# Patient Record
Sex: FEMALE | Race: WHITE | Employment: OTHER | URBAN - METROPOLITAN AREA
[De-identification: names, ages, dates, MRNs, and addresses within clinical notes are randomized per-mention and may not be internally consistent; named-entity substitution may affect disease eponyms.]

---

## 2017-02-25 ENCOUNTER — APPOINTMENT (OUTPATIENT)
Dept: GENERAL RADIOLOGY | Age: 82
DRG: 309 | End: 2017-02-25
Attending: EMERGENCY MEDICINE
Payer: MEDICARE

## 2017-02-25 ENCOUNTER — HOSPITAL ENCOUNTER (INPATIENT)
Age: 82
LOS: 1 days | Discharge: HOME | DRG: 309 | End: 2017-02-26
Attending: EMERGENCY MEDICINE | Admitting: INTERNAL MEDICINE
Payer: MEDICARE

## 2017-02-25 DIAGNOSIS — R06.00 ACUTE DYSPNEA: ICD-10-CM

## 2017-02-25 DIAGNOSIS — I48.91 ATRIAL FIBRILLATION WITH RAPID VENTRICULAR RESPONSE (HCC): Primary | ICD-10-CM

## 2017-02-25 LAB
ALBUMIN SERPL BCP-MCNC: 3.7 G/DL (ref 3.2–4.6)
ALBUMIN/GLOB SERPL: 1 {RATIO} (ref 1.2–3.5)
ALP SERPL-CCNC: 129 U/L (ref 50–136)
ALT SERPL-CCNC: 22 U/L (ref 12–65)
ANION GAP BLD CALC-SCNC: 13 MMOL/L (ref 7–16)
APTT PPP: 39.9 SEC (ref 23.5–31.7)
AST SERPL W P-5'-P-CCNC: 29 U/L (ref 15–37)
ATRIAL RATE: 208 BPM
BASOPHILS # BLD AUTO: 0 K/UL (ref 0–0.2)
BASOPHILS # BLD: 0 % (ref 0–2)
BILIRUB SERPL-MCNC: 1.4 MG/DL (ref 0.2–1.1)
BNP SERPL-MCNC: 395 PG/ML
BUN SERPL-MCNC: 15 MG/DL (ref 8–23)
CALCIUM SERPL-MCNC: 8.9 MG/DL (ref 8.3–10.4)
CALCULATED P AXIS, ECG09: NORMAL DEGREES
CALCULATED R AXIS, ECG10: -65 DEGREES
CALCULATED T AXIS, ECG11: 109 DEGREES
CHLORIDE SERPL-SCNC: 97 MMOL/L (ref 98–107)
CO2 SERPL-SCNC: 28 MMOL/L (ref 21–32)
CREAT SERPL-MCNC: 1.14 MG/DL (ref 0.6–1)
DIAGNOSIS, 93000: NORMAL
DIASTOLIC BP, ECG02: NORMAL MMHG
DIFFERENTIAL METHOD BLD: ABNORMAL
EOSINOPHIL # BLD: 0.1 K/UL (ref 0–0.8)
EOSINOPHIL NFR BLD: 1 % (ref 0.5–7.8)
ERYTHROCYTE [DISTWIDTH] IN BLOOD BY AUTOMATED COUNT: 12.7 % (ref 11.9–14.6)
GLOBULIN SER CALC-MCNC: 3.6 G/DL (ref 2.3–3.5)
GLUCOSE SERPL-MCNC: 106 MG/DL (ref 65–100)
HCT VFR BLD AUTO: 34 % (ref 35.8–46.3)
HGB BLD-MCNC: 11.4 G/DL (ref 11.7–15.4)
IMM GRANULOCYTES # BLD: 0 K/UL (ref 0–0.5)
IMM GRANULOCYTES NFR BLD AUTO: 0.1 % (ref 0–5)
LYMPHOCYTES # BLD AUTO: 16 % (ref 13–44)
LYMPHOCYTES # BLD: 1.3 K/UL (ref 0.5–4.6)
MCH RBC QN AUTO: 30.9 PG (ref 26.1–32.9)
MCHC RBC AUTO-ENTMCNC: 33.5 G/DL (ref 31.4–35)
MCV RBC AUTO: 92.1 FL (ref 79.6–97.8)
MONOCYTES # BLD: 0.8 K/UL (ref 0.1–1.3)
MONOCYTES NFR BLD AUTO: 10 % (ref 4–12)
NEUTS SEG # BLD: 5.7 K/UL (ref 1.7–8.2)
NEUTS SEG NFR BLD AUTO: 73 % (ref 43–78)
P-R INTERVAL, ECG05: NORMAL MS
PLATELET # BLD AUTO: 287 K/UL (ref 150–450)
PMV BLD AUTO: 10.9 FL (ref 10.8–14.1)
POTASSIUM SERPL-SCNC: 3.3 MMOL/L (ref 3.5–5.1)
PROT SERPL-MCNC: 7.3 G/DL (ref 6.3–8.2)
Q-T INTERVAL, ECG07: 382 MS
QRS DURATION, ECG06: 138 MS
QTC CALCULATION (BEZET), ECG08: 539 MS
RBC # BLD AUTO: 3.69 M/UL (ref 4.05–5.25)
SODIUM SERPL-SCNC: 138 MMOL/L (ref 136–145)
SYSTOLIC BP, ECG01: NORMAL MMHG
TROPONIN I SERPL-MCNC: 0.05 NG/ML (ref 0.02–0.05)
TROPONIN I SERPL-MCNC: 0.06 NG/ML (ref 0.02–0.05)
VENTRICULAR RATE, ECG03: 120 BPM
WBC # BLD AUTO: 7.8 K/UL (ref 4.3–11.1)

## 2017-02-25 PROCEDURE — 96365 THER/PROPH/DIAG IV INF INIT: CPT | Performed by: EMERGENCY MEDICINE

## 2017-02-25 PROCEDURE — 74011250636 HC RX REV CODE- 250/636: Performed by: PHYSICIAN ASSISTANT

## 2017-02-25 PROCEDURE — 36415 COLL VENOUS BLD VENIPUNCTURE: CPT | Performed by: INTERNAL MEDICINE

## 2017-02-25 PROCEDURE — 85025 COMPLETE CBC W/AUTO DIFF WBC: CPT | Performed by: EMERGENCY MEDICINE

## 2017-02-25 PROCEDURE — 74011000250 HC RX REV CODE- 250: Performed by: INTERNAL MEDICINE

## 2017-02-25 PROCEDURE — 99285 EMERGENCY DEPT VISIT HI MDM: CPT | Performed by: EMERGENCY MEDICINE

## 2017-02-25 PROCEDURE — 96375 TX/PRO/DX INJ NEW DRUG ADDON: CPT | Performed by: EMERGENCY MEDICINE

## 2017-02-25 PROCEDURE — 74011000258 HC RX REV CODE- 258: Performed by: PHYSICIAN ASSISTANT

## 2017-02-25 PROCEDURE — 85730 THROMBOPLASTIN TIME PARTIAL: CPT | Performed by: INTERNAL MEDICINE

## 2017-02-25 PROCEDURE — 65660000000 HC RM CCU STEPDOWN

## 2017-02-25 PROCEDURE — 80053 COMPREHEN METABOLIC PANEL: CPT | Performed by: EMERGENCY MEDICINE

## 2017-02-25 PROCEDURE — 74011000250 HC RX REV CODE- 250: Performed by: EMERGENCY MEDICINE

## 2017-02-25 PROCEDURE — 74011250636 HC RX REV CODE- 250/636: Performed by: INTERNAL MEDICINE

## 2017-02-25 PROCEDURE — 84484 ASSAY OF TROPONIN QUANT: CPT | Performed by: EMERGENCY MEDICINE

## 2017-02-25 PROCEDURE — C8929 TTE W OR WO FOL WCON,DOPPLER: HCPCS

## 2017-02-25 PROCEDURE — 74011000258 HC RX REV CODE- 258: Performed by: INTERNAL MEDICINE

## 2017-02-25 PROCEDURE — 83880 ASSAY OF NATRIURETIC PEPTIDE: CPT | Performed by: EMERGENCY MEDICINE

## 2017-02-25 PROCEDURE — 93005 ELECTROCARDIOGRAM TRACING: CPT | Performed by: EMERGENCY MEDICINE

## 2017-02-25 PROCEDURE — 77030019605

## 2017-02-25 PROCEDURE — 74011250637 HC RX REV CODE- 250/637: Performed by: PHYSICIAN ASSISTANT

## 2017-02-25 PROCEDURE — 71010 XR CHEST PORT: CPT

## 2017-02-25 PROCEDURE — 74011250637 HC RX REV CODE- 250/637: Performed by: INTERNAL MEDICINE

## 2017-02-25 RX ORDER — ZOLPIDEM TARTRATE 5 MG/1
5 TABLET ORAL
Status: DISCONTINUED | OUTPATIENT
Start: 2017-02-25 | End: 2017-02-26 | Stop reason: HOSPADM

## 2017-02-25 RX ORDER — ASPIRIN 81 MG/1
81 TABLET ORAL DAILY
Status: DISCONTINUED | OUTPATIENT
Start: 2017-02-26 | End: 2017-02-26 | Stop reason: HOSPADM

## 2017-02-25 RX ORDER — ACETAMINOPHEN 325 MG/1
650 TABLET ORAL
Status: DISCONTINUED | OUTPATIENT
Start: 2017-02-25 | End: 2017-02-26 | Stop reason: HOSPADM

## 2017-02-25 RX ORDER — AMLODIPINE BESYLATE 5 MG/1
2.5 TABLET ORAL DAILY
Status: DISCONTINUED | OUTPATIENT
Start: 2017-02-26 | End: 2017-02-26 | Stop reason: HOSPADM

## 2017-02-25 RX ORDER — GLIMEPIRIDE 2 MG/1
1 TABLET ORAL
Status: DISCONTINUED | OUTPATIENT
Start: 2017-02-26 | End: 2017-02-26 | Stop reason: HOSPADM

## 2017-02-25 RX ORDER — PANTOPRAZOLE SODIUM 40 MG/1
40 TABLET, DELAYED RELEASE ORAL
Status: DISCONTINUED | OUTPATIENT
Start: 2017-02-26 | End: 2017-02-26 | Stop reason: HOSPADM

## 2017-02-25 RX ORDER — HEPARIN SODIUM 5000 [USP'U]/100ML
12-25 INJECTION, SOLUTION INTRAVENOUS
Status: DISCONTINUED | OUTPATIENT
Start: 2017-02-25 | End: 2017-02-26 | Stop reason: HOSPADM

## 2017-02-25 RX ORDER — NITROGLYCERIN 0.4 MG/1
0.4 TABLET SUBLINGUAL
Status: DISCONTINUED | OUTPATIENT
Start: 2017-02-25 | End: 2017-02-26 | Stop reason: HOSPADM

## 2017-02-25 RX ORDER — POLYETHYLENE GLYCOL 3350 17 G/17G
17 POWDER, FOR SOLUTION ORAL
Status: DISCONTINUED | OUTPATIENT
Start: 2017-02-25 | End: 2017-02-26 | Stop reason: HOSPADM

## 2017-02-25 RX ORDER — DILTIAZEM HYDROCHLORIDE 5 MG/ML
0.25 INJECTION INTRAVENOUS
Status: COMPLETED | OUTPATIENT
Start: 2017-02-25 | End: 2017-02-25

## 2017-02-25 RX ORDER — METOPROLOL TARTRATE 25 MG/1
25 TABLET, FILM COATED ORAL EVERY 6 HOURS
Status: DISCONTINUED | OUTPATIENT
Start: 2017-02-25 | End: 2017-02-26

## 2017-02-25 RX ORDER — DILTIAZEM HYDROCHLORIDE 5 MG/ML
10 INJECTION INTRAVENOUS ONCE
Status: COMPLETED | OUTPATIENT
Start: 2017-02-25 | End: 2017-02-25

## 2017-02-25 RX ORDER — MORPHINE SULFATE 2 MG/ML
2 INJECTION, SOLUTION INTRAMUSCULAR; INTRAVENOUS
Status: DISCONTINUED | OUTPATIENT
Start: 2017-02-25 | End: 2017-02-26 | Stop reason: HOSPADM

## 2017-02-25 RX ORDER — HYDROCODONE BITARTRATE AND ACETAMINOPHEN 5; 325 MG/1; MG/1
1 TABLET ORAL
Status: DISCONTINUED | OUTPATIENT
Start: 2017-02-25 | End: 2017-02-26 | Stop reason: HOSPADM

## 2017-02-25 RX ORDER — FUROSEMIDE 10 MG/ML
40 INJECTION INTRAMUSCULAR; INTRAVENOUS ONCE
Status: COMPLETED | OUTPATIENT
Start: 2017-02-25 | End: 2017-02-25

## 2017-02-25 RX ORDER — HEPARIN SODIUM 5000 [USP'U]/ML
35 INJECTION, SOLUTION INTRAVENOUS; SUBCUTANEOUS ONCE
Status: COMPLETED | OUTPATIENT
Start: 2017-02-25 | End: 2017-02-25

## 2017-02-25 RX ORDER — LEVOTHYROXINE SODIUM 50 UG/1
25 TABLET ORAL
Status: DISCONTINUED | OUTPATIENT
Start: 2017-02-26 | End: 2017-02-26 | Stop reason: HOSPADM

## 2017-02-25 RX ORDER — METOPROLOL TARTRATE 25 MG/1
25 TABLET, FILM COATED ORAL EVERY 12 HOURS
Status: DISCONTINUED | OUTPATIENT
Start: 2017-02-25 | End: 2017-02-25

## 2017-02-25 RX ORDER — HEPARIN SODIUM 5000 [USP'U]/ML
4000 INJECTION, SOLUTION INTRAVENOUS; SUBCUTANEOUS ONCE
Status: COMPLETED | OUTPATIENT
Start: 2017-02-25 | End: 2017-02-25

## 2017-02-25 RX ORDER — GABAPENTIN 300 MG/1
600 CAPSULE ORAL 3 TIMES DAILY
Status: DISCONTINUED | OUTPATIENT
Start: 2017-02-25 | End: 2017-02-26 | Stop reason: HOSPADM

## 2017-02-25 RX ADMIN — DILTIAZEM HYDROCHLORIDE 11.5 MG: 5 INJECTION INTRAVENOUS at 08:17

## 2017-02-25 RX ADMIN — METOPROLOL TARTRATE 25 MG: 25 TABLET ORAL at 13:27

## 2017-02-25 RX ADMIN — HEPARIN SODIUM 4000 UNITS: 5000 INJECTION INTRAVENOUS; SUBCUTANEOUS at 11:04

## 2017-02-25 RX ADMIN — GABAPENTIN 600 MG: 300 CAPSULE ORAL at 13:27

## 2017-02-25 RX ADMIN — DILTIAZEM HYDROCHLORIDE 10 MG: 5 INJECTION INTRAVENOUS at 15:07

## 2017-02-25 RX ADMIN — AMIODARONE HYDROCHLORIDE 150 MG: 50 INJECTION, SOLUTION INTRAVENOUS at 13:17

## 2017-02-25 RX ADMIN — HEPARIN SODIUM AND DEXTROSE 12 UNITS/KG/HR: 5000; 5 INJECTION INTRAVENOUS at 11:01

## 2017-02-25 RX ADMIN — GABAPENTIN 600 MG: 300 CAPSULE ORAL at 21:05

## 2017-02-25 RX ADMIN — HEPARIN SODIUM 1550 UNITS: 5000 INJECTION, SOLUTION INTRAVENOUS; SUBCUTANEOUS at 19:21

## 2017-02-25 RX ADMIN — SODIUM CHLORIDE 5 MG/HR: 900 INJECTION, SOLUTION INTRAVENOUS at 15:12

## 2017-02-25 RX ADMIN — METOPROLOL TARTRATE 25 MG: 25 TABLET ORAL at 17:12

## 2017-02-25 RX ADMIN — AMIODARONE HYDROCHLORIDE 1 MG/MIN: 50 INJECTION, SOLUTION INTRAVENOUS at 13:36

## 2017-02-25 RX ADMIN — FUROSEMIDE 40 MG: 10 INJECTION, SOLUTION INTRAMUSCULAR; INTRAVENOUS at 13:29

## 2017-02-25 RX ADMIN — PERFLUTREN 1 ML: 6.52 INJECTION, SUSPENSION INTRAVENOUS at 14:00

## 2017-02-25 NOTE — H&P
[facility] CARDIOLOGY History &Physical                 Primary Cardiologist: Dr. Mauri Gomes Physician: Dr. Yamel Gupta:     Patient is a 80 y.o. female who presents with tachypalpitations and dyspnea. She was seen in our office yesterday due to dyspnea. Her BP was markedly elevated. She has history of PAF and was in sinus rhythm in the office. Her Florinef was stopped and she was started on clonidine. Her dyspnea worsened overnight and she felt like she was smothering. She noted tachypalpitations as well. She presented to the ER and was found to be in atrial fibrillation with RVR. BNP was mildly elevated.      Past history includes PAF, orthostatic hypotension, CAD, CKD    Past Medical History:   Diagnosis Date    A fib     Abnormal loss of weight     Acquired hypothyroidism 9/12/2012    Anemia     Anorexia 9/2/2014    Arthritis associated with another disorder     Atrial fibrillation ([address] 9/12/2012    Paroxysmal.  Coumadin contraindicated due to falls      Autonomic neuropathy 1/13/2013    CAD (coronary artery disease)     CAD in native artery 5/5/2016    Cervical radiculopathy 5/23/2013    Chest pain     Chronic pain syndrome 1/13/2013    Back, right shoulder, neck: Peripheral neuropathy, spinal stenosis C7-T1, foraminal narrowing C4-5     CKD (chronic kidney disease), stage III 9/12/2012    Diabetes ([address]     about 30 years    Diabetes mellitus     Diabetes mellitus, type 2 ([address] 9/12/2012    Dysphagia 9/2/2014    Due to esophageal spasm seen on modified barium swallow 2015     Gait disorder 9/12/2012    GERD (gastroesophageal reflux disease)     Heart failure ([address]     HTN     Hypertension 9/12/2012    Orthostatic hypotension due to autonomic neuropathy     Hypothyroid     IBS (irritable bowel syndrome) 9/12/2012    Iron deficiency 9/12/2012    Lumbar disc disease     MCI (mild cognitive impairment) 1/13/2013    Mixed hyperlipidemia 9/12/2012    Orthostatic hypotension 5/5/2016    Pacemaker     SSS (sick sinus syndrome) ([facility] 5/5/2016      Past Surgical History:   Procedure Laterality Date    HX APPENDECTOMY      HX CARPAL TUNNEL RELEASE      HX CERVICAL FUSION      HX COLONOSCOPY  Nov 2005    HX PACEMAKER  6/08      Allergies   Allergen Reactions    Other Medication Unknown (comments)     Tylenol (caused Insomnia)     Social History   Substance Use Topics    Smoking status: Never Smoker    Smokeless tobacco: Never Used    Alcohol use No      FH: No family history on file. Review of Systems   Constitution: Negative for chills, fever, weakness, malaise/fatigue, weight gain and weight loss. HENT: Negative for ear pain, headaches, hearing loss, nosebleeds, sore throat and tinnitus. Eyes: Negative for blurred vision, vision loss in left eye and vision loss in right eye. Cardiovascular: Positive for orthopnea and palpitations. Negative for chest pain, dyspnea on exertion, leg swelling, near-syncope, paroxysmal nocturnal dyspnea and syncope. Respiratory: Positive for shortness of breath. Negative for cough, hemoptysis, sputum production and wheezing. Endocrine: Negative for cold intolerance, heat intolerance and polydipsia. Hematologic/Lymphatic: Does not bruise/bleed easily. Skin: Negative for color change and rash. Musculoskeletal: Negative for back pain, joint pain, joint swelling and myalgias. Gastrointestinal: Negative for abdominal pain, constipation, diarrhea, dysphagia, heartburn, hematemesis, melena, nausea and vomiting. Genitourinary: Negative for dysuria, frequency, hematuria and urgency. Neurological: Negative for difficulty with concentration, dizziness, light-headedness, numbness, paresthesias, seizures and vertigo. Psychiatric/Behavioral: Negative for altered mental status and depression.          Objective:       Visit Vitals    /87 (BP 1 Location: Left arm, BP Patient Position: Sitting)    Pulse (!) 123    Temp 97.6 °F (36.4 °C)    Resp 16    Ht 5' 4\" (1.626 m)    Wt 45.4 kg (100 lb)    SpO2 98%    BMI 17.16 kg/m2       Physical Exam:  General: Well Developed, Well Nourished, No Acute Distress  HEENT: pupils equal and round, no abnormalities noted  Neck: supple, no JVD  Heart: S1S2 with IRR  Lungs: decreased at bilateral bases  Abd: soft, nontender, nondistended, with good bowel sounds  Ext: warm, no edema, calves supple/nontender, pulses 2+ bilaterally  Skin: warm and dry  Psychiatric: Normal mood and affect  Neurologic: Alert and oriented X 3      ECG: atrial fibrillation with RVR, LBBB    Data Review:   Recent Labs      02/25/17   0720   NA  138   K  3.3*   BUN  15   CREA  1.14*   GLU  106*   WBC  7.8   HGB  11.4*   HCT  34.0*   PLT  287   TROIQ  0.06*       CXR: Small amount of layering effusion versus atelectasis suspected now at the left base    Assessment/Plan:   Principal Problem:    Atrial fibrillation with RVR (HCC) (9/12/2012)-admit, will start IV amiodarone, IV Heparin, monitor on telemetry    Active Problems:    Hypertension (9/12/2012)-monitor, was recently on florinef and Norvasc, florinef stopped yesterday, will start BB, will monitor BP, repeat orthostatics      Acquired hypothyroidism (9/12/2012)-continue synthroid      Dyspnea (2/25/2017)-will give one dose of IV lasix and monitor response       Hypokalemia-replace and monitor      Kenneth Weeks PA-C  2/25/2017  10:21 AM

## 2017-02-25 NOTE — PROGRESS NOTES
TRANSFER - IN REPORT:    Verbal report received from SALVATORE Toney on Peggy Busch being received from ED for routine progression of care      Report consisted of patients Situation, Background, Assessment and Recommendations(SBAR). Information from the following report(s) SBAR, Kardex, [facility] and Recent Results was reviewed with the receiving nurse. Opportunity for questions and clarification was provided. Patient had amiodarone bolus and infusion due at 1040 on MAR. Patient arrived to floor not on amiodarone drip. Called pharmacy to find drip. Pharmacy stated medication was sent to ED. Called ED, they said medication was sent to 3rd floor telemetry. Medication not currently on floor. Will look for medication. Assessment completed upon patients arrival to unit and care assumed. Dual RN skin assessment complete. Patient's skin found to be fragile, dry , warm and intact. Sacrum and heels visualized with no noted skin breakdown. Allevyn applied to sacrum to prevent skin breakdown. No other noted skin issues. Call light in reach, gripper socks on, family at bedside.

## 2017-02-25 NOTE — PROGRESS NOTES
Problem: Falls - Risk of  Goal: *Absence of falls  Outcome: Progressing Towards Goal  Patient agrees to use call bell and ask for assistance to get out of bed. Gripper sock on, call light in place, bed alarm on, and family at bedside.

## 2017-02-25 NOTE — ED NOTES
Patient is resting on the stretcher. Patient is on B cardiac monitor, continuous pulse ox, and cycling vital signs. Patient denies needs at this time. Visitor at bedside. Call light within reach. Side rails x 2 for safety.  Will continue to monitor

## 2017-02-25 NOTE — ED NOTES
TRANSFER - OUT REPORT:    Verbal report given to SALVATORE Josue on Riana Kowalski  being transferred to [facility] for routine progression of care       Report consisted of patients Situation, Background, Assessment and   Recommendations(SBAR). Information from the following report(s) SBAR, Kardex, ED Summary, MAR, Accordion and Recent Results was reviewed with the receiving nurse. Lines:   Peripheral IV 02/25/17 Left Antecubital (Active)   Site Assessment Clean, dry, & intact 2/25/2017 11:34 AM   Phlebitis Assessment 0 2/25/2017 11:34 AM   Infiltration Assessment 0 2/25/2017 11:34 AM   Dressing Status Clean, dry, & intact 2/25/2017 11:34 AM        Opportunity for questions and clarification was provided.       Patient transported with:   Monitor  O2 @ 2 liters  Registered Nurse

## 2017-02-25 NOTE — ED PROVIDER NOTES
Patient is a 80 y.o. female presenting with shortness of breath. The history is provided by the patient and a relative. Shortness of Breath   This is a new problem. The average episode lasts 2 days. The problem occurs continuously. The current episode started yesterday. The problem has been gradually worsening. Associated symptoms include orthopnea and chest pain (patient did take one nitroglycerin last night about 11 PM.  Unclear whether she took it for chest pain or just because she was more short of breath.). Pertinent negatives include no fever, no headaches, no coryza, no rhinorrhea, no sore throat, no swollen glands, no ear pain, no neck pain, no cough, no sputum production, no hemoptysis, no wheezing, no PND, no syncope, no vomiting, no abdominal pain, no rash, no leg pain, no leg swelling and no claudication. It is unknown what precipitated the problem. She has tried nothing for the symptoms. The treatment provided no relief. She has had prior hospitalizations. She has had prior ED visits. She has had no prior ICU admissions. Associated medical issues include CAD and heart failure. Associated medical issues do not include asthma, COPD, chronic lung disease, past MI, DVT or recent surgery.         Past Medical History:   Diagnosis Date    A fib     Abnormal loss of weight     Acquired hypothyroidism 9/12/2012    Anemia     Anorexia 9/2/2014    Arthritis associated with another disorder     Atrial fibrillation ([address] 9/12/2012    Paroxysmal.  Coumadin contraindicated due to falls      Autonomic neuropathy 1/13/2013    CAD (coronary artery disease)     CAD in native artery 5/5/2016    Cervical radiculopathy 5/23/2013    Chest pain     Chronic pain syndrome 1/13/2013    Back, right shoulder, neck: Peripheral neuropathy, spinal stenosis C7-T1, foraminal narrowing C4-5     CKD (chronic kidney disease), stage III 9/12/2012    Diabetes ([address]     about 30 years    Diabetes mellitus     Diabetes mellitus, type 2 ([facility] 9/12/2012    Dysphagia 9/2/2014    Due to esophageal spasm seen on modified barium swallow 2015     Gait disorder 9/12/2012    GERD (gastroesophageal reflux disease)     Heart failure ([facility]     HTN     Hypertension 9/12/2012    Orthostatic hypotension due to autonomic neuropathy     Hypothyroid     IBS (irritable bowel syndrome) 9/12/2012    Iron deficiency 9/12/2012    Lumbar disc disease     MCI (mild cognitive impairment) 1/13/2013    Mixed hyperlipidemia 9/12/2012    Orthostatic hypotension 5/5/2016    Pacemaker     SSS [facility]sick [facility] syndrome) ([facility] 5/5/2016       Past Surgical History:   Procedure Laterality Date    HX APPENDECTOMY      HX CARPAL TUNNEL RELEASE      HX CERVICAL FUSION      HX COLONOSCOPY  Nov 2005    HX PACEMAKER  6/08         No family history on file. Social History     Social History    Marital status:      Spouse name: N/A    Number of children: N/A    Years of education: N/A     Occupational History    Not on file. Social History Main Topics    Smoking status: Never Smoker    Smokeless tobacco: Never Used    Alcohol use No    Drug use: No    Sexual activity: Not on file     Other Topics Concern    Not on file     Social History Narrative    Lives with  who has mild vascular dementia, patient is primary caretaker but daughters come over to house 4-5 times a week, Meghan Corona has HCPOA         ALLERGIES: Other medication    Review of Systems   Constitutional: Negative for fever. HENT: Negative for ear pain, rhinorrhea and sore throat. Respiratory: Positive for shortness of breath. Negative for cough, hemoptysis, sputum production and wheezing. Cardiovascular: Positive for chest pain (patient did take one nitroglycerin last night about 11 PM.  Unclear whether she took it for chest pain or just because she was more short of breath.) and orthopnea. Negative for claudication, leg swelling, syncope and PND. Gastrointestinal: Negative for abdominal pain and vomiting. Musculoskeletal: Negative for neck pain. Skin: Negative for rash. Neurological: Negative for headaches. All other systems reviewed and are negative. Vitals:    02/25/17 0718 02/25/17 0748   BP: 180/87    Pulse: (!) 123    Resp: 16    Temp: 97.6 °F (36.4 °C)    SpO2: 94% 96%   Weight: 45.4 kg (100 lb)    Height: 5' 4\" (1.626 m)             Physical Exam   Constitutional: She is oriented to person, place, and time. She appears well-developed and well-nourished. She has a sickly appearance. She appears distressed. HENT:   Head: Normocephalic and atraumatic. Right Ear: Tympanic membrane and external ear normal.   Left Ear: Tympanic membrane and external ear normal.   Mouth/Throat: Oropharynx is clear and moist.   Eyes: Conjunctivae and EOM are normal. Pupils are equal, round, and reactive to light. Neck: Normal range of motion. Neck supple. No tracheal deviation present. Cardiovascular: Normal heart sounds and intact distal pulses. An irregularly irregular rhythm present. Tachycardia present. Exam reveals no gallop and no friction rub. No murmur heard. Pulmonary/Chest: Effort normal and breath sounds normal. No respiratory distress. She has no wheezes. Abdominal: Soft. Bowel sounds are normal. She exhibits no distension and no mass. There is no hepatosplenomegaly. There is no tenderness. There is no rebound and no guarding. Musculoskeletal: Normal range of motion. She exhibits no edema. Lymphadenopathy:     She has no cervical adenopathy. Neurological: She is alert and oriented to person, place, and time. She displays normal reflexes. No cranial nerve deficit. Skin: Skin is warm and dry. No rash noted. She is not diaphoretic. No erythema. Psychiatric: She has a normal mood and affect. Nursing note and vitals reviewed.        MDM  Number of Diagnoses or Management Options  Acute dyspnea: new and requires workup  Atrial fibrillation with rapid ventricular response ([facility]): established and worsening     Amount and/or Complexity of Data Reviewed  Clinical lab tests: ordered and reviewed  Tests in the radiology section of CPT®: ordered and reviewed  Decide to obtain previous medical records or to obtain history from someone other than the patient: yes  Review and summarize past medical records: yes  Discuss the patient with other providers: yes  Independent visualization of images, tracings, or specimens: yes    Risk of Complications, Morbidity, and/or Mortality  Presenting problems: high  Diagnostic procedures: moderate  Management options: high    Patient Progress  Patient progress: improved    ED Course       Procedures

## 2017-02-25 NOTE — ED TRIAGE NOTES
Per patient she shaking and cant sleep and having shortness of breath, patient was evaluated by cardiologist yesterday and took 1 nitro. Patient is A+Ox4 but debatable if patient is able to make proper decisions.  \" I want to see my cardiologist, if I can't I will just go to [facility]\"

## 2017-02-26 VITALS
SYSTOLIC BLOOD PRESSURE: 174 MMHG | TEMPERATURE: 97.8 F | OXYGEN SATURATION: 97 % | BODY MASS INDEX: 15.91 KG/M2 | HEIGHT: 64 IN | WEIGHT: 93.2 LBS | DIASTOLIC BLOOD PRESSURE: 74 MMHG | RESPIRATION RATE: 18 BRPM | HEART RATE: 64 BPM

## 2017-02-26 LAB
ANION GAP BLD CALC-SCNC: 8 MMOL/L (ref 7–16)
APTT PPP: 44.8 SEC (ref 23.5–31.7)
BUN SERPL-MCNC: 17 MG/DL (ref 8–23)
CALCIUM SERPL-MCNC: 8 MG/DL (ref 8.3–10.4)
CHLORIDE SERPL-SCNC: 99 MMOL/L (ref 98–107)
CO2 SERPL-SCNC: 33 MMOL/L (ref 21–32)
CREAT SERPL-MCNC: 1.13 MG/DL (ref 0.6–1)
ERYTHROCYTE [DISTWIDTH] IN BLOOD BY AUTOMATED COUNT: 12.6 % (ref 11.9–14.6)
GLUCOSE SERPL-MCNC: 102 MG/DL (ref 65–100)
HCT VFR BLD AUTO: 30.4 % (ref 35.8–46.3)
HGB BLD-MCNC: 10.4 G/DL (ref 11.7–15.4)
MCH RBC QN AUTO: 31.3 PG (ref 26.1–32.9)
MCHC RBC AUTO-ENTMCNC: 34.2 G/DL (ref 31.4–35)
MCV RBC AUTO: 91.6 FL (ref 79.6–97.8)
PLATELET # BLD AUTO: 281 K/UL (ref 150–450)
PMV BLD AUTO: 10.4 FL (ref 10.8–14.1)
POTASSIUM SERPL-SCNC: 3 MMOL/L (ref 3.5–5.1)
RBC # BLD AUTO: 3.32 M/UL (ref 4.05–5.25)
SODIUM SERPL-SCNC: 140 MMOL/L (ref 136–145)
TSH SERPL DL<=0.005 MIU/L-ACNC: 1.58 UIU/ML (ref 0.36–3.74)
WBC # BLD AUTO: 5.3 K/UL (ref 4.3–11.1)

## 2017-02-26 PROCEDURE — 74011250637 HC RX REV CODE- 250/637: Performed by: INTERNAL MEDICINE

## 2017-02-26 PROCEDURE — 74011250636 HC RX REV CODE- 250/636: Performed by: INTERNAL MEDICINE

## 2017-02-26 PROCEDURE — 84443 ASSAY THYROID STIM HORMONE: CPT | Performed by: PHYSICIAN ASSISTANT

## 2017-02-26 PROCEDURE — 85730 THROMBOPLASTIN TIME PARTIAL: CPT | Performed by: INTERNAL MEDICINE

## 2017-02-26 PROCEDURE — 74011250637 HC RX REV CODE- 250/637: Performed by: PHYSICIAN ASSISTANT

## 2017-02-26 PROCEDURE — 36415 COLL VENOUS BLD VENIPUNCTURE: CPT | Performed by: PHYSICIAN ASSISTANT

## 2017-02-26 PROCEDURE — 80048 BASIC METABOLIC PNL TOTAL CA: CPT | Performed by: PHYSICIAN ASSISTANT

## 2017-02-26 PROCEDURE — 74011250636 HC RX REV CODE- 250/636: Performed by: PHYSICIAN ASSISTANT

## 2017-02-26 PROCEDURE — 85027 COMPLETE CBC AUTOMATED: CPT | Performed by: PHYSICIAN ASSISTANT

## 2017-02-26 RX ORDER — FUROSEMIDE 20 MG/1
20 TABLET ORAL DAILY
Qty: 30 TAB | Refills: 3 | Status: SHIPPED | OUTPATIENT
Start: 2017-02-26 | End: 2017-03-21

## 2017-02-26 RX ORDER — POTASSIUM CHLORIDE 750 MG/1
10 TABLET, EXTENDED RELEASE ORAL DAILY
Qty: 30 TAB | Refills: 3 | Status: SHIPPED | OUTPATIENT
Start: 2017-02-26 | End: 2017-07-13 | Stop reason: SDUPTHER

## 2017-02-26 RX ORDER — POTASSIUM CHLORIDE 20 MEQ/1
40 TABLET, EXTENDED RELEASE ORAL
Status: COMPLETED | OUTPATIENT
Start: 2017-02-26 | End: 2017-02-26

## 2017-02-26 RX ORDER — POTASSIUM CHLORIDE 14.9 MG/ML
20 INJECTION INTRAVENOUS ONCE
Status: COMPLETED | OUTPATIENT
Start: 2017-02-26 | End: 2017-02-26

## 2017-02-26 RX ORDER — CARVEDILOL 12.5 MG/1
12.5 TABLET ORAL 2 TIMES DAILY WITH MEALS
Qty: 60 TAB | Refills: 6 | Status: SHIPPED | OUTPATIENT
Start: 2017-02-26 | End: 2017-10-13 | Stop reason: SDUPTHER

## 2017-02-26 RX ORDER — HEPARIN SODIUM 5000 [USP'U]/ML
35 INJECTION, SOLUTION INTRAVENOUS; SUBCUTANEOUS ONCE
Status: COMPLETED | OUTPATIENT
Start: 2017-02-26 | End: 2017-02-26

## 2017-02-26 RX ORDER — LISINOPRIL 10 MG/1
10 TABLET ORAL DAILY
Qty: 30 TAB | Refills: 6 | Status: SHIPPED | OUTPATIENT
Start: 2017-02-26 | End: 2017-03-11

## 2017-02-26 RX ORDER — CARVEDILOL 12.5 MG/1
12.5 TABLET ORAL 2 TIMES DAILY WITH MEALS
Status: DISCONTINUED | OUTPATIENT
Start: 2017-02-26 | End: 2017-02-26 | Stop reason: HOSPADM

## 2017-02-26 RX ADMIN — POTASSIUM CHLORIDE 40 MEQ: 20 TABLET, EXTENDED RELEASE ORAL at 09:15

## 2017-02-26 RX ADMIN — GLIMEPIRIDE 1 MG: 2 TABLET ORAL at 06:27

## 2017-02-26 RX ADMIN — METOPROLOL TARTRATE 25 MG: 25 TABLET ORAL at 00:09

## 2017-02-26 RX ADMIN — AMLODIPINE BESYLATE 2.5 MG: 5 TABLET ORAL at 09:15

## 2017-02-26 RX ADMIN — PANTOPRAZOLE SODIUM 40 MG: 40 TABLET, DELAYED RELEASE ORAL at 06:28

## 2017-02-26 RX ADMIN — ASPIRIN 81 MG: 81 TABLET, COATED ORAL at 09:16

## 2017-02-26 RX ADMIN — METOPROLOL TARTRATE 25 MG: 25 TABLET ORAL at 06:23

## 2017-02-26 RX ADMIN — LEVOTHYROXINE SODIUM 25 MCG: 50 TABLET ORAL at 06:27

## 2017-02-26 RX ADMIN — GABAPENTIN 600 MG: 300 CAPSULE ORAL at 06:23

## 2017-02-26 RX ADMIN — HEPARIN SODIUM 1550 UNITS: 5000 INJECTION, SOLUTION INTRAVENOUS; SUBCUTANEOUS at 03:32

## 2017-02-26 RX ADMIN — CARVEDILOL 12.5 MG: 12.5 TABLET, FILM COATED ORAL at 09:16

## 2017-02-26 RX ADMIN — POTASSIUM CHLORIDE 20 MEQ: 14.9 INJECTION, SOLUTION INTRAVENOUS at 09:22

## 2017-02-26 NOTE — DISCHARGE SUMMARY
[facility] Cardiology Discharge Summary     Patient ID:  Yousuf Alonzo  [id number]  19 y.o.  8/10/1927    Admit date: 2/25/2017    Discharge date:  2/26/2017    Admitting Physician: Jeannette Steward MD     Discharge Physician: ALBERTO Ball/Dr. Loretta Gonsales     Admission Diagnoses: Atrial fibrillation with rapid ventricular response [facility])    Discharge Diagnoses:   Patient Active Problem List    Diagnosis Date Noted    Dyspnea 02/25/2017    Atrial fibrillation with rapid ventricular response ([address] 02/25/2017    SSS (sick sinus syndrome) ([facility] 05/05/2016    Orthostatic hypotension 05/05/2016    CAD in native artery 05/05/2016    Pacemaker 11/10/2015    Anorexia 09/02/2014    Dysphagia 09/02/2014    Cervical radiculopathy 05/23/2013    Autonomic neuropathy 01/13/2013    MCI (mild cognitive impairment) 01/13/2013    Chronic pain syndrome 01/13/2013    Diabetes mellitus, type 2 ([address] 09/12/2012    Hypertension 09/12/2012    Acquired hypothyroidism 09/12/2012    Mixed hyperlipidemia 09/12/2012    CKD (chronic kidney disease), stage III 09/12/2012    GERD (gastroesophageal reflux disease) 09/12/2012    IBS (irritable bowel syndrome) 09/12/2012    Gait disorder 09/12/2012    Atrial fibrillation with RVR ([facility] 09/12/2012       Cardiology Procedures this admission:  EchoCardiogram  Consults: None    Hospital Course: Patient presented to the emergency department of [facility] with complaints of tachypalpitations and shortness of breath. She was seen in our office on 2/24 for complaints of dyspnea. Her BP was markedly elevated. She has history of PAF and was in sinus rhythm in the office. Her Florinef was stopped and she was started on clonidine. Her dyspnea worsened overnight and she felt like she was smothering. She noted tachypalpitations as well. She presented to the ER and was found to be in atrial fibrillation with RVR. BNP was mildly elevated. She was admitted.  She was given IV amiodarone bolus and started on drip. She was started on IV Heparin as well. She was given single dose of IV lasix with good diuresis. She remained in atrial fibrillation with RVR. She was changed to IV Cardizem. She converted to sinus rhythm. She felt much better with less dyspnea. TSH was within normal limits. She had been on oral amiodarone 200mg daily PTA. An echocardiogram was performed with report as follows:  -  Left ventricle: Systolic function was markedly reduced. Ejection fraction was estimated in the range of 30 % to 35 %. This study was inadequate for the evaluation of regional wall motion. Possible mild anterior hypokinesis. There was moderate concentric hypertrophy. -  Left atrium: The atrium was mildly dilated. -  Right atrium: The atrium was mildly dilated. -  Inferior vena cava, hepatic veins: The inferior vena cava was mildly dilated. -  Mitral valve: There was mild annular calcification. There was mild to moderate regurgitation. -  Tricuspid valve: There was mild regurgitation. -  Pericardium: A trivial pericardial effusion was identified. She has known history of CAD but denies any recent chest pain. The morning of 2/26/17, the patient was feeling much better and remained in sinus rhythm. The patient was seen and examined by Dr. Jae Mojica and was determined stable and ready for discharge home. The patient was instructed on the importance of medication compliance and outpatient follow up. For systolic CHF, she is discharged on BB, ACE-I, oral lasix and potassium. She was determined not to be an anticoagulation candidate in the past by Dr. Conor Russ. The patient will follow up with [facility] Dr. Conor Russ in 1-2 weeks. DISPOSITION: The patient is being discharged home in stable condition on a low saturated fat, low cholesterol and low salt diet. The patient is instructed to advance activities as tolerated to the limit of fatigue or shortness of breath.  The patient is instructed to call the office or return to the ER for immediate evaluation for any severe shortness of breath, weight gain, LE edema, orthopnea, chest pain, prolonged palpitations, near syncope or syncope.     Discharge Exam:   Visit Vitals    /52 (BP 1 Location: Right arm, BP Patient Position: At rest)    Pulse 62    Temp 97.9 °F (36.6 °C)    Resp 16    Ht 5' 4\" (1.626 m)    Wt 42.3 kg (93 lb 3.2 oz)    SpO2 91%    BMI 16 kg/m2       Physical Exam:  General: Well Developed, Well Nourished, No Acute Distress, Alert & Oriented  Neck: supple, no JVD  Heart: S1S2 with RRR  Lungs: Clear throughout auscultation bilaterally  Abd: soft, nontender, nondistended, with good bowel sounds  Ext: warm, no edema, calves supple/nontender, pulses 2+ bilaterally  Skin: warm and dry      Recent Results (from the past 24 hour(s))   TROPONIN I    Collection Time: 02/25/17 10:10 AM   Result Value Ref Range    Troponin-I, Qt. 0.05 0.02 - 0.05 NG/ML   PTT    Collection Time: 02/25/17  4:10 PM   Result Value Ref Range    aPTT 39.9 (H) 23.5 - 75.2 SEC   METABOLIC PANEL, BASIC    Collection Time: 02/26/17  2:00 AM   Result Value Ref Range    Sodium 140 136 - 145 mmol/L    Potassium 3.0 (L) 3.5 - 5.1 mmol/L    Chloride 99 98 - 107 mmol/L    CO2 33 (H) 21 - 32 mmol/L    Anion gap 8 7 - 16 mmol/L    Glucose 102 (H) 65 - 100 mg/dL    BUN 17 8 - 23 MG/DL    Creatinine 1.13 (H) 0.6 - 1.0 MG/DL    GFR est AA 58 (L) >60 ml/min/1.73m2    GFR est non-AA 48 (L) >60 ml/min/1.73m2    Calcium 8.0 (L) 8.3 - 10.4 MG/DL   TSH 3RD GENERATION    Collection Time: 02/26/17  2:00 AM   Result Value Ref Range    TSH 1.580 0.358 - 3.740 uIU/mL   CBC W/O DIFF    Collection Time: 02/26/17  2:00 AM   Result Value Ref Range    WBC 5.3 4.3 - 11.1 K/uL    RBC 3.32 (L) 4.05 - 5.25 M/uL    HGB 10.4 (L) 11.7 - 15.4 g/dL    HCT 30.4 (L) 35.8 - 46.3 %    MCV 91.6 79.6 - 97.8 FL    MCH 31.3 26.1 - 32.9 PG    MCHC 34.2 31.4 - 35.0 g/dL    RDW 12.6 11.9 - 14.6 %    PLATELET 876 663 - 054 K/uL    MPV 10.4 (L) 10.8 - 14.1 FL   PTT    Collection Time: 02/26/17  2:00 AM   Result Value Ref Range    aPTT 44.8 (H) 23.5 - 31.7 SEC         Patient Instructions:   Current Discharge Medication List      START taking these medications    Details   carvedilol (COREG) 12.5 mg tablet Take 1 Tab by mouth two (2) times daily (with meals). Qty: 60 Tab, Refills: 6      furosemide (LASIX) 20 mg tablet Take 1 Tab by mouth daily. Qty: 30 Tab, Refills: 3      potassium chloride (K-DUR, KLOR-CON) 10 mEq tablet Take 1 Tab by mouth daily. Qty: 30 Tab, Refills: 3         CONTINUE these medications which have CHANGED    Details   lisinopril (PRINIVIL, ZESTRIL) 10 mg tablet Take 1 Tab by mouth daily. Qty: 30 Tab, Refills: 6         CONTINUE these medications which have NOT CHANGED    Details   HYDROcodone-acetaminophen (NORCO) 7.5-325 mg per tablet Take 1 Tab by mouth three (3) times daily. Max Daily Amount: 3 Tabs. Indications: Pain  Qty: 90 Tab, Refills: 0    Associated Diagnoses: Flank pain      SENNA TK 1 T PO QPM  Refills: 1      glimepiride (AMARYL) 1 mg tablet Take 1 mg by mouth.      zolpidem (AMBIEN) 5 mg tablet Take 1 Tab by mouth nightly as needed for Sleep. Max Daily Amount: 5 mg. Qty: 30 Tab, Refills: 1      amiodarone (CORDARONE) 200 mg tablet Take 1 Tab by mouth daily. Indications: PREVENTION OF RECURRENT ATRIAL FIBRILLATION  Qty: 90 Tab, Refills: 3      aspirin delayed-release 81 mg tablet Take  by mouth daily. omeprazole (PRILOSEC) 20 mg capsule Take 1 Cap by mouth two (2) times a day. Qty: 180 Cap, Refills: 3    Associated Diagnoses: Rib pain on left side      gabapentin (NEURONTIN) 600 mg tablet Take 1 Tab by mouth three (3) times daily. Qty: 360 Tab, Refills: 3      polyethylene glycol (MIRALAX) 17 gram packet Take 1 Packet by mouth daily. Qty: 30 Packet, Refills: 5    Associated Diagnoses: Rib pain on left side      senna-docusate (PERICOLACE) 8.6-50 mg per tablet Take 1 Tab by mouth nightly.   Qty: 90 Tab, Refills: 3    Associated Diagnoses: Rib pain on left side      levothyroxine (SYNTHROID) 25 mcg tablet Take 1 Tab by mouth Daily (before breakfast). Qty: 90 Tab, Refills: 3      glucose blood VI test strips (ONE TOUCH ULTRA TEST) strip Test twice a day  Qty: 4 Package, Refills: 3      nitroglycerin (NITROSTAT) 0.4 mg SL tablet 1 Tab by SubLINGual route every five (5) minutes as needed. Indications: ANGINA  Qty: 1 Bottle, Refills: 3      cholecalciferol (VITAMIN D-3) 400 unit Tab tablet Take 1,000 Units by mouth daily. Associated Diagnoses: Rib pain on left side      calcium carbonate (OS-) 500 mg (1,250 mg) tablet take 1 Tab by mouth two (2) times a day.           STOP taking these medications       cloNIDine HCl (CATAPRES) 0.1 mg tablet Comments:   Reason for Stopping:         amLODIPine (NORVASC) 5 mg tablet Comments:   Reason for Stopping:         fludrocortisone (FLORINEF) 0.1 mg tablet Comments:   Reason for Stopping:                 Signed:  Rosas Triana PA-C  2/26/2017  8:29 AM

## 2017-02-26 NOTE — PROGRESS NOTES
MR notified this writer that patient has converted into NSR. MD Thomas made aware orders to turn off cardizem gtt in 1 hour.

## 2017-02-26 NOTE — PROGRESS NOTES
Bedside shift change report given to  Prabhjot persaud RN. Report included the following information SBAR, Kardex, MAR and Recent Results. Heparin gtt rate verified at bedside.

## 2017-02-26 NOTE — DISCHARGE INSTRUCTIONS
DISCHARGE SUMMARY from Nurse    The following personal items are in your possession at time of discharge:    Dental Appliances: None  Visual Aid: Glasses     Home Medications: None  Jewelry: None  Clothing: Pajamas, Footwear, Robe, At bedside  Other Valuables: None             PATIENT INSTRUCTIONS:    After general anesthesia or intravenous sedation, for 24 hours or while taking prescription Narcotics:  · Limit your activities  · Do not drive and operate hazardous machinery  · Do not make important personal or business decisions  · Do  not drink alcoholic beverages  · If you have not urinated within 8 hours after discharge, please contact your surgeon on call. Report the following to your surgeon:  · Excessive pain, swelling, redness or odor of or around the surgical area  · Temperature over 100.5  · Nausea and vomiting lasting longer than 4 hours or if unable to take medications  · Any signs of decreased circulation or nerve impairment to extremity: change in color, persistent  numbness, tingling, coldness or increase pain  · Any questions        What to do at Home:  Recommended activity: Activity as tolerated    If you experience any of the following symptoms chest pain, shortness of breath, dizziness, palpitations, weight gain of 3 pounds overnight or 5 pounds in a week please follow up with [facility]. *  Please give a list of your current medications to your Primary Care Provider. *  Please update this list whenever your medications are discontinued, doses are      changed, or new medications (including over-the-counter products) are added. *  Please carry medication information at all times in case of emergency situations. These are general instructions for a healthy lifestyle:    No smoking/ No tobacco products/ Avoid exposure to second hand smoke    Surgeon General's Warning:  Quitting smoking now greatly reduces serious risk to your health.     Obesity, smoking, and sedentary lifestyle greatly increases your risk for illness    A healthy diet, regular physical exercise & weight monitoring are important for maintaining a healthy lifestyle    You may be retaining fluid if you have a history of heart failure or if you experience any of the following symptoms:  Weight gain of 3 pounds or more overnight or 5 pounds in a week, increased swelling in our hands or feet or shortness of breath while lying flat in bed. Please call your doctor as soon as you notice any of these symptoms; do not wait until your next office visit. Recognize signs and symptoms of STROKE:    F-face looks uneven    A-arms unable to move or move unevenly    S-speech slurred or non-existent    T-time-call 911 as soon as signs and symptoms begin-DO NOT go       Back to bed or wait to see if you get better-TIME IS BRAIN. Warning Signs of HEART ATTACK     Call 911 if you have these symptoms:   Chest discomfort. Most heart attacks involve discomfort in the center of the chest that lasts more than a few minutes, or that goes away and comes back. It can feel like uncomfortable pressure, squeezing, fullness, or pain.  Discomfort in other areas of the upper body. Symptoms can include pain or discomfort in one or both arms, the back, neck, jaw, or stomach.  Shortness of breath with or without chest discomfort.  Other signs may include breaking out in a cold sweat, nausea, or lightheadedness. Don't wait more than five minutes to call 911 - MINUTES MATTER! Fast action can save your life. Calling 911 is almost always the fastest way to get lifesaving treatment. Emergency Medical Services staff can begin treatment when they arrive -- up to an hour sooner than if someone gets to the hospital by car. The discharge information has been reviewed with the patient. The patient verbalized understanding.     Discharge medications reviewed with the patient and appropriate educational materials and side effects teaching were provided. Atrial Fibrillation: Care Instructions  Your Care Instructions    Atrial fibrillation is an irregular and often fast heartbeat. Treating this condition is important for several reasons. It can cause blood clots, which can travel from your heart to your brain and cause a stroke. If you have a fast heartbeat, you may feel lightheaded, dizzy, and weak. An irregular heartbeat can also increase your risk for heart failure. Atrial fibrillation is often the result of another heart condition, such as high blood pressure or coronary artery disease. Making changes to improve your heart condition will help you stay healthy and active. Follow-up care is a key part of your treatment and safety. Be sure to make and go to all appointments, and call your doctor if you are having problems. It's also a good idea to know your test results and keep a list of the medicines you take. How can you care for yourself at home? Medicines  · Take your medicines exactly as prescribed. Call your doctor if you think you are having a problem with your medicine. You will get more details on the specific medicines your doctor prescribes. · If your doctor has given you a blood thinner to prevent a stroke, be sure you get instructions about how to take your medicine safely. Blood thinners can cause serious bleeding problems. · Do not take any vitamins, over-the-counter drugs, or herbal products without talking to your doctor first.  Lifestyle changes  · Do not smoke. Smoking can increase your chance of a stroke and heart attack. If you need help quitting, talk to your doctor about stop-smoking programs and medicines. These can increase your chances of quitting for good. · Eat a heart-healthy diet. · Stay at a healthy weight. Lose weight if you need to. · Limit alcohol to 2 drinks a day for men and 1 drink a day for women. Too much alcohol can cause health problems. · Avoid colds and flu. Get a pneumococcal vaccine shot.  If you have had one before, ask your doctor whether you need another dose. Get a flu shot every year. If you must be around people with colds or flu, wash your hands often. Activity  · If your doctor recommends it, get more exercise. Walking is a good choice. Bit by bit, increase the amount you walk every day. Try for at least 30 minutes on most days of the week. You also may want to swim, bike, or do other activities. Your doctor may suggest that you join a cardiac rehabilitation program so that you can have help increasing your physical activity safely. · Start light exercise if your doctor says it is okay. Even a small amount will help you get stronger, have more energy, and manage stress. Walking is an easy way to get exercise. Start out by walking a little more than you did in the hospital. Gradually increase the amount you walk. · When you exercise, watch for signs that your heart is working too hard. You are pushing too hard if you cannot talk while you are exercising. If you become short of breath or dizzy or have chest pain, sit down and rest immediately. · Check your pulse regularly. Place two fingers on the artery at the palm side of your wrist, in line with your thumb. If your heartbeat seems uneven or fast, talk to your doctor. When should you call for help? Call 911 anytime you think you may need emergency care. For example, call if:  · You have symptoms of a heart attack. These may include:  ¨ Chest pain or pressure, or a strange feeling in the chest.  ¨ Sweating. ¨ Shortness of breath. ¨ Nausea or vomiting. ¨ Pain, pressure, or a strange feeling in the back, neck, jaw, or upper belly or in one or both shoulders or arms. ¨ Lightheadedness or sudden weakness. ¨ A fast or irregular heartbeat. After you call 911, the  may tell you to chew 1 adult-strength or 2 to 4 low-dose aspirin. Wait for an ambulance. Do not try to drive yourself. · You have symptoms of a stroke.  These may include:  ¨ Sudden numbness, tingling, weakness, or loss of movement in your face, arm, or leg, especially on only one side of your body. ¨ Sudden vision changes. ¨ Sudden trouble speaking. ¨ Sudden confusion or trouble understanding simple statements. ¨ Sudden problems with walking or balance. ¨ A sudden, severe headache that is different from past headaches. · You passed out (lost consciousness). Call your doctor now or seek immediate medical care if:  · You have new or increased shortness of breath. · You feel dizzy or lightheaded, or you feel like you may faint. · Your heart rate becomes irregular. · You can feel your heart flutter in your chest or skip heartbeats. Tell your doctor if these symptoms are new or worse. Watch closely for changes in your health, and be sure to contact your doctor if you have any problems. Where can you learn more? Go to http://davidDick or Broriu.info/. Enter U020 in the search box to learn more about \"Atrial Fibrillation: Care Instructions. \"  Current as of: January 27, 2016  Content Version: 11.1  © 1427-0493 [employer]. Care instructions adapted under license by [employer] (which disclaims liability or warranty for this information). If you have questions about a medical condition or this instruction, always ask your healthcare professional. [address] any warranty or liability for your use of this information. Avoiding Triggers With Heart Failure: Care Instructions  Your Care Instructions  Triggers are anything that make your heart failure flare up. A flare-up is also called \"sudden heart failure\" or \"acute heart failure. \" When you have a flare-up, fluid builds up in your lungs, and you have problems breathing. You might need to go to the hospital. By watching for changes in your condition and avoiding triggers, you can prevent heart failure flare-ups.   Follow-up care is a key part of your treatment and safety. Be sure to make and go to all appointments, and call your doctor if you are having problems. It's also a good idea to know your test results and keep a list of the medicines you take. How can you care for yourself at home? Watch for changes in your weight and condition  · Weigh yourself without clothing at the same time each day. Record your weight. Call your doctor if you gain 3 pounds or more in 2 to 3 days. A sudden weight gain may mean that your heart failure is getting worse. · Keep a daily record of your symptoms. Write down any changes in how you feel, such as new shortness of breath, cough, or problems eating. Also record if your ankles are more swollen than usual and if you have to urinate in the night more often. Note anything that you ate or did that could have triggered these changes. Limit sodium  Sodium causes your body to hold on to water, making it harder for your heart to pump. People get most of their sodium from processed foods. Fast food and restaurant meals also tend to be very high in sodium. · Your doctor may suggest that you limit sodium to 2,000 milligrams (mg) a day or less. That is less than 1 teaspoon of salt a day, including all the salt you eat in cooking or in packaged foods. · Read food labels on cans and food packages. They tell you how much sodium you get in one serving. Check the serving size. If you eat more than one serving, you are getting more sodium. · Be aware that sodium can come in forms other than salt, including monosodium glutamate (MSG), sodium citrate, and sodium bicarbonate (baking soda). MSG is often added to Asian food. You can sometimes ask for food without MSG or salt. · Slowly reducing salt will help you adjust to the taste. Take the salt shaker off the table. · Flavor your food with garlic, lemon juice, onion, vinegar, herbs, and spices instead of salt.  Do not use soy sauce, steak sauce, onion salt, garlic salt, mustard, or ketchup on your food, unless it is labeled \"low-sodium\" or \"low-salt. \"  · Make your own salad dressings, sauces, and ketchup without adding salt. · Use fresh or frozen ingredients, instead of canned ones, whenever you can. Choose low-sodium canned goods. · Eat less processed food and food from restaurants, including fast food. Exercise as directed  Moderate, regular exercise is very good for your heart. It improves your blood flow and helps control your weight. But too much exercise can stress your heart and cause a heart failure flare-up. · Check with your doctor before you start an exercise program.  · Walking is an easy way to get exercise. Start out slowly. Gradually increase the length and pace of your walk. Swimming, riding a bike, and using a treadmill are also good forms of exercise. · When you exercise, watch for signs that your heart is working too hard. You are pushing yourself too hard if you cannot talk while you are exercising. If you become short of breath or dizzy or have chest pain, stop, sit down, and rest.  · Do not exercise when you do not feel well. Take medicines correctly  · Take your medicines exactly as prescribed. Call your doctor if you think you are having a problem with your medicine. · Make a list of all the medicines you take. Include those prescribed to you by other doctors and any over-the-counter medicines, vitamins, or supplements you take. Take this list with you when you go to any doctor. · Take your medicines at the same time every day. It may help you to post a list of all the medicines you take every day and what time of day you take them. · Make taking your medicine as simple as you can. Plan times to take your medicines when you are doing other things, such as eating a meal or getting ready for bed. This will make it easier to remember to take your medicines. · Get organized. Use helpful tools, such as daily or weekly pill containers. When should you call for help?   Call 911 if you have symptoms of sudden heart failure such as:  · You have severe trouble breathing. · You cough up pink, foamy mucus. · You have a new irregular or rapid heartbeat. Call your doctor now or seek immediate medical care if:  · You have new or increased shortness of breath. · You are dizzy or lightheaded, or you feel like you may faint. · You have sudden weight gain, such as 3 pounds or more in 2 to 3 days. · You have increased swelling in your legs, ankles, or feet. · You are suddenly so tired or weak that you cannot do your usual activities. Watch closely for changes in your health, and be sure to contact your doctor if you develop new symptoms. Where can you learn more? Go to http://david-rui.info/. Enter L340 in the search box to learn more about \"Avoiding Triggers With Heart Failure: Care Instructions. \"  Current as of: April 27, 2016  Content Version: 11.1  © 4886-7580 [employer]. Care instructions adapted under license by [employer] (which disclaims liability or warranty for this information). If you have questions about a medical condition or this instruction, always ask your healthcare professional. [address] any warranty or liability for your use of this information.

## 2017-02-26 NOTE — PROGRESS NOTES
Bedside and Verbal shift change report given to Varun Ge RN (oncoming nurse) by self Kvng Ping nurse). Report included the following information SBAR, Kardex, MAR and Recent Results.

## 2017-02-26 NOTE — PROGRESS NOTES
IV heparin rate has been adjusted based on the most recent PTT results.     Lab Results   Component Value Date/Time    aPTT 44.8 02/26/2017 02:00 AM     Gtt increased and bolus ordered per protocol

## 2017-02-26 NOTE — PROGRESS NOTES
Bedside and Verbal shift change report given to self (oncoming nurse) by Ivon Garcia RN (offgoing nurse). Report included the following information SBAR, Kardex, MAR and Recent Results.

## 2017-02-26 NOTE — PROGRESS NOTES
Discharge instructions given and review with patient and daughter. All questions answered. IV and heart monitor removed. Ready for discharge.

## 2017-02-27 ENCOUNTER — PATIENT OUTREACH (OUTPATIENT)
Dept: CASE MANAGEMENT | Age: 82
End: 2017-02-27

## 2017-02-28 NOTE — PROGRESS NOTES
Transition of Care Discharge Follow-Up Questionnaire       Date/Time of Call:   February 27, 2017 2:52PM   What was the patient hospitalized for? Patient hospitalized for Atrial Fibrillation with RVR. Does the patient understand his/her diagnosis and/or treatment and what happened during the hospitalization? Patient states understanding of diagnosis and treatment during hospitalization. Did the patient receive discharge instructions? Patient states discharge instructions explained and received before discharge to home. Review any discharge instructions (see notes in [facility]). Ask patient if they understand these. Do they have any questions? Discharge instructions reviewed with  patient per connect [facility], opportunity for questions and clarification provided. Patient states no questions at this time. Were home services ordered (nursing, PT, OT, ST, etc.)? No home health services ordered. If so, has the first visit occurred? If not, why? (Assist with coordination of services if necessary. ) NA         Was any DME ordered? No durable medical equipment ordered. If so, has it been received? If not, why?  (Assist with coordination of arranging DME orders if necessary. ) NA         Complete a review of all medications (new, continued and discontinued meds per the D/C instructions and medication tab in [facility]). Three new prescriptions added Coreg 12.5 mg tabs po, Lasix 20 mh tabs po and K-Dur 10 meEq tabs po added before patient discharged home. Clonidine 0.1 mg tab po, Norvasc 5 mg tabs po and Florinef 0.1 mg po tabs discontinued. Patient states that she is no longer taking discontinued medications. Were all new prescriptions filled? If not, why?  (Assist with obtainment of medications if necessary.) Patient states new prescriptions filled and currently being taken per doctors order.          Does the patient understand the purpose and dosing instructions for all medications? (If patient has questions, provide explanation and education.) Patient states understanding of medication purposes and dosing instructions. Does the patient have any problems in performing ADLs? (If patient is unable to perform ADLs - what is the limiting factor(s)? Do they have a support system that can assist? If no support system is present, discuss possible assistance that they may be able to obtain.) Patient states she is independent with ADL's and ambulation. Patient states that her daughter is with her and will assist her as needed. Does the patient have all follow-up appointments scheduled? Has transportation been arranged? [facility] follow-up should be within 7 days of discharge; all others should have PCP follow-up within 7 days of discharge; follow-ups with other specialists as appropriate or ordered.) Patient states follow up appointment scheduled with [facility] [place] @ 2:45PM with Dr. Zeeshan Alonso. [address] [facility], March 5, 2017 @ 2:00PM with Dr. Sravani Mancuso. Patient states that she has transportation to appointments   Any other questions or concerns expressed by the patient? Patient expresses no questions or concerns. Schedule next appointment with ZAKIA CHARLES Coordinator or refer to RN Case Manager/  as appropriate. No further needs identified, patient instructed to call Care Coordinator if further questions or concerns arise.    ABRAN Call Completed By: Malachi Serrato LPN  Care Coordinator   [facility]

## 2017-03-08 ENCOUNTER — APPOINTMENT (OUTPATIENT)
Dept: CT IMAGING | Age: 82
DRG: 309 | End: 2017-03-08
Attending: EMERGENCY MEDICINE
Payer: MEDICARE

## 2017-03-08 ENCOUNTER — HOSPITAL ENCOUNTER (INPATIENT)
Age: 82
LOS: 1 days | Discharge: HOME | DRG: 309 | End: 2017-03-11
Attending: EMERGENCY MEDICINE | Admitting: INTERNAL MEDICINE
Payer: MEDICARE

## 2017-03-08 ENCOUNTER — APPOINTMENT (OUTPATIENT)
Dept: GENERAL RADIOLOGY | Age: 82
DRG: 309 | End: 2017-03-08
Attending: EMERGENCY MEDICINE
Payer: MEDICARE

## 2017-03-08 DIAGNOSIS — R40.0 SOMNOLENCE: ICD-10-CM

## 2017-03-08 DIAGNOSIS — R07.81 PLEURITIC CHEST PAIN: Primary | ICD-10-CM

## 2017-03-08 LAB
ALBUMIN SERPL BCP-MCNC: 4 G/DL (ref 3.2–4.6)
ALBUMIN/GLOB SERPL: 1 {RATIO} (ref 1.2–3.5)
ALP SERPL-CCNC: 116 U/L (ref 50–136)
ALT SERPL-CCNC: 19 U/L (ref 12–65)
AMMONIA PLAS-SCNC: <10 UMOL/L (ref 11–32)
ANION GAP BLD CALC-SCNC: 9 MMOL/L (ref 7–16)
ARTERIAL PATENCY WRIST A: POSITIVE
AST SERPL W P-5'-P-CCNC: 18 U/L (ref 15–37)
BASE EXCESS BLDA CALC-SCNC: 2.1 MMOL/L (ref 0–3)
BASOPHILS # BLD AUTO: 0 K/UL (ref 0–0.2)
BASOPHILS # BLD: 0 % (ref 0–2)
BDY SITE: ABNORMAL
BILIRUB SERPL-MCNC: 0.8 MG/DL (ref 0.2–1.1)
BNP SERPL-MCNC: 345 PG/ML
BUN SERPL-MCNC: 22 MG/DL (ref 8–23)
CALCIUM SERPL-MCNC: 9.1 MG/DL (ref 8.3–10.4)
CHLORIDE SERPL-SCNC: 95 MMOL/L (ref 98–107)
CO2 SERPL-SCNC: 31 MMOL/L (ref 21–32)
COHGB MFR BLD: 0.4 % (ref 0.5–1.5)
CREAT SERPL-MCNC: 1.41 MG/DL (ref 0.6–1)
DIFFERENTIAL METHOD BLD: ABNORMAL
DO-HGB BLD-MCNC: 5 % (ref 0–5)
EOSINOPHIL # BLD: 0 K/UL (ref 0–0.8)
EOSINOPHIL NFR BLD: 0 % (ref 0.5–7.8)
ERYTHROCYTE [DISTWIDTH] IN BLOOD BY AUTOMATED COUNT: 12.7 % (ref 11.9–14.6)
GAS FLOW.O2 O2 DELIVERY SYS: 2 L/MIN
GLOBULIN SER CALC-MCNC: 3.9 G/DL (ref 2.3–3.5)
GLUCOSE SERPL-MCNC: 132 MG/DL (ref 65–100)
HCO3 BLDA-SCNC: 26 MMOL/L (ref 22–26)
HCT VFR BLD AUTO: 35.2 % (ref 35.8–46.3)
HGB BLD-MCNC: 12 G/DL (ref 11.7–15.4)
HGB BLDMV-MCNC: 10.9 GM/DL (ref 11.7–15)
IMM GRANULOCYTES # BLD: 0 K/UL (ref 0–0.5)
IMM GRANULOCYTES NFR BLD AUTO: 0.2 % (ref 0–5)
LACTATE BLD-SCNC: 0.9 MMOL/L (ref 0.5–1.9)
LYMPHOCYTES # BLD AUTO: 13 % (ref 13–44)
LYMPHOCYTES # BLD: 1.2 K/UL (ref 0.5–4.6)
MCH RBC QN AUTO: 31.4 PG (ref 26.1–32.9)
MCHC RBC AUTO-ENTMCNC: 34.1 G/DL (ref 31.4–35)
MCV RBC AUTO: 92.1 FL (ref 79.6–97.8)
METHGB MFR BLD: 0.3 % (ref 0–1.5)
MONOCYTES # BLD: 1.4 K/UL (ref 0.1–1.3)
MONOCYTES NFR BLD AUTO: 15 % (ref 4–12)
NEUTS SEG # BLD: 6.6 K/UL (ref 1.7–8.2)
NEUTS SEG NFR BLD AUTO: 72 % (ref 43–78)
OXYHGB MFR BLDA: 94.1 % (ref 94–97)
PCO2 BLDA: 40 MMHG (ref 35–45)
PH BLDA: 7.44 [PH] (ref 7.35–7.45)
PLATELET # BLD AUTO: 341 K/UL (ref 150–450)
PMV BLD AUTO: 10.4 FL (ref 10.8–14.1)
PO2 BLDA: 76 MMHG (ref 75–100)
POTASSIUM SERPL-SCNC: 4.1 MMOL/L (ref 3.5–5.1)
PROT SERPL-MCNC: 7.9 G/DL (ref 6.3–8.2)
RBC # BLD AUTO: 3.82 M/UL (ref 4.05–5.25)
SAO2 % BLD: 95 % (ref 92–98.5)
SERVICE CMNT-IMP: ABNORMAL
SODIUM SERPL-SCNC: 135 MMOL/L (ref 136–145)
VENTILATION MODE VENT: ABNORMAL
WBC # BLD AUTO: 9.2 K/UL (ref 4.3–11.1)

## 2017-03-08 PROCEDURE — 83880 ASSAY OF NATRIURETIC PEPTIDE: CPT | Performed by: EMERGENCY MEDICINE

## 2017-03-08 PROCEDURE — 83605 ASSAY OF LACTIC ACID: CPT

## 2017-03-08 PROCEDURE — 74011000258 HC RX REV CODE- 258: Performed by: EMERGENCY MEDICINE

## 2017-03-08 PROCEDURE — 74011000250 HC RX REV CODE- 250: Performed by: EMERGENCY MEDICINE

## 2017-03-08 PROCEDURE — 71260 CT THORAX DX C+: CPT

## 2017-03-08 PROCEDURE — 82803 BLOOD GASES ANY COMBINATION: CPT

## 2017-03-08 PROCEDURE — 81003 URINALYSIS AUTO W/O SCOPE: CPT | Performed by: EMERGENCY MEDICINE

## 2017-03-08 PROCEDURE — 87040 BLOOD CULTURE FOR BACTERIA: CPT | Performed by: EMERGENCY MEDICINE

## 2017-03-08 PROCEDURE — 71010 XR CHEST PORT: CPT

## 2017-03-08 PROCEDURE — 93005 ELECTROCARDIOGRAM TRACING: CPT | Performed by: EMERGENCY MEDICINE

## 2017-03-08 PROCEDURE — 96375 TX/PRO/DX INJ NEW DRUG ADDON: CPT | Performed by: EMERGENCY MEDICINE

## 2017-03-08 PROCEDURE — 70450 CT HEAD/BRAIN W/O DYE: CPT

## 2017-03-08 PROCEDURE — 74011250636 HC RX REV CODE- 250/636: Performed by: EMERGENCY MEDICINE

## 2017-03-08 PROCEDURE — 96361 HYDRATE IV INFUSION ADD-ON: CPT | Performed by: EMERGENCY MEDICINE

## 2017-03-08 PROCEDURE — 85025 COMPLETE CBC W/AUTO DIFF WBC: CPT | Performed by: EMERGENCY MEDICINE

## 2017-03-08 PROCEDURE — 74011636320 HC RX REV CODE- 636/320: Performed by: EMERGENCY MEDICINE

## 2017-03-08 PROCEDURE — 82140 ASSAY OF AMMONIA: CPT | Performed by: EMERGENCY MEDICINE

## 2017-03-08 PROCEDURE — 77030005510 HC CATH URETH FOL M BARD -A

## 2017-03-08 PROCEDURE — 80053 COMPREHEN METABOLIC PANEL: CPT | Performed by: EMERGENCY MEDICINE

## 2017-03-08 PROCEDURE — 99218 HC RM OBSERVATION: CPT

## 2017-03-08 PROCEDURE — 36600 WITHDRAWAL OF ARTERIAL BLOOD: CPT

## 2017-03-08 PROCEDURE — 99285 EMERGENCY DEPT VISIT HI MDM: CPT | Performed by: EMERGENCY MEDICINE

## 2017-03-08 PROCEDURE — 96374 THER/PROPH/DIAG INJ IV PUSH: CPT | Performed by: EMERGENCY MEDICINE

## 2017-03-08 PROCEDURE — 51702 INSERT TEMP BLADDER CATH: CPT | Performed by: EMERGENCY MEDICINE

## 2017-03-08 RX ORDER — FUROSEMIDE 10 MG/ML
40 INJECTION INTRAMUSCULAR; INTRAVENOUS
Status: COMPLETED | OUTPATIENT
Start: 2017-03-08 | End: 2017-03-08

## 2017-03-08 RX ORDER — ENALAPRILAT 1.25 MG/ML
1.25 INJECTION INTRAVENOUS
Status: COMPLETED | OUTPATIENT
Start: 2017-03-08 | End: 2017-03-08

## 2017-03-08 RX ORDER — HYDRALAZINE HYDROCHLORIDE 20 MG/ML
20 INJECTION INTRAMUSCULAR; INTRAVENOUS
Status: COMPLETED | OUTPATIENT
Start: 2017-03-08 | End: 2017-03-08

## 2017-03-08 RX ORDER — SODIUM CHLORIDE 0.9 % (FLUSH) 0.9 %
10 SYRINGE (ML) INJECTION
Status: COMPLETED | OUTPATIENT
Start: 2017-03-08 | End: 2017-03-08

## 2017-03-08 RX ADMIN — HYDRALAZINE HYDROCHLORIDE 20 MG: 20 INJECTION INTRAMUSCULAR; INTRAVENOUS at 19:52

## 2017-03-08 RX ADMIN — IOVERSOL 100 ML: 741 INJECTION INTRA-ARTERIAL; INTRAVENOUS at 21:41

## 2017-03-08 RX ADMIN — ENALAPRILAT 1.25 MG: 1.25 INJECTION, SOLUTION INTRAVENOUS at 18:04

## 2017-03-08 RX ADMIN — SODIUM CHLORIDE 100 ML: 900 INJECTION, SOLUTION INTRAVENOUS at 21:41

## 2017-03-08 RX ADMIN — SODIUM CHLORIDE 1000 ML: 900 INJECTION, SOLUTION INTRAVENOUS at 19:03

## 2017-03-08 RX ADMIN — FUROSEMIDE 40 MG: 10 INJECTION, SOLUTION INTRAMUSCULAR; INTRAVENOUS at 22:19

## 2017-03-08 RX ADMIN — Medication 10 ML: at 21:41

## 2017-03-08 NOTE — ED TRIAGE NOTES
Pt. With increasing lethargy/ams/urinary odor for several days per family. Sent here from urgent care via EMS. Pt. Room air on arrival of 87%. Family denies any history of resp problems. Pt. Complains of cough/pain while breathing.

## 2017-03-08 NOTE — ED PROVIDER NOTES
HPI Comments: Pt presents with pleuritic chest pain since last night, when she awoke with pleurisy. She has had dyspnea on exertion today, just walking to the bathroom, for example. Family members and care-taker indicate pt has been more somnolent than usual today. Unsteady on her feet, she even fell once today, but it was after all this had started    Daughter reports that patient has taken her usual medications throughout the day, and states her usual blood pressure is around 140's/70's  correg was recently started, and both norvasc, and clonidine were recently stopped    Patient is a 80 y.o. female presenting with altered mental status. The history is provided by a relative and the patient. The history is limited by the condition of the patient. Altered mental status    This is a new problem. The current episode started more than 2 days ago. The problem has been gradually worsening. Associated symptoms include confusion, somnolence and weakness.         Past Medical History:   Diagnosis Date    A fib     Abnormal loss of weight     Acquired hypothyroidism 9/12/2012    Anemia     Anorexia 9/2/2014    Arthritis associated with another disorder     Atrial fibrillation ([address] 9/12/2012    Paroxysmal.  Coumadin contraindicated due to falls      Autonomic neuropathy 1/13/2013    CAD (coronary artery disease)     CAD in native artery 5/5/2016    Cervical radiculopathy 5/23/2013    Chest pain     Chronic pain syndrome 1/13/2013    Back, right shoulder, neck: Peripheral neuropathy, spinal stenosis C7-T1, foraminal narrowing C4-5     CKD (chronic kidney disease), stage III 9/12/2012    Diabetes ([address]     about 30 years    Diabetes mellitus     Diabetes mellitus, type 2 ([address] 9/12/2012    Dysphagia 9/2/2014    Due to esophageal spasm seen on modified barium swallow 2015     Gait disorder 9/12/2012    GERD (gastroesophageal reflux disease)     Heart failure ([address]     HTN     Hypertension 9/12/2012    Orthostatic hypotension due to autonomic neuropathy     Hypothyroid     IBS (irritable bowel syndrome) 9/12/2012    Iron deficiency 9/12/2012    Lumbar disc disease     MCI (mild cognitive impairment) 1/13/2013    Mixed hyperlipidemia 9/12/2012    Orthostatic hypotension 5/5/2016    Pacemaker     SSS (sick sinus syndrome) ([facility] 5/5/2016       Past Surgical History:   Procedure Laterality Date    HX APPENDECTOMY      HX CARPAL TUNNEL RELEASE      HX CERVICAL FUSION      HX COLONOSCOPY  Nov 2005    HX PACEMAKER  6/08         No family history on file. Social History     Social History    Marital status:      Spouse name: N/A    Number of children: N/A    Years of education: N/A     Occupational History    Not on file. Social History Main Topics    Smoking status: Never Smoker    Smokeless tobacco: Never Used    Alcohol use No    Drug use: No    Sexual activity: Not on file     Other Topics Concern    Not on file     Social History Narrative    Lives with  who has mild vascular dementia, patient is primary caretaker but daughters come over to house 4-5 times a week, Meghan Corona has HCPOA         ALLERGIES: Other medication    Review of Systems   Constitutional: Negative for chills and fever. HENT: Negative for congestion and voice change. Eyes: Negative for discharge and redness. Respiratory: Positive for shortness of breath. Negative for cough. Cardiovascular: Positive for chest pain. Negative for leg swelling. Gastrointestinal: Negative for diarrhea, nausea and vomiting. Musculoskeletal: Positive for gait problem. Neurological: Positive for weakness. Psychiatric/Behavioral: Positive for confusion and decreased concentration.        Vitals:    03/08/17 1655 03/08/17 1720 03/08/17 1739   BP: (!) 212/86 191/77    Pulse: 64 64    Resp: 14 17    Temp: 99.3 °F (37.4 °C)     SpO2: (!) 87% 98% 96%   Weight: 42.2 kg (93 lb)     Height: 5' 4\" (1.626 m) Physical Exam   Constitutional: She appears well-developed and well-nourished. No distress. HENT:   Head: Normocephalic and atraumatic. Eyes: Conjunctivae and EOM are normal. Pupils are equal, round, and reactive to light. Right eye exhibits no discharge. Left eye exhibits no discharge. No scleral icterus. Neck: Normal range of motion. Neck supple. Cardiovascular: Normal rate, regular rhythm and normal heart sounds. Exam reveals no gallop. No murmur heard. Pulmonary/Chest: Effort normal and breath sounds normal. No respiratory distress. She has no wheezes. She has no rales. Abdominal: Soft. There is no tenderness. There is no guarding. Musculoskeletal: Normal range of motion. She exhibits no edema. Neurological: She is alert. She has normal strength. She exhibits normal muscle tone. cni 2-12 grossly  Pt awakens to voice and answers questions,  maeew   Skin: Skin is warm and dry. No rash noted. She is not diaphoretic. Psychiatric: She has a normal mood and affect. Her behavior is normal.   Nursing note and vitals reviewed. MDM  Number of Diagnoses or Management Options  Pleuritic chest pain:   Somnolence:   Diagnosis management comments: Medical decision making note:  1 pleuritic chest pain with dyspnea on exertion,  Ct shows no PE, but only small bilateral pleural effusions  bnp slightly elevated, but less so than at last evaluation    2 altered mental status - co2 ok on abg, head ct performed AFTER chest ct, but looks reasonable enough, will check an ammonia level as well    3 CKD slightly worse than before, necessitating hydration to protect kidneys from iv contrast  Admit to hospitalist for altered mental status / accelerated HTN    This concludes the \"medical decision making note\" part of this emergency department visit note.       ED Course       Procedures

## 2017-03-08 NOTE — IP AVS SNAPSHOT
[address] 
[phone] Patient: Siri Srinivasan MRN: [id number] :8/10/1927 You are allergic to the following Allergen Reactions Other Medication Unknown (comments) Tylenol (caused Insomnia) Immunizations Administered for This Admission Name Date  
 TB Skin Test (PPD) Intradermal 3/9/2017 Recent Documentation Height Weight Breastfeeding? BMI OB Status Smoking Status 1.626 m 42.2 kg No 15.96 kg/m2 Postmenopausal Never Smoker Unresulted Labs Order Current Status CULTURE, BLOOD Preliminary result CULTURE, BLOOD Preliminary result PLEASE READ & DOCUMENT PPD TEST IN 24 HRS Preliminary result Emergency Contacts Name Discharge Info Relation Home Work Mobile Sun Mcdowell  Child [2] [phone] Raciel Green  Daughter [21] [phone] About your hospitalization You were admitted on:  2017 You last received care in the:  [facility] 6 MED SURG You were discharged on:  2017 Unit phone number:  [phone] Why you were hospitalized Your primary diagnosis was: Altered Mental State Your diagnoses also included:  Chest Pain, Pleuritic, Diabetes Mellitus, Type 2 (Hcc), Hypertension, Acquired Hypothyroidism, Atrial Fibrillation (Hcc), Altered Mental Status Providers Seen During Your Hospitalizations Provider Role Specialty Primary office phone Attila Flores MD Attending Provider Emergency Medicine [phone] Josh Jaimes DO Attending Provider Internal Medicine [phone] Jorge Navas MD Attending Provider Internal Medicine [phone] Your Primary Care Physician (PCP) Primary Care Physician Office Phone Office Fax Larry Galdamez [phone] Follow-up Information Follow up With Details Comments Contact Info Eddie Gilmore MD   [address] [facility] [address] 
[phone] Your Appointments Monday March 13, 2017  2:45 PM EDT TRANSITIONAL CARE MANAGEMENT with Elvira Bartlett MD  
[facility] Cardiology ([address]) [address] 
[address] Michelle Wells 81  
[phone] Wednesday March 29, 2017  9:10 AM EDT  
LAB with [facility] PM LAB [address] [place] [id number] [phone] Wednesday April 05, 2017  2:00 PM EDT Office Visit with Sonia Alvarez MD  
[address] [place] [phone] Current Discharge Medication List  
  
START taking these medications Dose & Instructions Dispensing Information Comments Morning Noon Evening Bedtime  
 dilTIAZem  mg ER capsule Commonly known as:  CARDIZEM CD Your next dose is: Today, Tomorrow Other:  _________ Dose:  120 mg Take 1 Cap by mouth daily. Quantity:  30 Cap Refills:  1  
     
   
   
   
  
 gabapentin 100 mg capsule Commonly known as:  NEURONTIN Replaces:  gabapentin 600 mg tablet Your next dose is: Today, Tomorrow Other:  _________ Dose:  100 mg Take 1 Cap by mouth three (3) times daily. Quantity:  90 Cap Refills:  0 CONTINUE these medications which have CHANGED Dose & Instructions Dispensing Information Comments Morning Noon Evening Bedtime HYDROcodone-acetaminophen 7.5-325 mg per tablet Commonly known as:  Lina Panda What changed:   
- when to take this 
- reasons to take this Your next dose is: Today, Tomorrow Other:  _________ Dose:  1 Tab Take 1 Tab by mouth every eight (8) hours as needed. Max Daily Amount: 3 Tabs. Indications: Pain Quantity:  20 Tab Refills:  0 CONTINUE these medications which have NOT CHANGED Dose & Instructions Dispensing Information Comments Morning Noon Evening Bedtime  
 amiodarone 200 mg tablet Commonly known as:  CORDARONE Your next dose is: Today, Tomorrow Other:  _________ Dose:  200 mg Take 1 Tab by mouth daily. Indications: PREVENTION OF RECURRENT ATRIAL FIBRILLATION Quantity:  90 Tab Refills:  3  
     
   
   
   
  
 aspirin delayed-release 81 mg tablet Your next dose is: Today, Tomorrow Other:  _________ Take  by mouth daily. Refills:  0  
     
   
   
   
  
 calcium carbonate 500 mg calcium (1,250 mg) tablet Commonly known as:  OS-CAN Your next dose is: Today, Tomorrow Other:  _________ Dose:  1 Tab  
take 1 Tab by mouth two (2) times a day. Refills:  0  
     
   
   
   
  
 carvedilol 12.5 mg tablet Commonly known as:  Jayesh Been Your next dose is: Today, Tomorrow Other:  _________ Dose:  12.5 mg Take 1 Tab by mouth two (2) times daily (with meals). Quantity:  60 Tab Refills:  6  
     
   
   
   
  
 furosemide 20 mg tablet Commonly known as:  LASIX Your next dose is: Today, Tomorrow Other:  _________ Dose:  20 mg Take 1 Tab by mouth daily. Quantity:  30 Tab Refills:  3  
     
   
   
   
  
 glucose blood VI test strips strip Commonly known as:  ONETOUCH ULTRA TEST Your next dose is: Today, Tomorrow Other:  _________ Test twice a day Quantity:  4 Package Refills:  3  
     
   
   
   
  
 levothyroxine 25 mcg tablet Commonly known as:  SYNTHROID Your next dose is: Today, Tomorrow Other:  _________ Dose:  25 mcg Take 1 Tab by mouth Daily (before breakfast). Quantity:  90 Tab Refills:  3  
     
   
   
   
  
 nitroglycerin 0.4 mg SL tablet Commonly known as:  NITROSTAT Your next dose is: Today, Tomorrow Other:  _________ Dose:  0.4 mg  
1 Tab by SubLINGual route every five (5) minutes as needed. Indications: ANGINA Quantity:  1 Bottle Refills:  3  
     
   
   
   
  
 omeprazole 20 mg capsule Commonly known as:  PRILOSEC Your next dose is: Today, Tomorrow Other:  _________ Dose:  20 mg Take 1 Cap by mouth two (2) times a day. Quantity:  180 Cap Refills:  3  
     
   
   
   
  
 polyethylene glycol 17 gram packet Commonly known as:  Waymond Oren Your next dose is: Today, Tomorrow Other:  _________ Dose:  17 g Take 1 Packet by mouth daily. Quantity:  30 Packet Refills:  5  
     
   
   
   
  
 potassium chloride 10 mEq tablet Commonly known as:  K-DUR KLOR-CON Your next dose is: Today, Tomorrow Other:  _________ Dose:  10 mEq Take 1 Tab by mouth daily. Quantity:  30 Tab Refills:  3 SENNA Your next dose is: Today, Tomorrow Other:  _________ TK 1 T PO QPM  
 Refills:  1  
     
   
   
   
  
 senna-docusate 8.6-50 mg per tablet Commonly known as:  Romero Parks Your next dose is: Today, Tomorrow Other:  _________ Dose:  1 Tab Take 1 Tab by mouth nightly. Quantity:  90 Tab Refills:  3 VITAMIN D3 400 unit Tab tablet Generic drug:  cholecalciferol Your next dose is: Today, Tomorrow Other:  _________ Dose:  1000 Units Take 1,000 Units by mouth daily. Refills:  0  
     
   
   
   
  
 zolpidem 5 mg tablet Commonly known as:  AMBIEN Your next dose is: Today, Tomorrow Other:  _________ Dose:  5 mg Take 1 Tab by mouth nightly as needed for Sleep. Max Daily Amount: 5 mg. Quantity:  30 Tab Refills:  1 STOP taking these medications   
 gabapentin 600 mg tablet Commonly known as:  NEURONTIN Replaced by:  gabapentin 100 mg capsule  
   
  
 glimepiride 1 mg tablet Commonly known as:  AMARYL  
   
  
 lisinopril 10 mg tablet Commonly known as:  Ania Zeng Where to Get Your Medications Information on where to get these meds will be given to you by the nurse or doctor. ! Ask your nurse or doctor about these medications  
  dilTIAZem  mg ER capsule  
 gabapentin 100 mg capsule HYDROcodone-acetaminophen 7.5-325 mg per tablet Discharge Instructions DISCHARGE SUMMARY from Nurse The following personal items are in your possession at time of discharge: 
 
Dental Appliances: None Home Medications: None Jewelry: None Clothing: At bedside Other Valuables: None PATIENT INSTRUCTIONS: 
 
After general anesthesia or intravenous sedation, for 24 hours or while taking prescription Narcotics: · Limit your activities · Do not drive and operate hazardous machinery · Do not make important personal or business decisions · Do  not drink alcoholic beverages · If you have not urinated within 8 hours after discharge, please contact your surgeon on call. Report the following to your surgeon: 
· Excessive pain, swelling, redness or odor of or around the surgical area · Temperature over 100.5 · Nausea and vomiting lasting longer than 4 hours or if unable to take medications · Any signs of decreased circulation or nerve impairment to extremity: change in color, persistent  numbness, tingling, coldness or increase pain · Any questions What to do at Home: 
Recommended activity: Activity as tolerated, no heavy lifting. If you experience any of the following symptoms change in mental status, increased weakness, or increased dizziness please follow up with your primary care provider or the emergency department.  
 
 
*  Please give a list of your current medications to your Primary Care Provider. *  Please update this list whenever your medications are discontinued, doses are 
    changed, or new medications (including over-the-counter products) are added. *  Please carry medication information at all times in case of emergency situations. These are general instructions for a healthy lifestyle: No smoking/ No tobacco products/ Avoid exposure to second hand smoke Surgeon General's Warning:  Quitting smoking now greatly reduces serious risk to your health. Obesity, smoking, and sedentary lifestyle greatly increases your risk for illness A healthy diet, regular physical exercise & weight monitoring are important for maintaining a healthy lifestyle You may be retaining fluid if you have a history of heart failure or if you experience any of the following symptoms:  Weight gain of 3 pounds or more overnight or 5 pounds in a week, increased swelling in our hands or feet or shortness of breath while lying flat in bed. Please call your doctor as soon as you notice any of these symptoms; do not wait until your next office visit. Recognize signs and symptoms of STROKE: 
 
F-face looks uneven A-arms unable to move or move unevenly S-speech slurred or non-existent T-time-call 911 as soon as signs and symptoms begin-DO NOT go Back to bed or wait to see if you get better-TIME IS BRAIN. Warning Signs of HEART ATTACK Call 911 if you have these symptoms: 
? Chest discomfort. Most heart attacks involve discomfort in the center of the chest that lasts more than a few minutes, or that goes away and comes back. It can feel like uncomfortable pressure, squeezing, fullness, or pain. ? Discomfort in other areas of the upper body. Symptoms can include pain or discomfort in one or both arms, the back, neck, jaw, or stomach. ? Shortness of breath with or without chest discomfort. ? Other signs may include breaking out in a cold sweat, nausea, or lightheadedness. Don't wait more than five minutes to call [address]! Fast action can save your life. Calling 911 is almost always the fastest way to get lifesaving treatment. Emergency Medical Services staff can begin treatment when they arrive  up to an hour sooner than if someone gets to the hospital by car. The discharge information has been reviewed with the patient and caregiver. The patient and caregiver verbalized understanding. Discharge medications reviewed with the patient and caregiver and appropriate educational materials and side effects teaching were provided. Discharge Orders None ACO Transitions of Care Introducing [employer] [address] Diann Reyes offers a voluntary care coordination program to provide high quality service and care to [facility] fee-for-service beneficiaries. Luis Daniel Carson was designed to help you enhance your health and well-being through the following services: ? Transitions of Care  support for individuals who are transitioning from one care setting to another (example: Hospital to home). ? Chronic and Complex Care Coordination  support for individuals and caregivers of those with serious or chronic illnesses or with more than one chronic (ongoing) condition and those who take a number of different medications. If you meet specific medical criteria, a [facility] [address] may call you directly to coordinate your care with your primary care physician and your other care providers. For questions about the [facility] programs, please, contact your physicians office. For general questions or additional information about Accountable Care Organizations: 
Please visit www.medicare.gov/acos. html or call 1-800-MEDICARE (0-372.474.9239) TTY users should call 8-506.207.5788. Ivone Announcement  We are excited to announce that we are making your provider's discharge notes available to you in [employer]. You will see these notes when they are completed and signed by the physician that discharged you from your recent hospital stay. If you have any questions or concerns about any information you see in [employer], please call the Health Information Department where you were seen or reach out to your Primary Care Provider for more information about your plan of care. Introducing [facility]! [facility] introduces [employer] patient portal. Now you can access parts of your medical record, email your doctor's office, and request medication refills online. 1. In your internet browser, go to [email or web address] 2. Click on the First Time User? Click Here link in the Sign In box. You will see the New Member Sign Up page. 3. Enter your [employer] Access Code exactly as it appears below. You will not need to use this code after youve completed the sign-up process. If you do not sign up before the expiration date, you must request a new code. · [employer] Access Code: [facility] Expires: 5/25/2017  9:40 AM 
 
4. Enter the last four digits of your Social Security Number (xxxx) and Date of Birth (mm/dd/yyyy) as indicated and click Submit. You will be taken to the next sign-up page. 5. Create a [employer] ID. This will be your [employer] login ID and cannot be changed, so think of one that is secure and easy to remember. 6. Create a [employer] password. You can change your password at any time. 7. Enter your Password Reset Question and Answer. This can be used at a later time if you forget your password. 8. Enter your e-mail address. You will receive e-mail notification when new information is available in [address] 9. Click Sign Up. You can now view and download portions of your medical record. 10. Click the Download Summary menu link to download a portable copy of your medical information. If you have questions, please visit the Frequently Asked Questions section of the MyChart website. Remember, MyChart is NOT to be used for urgent needs. For medical emergencies, dial 911. Now available from your iPhone and Android! General Information Please provide this summary of care documentation to your next provider. Patient Signature:  ____________________________________________________________ Date:  ____________________________________________________________  
  
[facility] Provider Signature:  ____________________________________________________________ Date:  ____________________________________________________________

## 2017-03-09 PROBLEM — R07.81 CHEST PAIN, PLEURITIC: Status: ACTIVE | Noted: 2017-03-09

## 2017-03-09 PROBLEM — I48.91 ATRIAL FIBRILLATION (HCC): Status: ACTIVE | Noted: 2017-03-09

## 2017-03-09 PROBLEM — R41.82 ALTERED MENTAL STATE: Status: ACTIVE | Noted: 2017-03-09

## 2017-03-09 LAB
ALBUMIN SERPL BCP-MCNC: 2.9 G/DL (ref 3.2–4.6)
ALBUMIN/GLOB SERPL: 0.9 {RATIO} (ref 1.2–3.5)
ALP SERPL-CCNC: 92 U/L (ref 50–136)
ALT SERPL-CCNC: 13 U/L (ref 12–65)
ANION GAP BLD CALC-SCNC: 14 MMOL/L (ref 7–16)
APPEARANCE UR: CLEAR
AST SERPL W P-5'-P-CCNC: 16 U/L (ref 15–37)
ATRIAL RATE: 63 BPM
ATRIAL RATE: 85 BPM
BACTERIA URNS QL MICRO: 0 /HPF
BILIRUB DIRECT SERPL-MCNC: 0.2 MG/DL
BILIRUB SERPL-MCNC: 0.7 MG/DL (ref 0.2–1.1)
BILIRUB UR QL: NEGATIVE
BUN SERPL-MCNC: 21 MG/DL (ref 8–23)
CALCIUM SERPL-MCNC: 7.8 MG/DL (ref 8.3–10.4)
CALCULATED P AXIS, ECG09: 110 DEGREES
CALCULATED P AXIS, ECG09: NORMAL DEGREES
CALCULATED R AXIS, ECG10: -50 DEGREES
CALCULATED R AXIS, ECG10: -71 DEGREES
CALCULATED T AXIS, ECG11: 81 DEGREES
CALCULATED T AXIS, ECG11: 95 DEGREES
CASTS URNS QL MICRO: ABNORMAL /LPF
CHLORIDE SERPL-SCNC: 97 MMOL/L (ref 98–107)
CO2 SERPL-SCNC: 27 MMOL/L (ref 21–32)
COLOR UR: YELLOW
CREAT SERPL-MCNC: 1.54 MG/DL (ref 0.6–1)
DIAGNOSIS, 93000: NORMAL
DIAGNOSIS, 93000: NORMAL
DIASTOLIC BP, ECG02: NORMAL MMHG
DIASTOLIC BP, ECG02: NORMAL MMHG
EPI CELLS #/AREA URNS HPF: 0 /HPF
ERYTHROCYTE [DISTWIDTH] IN BLOOD BY AUTOMATED COUNT: 13 % (ref 11.9–14.6)
FLUAV AG NPH QL IA: NEGATIVE
FLUBV AG NPH QL IA: NEGATIVE
GLOBULIN SER CALC-MCNC: 3.3 G/DL (ref 2.3–3.5)
GLUCOSE BLD STRIP.AUTO-MCNC: 129 MG/DL (ref 65–100)
GLUCOSE BLD STRIP.AUTO-MCNC: 176 MG/DL (ref 65–100)
GLUCOSE BLD STRIP.AUTO-MCNC: 185 MG/DL (ref 65–100)
GLUCOSE BLD STRIP.AUTO-MCNC: 188 MG/DL (ref 65–100)
GLUCOSE SERPL-MCNC: 247 MG/DL (ref 65–100)
GLUCOSE UR STRIP.AUTO-MCNC: NEGATIVE MG/DL
HCT VFR BLD AUTO: 31.9 % (ref 35.8–46.3)
HGB BLD-MCNC: 10.6 G/DL (ref 11.7–15.4)
HGB UR QL STRIP: ABNORMAL
KETONES UR QL STRIP.AUTO: NEGATIVE MG/DL
LEUKOCYTE ESTERASE UR QL STRIP.AUTO: ABNORMAL
MCH RBC QN AUTO: 30.5 PG (ref 26.1–32.9)
MCHC RBC AUTO-ENTMCNC: 33.2 G/DL (ref 31.4–35)
MCV RBC AUTO: 91.9 FL (ref 79.6–97.8)
NITRITE UR QL STRIP.AUTO: NEGATIVE
P-R INTERVAL, ECG05: 216 MS
P-R INTERVAL, ECG05: NORMAL MS
PH UR STRIP: 7 [PH] (ref 5–9)
PLATELET # BLD AUTO: 324 K/UL (ref 150–450)
PMV BLD AUTO: 10.8 FL (ref 10.8–14.1)
POTASSIUM SERPL-SCNC: 3.5 MMOL/L (ref 3.5–5.1)
PROT SERPL-MCNC: 6.2 G/DL (ref 6.3–8.2)
PROT UR STRIP-MCNC: NEGATIVE MG/DL
Q-T INTERVAL, ECG07: 466 MS
Q-T INTERVAL, ECG07: 496 MS
QRS DURATION, ECG06: 140 MS
QRS DURATION, ECG06: 144 MS
QTC CALCULATION (BEZET), ECG08: 507 MS
QTC CALCULATION (BEZET), ECG08: 591 MS
RBC # BLD AUTO: 3.47 M/UL (ref 4.05–5.25)
RBC #/AREA URNS HPF: ABNORMAL /HPF
SODIUM SERPL-SCNC: 138 MMOL/L (ref 136–145)
SP GR UR REFRACTOMETRY: 1.02 (ref 1–1.02)
SYSTOLIC BP, ECG01: NORMAL MMHG
SYSTOLIC BP, ECG01: NORMAL MMHG
TROPONIN I SERPL-MCNC: <0.02 NG/ML (ref 0.02–0.05)
UROBILINOGEN UR QL STRIP.AUTO: 0.2 EU/DL (ref 0.2–1)
VENTRICULAR RATE, ECG03: 63 BPM
VENTRICULAR RATE, ECG03: 97 BPM
WBC # BLD AUTO: 7.6 K/UL (ref 4.3–11.1)
WBC URNS QL MICRO: ABNORMAL /HPF

## 2017-03-09 PROCEDURE — 74011636637 HC RX REV CODE- 636/637: Performed by: INTERNAL MEDICINE

## 2017-03-09 PROCEDURE — 99218 HC RM OBSERVATION: CPT

## 2017-03-09 PROCEDURE — 93005 ELECTROCARDIOGRAM TRACING: CPT | Performed by: INTERNAL MEDICINE

## 2017-03-09 PROCEDURE — 74011250637 HC RX REV CODE- 250/637: Performed by: INTERNAL MEDICINE

## 2017-03-09 PROCEDURE — 74011250636 HC RX REV CODE- 250/636: Performed by: INTERNAL MEDICINE

## 2017-03-09 PROCEDURE — 87804 INFLUENZA ASSAY W/OPTIC: CPT | Performed by: INTERNAL MEDICINE

## 2017-03-09 PROCEDURE — 86580 TB INTRADERMAL TEST: CPT | Performed by: INTERNAL MEDICINE

## 2017-03-09 PROCEDURE — 85027 COMPLETE CBC AUTOMATED: CPT | Performed by: INTERNAL MEDICINE

## 2017-03-09 PROCEDURE — 80076 HEPATIC FUNCTION PANEL: CPT | Performed by: INTERNAL MEDICINE

## 2017-03-09 PROCEDURE — 81001 URINALYSIS AUTO W/SCOPE: CPT | Performed by: INTERNAL MEDICINE

## 2017-03-09 PROCEDURE — P9047 ALBUMIN (HUMAN), 25%, 50ML: HCPCS | Performed by: INTERNAL MEDICINE

## 2017-03-09 PROCEDURE — A9270 NON-COVERED ITEM OR SERVICE: HCPCS | Performed by: INTERNAL MEDICINE

## 2017-03-09 PROCEDURE — 82962 GLUCOSE BLOOD TEST: CPT

## 2017-03-09 PROCEDURE — 36415 COLL VENOUS BLD VENIPUNCTURE: CPT | Performed by: INTERNAL MEDICINE

## 2017-03-09 PROCEDURE — 80048 BASIC METABOLIC PNL TOTAL CA: CPT | Performed by: INTERNAL MEDICINE

## 2017-03-09 PROCEDURE — 84484 ASSAY OF TROPONIN QUANT: CPT | Performed by: INTERNAL MEDICINE

## 2017-03-09 PROCEDURE — 74011000302 HC RX REV CODE- 302: Performed by: INTERNAL MEDICINE

## 2017-03-09 RX ORDER — PANTOPRAZOLE SODIUM 40 MG/1
40 TABLET, DELAYED RELEASE ORAL
Status: DISCONTINUED | OUTPATIENT
Start: 2017-03-09 | End: 2017-03-11 | Stop reason: HOSPADM

## 2017-03-09 RX ORDER — NALOXONE HYDROCHLORIDE 0.4 MG/ML
0.4 INJECTION, SOLUTION INTRAMUSCULAR; INTRAVENOUS; SUBCUTANEOUS AS NEEDED
Status: DISCONTINUED | OUTPATIENT
Start: 2017-03-09 | End: 2017-03-11 | Stop reason: HOSPADM

## 2017-03-09 RX ORDER — ASPIRIN 81 MG/1
81 TABLET ORAL DAILY
Status: DISCONTINUED | OUTPATIENT
Start: 2017-03-09 | End: 2017-03-11 | Stop reason: HOSPADM

## 2017-03-09 RX ORDER — SODIUM CHLORIDE 0.9 % (FLUSH) 0.9 %
5-10 SYRINGE (ML) INJECTION AS NEEDED
Status: DISCONTINUED | OUTPATIENT
Start: 2017-03-09 | End: 2017-03-11 | Stop reason: HOSPADM

## 2017-03-09 RX ORDER — ONDANSETRON 2 MG/ML
4 INJECTION INTRAMUSCULAR; INTRAVENOUS
Status: DISCONTINUED | OUTPATIENT
Start: 2017-03-09 | End: 2017-03-11 | Stop reason: HOSPADM

## 2017-03-09 RX ORDER — ACETAMINOPHEN 325 MG/1
650 TABLET ORAL
Status: DISCONTINUED | OUTPATIENT
Start: 2017-03-09 | End: 2017-03-11 | Stop reason: HOSPADM

## 2017-03-09 RX ORDER — LISINOPRIL 5 MG/1
10 TABLET ORAL DAILY
Status: DISCONTINUED | OUTPATIENT
Start: 2017-03-09 | End: 2017-03-09

## 2017-03-09 RX ORDER — LISINOPRIL 5 MG/1
2.5 TABLET ORAL DAILY
Status: DISCONTINUED | OUTPATIENT
Start: 2017-03-10 | End: 2017-03-10

## 2017-03-09 RX ORDER — ENOXAPARIN SODIUM 100 MG/ML
30 INJECTION SUBCUTANEOUS EVERY 24 HOURS
Status: DISCONTINUED | OUTPATIENT
Start: 2017-03-09 | End: 2017-03-11 | Stop reason: HOSPADM

## 2017-03-09 RX ORDER — INSULIN LISPRO 100 [IU]/ML
INJECTION, SOLUTION INTRAVENOUS; SUBCUTANEOUS
Status: DISCONTINUED | OUTPATIENT
Start: 2017-03-09 | End: 2017-03-11 | Stop reason: HOSPADM

## 2017-03-09 RX ORDER — ALBUMIN HUMAN 250 G/1000ML
25 SOLUTION INTRAVENOUS
Status: COMPLETED | OUTPATIENT
Start: 2017-03-09 | End: 2017-03-09

## 2017-03-09 RX ORDER — CARVEDILOL 12.5 MG/1
12.5 TABLET ORAL 2 TIMES DAILY WITH MEALS
Status: DISCONTINUED | OUTPATIENT
Start: 2017-03-09 | End: 2017-03-09

## 2017-03-09 RX ORDER — GLIMEPIRIDE 2 MG/1
1 TABLET ORAL
Status: DISCONTINUED | OUTPATIENT
Start: 2017-03-09 | End: 2017-03-09

## 2017-03-09 RX ORDER — HYDROCODONE BITARTRATE AND ACETAMINOPHEN 7.5; 325 MG/1; MG/1
1 TABLET ORAL
Status: DISCONTINUED | OUTPATIENT
Start: 2017-03-09 | End: 2017-03-11 | Stop reason: HOSPADM

## 2017-03-09 RX ORDER — AMOXICILLIN 250 MG
1 CAPSULE ORAL
Status: DISCONTINUED | OUTPATIENT
Start: 2017-03-09 | End: 2017-03-11 | Stop reason: HOSPADM

## 2017-03-09 RX ORDER — FUROSEMIDE 20 MG/1
20 TABLET ORAL DAILY
Status: DISCONTINUED | OUTPATIENT
Start: 2017-03-09 | End: 2017-03-10

## 2017-03-09 RX ORDER — ZOLPIDEM TARTRATE 5 MG/1
5 TABLET ORAL
Status: DISCONTINUED | OUTPATIENT
Start: 2017-03-09 | End: 2017-03-11 | Stop reason: HOSPADM

## 2017-03-09 RX ORDER — MORPHINE SULFATE 10 MG/ML
5 INJECTION, SOLUTION INTRAMUSCULAR; INTRAVENOUS
Status: DISCONTINUED | OUTPATIENT
Start: 2017-03-09 | End: 2017-03-11 | Stop reason: HOSPADM

## 2017-03-09 RX ORDER — FUROSEMIDE 10 MG/ML
40 INJECTION INTRAMUSCULAR; INTRAVENOUS ONCE
Status: COMPLETED | OUTPATIENT
Start: 2017-03-09 | End: 2017-03-09

## 2017-03-09 RX ORDER — AMIODARONE HYDROCHLORIDE 200 MG/1
200 TABLET ORAL DAILY
Status: DISCONTINUED | OUTPATIENT
Start: 2017-03-09 | End: 2017-03-09

## 2017-03-09 RX ORDER — AMIODARONE HYDROCHLORIDE 200 MG/1
400 TABLET ORAL EVERY 12 HOURS
Status: DISCONTINUED | OUTPATIENT
Start: 2017-03-09 | End: 2017-03-10

## 2017-03-09 RX ORDER — CARVEDILOL 25 MG/1
25 TABLET ORAL 2 TIMES DAILY WITH MEALS
Status: DISCONTINUED | OUTPATIENT
Start: 2017-03-09 | End: 2017-03-10

## 2017-03-09 RX ORDER — DIPHENHYDRAMINE HCL 25 MG
25 CAPSULE ORAL
Status: DISCONTINUED | OUTPATIENT
Start: 2017-03-09 | End: 2017-03-11 | Stop reason: HOSPADM

## 2017-03-09 RX ORDER — POLYETHYLENE GLYCOL 3350 17 G/17G
17 POWDER, FOR SOLUTION ORAL DAILY
Status: DISCONTINUED | OUTPATIENT
Start: 2017-03-09 | End: 2017-03-11 | Stop reason: HOSPADM

## 2017-03-09 RX ORDER — GABAPENTIN 300 MG/1
600 CAPSULE ORAL 3 TIMES DAILY
Status: DISCONTINUED | OUTPATIENT
Start: 2017-03-09 | End: 2017-03-10

## 2017-03-09 RX ORDER — LEVOTHYROXINE SODIUM 50 UG/1
25 TABLET ORAL
Status: DISCONTINUED | OUTPATIENT
Start: 2017-03-09 | End: 2017-03-11 | Stop reason: HOSPADM

## 2017-03-09 RX ORDER — SODIUM CHLORIDE 0.9 % (FLUSH) 0.9 %
5-10 SYRINGE (ML) INJECTION EVERY 8 HOURS
Status: DISCONTINUED | OUTPATIENT
Start: 2017-03-09 | End: 2017-03-11 | Stop reason: HOSPADM

## 2017-03-09 RX ADMIN — CARVEDILOL 25 MG: 25 TABLET, FILM COATED ORAL at 17:51

## 2017-03-09 RX ADMIN — GABAPENTIN 600 MG: 300 CAPSULE ORAL at 05:59

## 2017-03-09 RX ADMIN — ASPIRIN 81 MG: 81 TABLET, COATED ORAL at 09:20

## 2017-03-09 RX ADMIN — HYDROCODONE BITARTRATE AND ACETAMINOPHEN 1 TABLET: 7.5; 325 TABLET ORAL at 06:09

## 2017-03-09 RX ADMIN — Medication 1 TABLET: at 22:16

## 2017-03-09 RX ADMIN — Medication 10 ML: at 22:00

## 2017-03-09 RX ADMIN — Medication 10 ML: at 02:34

## 2017-03-09 RX ADMIN — FUROSEMIDE 40 MG: 10 INJECTION, SOLUTION INTRAMUSCULAR; INTRAVENOUS at 02:34

## 2017-03-09 RX ADMIN — AMIODARONE HYDROCHLORIDE 200 MG: 200 TABLET ORAL at 09:21

## 2017-03-09 RX ADMIN — INSULIN LISPRO 2 UNITS: 100 INJECTION, SOLUTION INTRAVENOUS; SUBCUTANEOUS at 11:30

## 2017-03-09 RX ADMIN — Medication 10 ML: at 14:00

## 2017-03-09 RX ADMIN — ALBUMIN (HUMAN) 25 G: 0.25 INJECTION, SOLUTION INTRAVENOUS at 12:38

## 2017-03-09 RX ADMIN — POLYETHYLENE GLYCOL 3350 17 G: 17 POWDER, FOR SOLUTION ORAL at 09:20

## 2017-03-09 RX ADMIN — LEVOTHYROXINE SODIUM 25 MCG: 50 TABLET ORAL at 06:00

## 2017-03-09 RX ADMIN — GLIMEPIRIDE 1 MG: 2 TABLET ORAL at 09:21

## 2017-03-09 RX ADMIN — PANTOPRAZOLE SODIUM 40 MG: 40 TABLET, DELAYED RELEASE ORAL at 05:59

## 2017-03-09 RX ADMIN — TUBERCULIN PURIFIED PROTEIN DERIVATIVE 5 UNITS: 5 INJECTION, SOLUTION INTRADERMAL at 17:51

## 2017-03-09 RX ADMIN — AMIODARONE HYDROCHLORIDE 400 MG: 200 TABLET ORAL at 22:16

## 2017-03-09 RX ADMIN — GABAPENTIN 600 MG: 300 CAPSULE ORAL at 22:16

## 2017-03-09 RX ADMIN — GABAPENTIN 600 MG: 300 CAPSULE ORAL at 17:51

## 2017-03-09 RX ADMIN — Medication 1 TABLET: at 06:00

## 2017-03-09 RX ADMIN — ENOXAPARIN SODIUM 30 MG: 30 INJECTION SUBCUTANEOUS at 09:20

## 2017-03-09 NOTE — PROGRESS NOTES
TRANSFER - IN REPORT:    Verbal report received from [facility] HERNAN TRAN karen Alonzo  being received from ED(unit) for routine progression of care      Report consisted of patients Situation, Background, Assessment and   Recommendations(SBAR). Information from the following report(s) SBAR, Kardex and MAR was reviewed with the receiving nurse. Opportunity for questions and clarification was provided. Assessment completed upon patients arrival to unit and care assumed.

## 2017-03-09 NOTE — MED STUDENT NOTES
*ATTENTION:  This note has been created by a medical student for educational purposes only. Please do not refer to the content of this note for clinical decision-making, billing, or other purposes. Please see attending physicians note to obtain clinical information on this patient. *        Progress Note  Patient: Abi Durham MRN: [id number] SSN: [id number]    YOB: 1927 Age: 80 y.o. Sex: female      Subjective:  Ms. Heather Reyna is an 81yo  woman admitted 3/8/17 for pleuritic chest pain and altered mental status of one day duration. She reports a severe episode of afib one week prior that resulted in dyspnea. Dyspnea on exertion has increased in the last week with climbing 2 stairs. She also endorses a fall two days ago while in her kitchen talking to her caretaker. She denies hitting her head. She is a patient of [facility] cardiology. She recently had clonidine and norvasc stopped by [facility] cardiology, Dr. Kory Guerrero, with an addition of carvedilol. She is not on anticoagulation for her afib d/t her history of frequent falls. [address] [place] [place] - pacemaker 2008, cervical fusion, appendectomy, carpal tunnel release, colonoscopy 2005  PMH - afib, hypothyroidism, anemia, CAD, cervical radiculopathy, chronic pain sydnrome, CKD stage III, DM2, CHF, gait disturbance, IBS, Anup  Meds - reconciled with patient, see H&P. Allergies - tylenol (insomnia)  FH - no pertinent  SH - nonsmoker, nondrinker, , lives independently with . ROS:    Gen:  Denies fevers, chills, weight loss. HEENT:  Denies vision changes, congestion  Respiratory:  Endorses intermittent cough. CV:  Endorses diffuse chest pain on deep inspiration. GI:  Denies nausea, vomiting, diarrhea, hematochezia and melena. :  Denies hematuria and dysuria. Endorses urgency with increased lasix  MSK:  Denies muscle and joint pain. Neurological:  Endorses mild occasional occipital headache. Denies numbness and tingling. Hematologic:  Bruise/bleed    Endocrine:  Denies polyuria, polydipsia, heat/cold intolerance. Physical Exam:    Patient Vitals for the past 24 hrs:   BP Temp Pulse Resp SpO2 Height Weight   03/09/17 1531 103/50 97.5 °F (36.4 °C) 91 18 96 % - -   03/09/17 1141 91/51 98.1 °F (36.7 °C) 93 18 92 % - -   03/09/17 0800 95/48 - (!) 110 - - - -   03/09/17 0714 100/45 97.4 °F (36.3 °C) (!) 105 18 95 % - -   03/09/17 0304 147/64 98.1 °F (36.7 °C) 80 16 93 % - -   03/09/17 0036 151/53 98.1 °F (36.7 °C) 74 18 97 % - -   03/08/17 2340 167/72 98.5 °F (36.9 °C) 68 18 92 % - -   03/08/17 2333 171/74 - 69 18 94 % - -   03/08/17 2219 183/71 - 68 - - - -   03/08/17 2202 - - - - 91 % - -   03/08/17 2201 156/68 - - - - - -   03/08/17 2120 142/65 - 71 14 95 % - -   03/08/17 2100 177/72 - 69 (!) 32 95 % - -   03/08/17 2040 151/66 - 76 - 94 % - -   03/08/17 2020 140/63 - 71 - 95 % - -   03/08/17 2000 148/76 - 68 20 93 % - -   03/08/17 1952 (!) 196/117 - 83 - - - -   03/08/17 1920 194/82 - 63 14 97 % - -   03/08/17 1900 170/76 - 61 14 97 % - -   03/08/17 1820 181/86 - 62 17 99 % - -   03/08/17 1804 197/79 - 60 - - - -   03/08/17 1802 197/79 - 62 26 99 % - -   03/08/17 1739 - - - - 96 % - -   03/08/17 1720 191/77 - 64 17 98 % - -   03/08/17 1655 (!) 212/86 99.3 °F (37.4 °C) 64 14 (!) 87 % 5' 4\" (1.626 m) 42.2 kg (93 lb)     General:  Thin, elderly woman in NAD. AAOx3. CV:  Tachycardic, irregular irregular rhythm. No m/r/g.  2+ dorsalis pedis b/l. No peripheral LE edema. Lungs:  Mild R basilar crackles. No wheeze or rhonchi. Abdomen:  Soft, nontender, nondistended. +BS x 4. No hepatosplenomegaly. :  No suprapubic tenderness. No CVA tenderenss.     Recent Labs      03/09/17   0953  03/08/17   1710   WBC  7.6  9.2   HGB  10.6*  12.0   HCT  31.9*  35.2*   PLT  324  341     Recent Labs      03/09/17   0953  03/08/17   1710   NA  138  135*   K  3.5  4.1   CL  97*  95*   CO2  27  31   BUN  21  22   CREA  1.54*  1.41*   SGOT 16  18    Troponin <0.02 Ammonia <10    CT chest w contrast 3/8/17 - No evidence of PE. Consider pulmonary edema. CT head 3/8/17 - no acute infarction/ischemia    I/Os:  0/1900mL    Assessment:   1) Atrial fibrillation - per history, dyspnea is exacerbated with episodes of afib. 2) HTN - controlled, episodes of hypotension. 3) CKD stage III - stable  4) DM2 - controlled  5) CHF - EF 30-35%, no exacerbation   6) GERD - cont protonix  7) Pulmonary fibrosis - doubt, consider possible early presentation d/t prolonged amiodarone use    Plan:    1) D/C carvedilol and lisinopril. IV contrast administered on admission. Avoid NSAIDs for now w/ possible RUSS in near future. Will monitor BUN/Cr/UOP. D/c lasix. Albumin to aid hypotension. 2) Consider orthostatic checks d/t recent falls and change to HTN medication. 3) UA - r/o UTI. Asymptomatic currently. Rapid flu to r/o atypical flu presentation. 4) Consult cardiology. Per Dr. Judy Jenkins, with recent echo demonstrating LVEF of 30-35% and more frequent afib load, she may be require a biventricular pacer. In her condition and at her age, surgical risk should be considered. 5) Will continue to monitor. Based on cardiology consult, may discharge tomorrow.     Signed By: Kenzie Shannon    March 9, 2017

## 2017-03-09 NOTE — PROGRESS NOTES
Progress Note    Patient: Arnoldo Holstein MRN: [id number]  SSN: [id number]    YOB: 1927  Age: 80 y.o. Sex: female      Admit Date: 3/8/2017    LOS: 0 days     Subjective:     81 yo Female patient admitted with chest discomfort and SOB. CHest CT scan negative for PE. FOund with Afib this am. LVEF is 30%. Will consult cardiology for Afib management and to consider her for a BiV PMM upgrade. Objective:     Vitals:    03/08/17 2340 03/09/17 0036 03/09/17 0304 03/09/17 0714   BP: 167/72 151/53 147/64 100/45   Pulse: 68 74 80 (!) 105   Resp: 18 18 16 18   Temp: 98.5 °F (36.9 °C) 98.1 °F (36.7 °C) 98.1 °F (36.7 °C) 97.4 °F (36.3 °C)   SpO2: 92% 97% 93% 95%   Weight:       Height:            Intake and Output:  Current Shift:    Last three shifts: 03/07 1901 - 03/09 0700  In: -   Out: 1900 [Urine:1900]    Physical Exam:   GENERAL: alert, cooperative, no distress, appears stated age  EYE: conjunctivae/corneas clear. PERRL, EOM's intact. Fundi benign  LYMPHATIC: Cervical, supraclavicular, and axillary nodes normal.   THROAT & NECK: normal and no erythema or exudates noted. LUNG: clear to auscultation bilaterally  HEART: regular rate and rhythm, S1, S2 normal, no murmur, click, rub or gallop  ABDOMEN: soft, non-tender. Bowel sounds normal. No masses,  no organomegaly  EXTREMITIES:  extremities normal, atraumatic, no cyanosis or edema  SKIN: Normal.  NEUROLOGIC: AOx3. Gait normal. Reflexes and motor strength normal and symmetric. Cranial nerves 2-12 and sensation grossly intact. PSYCHIATRIC: non focal    Lab/Data Review:  Lab results reviewed. For significant abnormal values and values requiring intervention, see assessment and plan.          Assessment:     Principal Problem:    Altered mental state (3/9/2017)    Active Problems:    Diabetes mellitus, type 2 ([address] (9/12/2012)      Hypertension (9/12/2012)      Overview: Orthostatic hypotension due to autonomic neuropathy      Acquired hypothyroidism (9/12/2012)      Chest pain, pleuritic (3/9/2017)        Plan:     # BP is low. Will hold BP meds. Decrease dose of lisinopril.  IV albumin ordered   #On Afib this am. Will consult cardiology as previously discussed   #Will remove [place] cath       Signed By: Evaristo Casey MD     March 9, 2017

## 2017-03-09 NOTE — PROGRESS NOTES
Primary Nurse Romulo Pacheco and Jc Amado RN performed a dual skin assessment on this patient No impairment noted  Azam score is 18.

## 2017-03-09 NOTE — PROGRESS NOTES
Care Management Interventions  PCP Verified by CM: Yes  Transition of Care Consult (CM Consult): Discharge Planning  Physical Therapy Consult: Yes  Current Support Network: Lives with Spouse  Confirm Follow Up Transport: Family  Plan discussed with Pt/Family/Caregiver: Yes  Freedom of Choice Offered: Yes  Discharge Location  Discharge Placement: Home     Met with patient and her daughter at the bedside. Mrs. Heather Reyna lives with her debilitated spouse who is not very mobile and is usually found in a hospital bed. There are sitters during the day so that the couple in never alone. The four children take turns spending the nights with their parents. One level home. Patient usually is independent with mobility and only uses a walker occasionally. She tried to help the sitter with her husbands care but her children make sure she is not the primary caretaker. Patient asking for [place] cath to be dc'ed. MD aware. Daughter signed OBS paperwork and a copy has been placed on the chart. Daughter very aware of OBS vs Inpatient status from prior hospitalizations for her father. Goal is home with spouse. Patient does better in her own home and spouse does better when she is there. Devoted caring children. Will order PT and will add in [facility] if indicated. PPD just in case and will wait to see what cards decides. Evelyn Rossi

## 2017-03-09 NOTE — H&P
HOSPITALIST H&P    NAME:  Shonna Koehler   Age:  80 y.o.  :   8/10/1927   MRN:   [id number]  PCP: Raffy Dugan MD  Chief complaint: chest pain, lethargy    Subjective:     Patient is a 80 y.o. female who presents with chest pain and lethargy. Pt reports chest pain with deep inspiration. She cannot pinpoint one location and is unable to describe to me the character. She reports some dyspnea with exertion and has been more somnolent today per her daughter. She was recently in the hospital for afib with RVR. She denies fever, chills, N/V/D, abdominal pain, headache, or focal weakness.     Past Medical History:   Diagnosis Date    A fib     Abnormal loss of weight     Acquired hypothyroidism 2012    Anemia     Anorexia 2014    Arthritis associated with another disorder     Atrial fibrillation ([address] 2012    Paroxysmal.  Coumadin contraindicated due to falls      Autonomic neuropathy 2013    CAD (coronary artery disease)     CAD in native artery 2016    Cervical radiculopathy 2013    Chest pain     Chronic pain syndrome 2013    Back, right shoulder, neck: Peripheral neuropathy, spinal stenosis C7-T1, foraminal narrowing C4-5     CKD (chronic kidney disease), stage III 2012    Diabetes ([address]     about 30 years    Diabetes mellitus     Diabetes mellitus, type 2 ([address] 2012    Dysphagia 2014    Due to esophageal spasm seen on modified barium swallow 2015     Gait disorder 2012    GERD (gastroesophageal reflux disease)     Heart failure ([address]     HTN     Hypertension 2012    Orthostatic hypotension due to autonomic neuropathy     Hypothyroid     IBS (irritable bowel syndrome) 2012    Iron deficiency 2012    Lumbar disc disease     MCI (mild cognitive impairment) 2013    Mixed hyperlipidemia 2012    Orthostatic hypotension 2016    Pacemaker     SSS (sick sinus syndrome) ([address] 2016       Past Surgical History:   Procedure Laterality Date    HX APPENDECTOMY      HX CARPAL TUNNEL RELEASE      HX CERVICAL FUSION      HX COLONOSCOPY  Nov 2005    HX PACEMAKER  6/08       No current facility-administered medications on file prior to encounter. Current Outpatient Prescriptions on File Prior to Encounter   Medication Sig Dispense Refill    carvedilol (COREG) 12.5 mg tablet Take 1 Tab by mouth two (2) times daily (with meals). 60 Tab 6    lisinopril (PRINIVIL, ZESTRIL) 10 mg tablet Take 1 Tab by mouth daily. 30 Tab 6    furosemide (LASIX) 20 mg tablet Take 1 Tab by mouth daily. 30 Tab 3    potassium chloride (K-DUR, KLOR-CON) 10 mEq tablet Take 1 Tab by mouth daily. 30 Tab 3    HYDROcodone-acetaminophen (NORCO) 7.5-325 mg per tablet Take 1 Tab by mouth three (3) times daily. Max Daily Amount: 3 Tabs. Indications: Pain 90 Tab 0    SENNA TK 1 T PO QPM  1    glimepiride (AMARYL) 1 mg tablet Take 1 mg by mouth.  zolpidem (AMBIEN) 5 mg tablet Take 1 Tab by mouth nightly as needed for Sleep. Max Daily Amount: 5 mg. 30 Tab 1    amiodarone (CORDARONE) 200 mg tablet Take 1 Tab by mouth daily. Indications: PREVENTION OF RECURRENT ATRIAL FIBRILLATION 90 Tab 3    aspirin delayed-release 81 mg tablet Take  by mouth daily.  omeprazole (PRILOSEC) 20 mg capsule Take 1 Cap by mouth two (2) times a day. 180 Cap 3    gabapentin (NEURONTIN) 600 mg tablet Take 1 Tab by mouth three (3) times daily. 360 Tab 3    polyethylene glycol (MIRALAX) 17 gram packet Take 1 Packet by mouth daily. 30 Packet 5    senna-docusate (PERICOLACE) 8.6-50 mg per tablet Take 1 Tab by mouth nightly. 90 Tab 3    levothyroxine (SYNTHROID) 25 mcg tablet Take 1 Tab by mouth Daily (before breakfast). 90 Tab 3    glucose blood VI test strips (ONE TOUCH ULTRA TEST) strip Test twice a day 4 Package 3    nitroglycerin (NITROSTAT) 0.4 mg SL tablet 1 Tab by SubLINGual route every five (5) minutes as needed.  Indications: ANGINA 1 Bottle 3    cholecalciferol (VITAMIN D-3) 400 unit Tab tablet Take 1,000 Units by mouth daily.  calcium carbonate (OS-) 500 mg (1,250 mg) tablet take 1 Tab by mouth two (2) times a day. Allergies   Allergen Reactions    Other Medication Unknown (comments)     Tylenol (caused Insomnia)       Social History   Substance Use Topics    Smoking status: Never Smoker    Smokeless tobacco: Never Used    Alcohol use No       History reviewed. No pertinent family history. I have personally reviewed and reconciled patients history. Review of Systems  A comprehensive 12 point review of systems is negative other than what is listed above. Objective:     Patient Vitals for the past 24 hrs:   BP Temp Pulse Resp SpO2 Height Weight   03/09/17 0036 151/53 98.1 °F (36.7 °C) 74 18 97 % - -   03/08/17 2340 167/72 98.5 °F (36.9 °C) 68 18 92 % - -   03/08/17 2333 171/74 - 69 18 94 % - -   03/08/17 2219 183/71 - 68 - - - -   03/08/17 2202 - - - - 91 % - -   03/08/17 2201 156/68 - - - - - -   03/08/17 2120 142/65 - 71 14 95 % - -   03/08/17 2100 177/72 - 69 (!) 32 95 % - -   03/08/17 2040 151/66 - 76 - 94 % - -   03/08/17 2020 140/63 - 71 - 95 % - -   03/08/17 2000 148/76 - 68 20 93 % - -   03/08/17 1952 (!) 196/117 - 83 - - - -   03/08/17 1920 194/82 - 63 14 97 % - -   03/08/17 1900 170/76 - 61 14 97 % - -   03/08/17 1820 181/86 - 62 17 99 % - -   03/08/17 1804 197/79 - 60 - - - -   03/08/17 1802 197/79 - 62 26 99 % - -   03/08/17 1739 - - - - 96 % - -   03/08/17 1720 191/77 - 64 17 98 % - -   03/08/17 1655 (!) 212/86 99.3 °F (37.4 °C) 64 14 (!) 87 % 5' 4\" (1.626 m) 42.2 kg (93 lb)       Exam:  General: awake, alert, no apparent distress  Eyes: anicteric  Neck: Supple, trachea midline  Lungs: Clear to auscultation bilaterally. No rales, wheezes, or rhonchi. Heart: Regular rate and rhythm. No appreciable murmur. Abdomen: Soft, nontender, nondistended.   Bowel sounds normal.  No rebound tenderness, guarding, or rigidity. Extremities:  No LE edema. Skin: Warm/dry. No rashes or lesions. Neurologic: CN II-XII grossly intact bilaterally. No focal deficits. Psych: Alert. Slightly delayed. Data Review (Labs):   Recent Results (from the past 24 hour(s))   EKG, 12 LEAD, INITIAL    Collection Time: 03/08/17  5:02 PM   Result Value Ref Range    Systolic BP  mmHg    Diastolic BP  mmHg    Ventricular Rate 63 BPM    Atrial Rate 63 BPM    P-R Interval 216 ms    QRS Duration 144 ms    Q-T Interval 496 ms    QTC Calculation (Bezet) 507 ms    Calculated P Axis 110 degrees    Calculated R Axis -50 degrees    Calculated T Axis 81 degrees    Diagnosis       !! AGE AND GENDER SPECIFIC ECG ANALYSIS !! Sinus rhythm with 1st degree A-V block with Premature atrial complexes  Left axis deviation  Left bundle branch block  Abnormal ECG  When compared with ECG of 08-MAR-2017 16:59,  Previous ECG has undetermined rhythm, needs review     CBC WITH AUTOMATED DIFF    Collection Time: 03/08/17  5:10 PM   Result Value Ref Range    WBC 9.2 4.3 - 11.1 K/uL    RBC 3.82 (L) 4.05 - 5.25 M/uL    HGB 12.0 11.7 - 15.4 g/dL    HCT 35.2 (L) 35.8 - 46.3 %    MCV 92.1 79.6 - 97.8 FL    MCH 31.4 26.1 - 32.9 PG    MCHC 34.1 31.4 - 35.0 g/dL    RDW 12.7 11.9 - 14.6 %    PLATELET 296 834 - 205 K/uL    MPV 10.4 (L) 10.8 - 14.1 FL    DF AUTOMATED      NEUTROPHILS 72 43 - 78 %    LYMPHOCYTES 13 13 - 44 %    MONOCYTES 15 (H) 4.0 - 12.0 %    EOSINOPHILS 0 (L) 0.5 - 7.8 %    BASOPHILS 0 0.0 - 2.0 %    IMMATURE GRANULOCYTES 0.2 0.0 - 5.0 %    ABS. NEUTROPHILS 6.6 1.7 - 8.2 K/UL    ABS. LYMPHOCYTES 1.2 0.5 - 4.6 K/UL    ABS. MONOCYTES 1.4 (H) 0.1 - 1.3 K/UL    ABS. EOSINOPHILS 0.0 0.0 - 0.8 K/UL    ABS. BASOPHILS 0.0 0.0 - 0.2 K/UL    ABS. IMM.  GRANS. 0.0 0.0 - 0.5 K/UL   METABOLIC PANEL, COMPREHENSIVE    Collection Time: 03/08/17  5:10 PM   Result Value Ref Range    Sodium 135 (L) 136 - 145 mmol/L    Potassium 4.1 3.5 - 5.1 mmol/L    Chloride 95 (L) 98 - 107 mmol/L CO2 31 21 - 32 mmol/L    Anion gap 9 7 - 16 mmol/L    Glucose 132 (H) 65 - 100 mg/dL    BUN 22 8 - 23 MG/DL    Creatinine 1.41 (H) 0.6 - 1.0 MG/DL    GFR est AA 45 (L) >60 ml/min/1.73m2    GFR est non-AA 37 (L) >60 ml/min/1.73m2    Calcium 9.1 8.3 - 10.4 MG/DL    Bilirubin, total 0.8 0.2 - 1.1 MG/DL    ALT (SGPT) 19 12 - 65 U/L    AST (SGOT) 18 15 - 37 U/L    Alk. phosphatase 116 50 - 136 U/L    Protein, total 7.9 6.3 - 8.2 g/dL    Albumin 4.0 3.2 - 4.6 g/dL    Globulin 3.9 (H) 2.3 - 3.5 g/dL    A-G Ratio 1.0 (L) 1.2 - 3.5     BNP    Collection Time: 03/08/17  5:10 PM   Result Value Ref Range     pg/mL   POC LACTIC ACID    Collection Time: 03/08/17  5:15 PM   Result Value Ref Range    Lactic Acid (POC) 0.9 0.5 - 1.9 mmol/L   BLOOD GAS, ARTERIAL    Collection Time: 03/08/17  6:04 PM   Result Value Ref Range    pH 7.44 7.35 - 7.45      PCO2 40 35.0 - 45.0 mmHg    PO2 76 75.0 - 100.0 mmHg    BICARBONATE 26 22.0 - 26.0 mmol/L    BASE EXCESS 2.1 0 - 3 mmol/L    TOTAL HEMOGLOBIN 10.9 (L) 11.7 - 15.0 GM/DL    O2 SAT 95 92.0 - 98.5 %    ARTERIAL O2 HGB 94.1 94.0 - 97.0 %    CARBOXYHEMOGLOBIN 0.4 (L) 0.5 - 1.5 %    METHEMOGLOBIN 0.3 0.0 - 1.5 %    DEOXYHEMOGLOBIN 5 0.0 - 5.0 %    SITE LR     ALLENS TEST POSITIVE      MODE NC     O2 FLOW 2.00 L/min    Respiratory commentDorcus Farhana MD at 3 8 2017 6 08 06 PM. Read back.     AMMONIA    Collection Time: 03/08/17 11:21 PM   Result Value Ref Range    Ammonia <10 (L) 11 - 32 UMOL/L       Assessment:   Principal Problem:    Altered mental state (3/9/2017)    Active Problems:    Diabetes mellitus, type 2 ([facility] (9/12/2012)      Hypertension (9/12/2012)      Overview: Orthostatic hypotension due to autonomic neuropathy      Acquired hypothyroidism (9/12/2012)      Chest pain, pleuritic (3/9/2017)        Plan:     Observation on medical floor  Check troponin  Will give dose of IV lasix  CT chest without PE, aortic dissection, or pneumonia    DVT prophylaxis: Lovenox  Code Status: DNR    Plan of care discussed with: patient/daughter  Time spent on patient care: 30 minutes  Anticipated date of discharge: 1-2 days    Chapin Lerma, DO

## 2017-03-09 NOTE — CONSULTS
[facility] Cardiology Consult    Attending Cardiologist:Dr. Kieran Giles    Primary Cardiologist:Dr. Katelyn Verma     Primary Care Physician:Dr. Sonia Fields                     Referring--Dr. Nini Joel, consideration for CRT    Subjective:     Toma Jenkins is a 80 y. o.female with known history of PAF, SSS with Mdt dual chamber PM. She was here last month on our service with afib with RVR, she was chronically on amiodarone 200 mg daily but felt not to be anticoagulation candidate for several years now. She had spontaneous conversion to SR prior to discharge. Her echo last month noted EF 30-35 %. When she is in SR it appears she is 100 % AV paced with underlying LBBB. Previous EF normal in 2005. She represented with chest discomfort worsening with deep inspiration. CT chest negative for pathology. BNP was minimally elevated >300. She appears to have significant afib burden since Feb 5th at 5 %, device is close to CRYS at 1-8 months. Her breathing has improved overnight with single dose of IV lasix. Daughter at bedside relates pt has had mild confusion in last week which has now improved. Chest xray noted     MILD LEFT BASILAR ATELECTASIS/INFILTRATE WITH NO SIGNIFICANT CHANGE  FROM FEBRUARY 25.   Past Medical History:   Diagnosis Date    A fib     Abnormal loss of weight     Acquired hypothyroidism 9/12/2012    Anemia     Anorexia 9/2/2014    Arthritis associated with another disorder     Atrial fibrillation ([facility] 9/12/2012    Paroxysmal.  Coumadin contraindicated due to falls      Autonomic neuropathy 1/13/2013    CAD (coronary artery disease)     CAD in native artery 5/5/2016    Cervical radiculopathy 5/23/2013    Chest pain     Chronic pain syndrome 1/13/2013    Back, right shoulder, neck: Peripheral neuropathy, spinal stenosis C7-T1, foraminal narrowing C4-5     CKD (chronic kidney disease), stage III 9/12/2012    Diabetes ([address]     about 30 years    Diabetes mellitus     Diabetes mellitus, type 2 ([facility] 9/12/2012    Dysphagia 9/2/2014    Due to esophageal spasm seen on modified barium swallow 2015     Gait disorder 9/12/2012    GERD (gastroesophageal reflux disease)     Heart failure (HCC)     HTN     Hypertension 9/12/2012    Orthostatic hypotension due to autonomic neuropathy     Hypothyroid     IBS (irritable bowel syndrome) 9/12/2012    Iron deficiency 9/12/2012    Lumbar disc disease     MCI (mild cognitive impairment) 1/13/2013    Mixed hyperlipidemia 9/12/2012    Orthostatic hypotension 5/5/2016    Pacemaker     SSS [facility]sick [facility] syndrome) [facility] 5/5/2016      Past Surgical History:   Procedure Laterality Date    HX APPENDECTOMY      HX CARPAL TUNNEL RELEASE      HX CERVICAL FUSION      HX COLONOSCOPY  Nov 2005    HX PACEMAKER  6/08      Current Facility-Administered Medications   Medication Dose Route Frequency    amiodarone (CORDARONE) tablet 200 mg  200 mg Oral DAILY    aspirin delayed-release tablet 81 mg  81 mg Oral DAILY    carvedilol (COREG) tablet 12.5 mg  12.5 mg Oral BID WITH MEALS    furosemide (LASIX) tablet 20 mg  20 mg Oral DAILY    gabapentin (NEURONTIN) capsule 600 mg  600 mg Oral TID    HYDROcodone-acetaminophen (NORCO) 7.5-325 mg per tablet 1 Tab  1 Tab Oral Q6H PRN    levothyroxine (SYNTHROID) tablet 25 mcg  25 mcg Oral ACB    pantoprazole (PROTONIX) tablet 40 mg  40 mg Oral ACB    polyethylene glycol (MIRALAX) packet 17 g  17 g Oral DAILY    senna-docusate (PERICOLACE) 8.6-50 mg per tablet 1 Tab  1 Tab Oral QHS    zolpidem (AMBIEN) tablet 5 mg  5 mg Oral QHS PRN    sodium chloride (NS) flush 5-10 mL  5-10 mL IntraVENous Q8H    sodium chloride (NS) flush 5-10 mL  5-10 mL IntraVENous PRN    insulin lispro (HUMALOG) injection   SubCUTAneous AC&HS    acetaminophen (TYLENOL) tablet 650 mg  650 mg Oral Q6H PRN    morphine injection 5 mg  5 mg IntraVENous Q4H PRN    naloxone (NARCAN) injection 0.4 mg  0.4 mg IntraVENous PRN    diphenhydrAMINE (BENADRYL) capsule 25 mg  25 mg Oral Q4H PRN    ondansetron (ZOFRAN) injection 4 mg  4 mg IntraVENous Q6H PRN    enoxaparin (LOVENOX) injection 30 mg  30 mg SubCUTAneous Q24H    [START ON 3/10/2017] lisinopril (PRINIVIL, ZESTRIL) tablet 2.5 mg  2.5 mg Oral DAILY    albumin human 25% (BUMINATE) solution 25 g  25 g IntraVENous NOW     Allergies   Allergen Reactions    Other Medication Unknown (comments)     Tylenol (caused Insomnia)      Social History   Substance Use Topics    Smoking status: Never Smoker    Smokeless tobacco: Never Used    Alcohol use No      History reviewed. No pertinent family history. Review of Systems  Gen: Denies fever, chills, ++malaise or fatigue. Appetite good. HEENT: Denies frequent headaches, dizzyness, visual disturbances, Neck pain or swallowing difficulty  Lungs: as above, smothering feeling  Cardiovascular: as above  GI: Denies hememesis, dark tarry stools, No prior Hx of GI bleed, Denies constipation  : Denies dysuria, no complaints of frequency, nocturia  Heme: No prior bleeding disorders, no prior Cancer  Neuro: Denies prior CVA, TIA. Endocrine: no diabetes, thyroid disorders  Psychiatric: Denies anxiety, or other psychiatric illnesses. Objective:     Visit Vitals    BP 91/51    Pulse 93    Temp 98.1 °F (36.7 °C)    Resp 18    Ht 5' 4\" (1.626 m)    Wt 42.2 kg (93 lb)    SpO2 92%    Breastfeeding No    BMI 15.96 kg/m2     General:Alert, cooperative, no distress, appears stated age  Head: Normocephalic, without obvious abnormality, atraumatic. Eyes: Conjunctivae/corneas clear. PERRL, EOMs intact  Nose:Nares normal. Septum midline. Mucosa normal. No drainage or sinus tenderness. Throat: Lips, mucosa, and tongue normal. Teeth and gums normal.   Neck: Supple, symmetrical, trachea midline,  no carotid bruit and no JVD. Lungs:Clear to auscultation bilaterally. Chest wall: No tenderness or deformity.    Heart: Regular rate and rhythm, S1, S2 normal, no murmur, click, rub or gallop. Abdomen:Soft, non-tender. Bowel sounds normal. No masses, No organomegaly. Extremities: Extremities normal, atraumatic, no cyanosis or edema. Pulses: 2+ and symmetric all extremities.     Skin: Skin color, texture, turgor normal. No rashes or lesions  Lymph nodes: Cervical, supraclavicular, and axillary nodes normal  Neurologic:No focal deficits identified                 ECG: atrial fibrillation    Data Review:     Recent Results (from the past 24 hour(s))   CULTURE, BLOOD    Collection Time: 03/08/17  5:00 PM   Result Value Ref Range    Special Requests: NO SPECIAL REQUESTS      Culture result: NO GROWTH AFTER 12 HOURS     EKG, 12 LEAD, INITIAL    Collection Time: 03/08/17  5:02 PM   Result Value Ref Range    Systolic BP  mmHg    Diastolic BP  mmHg    Ventricular Rate 63 BPM    Atrial Rate 63 BPM    P-R Interval 216 ms    QRS Duration 144 ms    Q-T Interval 496 ms    QTC Calculation (Bezet) 507 ms    Calculated P Axis 110 degrees    Calculated R Axis -50 degrees    Calculated T Axis 81 degrees    Diagnosis       !! AGE AND GENDER SPECIFIC ECG ANALYSIS !!  AV sequential or dual chamber electronic pacemaker  Left axis deviation  Left bundle branch block  Abnormal ECG  When compared with ECG of 08-MAR-2017 16:59,  Previous ECG has undetermined rhythm, needs review  Confirmed by Darrell Braun MD (), STORM ROGER ([id number]) on 3/9/2017 11:00:02 AM     CBC WITH AUTOMATED DIFF    Collection Time: 03/08/17  5:10 PM   Result Value Ref Range    WBC 9.2 4.3 - 11.1 K/uL    RBC 3.82 (L) 4.05 - 5.25 M/uL    HGB 12.0 11.7 - 15.4 g/dL    HCT 35.2 (L) 35.8 - 46.3 %    MCV 92.1 79.6 - 97.8 FL    MCH 31.4 26.1 - 32.9 PG    MCHC 34.1 31.4 - 35.0 g/dL    RDW 12.7 11.9 - 14.6 %    PLATELET 982 617 - 160 K/uL    MPV 10.4 (L) 10.8 - 14.1 FL    DF AUTOMATED      NEUTROPHILS 72 43 - 78 %    LYMPHOCYTES 13 13 - 44 %    MONOCYTES 15 (H) 4.0 - 12.0 %    EOSINOPHILS 0 (L) 0.5 - 7.8 %    BASOPHILS 0 0.0 - 2.0 %    IMMATURE GRANULOCYTES 0.2 0.0 - 5.0 %    ABS. NEUTROPHILS 6.6 1.7 - 8.2 K/UL    ABS. LYMPHOCYTES 1.2 0.5 - 4.6 K/UL    ABS. MONOCYTES 1.4 (H) 0.1 - 1.3 K/UL    ABS. EOSINOPHILS 0.0 0.0 - 0.8 K/UL    ABS. BASOPHILS 0.0 0.0 - 0.2 K/UL    ABS. IMM. GRANS. 0.0 0.0 - 0.5 K/UL   METABOLIC PANEL, COMPREHENSIVE    Collection Time: 03/08/17  5:10 PM   Result Value Ref Range    Sodium 135 (L) 136 - 145 mmol/L    Potassium 4.1 3.5 - 5.1 mmol/L    Chloride 95 (L) 98 - 107 mmol/L    CO2 31 21 - 32 mmol/L    Anion gap 9 7 - 16 mmol/L    Glucose 132 (H) 65 - 100 mg/dL    BUN 22 8 - 23 MG/DL    Creatinine 1.41 (H) 0.6 - 1.0 MG/DL    GFR est AA 45 (L) >60 ml/min/1.73m2    GFR est non-AA 37 (L) >60 ml/min/1.73m2    Calcium 9.1 8.3 - 10.4 MG/DL    Bilirubin, total 0.8 0.2 - 1.1 MG/DL    ALT (SGPT) 19 12 - 65 U/L    AST (SGOT) 18 15 - 37 U/L    Alk.  phosphatase 116 50 - 136 U/L    Protein, total 7.9 6.3 - 8.2 g/dL    Albumin 4.0 3.2 - 4.6 g/dL    Globulin 3.9 (H) 2.3 - 3.5 g/dL    A-G Ratio 1.0 (L) 1.2 - 3.5     CULTURE, BLOOD    Collection Time: 03/08/17  5:10 PM   Result Value Ref Range    Special Requests: NO SPECIAL REQUESTS      Culture result: NO GROWTH AFTER 12 HOURS     BNP    Collection Time: 03/08/17  5:10 PM   Result Value Ref Range     pg/mL   POC LACTIC ACID    Collection Time: 03/08/17  5:15 PM   Result Value Ref Range    Lactic Acid (POC) 0.9 0.5 - 1.9 mmol/L   BLOOD GAS, ARTERIAL    Collection Time: 03/08/17  6:04 PM   Result Value Ref Range    pH 7.44 7.35 - 7.45      PCO2 40 35.0 - 45.0 mmHg    PO2 76 75.0 - 100.0 mmHg    BICARBONATE 26 22.0 - 26.0 mmol/L    BASE EXCESS 2.1 0 - 3 mmol/L    TOTAL HEMOGLOBIN 10.9 (L) 11.7 - 15.0 GM/DL    O2 SAT 95 92.0 - 98.5 %    ARTERIAL O2 HGB 94.1 94.0 - 97.0 %    CARBOXYHEMOGLOBIN 0.4 (L) 0.5 - 1.5 %    METHEMOGLOBIN 0.3 0.0 - 1.5 %    DEOXYHEMOGLOBIN 5 0.0 - 5.0 %    SITE LR     ALLENS TEST POSITIVE      MODE NC     O2 FLOW 2.00 L/min    Respiratory comment: Briana Parekh MD at 3 8 2017 6 08 06 PM. Read back. AMMONIA    Collection Time: 03/08/17 11:21 PM   Result Value Ref Range    Ammonia <10 (L) 11 - 32 UMOL/L   TROPONIN I    Collection Time: 03/09/17  1:05 AM   Result Value Ref Range    Troponin-I, Qt. <0.02 (L) 0.02 - 0.05 NG/ML   GLUCOSE, POC    Collection Time: 03/09/17  7:18 AM   Result Value Ref Range    Glucose (POC) 129 (H) 65 - 100 mg/dL   CBC W/O DIFF    Collection Time: 03/09/17  9:53 AM   Result Value Ref Range    WBC 7.6 4.3 - 11.1 K/uL    RBC 3.47 (L) 4.05 - 5.25 M/uL    HGB 10.6 (L) 11.7 - 15.4 g/dL    HCT 31.9 (L) 35.8 - 46.3 %    MCV 91.9 79.6 - 97.8 FL    MCH 30.5 26.1 - 32.9 PG    MCHC 33.2 31.4 - 35.0 g/dL    RDW 13.0 11.9 - 14.6 %    PLATELET 267 466 - 982 K/uL    MPV 10.8 10.8 - 90.7 FL   METABOLIC PANEL, BASIC    Collection Time: 03/09/17  9:53 AM   Result Value Ref Range    Sodium 138 136 - 145 mmol/L    Potassium 3.5 3.5 - 5.1 mmol/L    Chloride 97 (L) 98 - 107 mmol/L    CO2 27 21 - 32 mmol/L    Anion gap 14 7 - 16 mmol/L    Glucose 247 (H) 65 - 100 mg/dL    BUN 21 8 - 23 MG/DL    Creatinine 1.54 (H) 0.6 - 1.0 MG/DL    GFR est AA 41 (L) >60 ml/min/1.73m2    GFR est non-AA 34 (L) >60 ml/min/1.73m2    Calcium 7.8 (L) 8.3 - 10.4 MG/DL   HEPATIC FUNCTION PANEL    Collection Time: 03/09/17  9:53 AM   Result Value Ref Range    Protein, total 6.2 (L) 6.3 - 8.2 g/dL    Albumin 2.9 (L) 3.2 - 4.6 g/dL    Globulin 3.3 2.3 - 3.5 g/dL    A-G Ratio 0.9 (L) 1.2 - 3.5      Bilirubin, total 0.7 0.2 - 1.1 MG/DL    Bilirubin, direct 0.2 <0.4 MG/DL    Alk.  phosphatase 92 50 - 136 U/L    AST (SGOT) 16 15 - 37 U/L    ALT (SGPT) 13 12 - 65 U/L   EKG, 12 LEAD, SUBSEQUENT    Collection Time: 03/09/17 10:20 AM   Result Value Ref Range    Systolic BP  mmHg    Diastolic BP  mmHg    Ventricular Rate 97 BPM    Atrial Rate 85 BPM    P-R Interval  ms    QRS Duration 140 ms    Q-T Interval 466 ms    QTC Calculation (Bezet) 591 ms    Calculated P Axis  degrees    Calculated R Axis -71 degrees Calculated T Axis 95 degrees    Diagnosis       Atrial fibrillation  Left axis deviation  Left bundle branch block  Abnormal ECG  When compared with ECG of 09-MAR-2017 10:20,  No significant change was found  Confirmed by Brandee Daniel MD (), STORM ROGER ([id number]) on 3/9/2017 11:02:24 AM     GLUCOSE, POC    Collection Time: 03/09/17 11:40 AM   Result Value Ref Range    Glucose (POC) 185 (H) 65 - 100 mg/dL   URINALYSIS W/ RFLX MICROSCOPIC    Collection Time: 03/09/17 11:48 AM   Result Value Ref Range    Color YELLOW      Appearance CLEAR      Specific gravity 1.020 1.001 - 1.023      pH (UA) 7.0 5.0 - 9.0      Protein NEGATIVE  NEG mg/dL    Glucose NEGATIVE  mg/dL    Ketone NEGATIVE  NEG mg/dL    Bilirubin NEGATIVE  NEG      Blood MODERATE (A) NEG      Urobilinogen 0.2 0.2 - 1.0 EU/dL    Nitrites NEGATIVE  NEG      Leukocyte Esterase MODERATE (A) NEG      WBC 10-20 0 /hpf    RBC 5-10 0 /hpf    Epithelial cells 0 0 /hpf    Bacteria 0 0 /hpf    Casts 0-3 0 /lpf         Assessment / Plan     Principal Problem:    Altered mental state (3/9/2017)--per IM     Active Problems:    Diabetes mellitus, type 2 ([address] (9/12/2012)      Hypertension (9/12/2012)--recently stopped florinef       Overview: Orthostatic hypotension due to autonomic neuropathy      Acquired hypothyroidism (9/12/2012)--normal TSH       Chest pain, pleuritic (3/9/2017)--noted CXR findings, consideration for pneumonia? Atrial fibrillation ([facility] (3/9/2017)--patient with increased afib burden, not on oral anticoagulant due to fall risk, will increase amiodarone dose to 400mg BID x 3 days and then change to 200mg BID to suppress afib.  If cannot tolerate or does not control AF would consider AV node ablation and BiV PM Federico Smith MD

## 2017-03-10 PROBLEM — R41.82 ALTERED MENTAL STATUS: Status: ACTIVE | Noted: 2017-03-10

## 2017-03-10 LAB
ANION GAP BLD CALC-SCNC: 9 MMOL/L (ref 7–16)
BASOPHILS # BLD AUTO: 0 K/UL (ref 0–0.2)
BASOPHILS # BLD: 0 % (ref 0–2)
BUN SERPL-MCNC: 23 MG/DL (ref 8–23)
CALCIUM SERPL-MCNC: 8.4 MG/DL (ref 8.3–10.4)
CHLORIDE SERPL-SCNC: 98 MMOL/L (ref 98–107)
CO2 SERPL-SCNC: 31 MMOL/L (ref 21–32)
CREAT SERPL-MCNC: 1.41 MG/DL (ref 0.6–1)
DIFFERENTIAL METHOD BLD: ABNORMAL
EOSINOPHIL # BLD: 0.1 K/UL (ref 0–0.8)
EOSINOPHIL NFR BLD: 2 % (ref 0.5–7.8)
ERYTHROCYTE [DISTWIDTH] IN BLOOD BY AUTOMATED COUNT: 12.8 % (ref 11.9–14.6)
GLUCOSE BLD STRIP.AUTO-MCNC: 116 MG/DL (ref 65–100)
GLUCOSE BLD STRIP.AUTO-MCNC: 116 MG/DL (ref 65–100)
GLUCOSE BLD STRIP.AUTO-MCNC: 131 MG/DL (ref 65–100)
GLUCOSE BLD STRIP.AUTO-MCNC: 153 MG/DL (ref 65–100)
GLUCOSE SERPL-MCNC: 64 MG/DL (ref 65–100)
HCT VFR BLD AUTO: 29.7 % (ref 35.8–46.3)
HGB BLD-MCNC: 9.9 G/DL (ref 11.7–15.4)
IMM GRANULOCYTES # BLD: 0 K/UL (ref 0–0.5)
IMM GRANULOCYTES NFR BLD AUTO: 0.4 % (ref 0–5)
LYMPHOCYTES # BLD AUTO: 23 % (ref 13–44)
LYMPHOCYTES # BLD: 1.3 K/UL (ref 0.5–4.6)
MCH RBC QN AUTO: 30.5 PG (ref 26.1–32.9)
MCHC RBC AUTO-ENTMCNC: 33.3 G/DL (ref 31.4–35)
MCV RBC AUTO: 91.4 FL (ref 79.6–97.8)
MM INDURATION POC: 0 MM (ref 0–5)
MONOCYTES # BLD: 0.7 K/UL (ref 0.1–1.3)
MONOCYTES NFR BLD AUTO: 13 % (ref 4–12)
NEUTS SEG # BLD: 3.3 K/UL (ref 1.7–8.2)
NEUTS SEG NFR BLD AUTO: 62 % (ref 43–78)
PLATELET # BLD AUTO: 303 K/UL (ref 150–450)
PMV BLD AUTO: 10.6 FL (ref 10.8–14.1)
POTASSIUM SERPL-SCNC: 3.4 MMOL/L (ref 3.5–5.1)
PPD POC: NORMAL NEGATIVE
RBC # BLD AUTO: 3.25 M/UL (ref 4.05–5.25)
SODIUM SERPL-SCNC: 138 MMOL/L (ref 136–145)
WBC # BLD AUTO: 5.5 K/UL (ref 4.3–11.1)

## 2017-03-10 PROCEDURE — 74011250637 HC RX REV CODE- 250/637: Performed by: INTERNAL MEDICINE

## 2017-03-10 PROCEDURE — 74011250636 HC RX REV CODE- 250/636: Performed by: INTERNAL MEDICINE

## 2017-03-10 PROCEDURE — 99218 HC RM OBSERVATION: CPT

## 2017-03-10 PROCEDURE — 36415 COLL VENOUS BLD VENIPUNCTURE: CPT | Performed by: INTERNAL MEDICINE

## 2017-03-10 PROCEDURE — 82962 GLUCOSE BLOOD TEST: CPT

## 2017-03-10 PROCEDURE — 80048 BASIC METABOLIC PNL TOTAL CA: CPT | Performed by: INTERNAL MEDICINE

## 2017-03-10 PROCEDURE — 97162 PT EVAL MOD COMPLEX 30 MIN: CPT

## 2017-03-10 PROCEDURE — 65270000029 HC RM PRIVATE

## 2017-03-10 PROCEDURE — 85025 COMPLETE CBC W/AUTO DIFF WBC: CPT | Performed by: INTERNAL MEDICINE

## 2017-03-10 RX ORDER — CARVEDILOL 12.5 MG/1
12.5 TABLET ORAL 2 TIMES DAILY WITH MEALS
Status: DISCONTINUED | OUTPATIENT
Start: 2017-03-10 | End: 2017-03-11 | Stop reason: HOSPADM

## 2017-03-10 RX ORDER — POTASSIUM CHLORIDE 750 MG/1
20 TABLET, EXTENDED RELEASE ORAL DAILY
Status: DISCONTINUED | OUTPATIENT
Start: 2017-03-11 | End: 2017-03-11 | Stop reason: HOSPADM

## 2017-03-10 RX ORDER — GABAPENTIN 100 MG/1
100 CAPSULE ORAL 3 TIMES DAILY
Status: DISCONTINUED | OUTPATIENT
Start: 2017-03-10 | End: 2017-03-11 | Stop reason: HOSPADM

## 2017-03-10 RX ORDER — DIGOXIN 125 MCG
0.12 TABLET ORAL DAILY
Status: DISCONTINUED | OUTPATIENT
Start: 2017-03-11 | End: 2017-03-10

## 2017-03-10 RX ORDER — AMIODARONE HYDROCHLORIDE 200 MG/1
200 TABLET ORAL DAILY
Status: DISCONTINUED | OUTPATIENT
Start: 2017-03-11 | End: 2017-03-11 | Stop reason: HOSPADM

## 2017-03-10 RX ORDER — DILTIAZEM HYDROCHLORIDE 30 MG/1
30 TABLET, FILM COATED ORAL
Status: DISCONTINUED | OUTPATIENT
Start: 2017-03-10 | End: 2017-03-11 | Stop reason: HOSPADM

## 2017-03-10 RX ADMIN — CARVEDILOL 25 MG: 25 TABLET, FILM COATED ORAL at 08:36

## 2017-03-10 RX ADMIN — GABAPENTIN 600 MG: 300 CAPSULE ORAL at 08:38

## 2017-03-10 RX ADMIN — Medication 10 ML: at 13:49

## 2017-03-10 RX ADMIN — ASPIRIN 81 MG: 81 TABLET, COATED ORAL at 08:33

## 2017-03-10 RX ADMIN — CARVEDILOL 12.5 MG: 12.5 TABLET, FILM COATED ORAL at 17:18

## 2017-03-10 RX ADMIN — DILTIAZEM HYDROCHLORIDE 30 MG: 30 TABLET, FILM COATED ORAL at 17:18

## 2017-03-10 RX ADMIN — LISINOPRIL 2.5 MG: 5 TABLET ORAL at 08:36

## 2017-03-10 RX ADMIN — GABAPENTIN 100 MG: 100 CAPSULE ORAL at 17:18

## 2017-03-10 RX ADMIN — Medication 1 TABLET: at 21:50

## 2017-03-10 RX ADMIN — AMIODARONE HYDROCHLORIDE 200 MG: 200 TABLET ORAL at 08:33

## 2017-03-10 RX ADMIN — GABAPENTIN 100 MG: 100 CAPSULE ORAL at 21:50

## 2017-03-10 RX ADMIN — Medication 10 ML: at 22:00

## 2017-03-10 RX ADMIN — PANTOPRAZOLE SODIUM 40 MG: 40 TABLET, DELAYED RELEASE ORAL at 08:38

## 2017-03-10 RX ADMIN — POLYETHYLENE GLYCOL 3350 17 G: 17 POWDER, FOR SOLUTION ORAL at 08:43

## 2017-03-10 RX ADMIN — HYDROCODONE BITARTRATE AND ACETAMINOPHEN 1 TABLET: 7.5; 325 TABLET ORAL at 21:50

## 2017-03-10 RX ADMIN — ENOXAPARIN SODIUM 30 MG: 30 INJECTION SUBCUTANEOUS at 08:46

## 2017-03-10 RX ADMIN — FUROSEMIDE 20 MG: 20 TABLET ORAL at 08:37

## 2017-03-10 NOTE — PROGRESS NOTES
Problem: Mobility Impaired (Adult and Pediatric)  Goal: *Acute Goals and Plan of Care (Insert Text)  LTG:  (1.)Ms. Jojo Gaitan will move from supine to sit and sit to supine , scoot up and down and roll side to side in bed with INDEPENDENCE within 7 day(s). (2.)Ms. Jojo Gaitan will transfer from bed to chair and chair to bed with MODIFIED INDEPENDENCE using the least restrictive device within 7 day(s). (3.)Ms. Jojo Gaitan will ambulate with MODIFIED INDEPENDENCE for 250 feet with the least restrictive device within 7 day(s). (4.)Ms. Jojo Gaitan will perform standing static and dynamic balance activities x 20 minutes with SUPERVISION to improve safety within 7 day(s). ________________________________________________________________________________________________      PHYSICAL THERAPY: INITIAL ASSESSMENT, PM 3/10/2017  INPATIENT: Hospital Day: 3  [place] / Product Type: Medicare /      NAME/AGE/GENDER: Vitor Huston is a 80 y.o. female        PRIMARY DIAGNOSIS: altered mental state  Altered mental status Altered mental state Altered mental state        ICD-10: Treatment Diagnosis:       · Generalized Muscle Weakness (M62.81)  · Other abnormalities of gait and mobility (R26.89)  · Repeated Falls (R29.6)  · History of falling (Z91.81)   Precaution/Allergies: Other medication       ASSESSMENT:      Ms. Jojo Gaitan is a 80 y.o. female admitted to the hospital for the above with PMH of a-fib, AMS, and chest pain. Pt presents to PT with generalized weakness in B LEs, especially R hip flexors (4-/5). Pt observed to have WFL AROM in B LEs and reports no N/T in either LE. Pt was able to come to sitting on EOB with supervision and has intact static sitting balance but fair dynamic balance. Pt stood with CGA and ambulated in ohara with SBA-supervision. Pt has fair dynamic standing balance with decreased gait speed and shortened step lengths.   Ms. Jojo Gaitan also has tendency to deviate to R and required cuing to avoid objects on R. Upon further testing pt noted to have decreased R peripheral vision and some upward torsional nystagmus with left/right upper quadrant testing. Pt transferred to bedside chair with SBA and was left with needs within reach. Pt could benefit from skilled PT to increase safety and independence at home. This section established at most recent assessment   PROBLEM LIST (Impairments causing functional limitations):  1. Decreased Strength  2. Decreased Transfer Abilities  3. Decreased Ambulation Ability/Technique  4. Decreased Balance    INTERVENTIONS PLANNED: (Benefits and precautions of physical therapy have been discussed with the patient.)  1. Balance Exercise  2. Bed Mobility  3. Family Education  4. Gait Training  5. Neuromuscular Re-education/Strengthening  6. Therapeutic Activites  7. Therapeutic Exercise/Strengthening  8. Transfer Training  9. Group Therapy      TREATMENT PLAN: Frequency/Duration: 3 times a week for duration of hospital stay  Rehabilitation Potential For Stated Goals: GOOD      RECOMMENDED REHABILITATION/EQUIPMENT: (at time of discharge pending progress): Continue Skilled Therapy and Discussed with Case Management. HISTORY:   History of Present Injury/Illness (Reason for Referral):  Per H&P:   Subjective:      Patient is a 80 y.o. female who presents with chest pain and lethargy. Pt reports chest pain with deep inspiration. She cannot pinpoint one location and is unable to describe to me the character. She reports some dyspnea with exertion and has been more somnolent today per her daughter. She was recently in the hospital for afib with RVR. She denies fever, chills, N/V/D, abdominal pain, headache, or focal weakness. Past Medical History/Comorbidities:   Ms. Heather Reyna  has a past medical history of A fib; Abnormal loss of weight; Acquired hypothyroidism (9/12/2012); Anemia; Anorexia (9/2/2014);  Arthritis associated with another disorder; Atrial fibrillation ([address] (9/12/2012); Autonomic neuropathy (1/13/2013); CAD (coronary artery disease); CAD in native artery (5/5/2016); Cervical radiculopathy (5/23/2013); Chest pain; Chronic pain syndrome (1/13/2013); CKD (chronic kidney disease), stage III (9/12/2012); Diabetes ([address] Diabetes mellitus; Diabetes mellitus, type 2 ([address] (9/12/2012); Dysphagia (9/2/2014); Gait disorder (9/12/2012); GERD (gastroesophageal reflux disease); [facility] failure ([facility] HTN; Hypertension (9/12/2012); Hypothyroid; IBS (irritable bowel syndrome) (9/12/2012); Iron deficiency (9/12/2012); Lumbar disc disease; MCI (mild cognitive impairment) (1/13/2013); Mixed hyperlipidemia (9/12/2012); Orthostatic hypotension (5/5/2016); Pacemaker; and SSS (sick sinus syndrome) ([facility] (5/5/2016). Ms. Jerod Jack  has a past surgical history that includes pacemaker (6/08); cervical fusion; appendectomy; carpal tunnel release; and colonoscopy (Nov 2005). Social History/Living Environment:   Home Environment: Private residence  # Steps to Enter: 1  One/Two Story Residence: One story  Living Alone: No  Support Systems: Child(malia), Home care staff  Patient Expects to be Discharged to[de-identified] Private residence  Current DME Used/Available at Home: Girtha Eloise, straight, Walker, rolling, Grab bars, Shower chair  Prior Level of Function/Work/Activity:  Pt reports she lives in a one story house with her  and no steps to enter. She reports being independent with ADLs and occasionally using cane for ambulation. Pt reports multiple falls within the past year. Per daughter pt and spouse have sitters 8-5 most days with children rotating stays at night.       Number of Personal Factors/Comorbidities that affect the Plan of Care:  · A-fib  · CAD  · DM II  · HF  · Orthostatic hypotension 3+: HIGH COMPLEXITY   EXAMINATION:   Most Recent Physical Functioning:   Gross Assessment:  AROM: Within functional limits  Strength: Generally decreased, functional (B LEs 4 to 4-/5) Posture:     Balance:  Sitting: Impaired  Sitting - Static: Good (unsupported)  Sitting - Dynamic: Fair (occasional)  Standing: Impaired  Standing - Static: Good  Standing - Dynamic : Fair Bed Mobility:  Supine to Sit: Supervision  Scooting: Supervision  Wheelchair Mobility:     Transfers:  Sit to Stand: Contact guard assistance  Stand to Sit: Stand-by asssistance  Bed to Chair: Stand-by asssistance  Gait:     Speed/Chandni: Slow  Step Length: Right shortened;Left shortened  Gait Abnormalities: Decreased step clearance; Path deviations  Distance (ft): 75 Feet (ft)  Assistive Device:  (none)  Ambulation - Level of Assistance: Stand-by asssistance;Contact guard assistance       Body Structures Involved:  1. Heart  2. Lungs  3. Muscles Body Functions Affected:  1. Cardio  2. Respiratory  3. Neuromusculoskeletal  4. Movement Related Activities and Participation Affected:  1. General Tasks and Demands  2. Mobility  3. Community, Social and [place]   Number of elements that affect the Plan of Care: 4+: HIGH COMPLEXITY   CLINICAL PRESENTATION:   Presentation: Evolving clinical presentation with changing clinical characteristics: MODERATE COMPLEXITY   CLINICAL DECISION MAKIN [facility] Inpatient Short Form  How much difficulty does the patient currently have. .. Unable A Lot A Little None   1. Turning over in bed (including adjusting bedclothes, sheets and blankets)? [ ] 1   [ ] 2   [ ] 3   [X] 4   2. Sitting down on and standing up from a chair with arms ( e.g., wheelchair, bedside commode, etc.)   [ ] 1   [ ] 2   [X] 3   [ ] 4   3. Moving from lying on back to sitting on the side of the bed? [ ] 1   [ ] 2   [ ] 3   [X] 4   How much help from another person does the patient currently need. .. Total A Lot A Little None   4. Moving to and from a bed to a chair (including a wheelchair)? [ ] 1   [ ] 2   [X] 3   [ ] 4   5. Need to walk in hospital room?    [ ] 1   [ ] 2 [X] 3   [ ] 4   6. Climbing 3-5 steps with a railing? [ ] 1   [ ] 2   [X] 3   [ ] 4   © 2007, Trustees of [address] [place] [address] [address] [id number], under license to [employer]. All rights reserved    Score:  Initial: 20 Most Recent: X (Date: -- )     Interpretation of Tool:  Represents activities that are increasingly more difficult (i.e. Bed mobility, Transfers, Gait). Score 24 23 22-20 19-15 14-10 9-7 6       Modifier CH CI CJ CK CL CM CN         · Mobility - Walking and Moving Around:               - CURRENT STATUS:    CJ - 20%-39% impaired, limited or restricted               - GOAL STATUS:           CI - 1%-19% impaired, limited or restricted               - D/C STATUS:                       ---------------To be determined---------------  [place] / [place] / Product Type: Medicare /       Medical Necessity:     · Patient demonstrates good rehab potential due to higher previous functional level. Reason for Services/Other Comments:  · Patient continues to require skilled intervention due to decreased balance and activity tolerance. Use of outcome tool(s) and clinical judgement create a POC that gives a: Questionable prediction of patient's progress: MODERATE COMPLEXITY                 TREATMENT:   (In addition to Assessment/Re-Assessment sessions the following treatments were rendered)   Pre-treatment Symptoms/Complaints:  None  Pain: Initial:   Pain Intensity 1: 0  Post Session:  0      Assessment/Reassessment only, no treatment provided today     Braces/Orthotics/Lines/Etc:   · O2 Device: Room air  Treatment/Session Assessment:    · Response to Treatment:  Pt tolerated evaluation fairly as she becomes fatigued with minimal exertion.   · Interdisciplinary Collaboration:  · Physical Therapist  · Registered Nurse  ·   · After treatment position/precautions:  · Up in chair  · Bed/Chair-wheels locked  · Call light within reach  · RN notified  · Family at bedside  · Compliance with Program/Exercises: Will assess as treatment progresses. · Recommendations/Intent for next treatment session: \"Next visit will focus on advancements to more challenging activities and reduction in assistance provided\".   Total Treatment Duration:  PT Patient Time In/Time Out  Time In: 8190  Time Out: José Miugel Torrez PT, DPT

## 2017-03-10 NOTE — INTERDISCIPLINARY ROUNDS
Interdisciplinary team rounds were held 3/10/2017 with the following team members:Care Management, Nursing, Pastoral Care, Pharmacy and Physician. Plan of care discussed. See clinical pathway and/or care plan for interventions and desired outcomes. Anticipating discharge to home tomorrow.

## 2017-03-10 NOTE — PROGRESS NOTES
CNA reports pt fell. She was trying to get in the chair and the foot rest was up. The pt thought the foot rest was the chair seat and sat down. Pt did hit her R elbow, slight bruising noted. Pt did NOT hit her head. Dr. Amelia Mancuso at nursing station, states pt should be moved to another room, he will talk to Saima Marion. Pt daughter, Shauna Reynaga, notified of fall.

## 2017-03-10 NOTE — PROGRESS NOTES
[facility] CARDIOLOGY PROGRESS NOTE           3/10/2017 4:23 PM    Admit Date: 3/8/2017      Subjective:   No cp or inc sob    ROS:  Cardiovascular:  As noted above    Objective:      Vitals:    03/10/17 0804 03/10/17 1125 03/10/17 1429 03/10/17 1450   BP: 118/60 112/57 108/71    Pulse: 100 90 91 (!) 102   Resp: 16 18 18    Temp: 97.7 °F (36.5 °C) 97.5 °F (36.4 °C) 97.7 °F (36.5 °C)    SpO2: 95% 97% 94% 95%   Weight:       Height:           Physical Exam:  General-No Acute Distress  Neck- supple, no JVD  CV- irregular rate and rhythm no MRG  Lung- clear bilaterally  Abd- soft, nontender, nondistended  Ext- no edema bilaterally. Skin- warm and dry    Data Review:   Recent Labs      03/10/17   0529  03/09/17   0953  03/09/17   0105   NA  138  138   --    K  3.4*  3.5   --    BUN  23  21   --    CREA  1.41*  1.54*   --    GLU  64*  247*   --    WBC  5.5  7.6   --    HGB  9.9*  10.6*   --    HCT  29.7*  31.9*   --    PLT  303  324   --    TROIQ   --    --   <0.02*       Assessment/Plan:     Principal Problem:    Altered mental state (3/9/2017)    Active Problems:    Diabetes mellitus, type 2 ([address] (9/12/2012)      Hypertension (9/12/2012)      Overview: Orthostatic hypotension due to autonomic neuropathy      Acquired hypothyroidism (9/12/2012)      Chest pain, pleuritic (3/9/2017)      Atrial fibrillation ([address] (3/9/2017)      Altered mental status (3/10/2017)    ///  She is currently compensated. Since pt is on chronic amiodarone, a transient inc in the dose should not help. Will dec carvedilol to home dose of 12.5 and add low dose diltiazem for a fib rate control.       Michael Molina MD  3/10/2017 4:23 PM

## 2017-03-10 NOTE — PROGRESS NOTES
Progress Note    Patient: Miriam Yusuf MRN: [id number]  SSN: [id number]    YOB: 1927  Age: 80 y.o. Sex: female      Admit Date: 3/8/2017    LOS: 0 days     Subjective:     81 yo Female patient admitted with chest discomfort and SOB. CHest CT scan negative for PE. FOund with uncontrolled  Afib . LVEF is 30%. Cardiology consulted. Recommendations appreciated. Objective:     Vitals:    03/10/17 0804 03/10/17 1125 03/10/17 1429 03/10/17 1450   BP: 118/60 112/57 108/71    Pulse: 100 90 91 (!) 102   Resp: 16 18 18    Temp: 97.7 °F (36.5 °C) 97.5 °F (36.4 °C) 97.7 °F (36.5 °C)    SpO2: 95% 97% 94% 95%   Weight:       Height:            Intake and Output:  Current Shift: 03/10 0701 - 03/10 1900  In: 440 [P.O.:440]  Out: -   Last three shifts: 03/08 1901 - 03/10 0700  In: 420 [P.O.:420]  Out: 1900 [Urine:1900]    Physical Exam:   GENERAL: alert, cooperative, no distress, appears stated age  EYE: conjunctivae/corneas clear. PERRL, EOM's intact. Fundi benign  LYMPHATIC: Cervical, supraclavicular, and axillary nodes normal.   THROAT & NECK: normal and no erythema or exudates noted. LUNG: clear to auscultation bilaterally  HEART: regular rate and rhythm, S1, S2 normal, no murmur, click, rub or gallop  ABDOMEN: soft, non-tender. Bowel sounds normal. No masses,  no organomegaly  EXTREMITIES:  extremities normal, atraumatic, no cyanosis or edema  SKIN: Normal.  NEUROLOGIC: AOx3. Gait normal. Reflexes and motor strength normal and symmetric. Cranial nerves 2-12 and sensation grossly intact. PSYCHIATRIC: non focal    Lab/Data Review:  Lab results reviewed. For significant abnormal values and values requiring intervention, see assessment and plan.          Assessment:     Principal Problem:    Altered mental state (3/9/2017)    Active Problems:    Diabetes mellitus, type 2 ([address] (9/12/2012)      Hypertension (9/12/2012)      Overview: Orthostatic hypotension due to autonomic neuropathy      Acquired hypothyroidism (9/12/2012)      Chest pain, pleuritic (3/9/2017)      Atrial fibrillation ([address] (3/9/2017)      Altered mental status (3/10/2017)        Plan:     # BP is better today. On BB. Started on low dose cardizem by cardiology today. #Patient had a mechanical fall today. No evidence of head trauma or important trauma. Will try to move her to a bed closer to the nurse station. Patient was switched to inpatient status today.  PMH, social history, Family history and ROS reviewed and remains the same as per previous H&P done on 3/8/17    Signed By: Toby Cheema MD     March 10, 2017

## 2017-03-11 ENCOUNTER — APPOINTMENT (OUTPATIENT)
Dept: CT IMAGING | Age: 82
DRG: 309 | End: 2017-03-11
Attending: INTERNAL MEDICINE
Payer: MEDICARE

## 2017-03-11 VITALS
SYSTOLIC BLOOD PRESSURE: 121 MMHG | HEART RATE: 80 BPM | BODY MASS INDEX: 15.88 KG/M2 | RESPIRATION RATE: 16 BRPM | WEIGHT: 93 LBS | DIASTOLIC BLOOD PRESSURE: 61 MMHG | HEIGHT: 64 IN | OXYGEN SATURATION: 96 % | TEMPERATURE: 97.6 F

## 2017-03-11 LAB
ANION GAP BLD CALC-SCNC: 9 MMOL/L (ref 7–16)
BUN SERPL-MCNC: 18 MG/DL (ref 8–23)
CALCIUM SERPL-MCNC: 8.3 MG/DL (ref 8.3–10.4)
CHLORIDE SERPL-SCNC: 94 MMOL/L (ref 98–107)
CO2 SERPL-SCNC: 29 MMOL/L (ref 21–32)
CREAT SERPL-MCNC: 1.46 MG/DL (ref 0.6–1)
ERYTHROCYTE [DISTWIDTH] IN BLOOD BY AUTOMATED COUNT: 12.5 % (ref 11.9–14.6)
GLUCOSE BLD STRIP.AUTO-MCNC: 163 MG/DL (ref 65–100)
GLUCOSE BLD STRIP.AUTO-MCNC: 86 MG/DL (ref 65–100)
GLUCOSE SERPL-MCNC: 152 MG/DL (ref 65–100)
HCT VFR BLD AUTO: 30.9 % (ref 35.8–46.3)
HGB BLD-MCNC: 10.2 G/DL (ref 11.7–15.4)
MCH RBC QN AUTO: 30.4 PG (ref 26.1–32.9)
MCHC RBC AUTO-ENTMCNC: 33 G/DL (ref 31.4–35)
MCV RBC AUTO: 92.2 FL (ref 79.6–97.8)
PLATELET # BLD AUTO: 333 K/UL (ref 150–450)
PMV BLD AUTO: 10.4 FL (ref 10.8–14.1)
POTASSIUM SERPL-SCNC: 3.8 MMOL/L (ref 3.5–5.1)
RBC # BLD AUTO: 3.35 M/UL (ref 4.05–5.25)
SODIUM SERPL-SCNC: 132 MMOL/L (ref 136–145)
WBC # BLD AUTO: 3.8 K/UL (ref 4.3–11.1)

## 2017-03-11 PROCEDURE — 36415 COLL VENOUS BLD VENIPUNCTURE: CPT | Performed by: INTERNAL MEDICINE

## 2017-03-11 PROCEDURE — 70450 CT HEAD/BRAIN W/O DYE: CPT

## 2017-03-11 PROCEDURE — 74011250636 HC RX REV CODE- 250/636: Performed by: INTERNAL MEDICINE

## 2017-03-11 PROCEDURE — G8988 SELF CARE GOAL STATUS: HCPCS

## 2017-03-11 PROCEDURE — 74011250637 HC RX REV CODE- 250/637: Performed by: INTERNAL MEDICINE

## 2017-03-11 PROCEDURE — 80048 BASIC METABOLIC PNL TOTAL CA: CPT | Performed by: INTERNAL MEDICINE

## 2017-03-11 PROCEDURE — 97165 OT EVAL LOW COMPLEX 30 MIN: CPT

## 2017-03-11 PROCEDURE — G8987 SELF CARE CURRENT STATUS: HCPCS

## 2017-03-11 PROCEDURE — G8989 SELF CARE D/C STATUS: HCPCS

## 2017-03-11 PROCEDURE — 82962 GLUCOSE BLOOD TEST: CPT

## 2017-03-11 PROCEDURE — 85027 COMPLETE CBC AUTOMATED: CPT | Performed by: INTERNAL MEDICINE

## 2017-03-11 RX ORDER — DILTIAZEM HYDROCHLORIDE 120 MG/1
120 CAPSULE, COATED, EXTENDED RELEASE ORAL DAILY
Qty: 30 CAP | Refills: 1 | Status: SHIPPED | OUTPATIENT
Start: 2017-03-11 | End: 2017-03-15

## 2017-03-11 RX ORDER — HYDROCODONE BITARTRATE AND ACETAMINOPHEN 7.5; 325 MG/1; MG/1
1 TABLET ORAL
Qty: 20 TAB | Refills: 0 | Status: SHIPPED | OUTPATIENT
Start: 2017-03-11 | End: 2017-03-21

## 2017-03-11 RX ORDER — GABAPENTIN 100 MG/1
100 CAPSULE ORAL 3 TIMES DAILY
Qty: 90 CAP | Refills: 0 | Status: SHIPPED | OUTPATIENT
Start: 2017-03-11 | End: 2017-04-05 | Stop reason: DRUGHIGH

## 2017-03-11 RX ADMIN — GABAPENTIN 100 MG: 100 CAPSULE ORAL at 08:26

## 2017-03-11 RX ADMIN — ENOXAPARIN SODIUM 30 MG: 30 INJECTION SUBCUTANEOUS at 08:26

## 2017-03-11 RX ADMIN — DILTIAZEM HYDROCHLORIDE 30 MG: 30 TABLET, FILM COATED ORAL at 11:24

## 2017-03-11 RX ADMIN — LEVOTHYROXINE SODIUM 25 MCG: 50 TABLET ORAL at 06:31

## 2017-03-11 RX ADMIN — HYDROCODONE BITARTRATE AND ACETAMINOPHEN 1 TABLET: 7.5; 325 TABLET ORAL at 03:33

## 2017-03-11 RX ADMIN — ASPIRIN 81 MG: 81 TABLET, COATED ORAL at 08:26

## 2017-03-11 RX ADMIN — Medication 10 ML: at 13:58

## 2017-03-11 RX ADMIN — DILTIAZEM HYDROCHLORIDE 30 MG: 30 TABLET, FILM COATED ORAL at 08:26

## 2017-03-11 RX ADMIN — POTASSIUM CHLORIDE 20 MEQ: 10 TABLET, EXTENDED RELEASE ORAL at 08:26

## 2017-03-11 RX ADMIN — CARVEDILOL 12.5 MG: 12.5 TABLET, FILM COATED ORAL at 08:26

## 2017-03-11 RX ADMIN — AMIODARONE HYDROCHLORIDE 200 MG: 200 TABLET ORAL at 08:26

## 2017-03-11 RX ADMIN — PANTOPRAZOLE SODIUM 40 MG: 40 TABLET, DELAYED RELEASE ORAL at 06:32

## 2017-03-11 NOTE — PROGRESS NOTES
Problem: Self Care Deficits Care Plan (Adult)  Goal: *Acute Goals and Plan of Care (Insert Text)      OCCUPATIONAL THERAPY: Initial Assessment and Discharge 3/11/2017  INPATIENT: Hospital Day: 4  [place] / [place] / Product Type: Medicare /      NAME/AGE/GENDER: Ritesh Douglass is a 80 y.o. female        PRIMARY DIAGNOSIS:  altered mental state  Altered mental status Altered mental state Altered mental state        ICD-10: Treatment Diagnosis:        · Generalized Muscle Weakness (M62.81)   Precautions/Allergies: Other medication       ASSESSMENT:      Ms. Cary Hamm presents supine in bed with daughter at side, agreeable to OT evaluation. Pt is oriented x 4 and presents with overall weakness. She demonstrates lower body dressing sitting edge of bed with setup and verbal cueing for positioning to decrease c/o of slight dizziness. Pt transferred to chair for lunch with CGA to stand and standby assistance for the short distance between bed and chair. Pt states she lives with her  (he has a hx of dementia) and receives assistance from paid caregivers from 8-5 everyday with family taking turns coming by at night. She is close to baseline for self care. PT is addressing her activity tolerance and weakness deficits. Further OT is not indicated at this time. Recommend PT HH if needed (defer to PT)      This section established at most recent assessment                RECOMMENDED REHABILITATION/EQUIPMENT: (at time of discharge pending progress): Home Health: Physical Therapy. OCCUPATIONAL PROFILE AND HISTORY:   History of Present Injury/Illness (Reason for Referral):  See H&P  Past Medical History/Comorbidities:   Ms. Cary Hamm  has a past medical history of A fib; Abnormal loss of weight; Acquired hypothyroidism (9/12/2012); Anemia; Anorexia (9/2/2014); Arthritis associated with another disorder; Atrial fibrillation ([facility] [address] (9/12/2012);  Autonomic neuropathy (1/13/2013); CAD (coronary artery disease); CAD in native artery (5/5/2016); Cervical radiculopathy (5/23/2013); Chest pain; Chronic pain syndrome (1/13/2013); CKD (chronic kidney disease), stage III (9/12/2012); Diabetes ([facility] Diabetes mellitus; Diabetes mellitus, type 2 ([facility] (9/12/2012); Dysphagia (9/2/2014); Gait disorder (9/12/2012); GERD (gastroesophageal reflux disease); Heart failure ([facility] HTN; Hypertension (9/12/2012); Hypothyroid; IBS (irritable bowel syndrome) (9/12/2012); Iron deficiency (9/12/2012); Lumbar disc disease; MCI (mild cognitive impairment) (1/13/2013); Mixed hyperlipidemia (9/12/2012); Orthostatic hypotension (5/5/2016); Pacemaker; and SSS (sick sinus syndrome) ([facility] (5/5/2016). Ms. Fernando Lala  has a past surgical history that includes pacemaker (6/08); cervical fusion; appendectomy; carpal tunnel release; and colonoscopy (Nov 2005).   Social History/Living Environment:   Home Environment: Private residence  # Steps to Enter: 1  One/Two Story Residence: One story  Living Alone: No  Support Systems: Child(malia), Home care staff  Patient Expects to be Discharged to[de-identified] Private residence  Current DME Used/Available at Home: [employer], straight, Walker, rolling, Grab bars, Shower chair  Tub or Shower Type: Shower  Prior Level of Function/Work/Activity:  Mostly independent      Number of Personal Factors/Comorbidities that affect the Plan of Care: Expanded review of therapy/medical records (1-2):  MODERATE COMPLEXITY   ASSESSMENT OF OCCUPATIONAL PERFORMANCE[de-identified]   Activities of Daily Living:           Basic ADLs (From Assessment) Complex ADLs (From Assessment)   Basic ADL  Feeding: Modified independent  Oral Facial Hygiene/Grooming: Setup  Bathing: Stand-by assistance  Upper Body Dressing: Modified independent  Lower Body Dressing: Setup  Toileting: Stand by assistance     Grooming/Bathing/Dressing Activities of Daily Living     Cognitive Retraining  Safety/Judgement: Fall prevention                 Functional Transfers  Toilet Transfer : Stand-by asssistance  Shower Transfer: Contact guard assistance   Lower Body Dressing Assistance  Dressing Assistance: Supervision/set up  Socks: Supervision/set-up Bed/Mat Mobility  Rolling: Supervision  Supine to Sit: Supervision  Sit to Stand: Contact guard assistance  Bed to Chair: Stand-by asssistance  Scooting: Supervision          Most Recent Physical Functioning:   Gross Assessment:  AROM: Within functional limits  Strength: Generally decreased, functional               Posture:     Balance:  Sitting: Impaired  Sitting - Static: Good (unsupported)  Sitting - Dynamic: Fair (occasional) (Fair+)  Standing: Impaired  Standing - Static: Good  Standing - Dynamic : Fair Bed Mobility:  Rolling: Supervision  Supine to Sit: Supervision  Scooting: Supervision  Wheelchair Mobility:     Transfers:  Sit to Stand: Contact guard assistance  Stand to Sit: Stand-by asssistance  Bed to Chair: Stand-by asssistance                    Patient Vitals for the past 6 hrs:       BP BP Patient Position SpO2 Pulse   17 0743 125/57 Head of bed elevated (Comment degrees) 93 % 90   17 1205 121/61 Sitting 96 % 80        Mental Status  Neurologic State: Alert  Orientation Level: Oriented X4  Cognition: Follows commands  Perception: Appears intact  Perseveration: No perseveration noted  Safety/Judgement: Fall prevention                               Physical Skills Involved:  1. Balance  2. Mobility  3. Strength  4. Endurance Cognitive Skills Affected (resulting in the inability to perform in a timely and safe manner):  1. Problem Solving Psychosocial Skills Affected:  1. Active Use of Coping Strategies  2. Environmental Adaptations   Number of elements that affect the Plan of Care: 3-5:  MODERATE COMPLEXITY   CLINICAL DECISION MAKIN [facility] [address] [id number] AM-PAC 6 Clicks   Basic Mobility Inpatient Short Form  How much help from another person does the patient currently need. ..  Total A Lot A Little None   1. Putting on and taking off regular lower body clothing?   [ ] 1   [ ] 2   [ ] 3   [X] 4   2. Bathing (including washing, rinsing, drying)? [ ] 1   [ ] 2   [X] 3   [ ] 4   3. Toileting, which includes using toilet, bedpan or urinal?   [ ] 1   [ ] 2   [ ] 3   [X] 4   4. Putting on and taking off regular upper body clothing?   [ ] 1   [ ] 2   [ ] 3   [X] 4   5. Taking care of personal grooming such as brushing teeth? [ ] 1   [ ] 2   [ ] 3   [X] 4   6. Eating meals? [ ] 1   [ ] 2   [ ] 3   [X] 4   © 2007, Trustees of [address] [place] [address] [address] [address], under license to [employer]. All rights reserved    Score:  Initial: 23 Most Recent: X (Date: -- )     Interpretation of Tool:  Represents activities that are increasingly more difficult (i.e. Bed mobility, Transfers, Gait).        Score 24 23 22-20 19-15 14-10 9-7 6       Modifier CH CI CJ CK CL CM CN         · Self Care:               - CURRENT STATUS:    CI - 1%-19% impaired, limited or restricted               - GOAL STATUS:           CI - 1%-19% impaired, limited or restricted               - D/C STATUS:                       CI - 1%-19% impaired, limited or restricted  [place] / [place] / Product Type: Medicare /           Use of outcome tool(s) and clinical judgement create a POC that gives a: LOW COMPLEXITY             TREATMENT:   (In addition to Assessment/Re-Assessment sessions the following treatments were rendered)      Pre-treatment Symptoms/Complaints:    Pain: Initial:   Pain Intensity 1: 0  Post Session:  0      Assessment/Reassessment only, no treatment provided today  Discharge     Braces/Orthotics/Lines/Etc:   · O2 Device: Room air  Treatment/Session Assessment:    · Response to Treatment:  D/C  · Interdisciplinary Collaboration:  · Occupational Therapist  · Registered Nurse  · After treatment position/precautions:  · Up in chair  · Bed/Chair-wheels locked  · Call light within reach  · RN notified  · Family at bedside     Total Treatment Duration:  OT Patient Time In/Time Out  Time In: 1130  Time Out: Surinder Harrington, OTR/L

## 2017-03-11 NOTE — DISCHARGE INSTRUCTIONS
DISCHARGE SUMMARY from Nurse    The following personal items are in your possession at time of discharge:    Dental Appliances: None        Home Medications: None  Jewelry: None  Clothing: At bedside  Other Valuables: None             PATIENT INSTRUCTIONS:    After general anesthesia or intravenous sedation, for 24 hours or while taking prescription Narcotics:  · Limit your activities  · Do not drive and operate hazardous machinery  · Do not make important personal or business decisions  · Do  not drink alcoholic beverages  · If you have not urinated within 8 hours after discharge, please contact your surgeon on call. Report the following to your surgeon:  · Excessive pain, swelling, redness or odor of or around the surgical area  · Temperature over 100.5  · Nausea and vomiting lasting longer than 4 hours or if unable to take medications  · Any signs of decreased circulation or nerve impairment to extremity: change in color, persistent  numbness, tingling, coldness or increase pain  · Any questions        What to do at Home:  Recommended activity: Activity as tolerated, no heavy lifting. If you experience any of the following symptoms change in mental status, increased weakness, or increased dizziness please follow up with your primary care provider or the emergency department. *  Please give a list of your current medications to your Primary Care Provider. *  Please update this list whenever your medications are discontinued, doses are      changed, or new medications (including over-the-counter products) are added. *  Please carry medication information at all times in case of emergency situations. These are general instructions for a healthy lifestyle:    No smoking/ No tobacco products/ Avoid exposure to second hand smoke    Surgeon General's Warning:  Quitting smoking now greatly reduces serious risk to your health.     Obesity, smoking, and sedentary lifestyle greatly increases your risk for illness    A healthy diet, regular physical exercise & weight monitoring are important for maintaining a healthy lifestyle    You may be retaining fluid if you have a history of heart failure or if you experience any of the following symptoms:  Weight gain of 3 pounds or more overnight or 5 pounds in a week, increased swelling in our hands or feet or shortness of breath while lying flat in bed. Please call your doctor as soon as you notice any of these symptoms; do not wait until your next office visit. Recognize signs and symptoms of STROKE:    F-face looks uneven    A-arms unable to move or move unevenly    S-speech slurred or non-existent    T-time-call 911 as soon as signs and symptoms begin-DO NOT go       Back to bed or wait to see if you get better-TIME IS BRAIN. Warning Signs of HEART ATTACK     Call 911 if you have these symptoms:   Chest discomfort. Most heart attacks involve discomfort in the center of the chest that lasts more than a few minutes, or that goes away and comes back. It can feel like uncomfortable pressure, squeezing, fullness, or pain.  Discomfort in other areas of the upper body. Symptoms can include pain or discomfort in one or both arms, the back, neck, jaw, or stomach.  Shortness of breath with or without chest discomfort.  Other signs may include breaking out in a cold sweat, nausea, or lightheadedness. Don't wait more than five minutes to call 911 - MINUTES MATTER! Fast action can save your life. Calling 911 is almost always the fastest way to get lifesaving treatment. Emergency Medical Services staff can begin treatment when they arrive -- up to an hour sooner than if someone gets to the hospital by car. The discharge information has been reviewed with the patient and caregiver. The patient and caregiver verbalized understanding.     Discharge medications reviewed with the patient and caregiver and appropriate educational materials and side effects teaching were provided.

## 2017-03-11 NOTE — DISCHARGE SUMMARY
Discharge Summary     Patient: Sisi López MRN: [id number]  SSN: [id number]    YOB: 1927  Age: 80 y.o.   Sex: female       Admit Date: 3/8/2017    Discharge Date: 3/11/2017      Admission Diagnoses: altered mental state  Altered mental status    Discharge Diagnoses:   Problem List as of 3/11/2017  Date Reviewed: 2/24/2017          Codes Class Noted - Resolved    Altered mental status ICD-10-CM: R41.82  ICD-9-CM: 780.97  3/10/2017 - Present        * (Principal)Altered mental state ICD-10-CM: R41.82  ICD-9-CM: 780.97  3/9/2017 - Present        Chest pain, pleuritic ICD-10-CM: R07.81  ICD-9-CM: 786.52  3/9/2017 - Present        Atrial fibrillation ([facility] ICD-10-CM: I48.91  ICD-9-CM: 427.31  3/9/2017 - Present        Dyspnea ICD-10-CM: R06.00  ICD-9-CM: 786.09  2/25/2017 - Present        Atrial fibrillation with rapid ventricular response ([facility] ICD-10-CM: I48.91  ICD-9-CM: 427.31  2/25/2017 - Present        SSS (sick sinus syndrome) ([facility] ICD-10-CM: I49.5  ICD-9-CM: 427.81  5/5/2016 - Present        Orthostatic hypotension ICD-10-CM: I95.1  ICD-9-CM: 458.0  5/5/2016 - Present        CAD in native artery ICD-10-CM: I25.10  ICD-9-CM: 414.01  5/5/2016 - Present        Pacemaker ICD-10-CM: Z95.0  ICD-9-CM: V45.01  11/10/2015 - Present        Anorexia ICD-10-CM: R63.0  ICD-9-CM: 783.0  9/2/2014 - Present        Dysphagia ICD-10-CM: R13.10  ICD-9-CM: 787.20  9/2/2014 - Present    Overview Signed 4/5/2016  5:24 PM by Kristel Jenkins MD     Due to esophageal spasm seen on modified barium swallow 2015             Cervical radiculopathy ICD-10-CM: M54.12  ICD-9-CM: 723.4  5/23/2013 - Present    Overview Addendum 8/26/2013  4:59 PM by Kristel Jenkins MD     Chronic right shoulder pain S/P eval Dr Rey Anthony POC August 2013, previous cervical spinal fusion             Autonomic neuropathy ICD-10-CM: G90.9  ICD-9-CM: 337.9  1/13/2013 - Present    Overview Signed 1/13/2013  8:18 PM by Kristel Jenkins MD     Severe orthostatic hypotension             MCI (mild cognitive impairment) ICD-10-CM: G31.84  ICD-9-CM: 331.83  1/13/2013 - Present    Overview Addendum 12/16/2013  4:43 PM by Fairy Olszewski, MD     MMSE 27/30 Nov 2009, 27/30 Dec 2013, remembers 3/3 objects 5 min later and draws normal clock             Chronic pain syndrome ICD-10-CM: G89.4  ICD-9-CM: 338.4  1/13/2013 - Present    Overview Addendum 11/10/2015  5:15 PM by Fairy Olszewski, MD     Back, right shoulder, neck: Peripheral neuropathy, spinal stenosis C7-T1, foraminal narrowing C4-5             Diabetes mellitus, type 2 (HCC) ICD-10-CM: E11.9  ICD-9-CM: 250.00  9/12/2012 - Present        Hypertension (Chronic) ICD-10-CM: I10  ICD-9-CM: 401.9  9/12/2012 - Present    Overview Signed 9/12/2012  3:43 PM by Giovanny Alarcon     Orthostatic hypotension due to autonomic neuropathy             Acquired hypothyroidism (Chronic) ICD-10-CM: E03.9  ICD-9-CM: 244.9  9/12/2012 - Present        Mixed hyperlipidemia (Chronic) ICD-10-CM: E78.2  ICD-9-CM: 272.2  9/12/2012 - Present    Overview Addendum 9/12/2012  3:51 PM by Giovanny Alarcon     With high HDL  Intolerant of pravastatin,lovastatin and lescol             CKD (chronic kidney disease), stage III (Chronic) ICD-10-CM: N18.3  ICD-9-CM: 585.3  9/12/2012 - Present        GERD (gastroesophageal reflux disease) (Chronic) ICD-10-CM: K21.9  ICD-9-CM: 530.81  9/12/2012 - Present        IBS (irritable bowel syndrome) (Chronic) ICD-10-CM: K58.9  ICD-9-CM: 564.1  9/12/2012 - Present    Overview Signed 9/12/2012  3:52 PM by Giovanny Alarcon     Chronic enema abuse  diverticulosis             Gait disorder (Chronic) ICD-10-CM: R26.9  ICD-9-CM: 781.2  9/12/2012 - Present    Overview Signed 9/12/2012  3:53 PM by Giovanny Alarcon     Diabetic peripheral neuropathy              Atrial fibrillation with RVR ([facility] ICD-10-CM: I48.91  ICD-9-CM: 427.31  9/12/2012 - Present    Overview Addendum 10/14/2012  9:34 AM by Fairy Olszewski, MD     Paroxysmal. 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 o.female with known history of PAF, SSS with Mdt dual chamber PM. She was admitted last month to the cardiology  service with afib with RVR, she is chronically on amiodarone 200 mg daily , she has been considered a poor candidate for anticoagulation. She has paroxysmal Afib, during last admission she converted spontaneously to NSR. Her echo last month showed an  EF 30-35 %. She was admitted  with chest discomfort worsening with deep inspiration. CT chest negative for PE or any other lung pathology. BNP was minimally elevated >300 and she received one dose of IV lasix at admission. She was found to have Afib during this admission. She appears to have important  afib burden. She was seen by cardiology and she was started on cardizem ( already on Amiodarone and coreg). Stopped lisinopril due to borderline BP. Gabapentin dose decreased to 100 mg tid. She will follow up with cardiology on 3/13/17. It has been considered she might need re-synchronization  (  ms)  therapy and upgrade her device. She fell down during this admission and underwent minor head trauma. Head CT scan was negative for any lesion. Her BS remained stable during this admission. She has been advised to stop her amaryl and monitor her BS at home. It was considered majority of her symptoms at presentation ( AMS, SOB) were due to uncontrolled paroxysmal Afib and CHF. GENERAL: alert, cooperative, no distress, appears stated age  EYE: conjunctivae/corneas clear. PERRL, EOM's intact.  Fundi benign  LYMPHATIC: Cervical, supraclavicular, and axillary nodes normal.   THROAT & NECK: normal and no erythema or exudates noted. LUNG: clear to auscultation bilaterally  HEART: regular rate and rhythm, S1, S2 normal, no murmur, click, rub or gallop  ABDOMEN: soft, non-tender. Bowel sounds normal. No masses, no organomegaly  EXTREMITIES: extremities normal, atraumatic, no cyanosis or edema  SKIN: Normal.  NEUROLOGIC: AOx3. Gait normal. Reflexes and motor strength normal and symmetric. Cranial nerves 2-12 and sensation grossly intact. PSYCHIATRIC: non focal        Consults: Cardiology    Significant Diagnostic Studies: see hospital course     Disposition: home    Discharge Medications:   Current Discharge Medication List      START taking these medications    Details   gabapentin (NEURONTIN) 100 mg capsule Take 1 Cap by mouth three (3) times daily. Qty: 90 Cap, Refills: 0      dilTIAZem CD (CARDIZEM CD) 120 mg ER capsule Take 1 Cap by mouth daily. Qty: 30 Cap, Refills: 1         CONTINUE these medications which have CHANGED    Details   HYDROcodone-acetaminophen (NORCO) 7.5-325 mg per tablet Take 1 Tab by mouth every eight (8) hours as needed. Max Daily Amount: 3 Tabs. Indications: Pain  Qty: 20 Tab, Refills: 0         CONTINUE these medications which have NOT CHANGED    Details   carvedilol (COREG) 12.5 mg tablet Take 1 Tab by mouth two (2) times daily (with meals). Qty: 60 Tab, Refills: 6      furosemide (LASIX) 20 mg tablet Take 1 Tab by mouth daily. Qty: 30 Tab, Refills: 3      potassium chloride (K-DUR, KLOR-CON) 10 mEq tablet Take 1 Tab by mouth daily. Qty: 30 Tab, Refills: 3      SENNA TK 1 T PO QPM  Refills: 1      zolpidem (AMBIEN) 5 mg tablet Take 1 Tab by mouth nightly as needed for Sleep. Max Daily Amount: 5 mg. Qty: 30 Tab, Refills: 1      amiodarone (CORDARONE) 200 mg tablet Take 1 Tab by mouth daily.  Indications: PREVENTION OF RECURRENT ATRIAL FIBRILLATION  Qty: 90 Tab, Refills: 3      aspirin delayed-release 81 mg tablet Take  by mouth daily. omeprazole (PRILOSEC) 20 mg capsule Take 1 Cap by mouth two (2) times a day. Qty: 180 Cap, Refills: 3    Associated Diagnoses: Rib pain on left side      polyethylene glycol (MIRALAX) 17 gram packet Take 1 Packet by mouth daily. Qty: 30 Packet, Refills: 5    Associated Diagnoses: Rib pain on left side      senna-docusate (PERICOLACE) 8.6-50 mg per tablet Take 1 Tab by mouth nightly. Qty: 90 Tab, Refills: 3    Associated Diagnoses: Rib pain on left side      levothyroxine (SYNTHROID) 25 mcg tablet Take 1 Tab by mouth Daily (before breakfast). Qty: 90 Tab, Refills: 3      glucose blood VI test strips (ONE TOUCH ULTRA TEST) strip Test twice a day  Qty: 4 Package, Refills: 3      nitroglycerin (NITROSTAT) 0.4 mg SL tablet 1 Tab by SubLINGual route every five (5) minutes as needed. Indications: ANGINA  Qty: 1 Bottle, Refills: 3      cholecalciferol (VITAMIN D-3) 400 unit Tab tablet Take 1,000 Units by mouth daily. Associated Diagnoses: Rib pain on left side      calcium carbonate (OS-) 500 mg (1,250 mg) tablet take 1 Tab by mouth two (2) times a day. STOP taking these medications       lisinopril (PRINIVIL, ZESTRIL) 10 mg tablet Comments:   Reason for Stopping:         glimepiride (AMARYL) 1 mg tablet Comments:   Reason for Stopping:         gabapentin (NEURONTIN) 600 mg tablet Comments:   Reason for Stopping:               Activity: Activity as tolerated  Diet: Cardiac Diet  Wound Care: None needed    Follow-up Appointments   Procedures    FOLLOW UP VISIT Appointment in: 3 - 5 Days Cardiology. [facility] has appointment on Monday 3/13/17     Cardiology.  [facility] has appointment on Monday 3/13/17     Standing Status:   Standing     Number of Occurrences:   1     Order Specific Question:   Appointment in     Answer:   3 - 5 Days       Signed By: Santosh Olguin MD     March 11, 2017

## 2017-03-11 NOTE — PROGRESS NOTES
3/11/2017 11:50 AM    Admit Date: 3/8/2017        Subjective:     Shonna Koehler denies chest pain, dyspnea, palpitations. She fell yesterday and hit her head CT today neg        Objective:      Visit Vitals    /57 (BP 1 Location: Left arm, BP Patient Position: Head of bed elevated (Comment degrees))    Pulse 90    Temp 97.5 °F (36.4 °C)    Resp 16    Ht 5' 4\" (1.626 m)    Wt 42.2 kg (93 lb)    SpO2 93%    Breastfeeding No    BMI 15.96 kg/m2       Physical Exam:  Heart: irregularly irregular rhythm  Lungs: clear to auscultation bilaterally  Abdomen: soft, non-tender.  Bowel sounds normal. No masses,  no organomegaly    Data Review:   Labs:    Recent Results (from the past 24 hour(s))   GLUCOSE, POC    Collection Time: 03/10/17  3:53 PM   Result Value Ref Range    Glucose (POC) 153 (H) 65 - 100 mg/dL   PLEASE READ & DOCUMENT PPD TEST IN 24 HRS    Collection Time: 03/10/17  7:12 PM   Result Value Ref Range    PPD  Negative    mm Induration 0 mm   GLUCOSE, POC    Collection Time: 03/10/17  8:37 PM   Result Value Ref Range    Glucose (POC) 116 (H) 65 - 100 mg/dL   GLUCOSE, POC    Collection Time: 03/11/17  7:42 AM   Result Value Ref Range    Glucose (POC) 86 65 - 100 mg/dL           Radiology:CT head neg        Assessment:     Patient Active Problem List    Diagnosis Date Noted    Altered mental status better 03/10/2017    Altered mental state 03/09/2017    Chest pain, pleuritic 03/09/2017    Atrial fibrillation ([address] rate control Dr Eder Schulz added cardiazem 03/09/2017    Dyspnea 02/25/2017    Atrial fibrillation with rapid ventricular response ([address] 02/25/2017    SSS (sick sinus syndrome) ([address] 05/05/2016    Orthostatic hypotension 05/05/2016    CAD in native artery 05/05/2016    Pacemaker  Stable close to CRYS 11/10/2015    Anorexia 09/02/2014    Dysphagia 09/02/2014    Cervical radiculopathy 05/23/2013    Autonomic neuropathy 01/13/2013    MCI (mild cognitive impairment) 01/13/2013    Chronic pain syndrome 01/13/2013    Diabetes mellitus, type 2 ([facility] 09/12/2012    Hypertension 09/12/2012    Acquired hypothyroidism 09/12/2012    Mixed hyperlipidemia 09/12/2012    CKD (chronic kidney disease), stage III 09/12/2012    GERD (gastroesophageal reflux disease) 09/12/2012    IBS (irritable bowel syndrome) 09/12/2012    Gait disorder 09/12/2012    Atrial fibrillation with RVR ([facility] 09/12/2012       Plan:   Madina Vigil to send home

## 2017-03-11 NOTE — PROGRESS NOTES
Discharge information given and reviewed with patient and daughter. Prescriptions handed to daughter as well. Medications have changed so that information was written down by Dr. Cassy Saenz which was gone over with and given to patient's daughter. All questions answered. IV removed.

## 2017-03-11 NOTE — PROGRESS NOTES
Pt c/o right occipital head pain. Pt states she fell near the chair earlier in the day and hit her head on the bed rail. Pt tells night shift CNA she only stated elbow pain earlier because she \"Didn't want us to worry\" Pt states she regularly takes Norco at home for leg pain. Pt alert and oriented. Pt given Norco 7.5 r/t head pain. Dr Jeniffer Damon notified, no new orders at this time.

## 2017-03-12 ENCOUNTER — PATIENT OUTREACH (OUTPATIENT)
Dept: CASE MANAGEMENT | Age: 82
End: 2017-03-12

## 2017-03-13 LAB
BACTERIA SPEC CULT: NORMAL
BACTERIA SPEC CULT: NORMAL
SERVICE CMNT-IMP: NORMAL
SERVICE CMNT-IMP: NORMAL

## 2017-03-13 NOTE — PROGRESS NOTES
Transition of Care Discharge Follow-up Questionnaire   Date/Time of Call:   3/12/17 1:42p     What was the patient hospitalized for? Does the patient understand his/her diagnosis and/or treatment and what happened during the hospitalization? Patient verbalized understanding of treatment and diagnosis. Did the patient receive discharge instructions? Yes   Review any discharge instructions (see notes in [facility]). Ask patient if they understand these. Do they have any questions? Patient verbalized understanding of instructions. Were home services ordered (nursing, PT, OT, ST, etc.)? No   If so, has the first visit occurred? If not, why? (Assist with coordination of services if necessary.) n/a   Was any DME ordered? No   If so, has it been received? If not, why?  (Assist with coordination of arranging DME orders if necessary.) n/a   Complete a review of all medications (new, continued and discontinued meds per the D/C instructions and medication tab in [facility]). Patient is compliant with medications. Were all new prescriptions filled? If not, why?  (Assist with obtainment of medications if necessary.) Yes. Daughter picked up medications. Does the patient understand the purpose and dosing instructions for all medications? (If patient has questions, provide explanation and education.) Yes   Does the patient have any problems in performing ADLs? (If patient is unable to perform ADLs - what is the limiting factor(s)? Do they have a support system that can assist? If no support system is present, discuss possible assistance that they may be able to obtain.) Patient uses a sit down walker. Patient has hired help who assists with ADLs. Patient is able to cook and clean as necessary. Does the patient have all follow-up appointments scheduled? Has transportation been arranged?   [facility] follow-up should be within 7 days of discharge; all others should have PCP follow-up within 7 days of discharge; follow-ups with other specialists as appropriate or ordered.) Patient will schedule f/u appointment. Any other questions or concerns expressed by the patient? No other questions or concerns were voiced by patient to care coordinator. Patient was appreciative for call.     ABRAN Call Completed By: NIK Brady ACO  Care Coordinator

## 2017-03-14 ENCOUNTER — ANESTHESIA EVENT (OUTPATIENT)
Dept: SURGERY | Age: 82
End: 2017-03-14
Payer: MEDICARE

## 2017-03-14 RX ORDER — NALOXONE HYDROCHLORIDE 0.4 MG/ML
0.1 INJECTION, SOLUTION INTRAMUSCULAR; INTRAVENOUS; SUBCUTANEOUS
Status: CANCELLED | OUTPATIENT
Start: 2017-03-14

## 2017-03-14 RX ORDER — FLUMAZENIL 0.1 MG/ML
0.2 INJECTION INTRAVENOUS
Status: CANCELLED | OUTPATIENT
Start: 2017-03-14

## 2017-03-14 RX ORDER — NALBUPHINE HYDROCHLORIDE 20 MG/ML
5 INJECTION, SOLUTION INTRAMUSCULAR; INTRAVENOUS; SUBCUTANEOUS
Status: CANCELLED | OUTPATIENT
Start: 2017-03-14

## 2017-03-14 RX ORDER — SODIUM CHLORIDE 0.9 % (FLUSH) 0.9 %
5-10 SYRINGE (ML) INJECTION AS NEEDED
Status: CANCELLED | OUTPATIENT
Start: 2017-03-14

## 2017-03-14 RX ORDER — ONDANSETRON 2 MG/ML
4 INJECTION INTRAMUSCULAR; INTRAVENOUS
Status: CANCELLED | OUTPATIENT
Start: 2017-03-14

## 2017-03-14 RX ORDER — OXYCODONE HYDROCHLORIDE 5 MG/1
5 TABLET ORAL
Status: CANCELLED | OUTPATIENT
Start: 2017-03-14

## 2017-03-14 RX ORDER — HYDROMORPHONE HYDROCHLORIDE 2 MG/ML
0.5 INJECTION, SOLUTION INTRAMUSCULAR; INTRAVENOUS; SUBCUTANEOUS
Status: CANCELLED | OUTPATIENT
Start: 2017-03-14

## 2017-03-15 ENCOUNTER — SURGERY (OUTPATIENT)
Age: 82
End: 2017-03-15

## 2017-03-15 ENCOUNTER — HOSPITAL ENCOUNTER (OUTPATIENT)
Age: 82
Setting detail: OUTPATIENT SURGERY
Discharge: HOME | End: 2017-03-15
Attending: INTERNAL MEDICINE | Admitting: INTERNAL MEDICINE
Payer: MEDICARE

## 2017-03-15 ENCOUNTER — ANESTHESIA (OUTPATIENT)
Dept: SURGERY | Age: 82
End: 2017-03-15
Payer: MEDICARE

## 2017-03-15 VITALS
RESPIRATION RATE: 18 BRPM | BODY MASS INDEX: 16.22 KG/M2 | WEIGHT: 95 LBS | HEART RATE: 75 BPM | HEIGHT: 64 IN | TEMPERATURE: 98.3 F | SYSTOLIC BLOOD PRESSURE: 140 MMHG | OXYGEN SATURATION: 92 % | DIASTOLIC BLOOD PRESSURE: 78 MMHG

## 2017-03-15 DIAGNOSIS — I48.91 ATRIAL FIBRILLATION WITH RVR (HCC): Primary | ICD-10-CM

## 2017-03-15 LAB
ANION GAP BLD CALC-SCNC: 10 MMOL/L (ref 7–16)
ATRIAL RATE: 108 BPM
BUN SERPL-MCNC: 15 MG/DL (ref 8–23)
CALCIUM SERPL-MCNC: 8.8 MG/DL (ref 8.3–10.4)
CALCULATED P AXIS, ECG09: NORMAL DEGREES
CALCULATED R AXIS, ECG10: -64 DEGREES
CALCULATED T AXIS, ECG11: 113 DEGREES
CHLORIDE SERPL-SCNC: 99 MMOL/L (ref 98–107)
CO2 SERPL-SCNC: 28 MMOL/L (ref 21–32)
CREAT SERPL-MCNC: 1.54 MG/DL (ref 0.6–1)
DIAGNOSIS, 93000: NORMAL
DIASTOLIC BP, ECG02: NORMAL MMHG
ERYTHROCYTE [DISTWIDTH] IN BLOOD BY AUTOMATED COUNT: 12.7 % (ref 11.9–14.6)
GLUCOSE BLD STRIP.AUTO-MCNC: 98 MG/DL (ref 65–100)
GLUCOSE SERPL-MCNC: 107 MG/DL (ref 65–100)
HCT VFR BLD AUTO: 34.3 % (ref 35.8–46.3)
HGB BLD-MCNC: 11.5 G/DL (ref 11.7–15.4)
INR PPP: 1 (ref 0.9–1.2)
MAGNESIUM SERPL-MCNC: 2.2 MG/DL (ref 1.8–2.4)
MCH RBC QN AUTO: 30.7 PG (ref 26.1–32.9)
MCHC RBC AUTO-ENTMCNC: 33.5 G/DL (ref 31.4–35)
MCV RBC AUTO: 91.5 FL (ref 79.6–97.8)
P-R INTERVAL, ECG05: NORMAL MS
PLATELET # BLD AUTO: 394 K/UL (ref 150–450)
PMV BLD AUTO: 10.2 FL (ref 10.8–14.1)
POTASSIUM SERPL-SCNC: 4.6 MMOL/L (ref 3.5–5.1)
PROTHROMBIN TIME: 10.9 SEC (ref 9.6–12)
Q-T INTERVAL, ECG07: 358 MS
QRS DURATION, ECG06: 152 MS
QTC CALCULATION (BEZET), ECG08: 468 MS
RBC # BLD AUTO: 3.75 M/UL (ref 4.05–5.25)
SODIUM SERPL-SCNC: 137 MMOL/L (ref 136–145)
SYSTOLIC BP, ECG01: NORMAL MMHG
VENTRICULAR RATE, ECG03: 103 BPM
WBC # BLD AUTO: 7.3 K/UL (ref 4.3–11.1)

## 2017-03-15 PROCEDURE — C1894 INTRO/SHEATH, NON-LASER: HCPCS

## 2017-03-15 PROCEDURE — 83735 ASSAY OF MAGNESIUM: CPT | Performed by: INTERNAL MEDICINE

## 2017-03-15 PROCEDURE — 80048 BASIC METABOLIC PNL TOTAL CA: CPT | Performed by: INTERNAL MEDICINE

## 2017-03-15 PROCEDURE — 74011250636 HC RX REV CODE- 250/636

## 2017-03-15 PROCEDURE — 74011000250 HC RX REV CODE- 250: Performed by: ANESTHESIOLOGY

## 2017-03-15 PROCEDURE — 74011250636 HC RX REV CODE- 250/636: Performed by: ANESTHESIOLOGY

## 2017-03-15 PROCEDURE — 74011250637 HC RX REV CODE- 250/637: Performed by: ANESTHESIOLOGY

## 2017-03-15 PROCEDURE — 76060000032 HC ANESTHESIA 0.5 TO 1 HR: Performed by: INTERNAL MEDICINE

## 2017-03-15 PROCEDURE — 93005 ELECTROCARDIOGRAM TRACING: CPT | Performed by: INTERNAL MEDICINE

## 2017-03-15 PROCEDURE — 93280 PM DEVICE PROGR EVAL DUAL: CPT

## 2017-03-15 PROCEDURE — 93650 ICAR CATH ABLTJ AV NODE FUNC: CPT

## 2017-03-15 PROCEDURE — 82962 GLUCOSE BLOOD TEST: CPT

## 2017-03-15 PROCEDURE — 85027 COMPLETE CBC AUTOMATED: CPT | Performed by: INTERNAL MEDICINE

## 2017-03-15 PROCEDURE — C1733 CATH, EP, OTHR THAN COOL-TIP: HCPCS

## 2017-03-15 PROCEDURE — 85610 PROTHROMBIN TIME: CPT | Performed by: INTERNAL MEDICINE

## 2017-03-15 RX ORDER — SODIUM CHLORIDE, SODIUM LACTATE, POTASSIUM CHLORIDE, CALCIUM CHLORIDE 600; 310; 30; 20 MG/100ML; MG/100ML; MG/100ML; MG/100ML
100 INJECTION, SOLUTION INTRAVENOUS CONTINUOUS
Status: DISCONTINUED | OUTPATIENT
Start: 2017-03-15 | End: 2017-03-15 | Stop reason: HOSPADM

## 2017-03-15 RX ORDER — PROPOFOL 10 MG/ML
INJECTION, EMULSION INTRAVENOUS
Status: DISCONTINUED | OUTPATIENT
Start: 2017-03-15 | End: 2017-03-15 | Stop reason: HOSPADM

## 2017-03-15 RX ORDER — SODIUM CHLORIDE 0.9 % (FLUSH) 0.9 %
5-10 SYRINGE (ML) INJECTION AS NEEDED
Status: DISCONTINUED | OUTPATIENT
Start: 2017-03-15 | End: 2017-03-15 | Stop reason: HOSPADM

## 2017-03-15 RX ORDER — SODIUM CHLORIDE 0.9 % (FLUSH) 0.9 %
5-10 SYRINGE (ML) INJECTION EVERY 8 HOURS
Status: DISCONTINUED | OUTPATIENT
Start: 2017-03-15 | End: 2017-03-15 | Stop reason: HOSPADM

## 2017-03-15 RX ORDER — ASPIRIN 81 MG/1
81 TABLET ORAL ONCE
Status: COMPLETED | OUTPATIENT
Start: 2017-03-15 | End: 2017-03-15

## 2017-03-15 RX ORDER — GLIMEPIRIDE 1 MG/1
0.5 TABLET ORAL
Status: ON HOLD | COMMUNITY
End: 2017-08-12

## 2017-03-15 RX ORDER — LIDOCAINE HYDROCHLORIDE 10 MG/ML
0.1 INJECTION INFILTRATION; PERINEURAL AS NEEDED
Status: DISCONTINUED | OUTPATIENT
Start: 2017-03-15 | End: 2017-03-15 | Stop reason: HOSPADM

## 2017-03-15 RX ORDER — FAMOTIDINE 10 MG/ML
20 INJECTION INTRAVENOUS ONCE
Status: COMPLETED | OUTPATIENT
Start: 2017-03-15 | End: 2017-03-15

## 2017-03-15 RX ORDER — PROPOFOL 10 MG/ML
INJECTION, EMULSION INTRAVENOUS AS NEEDED
Status: DISCONTINUED | OUTPATIENT
Start: 2017-03-15 | End: 2017-03-15 | Stop reason: HOSPADM

## 2017-03-15 RX ADMIN — PROPOFOL 50 MG: 10 INJECTION, EMULSION INTRAVENOUS at 14:45

## 2017-03-15 RX ADMIN — PROPOFOL 70 MCG/KG/MIN: 10 INJECTION, EMULSION INTRAVENOUS at 14:45

## 2017-03-15 RX ADMIN — ASPIRIN 81 MG: 81 TABLET, COATED ORAL at 13:55

## 2017-03-15 RX ADMIN — SODIUM CHLORIDE, SODIUM LACTATE, POTASSIUM CHLORIDE, AND CALCIUM CHLORIDE: 600; 310; 30; 20 INJECTION, SOLUTION INTRAVENOUS at 14:40

## 2017-03-15 RX ADMIN — FAMOTIDINE 20 MG: 10 INJECTION, SOLUTION INTRAVENOUS at 13:59

## 2017-03-15 NOTE — PROGRESS NOTES
Discharge instructions reviewed with patient and son. Verbalize understanding. Written instructions given.

## 2017-03-15 NOTE — IP AVS SNAPSHOT
Marcello Chang 
 
 
 [address] 
[phone] Patient: Abi Durham MRN: [id number] :8/10/1927 You are allergic to the following Allergen Reactions Other Medication Unknown (comments) Tylenol (caused Insomnia) Recent Documentation Height Weight BMI OB Status Smoking Status 1.626 m 43.1 kg 16.31 kg/m2 Postmenopausal Never Smoker Emergency Contacts Name Discharge Info Relation Home Work Mobile Demarcus Arvizu  Child [2] [phone] Eligio Emanuel  Daughter [21] [phone] About your hospitalization You were admitted on:  March 15, 2017 You last received care in the:  [facility] CARDIAC CATH LAB You were discharged on:  March 15, 2017 Unit phone number:  [phone] Why you were hospitalized Your primary diagnosis was:  Not on File Providers Seen During Your Hospitalizations Provider Role Specialty Primary office phone Ronak Henry MD Attending Provider Cardiology [phone] Your Primary Care Physician (PCP) Primary Care Physician Office Phone Office Fax Dl Simmons [phone] Follow-up Information Follow up With Details Comments Contact Info Aruna Peres MD On 3/30/2017 at 4:15 p.m. [address] [facility] [address] 
[phone] Your Appointments 2017  9:10 AM EDT  
LAB with [facility] PM LAB [address] [place] [address]) [address] [place] [id number] [phone]   4:15 PM EDT HOSPITAL FOLLOW-UP with Aruna Peres, [address] [facility] Cardiology ([address] [place] [address]) [address] 
[address]  
[phone] 2017  2:00 PM EDT Office Visit with Sonia Donovan MD  
[address] [place] [address]) [address] [place] [address] [address] [facility] [id number] [phone] Current Discharge Medication List  
  
CONTINUE these medications which have NOT CHANGED Dose & Instructions Dispensing Information Comments Morning Noon Evening Bedtime  
 amiodarone 200 mg tablet Commonly known as:  CORDARONE Your last dose was: Your next dose is:    
   
   
 Dose:  200 mg Take 1 Tab by mouth daily. Indications: PREVENTION OF RECURRENT ATRIAL FIBRILLATION Quantity:  90 Tab Refills:  3  
     
   
   
   
  
 aspirin delayed-release 81 mg tablet Your last dose was: Your next dose is: Take  by mouth daily. Refills:  0  
     
   
   
   
  
 calcium carbonate 500 mg calcium (1,250 mg) tablet Commonly known as:  OS-CAN Your last dose was: Your next dose is:    
   
   
 Dose:  1 Tab  
take 1 Tab by mouth two (2) times a day. Refills:  0  
     
   
   
   
  
 carvedilol 12.5 mg tablet Commonly known as:  May Annabelleh Your last dose was: Your next dose is:    
   
   
 Dose:  12.5 mg Take 1 Tab by mouth two (2) times daily (with meals). Quantity:  60 Tab Refills:  6  
     
   
   
   
  
 furosemide 20 mg tablet Commonly known as:  LASIX Your last dose was: Your next dose is:    
   
   
 Dose:  20 mg Take 1 Tab by mouth daily. Quantity:  30 Tab Refills:  3  
     
   
   
   
  
 gabapentin 100 mg capsule Commonly known as:  NEURONTIN Your last dose was: Your next dose is:    
   
   
 Dose:  100 mg Take 1 Cap by mouth three (3) times daily. Quantity:  90 Cap Refills:  0  
     
   
   
   
  
 glimepiride 1 mg tablet Commonly known as:  AMARYL Your last dose was: Your next dose is:    
   
   
 Dose:  0.5 mg Take 0.5 mg by mouth Daily (before breakfast). Refills:  0 HYDROcodone-acetaminophen 7.5-325 mg per tablet Commonly known as:  Dinorastarr Jacob  
 Your last dose was: Your next dose is:    
   
   
 Dose:  1 Tab Take 1 Tab by mouth every eight (8) hours as needed. Max Daily Amount: 3 Tabs. Indications: Pain Quantity:  20 Tab Refills:  0  
     
   
   
   
  
 levothyroxine 25 mcg tablet Commonly known as:  SYNTHROID Your last dose was: Your next dose is:    
   
   
 Dose:  25 mcg Take 1 Tab by mouth Daily (before breakfast). Quantity:  90 Tab Refills:  3  
     
   
   
   
  
 nitroglycerin 0.4 mg SL tablet Commonly known as:  NITROSTAT Your last dose was: Your next dose is:    
   
   
 Dose:  0.4 mg  
1 Tab by SubLINGual route every five (5) minutes as needed. Indications: ANGINA Quantity:  1 Bottle Refills:  3  
     
   
   
   
  
 omeprazole 20 mg capsule Commonly known as:  PRILOSEC Your last dose was: Your next dose is:    
   
   
 Dose:  20 mg Take 1 Cap by mouth two (2) times a day. Quantity:  180 Cap Refills:  3  
     
   
   
   
  
 polyethylene glycol 17 gram packet Commonly known as:  Duanestarr Wooten Your last dose was: Your next dose is:    
   
   
 Dose:  17 g Take 1 Packet by mouth daily. Quantity:  30 Packet Refills:  5  
     
   
   
   
  
 potassium chloride 10 mEq tablet Commonly known as:  K-DUR, KLOR-CON Your last dose was: Your next dose is:    
   
   
 Dose:  10 mEq Take 1 Tab by mouth daily. Quantity:  30 Tab Refills:  3  
     
   
   
   
  
 senna-docusate 8.6-50 mg per tablet Commonly known as:  Roselinda Sorin Your last dose was: Your next dose is:    
   
   
 Dose:  1 Tab Take 1 Tab by mouth nightly. Quantity:  90 Tab Refills:  3 VITAMIN D3 400 unit Tab tablet Generic drug:  cholecalciferol Your last dose was: Your next dose is:    
   
   
 Dose:  1000 Units Take 1,000 Units by mouth daily. Refills:  0 zolpidem 5 mg tablet Commonly known as:  AMBIEN Your last dose was: Your next dose is:    
   
   
 Dose:  5 mg Take 1 Tab by mouth nightly as needed for Sleep. Max Daily Amount: 5 mg. Quantity:  30 Tab Refills:  1 STOP taking these medications   
 dilTIAZem  mg ER capsule Commonly known as:  CARDIZEM CD Discharge Instructions ABLATION DISCHARGE INSTRUCTIONS 1. Check puncture site frequently for swelling or bleeding. If there is any bleeding, lie down and apply pressure over the area with a clean towel or washcloth. Notify your doctor for any redness, swelling, drainage, or oozing from the puncture site. Notify your doctor for any fever or chills. 2. If the extremity becomes cold, numb, or painful call Dr. Matt Wyatt at [phone]. 
3. Activity should be limited for the next 48 hours. Climb stairs as little as possible and avoid any stooping, bending, or strenuous activity for 48 hours. No heavy lifting (anything over 10 pounds) for 3 days. 4. You may resume your usual diet. Drink more fluids than usual. 
5. Have a responsible person drive you home and stay with you for at least 24 hours after your ablation. 6. You may remove bandage from your Right groin in 24 hours. You may shower in 24 hours. No tub baths, hot tubs, or swimming for 1 week. Do not place any lotions, creams, powders, or ointments over puncture site for 1 week. You may place a clean band-aid over the puncture site each day for 5 days. Change daily. 7. Do not drive for 24 hours. I have read the above instructions and have had the opportunity to ask questions. Patient: ________________________   Date: 3/15/2017 Witness: _______________________   Date: 3/15/2017 Discharge Orders None ACO Transitions of Care Rima Mukherjee [facility] offers a voluntary care coordination program to provide high quality service and care to [facility] fee-for-service beneficiaries. Praneeth Rush was designed to help you enhance your health and well-being through the following services: ? Transitions of Care  support for individuals who are transitioning from one care setting to another (example: Hospital to home). ? Chronic and Complex Care Coordination  support for individuals and caregivers of those with serious or chronic illnesses or with more than one chronic (ongoing) condition and those who take a number of different medications. If you meet specific medical criteria, a [facility] [address] may call you directly to coordinate your care with your primary care physician and your other care providers. For questions about the [facility] programs, please, contact your physicians office. For general questions or additional information about Accountable Care Organizations: 
Please visit www.medicare.gov/acos. html or call 1-800-MEDICARE (2-567.807.8792) TTY users should call 4-465.777.7537. Introducing [facility]! Bianca Dugan introduces [employer] patient portal. Now you can access parts of your medical record, email your doctor's office, and request medication refills online. 1. In your internet browser, go to [email or web address] 2. Click on the First Time User? Click Here link in the Sign In box. You will see the New Member Sign Up page. 3. Enter your [employer] Access Code exactly as it appears below. You will not need to use this code after youve completed the sign-up process. If you do not sign up before the expiration date, you must request a new code. · [employer] Access Code: [facility] Expires: 5/25/2017 10:40 AM 
 
4. Enter the last four digits of your Social Security Number (xxxx) and Date of Birth (mm/dd/yyyy) as indicated and click Submit. You will be taken to the next sign-up page. 5. Create a digitalbox ID. This will be your digitalbox login ID and cannot be changed, so think of one that is secure and easy to remember. 6. Create a digitalbox password. You can change your password at any time. 7. Enter your Password Reset Question and Answer. This can be used at a later time if you forget your password. 8. Enter your e-mail address. You will receive e-mail notification when new information is available in [address] 9. Click Sign Up. You can now view and download portions of your medical record. 10. Click the Download Summary menu link to download a portable copy of your medical information. If you have questions, please visit the Frequently Asked Questions section of the digitalbox website. Remember, digitalbox is NOT to be used for urgent needs. For medical emergencies, dial 911. Now available from your iPhone and Android! General Information Please provide this summary of care documentation to your next provider. Patient Signature:  ____________________________________________________________ Date:  ____________________________________________________________  
  
Becka Biswas Provider Signature:  ____________________________________________________________ Date:  ____________________________________________________________

## 2017-03-15 NOTE — DISCHARGE INSTRUCTIONS
ABLATION DISCHARGE INSTRUCTIONS    1. Check puncture site frequently for swelling or bleeding. If there is any bleeding, lie down and apply pressure over the area with a clean towel or washcloth. Notify your doctor for any redness, swelling, drainage, or oozing from the puncture site. Notify your doctor for any fever or chills. 2. If the extremity becomes cold, numb, or painful call Dr. Trisha Ferrer at [phone].  3. Activity should be limited for the next 48 hours. Climb stairs as little as possible and avoid any stooping, bending, or strenuous activity for 48 hours. No heavy lifting (anything over 10 pounds) for 3 days. 4. You may resume your usual diet. Drink more fluids than usual.  5. Have a responsible person drive you home and stay with you for at least 24 hours after your ablation. 6. You may remove bandage from your Right groin in 24 hours. You may shower in 24 hours. No tub baths, hot tubs, or swimming for 1 week. Do not place any lotions, creams, powders, or ointments over puncture site for 1 week. You may place a clean band-aid over the puncture site each day for 5 days. Change daily. 7. Do not drive for 24 hours. I have read the above instructions and have had the opportunity to ask questions.       Patient: ________________________   Date: 3/15/2017    Witness: _______________________   Date: 3/15/2017

## 2017-03-15 NOTE — PROCEDURES
PRE-ELECTROPHYSIOLOGY STUDY DIAGNOSES  1. Atrial Fibrillation  2. Tachy/Jeremias syndrome  3. Previous device implant     PROCEDURE PERFORMED  1. AV Node ablation. 2. Patito-Procedure Device reprogramming     Anesthesia: MAC     Estimated Blood Loss: Less than 10 mL     Specimens: * No specimens in log *      PROCEDURE IN DETAIL: The patient was brought into the EP lab in a fasting  state. The right groin was prepped and draped in the usual  sterile fashion. Access was obtained in the right femoral vein via the  Seldinger technique over which an 8 -Swedish sheath was placed. Through the 8-Swedish sheath, a large curved 8 mm Blazer II ablation catheter was  placed. The AV node was mapped and ablated with 61 W of energy, 60 degree heat for  60 seconds. The patient was monitored for 10 minutes. The device was reprogrammed.        The sheaths were then pulled. Hemostasis was achieved.  The patient recovered from their sedation, transferred from the lab in a stable condition.     IMPRESSION: Successful catheter mapping ablation of AV node to form complete AV Block

## 2017-03-15 NOTE — PROGRESS NOTES
Discharged to home accompanied by son. Left unit via wheelchair. Right groin without bleeding or hematoma.

## 2017-03-15 NOTE — IP AVS SNAPSHOT
Jennifer Sullivan 
 
 
 [address] [facility] [address] [facility] 
[phone] Patient: Salty Sandoval MRN: [id number] :8/10/1927 Discharge Summary 3/15/2017 Salty Sandoval MRN[de-identified]  [id number] Admission Information Provider Pager Service Admission Date Expected D/C Date Noe Friday, MD  CARDIAC CATH LAB 3/15/2017 3/15/2017 Actual LOS Patient Class 0 days OUTPATIENT SURGERY Follow-up Information Follow up With Details Comments Contact Info Hayden Mullen MD On 3/30/2017 at 4:15 p.m. [address] [place] [address] 
[phone] Current Discharge Medication List  
  
CONTINUE these medications which have NOT CHANGED Dose & Instructions Dispensing Information Comments Morning Noon Evening Bedtime  
 amiodarone 200 mg tablet Commonly known as:  CORDARONE Your last dose was: Your next dose is:    
   
   
 Dose:  200 mg Take 1 Tab by mouth daily. Indications: PREVENTION OF RECURRENT ATRIAL FIBRILLATION Quantity:  90 Tab Refills:  3  
     
   
   
   
  
 aspirin delayed-release 81 mg tablet Your last dose was: Your next dose is: Take  by mouth daily. Refills:  0  
     
   
   
   
  
 calcium carbonate 500 mg calcium (1,250 mg) tablet Commonly known as:  OS-CAN Your last dose was: Your next dose is:    
   
   
 Dose:  1 Tab  
take 1 Tab by mouth two (2) times a day. Refills:  0  
     
   
   
   
  
 carvedilol 12.5 mg tablet Commonly known as:  Jestine Pellet Your last dose was: Your next dose is:    
   
   
 Dose:  12.5 mg Take 1 Tab by mouth two (2) times daily (with meals). Quantity:  60 Tab Refills:  6  
     
   
   
   
  
 furosemide 20 mg tablet Commonly known as:  LASIX Your last dose was: Your next dose is:    
   
   
 Dose:  20 mg Take 1 Tab by mouth daily. Quantity:  30 Tab Refills:  3  
     
   
   
   
  
 gabapentin 100 mg capsule Commonly known as:  NEURONTIN Your last dose was: Your next dose is:    
   
   
 Dose:  100 mg Take 1 Cap by mouth three (3) times daily. Quantity:  90 Cap Refills:  0  
     
   
   
   
  
 glimepiride 1 mg tablet Commonly known as:  AMARYL Your last dose was: Your next dose is:    
   
   
 Dose:  0.5 mg Take 0.5 mg by mouth Daily (before breakfast). Refills:  0 HYDROcodone-acetaminophen 7.5-325 mg per tablet Commonly known as:  Benetta Pock Your last dose was: Your next dose is:    
   
   
 Dose:  1 Tab Take 1 Tab by mouth every eight (8) hours as needed. Max Daily Amount: 3 Tabs. Indications: Pain Quantity:  20 Tab Refills:  0  
     
   
   
   
  
 levothyroxine 25 mcg tablet Commonly known as:  SYNTHROID Your last dose was: Your next dose is:    
   
   
 Dose:  25 mcg Take 1 Tab by mouth Daily (before breakfast). Quantity:  90 Tab Refills:  3  
     
   
   
   
  
 nitroglycerin 0.4 mg SL tablet Commonly known as:  NITROSTAT Your last dose was: Your next dose is:    
   
   
 Dose:  0.4 mg  
1 Tab by SubLINGual route every five (5) minutes as needed. Indications: ANGINA Quantity:  1 Bottle Refills:  3  
     
   
   
   
  
 omeprazole 20 mg capsule Commonly known as:  PRILOSEC Your last dose was: Your next dose is:    
   
   
 Dose:  20 mg Take 1 Cap by mouth two (2) times a day. Quantity:  180 Cap Refills:  3  
     
   
   
   
  
 polyethylene glycol 17 gram packet Commonly known as:  Marnie Clause Your last dose was: Your next dose is:    
   
   
 Dose:  17 g Take 1 Packet by mouth daily. Quantity:  30 Packet Refills:  5  
     
   
   
   
  
 potassium chloride 10 mEq tablet Commonly known as:  K-DUR, KLOR-CON Your last dose was: Your next dose is:    
   
   
 Dose:  10 mEq Take 1 Tab by mouth daily. Quantity:  30 Tab Refills:  3  
     
   
   
   
  
 senna-docusate 8.6-50 mg per tablet Commonly known as:  Carolynn Thomson Your last dose was: Your next dose is:    
   
   
 Dose:  1 Tab Take 1 Tab by mouth nightly. Quantity:  90 Tab Refills:  3 VITAMIN D3 400 unit Tab tablet Generic drug:  cholecalciferol Your last dose was: Your next dose is:    
   
   
 Dose:  1000 Units Take 1,000 Units by mouth daily. Refills:  0  
     
   
   
   
  
 zolpidem 5 mg tablet Commonly known as:  AMBIEN Your last dose was: Your next dose is:    
   
   
 Dose:  5 mg Take 1 Tab by mouth nightly as needed for Sleep. Max Daily Amount: 5 mg. Quantity:  30 Tab Refills:  1 STOP taking these medications   
 dilTIAZem  mg ER capsule Commonly known as:  CARDIZEM CD General Information Please provide this summary of care documentation to your next provider. Allergies Unspecified:  Other Medication Current Immunizations  Reviewed on 3/9/2017 Name Date Influenza High Dose Vaccine PF 9/20/2016, 10/8/2015 Influenza Vaccine 9/2/2014, 10/10/2013 Influenza Vaccine Split 10/15/2012 Pneumococcal Conjugate (PCV-13) 11/10/2015 Pneumococcal Polysaccharide (PPSV-23) 4/16/2013 TB Skin Test (PPD) Intradermal 3/9/2017 TDAP Vaccine 10/15/2012 Discharge Instructions Discharge Instructions ABLATION DISCHARGE INSTRUCTIONS 1. Check puncture site frequently for swelling or bleeding. If there is any bleeding, lie down and apply pressure over the area with a clean towel or washcloth. Notify your doctor for any redness, swelling, drainage, or oozing from the puncture site. Notify your doctor for any fever or chills. 2. If the extremity becomes cold, numb, or painful call Dr. Lonny Hernandez at [phone]. 
3. Activity should be limited for the next 48 hours. Climb stairs as little as possible and avoid any stooping, bending, or strenuous activity for 48 hours. No heavy lifting (anything over 10 pounds) for 3 days. 4. You may resume your usual diet. Drink more fluids than usual. 
5. Have a responsible person drive you home and stay with you for at least 24 hours after your ablation. 6. You may remove bandage from your Right groin in 24 hours. You may shower in 24 hours. No tub baths, hot tubs, or swimming for 1 week. Do not place any lotions, creams, powders, or ointments over puncture site for 1 week. You may place a clean band-aid over the puncture site each day for 5 days. Change daily. 7. Do not drive for 24 hours. I have read the above instructions and have had the opportunity to ask questions. Patient: ________________________   Date: 3/15/2017 Witness: _______________________   Date: 3/15/2017 Discharge Orders None  
  
` Patient Signature:  ____________________________________________________________ Date:  ____________________________________________________________  
  
 Malinda James Provider Signature:  ____________________________________________________________ Date:  ____________________________________________________________

## 2017-03-15 NOTE — IP AVS SNAPSHOT
Current Discharge Medication List  
  
CONTINUE these medications which have NOT CHANGED Dose & Instructions Dispensing Information Comments Morning Noon Evening Bedtime  
 amiodarone 200 mg tablet Commonly known as:  CORDARONE Your last dose was: Your next dose is:    
   
   
 Dose:  200 mg Take 1 Tab by mouth daily. Indications: PREVENTION OF RECURRENT ATRIAL FIBRILLATION Quantity:  90 Tab Refills:  3  
     
   
   
   
  
 aspirin delayed-release 81 mg tablet Your last dose was: Your next dose is: Take  by mouth daily. Refills:  0  
     
   
   
   
  
 calcium carbonate 500 mg calcium (1,250 mg) tablet Commonly known as:  OS-CAN Your last dose was: Your next dose is:    
   
   
 Dose:  1 Tab  
take 1 Tab by mouth two (2) times a day. Refills:  0  
     
   
   
   
  
 carvedilol 12.5 mg tablet Commonly known as:  Willard Gut Your last dose was: Your next dose is:    
   
   
 Dose:  12.5 mg Take 1 Tab by mouth two (2) times daily (with meals). Quantity:  60 Tab Refills:  6  
     
   
   
   
  
 furosemide 20 mg tablet Commonly known as:  LASIX Your last dose was: Your next dose is:    
   
   
 Dose:  20 mg Take 1 Tab by mouth daily. Quantity:  30 Tab Refills:  3  
     
   
   
   
  
 gabapentin 100 mg capsule Commonly known as:  NEURONTIN Your last dose was: Your next dose is:    
   
   
 Dose:  100 mg Take 1 Cap by mouth three (3) times daily. Quantity:  90 Cap Refills:  0  
     
   
   
   
  
 glimepiride 1 mg tablet Commonly known as:  AMARYL Your last dose was: Your next dose is:    
   
   
 Dose:  0.5 mg Take 0.5 mg by mouth Daily (before breakfast). Refills:  0 HYDROcodone-acetaminophen 7.5-325 mg per tablet Commonly known as:  Emy Gates Your last dose was: Your next dose is: Dose:  1 Tab Take 1 Tab by mouth every eight (8) hours as needed. Max Daily Amount: 3 Tabs. Indications: Pain Quantity:  20 Tab Refills:  0  
     
   
   
   
  
 levothyroxine 25 mcg tablet Commonly known as:  SYNTHROID Your last dose was: Your next dose is:    
   
   
 Dose:  25 mcg Take 1 Tab by mouth Daily (before breakfast). Quantity:  90 Tab Refills:  3  
     
   
   
   
  
 nitroglycerin 0.4 mg SL tablet Commonly known as:  NITROSTAT Your last dose was: Your next dose is:    
   
   
 Dose:  0.4 mg  
1 Tab by SubLINGual route every five (5) minutes as needed. Indications: ANGINA Quantity:  1 Bottle Refills:  3  
     
   
   
   
  
 omeprazole 20 mg capsule Commonly known as:  PRILOSEC Your last dose was: Your next dose is:    
   
   
 Dose:  20 mg Take 1 Cap by mouth two (2) times a day. Quantity:  180 Cap Refills:  3  
     
   
   
   
  
 polyethylene glycol 17 gram packet Commonly known as:  Kreg Patron Your last dose was: Your next dose is:    
   
   
 Dose:  17 g Take 1 Packet by mouth daily. Quantity:  30 Packet Refills:  5  
     
   
   
   
  
 potassium chloride 10 mEq tablet Commonly known as:  K-DUR, KLOR-CON Your last dose was: Your next dose is:    
   
   
 Dose:  10 mEq Take 1 Tab by mouth daily. Quantity:  30 Tab Refills:  3  
     
   
   
   
  
 senna-docusate 8.6-50 mg per tablet Commonly known as:  Laura Foster Your last dose was: Your next dose is:    
   
   
 Dose:  1 Tab Take 1 Tab by mouth nightly. Quantity:  90 Tab Refills:  3 VITAMIN D3 400 unit Tab tablet Generic drug:  cholecalciferol Your last dose was: Your next dose is:    
   
   
 Dose:  1000 Units Take 1,000 Units by mouth daily. Refills:  0  
     
   
   
   
  
 zolpidem 5 mg tablet Commonly known as:  AMBIEN  
   
 Your last dose was: Your next dose is:    
   
   
 Dose:  5 mg Take 1 Tab by mouth nightly as needed for Sleep. Max Daily Amount: 5 mg. Quantity:  30 Tab Refills:  1 STOP taking these medications   
 dilTIAZem  mg ER capsule Commonly known as:  CARDIZEM CD

## 2017-03-15 NOTE — PROGRESS NOTES
Report received from [address] [place] [address]. Procedural findings communicated. Intra procedural  medication administration reviewed. Progression of care discussed.      Patient received into [address] [facility] 2 post sheath removal.     Access site without bleeding or swelling yes    Dressing dry and intact yes    Patient instructed to limit movement to right lower extremity    Routine post procedural vital signs and site assessment initiated yes

## 2017-03-15 NOTE — ANESTHESIA PREPROCEDURE EVALUATION
Anesthetic History   No history of anesthetic complications            Review of Systems / Medical History  Patient summary reviewed and pertinent labs reviewed    Pulmonary          Shortness of breath (associated with AFib)         Neuro/Psych             Comments: \"mild cognitive impairment\" Cardiovascular    Hypertension      CHF  Dysrhythmias (SSS) : atrial fibrillation  Pacemaker and CAD    Exercise tolerance: <4 METS  Comments: Orthostatic hypotension   GI/Hepatic/Renal     GERD    Renal disease: CRI      Comments: IBS    Dysphagia Endo/Other    Diabetes: poorly controlled, type 2  Hypothyroidism       Other Findings   Comments: Autonomic neuropathy    Anorexia    Chronic Pain    Cervical Radiculopathy           Physical Exam    Airway  Mallampati: II  TM Distance: 4 - 6 cm  Neck ROM: normal range of motion   Mouth opening: Normal     Cardiovascular    Rhythm: irregular  Rate: normal         Dental  No notable dental hx       Pulmonary  Breath sounds clear to auscultation               Abdominal  GI exam deferred       Other Findings            Anesthetic Plan    ASA: 3  Anesthesia type: total IV anesthesia          Induction: Intravenous  Anesthetic plan and risks discussed with: Patient

## 2017-03-15 NOTE — PROGRESS NOTES
Patient received to [address] [place] [address] room # 4  Ambulatory from [facility]. Patient scheduled for AVN Ablation today with Dr Evelyn Ramirez. Procedure reviewed & questions answered, voiced good understanding consent obtained & placed on chart. All medications and medical history reviewed. Will prep patient per orders. Patient & family updated on plan of care.

## 2017-03-16 NOTE — ANESTHESIA POSTPROCEDURE EVALUATION
Post-Anesthesia Evaluation and Assessment    Patient: Peggy Busch MRN: [id number]  SSN: [id number]    YOB: 1927  Age: 80 y.o. Sex: female       Cardiovascular Function/Vital Signs  Visit Vitals    /78 (BP Patient Position: Post activity; Sitting)    Pulse 75    Temp 36.8 °C (98.3 °F)    Resp 18    Ht 5' 4\" (1.626 m)    Wt 43.1 kg (95 lb)    SpO2 92%    BMI 16.31 kg/m2       Patient is status post total IV anesthesia anesthesia for Procedure(s):  AV NODE  ABLATION. Nausea/Vomiting: None    Postoperative hydration reviewed and adequate. Pain:  Pain Scale 1: Numeric (0 - 10) (03/15/17 1729)  Pain Intensity 1: 0 (03/15/17 1916)   Managed    Neurological Status: At baseline    Mental Status and Level of Consciousness: Arousable    Pulmonary Status:   O2 Device: Oxygen mask (03/15/17 1522)   Adequate oxygenation and airway patent    Complications related to anesthesia: None    Post-anesthesia assessment completed.  No concerns    Signed By: Chasity Garcia MD     March 15, 2017

## 2017-03-17 NOTE — PROGRESS NOTES
Call from [facility]. Patient dc'ed yesterday  And she will have Interim. They called to schedule with her and per patient request, she does not want anyone to come to the home until Monday. [facility] wanted to update medical team with that information. Delay in service PER PATIENT REQUEST. Paramjit Garcia

## 2017-03-18 ENCOUNTER — APPOINTMENT (OUTPATIENT)
Dept: GENERAL RADIOLOGY | Age: 82
DRG: 242 | End: 2017-03-18
Attending: EMERGENCY MEDICINE
Payer: MEDICARE

## 2017-03-18 ENCOUNTER — HOSPITAL ENCOUNTER (INPATIENT)
Age: 82
LOS: 1 days | Discharge: HOME HEALTH | DRG: 242 | End: 2017-03-21
Attending: EMERGENCY MEDICINE | Admitting: INTERNAL MEDICINE
Payer: MEDICARE

## 2017-03-18 DIAGNOSIS — I10 HYPERTENSION, UNCONTROLLED: ICD-10-CM

## 2017-03-18 DIAGNOSIS — E11.29 TYPE 2 DIABETES MELLITUS WITH MICROALBUMINURIA, WITHOUT LONG-TERM CURRENT USE OF INSULIN (HCC): ICD-10-CM

## 2017-03-18 DIAGNOSIS — R80.9 TYPE 2 DIABETES MELLITUS WITH MICROALBUMINURIA, WITHOUT LONG-TERM CURRENT USE OF INSULIN (HCC): ICD-10-CM

## 2017-03-18 DIAGNOSIS — I50.9 CONGESTIVE HEART FAILURE, UNSPECIFIED CONGESTIVE HEART FAILURE CHRONICITY, UNSPECIFIED CONGESTIVE HEART FAILURE TYPE: ICD-10-CM

## 2017-03-18 DIAGNOSIS — R09.02 HYPOXIA: ICD-10-CM

## 2017-03-18 DIAGNOSIS — I50.21 ACUTE SYSTOLIC CONGESTIVE HEART FAILURE (HCC): Primary | ICD-10-CM

## 2017-03-18 LAB
ALBUMIN SERPL BCP-MCNC: 3.3 G/DL (ref 3.2–4.6)
ALBUMIN/GLOB SERPL: 0.9 {RATIO} (ref 1.2–3.5)
ALP SERPL-CCNC: 170 U/L (ref 50–136)
ALT SERPL-CCNC: 33 U/L (ref 12–65)
ANION GAP BLD CALC-SCNC: 12 MMOL/L (ref 7–16)
AST SERPL W P-5'-P-CCNC: 43 U/L (ref 15–37)
BASOPHILS # BLD AUTO: 0 K/UL (ref 0–0.2)
BASOPHILS # BLD: 0 % (ref 0–2)
BILIRUB SERPL-MCNC: 0.9 MG/DL (ref 0.2–1.1)
BNP SERPL-MCNC: 1141 PG/ML
BUN SERPL-MCNC: 22 MG/DL (ref 8–23)
CALCIUM SERPL-MCNC: 8.4 MG/DL (ref 8.3–10.4)
CHLORIDE SERPL-SCNC: 96 MMOL/L (ref 98–107)
CO2 SERPL-SCNC: 25 MMOL/L (ref 21–32)
CREAT SERPL-MCNC: 1.65 MG/DL (ref 0.6–1)
DIFFERENTIAL METHOD BLD: ABNORMAL
EOSINOPHIL # BLD: 0 K/UL (ref 0–0.8)
EOSINOPHIL NFR BLD: 0 % (ref 0.5–7.8)
ERYTHROCYTE [DISTWIDTH] IN BLOOD BY AUTOMATED COUNT: 12.8 % (ref 11.9–14.6)
FLUAV AG NPH QL IA: NEGATIVE
FLUBV AG NPH QL IA: NEGATIVE
GLOBULIN SER CALC-MCNC: 3.5 G/DL (ref 2.3–3.5)
GLUCOSE SERPL-MCNC: 182 MG/DL (ref 65–100)
HCT VFR BLD AUTO: 30.4 % (ref 35.8–46.3)
HGB BLD-MCNC: 10.2 G/DL (ref 11.7–15.4)
IMM GRANULOCYTES # BLD: 0 K/UL (ref 0–0.5)
IMM GRANULOCYTES NFR BLD AUTO: 0.3 % (ref 0–5)
LACTATE BLD-SCNC: 1.5 MMOL/L (ref 0.5–1.9)
LYMPHOCYTES # BLD AUTO: 5 % (ref 13–44)
LYMPHOCYTES # BLD: 0.5 K/UL (ref 0.5–4.6)
MCH RBC QN AUTO: 30.8 PG (ref 26.1–32.9)
MCHC RBC AUTO-ENTMCNC: 33.6 G/DL (ref 31.4–35)
MCV RBC AUTO: 91.8 FL (ref 79.6–97.8)
MONOCYTES # BLD: 1 K/UL (ref 0.1–1.3)
MONOCYTES NFR BLD AUTO: 10 % (ref 4–12)
NEUTS SEG # BLD: 8.1 K/UL (ref 1.7–8.2)
NEUTS SEG NFR BLD AUTO: 85 % (ref 43–78)
PLATELET # BLD AUTO: 300 K/UL (ref 150–450)
PMV BLD AUTO: 10.1 FL (ref 10.8–14.1)
POTASSIUM SERPL-SCNC: 4.4 MMOL/L (ref 3.5–5.1)
PROCALCITONIN SERPL-MCNC: 0.1 NG/ML
PROT SERPL-MCNC: 6.8 G/DL (ref 6.3–8.2)
RBC # BLD AUTO: 3.31 M/UL (ref 4.05–5.25)
SODIUM SERPL-SCNC: 133 MMOL/L (ref 136–145)
TROPONIN I BLD-MCNC: 0.11 NG/ML (ref 0–0.08)
TROPONIN I SERPL-MCNC: 0.12 NG/ML (ref 0.02–0.05)
WBC # BLD AUTO: 9.6 K/UL (ref 4.3–11.1)

## 2017-03-18 PROCEDURE — 74011250637 HC RX REV CODE- 250/637: Performed by: EMERGENCY MEDICINE

## 2017-03-18 PROCEDURE — 71010 XR CHEST SNGL V: CPT

## 2017-03-18 PROCEDURE — 87040 BLOOD CULTURE FOR BACTERIA: CPT | Performed by: EMERGENCY MEDICINE

## 2017-03-18 PROCEDURE — 80053 COMPREHEN METABOLIC PANEL: CPT | Performed by: EMERGENCY MEDICINE

## 2017-03-18 PROCEDURE — 96375 TX/PRO/DX INJ NEW DRUG ADDON: CPT | Performed by: EMERGENCY MEDICINE

## 2017-03-18 PROCEDURE — 74011250636 HC RX REV CODE- 250/636: Performed by: EMERGENCY MEDICINE

## 2017-03-18 PROCEDURE — 84484 ASSAY OF TROPONIN QUANT: CPT | Performed by: EMERGENCY MEDICINE

## 2017-03-18 PROCEDURE — 83605 ASSAY OF LACTIC ACID: CPT

## 2017-03-18 PROCEDURE — 99218 HC RM OBSERVATION: CPT

## 2017-03-18 PROCEDURE — 74011250636 HC RX REV CODE- 250/636: Performed by: INTERNAL MEDICINE

## 2017-03-18 PROCEDURE — 99285 EMERGENCY DEPT VISIT HI MDM: CPT | Performed by: EMERGENCY MEDICINE

## 2017-03-18 PROCEDURE — 84145 PROCALCITONIN (PCT): CPT | Performed by: EMERGENCY MEDICINE

## 2017-03-18 PROCEDURE — 74011000250 HC RX REV CODE- 250: Performed by: EMERGENCY MEDICINE

## 2017-03-18 PROCEDURE — 85025 COMPLETE CBC W/AUTO DIFF WBC: CPT | Performed by: EMERGENCY MEDICINE

## 2017-03-18 PROCEDURE — 87804 INFLUENZA ASSAY W/OPTIC: CPT | Performed by: EMERGENCY MEDICINE

## 2017-03-18 PROCEDURE — 83880 ASSAY OF NATRIURETIC PEPTIDE: CPT | Performed by: EMERGENCY MEDICINE

## 2017-03-18 PROCEDURE — 96374 THER/PROPH/DIAG INJ IV PUSH: CPT | Performed by: EMERGENCY MEDICINE

## 2017-03-18 PROCEDURE — 93005 ELECTROCARDIOGRAM TRACING: CPT | Performed by: EMERGENCY MEDICINE

## 2017-03-18 RX ORDER — FENTANYL CITRATE 50 UG/ML
25 INJECTION, SOLUTION INTRAMUSCULAR; INTRAVENOUS ONCE
Status: COMPLETED | OUTPATIENT
Start: 2017-03-18 | End: 2017-03-18

## 2017-03-18 RX ORDER — ASPIRIN 81 MG/1
81 TABLET ORAL DAILY
Status: DISCONTINUED | OUTPATIENT
Start: 2017-03-19 | End: 2017-03-21 | Stop reason: HOSPADM

## 2017-03-18 RX ORDER — LABETALOL HYDROCHLORIDE 5 MG/ML
20 INJECTION, SOLUTION INTRAVENOUS ONCE
Status: DISCONTINUED | OUTPATIENT
Start: 2017-03-18 | End: 2017-03-18

## 2017-03-18 RX ORDER — HEPARIN SODIUM 5000 [USP'U]/ML
5000 INJECTION, SOLUTION INTRAVENOUS; SUBCUTANEOUS EVERY 8 HOURS
Status: DISCONTINUED | OUTPATIENT
Start: 2017-03-18 | End: 2017-03-21 | Stop reason: HOSPADM

## 2017-03-18 RX ORDER — GUAIFENESIN 100 MG/5ML
324 LIQUID (ML) ORAL
Status: COMPLETED | OUTPATIENT
Start: 2017-03-18 | End: 2017-03-18

## 2017-03-18 RX ORDER — LISINOPRIL 5 MG/1
10 TABLET ORAL DAILY
Status: DISCONTINUED | OUTPATIENT
Start: 2017-03-19 | End: 2017-03-21

## 2017-03-18 RX ORDER — INSULIN LISPRO 100 [IU]/ML
INJECTION, SOLUTION INTRAVENOUS; SUBCUTANEOUS
Status: DISCONTINUED | OUTPATIENT
Start: 2017-03-19 | End: 2017-03-21

## 2017-03-18 RX ORDER — FUROSEMIDE 10 MG/ML
40 INJECTION INTRAMUSCULAR; INTRAVENOUS
Status: COMPLETED | OUTPATIENT
Start: 2017-03-18 | End: 2017-03-18

## 2017-03-18 RX ORDER — HYDROCODONE BITARTRATE AND ACETAMINOPHEN 7.5; 325 MG/1; MG/1
1 TABLET ORAL
Status: DISCONTINUED | OUTPATIENT
Start: 2017-03-18 | End: 2017-03-21 | Stop reason: HOSPADM

## 2017-03-18 RX ORDER — PANTOPRAZOLE SODIUM 40 MG/1
40 TABLET, DELAYED RELEASE ORAL
Status: DISCONTINUED | OUTPATIENT
Start: 2017-03-19 | End: 2017-03-21 | Stop reason: HOSPADM

## 2017-03-18 RX ORDER — LEVOTHYROXINE SODIUM 50 UG/1
25 TABLET ORAL
Status: DISCONTINUED | OUTPATIENT
Start: 2017-03-19 | End: 2017-03-21 | Stop reason: HOSPADM

## 2017-03-18 RX ORDER — POLYETHYLENE GLYCOL 3350 17 G/17G
17 POWDER, FOR SOLUTION ORAL DAILY
Status: DISCONTINUED | OUTPATIENT
Start: 2017-03-19 | End: 2017-03-21 | Stop reason: HOSPADM

## 2017-03-18 RX ORDER — GLIMEPIRIDE 1 MG/1
0.5 TABLET ORAL
Status: DISCONTINUED | OUTPATIENT
Start: 2017-03-19 | End: 2017-03-19

## 2017-03-18 RX ORDER — AMIODARONE HYDROCHLORIDE 200 MG/1
200 TABLET ORAL DAILY
Status: DISCONTINUED | OUTPATIENT
Start: 2017-03-19 | End: 2017-03-21 | Stop reason: HOSPADM

## 2017-03-18 RX ORDER — CARVEDILOL 12.5 MG/1
12.5 TABLET ORAL 2 TIMES DAILY WITH MEALS
Status: DISCONTINUED | OUTPATIENT
Start: 2017-03-19 | End: 2017-03-21 | Stop reason: HOSPADM

## 2017-03-18 RX ORDER — METOPROLOL TARTRATE 5 MG/5ML
5 INJECTION INTRAVENOUS ONCE
Status: COMPLETED | OUTPATIENT
Start: 2017-03-18 | End: 2017-03-18

## 2017-03-18 RX ORDER — ZOLPIDEM TARTRATE 5 MG/1
5 TABLET ORAL
Status: DISCONTINUED | OUTPATIENT
Start: 2017-03-18 | End: 2017-03-21 | Stop reason: HOSPADM

## 2017-03-18 RX ORDER — FUROSEMIDE 10 MG/ML
40 INJECTION INTRAMUSCULAR; INTRAVENOUS EVERY 12 HOURS
Status: DISCONTINUED | OUTPATIENT
Start: 2017-03-18 | End: 2017-03-21

## 2017-03-18 RX ADMIN — ASPIRIN 324 MG: 81 TABLET, CHEWABLE ORAL at 19:45

## 2017-03-18 RX ADMIN — FUROSEMIDE 40 MG: 10 INJECTION, SOLUTION INTRAMUSCULAR; INTRAVENOUS at 19:46

## 2017-03-18 RX ADMIN — METOPROLOL TARTRATE 5 MG: 5 INJECTION INTRAVENOUS at 20:56

## 2017-03-18 RX ADMIN — HEPARIN SODIUM 5000 UNITS: 5000 INJECTION, SOLUTION INTRAVENOUS; SUBCUTANEOUS at 23:08

## 2017-03-18 RX ADMIN — FENTANYL CITRATE 25 MCG: 50 INJECTION, SOLUTION INTRAMUSCULAR; INTRAVENOUS at 21:20

## 2017-03-18 RX ADMIN — FUROSEMIDE 40 MG: 10 INJECTION, SOLUTION INTRAMUSCULAR; INTRAVENOUS at 23:08

## 2017-03-18 RX ADMIN — NITROGLYCERIN 1 INCH: 20 OINTMENT TOPICAL at 19:46

## 2017-03-18 NOTE — IP AVS SNAPSHOT
Rigoberto Eldridge 
 
 
 [address] [id number] 
[phone] Patient: Kaveh Malik MRN: [id number] :8/10/1927 You are allergic to the following Allergen Reactions Tylenol (Acetaminophen) Unknown (comments)  
 insomnia Recent Documentation Height Weight Breastfeeding? BMI OB Status Smoking Status 1.626 m 40.3 kg No 15.26 kg/m2 Postmenopausal Never Smoker Unresulted Labs Order Current Status CULTURE, BLOOD Preliminary result CULTURE, BLOOD Preliminary result Emergency Contacts Name Discharge Info Relation Home Work Mobile Javier Aguilar  Daughter [21] [phone] Maria D Ta  Child [2] [phone] About your hospitalization You were admitted on:  2017 You last received care in the:  [facility] 3 CLINICAL OBSERVATION You were discharged on:  2017 Unit phone number:  [phone] Why you were hospitalized Your primary diagnosis was:  Not on File Your diagnoses also included:  Chf (Congestive Heart Failure) (Hcc) Providers Seen During Your Hospitalizations Provider Role Specialty Primary office phone Alcon Allan MD Attending Provider Emergency Medicine [phone] Adolm Cooks, MD Attending Provider Cardiology [phone] Your Primary Care Physician (PCP) Primary Care Physician Office Phone Office Fax Omer Shrestha [phone] Follow-up Information Follow up With Details Comments Contact Info [address], physical therapy [address] [facility] [address] [place] [address] 
[phone] [facility] Cardiology On 2017 Pacemaker check on 17 @ [address] 
[id number] [place] [address] 
[phone] Adele Wolf MD   [address] [place] [address] 
[phone] Your Appointments Wednesday March 29, 2017  9:10 AM EDT  
LAB with [facility] PM LAB [address] [facility]) [address] [address] [phone] Thursday March 30, 2017  4:15 PM EDT HOSPITAL FOLLOW-UP with Vladimir Goss, [facility] Cardiology ([address] [place] [address]) [address]  
[address] 81  
[phone] Wednesday April 05, 2017  2:00 PM EDT Office Visit with Sonia Berry MD  
[address] [facility]) [address] [address] [phone] Thursday April 06, 2017  9:30 AM EDT  
OFFICE DEVICE CHECKS with [facility] Cardiology ([address] [place] [address]) [address]  
[phone] Current Discharge Medication List  
  
CONTINUE these medications which have CHANGED Dose & Instructions Dispensing Information Comments Morning Noon Evening Bedtime  
 furosemide 40 mg tablet Commonly known as:  LASIX What changed:   
- medication strength 
- how much to take - when to take this Your last dose was: Your next dose is:    
   
   
 Dose:  40 mg Take 1 Tab by mouth two (2) times a day. Quantity:  60 Tab Refills:  11  
     
   
   
   
  
 gabapentin 100 mg capsule Commonly known as:  NEURONTIN What changed:  when to take this Your last dose was: Your next dose is:    
   
   
 Dose:  100 mg Take 1 Cap by mouth three (3) times daily. Quantity:  90 Cap Refills:  0 HYDROcodone-acetaminophen 7.5-325 mg per tablet Commonly known as:  Julaine Kava What changed:  when to take this Your last dose was: Your next dose is:    
   
   
 Dose:  1 Tab Take 1 Tab by mouth every six (6) hours as needed. Max Daily Amount: 4 Tabs. Indications: Pain Quantity:  8 Tab Refills:  0 CONTINUE these medications which have NOT CHANGED Dose & Instructions Dispensing Information Comments Morning Noon Evening Bedtime  
 amiodarone 200 mg tablet Commonly known as:  CORDARONE Your last dose was: Your next dose is:    
   
   
 Dose:  200 mg Take 1 Tab by mouth daily. Indications: PREVENTION OF RECURRENT ATRIAL FIBRILLATION Quantity:  90 Tab Refills:  3  
     
   
   
   
  
 aspirin delayed-release 81 mg tablet Your last dose was: Your next dose is: Take  by mouth daily. Refills:  0  
     
   
   
   
  
 calcium carbonate 500 mg calcium (1,250 mg) tablet Commonly known as:  OS-CAN Your last dose was: Your next dose is:    
   
   
 Dose:  1 Tab  
take 1 Tab by mouth two (2) times a day. Refills:  0  
     
   
   
   
  
 carvedilol 12.5 mg tablet Commonly known as:  Layman Winn Your last dose was: Your next dose is:    
   
   
 Dose:  12.5 mg Take 1 Tab by mouth two (2) times daily (with meals). Quantity:  60 Tab Refills:  6  
     
   
   
   
  
 glimepiride 1 mg tablet Commonly known as:  AMARYL Your last dose was: Your next dose is:    
   
   
 Dose:  0.5 mg Take 0.5 mg by mouth Daily (before breakfast). Refills:  0  
     
   
   
   
  
 levothyroxine 25 mcg tablet Commonly known as:  SYNTHROID Your last dose was: Your next dose is:    
   
   
 Dose:  25 mcg Take 1 Tab by mouth Daily (before breakfast). Quantity:  90 Tab Refills:  3  
     
   
   
   
  
 nitroglycerin 0.4 mg SL tablet Commonly known as:  NITROSTAT Your last dose was: Your next dose is:    
   
   
 Dose:  0.4 mg  
1 Tab by SubLINGual route every five (5) minutes as needed. Indications: ANGINA Quantity:  1 Bottle Refills:  3  
     
   
   
   
  
 omeprazole 20 mg capsule Commonly known as:  PRILOSEC Your last dose was: Your next dose is: Dose:  20 mg Take 1 Cap by mouth two (2) times a day. Quantity:  180 Cap Refills:  3  
     
   
   
   
  
 polyethylene glycol 17 gram packet Commonly known as:  ne Verna Your last dose was: Your next dose is:    
   
   
 Dose:  17 g Take 1 Packet by mouth daily. Quantity:  30 Packet Refills:  5  
     
   
   
   
  
 potassium chloride 10 mEq tablet Commonly known as:  K-DUR, KLOR-CON Your last dose was: Your next dose is:    
   
   
 Dose:  10 mEq Take 1 Tab by mouth daily. Quantity:  30 Tab Refills:  3  
     
   
   
   
  
 senna-docusate 8.6-50 mg per tablet Commonly known as:  Gauri Alma Your last dose was: Your next dose is:    
   
   
 Dose:  1 Tab Take 1 Tab by mouth nightly. Quantity:  90 Tab Refills:  3 VITAMIN D3 400 unit Tab tablet Generic drug:  cholecalciferol Your last dose was: Your next dose is:    
   
   
 Dose:  1000 Units Take 1,000 Units by mouth daily. Refills:  0  
     
   
   
   
  
 zolpidem 5 mg tablet Commonly known as:  AMBIEN Your last dose was: Your next dose is:    
   
   
 Dose:  5 mg Take 1 Tab by mouth nightly as needed for Sleep. Max Daily Amount: 5 mg. Quantity:  30 Tab Refills:  1 Where to Get Your Medications Information on where to get these meds will be given to you by the nurse or doctor. ! Ask your nurse or doctor about these medications  
  furosemide 40 mg tablet HYDROcodone-acetaminophen 7.5-325 mg per tablet Discharge Instructions Heart Failure: Care Instructions Your Care Instructions Heart failure occurs when your heart does not pump as much blood as the body needs. Failure does not mean that the heart has stopped pumping but rather that it is not pumping as well as it should.  Over time, this causes fluid buildup in your lungs and other parts of your body. Fluid buildup can cause shortness of breath, fatigue, swollen ankles, and other problems. By taking medicines regularly, reducing sodium (salt) in your diet, checking your weight every day, and making lifestyle changes, you can feel better and live longer. Follow-up care is a key part of your treatment and safety. Be sure to make and go to all appointments, and call your doctor if you are having problems. It's also a good idea to know your test results and keep a list of the medicines you take. How can you care for yourself at home? Medicines · Be safe with medicines. Take your medicines exactly as prescribed. Call your doctor if you think you are having a problem with your medicine. · Do not take any vitamins, over-the-counter medicine, or herbal products without talking to your doctor first. Zetta Hipps not take ibuprofen (Advil or Motrin) and naproxen (Aleve) without talking to your doctor first. They could make your heart failure worse. · You may be taking some of the following medicine. ¨ Beta-blockers can slow heart rate, decrease blood pressure, and improve your condition. Taking a beta-blocker may lower your chance of needing to be hospitalized. ¨ Angiotensin-converting enzyme inhibitors (ACEIs) reduce the heart's workload, lower blood pressure, and reduce swelling. Taking an ACEI may lower your chance of needing to be hospitalized again. ¨ Angiotensin II receptor blockers (ARBs) work like ACEIs. Your doctor may prescribe them instead of ACEIs. ¨ Diuretics, also called water pills, reduce swelling. ¨ Potassium supplements replace this important mineral, which is sometimes lost with diuretics. ¨ Aspirin and other blood thinners prevent blood clots, which can cause a stroke or heart attack. You will get more details on the specific medicines your doctor prescribes. Diet · Your doctor may suggest that you limit sodium to 2,000 milligrams (mg) a day or less. That is less than 1 teaspoon of salt a day, including all the salt you eat in cooking or in packaged foods. People get most of their sodium from processed foods. Fast food and restaurant meals also tend to be very high in sodium. · Ask your doctor how much liquid you can drink each day. You may have to limit liquids. Weight · Weigh yourself without clothing at the same time each day. Record your weight. Call your doctor if you gain more than 3 pounds in 2 to 3 days. A sudden weight gain may mean that your heart failure is getting worse. Activity level · Start light exercise (if your doctor says it is okay). Even if you can only do a small amount, exercise will help you get stronger, have more energy, and manage your weight and your stress. Walking is an easy way to get exercise. Start out by walking a little more than you did before. Bit by bit, increase the amount you walk. · When you exercise, watch for signs that your heart is working too hard. You are pushing yourself too hard if you cannot talk while you are exercising. If you become short of breath or dizzy or have chest pain, stop, sit down, and rest. 
· If you feel \"wiped out\" the day after you exercise, walk slower or for a shorter distance until you can work up to a better pace. · Get enough rest at night. Sleeping with 1 or 2 pillows under your upper body and head may help you breathe easier. Lifestyle changes · Do not smoke. Smoking can make a heart condition worse. If you need help quitting, talk to your doctor about stop-smoking programs and medicines. These can increase your chances of quitting for good. Quitting smoking may be the most important step you can take to protect your heart. · Limit alcohol to 2 drinks a day for men and 1 drink a day for women. Too much alcohol can cause health problems. · Avoid getting sick from colds and the flu.  Get a pneumococcal vaccine shot. If you have had one before, ask your doctor whether you need another dose. Get a flu shot each year. If you must be around people with colds or the flu, wash your hands often. When should you call for help? Call 911 if you have symptoms of sudden heart failure such as: 
· You have severe trouble breathing. · You cough up pink, foamy mucus. · You have a new irregular or rapid heartbeat. Call your doctor now or seek immediate medical care if: 
· You have new or increased shortness of breath. · You are dizzy or lightheaded, or you feel like you may faint. · You have sudden weight gain, such as 3 pounds or more in 2 to 3 days. · You have increased swelling in your legs, ankles, or feet. · You are suddenly so tired or weak that you cannot do your usual activities. Watch closely for changes in your health, and be sure to contact your doctor if: 
· You develop new symptoms. Where can you learn more? Go to http://david-rui.info/. Enter Z757 in the search box to learn more about \"Heart Failure: Care Instructions. \" Current as of: January 27, 2016 Content Version: 11.1 © 1038-6646 [employer]. Care instructions adapted under license by [employer] (which disclaims liability or warranty for this information). If you have questions about a medical condition or this instruction, always ask your healthcare professional. [place] [address] any warranty or liability for your use of this information. Discharge Orders None ACO Transitions of Care Introducing [employer] [address] Diann Reyes offers a voluntary care coordination program to provide high quality service and care to [facility] fee-for-service beneficiaries. Thi Rose was designed to help you enhance your health and well-being through the following services: ? Transitions of Care  support for individuals who are transitioning from one care setting to another (example: Hospital to home). ? Chronic and Complex Care Coordination  support for individuals and caregivers of those with serious or chronic illnesses or with more than one chronic (ongoing) condition and those who take a number of different medications. If you meet specific medical criteria, a Critical access hospital[address] may call you directly to coordinate your care with your primary care physician and your other care providers. For questions about the [facility] programs, please, contact your physicians office. For general questions or additional information about Accountable Care Organizations: 
Please visit www.medicare.gov/acos. html or call 1-800-MEDICARE (5-554.195.8459) TTY users should call 9-972.657.8621. [employer] Announcement We are excited to announce that we are making your provider's discharge notes available to you in Clinverse. You will see these notes when they are completed and signed by the physician that discharged you from your recent hospital stay. If you have any questions or concerns about any information you see in Clinverse, please call the Health Information Department where you were seen or reach out to your Primary Care Provider for more information about your plan of care. Introducing [facility]! Rosita Marsh introduces Clinverse patient portal. Now you can access parts of your medical record, email your doctor's office, and request medication refills online. 1. In your internet browser, go to [email or web address] 2. Click on the First Time User? Click Here link in the Sign In box. You will see the New Member Sign Up page. 3. Enter your Clinverse Access Code exactly as it appears below. You will not need to use this code after youve completed the sign-up process.  If you do not sign up before the expiration date, you must request a new code. · ShapeUp Access Code: [facility] Expires: 5/25/2017 10:40 AM 
 
4. Enter the last four digits of your Social Security Number (xxxx) and Date of Birth (mm/dd/yyyy) as indicated and click Submit. You will be taken to the next sign-up page. 5. Create a ShapeUp ID. This will be your ShapeUp login ID and cannot be changed, so think of one that is secure and easy to remember. 6. Create a ShapeUp password. You can change your password at any time. 7. Enter your Password Reset Question and Answer. This can be used at a later time if you forget your password. 8. Enter your e-mail address. You will receive e-mail notification when new information is available in [address] 9. Click Sign Up. You can now view and download portions of your medical record. 10. Click the Download Summary menu link to download a portable copy of your medical information. If you have questions, please visit the Frequently Asked Questions section of the ShapeUp website. Remember, [employer]Up is NOT to be used for urgent needs. For medical emergencies, dial 911. Now available from your iPhone and Android! General Information Please provide this summary of care documentation to your next provider. Patient Signature:  ____________________________________________________________ Date:  ____________________________________________________________  
  
Luisroque Queen Provider Signature:  ____________________________________________________________ Date:  ____________________________________________________________

## 2017-03-18 NOTE — IP AVS SNAPSHOT
Current Discharge Medication List  
  
CONTINUE these medications which have CHANGED Dose & Instructions Dispensing Information Comments Morning Noon Evening Bedtime  
 furosemide 40 mg tablet Commonly known as:  LASIX What changed:   
- medication strength 
- how much to take - when to take this Your last dose was: Your next dose is:    
   
   
 Dose:  40 mg Take 1 Tab by mouth two (2) times a day. Quantity:  60 Tab Refills:  11  
     
   
   
   
  
 gabapentin 100 mg capsule Commonly known as:  NEURONTIN What changed:  when to take this Your last dose was: Your next dose is:    
   
   
 Dose:  100 mg Take 1 Cap by mouth three (3) times daily. Quantity:  90 Cap Refills:  0 HYDROcodone-acetaminophen 7.5-325 mg per tablet Commonly known as:  Adi Ill What changed:  when to take this Your last dose was: Your next dose is:    
   
   
 Dose:  1 Tab Take 1 Tab by mouth every six (6) hours as needed. Max Daily Amount: 4 Tabs. Indications: Pain Quantity:  8 Tab Refills:  0 CONTINUE these medications which have NOT CHANGED Dose & Instructions Dispensing Information Comments Morning Noon Evening Bedtime  
 amiodarone 200 mg tablet Commonly known as:  CORDARONE Your last dose was: Your next dose is:    
   
   
 Dose:  200 mg Take 1 Tab by mouth daily. Indications: PREVENTION OF RECURRENT ATRIAL FIBRILLATION Quantity:  90 Tab Refills:  3  
     
   
   
   
  
 aspirin delayed-release 81 mg tablet Your last dose was: Your next dose is: Take  by mouth daily. Refills:  0  
     
   
   
   
  
 calcium carbonate 500 mg calcium (1,250 mg) tablet Commonly known as:  OS-CAN Your last dose was: Your next dose is:    
   
   
 Dose:  1 Tab  
take 1 Tab by mouth two (2) times a day. Refills:  0 carvedilol 12.5 mg tablet Commonly known as:  Theo Salazar Your last dose was: Your next dose is:    
   
   
 Dose:  12.5 mg Take 1 Tab by mouth two (2) times daily (with meals). Quantity:  60 Tab Refills:  6  
     
   
   
   
  
 glimepiride 1 mg tablet Commonly known as:  AMARYL Your last dose was: Your next dose is:    
   
   
 Dose:  0.5 mg Take 0.5 mg by mouth Daily (before breakfast). Refills:  0  
     
   
   
   
  
 levothyroxine 25 mcg tablet Commonly known as:  SYNTHROID Your last dose was: Your next dose is:    
   
   
 Dose:  25 mcg Take 1 Tab by mouth Daily (before breakfast). Quantity:  90 Tab Refills:  3  
     
   
   
   
  
 nitroglycerin 0.4 mg SL tablet Commonly known as:  NITROSTAT Your last dose was: Your next dose is:    
   
   
 Dose:  0.4 mg  
1 Tab by SubLINGual route every five (5) minutes as needed. Indications: ANGINA Quantity:  1 Bottle Refills:  3  
     
   
   
   
  
 omeprazole 20 mg capsule Commonly known as:  PRILOSEC Your last dose was: Your next dose is:    
   
   
 Dose:  20 mg Take 1 Cap by mouth two (2) times a day. Quantity:  180 Cap Refills:  3  
     
   
   
   
  
 polyethylene glycol 17 gram packet Commonly known as:  Janee Fail Your last dose was: Your next dose is:    
   
   
 Dose:  17 g Take 1 Packet by mouth daily. Quantity:  30 Packet Refills:  5  
     
   
   
   
  
 potassium chloride 10 mEq tablet Commonly known as:  K-DUR, KLOR-CON Your last dose was: Your next dose is:    
   
   
 Dose:  10 mEq Take 1 Tab by mouth daily. Quantity:  30 Tab Refills:  3  
     
   
   
   
  
 senna-docusate 8.6-50 mg per tablet Commonly known as:  Sam Garcia Your last dose was: Your next dose is:    
   
   
 Dose:  1 Tab Take 1 Tab by mouth nightly. Quantity:  90 Tab Refills:  3 VITAMIN D3 400 unit Tab tablet Generic drug:  cholecalciferol Your last dose was: Your next dose is:    
   
   
 Dose:  1000 Units Take 1,000 Units by mouth daily. Refills:  0  
     
   
   
   
  
 zolpidem 5 mg tablet Commonly known as:  AMBIEN Your last dose was: Your next dose is:    
   
   
 Dose:  5 mg Take 1 Tab by mouth nightly as needed for Sleep. Max Daily Amount: 5 mg. Quantity:  30 Tab Refills:  1 Where to Get Your Medications Information on where to get these meds will be given to you by the nurse or doctor. ! Ask your nurse or doctor about these medications  
  furosemide 40 mg tablet HYDROcodone-acetaminophen 7.5-325 mg per tablet

## 2017-03-18 NOTE — ED TRIAGE NOTES
Pt. Complains of shortness of breath starting last night. Pt. States she had an ablation done on Wednesday.

## 2017-03-19 LAB
ANION GAP BLD CALC-SCNC: 11 MMOL/L (ref 7–16)
ATRIAL RATE: 70 BPM
BUN SERPL-MCNC: 23 MG/DL (ref 8–23)
CALCIUM SERPL-MCNC: 8.4 MG/DL (ref 8.3–10.4)
CALCULATED P AXIS, ECG09: NORMAL DEGREES
CALCULATED R AXIS, ECG10: -79 DEGREES
CALCULATED T AXIS, ECG11: 88 DEGREES
CHLORIDE SERPL-SCNC: 98 MMOL/L (ref 98–107)
CO2 SERPL-SCNC: 30 MMOL/L (ref 21–32)
CREAT SERPL-MCNC: 1.57 MG/DL (ref 0.6–1)
DIAGNOSIS, 93000: NORMAL
DIASTOLIC BP, ECG02: NORMAL MMHG
EST. AVERAGE GLUCOSE BLD GHB EST-MCNC: 154 MG/DL
GLUCOSE BLD STRIP.AUTO-MCNC: 102 MG/DL (ref 65–100)
GLUCOSE BLD STRIP.AUTO-MCNC: 106 MG/DL (ref 65–100)
GLUCOSE BLD STRIP.AUTO-MCNC: 168 MG/DL (ref 65–100)
GLUCOSE BLD STRIP.AUTO-MCNC: 192 MG/DL (ref 65–100)
GLUCOSE SERPL-MCNC: 129 MG/DL (ref 65–100)
HBA1C MFR BLD: 7 % (ref 4.8–6)
P-R INTERVAL, ECG05: NORMAL MS
POTASSIUM SERPL-SCNC: 3.7 MMOL/L (ref 3.5–5.1)
Q-T INTERVAL, ECG07: 508 MS
QRS DURATION, ECG06: 180 MS
QTC CALCULATION (BEZET), ECG08: 571 MS
SODIUM SERPL-SCNC: 139 MMOL/L (ref 136–145)
SYSTOLIC BP, ECG01: NORMAL MMHG
VENTRICULAR RATE, ECG03: 76 BPM

## 2017-03-19 PROCEDURE — 74011250637 HC RX REV CODE- 250/637: Performed by: INTERNAL MEDICINE

## 2017-03-19 PROCEDURE — 80048 BASIC METABOLIC PNL TOTAL CA: CPT | Performed by: INTERNAL MEDICINE

## 2017-03-19 PROCEDURE — 74011250636 HC RX REV CODE- 250/636: Performed by: INTERNAL MEDICINE

## 2017-03-19 PROCEDURE — 99218 HC RM OBSERVATION: CPT

## 2017-03-19 PROCEDURE — 82962 GLUCOSE BLOOD TEST: CPT

## 2017-03-19 PROCEDURE — 36415 COLL VENOUS BLD VENIPUNCTURE: CPT | Performed by: INTERNAL MEDICINE

## 2017-03-19 PROCEDURE — 83036 HEMOGLOBIN GLYCOSYLATED A1C: CPT | Performed by: INTERNAL MEDICINE

## 2017-03-19 RX ORDER — GABAPENTIN 100 MG/1
100 CAPSULE ORAL 3 TIMES DAILY
Status: DISCONTINUED | OUTPATIENT
Start: 2017-03-20 | End: 2017-03-20

## 2017-03-19 RX ORDER — GABAPENTIN 100 MG/1
100 CAPSULE ORAL 3 TIMES DAILY
Status: DISCONTINUED | OUTPATIENT
Start: 2017-03-20 | End: 2017-03-19

## 2017-03-19 RX ADMIN — HEPARIN SODIUM 5000 UNITS: 5000 INJECTION, SOLUTION INTRAVENOUS; SUBCUTANEOUS at 06:00

## 2017-03-19 RX ADMIN — INSULIN LISPRO 3 UNITS: 100 INJECTION, SOLUTION INTRAVENOUS; SUBCUTANEOUS at 12:14

## 2017-03-19 RX ADMIN — LISINOPRIL 10 MG: 5 TABLET ORAL at 09:40

## 2017-03-19 RX ADMIN — INSULIN LISPRO 3 UNITS: 100 INJECTION, SOLUTION INTRAVENOUS; SUBCUTANEOUS at 17:19

## 2017-03-19 RX ADMIN — HEPARIN SODIUM 5000 UNITS: 5000 INJECTION, SOLUTION INTRAVENOUS; SUBCUTANEOUS at 15:01

## 2017-03-19 RX ADMIN — GABAPENTIN 100 MG: 100 CAPSULE ORAL at 23:27

## 2017-03-19 RX ADMIN — PANTOPRAZOLE SODIUM 40 MG: 40 TABLET, DELAYED RELEASE ORAL at 06:01

## 2017-03-19 RX ADMIN — FUROSEMIDE 40 MG: 10 INJECTION, SOLUTION INTRAMUSCULAR; INTRAVENOUS at 09:34

## 2017-03-19 RX ADMIN — CARVEDILOL 12.5 MG: 12.5 TABLET, FILM COATED ORAL at 17:16

## 2017-03-19 RX ADMIN — FUROSEMIDE 40 MG: 10 INJECTION, SOLUTION INTRAMUSCULAR; INTRAVENOUS at 17:16

## 2017-03-19 RX ADMIN — HYDROCODONE BITARTRATE AND ACETAMINOPHEN 1 TABLET: 7.5; 325 TABLET ORAL at 23:29

## 2017-03-19 RX ADMIN — AMIODARONE HYDROCHLORIDE 200 MG: 200 TABLET ORAL at 09:35

## 2017-03-19 RX ADMIN — LEVOTHYROXINE SODIUM 25 MCG: 50 TABLET ORAL at 06:01

## 2017-03-19 RX ADMIN — HEPARIN SODIUM 5000 UNITS: 5000 INJECTION, SOLUTION INTRAVENOUS; SUBCUTANEOUS at 21:32

## 2017-03-19 RX ADMIN — ASPIRIN 81 MG: 81 TABLET, COATED ORAL at 09:35

## 2017-03-19 RX ADMIN — CARVEDILOL 12.5 MG: 12.5 TABLET, FILM COATED ORAL at 09:35

## 2017-03-19 NOTE — PROGRESS NOTES
TRANSFER - IN REPORT:    [facility] being received from ED for routine progression of care      Report consisted of patients Situation, Background, Assessment and Recommendations(SBAR). Information from the following report(s) SBAR, Kardex, ED Summary, Intake/Output, MAR, Recent Results and Cardiac Rhythm paced was reviewed with the receiving nurse. Opportunity for questions and clarification was provided. Assessment completed upon patients arrival to unit and care assumed. Dual skin assessment complete. Sacrum and bilateral heels visualized. Patient has no skin abnormalities other than a couple of scars on shins. Yellow falls socks applied, bed alarm activated, falls magnet on outside of door. Patient has had falls at home. Instructed patient to call for any assistance. Patient's daughter at bedside. Call light within reach.

## 2017-03-19 NOTE — PROGRESS NOTES
Patient was very alert on admission now she is extremely drowsy but arouses when her name is called and goes right back to sleep. Patient O2 sats 100% on 3 L O2. P[place]y bed alarm keeps alarming due to patient's light weight. Will switch the bed to a bed with a built in alarm. Will closely monitor. Family at bedside.

## 2017-03-19 NOTE — PROGRESS NOTES
Bedside and Verbal shift change report received from [address]. Report included the following information SBAR, Kardex, Intake/Output, MAR, Recent Results and Cardiac Rhythm paced.

## 2017-03-19 NOTE — ED PROVIDER NOTES
HPI Comments: Presents complaint of shortness of breath for the past few hours. Patient reports right-sided chest pain intermittently and bilateral lower extremity pain but no swelling. Patient is not on home oxygen. She denies any cough. She denies any fever. She has a history of heart failure and forgot to take her Lasix this morning but her daughter gave it to her this afternoon. She recently had a ablation for atrial fibrillation. Patient is a 80 y.o. female presenting with shortness of breath. The history is provided by the patient and a relative. Shortness of Breath   This is a new problem. The problem occurs continuously. The current episode started 6 to 12 hours ago. The problem has been rapidly worsening. Associated symptoms include chest pain (R sided) and leg pain (B since discharge). Pertinent negatives include no fever, no cough, no sputum production, no wheezing and no leg swelling. She has had prior hospitalizations. She has had prior ED visits. She has had prior ICU admissions. Associated medical issues include CAD, heart failure and past MI.         Past Medical History:   Diagnosis Date    A fib     Abnormal loss of weight     Acquired hypothyroidism 9/12/2012    Anemia     Anorexia 9/2/2014    Arthritis associated with another disorder     Atrial fibrillation ([address] 9/12/2012    Paroxysmal.  Coumadin contraindicated due to falls      Autonomic neuropathy 1/13/2013    CAD (coronary artery disease)     CAD in native artery 5/5/2016    Cervical radiculopathy 5/23/2013    Chest pain     Chronic pain syndrome 1/13/2013    Back, right shoulder, neck: Peripheral neuropathy, spinal stenosis C7-T1, foraminal narrowing C4-5     CKD (chronic kidney disease), stage III 9/12/2012    Diabetes ([address]     about 30 years    Diabetes mellitus     Diabetes mellitus, type 2 ([address] 9/12/2012    Dysphagia 9/2/2014    Due to esophageal spasm seen on modified barium swallow 2015     Gait disorder 9/12/2012    GERD (gastroesophageal reflux disease)     Heart failure ([address]     HTN     Hypertension 9/12/2012    Orthostatic hypotension due to autonomic neuropathy     Hypothyroid     IBS (irritable bowel syndrome) 9/12/2012    Iron deficiency 9/12/2012    Lumbar disc disease     MCI (mild cognitive impairment) 1/13/2013    Mixed hyperlipidemia 9/12/2012    Orthostatic hypotension 5/5/2016    Pacemaker     SSS (sick sinus syndrome) ([address] 5/5/2016       Past Surgical History:   Procedure Laterality Date    HX APPENDECTOMY      HX CARPAL TUNNEL RELEASE      HX CERVICAL FUSION      HX COLONOSCOPY  Nov 2005    HX PACEMAKER  6/08         No family history on file. Social History     Social History    Marital status:      Spouse name: N/A    Number of children: N/A    Years of education: N/A     Occupational History    Not on file. Social History Main Topics    Smoking status: Never Smoker    Smokeless tobacco: Never Used    Alcohol use No    Drug use: No    Sexual activity: Not on file     Other Topics Concern    Not on file     Social History Narrative    Lives with  who has mild vascular dementia, patient is primary caretaker but daughters come over to house 4-5 times a week, Kell Martinez has HCPOA         ALLERGIES: Tylenol [acetaminophen]    Review of Systems   Constitutional: Negative for chills and fever. Respiratory: Positive for shortness of breath. Negative for cough, sputum production and wheezing. Cardiovascular: Positive for chest pain (R sided). Negative for leg swelling. All other systems reviewed and are negative. Vitals:    03/18/17 2056 03/18/17 2100 03/18/17 2110 03/18/17 2120   BP: (!) 169/98 195/85 155/72 179/79   Pulse: 75  75 75   Resp:   17 23   Temp:    98 °F (36.7 °C)   SpO2:   95% 96%   Weight:       Height:                Physical Exam   Constitutional: She appears well-developed. She appears cachectic. She appears distressed. HENT:   Head: Normocephalic and atraumatic. Neck: Normal range of motion. Neck supple. Cardiovascular: Normal rate and regular rhythm. Pulmonary/Chest: She is in respiratory distress. She has rales. Abdominal: Soft. She exhibits no distension. There is no tenderness. There is no rebound and no guarding. Musculoskeletal: Normal range of motion. She exhibits no edema. No calf tenderness     Neurological: She is alert. No cranial nerve deficit. Skin: Skin is warm and dry. No rash noted. She is not diaphoretic. No erythema. Psychiatric: She has a normal mood and affect. Her behavior is normal.   Nursing note and vitals reviewed. MDM  Number of Diagnoses or Management Options  Acute systolic congestive heart failure ([address]): Hypertension, uncontrolled:   Hypoxia:   Diagnosis management comments: Patient with rhonchi and moderate respiratory distress requiring O2. She was given nitro paste and Lasix. I discussed her case with Dr. José Zaldivar and he recommended we give her labetalol which we are out of position was given a dose of Lopressor. She had 300 urine output. I did a bedside ultrasound of her heart and saw no pericardial effusion. She does not have pneumonia and no signs of sepsis. Amount and/or Complexity of Data Reviewed  Clinical lab tests: ordered and reviewed  Decide to obtain previous medical records or to obtain history from someone other than the patient: yes (Dtr)  Review and summarize past medical records: yes (AV node ablation)  Discuss the patient with other providers: yes  Independent visualization of images, tracings, or specimens: yes (Paced  )    Risk of Complications, Morbidity, and/or Mortality  Presenting problems: high  Diagnostic procedures: moderate  Management options: high    Patient Progress  Patient progress: stable    ED Course       Bedside US  Date/Time: 3/18/2017 9:56 PM  Consent: Verbal consent obtained.   Consent given by: patient  Procedure Type: Cardiac  Indication: recent AV node ablation.   Images Obtained: Subxyphoid  Findings: No pericardial fluid  Confirmation Study: Not Indicated

## 2017-03-19 NOTE — ED NOTES
TRANSFER - OUT REPORT:    Verbal report given to SALVATORE Still (name) on Budd Dye  being transferred to room 336(unit) for routine progression of care       Report consisted of patients Situation, Background, Assessment and   Recommendations(SBAR). Information from the following report(s) SBAR, ED Summary, Intake/Output and MAR was reviewed with the receiving nurse. Lines:   Peripheral IV 03/18/17 Left Forearm (Active)   Site Assessment Clean, dry, & intact 3/18/2017  8:57 PM   Phlebitis Assessment 0 3/18/2017  8:57 PM   Infiltration Assessment 0 3/18/2017  8:57 PM   Dressing Status Clean, dry, & intact 3/18/2017  8:57 PM        Opportunity for questions and clarification was provided.       Patient transported with:   Monitor  O2 @ 3 liters  Registered Nurse

## 2017-03-19 NOTE — PROGRESS NOTES
[facility] CARDIOLOGY PROGRESS NOTE           3/19/2017 7:19 AM    Admit Date: 3/18/2017      Subjective:   Breathing better overnight patient laying flat sleeping comfortably  ROS:  Cardiovascular:  As noted above    Objective:      Vitals:    03/18/17 2205 03/18/17 2336 03/19/17 0051 03/19/17 0453   BP: 157/69  145/76 113/64   Pulse: 99  74 77   Resp: 20 18 18   Temp: 96.7 °F (35.9 °C)  97.1 °F (36.2 °C) 97.7 °F (36.5 °C)   SpO2: 98% 100% 100% 100%   Weight: 43.9 kg (96 lb 11.2 oz)   44.1 kg (97 lb 3.2 oz)   Height: 5' 4\" (1.626 m)        Current Facility-Administered Medications   Medication Dose Route Frequency    amiodarone (CORDARONE) tablet 200 mg  200 mg Oral DAILY    aspirin delayed-release tablet 81 mg  81 mg Oral DAILY    carvedilol (COREG) tablet 12.5 mg  12.5 mg Oral BID WITH MEALS    glimepiride (AMARYL) tablet 0.5 mg  0.5 mg Oral ACB    HYDROcodone-acetaminophen (NORCO) 7.5-325 mg per tablet 1 Tab  1 Tab Oral Q6H PRN    levothyroxine (SYNTHROID) tablet 25 mcg  25 mcg Oral ACB    pantoprazole (PROTONIX) tablet 40 mg  40 mg Oral ACB    polyethylene glycol (MIRALAX) packet 17 g  17 g Oral DAILY    zolpidem (AMBIEN) tablet 5 mg  5 mg Oral QHS PRN    heparin (porcine) injection 5,000 Units  5,000 Units SubCUTAneous Q8H    lisinopril (PRINIVIL, ZESTRIL) tablet 10 mg  10 mg Oral DAILY    furosemide (LASIX) injection 40 mg  40 mg IntraVENous Q12H    insulin lispro (HUMALOG) injection   SubCUTAneous AC&HS         Physical Exam:  General-No Acute Distress  Neck- supple, no JVD  CV- regular rate and rhythm no MRG  Lung- clear bilaterally  Abd- soft, nontender, nondistended  Ext- no edema bilaterally.   Skin- warm and dry    Data Review:   Recent Labs      03/19/17   0420  03/18/17   1856   NA  139  133*   K  3.7  4.4   BUN  23  22   CREA  1.57*  1.65*   GLU  129*  182*   WBC   --   9.6   HGB   --   10.2*   HCT   --   30.4*   PLT   --   300   TROIQ   --   0.12*     Lab Results Component Value Date/Time    Glucose 129 03/19/2017 04:20 AM    Glucose (POC) 106 03/19/2017 06:30 AM         Assessment/Plan:     Active Problems:    CHF (congestive heart failure) ([facility] (3/18/2017) symptoms improving creatinine improving with IV diuresis continue current therapies potassium stable.   hypertensive urgency uncontrolled still elevated blood pressures goal reduction 20% to 24 hours patient has appropriate blood pressure at this time avoiding aggressive blood pressure reduction. Atrial fibrillation status post AV node ablation not candidate for anticoagulation due to falls    Diabetes continue sliding scale insulin.     Chito Burch MD  3/19/2017 7:19 AM

## 2017-03-19 NOTE — H&P
[address]   HISTORY AND PHYSICAL       Name:  Radha Garcia   MR#:  [id number]   :  08/10/1927   Account #:  [de-identified]   Date of Adm:  2017       ADMITTING DIAGNOSES: Congestive heart failure, decompensated. HISTORY OF PRESENT ILLNESS: This is an 42-year-old female with   history of New York Heart Association class III-IV   cardiomyopathy with a history of atrial fibrillation. She   underwent recent AV node ablation due to recurrent atrial   fibrillation with rapid ventricular response. She has known   cardiomyopathy, EF 30% to 35%. The patient presented to [facility] Emergency Department with episodes of shortness of   breath. The patient noticed the shortness of breath beginning   approximately 2017. Denied weight gain, abdominal   swelling. The patient currently lives with her  who is   bedridden as well. They are checked on a regular basis by family   members. During the hospital admission, she was accompanied by her   adult daughter who was with her at that time. REVIEW OF SYSTEMS   CONSTITUTIONAL: No fever, chills, weight loss. SKIN: No rashes or itching. HEENT: No headaches, hearing loss, tinnitus. EYES: No blurred vision, photophobia, eye redness. CARDIOVASCULAR: No chest pain, palpitations, orthopnea. Positive   for minimal leg swelling. RESPIRATORY: Shortness of breath. Denies cough or sputum   production. GASTROINTESTINAL: No nausea, vomiting, abdominal pain. GENITOURINARY: No dysuria or urinary frequency. MUSCULOSKELETAL: No myalgias or neck pain. NEUROLOGIC: No dizziness, tingling, tremors or sensory change. PSYCHIATRIC: No depression, suicidal ideation, substance abuse,       PAST MEDICAL HISTORY   1. Coronary artery disease. 2. Arthritis. 3. Atrial fibrillation. 4. Chest discomfort. 5. Chronic pain. 6. Diabetes. 7. Hypothyroidism. 8. Chronic kidney disease. PAST SURGICAL HISTORY    1. Appendectomy.    2. Carpal tunnel release. 3. Cervical fusion. 4. Colonoscopy. 5. Pacemaker placement. FAMILY HISTORY: Noncontributory. Denies history of sudden   cardiac death. SOCIAL HISTORY: The patient is a nonsmoker. PHYSICAL EXAMINATION   HEAD, EYES, EARS, NOSE, AND THROAT: Head normocephalic,   atraumatic. Eyes: Extraocular muscles intact. Nares patent, no   discharge clear, no exudate. CARDIAC: Regular rate and rhythm. CHEST: Coarse expiratory wheezing throughout. ABDOMEN: Soft, nontender, nondistended. Bowel sounds present. EXTREMITIES: Warm. 5/5 strength in distal bilateral extremities,   2+ pulses distally. NEUROLOGIC: Awake, alert to person, place, time, and event. Diminished hearing. ASSESSMENT    1. New York Heart Association Class III-IV cardiomyopathy. Last   ejection fraction 30% to 35%. BNP elevated. Findings of volume   overload on exam. The patient will be initiated on IV diuretic. She will be continued on heart failure medication. Of note, the   patient was not on ACE inhibitor or ARB and I could not find   documentation as to the reason for this. The patient was placed   on lisinopril therapy. 2. Hypertensive urgency. Elevated blood in the emergency   department, treated with intravenous metoprolol. Blood pressure   improved. Will continue heart failure therapy. Most likely   etiology is respiratory distress from pulmonary edema   contributing to increased sympathetic output and elevated blood   pressures. They have improved with symptom improvement of   cardiomyopathy. 3. Diabetes. Sliding scale insulin initiated. The patient not on   home medications. 4. Atrial fibrillation, profoundly elevated CHADS-VASc score. Rate   control with CRT and AV node ablation. The patient is not a   candidate for anticoagulation, which is documented on multiple   notes due to history of fall.          MD Shandra Henderson   D:  03/18/2017   22:37   T:  03/18/2017   23:03   Job #: [id number]

## 2017-03-19 NOTE — PROGRESS NOTES
Dr Garsia Duemolly on telemetry unit and requested me to place an admit order to the observation unit for this patient. VO read back and placed in connect care. ER Charge notified that bed order was placed.

## 2017-03-19 NOTE — DISCHARGE INSTRUCTIONS
Heart Failure: Care Instructions  Your Care Instructions    Heart failure occurs when your heart does not pump as much blood as the body needs. Failure does not mean that the heart has stopped pumping but rather that it is not pumping as well as it should. Over time, this causes fluid buildup in your lungs and other parts of your body. Fluid buildup can cause shortness of breath, fatigue, swollen ankles, and other problems. By taking medicines regularly, reducing sodium (salt) in your diet, checking your weight every day, and making lifestyle changes, you can feel better and live longer. Follow-up care is a key part of your treatment and safety. Be sure to make and go to all appointments, and call your doctor if you are having problems. It's also a good idea to know your test results and keep a list of the medicines you take. How can you care for yourself at home? Medicines  · Be safe with medicines. Take your medicines exactly as prescribed. Call your doctor if you think you are having a problem with your medicine. · Do not take any vitamins, over-the-counter medicine, or herbal products without talking to your doctor first. Jean Pierre Palmero not take ibuprofen (Advil or Motrin) and naproxen (Aleve) without talking to your doctor first. They could make your heart failure worse. · You may be taking some of the following medicine. ¨ Beta-blockers can slow heart rate, decrease blood pressure, and improve your condition. Taking a beta-blocker may lower your chance of needing to be hospitalized. ¨ Angiotensin-converting enzyme inhibitors (ACEIs) reduce the heart's workload, lower blood pressure, and reduce swelling. Taking an ACEI may lower your chance of needing to be hospitalized again. ¨ Angiotensin II receptor blockers (ARBs) work like ACEIs. Your doctor may prescribe them instead of ACEIs. ¨ Diuretics, also called water pills, reduce swelling.   ¨ Potassium supplements replace this important mineral, which is sometimes lost with diuretics. ¨ Aspirin and other blood thinners prevent blood clots, which can cause a stroke or heart attack. You will get more details on the specific medicines your doctor prescribes. Diet  · Your doctor may suggest that you limit sodium to 2,000 milligrams (mg) a day or less. That is less than 1 teaspoon of salt a day, including all the salt you eat in cooking or in packaged foods. People get most of their sodium from processed foods. Fast food and restaurant meals also tend to be very high in sodium. · Ask your doctor how much liquid you can drink each day. You may have to limit liquids. Weight  · Weigh yourself without clothing at the same time each day. Record your weight. Call your doctor if you gain more than 3 pounds in 2 to 3 days. A sudden weight gain may mean that your heart failure is getting worse. Activity level  · Start light exercise (if your doctor says it is okay). Even if you can only do a small amount, exercise will help you get stronger, have more energy, and manage your weight and your stress. Walking is an easy way to get exercise. Start out by walking a little more than you did before. Bit by bit, increase the amount you walk. · When you exercise, watch for signs that your heart is working too hard. You are pushing yourself too hard if you cannot talk while you are exercising. If you become short of breath or dizzy or have chest pain, stop, sit down, and rest.  · If you feel \"wiped out\" the day after you exercise, walk slower or for a shorter distance until you can work up to a better pace. · Get enough rest at night. Sleeping with 1 or 2 pillows under your upper body and head may help you breathe easier. Lifestyle changes  · Do not smoke. Smoking can make a heart condition worse. If you need help quitting, talk to your doctor about stop-smoking programs and medicines. These can increase your chances of quitting for good.  Quitting smoking may be the most important step you can take to protect your heart. · Limit alcohol to 2 drinks a day for men and 1 drink a day for women. Too much alcohol can cause health problems. · Avoid getting sick from colds and the flu. Get a pneumococcal vaccine shot. If you have had one before, ask your doctor whether you need another dose. Get a flu shot each year. If you must be around people with colds or the flu, wash your hands often. When should you call for help? Call 911 if you have symptoms of sudden heart failure such as:  · You have severe trouble breathing. · You cough up pink, foamy mucus. · You have a new irregular or rapid heartbeat. Call your doctor now or seek immediate medical care if:  · You have new or increased shortness of breath. · You are dizzy or lightheaded, or you feel like you may faint. · You have sudden weight gain, such as 3 pounds or more in 2 to 3 days. · You have increased swelling in your legs, ankles, or feet. · You are suddenly so tired or weak that you cannot do your usual activities. Watch closely for changes in your health, and be sure to contact your doctor if:  · You develop new symptoms. Where can you learn more? Go to http://david-rui.info/. Enter S593 in the search box to learn more about \"Heart Failure: Care Instructions. \"  Current as of: January 27, 2016  Content Version: 11.1  © 4565-9406 [employer]. Care instructions adapted under license by [employer] (which disclaims liability or warranty for this information). If you have questions about a medical condition or this instruction, always ask your healthcare professional. [address] any warranty or liability for your use of this information.

## 2017-03-20 ENCOUNTER — APPOINTMENT (OUTPATIENT)
Dept: GENERAL RADIOLOGY | Age: 82
DRG: 242 | End: 2017-03-20
Attending: INTERNAL MEDICINE
Payer: MEDICARE

## 2017-03-20 LAB
ANION GAP BLD CALC-SCNC: 10 MMOL/L (ref 7–16)
BUN SERPL-MCNC: 27 MG/DL (ref 8–23)
CALCIUM SERPL-MCNC: 8 MG/DL (ref 8.3–10.4)
CHLORIDE SERPL-SCNC: 95 MMOL/L (ref 98–107)
CO2 SERPL-SCNC: 32 MMOL/L (ref 21–32)
CREAT SERPL-MCNC: 1.7 MG/DL (ref 0.6–1)
GLUCOSE BLD STRIP.AUTO-MCNC: 101 MG/DL (ref 65–100)
GLUCOSE BLD STRIP.AUTO-MCNC: 153 MG/DL (ref 65–100)
GLUCOSE BLD STRIP.AUTO-MCNC: 167 MG/DL (ref 65–100)
GLUCOSE BLD STRIP.AUTO-MCNC: 182 MG/DL (ref 65–100)
GLUCOSE SERPL-MCNC: 85 MG/DL (ref 65–100)
POTASSIUM SERPL-SCNC: 3.4 MMOL/L (ref 3.5–5.1)
SODIUM SERPL-SCNC: 137 MMOL/L (ref 136–145)

## 2017-03-20 PROCEDURE — 80048 BASIC METABOLIC PNL TOTAL CA: CPT | Performed by: INTERNAL MEDICINE

## 2017-03-20 PROCEDURE — 74011250636 HC RX REV CODE- 250/636: Performed by: INTERNAL MEDICINE

## 2017-03-20 PROCEDURE — 0JH607Z INSERTION OF CARDIAC RESYNCHRONIZATION PACEMAKER PULSE GENERATOR INTO CHEST SUBCUTANEOUS TISSUE AND FASCIA, OPEN APPROACH: ICD-10-PCS | Performed by: INTERNAL MEDICINE

## 2017-03-20 PROCEDURE — C1900 LEAD, CORONARY VENOUS: HCPCS

## 2017-03-20 PROCEDURE — 77030004559 HC CATH ANGI DX SUPT CARD -B

## 2017-03-20 PROCEDURE — C1769 GUIDE WIRE: HCPCS

## 2017-03-20 PROCEDURE — 99152 MOD SED SAME PHYS/QHP 5/>YRS: CPT

## 2017-03-20 PROCEDURE — 33225 L VENTRIC PACING LEAD ADD-ON: CPT

## 2017-03-20 PROCEDURE — 99218 HC RM OBSERVATION: CPT

## 2017-03-20 PROCEDURE — 77030002996 HC SUT SLK J&J -A

## 2017-03-20 PROCEDURE — 77030004534 HC CATH ANGI DX INFN CARD -A

## 2017-03-20 PROCEDURE — 74011000250 HC RX REV CODE- 250: Performed by: INTERNAL MEDICINE

## 2017-03-20 PROCEDURE — 74011250637 HC RX REV CODE- 250/637: Performed by: INTERNAL MEDICINE

## 2017-03-20 PROCEDURE — 77030031139 HC SUT VCRL2 J&J -A

## 2017-03-20 PROCEDURE — 71010 XR CHEST PORT: CPT

## 2017-03-20 PROCEDURE — 02HL3JZ INSERTION OF PACEMAKER LEAD INTO LEFT VENTRICLE, PERCUTANEOUS APPROACH: ICD-10-PCS | Performed by: INTERNAL MEDICINE

## 2017-03-20 PROCEDURE — 36415 COLL VENOUS BLD VENIPUNCTURE: CPT | Performed by: INTERNAL MEDICINE

## 2017-03-20 PROCEDURE — C1894 INTRO/SHEATH, NON-LASER: HCPCS

## 2017-03-20 PROCEDURE — 99153 MOD SED SAME PHYS/QHP EA: CPT

## 2017-03-20 PROCEDURE — C1892 INTRO/SHEATH,FIXED,PEEL-AWAY: HCPCS

## 2017-03-20 PROCEDURE — 33208 INSRT HEART PM ATRIAL & VENT: CPT

## 2017-03-20 PROCEDURE — 74011000258 HC RX REV CODE- 258: Performed by: INTERNAL MEDICINE

## 2017-03-20 PROCEDURE — 74011250636 HC RX REV CODE- 250/636

## 2017-03-20 PROCEDURE — 65660000000 HC RM CCU STEPDOWN

## 2017-03-20 PROCEDURE — 82962 GLUCOSE BLOOD TEST: CPT

## 2017-03-20 PROCEDURE — 77030010507 HC ADH SKN DERMBND J&J -B

## 2017-03-20 PROCEDURE — 74011636320 HC RX REV CODE- 636/320: Performed by: INTERNAL MEDICINE

## 2017-03-20 PROCEDURE — C2621 PMKR, OTHER THAN SING/DUAL: HCPCS

## 2017-03-20 RX ORDER — CEFAZOLIN SODIUM IN 0.9 % NACL 2 G/50 ML
2 INTRAVENOUS SOLUTION, PIGGYBACK (ML) INTRAVENOUS ONCE
Status: COMPLETED | OUTPATIENT
Start: 2017-03-20 | End: 2017-03-20

## 2017-03-20 RX ORDER — FENTANYL CITRATE 50 UG/ML
25-75 INJECTION, SOLUTION INTRAMUSCULAR; INTRAVENOUS
Status: DISCONTINUED | OUTPATIENT
Start: 2017-03-20 | End: 2017-03-21 | Stop reason: HOSPADM

## 2017-03-20 RX ORDER — GABAPENTIN 100 MG/1
100 CAPSULE ORAL
Status: DISCONTINUED | OUTPATIENT
Start: 2017-03-20 | End: 2017-03-21 | Stop reason: HOSPADM

## 2017-03-20 RX ORDER — LIDOCAINE HYDROCHLORIDE AND EPINEPHRINE 10; 10 MG/ML; UG/ML
10-40 INJECTION, SOLUTION INFILTRATION; PERINEURAL ONCE
Status: COMPLETED | OUTPATIENT
Start: 2017-03-20 | End: 2017-03-20

## 2017-03-20 RX ORDER — MIDAZOLAM HYDROCHLORIDE 1 MG/ML
.5-2 INJECTION, SOLUTION INTRAMUSCULAR; INTRAVENOUS
Status: DISCONTINUED | OUTPATIENT
Start: 2017-03-20 | End: 2017-03-21 | Stop reason: HOSPADM

## 2017-03-20 RX ADMIN — MIDAZOLAM HYDROCHLORIDE 1 MG: 1 INJECTION, SOLUTION INTRAMUSCULAR; INTRAVENOUS at 14:03

## 2017-03-20 RX ADMIN — MIDAZOLAM HYDROCHLORIDE 2 MG: 1 INJECTION, SOLUTION INTRAMUSCULAR; INTRAVENOUS at 13:50

## 2017-03-20 RX ADMIN — GABAPENTIN 100 MG: 100 CAPSULE ORAL at 22:13

## 2017-03-20 RX ADMIN — FENTANYL CITRATE 25 MCG: 50 INJECTION, SOLUTION INTRAMUSCULAR; INTRAVENOUS at 15:20

## 2017-03-20 RX ADMIN — CARVEDILOL 12.5 MG: 12.5 TABLET, FILM COATED ORAL at 18:20

## 2017-03-20 RX ADMIN — ASPIRIN 81 MG: 81 TABLET, COATED ORAL at 09:06

## 2017-03-20 RX ADMIN — ZOLPIDEM TARTRATE 2.5 MG: 5 TABLET ORAL at 01:05

## 2017-03-20 RX ADMIN — HYDROCODONE BITARTRATE AND ACETAMINOPHEN 1 TABLET: 7.5; 325 TABLET ORAL at 22:25

## 2017-03-20 RX ADMIN — LIDOCAINE HYDROCHLORIDE,EPINEPHRINE BITARTRATE 200 MG: 10; .01 INJECTION, SOLUTION INFILTRATION; PERINEURAL at 13:58

## 2017-03-20 RX ADMIN — LISINOPRIL 10 MG: 5 TABLET ORAL at 09:06

## 2017-03-20 RX ADMIN — FUROSEMIDE 40 MG: 10 INJECTION, SOLUTION INTRAMUSCULAR; INTRAVENOUS at 09:08

## 2017-03-20 RX ADMIN — INSULIN LISPRO 2 UNITS: 100 INJECTION, SOLUTION INTRAVENOUS; SUBCUTANEOUS at 18:19

## 2017-03-20 RX ADMIN — FUROSEMIDE 40 MG: 10 INJECTION, SOLUTION INTRAMUSCULAR; INTRAVENOUS at 22:13

## 2017-03-20 RX ADMIN — CEFAZOLIN 2 G: 1 INJECTION, POWDER, FOR SOLUTION INTRAMUSCULAR; INTRAVENOUS; PARENTERAL at 13:40

## 2017-03-20 RX ADMIN — MIDAZOLAM HYDROCHLORIDE 1 MG: 1 INJECTION, SOLUTION INTRAMUSCULAR; INTRAVENOUS at 14:17

## 2017-03-20 RX ADMIN — CEFAZOLIN SODIUM 1 G: 1 INJECTION, POWDER, FOR SOLUTION INTRAMUSCULAR; INTRAVENOUS at 22:19

## 2017-03-20 RX ADMIN — IOVERSOL 150 ML: 741 INJECTION INTRA-ARTERIAL; INTRAVENOUS at 14:06

## 2017-03-20 RX ADMIN — LEVOTHYROXINE SODIUM 25 MCG: 50 TABLET ORAL at 09:07

## 2017-03-20 RX ADMIN — HEPARIN SODIUM 5000 UNITS: 5000 INJECTION, SOLUTION INTRAVENOUS; SUBCUTANEOUS at 18:20

## 2017-03-20 RX ADMIN — SODIUM CHLORIDE 50000 UNITS: 900 INJECTION, SOLUTION INTRAVENOUS at 15:28

## 2017-03-20 RX ADMIN — MIDAZOLAM HYDROCHLORIDE 1 MG: 1 INJECTION, SOLUTION INTRAMUSCULAR; INTRAVENOUS at 15:23

## 2017-03-20 RX ADMIN — FENTANYL CITRATE 25 MCG: 50 INJECTION, SOLUTION INTRAMUSCULAR; INTRAVENOUS at 14:03

## 2017-03-20 RX ADMIN — FENTANYL CITRATE 25 MCG: 50 INJECTION, SOLUTION INTRAMUSCULAR; INTRAVENOUS at 15:23

## 2017-03-20 RX ADMIN — CARVEDILOL 12.5 MG: 12.5 TABLET, FILM COATED ORAL at 09:07

## 2017-03-20 RX ADMIN — PANTOPRAZOLE SODIUM 40 MG: 40 TABLET, DELAYED RELEASE ORAL at 09:06

## 2017-03-20 RX ADMIN — FENTANYL CITRATE 25 MCG: 50 INJECTION, SOLUTION INTRAMUSCULAR; INTRAVENOUS at 13:51

## 2017-03-20 RX ADMIN — HEPARIN SODIUM 5000 UNITS: 5000 INJECTION, SOLUTION INTRAVENOUS; SUBCUTANEOUS at 05:30

## 2017-03-20 RX ADMIN — MIDAZOLAM HYDROCHLORIDE 1 MG: 1 INJECTION, SOLUTION INTRAMUSCULAR; INTRAVENOUS at 15:19

## 2017-03-20 RX ADMIN — AMIODARONE HYDROCHLORIDE 200 MG: 200 TABLET ORAL at 09:07

## 2017-03-20 NOTE — ROUTINE PROCESS
CHF teaching started to pt/family @ BS , will follow as needed.  Planner @ BS: 10mins    Start 2 L/D FR  Pallaitive score: OBS    NPO

## 2017-03-20 NOTE — ROUTINE PROCESS
TRANSFER - OUT REPORT:    Bi-V upgrade  Dr. Payton Moy  Left chest    Versed 6mg, fentanyl 100mcg, ancef 2gm  Bi-V pacer upgrade. Pacer settings DDDR   Pocket closed with sutures. Skin closed with suture and dermabond  Site dressed with telfa/tegaderm    Verbal report given to tele RN(name) on Yousuf Alonzo  being transferred to tele(unit) for routine progression of care       Report consisted of patients Situation, Background, Assessment and   Recommendations(SBAR). Information from the following report(s) Procedure Summary was reviewed with the receiving nurse. Lines:   Peripheral IV 03/18/17 Left Forearm (Active)   Site Assessment Clean, dry, & intact 3/20/2017 11:43 AM   Phlebitis Assessment 0 3/20/2017 11:43 AM   Infiltration Assessment 0 3/20/2017 11:43 AM   Dressing Status Clean, dry, & intact 3/20/2017 11:43 AM   Dressing Type Transparent;Tape 3/20/2017 11:43 AM   Hub Color/Line Status Patent 3/20/2017 11:43 AM   Alcohol Cap Used No 3/20/2017  4:26 AM        Opportunity for questions and clarification was provided.       Patient transported with:   [employer]

## 2017-03-20 NOTE — ROUTINE PROCESS
Cath Lab Transfer In    Transferred from tele  Transferred to : Cath Lab Procedure Room 2  Reason for Transfer: BiV upgrade    Attending physician: [facility] MARIBEL FONTANA JR. [facility]      Patient Assessment    Patient received into procedure room. No acute distress noted or verbalized. Procedural prep completed. Iv accesses assessed for patency. Review of pertinent labs and allergies completed. Noted presence of current History & Physical, updated within the previous 24 hours. Consent signed.

## 2017-03-20 NOTE — PROGRESS NOTES
Bedside and Verbal shift change report given to self (oncoming nurse) by Betty Levine RN (offgoing nurse). Report included the following information SBAR, Kardex, Procedure Summary, MAR, Recent Results and Cardiac Rhythm paced.

## 2017-03-20 NOTE — PROGRESS NOTES
Verbal bedside report given to [facility], oncoming RN. Patient's situation, background, assessment and recommendations provided. Opportunity for questions provided. Oncoming RN assumed care of patient.

## 2017-03-20 NOTE — PROGRESS NOTES
Patient states she takes gabapentin every night for tingling in her legs and feet. Home med was not ordered on admission. Notified MD on call. Orders received.

## 2017-03-20 NOTE — DIABETES MGMT
Pt admitted to observation unit with CHF is seen by RN NADIA for diabetes education. A1C 7.0. (EaG = 154). H/o CHF, type 2 diabetes, cardiomyopathy, and A Fib. Pt says she rarely takes her amaryl at home. She says her blood glucose is usually 120s in the morning. Reviewed with pt and her children in room current Humalog sliding scale orders. Pt and family verbalize understanding. Blood glucose ranged 106-192 with total of 6units Humalog given yesterday. Blood glucose  this morning and 182 pre-lunch. Per discussion with primary nurse, primary nurse checking with cardiology re: sliding scale at this time as pt is NPO for a procedure later today, and family says she has become hypoglycemic with insulin in the past.  Pt given educational material, \"Survival Skills for Diabetes Management\", which was reviewed with pt and her family. Explained basic physiology of diabetes, as well as causes, s/s, and treatments for hypoglycemia and hyperglycemia. Educated re: effects of carbohydrates on blood glucose. Discussed target goals for blood glucose and A1C. Pt and family verbalize understanding of teaching. Pt has a working glucometer at home and checks blood glucose daily. Pt and family have no further questions at this time.

## 2017-03-20 NOTE — PROCEDURES
Brief Cardiac Procedure Note    Patient: Kaveh Malik MRN: [id number]  SSN: [id number]    YOB: 1927  Age: 80 y.o. Sex: female      Date of Procedure: 3/20/2017     Pre-procedure Diagnosis: Congestive Heart Failure    Post-procedure Diagnosis: bi v upgrade    Procedure: Pacemaker Insertion    Brief Description of Procedure: via lscv    Performed By: Michael Molina MD     Assistants:     Anesthesia: Moderate Sedation    Estimated Blood Loss: Less than 10 mL      Specimens: None    Implants: None    Findings:  Good implant, lateral cardiac vein    Complications: None    Recommendations: Continue medical therapy.     Signed By: Michael Molina MD     March 20, 2017

## 2017-03-20 NOTE — PROGRESS NOTES
Patient has family members who take care of her 24 hours a day. She also has a Caregiver service that helps as well called [employer]. Patient lives her  and uses a walker. Patient and son both states that the felt unconfertable signing the observation letter because of a past experience where they were charged and medicare did not covered after an observation stay.

## 2017-03-20 NOTE — PROCEDURES
Imelda Theodore 44       Name:  Ysabel Locke   MR#:  [id number]   :  08/10/1927   Account #:  [de-identified]   Date of Adm:  2017       DATE OF PROCEDURE: 2017    PROCEDURE PERFORMED: Upgrade of pacer to biventricular. HISTORY: This is an 80-year-old lady with recurrent congestive   heart failure. She has a low left ventricular ejection fraction. She has had an AV node ablation and has chronic right   ventricular pacing. An upgrade to biventricular pacing is   recommended to improve quality of life. PROCEDURE: The left infraclavicular fossa was prepped and draped   in sterile manner. Skin and subcutaneous tissue over the old   incision line was anesthetized with 2% Xylocaine. The left   subclavian vein was located with an ipsilateral contrast   injection and needled with the micropuncture kit and then wired. Over the wire, a 10-Polish sheath was inserted. Via this sheath,   the coronary sinus was localized eventually with contrast via   coronary catheters via the delivery catheter. Once a secure   coronary sinus position was obtained, the left ventricular lead   was advanced out a left ventricular lateral lead. Excellent   acute implant parameters were obtained. The lead was then   secured to the pectoral fascia. The pocket was gently expanded. The new lead and the old leads were connected to the new pulse   generator. The set screws were set. The new pacer, old right   ventricular and right atrial leads and new left ventricular lead   were then placed within the pocket. A 2-0 silk suture was placed   at the header. The pocket was then closed with 2-0 subcutaneous   Vicryl, followed by 3-0 subcuticular Vicryl. Dermabond was   applied. A sterile dressing was applied. She tolerated it well. The new device is a [address] [facility] [place], [id number], serial number   [id number]. The new left ventricular lead is a [address] [employer] serial number [id number].  The old atrial lead is a   [employer]onic [id number], serial number [id number], implanted   07/10/2008. The old right ventricular lead is a Medtronic [id number],   serial number [id number], implanted 07/10/2008. The left ventricular pacing threshold is 1.0 volts at 0.5   millisecond pulse width with a lead impedance of 1000 ohms. There was no diaphragmatic pacing at 10 volts.         Christy Blount MD      [facility] / Caitlin Mcgowan   D:  03/20/2017   16:02   T:  03/20/2017   16:50   Job #:  [id number]

## 2017-03-21 ENCOUNTER — HOME HEALTH ADMISSION (OUTPATIENT)
Dept: HOME HEALTH SERVICES | Facility: HOME HEALTH | Age: 82
End: 2017-03-21

## 2017-03-21 VITALS
HEIGHT: 64 IN | HEART RATE: 75 BPM | SYSTOLIC BLOOD PRESSURE: 145 MMHG | BODY MASS INDEX: 15.18 KG/M2 | RESPIRATION RATE: 17 BRPM | TEMPERATURE: 97.7 F | WEIGHT: 88.9 LBS | OXYGEN SATURATION: 95 % | DIASTOLIC BLOOD PRESSURE: 75 MMHG

## 2017-03-21 LAB
ANION GAP BLD CALC-SCNC: 7 MMOL/L (ref 7–16)
BNP SERPL-MCNC: 524 PG/ML
BUN SERPL-MCNC: 24 MG/DL (ref 8–23)
CALCIUM SERPL-MCNC: 8.4 MG/DL (ref 8.3–10.4)
CHLORIDE SERPL-SCNC: 95 MMOL/L (ref 98–107)
CO2 SERPL-SCNC: 34 MMOL/L (ref 21–32)
CREAT SERPL-MCNC: 1.83 MG/DL (ref 0.6–1)
GLUCOSE BLD STRIP.AUTO-MCNC: 112 MG/DL (ref 65–100)
GLUCOSE BLD STRIP.AUTO-MCNC: 246 MG/DL (ref 65–100)
GLUCOSE BLD STRIP.AUTO-MCNC: 72 MG/DL (ref 65–100)
GLUCOSE SERPL-MCNC: 65 MG/DL (ref 65–100)
POTASSIUM SERPL-SCNC: 3 MMOL/L (ref 3.5–5.1)
SODIUM SERPL-SCNC: 136 MMOL/L (ref 136–145)

## 2017-03-21 PROCEDURE — 83880 ASSAY OF NATRIURETIC PEPTIDE: CPT | Performed by: NURSE PRACTITIONER

## 2017-03-21 PROCEDURE — 80048 BASIC METABOLIC PNL TOTAL CA: CPT | Performed by: NURSE PRACTITIONER

## 2017-03-21 PROCEDURE — 82962 GLUCOSE BLOOD TEST: CPT

## 2017-03-21 PROCEDURE — 36415 COLL VENOUS BLD VENIPUNCTURE: CPT | Performed by: NURSE PRACTITIONER

## 2017-03-21 PROCEDURE — 74011250636 HC RX REV CODE- 250/636: Performed by: INTERNAL MEDICINE

## 2017-03-21 PROCEDURE — 97161 PT EVAL LOW COMPLEX 20 MIN: CPT

## 2017-03-21 PROCEDURE — 74011000258 HC RX REV CODE- 258: Performed by: INTERNAL MEDICINE

## 2017-03-21 PROCEDURE — 74011250637 HC RX REV CODE- 250/637: Performed by: INTERNAL MEDICINE

## 2017-03-21 RX ORDER — INSULIN LISPRO 100 [IU]/ML
INJECTION, SOLUTION INTRAVENOUS; SUBCUTANEOUS
Status: DISCONTINUED | OUTPATIENT
Start: 2017-03-21 | End: 2017-03-21 | Stop reason: HOSPADM

## 2017-03-21 RX ORDER — FUROSEMIDE 40 MG/1
40 TABLET ORAL 2 TIMES DAILY
Qty: 60 TAB | Refills: 11 | Status: SHIPPED | OUTPATIENT
Start: 2017-03-21 | End: 2017-03-30 | Stop reason: SDUPTHER

## 2017-03-21 RX ORDER — FUROSEMIDE 40 MG/1
40 TABLET ORAL 2 TIMES DAILY
Status: DISCONTINUED | OUTPATIENT
Start: 2017-03-21 | End: 2017-03-21 | Stop reason: HOSPADM

## 2017-03-21 RX ORDER — HYDROCODONE BITARTRATE AND ACETAMINOPHEN 7.5; 325 MG/1; MG/1
1 TABLET ORAL
Qty: 8 TAB | Refills: 0 | Status: SHIPPED
Start: 2017-03-21 | End: 2017-04-05 | Stop reason: SDUPTHER

## 2017-03-21 RX ADMIN — CARVEDILOL 12.5 MG: 12.5 TABLET, FILM COATED ORAL at 08:33

## 2017-03-21 RX ADMIN — PANTOPRAZOLE SODIUM 40 MG: 40 TABLET, DELAYED RELEASE ORAL at 05:57

## 2017-03-21 RX ADMIN — LEVOTHYROXINE SODIUM 25 MCG: 50 TABLET ORAL at 05:57

## 2017-03-21 RX ADMIN — LISINOPRIL 10 MG: 5 TABLET ORAL at 08:33

## 2017-03-21 RX ADMIN — HYDROCODONE BITARTRATE AND ACETAMINOPHEN 1 TABLET: 7.5; 325 TABLET ORAL at 11:24

## 2017-03-21 RX ADMIN — ASPIRIN 81 MG: 81 TABLET, COATED ORAL at 08:33

## 2017-03-21 RX ADMIN — INSULIN LISPRO 6 UNITS: 100 INJECTION, SOLUTION INTRAVENOUS; SUBCUTANEOUS at 11:52

## 2017-03-21 RX ADMIN — HYDROCODONE BITARTRATE AND ACETAMINOPHEN 1 TABLET: 7.5; 325 TABLET ORAL at 04:54

## 2017-03-21 RX ADMIN — AMIODARONE HYDROCHLORIDE 200 MG: 200 TABLET ORAL at 08:33

## 2017-03-21 RX ADMIN — HEPARIN SODIUM 5000 UNITS: 5000 INJECTION, SOLUTION INTRAVENOUS; SUBCUTANEOUS at 02:51

## 2017-03-21 RX ADMIN — CEFAZOLIN SODIUM 1 G: 1 INJECTION, POWDER, FOR SOLUTION INTRAMUSCULAR; INTRAVENOUS at 05:57

## 2017-03-21 NOTE — PROGRESS NOTES
Problem: Mobility Impaired (Adult and Pediatric)  Goal: *Acute Goals and Plan of Care (Insert Text)  LTG:  (1.)Ms. Massimo Israel will move from supine to sit and sit to supine , scoot up and down and roll side to side in bed with SUPERVISION while maintaining pacer precautions within 5 day(s). (2.)Ms. Massimo Israel will transfer from bed to chair and chair to bed with SUPERVISION using the least restrictive device within 5 day(s). (3.)Ms. Massimo Israel will ambulate with SUPERVISION for 150+ feet with the least restrictive device within 5 day(s). 4. Ms. Massimo Israel will verbalize 3 pacer precautions correctly to increase safety in home within 5 days. ________________________________________________________________________________________________      PHYSICAL THERAPY: INITIAL ASSESSMENT, AM 3/21/2017  INPATIENT: Hospital Day: 4  [place] / Product Type: Medicare /   Pacer Precautions   NAME/AGE/GENDER: Jhonatan Carreon is a 80 y.o. female        PRIMARY DIAGNOSIS: CHF (congestive heart failure) (HCC)  AFib  CHF (congestive heart failure) (HCC)  CHF (congestive heart failure) (HCC) <principal problem not specified> <principal problem not specified>        ICD-10: Treatment Diagnosis:       · Other abnormalities of gait and mobility (R26.89)   Precaution/Allergies:  Tylenol [acetaminophen]       ASSESSMENT:      Ms. Massimo Israel presents supine in bed with daughter at the bedside on presentation with sling on L UE. She was agreeable to PT assessment. She transitioned to sit with min assist and cueing not to push with L UE. Strength grossly 4-. Educated patient in pacer precautions. Patient then complained of feeling dizzy and bad, took /54. Patient then insisted on getting back in bed. Daughter talked patient into standing up and patient then ambulated 25' in room with cane and CGA, HR increased from 75 to 80's. Notified family that cane is too tall for patient.   Patient back to bed with supervision. Ms. Kwasi Dowell would benefit from skilled physical therapy (medically necessary) to address her deficits and maximize her function and reinforce pacer precautions with mobility. Daughter states that she would like patient to return home where she has 24/7 assistance, she would benefit from [address] This section established at most recent assessment   PROBLEM LIST (Impairments causing functional limitations):  1. Decreased Strength  2. Decreased ADL/Functional Activities  3. Decreased Transfer Abilities  4. Decreased Ambulation Ability/Technique  5. Decreased Balance  6. Decreased Activity Tolerance  7. Decreased Knowledge of Precautions    INTERVENTIONS PLANNED: (Benefits and precautions of physical therapy have been discussed with the patient.)  1. Balance Exercise  2. Bed Mobility  3. Family Education  4. Gait Training  5. Home Exercise Program (HEP)  6. Therapeutic Activites  7. Therapeutic Exercise/Strengthening  8. Transcutaneous Electrical Nerve Stimulation (TENS)  9. education  10. Group Therapy      TREATMENT PLAN: Frequency/Duration: 3 times a week for 1 week  Rehabilitation Potential For Stated Goals: GOOD      RECOMMENDED REHABILITATION/EQUIPMENT: (at time of discharge pending progress): Continue Skilled Therapy. HISTORY:   History of Present Injury/Illness (Reason for Referral):  Per MD note, \"This is an 77-year-old female with   history of New York Heart Association class III-IV   cardiomyopathy with a history of atrial fibrillation. She   underwent recent AV node ablation due to recurrent atrial   fibrillation with rapid ventricular response. She has known   cardiomyopathy, EF 30% to 35%. The patient presented to [facility] Emergency Department with episodes of shortness of   breath. The patient noticed the shortness of breath beginning   approximately 03/16/2017. Denied weight gain, abdominal   swelling.  The patient currently lives with her  who is bedridden as well. They are checked on a regular basis by family   members. During the hospital admission, she was accompanied by her   adult daughter who was with her at that time\"  Past Medical History/Comorbidities:   Ms. Stew Mao  has a past medical history of A fib; Abnormal loss of weight; Acquired hypothyroidism (9/12/2012); Anemia; Anorexia (9/2/2014); Arthritis associated with another disorder; Atrial fibrillation ([address] (9/12/2012); Autonomic neuropathy (1/13/2013); CAD (coronary artery disease); CAD in native artery (5/5/2016); Cervical radiculopathy (5/23/2013); Chest pain; Chronic pain syndrome (1/13/2013); CKD (chronic kidney disease), stage III (9/12/2012); Diabetes ([address] Diabetes mellitus; Diabetes mellitus, type 2 ([address] (9/12/2012); Dysphagia (9/2/2014); Gait disorder (9/12/2012); GERD (gastroesophageal reflux disease); Heart failure ([address] HTN; Hypertension (9/12/2012); Hypothyroid; IBS (irritable bowel syndrome) (9/12/2012); Iron deficiency (9/12/2012); Lumbar disc disease; MCI (mild cognitive impairment) (1/13/2013); Mixed hyperlipidemia (9/12/2012); Orthostatic hypotension (5/5/2016); Pacemaker; and SSS (sick sinus syndrome) ([address] (5/5/2016). Ms. Stew Mao  has a past surgical history that includes pacemaker (6/08); cervical fusion; appendectomy; carpal tunnel release; and colonoscopy (Nov 2005). Social History/Living Environment:   Home Environment: Private residence  # Steps to Enter: 0  One/Two Story Residence: One story  Living Alone: No  Support Systems: Family member(s), Friends \ neighbors  Patient Expects to be Discharged to[de-identified] Private residence  Current DME Used/Available at Home: Glucometer, Walker, rollator, Walker, rolling  Prior Level of Function/Work/Activity:  Lives at home with spouse. Has caregivers daily and 4 children take turns staying with in the evenings. Ambulates with cane or RW independently in home.       Number of Personal Factors/Comorbidities that affect the Plan of Care: 3+: HIGH COMPLEXITY   EXAMINATION:   Most Recent Physical Functioning:   Gross Assessment:  AROM: Generally decreased, functional (except L UE NT)  Strength: Generally decreased, functional (except L UE NT)  Sensation: Impaired               Posture:  Posture (WDL): Exceptions to WDL  Posture Assessment: Forward head, Rounded shoulders  Balance:  Sitting: Intact  Standing: Impaired  Standing - Static: Fair  Standing - Dynamic : Fair Bed Mobility:  Supine to Sit: Minimum assistance  Sit to Supine: Supervision  Scooting: Minimum assistance  Wheelchair Mobility:     Transfers:  Sit to Stand: Contact guard assistance  Stand to Sit: Contact guard assistance  Gait:     Base of Support: Widened  Speed/Chandni: Slow  Step Length: Right shortened;Left shortened  Distance (ft): 25 Feet (ft)  Assistive Device: Cane, straight  Ambulation - Level of Assistance: Contact guard assistance       Body Structures Involved:  1. Heart  2. Joints  3. Muscles  4. Ligaments Body Functions Affected:  1. Sensory/Pain  2. Cardio  3. Neuromusculoskeletal  4. Movement Related Activities and Participation Affected:  1. Learning and Applying Knowledge  2. General Tasks and Demands  3. Mobility  4. Self Care  5. Domestic Life   Number of elements that affect the Plan of Care: 4+: HIGH COMPLEXITY   CLINICAL PRESENTATION:   Presentation: Evolving clinical presentation with changing clinical characteristics: MODERATE COMPLEXITY   CLINICAL DECISION MAKIN [facility] Inpatient Short Form  How much difficulty does the patient currently have. .. Unable A Lot A Little None   1. Turning over in bed (including adjusting bedclothes, sheets and blankets)? [ ] 1   [ ] 2   [X] 3   [ ] 4   2. Sitting down on and standing up from a chair with arms ( e.g., wheelchair, bedside commode, etc.)   [ ] 1   [ ] 2   [X] 3   [ ] 4   3. Moving from lying on back to sitting on the side of the bed?    [ ] 1   [ ] 2   [X] 3   [ ] 4   How much help from another person does the patient currently need. .. Total A Lot A Little None   4. Moving to and from a bed to a chair (including a wheelchair)? [ ] 1   [ ] 2   [X] 3   [ ] 4   5. Need to walk in hospital room? [ ] 1   [ ] 2   [X] 3   [ ] 4   6. Climbing 3-5 steps with a railing? [ ] 1   [ ] 2   [X] 3   [ ] 4   © 2007, Trustees of [address] [place] [address] [address] [address], under license to [employer]. All rights reserved    Score:  Initial: 18 Most Recent: X (Date: -- )     Interpretation of Tool:  Represents activities that are increasingly more difficult (i.e. Bed mobility, Transfers, Gait). Score 24 23 22-20 19-15 14-10 9-7 6       Modifier CH CI CJ CK CL CM CN         · Mobility - Walking and Moving Around:               - CURRENT STATUS:    CK - 40%-59% impaired, limited or restricted               - GOAL STATUS:           CJ - 20%-39% impaired, limited or restricted               - D/C STATUS:                       ---------------To be determined---------------  [place] / [place] / Product Type: Medicare /       Medical Necessity:     · Patient is expected to demonstrate progress in strength, range of motion, balance, coordination and functional technique to decrease assistance required with all functional mobility. Reason for Services/Other Comments:  · Patient continues to require skilled intervention due to patient unable to attend/participate in therapy as expected. Use of outcome tool(s) and clinical judgement create a POC that gives a: Clear prediction of patient's progress: LOW COMPLEXITY                 TREATMENT:   (In addition to Assessment/Re-Assessment sessions the following treatments were rendered)   Pre-treatment Symptoms/Complaints:    Pain: Initial:   Pain Intensity 1: 6  Pain Location 1: Incisional  Pain Orientation 1: Left  Pain Intervention(s) 1: Declines  Post Session:  No change.       Assessment/Reassessment only, no treatment provided today     Braces/Orthotics/Lines/Etc:   · Eagle monitor  · O2 Device: Room air  Treatment/Session Assessment:    · Response to Treatment:  fatigued  · Interdisciplinary Collaboration:  · Physical Therapist  · Registered Nurse  ·   · After treatment position/precautions:  · Supine in bed  · Bed/Chair-wheels locked  · Bed in low position  · Call light within reach  · RN notified  · Family at bedside  · Compliance with Program/Exercises: Will assess as treatment progresses. · Recommendations/Intent for next treatment session: \"Next visit will focus on advancements to more challenging activities and reduction in assistance provided\".   Total Treatment Duration:  PT Patient Time In/Time Out  Time In: 1020  Time Out: [address], PT

## 2017-03-21 NOTE — PROGRESS NOTES
Bedside and Verbal shift change report given to Hollie Castillo RN (oncoming nurse) by self Cam Mullins nurse). Report included the following information SBAR, Kardex, ED Summary, Procedure Summary, Intake/Output, MAR, Recent Results and Cardiac Rhythm paced.

## 2017-03-21 NOTE — PROGRESS NOTES
Problem: Falls - Risk of  Goal: *Absence of falls  Outcome: Progressing Towards Goal  Patient progressing towards goal with no falls on current admission. Patient without confusion, agitation, or sensory perception deficits. Patient has unsteady gait on ambulation and needs one person stabilizing assistance. Personal belongings are within reach. Daughter is at bedside. Bed is in the low and locked position with side rails up x3. Yellow gripper socks to bilateral feet. Call light within reach and patient verbalizes understanding of use. Problem: Pressure Ulcer - Risk of  Goal: *Prevention of pressure ulcer  Outcome: Progressing Towards Goal  Patient was on bedrest until 0030. Now encouraged to ambulate to bathroom with assistance. Patient can turn and reposition self. Problem: Pacer/ICD: Post-Procedure  Goal: *Hemodynamically stable  Outcome: Resolved/Met Date Met:  03/21/17  Patient has been hemodynamically stable, but hypertensive with a paced rhythm. Goal: *Optimal pain control at patients stated goal  Outcome: Progressing Towards Goal  Patient has had some pain at pacer site, but has been controlled with Norco. Will continue to monitor. Goal: *Absence of signs and symptoms of infection or wound complication  Outcome: Progressing Towards Goal  Left subclavian pacer placement site is covered with telfa and tegaderm which is clean, dry, and intact. There are no signs or symptoms of infection or wound complication.

## 2017-03-21 NOTE — PROGRESS NOTES
[address]  Face to Face Encounter    Patients Name: Megan Hamilton    YOB: 1927    Ordering Physician: Dr. Kyleigh Hensley    Primary Diagnosis: CHF (congestive heart failure) [facility])  AFib  CHF (congestive heart failure) ([facility]  CHF (congestive heart failure) [facility])    Date of Face to Face:   3/21/2017                                  Face to Face Encounter findings are related to primary reason for home care:   yes. 1. I certify that the patient needs intermittent care as follows: skilled nursing care:  skilled observation/assessment, patient education - CHF    2. I certify that this patient is homebound, that is: 1) patient requires the use of a walker device, special transportation, or assistance of another to leave the home; or 2) patient's condition makes leaving the home medically contraindicated; and 3) patient has a normal inability to leave the home and leaving the home requires considerable and taxing effort. Patient may leave the home for infrequent and short duration for medical reasons, and occasional absences for non-medical reasons. Homebound status is due to the following functional limitations: Patient with strength deficits limiting the performance of all ADL's without caregiver assistance or the use of an assistive device. 3. I certify that this patient is under my care and that I, or a nurse practitioner or  [id number], or clinical nurse specialist, or certified nurse midwife, working with me, had a Face-to-Face Encounter that meets the physician Face-to-Face Encounter requirements.   The following are the clinical findings from the [address] [place] [address] encounter that support the need for skilled services and is a summary of the encounter:   See hospital chart       Ira , [address]  3/21/2017      THE FOLLOWING TO BE COMPLETED BY THE COMMUNITY PHYSICIAN:    I concur with the findings described above from the F encounter that this patient is homebound and in need of a skilled service.     Certifying Physician: _____________________________________      Printed Certifying Physician Name: _____________________________________    Date: _________________

## 2017-03-21 NOTE — PROGRESS NOTES
Care Management Interventions  PCP Verified by CM: Yes  Palliative Care Consult (Criteria: CHF and RRAT>21): No  Reason for No Palliative Care Consult: Other (see comment)  Mode of Transport at Discharge: S  Transition of Care Consult (CM Consult): [address]: Yes  MyChart Signup: No  Discharge Durable Medical Equipment: No  Health Maintenance Reviewed: Yes  Physical Therapy Consult: No  Occupational Therapy Consult: No  Speech Therapy Consult: No  Current Support Network: Lives with Spouse, Family Lives Nearby, Has Personal Caregivers  Confirm Follow Up Transport: Family  Plan discussed with Pt/Family/Caregiver: Yes  Freedom of Choice Offered: Yes  Discharge Location  Discharge Placement: Home with home health    Per MD, pt will dc home with [facility] nursing, PT through [facility]. [facility] RN liaison informed of referral and plans for dc today.

## 2017-03-21 NOTE — PROGRESS NOTES
Cardiac Rehab:  Chart reviewed by cardiac rehab staff. Meets qualifications for outpatient cardiac rehab with CHF: Chronic, EF 30-35%, class 3-4 NYHA.  Pt will be contacted after discharge when referral is received from cardiologist. (Per insurance, there is a 6-week waiting period for cardiac rehab s/p admission for CHF.)

## 2017-03-21 NOTE — DISCHARGE SUMMARY
[facility] Cardiology Discharge Summary     Patient ID:  Donn Gil  [id number]  62 y.o.  8/10/1927    Admit date: 3/18/2017    Discharge date and time:  3-21-17    Admitting Physician: Linnette Lombard, MD     Discharge Physician: Kate Mackey NP/    Admission Diagnoses: CHF (congestive heart failure) ([facility] [place] [address]  AFib  CHF (congestive heart failure) (HCC)  CHF (congestive heart failure) ([facility] [place] [address]    Discharge Diagnoses:    Diagnosis    CHF (congestive heart failure) (HCC)    Altered mental status    Altered mental state    Chest pain, pleuritic    Atrial fibrillation (HCC)    Dyspnea    Atrial fibrillation with rapid ventricular response (HCC)    SSS (sick sinus syndrome) ([facility] [place] [address]    Orthostatic hypotension    CAD in native artery    Pacemaker    Anorexia    Dysphagia    MCI (mild cognitive impairment)    Chronic pain syndrome    Diabetes mellitus, type 2 ([facility] [place] [address]    Hypertension    Acquired hypothyroidism    Mixed hyperlipidemia    CKD (chronic kidney disease), stage III       Cardiology Procedures this admission:  Upgrade of pacer to biventricular Pacemaker Implantation, CXR  Consults: none    Hospital Course: Pt presented to ED with c/o SOB and was admitted with heart failure with Sick Sinus Syndrome. She was admitted for IV diuretics and was -3.4L at discharge. Dyspnea persisted despite diuretics and the decision was made to upgrade to BiV PM.  Pt was taken to the cath lab by Dr. Patricia Kang. Pt received a [employer] BiV PM pacemaker without complication. Follow up CXR showed no pneumothorax. Pt tolerated the procedure well and was taken to the telemetry floor for recovery. The following morning Pt was up feeling well without any complaints of CP, SOB or palpitations. Pt's left subclavian PM site was clean, dry and intact without hematoma. Pt's labs were WNL. Pt was seen and examined by Dr. Ivett Mcleod and determined stable and ready for discharge. The patient alexa need home health at discharge.  Pt was instructed to follow PM discharge instructions given by nursing staff. Prior to d/c BNP was 524 and Cr 1.83. The patient will need repeat BMP in one week by home health. The patient will follow up with [facility] for pacemaker check/site check April 6th at 80 am in [place] office. DISPOSITION: The patient is being discharged home in stable condition on a low saturated fat, low cholesterol and low salt diet. Pt is instructed to advance activities as tolerated limited to fatigue or shortness of breath. Pt is instructed not to lift anything over 5-10 lbs with affected arm. No pushing or pulling or lifting arm above shoulder. See complete discharge instructions. Pt is instructed to watch PM site for bleeding/oozing, if seen Pt is instructed to apply firm pressure with clean cloth and call office at [phone]. Pt is instructed to watch for signs of infection which include increasing area of redness around site, fever/hot to touch or purulent drainage. Pt is instructed to call office or return to ER for immediate evaluation of any shortness of breath, chest pain or palpitations. Discharge Exam:   Visit Vitals    /77 (BP 1 Location: Right arm, BP Patient Position: At rest)    Pulse 75    Temp 97.7 °F (36.5 °C)    Resp 17    Ht 5' 4\" (1.626 m)    Wt 40.3 kg (88 lb 14.4 oz)    SpO2 94%    Breastfeeding No    BMI 15.26 kg/m2       Visit Vitals    /75 (BP 1 Location: Right arm, BP Patient Position: At rest)    Pulse 75    Temp 97.7 °F (36.5 °C)    Resp 17    Ht 5' 4\" (1.626 m)    Wt 40.3 kg (88 lb 14.4 oz)    SpO2 95%    Breastfeeding No    BMI 15.26 kg/m2     General Appearance:  Well developed, well nourished,alert and oriented x 3, and individual in no acute distress. Ears/Nose/Mouth/Throat:   Hearing grossly normal.         Neck: Supple. Chest:   Lungs clear to auscultation bilaterally.  Left upper with opsite dressing intact   Cardiovascular:  Regular rate and rhythm, S1, S2 normal, no murmur. Abdomen:   Soft, non-tender, bowel sounds are active. Extremities: No edema bilaterally. Skin: Warm and dry. Pt has been seen by Dr. Jyotsna Newberry Results (from the past 24 hour(s))   GLUCOSE, POC    Collection Time: 03/20/17 11:09 AM   Result Value Ref Range    Glucose (POC) 182 (H) 65 - 100 mg/dL   GLUCOSE, POC    Collection Time: 03/20/17  5:28 PM   Result Value Ref Range    Glucose (POC) 167 (H) 65 - 100 mg/dL   GLUCOSE, POC    Collection Time: 03/20/17  9:39 PM   Result Value Ref Range    Glucose (POC) 153 (H) 65 - 100 mg/dL   GLUCOSE, POC    Collection Time: 03/21/17  6:07 AM   Result Value Ref Range    Glucose (POC) 112 (H) 65 - 100 mg/dL         Patient Instructions:   Current Discharge Medication List      CONTINUE these medications which have CHANGED    Details   furosemide (LASIX) 40 mg tablet Take 1 Tab by mouth two (2) times a day. Qty: 60 Tab, Refills: 11      HYDROcodone-acetaminophen (NORCO) 7.5-325 mg per tablet Take 1 Tab by mouth every six (6) hours as needed. Max Daily Amount: 4 Tabs. Indications: Pain  Qty: 8 Tab, Refills: 0    Associated Diagnoses: Congestive heart failure, unspecified congestive heart failure chronicity, unspecified congestive heart failure type ([facility] [address]         CONTINUE these medications which have NOT CHANGED    Details   glimepiride (AMARYL) 1 mg tablet Take 0.5 mg by mouth Daily (before breakfast). gabapentin (NEURONTIN) 100 mg capsule Take 1 Cap by mouth three (3) times daily. Qty: 90 Cap, Refills: 0      carvedilol (COREG) 12.5 mg tablet Take 1 Tab by mouth two (2) times daily (with meals). Qty: 60 Tab, Refills: 6      potassium chloride (K-DUR, KLOR-CON) 10 mEq tablet Take 1 Tab by mouth daily. Qty: 30 Tab, Refills: 3      zolpidem (AMBIEN) 5 mg tablet Take 1 Tab by mouth nightly as needed for Sleep. Max Daily Amount: 5 mg. Qty: 30 Tab, Refills: 1      amiodarone (CORDARONE) 200 mg tablet Take 1 Tab by mouth daily. Indications: PREVENTION OF RECURRENT ATRIAL FIBRILLATION  Qty: 90 Tab, Refills: 3      aspirin delayed-release 81 mg tablet Take  by mouth daily. omeprazole (PRILOSEC) 20 mg capsule Take 1 Cap by mouth two (2) times a day. Qty: 180 Cap, Refills: 3    Associated Diagnoses: Rib pain on left side      polyethylene glycol (MIRALAX) 17 gram packet Take 1 Packet by mouth daily. Qty: 30 Packet, Refills: 5    Associated Diagnoses: Rib pain on left side      senna-docusate (PERICOLACE) 8.6-50 mg per tablet Take 1 Tab by mouth nightly. Qty: 90 Tab, Refills: 3    Associated Diagnoses: Rib pain on left side      levothyroxine (SYNTHROID) 25 mcg tablet Take 1 Tab by mouth Daily (before breakfast). Qty: 90 Tab, Refills: 3      nitroglycerin (NITROSTAT) 0.4 mg SL tablet 1 Tab by SubLINGual route every five (5) minutes as needed. Indications: ANGINA  Qty: 1 Bottle, Refills: 3      cholecalciferol (VITAMIN D-3) 400 unit Tab tablet Take 1,000 Units by mouth daily. Associated Diagnoses: Rib pain on left side      calcium carbonate (OS-) 500 mg (1,250 mg) tablet take 1 Tab by mouth two (2) times a day.                 Signed:  Francis Singletary NP  3/21/2017  11:08 AM

## 2017-03-21 NOTE — ROUTINE PROCESS
CHF teaching started post introduction to pt/family; aware of diagnosis. Planner/scale @ BS and will follow. Smoking/ ETOH/Illicit drug use cessation covered. Pt/family aware that I can not prescribe nor adjust  medications: 15mins  Palliative Care score:  Start 2L/D Fluid restriction  CHF teaching continues to pt/family. Emphasis on taking prescription meds as ordered, to keep F/U appts and to call MD STAT. CHF teaching completed, verbalize emphasis on monitoring self and report to MD:   If you gain 2 lbs in one day or 5 lbs in a week, and short of breath.  If you can not lay flat without developing short of breath or rapid breathing at night; or if it wakes you up. Develop a cough or wheezing.  If you notice swollen hands/feet/ankles or stomach with a bloated/ full feeling.  If you become confused or mentally fuzzy or dizzy.  If you notice a rapid or change in your heart rate.  If you become more exhausted all the time and unable to do the same level of activity without stopping to catch your breath. Drink no more than 8 cups a day in 8 oz. cups. Limit Cola Drinks. Your Heart can not handle any more. Stay away from salt (limit anything with salt or sodium in it). Limit to 250mg per serving. Exercise needs to be started with your Doctors approval.  Reduce stress  Pass post test via teach back, will make self available post DC ,if an questions arise. Diabetic teaching completed.  Planner/scale @ BS:  60 mins total

## 2017-03-22 ENCOUNTER — PATIENT OUTREACH (OUTPATIENT)
Dept: CASE MANAGEMENT | Age: 82
End: 2017-03-22

## 2017-03-22 NOTE — PROGRESS NOTES
This note will not be viewable in [address] Transition of Care Discharge Follow-up Questionnaire     Date/Time of Call:      03/22/2017   3:12 pm     What was the patient hospitalized for? Patient was hospitalized for diagnosis of:  CHF     Does the patient understand his/her diagnosis and/or treatment and what happened during the hospitalization? Patient voiced understanding of diagnosis and /or treatment. Did the patient receive discharge instructions? Yes. Patient states discharge instructions explained and received before discharge to home. Review any discharge instructions (see notes in [facility]). Ask patient if they understand these. Do they have any questions? Care Coordinator and patient reviewed discharge instructions per [facility]. Opportunity for questions and clarification provided. Patient verbalized understanding of instructions. Were home services ordered (nursing, PT, OT, ST, etc.)? Home services were ordered for [facility], PT       If so, has the first visit occurred? If not, why? (Assist with coordination of services if necessary.)   Patient states she has not heard from nursing services as of yet. Patient states she will have her daughter call. Was any DME ordered? No DME ordered. Note:  Patient owns cane and rolling walker       If so, has it been received? If not, why?  (Assist with coordination of arranging DME orders if necessary.)   n/a     Complete a review of all medications (new, continued and discontinued meds per the D/C instructions and medication tab in [facility]). Care Coordinator and patient reviewed medications per [facility]. Were all new prescriptions filled? If not, why?  (Assist with obtainment of medications if necessary.)   Patient reports having daughter  prescriptions and will take them as ordered. Does the patient understand the purpose and dosing instructions for all medications?   (If patient has questions, provide explanation and education.)     Patient voiced understanding of purpose and dosing instructions for all medications. Does the patient have any problems in performing ADLs? (If patient is unable to perform ADLs  what is the limiting factor(s)? Do they have a support system that can assist? If no support system is present, discuss possible assistance that they may be able to obtain.)     Patient reports needing assistance to perform ADLs. Patient reports having personal care provider throughout the day and adult children providing care in the evenings. Does the patient have all follow-up appointments scheduled? Has transportation been arranged? [facility] follow-up should be within 7 days of discharge; all others should have PCP follow-up within 7 days of discharge; follow-ups with other specialists as appropriate or ordered.)   Patient report eing scheduled for follow up appointments with [facility] on 3/29/2017 and with PCP next week. Patient has transportation available. Any other questions or concerns expressed by the patient? Patient voiced no other questions or concerns and was appreciative of call. No other needs identified.            ABRAN Call Completed By:   Jeremy Lafleur, [employer] Coordinator

## 2017-03-29 ENCOUNTER — HOME CARE VISIT (OUTPATIENT)
Dept: HOME HEALTH SERVICES | Facility: HOME HEALTH | Age: 82
End: 2017-03-29

## 2017-03-30 PROBLEM — I42.9 CARDIOMYOPATHY (HCC): Status: ACTIVE | Noted: 2017-03-30

## 2017-07-11 PROBLEM — I25.5 ISCHEMIC CARDIOMYOPATHY: Status: ACTIVE | Noted: 2017-07-11

## 2017-08-12 ENCOUNTER — APPOINTMENT (OUTPATIENT)
Dept: GENERAL RADIOLOGY | Age: 82
DRG: 291 | End: 2017-08-12
Attending: EMERGENCY MEDICINE
Payer: MEDICARE

## 2017-08-12 ENCOUNTER — HOSPITAL ENCOUNTER (INPATIENT)
Age: 82
LOS: 4 days | Discharge: HOME | DRG: 291 | End: 2017-08-16
Attending: EMERGENCY MEDICINE | Admitting: INTERNAL MEDICINE
Payer: MEDICARE

## 2017-08-12 DIAGNOSIS — J96.01 ACUTE RESPIRATORY FAILURE WITH HYPOXIA (HCC): ICD-10-CM

## 2017-08-12 DIAGNOSIS — J18.9 CAP (COMMUNITY ACQUIRED PNEUMONIA): ICD-10-CM

## 2017-08-12 DIAGNOSIS — R63.0 ANOREXIA: ICD-10-CM

## 2017-08-12 DIAGNOSIS — J96.01 ACUTE RESPIRATORY FAILURE WITH HYPOXEMIA (HCC): ICD-10-CM

## 2017-08-12 DIAGNOSIS — I48.91 ATRIAL FIBRILLATION WITH RAPID VENTRICULAR RESPONSE (HCC): ICD-10-CM

## 2017-08-12 DIAGNOSIS — N17.9 AKI (ACUTE KIDNEY INJURY) (HCC): ICD-10-CM

## 2017-08-12 DIAGNOSIS — I48.91 ATRIAL FIBRILLATION, UNSPECIFIED TYPE (HCC): Chronic | ICD-10-CM

## 2017-08-12 DIAGNOSIS — J18.9 COMMUNITY ACQUIRED PNEUMONIA: Primary | ICD-10-CM

## 2017-08-12 DIAGNOSIS — R13.19 OTHER DYSPHAGIA: Chronic | ICD-10-CM

## 2017-08-12 DIAGNOSIS — I42.9 CARDIOMYOPATHY, UNSPECIFIED TYPE (HCC): ICD-10-CM

## 2017-08-12 PROBLEM — E11.9 DIABETES MELLITUS, TYPE 2 (HCC): Chronic | Status: ACTIVE | Noted: 2017-08-12

## 2017-08-12 PROBLEM — I10 HYPERTENSION: Chronic | Status: ACTIVE | Noted: 2017-08-12

## 2017-08-12 PROBLEM — J96.90 RESPIRATORY FAILURE (HCC): Status: ACTIVE | Noted: 2017-08-12

## 2017-08-12 PROBLEM — Z66 DNR (DO NOT RESUSCITATE): Chronic | Status: ACTIVE | Noted: 2017-08-12

## 2017-08-12 PROBLEM — J96.00 ACUTE RESPIRATORY FAILURE (HCC): Status: ACTIVE | Noted: 2017-08-12

## 2017-08-12 PROBLEM — N18.30 CKD (CHRONIC KIDNEY DISEASE), STAGE III (HCC): Chronic | Status: ACTIVE | Noted: 2017-08-12

## 2017-08-12 PROBLEM — R13.10 DYSPHAGIA: Chronic | Status: ACTIVE | Noted: 2017-08-12

## 2017-08-12 LAB
ALBUMIN SERPL BCP-MCNC: 3.6 G/DL (ref 3.2–4.6)
ALBUMIN/GLOB SERPL: 1.1 {RATIO} (ref 1.2–3.5)
ALP SERPL-CCNC: 134 U/L (ref 50–136)
ALT SERPL-CCNC: 42 U/L (ref 12–65)
ANION GAP BLD CALC-SCNC: 10 MMOL/L (ref 7–16)
ARTERIAL PATENCY WRIST A: POSITIVE
AST SERPL W P-5'-P-CCNC: 66 U/L (ref 15–37)
ATRIAL RATE: 326 BPM
BASE DEFICIT BLDA-SCNC: 0.9 MMOL/L (ref 0–2)
BDY SITE: ABNORMAL
BILIRUB SERPL-MCNC: 0.7 MG/DL (ref 0.2–1.1)
BNP SERPL-MCNC: 363 PG/ML
BUN SERPL-MCNC: 22 MG/DL (ref 8–23)
CALCIUM SERPL-MCNC: 8.4 MG/DL (ref 8.3–10.4)
CALCULATED R AXIS, ECG10: 160 DEGREES
CALCULATED T AXIS, ECG11: 45 DEGREES
CHLORIDE SERPL-SCNC: 97 MMOL/L (ref 98–107)
CO2 SERPL-SCNC: 29 MMOL/L (ref 21–32)
COHGB MFR BLD: 0.3 % (ref 0.5–1.5)
CREAT SERPL-MCNC: 1.52 MG/DL (ref 0.6–1)
DIAGNOSIS, 93000: NORMAL
DO-HGB BLD-MCNC: 6 % (ref 0–5)
ERYTHROCYTE [DISTWIDTH] IN BLOOD BY AUTOMATED COUNT: 13.3 % (ref 11.9–14.6)
FIO2 ON VENT: 70 %
GLOBULIN SER CALC-MCNC: 3.2 G/DL (ref 2.3–3.5)
GLUCOSE BLD STRIP.AUTO-MCNC: 156 MG/DL (ref 65–100)
GLUCOSE BLD STRIP.AUTO-MCNC: 170 MG/DL (ref 65–100)
GLUCOSE BLD STRIP.AUTO-MCNC: 186 MG/DL (ref 65–100)
GLUCOSE SERPL-MCNC: 164 MG/DL (ref 65–100)
HCO3 BLDA-SCNC: 23 MMOL/L (ref 22–26)
HCT VFR BLD AUTO: 35.6 % (ref 35.8–46.3)
HGB BLD-MCNC: 12.2 G/DL (ref 11.7–15.4)
HGB BLDMV-MCNC: 13.3 GM/DL (ref 11.7–15)
LACTATE BLD-SCNC: 1.4 MMOL/L (ref 0.5–1.9)
LACTATE BLD-SCNC: 2.7 MMOL/L (ref 0.5–1.9)
MAGNESIUM SERPL-MCNC: 1.8 MG/DL (ref 1.8–2.4)
MCH RBC QN AUTO: 30.7 PG (ref 26.1–32.9)
MCHC RBC AUTO-ENTMCNC: 34.3 G/DL (ref 31.4–35)
MCV RBC AUTO: 89.7 FL (ref 79.6–97.8)
METHGB MFR BLD: 0.2 % (ref 0–1.5)
OXYHGB MFR BLDA: 93.4 % (ref 94–97)
PCO2 BLDA: 38 MMHG (ref 35–45)
PH BLDA: 7.41 [PH] (ref 7.35–7.45)
PLATELET # BLD AUTO: 260 K/UL (ref 150–450)
PMV BLD AUTO: 11 FL (ref 10.8–14.1)
PO2 BLDA: 72 MMHG (ref 75–100)
POTASSIUM SERPL-SCNC: 4.5 MMOL/L (ref 3.5–5.1)
PROCALCITONIN SERPL-MCNC: 0.1 NG/ML
PROT SERPL-MCNC: 6.8 G/DL (ref 6.3–8.2)
Q-T INTERVAL, ECG07: 404 MS
QRS DURATION, ECG06: 138 MS
QTC CALCULATION (BEZET), ECG08: 451 MS
RBC # BLD AUTO: 3.97 M/UL (ref 4.05–5.25)
RESP RATE: 16
SAO2 % BLD: 94 % (ref 92–98.5)
SODIUM SERPL-SCNC: 136 MMOL/L (ref 136–145)
TROPONIN I BLD-MCNC: 0.01 NG/ML (ref 0–0.08)
VENTILATION MODE VENT: ABNORMAL
VENTRICULAR RATE, ECG03: 75 BPM
WBC # BLD AUTO: 8.2 K/UL (ref 4.3–11.1)

## 2017-08-12 PROCEDURE — 99223 1ST HOSP IP/OBS HIGH 75: CPT | Performed by: INTERNAL MEDICINE

## 2017-08-12 PROCEDURE — 83735 ASSAY OF MAGNESIUM: CPT | Performed by: EMERGENCY MEDICINE

## 2017-08-12 PROCEDURE — 93005 ELECTROCARDIOGRAM TRACING: CPT | Performed by: EMERGENCY MEDICINE

## 2017-08-12 PROCEDURE — 65660000000 HC RM CCU STEPDOWN

## 2017-08-12 PROCEDURE — 99285 EMERGENCY DEPT VISIT HI MDM: CPT | Performed by: EMERGENCY MEDICINE

## 2017-08-12 PROCEDURE — 96365 THER/PROPH/DIAG IV INF INIT: CPT | Performed by: EMERGENCY MEDICINE

## 2017-08-12 PROCEDURE — 83605 ASSAY OF LACTIC ACID: CPT

## 2017-08-12 PROCEDURE — 74011250637 HC RX REV CODE- 250/637: Performed by: EMERGENCY MEDICINE

## 2017-08-12 PROCEDURE — 94660 CPAP INITIATION&MGMT: CPT

## 2017-08-12 PROCEDURE — 74011000258 HC RX REV CODE- 258: Performed by: EMERGENCY MEDICINE

## 2017-08-12 PROCEDURE — 87040 BLOOD CULTURE FOR BACTERIA: CPT | Performed by: EMERGENCY MEDICINE

## 2017-08-12 PROCEDURE — 36600 WITHDRAWAL OF ARTERIAL BLOOD: CPT

## 2017-08-12 PROCEDURE — 82962 GLUCOSE BLOOD TEST: CPT

## 2017-08-12 PROCEDURE — 82803 BLOOD GASES ANY COMBINATION: CPT

## 2017-08-12 PROCEDURE — 74011250636 HC RX REV CODE- 250/636: Performed by: EMERGENCY MEDICINE

## 2017-08-12 PROCEDURE — 80053 COMPREHEN METABOLIC PANEL: CPT | Performed by: EMERGENCY MEDICINE

## 2017-08-12 PROCEDURE — 85027 COMPLETE CBC AUTOMATED: CPT | Performed by: EMERGENCY MEDICINE

## 2017-08-12 PROCEDURE — 94762 N-INVAS EAR/PLS OXIMTRY CONT: CPT | Performed by: EMERGENCY MEDICINE

## 2017-08-12 PROCEDURE — 84145 PROCALCITONIN (PCT): CPT | Performed by: EMERGENCY MEDICINE

## 2017-08-12 PROCEDURE — 77030032490 HC SLV COMPR SCD KNE COVD -B

## 2017-08-12 PROCEDURE — 84484 ASSAY OF TROPONIN QUANT: CPT

## 2017-08-12 PROCEDURE — 83880 ASSAY OF NATRIURETIC PEPTIDE: CPT | Performed by: EMERGENCY MEDICINE

## 2017-08-12 PROCEDURE — 71010 XR CHEST PORT: CPT

## 2017-08-12 RX ORDER — LISINOPRIL 5 MG/1
10 TABLET ORAL DAILY
Status: DISCONTINUED | OUTPATIENT
Start: 2017-08-12 | End: 2017-08-16 | Stop reason: HOSPADM

## 2017-08-12 RX ORDER — PANTOPRAZOLE SODIUM 40 MG/1
40 TABLET, DELAYED RELEASE ORAL
Status: DISCONTINUED | OUTPATIENT
Start: 2017-08-13 | End: 2017-08-16 | Stop reason: HOSPADM

## 2017-08-12 RX ORDER — LEVOTHYROXINE SODIUM 50 UG/1
25 TABLET ORAL
Status: DISCONTINUED | OUTPATIENT
Start: 2017-08-13 | End: 2017-08-16 | Stop reason: HOSPADM

## 2017-08-12 RX ORDER — SODIUM CHLORIDE 0.9 % (FLUSH) 0.9 %
5 SYRINGE (ML) INJECTION EVERY 8 HOURS
Status: DISCONTINUED | OUTPATIENT
Start: 2017-08-12 | End: 2017-08-16 | Stop reason: HOSPADM

## 2017-08-12 RX ORDER — CARVEDILOL 12.5 MG/1
12.5 TABLET ORAL 2 TIMES DAILY WITH MEALS
Status: DISCONTINUED | OUTPATIENT
Start: 2017-08-12 | End: 2017-08-16 | Stop reason: HOSPADM

## 2017-08-12 RX ORDER — GUAIFENESIN 100 MG/5ML
324 LIQUID (ML) ORAL
Status: DISCONTINUED | OUTPATIENT
Start: 2017-08-12 | End: 2017-08-12

## 2017-08-12 RX ORDER — FUROSEMIDE 10 MG/ML
80 INJECTION INTRAMUSCULAR; INTRAVENOUS
Status: DISCONTINUED | OUTPATIENT
Start: 2017-08-12 | End: 2017-08-12

## 2017-08-12 RX ORDER — ENOXAPARIN SODIUM 100 MG/ML
30 INJECTION SUBCUTANEOUS EVERY 24 HOURS
Status: DISCONTINUED | OUTPATIENT
Start: 2017-08-12 | End: 2017-08-13

## 2017-08-12 RX ORDER — ZOLPIDEM TARTRATE 5 MG/1
5 TABLET ORAL
Status: DISCONTINUED | OUTPATIENT
Start: 2017-08-12 | End: 2017-08-16 | Stop reason: HOSPADM

## 2017-08-12 RX ORDER — AMOXICILLIN 250 MG
1 CAPSULE ORAL
Status: DISCONTINUED | OUTPATIENT
Start: 2017-08-12 | End: 2017-08-16 | Stop reason: HOSPADM

## 2017-08-12 RX ORDER — GABAPENTIN 300 MG/1
600 CAPSULE ORAL
Status: DISCONTINUED | OUTPATIENT
Start: 2017-08-12 | End: 2017-08-16 | Stop reason: HOSPADM

## 2017-08-12 RX ORDER — ASPIRIN 300 MG/1
300 SUPPOSITORY RECTAL DAILY
Status: DISCONTINUED | OUTPATIENT
Start: 2017-08-12 | End: 2017-08-12

## 2017-08-12 RX ORDER — ASPIRIN 81 MG/1
81 TABLET ORAL DAILY
Status: DISCONTINUED | OUTPATIENT
Start: 2017-08-12 | End: 2017-08-16 | Stop reason: HOSPADM

## 2017-08-12 RX ORDER — SODIUM CHLORIDE 0.9 % (FLUSH) 0.9 %
5-10 SYRINGE (ML) INJECTION AS NEEDED
Status: DISCONTINUED | OUTPATIENT
Start: 2017-08-12 | End: 2017-08-16 | Stop reason: HOSPADM

## 2017-08-12 RX ORDER — FUROSEMIDE 10 MG/ML
40 INJECTION INTRAMUSCULAR; INTRAVENOUS 2 TIMES DAILY
Status: DISCONTINUED | OUTPATIENT
Start: 2017-08-12 | End: 2017-08-15

## 2017-08-12 RX ORDER — INSULIN LISPRO 100 [IU]/ML
INJECTION, SOLUTION INTRAVENOUS; SUBCUTANEOUS
Status: DISCONTINUED | OUTPATIENT
Start: 2017-08-12 | End: 2017-08-16 | Stop reason: HOSPADM

## 2017-08-12 RX ORDER — SODIUM CHLORIDE 9 MG/ML
150 INJECTION, SOLUTION INTRAVENOUS CONTINUOUS
Status: DISCONTINUED | OUTPATIENT
Start: 2017-08-12 | End: 2017-08-12

## 2017-08-12 RX ORDER — POTASSIUM CHLORIDE 750 MG/1
10 TABLET, EXTENDED RELEASE ORAL DAILY
Status: DISCONTINUED | OUTPATIENT
Start: 2017-08-12 | End: 2017-08-16 | Stop reason: HOSPADM

## 2017-08-12 RX ADMIN — ASPIRIN 300 MG: 300 SUPPOSITORY RECTAL at 07:55

## 2017-08-12 RX ADMIN — AZITHROMYCIN MONOHYDRATE 500 MG: 500 INJECTION, POWDER, LYOPHILIZED, FOR SOLUTION INTRAVENOUS at 06:34

## 2017-08-12 RX ADMIN — Medication 5 ML: at 14:00

## 2017-08-12 RX ADMIN — SODIUM CHLORIDE 1000 ML: 900 INJECTION, SOLUTION INTRAVENOUS at 07:51

## 2017-08-12 RX ADMIN — Medication 5 ML: at 11:12

## 2017-08-12 RX ADMIN — Medication 5 ML: at 22:07

## 2017-08-12 RX ADMIN — METHYLPREDNISOLONE SODIUM SUCCINATE 125 MG: 125 INJECTION, POWDER, FOR SOLUTION INTRAMUSCULAR; INTRAVENOUS at 07:54

## 2017-08-12 RX ADMIN — CEFTRIAXONE 2 G: 2 INJECTION, POWDER, FOR SOLUTION INTRAMUSCULAR; INTRAVENOUS at 07:54

## 2017-08-12 NOTE — ED PROVIDER NOTES
HPI Comments: 66-year-old female presents from home. Complains of severe shortness of breath. History of CHF. Symptoms have evolved over a relatively short period of time. EMS found the patient to be hypoxic with sats in the 70s. Placed patient on BiPAP and brought her here to the ER for further evaluation. Patient is a 80 y.o. female presenting with respiratory distress syndrome. The history is provided by the patient and the EMS personnel. Respiratory Distress   This is a new problem. The problem occurs intermittently. The current episode started 1 to 2 hours ago. The problem has not changed since onset. Associated symptoms include PND and orthopnea. Pertinent negatives include no fever, no headaches, no rhinorrhea, no cough, no sputum production, no wheezing, no chest pain, no syncope and no vomiting. It is unknown what precipitated the problem. She has tried nothing for the symptoms. She has had prior hospitalizations. She has had prior ED visits. Associated medical issues include CAD and heart failure.         Past Medical History:   Diagnosis Date    A fib     Abnormal loss of weight     Acquired hypothyroidism 9/12/2012    Anemia     Anorexia 9/2/2014    Arthritis associated with another disorder     Atrial fibrillation ([address] 9/12/2012    Paroxysmal.  Coumadin contraindicated due to falls      Autonomic neuropathy 1/13/2013    CAD (coronary artery disease)     CAD in native artery 5/5/2016    Cervical radiculopathy 5/23/2013    Chest pain     Chronic pain syndrome 1/13/2013    Back, right shoulder, neck: Peripheral neuropathy, spinal stenosis C7-T1, foraminal narrowing C4-5     CKD (chronic kidney disease), stage III 9/12/2012    Diabetes ([address]     about 30 years    Diabetes mellitus     Diabetes mellitus, type 2 ([address] 9/12/2012    Dysphagia 9/2/2014    Due to esophageal spasm seen on modified barium swallow 2015     Gait disorder 9/12/2012    GERD (gastroesophageal reflux disease)     Heart failure ([facility] [address]     HTN     Hypertension 9/12/2012    Orthostatic hypotension due to autonomic neuropathy     Hypothyroid     IBS (irritable bowel syndrome) 9/12/2012    Iron deficiency 9/12/2012    Lumbar disc disease     MCI (mild cognitive impairment) 1/13/2013    Mixed hyperlipidemia 9/12/2012    Orthostatic hypotension 5/5/2016    Pacemaker     SSS (sick sinus syndrome) ([facility] [address] 5/5/2016       Past Surgical History:   Procedure Laterality Date    HX APPENDECTOMY      HX CARPAL TUNNEL RELEASE      HX CERVICAL FUSION      HX COLONOSCOPY  Nov 2005    HX PACEMAKER  6/08    HX PACEMAKER  2017    Replacement          History reviewed. No pertinent family history. Social History     Social History    Marital status:      Spouse name: N/A    Number of children: N/A    Years of education: N/A     Occupational History    Not on file. Social History Main Topics    Smoking status: Never Smoker    Smokeless tobacco: Never Used    Alcohol use No    Drug use: No    Sexual activity: Not on file     Other Topics Concern    Not on file     Social History Narrative    Lives with  who has mild vascular dementia, patient is primary caretaker but daughters come over to house 4-5 times a week, Lee Sepulvedael has HCPOA         ALLERGIES: Tylenol [acetaminophen]    Review of Systems   Unable to perform ROS: Acuity of condition   Constitutional: Negative for fever. HENT: Negative for rhinorrhea. Respiratory: Negative for cough, sputum production and wheezing. Cardiovascular: Positive for orthopnea and PND. Negative for chest pain and syncope. Gastrointestinal: Negative for vomiting. Neurological: Negative for headaches.        Vitals:    08/12/17 0534 08/12/17 0539   BP: (!) 232/97    Pulse: 72 75   Resp: (!) 32 29   Temp: 97.6 °F (36.4 °C)    SpO2: (!) 86% 92%   Weight: 43.1 kg (95 lb)    Height: 5' 4\" (1.626 m)             Physical Exam   Constitutional: She is oriented to person, place, and time. She appears well-developed and well-nourished. She appears lethargic. She is active. She has a sickly appearance. She appears distressed. HENT:   Head: Normocephalic and atraumatic. Mouth/Throat: Oropharynx is clear and moist.   Eyes: Conjunctivae and EOM are normal. Pupils are equal, round, and reactive to light. Right eye exhibits no discharge. Left eye exhibits no discharge. No scleral icterus. Neck: Normal range of motion. Neck supple. No thyromegaly present. Cardiovascular: Normal rate, regular rhythm, normal heart sounds and intact distal pulses. Exam reveals no gallop and no friction rub. No murmur heard. Pulmonary/Chest: Effort normal. No respiratory distress. She has decreased breath sounds. She has no wheezes. She has rales in the right lower field and the left lower field. She exhibits no tenderness. Abdominal: Soft. Bowel sounds are normal. She exhibits no distension. There is no hepatosplenomegaly. There is no tenderness. There is no rebound and no guarding. No hernia. Musculoskeletal: Normal range of motion. She exhibits no edema or tenderness. Neurological: She is oriented to person, place, and time. She appears lethargic. No cranial nerve deficit. She exhibits normal muscle tone. Skin: Skin is warm, dry and intact. No rash noted. She is not diaphoretic. No erythema. There is pallor. Psychiatric: Judgment and thought content normal. Her affect is blunt. Her speech is delayed. She is slowed. She exhibits abnormal recent memory. Nursing note and vitals reviewed. MDM  Number of Diagnoses or Management Options  Acute respiratory failure with hypoxia [facility]): new and requires workup  Community acquired pneumonia: new and requires workup  Diagnosis management comments: CHF versus pneumonia versus pneumothorax    7:03 AM  Case discussed with Dr. Wyatt Sifuentes  He will turn the case over to the day time Intensivist for evaluation.   At this point, the patient has been stabilized. We have begun antibiotics. Blood cultures have been sent  I feel that the patient is stable to await the evaluation of the daytime intensivist.         Amount and/or Complexity of Data Reviewed  Clinical lab tests: ordered and reviewed  Tests in the radiology section of CPT®: ordered and reviewed  Tests in the medicine section of CPT®: ordered and reviewed  Review and summarize past medical records: yes  Discuss the patient with other providers: yes  Independent visualization of images, tracings, or specimens: yes    Risk of Complications, Morbidity, and/or Mortality  Presenting problems: high  Diagnostic procedures: high  Management options: high  General comments: /Elements of this note have been dictated via voice recognition software. Text and phrases may be limited by the accuracy of the software. The chart has been reviewed, but errors may still be present.       Critical Care  Total time providing critical care: 30-74 minutes    Patient Progress  Patient progress: improved    ED Course       Procedures

## 2017-08-12 NOTE — PROGRESS NOTES
Patient stable with no complaints at this time. Patient resting in bed with no visual s/s of pain or discomfort. Family at bedside for support. Call light within reach and patient instructed to call if assistance is needed.   Report to be given to oncoming RN 7p-7a

## 2017-08-12 NOTE — IP AVS SNAPSHOT
[address] 
[phone] Patient: Aiede Rushing MRN: [id number] :8/10/1927 Current Discharge Medication List  
  
START taking these medications Dose & Instructions Dispensing Information Comments Morning Noon Evening Bedtime  
 cefdinir 300 mg capsule Commonly known as:  OMNICEF Your next dose is:  17 pm  
   
 Dose:  300 mg Take 1 Cap by mouth two (2) times a day for 3 days. Quantity:  6 Cap Refills:  0 CONTINUE these medications which have CHANGED Dose & Instructions Dispensing Information Comments Morning Noon Evening Bedtime  
 furosemide 40 mg tablet Commonly known as:  LASIX What changed:  when to take this Your last dose was:  17 am  
Your next dose is:  17 am  
   
 Dose:  40 mg Take 1 Tab by mouth daily. Quantity:  60 Tab Refills:  11  
     
  
   
   
   
  
 gabapentin 600 mg tablet Commonly known as:  NEURONTIN What changed:  when to take this Your last dose was:  8/15/2017 9:45pm  
Your next dose is:  Bedtime as needed Dose:  600 mg Take 1 Tab by mouth nightly as needed. Quantity:  90 Tab Refills:  1 CONTINUE these medications which have NOT CHANGED Dose & Instructions Dispensing Information Comments Morning Noon Evening Bedtime  
 aspirin delayed-release 81 mg tablet Your last dose was:  17 am  
Your next dose is:  17 am  
   
 Take  by mouth daily. Refills:  0  
     
  
   
   
   
  
 calcium carbonate 500 mg calcium (1,250 mg) tablet Commonly known as:  OS-CAN Your next dose is:  Resume home schedule Dose:  1 Tab  
take 1 Tab by mouth two (2) times a day. Refills:  0  
     
  
   
   
  
   
  
 carvedilol 12.5 mg tablet Commonly known as:  Leon  Your last dose was:  17 am   
Your next dose is:  17 5pm  
   
 Dose:  12.5 mg Take 1 Tab by mouth two (2) times daily (with meals). Quantity:  60 Tab Refills:  6 HYDROcodone-acetaminophen 7.5-325 mg per tablet Commonly known as:  Gleda Ridgel Your next dose is: Take on as needed schedule Dose:  1 Tab Take 1 Tab by mouth every six (6) hours as needed. Max Daily Amount: 4 Tabs. Indications: Pain Quantity:  90 Tab Refills:  0  
     
   
   
   
  
 levothyroxine 25 mcg tablet Commonly known as:  SYNTHROID Your last dose was:  08/16/17 am  
Your next dose is:  08/17/17 before breakfast  
   
 Dose:  25 mcg Take 1 Tab by mouth Daily (before breakfast). Quantity:  90 Tab Refills:  3  
     
  
   
   
   
  
 lisinopril 10 mg tablet Commonly known as:  Niranjan Handing Your last dose was:  08/16/17 am  
Your next dose is:  08/17/17 am  
   
 Dose:  10 mg Take 1 Tab by mouth daily. Quantity:  90 Tab Refills:  3  
     
  
   
   
   
  
 nitroglycerin 0.4 mg SL tablet Commonly known as:  NITROSTAT Your next dose is: Take per as needed schedule Dose:  0.4 mg  
1 Tab by SubLINGual route every five (5) minutes as needed. Indications: ANGINA Quantity:  1 Bottle Refills:  3  
     
   
   
   
  
 omeprazole 20 mg capsule Commonly known as:  PRILOSEC Your last dose was:  08/16/17 am  
Your next dose is:  08/16/17 4pm  
   
 Dose:  20 mg Take 1 Cap by mouth two (2) times a day. Quantity:  180 Cap Refills:  3  
     
  
   
   
  
   
  
 polyethylene glycol 17 gram packet Commonly known as:  Anny Malik Your next dose is:  Resume home schedule Dose:  17 g Take 1 Packet by mouth daily. Quantity:  30 Packet Refills:  5  
     
  
   
   
   
  
 potassium chloride 10 mEq tablet Commonly known as:  KLOR-CON Your last dose was:  08/16/17 am  
Your next dose is:  08/17/17 am  
   
 Dose:  10 mEq Take 1 Tab by mouth daily. Quantity:  90 Tab Refills:  3 senna-docusate 8.6-50 mg per tablet Commonly known as:  Brittany Jacobse Your last dose was:  8/15/2017 10pm  
Your next dose is:  08/16/17 pm  
   
 Dose:  1 Tab Take 1 Tab by mouth nightly. Quantity:  90 Tab Refills:  3 VITAMIN D3 400 unit Tab tablet Generic drug:  cholecalciferol Your next dose is:  Resume home schedule Dose:  1000 Units Take 1,000 Units by mouth daily. Refills:  0  
     
  
   
   
   
  
 zolpidem 5 mg tablet Commonly known as:  AMBIEN Your next dose is: Take at bedtime as needed. Dose:  5 mg Take 1 Tab by mouth nightly as needed for Sleep. Max Daily Amount: 5 mg. Quantity:  30 Tab Refills:  1 Where to Get Your Medications These medications were sent to [facility], [address] AT [facility] [address] [facility], [facility] [address] Phone:  [phone] cefdinir 300 mg capsule

## 2017-08-12 NOTE — PROGRESS NOTES
TRANSFER - IN REPORT:    Verbal report received from SALVATORE Stringer on Kylie Son  being received from ED for routine progression of care      Report consisted of patients Situation, Background, Assessment and   Recommendations(SBAR). Information from the following report(s) SBAR was reviewed with the receiving nurse. Opportunity for questions and clarification was provided. Assessment completed upon patients arrival to unit and care assumed.

## 2017-08-12 NOTE — PROGRESS NOTES
Patient voices no concerns at this time. Patient is resting with eyes closed in bed. Family at bedside for support. Call light within reach and patient instructed to call if assistance is needed. Will continue to monitor.

## 2017-08-12 NOTE — PROGRESS NOTES
Problem: Nutrition Deficit  Goal: *Optimize nutritional status  Nutrition  Reason for assessment: Referral received from nursing admission Malnutrition Screening Tool for recently lost 2-13# without trying and eating poorly due to decreased appetite. Assessment:   Diet order(s): NPO  Food/Nutrition Patient History:  Patient presents with a h/o diabetes, CKD stage 3, GERD, IBS-C, CAD, CHF, pacemaker and cardiomyopathy. The patient is currently sleeping and daughter is present. The daughter reports a decreased appetite over the past several weeks and that the patient has had ongoing weight loss for years. The patient's PCP has discussed concerns over weight loss with the patient. The patient does Boost supplements occasionally at home and likes the chocolate ones best. The patient is currently NPO awaiting SLP eval. Due to the patients lethargy and sleepiness today SLP eval has been postponed until tomorrow. Daughter denies any recent issues with chewing or swallowing. Weight history in the EMR cannot be verified as accurate due to unknown weight source (pt stated vs estimated vs measured). WT / BMI 8/12/2017 7/13/2017 7/11/2017 6/20/2017   WEIGHT 97 lb 8 oz 93 lb 3.2 oz 93 lb 12.8 oz 94 lb   BODY MASS INDEX 16.74 kg/m2 16 kg/m2 16.1 kg/m2 16.14 kg/m2       WT / BMI 4/18/2017 4/5/2017 3/30/2017 3/21/2017   WEIGHT 97 lb 1.6 oz 94 lb 92 lb 6 oz 88 lb 14.4 oz   BODY MASS INDEX 16.67 kg/m2 16.14 kg/m2 15.86 kg/m2 15.26 kg/m2       WT / BMI 3/15/2017 3/13/2017 3/8/2017 2/26/2017   WEIGHT 95 lb 100 lb 9.6 oz 93 lb 93 lb 3.2 oz   BODY MASS INDEX 16.31 kg/m2 17.27 kg/m2 15.96 kg/m2 16 kg/m2       WT / BMI 2/24/2017 1/3/2017   WEIGHT 100 lb 98 lb   BODY MASS INDEX 17.16 kg/m2 16.82 kg/m2   Per EMR that patient's weight seems to range between  lbs. Anthropometrics:Height: 5' 4\" (162.6 cm),  Weight: 44.2 kg (97 lb 8 oz), Weight Source: Estimated, Body mass index is 16.74 kg/(m^2). BMI class of underweight. Macronutrient needs:  EER:  4808-0970 kcal /day (30-35 kcal/kg estimated BW)  EPR:  35-53 grams protein/day (0.8-1.2 grams/kg IBW) (h/o CKD)  Intake/Comparative Standards: Current NPO status does not meet estimated needs at this time. Nutrition Diagnosis: Underweight related to ongoing insidious weight loss as evidenced by current BMI of 16.7     Intervention:  Meals and snacks: Advance diet per SLP recommendations. Nutrition Supplement Therapy: If diet permits send Boost TID- chocolate. Nutrition Discharge Plan: Too soon to be determined     Aileen Mcguire.  Silvia Yo Roscoe [address], [address],  [phone]

## 2017-08-12 NOTE — IP AVS SNAPSHOT
[address] 
[phone] Patient: Nancy Salazar MRN: [id number] :8/10/1927 You are allergic to the following Allergen Reactions Tylenol (Acetaminophen) Unknown (comments)  
 insomnia Recent Documentation Height Weight Breastfeeding? BMI OB Status Smoking Status 1.626 m 41.1 kg No 15.55 kg/m2 Postmenopausal Never Smoker Unresulted Labs Order Current Status CULTURE, BLOOD Preliminary result CULTURE, BLOOD Preliminary result Emergency Contacts Name Discharge Info Relation Home Work Mobile Karl Soto  Daughter [21] [phone] Greg Simms  Child [2] [phone] About your hospitalization You were admitted on:  2017 You last received care in the:  [facility] 8 MED SURG You were discharged on:  2017 Unit phone number:  [phone] Why you were hospitalized Your primary diagnosis was:  Cap (Community Acquired Pneumonia) Your diagnoses also included:  Atrial Fibrillation (Hcc), Ckd (Chronic Kidney Disease), Stage Iii, Diabetes Mellitus, Type 2 (Hcc), Acute Respiratory Failure With Hypoxemia (Hcc), Cardiomyopathy (Hcc), Hypertension, Dnr (Do Not Resuscitate), Respiratory Failure (Hcc), Jarad (Acute Kidney Injury) (Hcc) Providers Seen During Your Hospitalizations Provider Role Specialty Primary office phone Nickolas Mahajan MD Attending Provider Emergency Medicine [phone] Keren Romero MD Attending Provider Pulmonary Disease [phone] Your Primary Care Physician (PCP) Primary Care Physician Office Phone Office Fax [facility] [phone] Follow-up Information Follow up With Details Comments Contact Info Lukasz Russell MD On 2017 11:15am 21 [facility] [address] 
[phone] ALBERTO Amor On 9/6/2017 10:00am [address] 
[address] [facility] [address] 
[phone] [facility]  Nursing and physical therapy [address] [place] [address] Imelda Hinds [phone] 
[phone] Your Appointments Wednesday August 23, 2017 11:15 AM EDT Office Visit with Sonia Ball MD  
[address]) [address] [facility] [address] [phone] Wednesday September 06, 2017 11:20 AM EDT  
(Arrive by 10:50 AM) HOSPITAL with ALBERTO Amor [facility] ([facility]) [address] [facility]  
[phone] Current Discharge Medication List  
  
START taking these medications Dose & Instructions Dispensing Information Comments Morning Noon Evening Bedtime  
 cefdinir 300 mg capsule Commonly known as:  OMNICEF Your next dose is:  08/16/17 pm  
   
 Dose:  300 mg Take 1 Cap by mouth two (2) times a day for 3 days. Quantity:  6 Cap Refills:  0 CONTINUE these medications which have CHANGED Dose & Instructions Dispensing Information Comments Morning Noon Evening Bedtime  
 furosemide 40 mg tablet Commonly known as:  LASIX What changed:  when to take this Your last dose was:  08/16/17 am  
Your next dose is:  08/17/17 am  
   
 Dose:  40 mg Take 1 Tab by mouth daily. Quantity:  60 Tab Refills:  11  
     
  
   
   
   
  
 gabapentin 600 mg tablet Commonly known as:  NEURONTIN What changed:  when to take this Your last dose was:  8/15/2017 9:45pm  
Your next dose is:  Bedtime as needed Dose:  600 mg Take 1 Tab by mouth nightly as needed. Quantity:  90 Tab Refills:  1 CONTINUE these medications which have NOT CHANGED Dose & Instructions Dispensing Information Comments Morning Noon Evening Bedtime  
 aspirin delayed-release 81 mg tablet Your last dose was:  08/16/17 am  
Your next dose is:  08/17/17 am  
   
 Take  by mouth daily. Refills:  0  
     
  
   
   
   
  
 calcium carbonate 500 mg calcium (1,250 mg) tablet Commonly known as:  OS-CAN Your next dose is:  Resume home schedule Dose:  1 Tab  
take 1 Tab by mouth two (2) times a day. Refills:  0  
     
  
   
   
  
   
  
 carvedilol 12.5 mg tablet Commonly known as:  Corby Caballerofeld Your last dose was:  08/16/17 am   
Your next dose is:  08/16/17 5pm  
   
 Dose:  12.5 mg Take 1 Tab by mouth two (2) times daily (with meals). Quantity:  60 Tab Refills:  6 HYDROcodone-acetaminophen 7.5-325 mg per tablet Commonly known as:  Riana Barbara Your next dose is: Take on as needed schedule Dose:  1 Tab Take 1 Tab by mouth every six (6) hours as needed. Max Daily Amount: 4 Tabs. Indications: Pain Quantity:  90 Tab Refills:  0  
     
   
   
   
  
 levothyroxine 25 mcg tablet Commonly known as:  SYNTHROID Your last dose was:  08/16/17 am  
Your next dose is:  08/17/17 before breakfast  
   
 Dose:  25 mcg Take 1 Tab by mouth Daily (before breakfast). Quantity:  90 Tab Refills:  3  
     
  
   
   
   
  
 lisinopril 10 mg tablet Commonly known as:  Aspen [place] Your last dose was:  08/16/17 am  
Your next dose is:  08/17/17 am  
   
 Dose:  10 mg Take 1 Tab by mouth daily. Quantity:  90 Tab Refills:  3  
     
  
   
   
   
  
 nitroglycerin 0.4 mg SL tablet Commonly known as:  NITROSTAT Your next dose is: Take per as needed schedule Dose:  0.4 mg  
1 Tab by SubLINGual route every five (5) minutes as needed. Indications: ANGINA Quantity:  1 Bottle Refills:  3  
     
   
   
   
  
 omeprazole 20 mg capsule Commonly known as:  PRILOSEC Your last dose was:  08/16/17 am  
Your next dose is:  08/16/17 4pm  
   
 Dose:  20 mg Take 1 Cap by mouth two (2) times a day. Quantity:  180 Cap Refills:  3  
     
  
   
   
  
   
  
 polyethylene glycol 17 gram packet Commonly known as:  Kathrin Hodgkin Your next dose is:  Resume home schedule Dose:  17 g Take 1 Packet by mouth daily. Quantity:  30 Packet Refills:  5  
     
  
   
   
   
  
 potassium chloride 10 mEq tablet Commonly known as:  KLOR-CON Your last dose was:  08/16/17 am  
Your next dose is:  08/17/17 am  
   
 Dose:  10 mEq Take 1 Tab by mouth daily. Quantity:  90 Tab Refills:  3  
     
  
   
   
   
  
 senna-docusate 8.6-50 mg per tablet Commonly known as:  Herb Fought Your last dose was:  8/15/2017 10pm  
Your next dose is:  08/16/17 pm  
   
 Dose:  1 Tab Take 1 Tab by mouth nightly. Quantity:  90 Tab Refills:  3 VITAMIN D3 400 unit Tab tablet Generic drug:  cholecalciferol Your next dose is:  Resume home schedule Dose:  1000 Units Take 1,000 Units by mouth daily. Refills:  0  
     
  
   
   
   
  
 zolpidem 5 mg tablet Commonly known as:  AMBIEN Your next dose is: Take at bedtime as needed. Dose:  5 mg Take 1 Tab by mouth nightly as needed for Sleep. Max Daily Amount: 5 mg. Quantity:  30 Tab Refills:  1 Where to Get Your Medications These medications were sent to [facility], [address] AT [address] [facility], [address] [place] [address]. Phone:  [phone] cefdinir 300 mg capsule Discharge Instructions DISCHARGE SUMMARY from Nurse The following personal items are in your possession at time of discharge: 
 
Dental Appliances: None Visual Aid: None Home Medications: None Jewelry: None Clothing: Pajamas Other Valuables: None Personal Items Sent to Safe: none PATIENT INSTRUCTIONS: 
 
 After general anesthesia or intravenous sedation, for 24 hours or while taking prescription Narcotics: · Limit your activities · Do not drive and operate hazardous machinery · Do not make important personal or business decisions · Do  not drink alcoholic beverages · If you have not urinated within 8 hours after discharge, please contact your surgeon on call. Report the following to your surgeon: 
· Excessive pain, swelling, redness or odor of or around the surgical area · Temperature over 100.5 · Nausea and vomiting lasting longer than 4 hours or if unable to take medications · Any signs of decreased circulation or nerve impairment to extremity: change in color, persistent  numbness, tingling, coldness or increase pain · Any questions What to do at Home: 
Recommended activity: Activity as tolerated. If you experience any of the following symptoms temp > 101.5, worsening cough or wheezing, shortness of breath or fatigue not relieved with rest, please follow up with MD. 
 
 
*  Please give a list of your current medications to your Primary Care Provider. *  Please update this list whenever your medications are discontinued, doses are 
    changed, or new medications (including over-the-counter products) are added. *  Please carry medication information at all times in case of emergency situations. These are general instructions for a healthy lifestyle: No smoking/ No tobacco products/ Avoid exposure to second hand smoke Surgeon General's Warning:  Quitting smoking now greatly reduces serious risk to your health. Obesity, smoking, and sedentary lifestyle greatly increases your risk for illness A healthy diet, regular physical exercise & weight monitoring are important for maintaining a healthy lifestyle You may be retaining fluid if you have a history of heart failure or if you experience any of the following symptoms:  Weight gain of 3 pounds or more overnight or 5 pounds in a week, increased swelling in our hands or feet or shortness of breath while lying flat in bed. Please call your doctor as soon as you notice any of these symptoms; do not wait until your next office visit. Recognize signs and symptoms of STROKE: 
 
F-face looks uneven A-arms unable to move or move unevenly S-speech slurred or non-existent T-time-call 911 as soon as signs and symptoms begin-DO NOT go Back to bed or wait to see if you get better-TIME IS BRAIN. Warning Signs of HEART ATTACK Call 911 if you have these symptoms: 
? Chest discomfort. Most heart attacks involve discomfort in the center of the chest that lasts more than a few minutes, or that goes away and comes back. It can feel like uncomfortable pressure, squeezing, fullness, or pain. ? Discomfort in other areas of the upper body. Symptoms can include pain or discomfort in one or both arms, the back, neck, jaw, or stomach. ? Shortness of breath with or without chest discomfort. ? Other signs may include breaking out in a cold sweat, nausea, or lightheadedness. Don't wait more than five minutes to call [address]! Fast action can save your life. Calling 911 is almost always the fastest way to get lifesaving treatment. Emergency Medical Services staff can begin treatment when they arrive  up to an hour sooner than if someone gets to the hospital by car. The discharge information has been reviewed with the patient. The patient verbalized understanding. Discharge medications reviewed with the patient and appropriate educational materials and side effects teaching were provided. Pneumonia: Care Instructions Your Care Instructions Pneumonia is an infection of the lungs. Most cases are caused by infections from bacteria or viruses. Pneumonia may be mild or very severe.  If it is caused by bacteria, you will be treated with antibiotics. It may take a few weeks to a few months to recover fully from pneumonia, depending on how sick you were and whether your overall health is good. Follow-up care is a key part of your treatment and safety. Be sure to make and go to all appointments, and call your doctor if you are having problems. Its also a good idea to know your test results and keep a list of the medicines you take. How can you care for yourself at home? · Take your antibiotics exactly as directed. Do not stop taking the medicine just because you are feeling better. You need to take the full course of antibiotics. · Take your medicines exactly as prescribed. Call your doctor if you think you are having a problem with your medicine. · Get plenty of rest and sleep. You may feel weak and tired for a while, but your energy level will improve with time. · To prevent dehydration, drink plenty of fluids, enough so that your urine is light yellow or clear like water. Choose water and other caffeine-free clear liquids until you feel better. If you have kidney, heart, or liver disease and have to limit fluids, talk with your doctor before you increase the amount of fluids you drink. · Take care of your cough so you can rest. A cough that brings up mucus from your lungs is common with pneumonia. It is one way your body gets rid of the infection. But if coughing keeps you from resting or causes severe fatigue and chest-wall pain, talk to your doctor. He or she may suggest that you take a medicine to reduce the cough. · Use a vaporizer or humidifier to add moisture to your bedroom. Follow the directions for cleaning the machine. · Do not smoke or allow others to smoke around you. Smoke will make your cough last longer. If you need help quitting, talk to your doctor about stop-smoking programs and medicines. These can increase your chances of quitting for good. · Take an over-the-counter pain medicine, such as acetaminophen (Tylenol), ibuprofen (Advil, Motrin), or naproxen (Aleve). Read and follow all instructions on the label. · Do not take two or more pain medicines at the same time unless the doctor told you to. Many pain medicines have acetaminophen, which is Tylenol. Too much acetaminophen (Tylenol) can be harmful. · If you were given a spirometer to measure how well your lungs are working, use it as instructed. This can help your doctor tell how your recovery is going. · To prevent pneumonia in the future, talk to your doctor about getting a flu vaccine (once a year) and a pneumococcal vaccine (one time only for most people). When should you call for help? Call 911 anytime you think you may need emergency care. For example, call if: 
· You have severe trouble breathing. Call your doctor now or seek immediate medical care if: 
· You cough up dark brown or bloody mucus (sputum). · You have new or worse trouble breathing. · You are dizzy or lightheaded, or you feel like you may faint. Watch closely for changes in your health, and be sure to contact your doctor if: 
· You have a new or higher fever. · You are coughing more deeply or more often. · You are not getting better after 2 days (48 hours). · You do not get better as expected. Where can you learn more? Go to http://david-rui.info/. Enter [id number] in the search box to learn more about \"Pneumonia: Care Instructions. \" Current as of: March 25, 2017 Content Version: 11.3 © 6890-1467 Healthwise, Incorporated. Care instructions adapted under license by [employer] (which disclaims liability or warranty for this information). If you have questions about a medical condition or this instruction, always ask your healthcare professional. [place] [address] any warranty or liability for your use of this information. Discharge Orders None ACO Transitions of Care Introducing [employer] [address] Diann Reyes offers a voluntary care coordination program to provide high quality service and care to [facility] fee-for-service beneficiaries. Gaetano Busch was designed to help you enhance your health and well-being through the following services: ? Transitions of Care  support for individuals who are transitioning from one care setting to another (example: Hospital to home). ? Chronic and Complex Care Coordination  support for individuals and caregivers of those with serious or chronic illnesses or with more than one chronic (ongoing) condition and those who take a number of different medications. If you meet specific medical criteria, a [address] [place] [address] Rd may call you directly to coordinate your care with your primary care physician and your other care providers. For questions about the [facility] programs, please, contact your physicians office. For general questions or additional information about Accountable Care Organizations: 
Please visit www.medicare.gov/acos. html or call 1-800-MEDICARE (5-991.266.9790) TTY users should call 2-767.391.5717. "[employer]" Announcement We are excited to announce that we are making your provider's discharge notes available to you in "[employer]". You will see these notes when they are completed and signed by the physician that discharged you from your recent hospital stay. If you have any questions or concerns about any information you see in "[employer]", please call the Health Information Department where you were seen or reach out to your Primary Care Provider for more information about your plan of care. Introducing hospitals & HEALTH SERVICES! [employer] introduces "[employer]" patient portal. Now you can access parts of your medical record, email your doctor's office, and request medication refills online. 1. In your internet browser, go to [email or web address] 2. Click on the First Time User? Click Here link in the Sign In box. You will see the New Member Sign Up page. 3. Enter your [employer] Access Code exactly as it appears below. You will not need to use this code after youve completed the sign-up process. If you do not sign up before the expiration date, you must request a new code. · [employer] Access Code: [email or web address] Expires: 9/18/2017  5:05 PM 
 
4. Enter the last four digits of your Social Security Number (xxxx) and Date of Birth (mm/dd/yyyy) as indicated and click Submit. You will be taken to the next sign-up page. 5. Create a [employer] ID. This will be your [employer] login ID and cannot be changed, so think of one that is secure and easy to remember. 6. Create a [employer] password. You can change your password at any time. 7. Enter your Password Reset Question and Answer. This can be used at a later time if you forget your password. 8. Enter your e-mail address. You will receive e-mail notification when new information is available in [address] 9. Click Sign Up. You can now view and download portions of your medical record. 10. Click the Download Summary menu link to download a portable copy of your medical information. If you have questions, please visit the Frequently Asked Questions section of the [employer] website. Remember, [employer] is NOT to be used for urgent needs. For medical emergencies, dial 911. Now available from your iPhone and Android! General Information Please provide this summary of care documentation to your next provider. Patient Signature:  ____________________________________________________________ Date:  ____________________________________________________________  
  
Rexann Ports Provider Signature:  ____________________________________________________________ Date:  ____________________________________________________________

## 2017-08-12 NOTE — PROGRESS NOTES
Speech Pathology Note:    Spoke with nurse Katie Peguero RN) regarding order for bedside swallow evaluation. Reports patient is lethargic and there is a concern for aspiration. Requests SLP to hold and attempt evaluation tomorrow AM.    Thank you for this consult,    Dann Diaz.  MS Edy, CCC-SLP

## 2017-08-12 NOTE — ED TRIAGE NOTES
Patient arrives via EMS on CPAP. Hx of CHF, initial o2 sat with EMS on scene 75%. Given 0.4 mg nitroglycerin and placed nitropaste on chest which lead to improvement. 18 g LAC. Per EMS, rales noted upon auscultation and hypertensive.

## 2017-08-12 NOTE — PROGRESS NOTES
PT note:    Chart reviewed and attempted PT evaluation this afternoon. Nursing requested to hold PT evaluation until tomorrow. Will check back later time/date as schedule allows.     Thanks,  Suasn Arenas, KELLEYT

## 2017-08-12 NOTE — ED NOTES
Pt now off BiPAP, per hospitalist. Pt tolerating NC well at 4L/min remaining at 95%. Pt's family at bedside.

## 2017-08-12 NOTE — H&P
HISTORY AND PHYSICAL      Johana Quiñonez    8/12/2017    Date of Admission:  8/12/2017    The patient's chart is reviewed and the patient is discussed with the staff. Subjective:     Patient is a 80 y.o.  female presents with sob. Patient has a history of hypothyroidism, HTN, HL, a.fib on ASA alone due to fall risk, CAD, ischemic cardiomyopathy, SSS s/p PPM placement, CKD, and DM. Patient is lethargic and history is provided by patient's daughter. Patient was in her usual state of health yesterday, ate dinner well and went to bed last night as usual.  Apparently she woke this am with c/o sob and was \"rattling\". She has chronic pain in her neck and shoulder which is unchanged from her baseline and daughter reports no additional c/o chest discomfort. Daughter states that patient then either cough up or vomited up a small amount of liquid but was in enough distress at that point that she called EMS. On their arrival patient's O2 sats were in the 70s and she was in respiratory distress. She was placed on CPAP and brought to the ER for further evaluation. Her initial o2 sat on CPAP were 86% and she was transitioned to BiPAP. She was also significantly hypertensive with /97 - NTG patch placed by EMS. , WBC 8.2, PCT pending. On exam patient remains extremely drowsy but does occasionally wake and lifts her head. She was transitioned to NC with o2 sats remaining in the 90s. BP dropped to 80/50s (without additional tx of HTN in ER), IVF bolus was started and NGT patch removed. Per patient's daughter she has no hx of lung disease, does not require home O2, and does not smoke. Review of Systems  Review of systems not obtained due to patient factors.     Patient Active Problem List   Diagnosis Code    Diabetes mellitus, type 2 ([facility] [address] E11.9    Hypertension I10    Acquired hypothyroidism E03.9    Mixed hyperlipidemia E78.2    CKD (chronic kidney disease), stage III N18.3    GERD (gastroesophageal reflux disease) K21.9    IBS (irritable bowel syndrome) K58.9    Gait disorder R26.9    Atrial fibrillation with RVR ([facility]) I48.91    Autonomic neuropathy G90.9    MCI (mild cognitive impairment) G31.84    Chronic pain syndrome G89.4    Cervical radiculopathy M54.12    Anorexia R63.0    Dysphagia R13.10    Pacemaker Z95.0    SSS (sick sinus syndrome) ([facility]) I49.5    Orthostatic hypotension I95.1    CAD in native artery I25.10    Dyspnea R06.00    Atrial fibrillation with rapid ventricular response ([facility]) I48.91    Altered mental state R41.82    Chest pain, pleuritic R07.81    Atrial fibrillation ([facility]) I48.91    Altered mental status R41.82    CHF (congestive heart failure) ([facility]) I50.9    Cardiomyopathy ([facility]) I42.9    Ischemic cardiomyopathy I25.5       Prior to Admission Medications   Prescriptions Last Dose Informant Patient Reported? Taking? HYDROcodone-acetaminophen (NORCO) 7.5-325 mg per tablet   No No   Sig: Take 1 Tab by mouth every six (6) hours as needed. Max Daily Amount: 4 Tabs. Indications: Pain   aspirin delayed-release 81 mg tablet   Yes No   Sig: Take  by mouth daily. calcium carbonate (OS-) 500 mg (1,250 mg) tablet   Yes No   Sig: take 1 Tab by mouth two (2) times a day. carvedilol (COREG) 12.5 mg tablet   No No   Sig: Take 1 Tab by mouth two (2) times daily (with meals). cholecalciferol (VITAMIN D-3) 400 unit Tab tablet   Yes No   Sig: Take 1,000 Units by mouth daily. furosemide (LASIX) 40 mg tablet   No No   Sig: Take 1 Tab by mouth daily. gabapentin (NEURONTIN) 600 mg tablet   No No   Sig: Take 1 Tab by mouth nightly as needed. glimepiride (AMARYL) 1 mg tablet   Yes No   Sig: Take 0.5 mg by mouth Daily (before breakfast). levothyroxine (SYNTHROID) 25 mcg tablet   No No   Sig: Take 1 Tab by mouth Daily (before breakfast). lisinopril (PRINIVIL, ZESTRIL) 10 mg tablet   No No   Sig: Take 1 Tab by mouth daily.    nitroglycerin (NITROSTAT) 0.4 mg SL tablet   No No   Si Tab by SubLINGual route every five (5) minutes as needed. Indications: ANGINA   omeprazole (PRILOSEC) 20 mg capsule   No No   Sig: Take 1 Cap by mouth two (2) times a day. polyethylene glycol (MIRALAX) 17 gram packet   No No   Sig: Take 1 Packet by mouth daily. potassium chloride (KLOR-CON) 10 mEq tablet   No No   Sig: Take 1 Tab by mouth daily. senna-docusate (PERICOLACE) 8.6-50 mg per tablet   No No   Sig: Take 1 Tab by mouth nightly. zolpidem (AMBIEN) 5 mg tablet   No No   Sig: Take 1 Tab by mouth nightly as needed for Sleep. Max Daily Amount: 5 mg.       Facility-Administered Medications: None       Past Medical History:   Diagnosis Date    A fib     Abnormal loss of weight     Acquired hypothyroidism 2012    Anemia     Anorexia 2014    Arthritis associated with another disorder     Atrial fibrillation ([address] 2012    Paroxysmal.  Coumadin contraindicated due to falls      Autonomic neuropathy 2013    CAD (coronary artery disease)     CAD in native artery 2016    Cervical radiculopathy 2013    Chest pain     Chronic pain syndrome 2013    Back, right shoulder, neck: Peripheral neuropathy, spinal stenosis C7-T1, foraminal narrowing C4-5     CKD (chronic kidney disease), stage III 2012    Diabetes ([address]     about 30 years    Diabetes mellitus     Diabetes mellitus, type 2 ([address] 2012    Dysphagia 2014    Due to esophageal spasm seen on modified barium swallow      Gait disorder 2012    GERD (gastroesophageal reflux disease)     Heart failure ([address]     HTN     Hypertension 2012    Orthostatic hypotension due to autonomic neuropathy     Hypothyroid     IBS (irritable bowel syndrome) 2012    Iron deficiency 2012    Lumbar disc disease     MCI (mild cognitive impairment) 2013    Mixed hyperlipidemia 2012    Orthostatic hypotension 2016    Pacemaker  SSS (sick sinus syndrome) ([facility] 5/5/2016     Past Surgical History:   Procedure Laterality Date    HX APPENDECTOMY      HX CARPAL TUNNEL RELEASE      HX CERVICAL FUSION      HX COLONOSCOPY  Nov 2005    HX PACEMAKER  6/08   Thuan Benitez PACEMAKER  2017    Replacement      Social History     Social History    Marital status:      Spouse name: N/A    Number of children: N/A    Years of education: N/A     Occupational History    Not on file. Social History Main Topics    Smoking status: Never Smoker    Smokeless tobacco: Never Used    Alcohol use No    Drug use: No    Sexual activity: Not on file     Other Topics Concern    Not on file     Social History Narrative    Lives with  who has mild vascular dementia, patient is primary caretaker but daughters come over to house 4-5 times a week, Ryanne Fuentes has HCPOA     History reviewed. No pertinent family history. Allergies   Allergen Reactions    Tylenol [Acetaminophen] Unknown (comments)     insomnia       Current Facility-Administered Medications   Medication Dose Route Frequency    0.9% sodium chloride infusion  150 mL/hr IntraVENous CONTINUOUS    cefTRIAXone (ROCEPHIN) 2 g in 0.9% sodium chloride (MBP/ADV) 50 mL  2 g IntraVENous Q24H    azithromycin (ZITHROMAX) 500 mg in 0.9% sodium chloride (MBP/ADV) 250 mL  500 mg IntraVENous Q24H    sodium chloride 0.9 % bolus infusion 1,000 mL  1,000 mL IntraVENous ONCE    methylPREDNISolone (PF) (SOLU-MEDROL) injection 125 mg  125 mg IntraVENous ONCE    aspirin (ASA) suppository 300 mg  300 mg Rectal DAILY     Current Outpatient Prescriptions   Medication Sig    HYDROcodone-acetaminophen (NORCO) 7.5-325 mg per tablet Take 1 Tab by mouth every six (6) hours as needed. Max Daily Amount: 4 Tabs. Indications: Pain    lisinopril (PRINIVIL, ZESTRIL) 10 mg tablet Take 1 Tab by mouth daily.  potassium chloride (KLOR-CON) 10 mEq tablet Take 1 Tab by mouth daily.     levothyroxine (SYNTHROID) 25 mcg tablet Take 1 Tab by mouth Daily (before breakfast).  senna-docusate (PERICOLACE) 8.6-50 mg per tablet Take 1 Tab by mouth nightly.  gabapentin (NEURONTIN) 600 mg tablet Take 1 Tab by mouth nightly as needed.  furosemide (LASIX) 40 mg tablet Take 1 Tab by mouth daily.  glimepiride (AMARYL) 1 mg tablet Take 0.5 mg by mouth Daily (before breakfast).  carvedilol (COREG) 12.5 mg tablet Take 1 Tab by mouth two (2) times daily (with meals).  zolpidem (AMBIEN) 5 mg tablet Take 1 Tab by mouth nightly as needed for Sleep. Max Daily Amount: 5 mg.  aspirin delayed-release 81 mg tablet Take  by mouth daily.  omeprazole (PRILOSEC) 20 mg capsule Take 1 Cap by mouth two (2) times a day.  polyethylene glycol (MIRALAX) 17 gram packet Take 1 Packet by mouth daily.  nitroglycerin (NITROSTAT) 0.4 mg SL tablet 1 Tab by SubLINGual route every five (5) minutes as needed. Indications: ANGINA    cholecalciferol (VITAMIN D-3) 400 unit Tab tablet Take 1,000 Units by mouth daily.  calcium carbonate (OS-) 500 mg (1,250 mg) tablet take 1 Tab by mouth two (2) times a day. Objective:     Vitals:    08/12/17 0534 08/12/17 0539 08/12/17 0554 08/12/17 0601   BP: (!) 232/97   (!) 206/88   Pulse: 72 75  74   Resp: (!) 32 29  19   Temp: 97.6 °F (36.4 °C)      SpO2: (!) 86% 92% 95% 99%   Weight: 95 lb (43.1 kg)      Height: 5' 4\" (1.626 m)          PHYSICAL EXAM     Constitutional:  the patient is well developed and in no acute distress  EENMT:  Sclera clear, pupils equal, oral mucosa moist  Respiratory: rhonchi on L, BiPAP >>>NC  Cardiovascular:  iRRR without M,G,R  Gastrointestinal: soft and non-tender; with positive bowel sounds. Musculoskeletal: warm without cyanosis. There is no lower leg edema. Skin:  no jaundice or rashes, no open wounds   Neurologic: no gross neuro deficits     Psychiatric:  alert and oriented x 3    CXR:  2/25/17 TTE  -  Left ventricle: Systolic function was markedly reduced. Ejection fraction  was estimated in the range of 30 % to 35 %. This study was inadequate for the  evaluation of regional wall motion. Possible mild anterior hypokinesis EF   Total wnl There was moderate concentric hypertrophy. -  Left atrium: The atrium was mildly dilated. -  Right atrium: The atrium was mildly dilated. -  Inferior vena cava, hepatic veins: The inferior vena cava was mildly  dilated. -  Mitral valve: There was mild annular calcification. There was mild to  moderate regurgitation. -  Tricuspid valve: There was mild regurgitation. -  Pericardium: A trivial pericardial effusion was identified.       8/12        Recent Labs      08/12/17   0545   WBC  8.2   HGB  12.2   HCT  35.6*   PLT  260     Recent Labs      08/12/17   0545   NA  136   K  4.5   CL  97*   GLU  164*   CO2  29   BUN  22   CREA  1.52*   MG  1.8   CA  8.4   ALB  3.6   TBILI  0.7   ALT  42   SGOT  66*     Recent Labs      08/12/17   0540   PH  7.41   PCO2  38   PO2  72*   HCO3  23       Assessment:  (Medical Decision Making)     Hospital Problems  Date Reviewed: 8/12/2017          Codes Class Noted POA    Diabetes mellitus, type 2 ([facility] [address] (Chronic) ICD-10-CM: E11.9  ICD-9-CM: 250.00  8/12/2017 Yes    Diet controlled      Hypertension (Chronic) ICD-10-CM: I10  ICD-9-CM: 401.9  8/12/2017 Yes     Orthostatic hypotension due to autonomic neuropathy  Initially very hypertensive in ER with /97 followed by a precipitous drop to 80/50s         CKD (chronic kidney disease), stage III (Chronic) ICD-10-CM: N18.3  ICD-9-CM: 585.3  8/12/2017 Yes    Creat 1.52 - baseline      Atrial fibrillation (HCC) (Chronic) ICD-10-CM: I48.91  ICD-9-CM: 427.31  8/12/2017 Yes    Rate controlled in the 70s  On ASA alone for anticoagulation due to high fall risk      Acute respiratory failure with hypoxemia [facility]) ICD-10-CM: J96.01  ICD-9-CM: 518.81  8/12/2017 Yes    Initially required BIPAP in ER but transitioned to NC      * (Principal)CAP (community acquired pneumonia) ICD-10-CM: J18.9  ICD-9-CM: 959  8/12/2017 Yes    Large L sided infiltrate  On abx  ? Aspiration - family reports that patient has no overt s/s of aspiration/dysphagia but that the patient some times complains of difficulty swallowing - may benefit from SLP eval      DNR (do not resuscitate) (Chronic) ICD-10-CM: Z66  ICD-9-CM: V49.86  8/12/2017 Yes    Confirmed with patient's daughter      Cardiomyopathy [facility]) ICD-10-CM: I42.9  ICD-9-CM: 425.4  3/30/2017 Yes         Plan:  (Medical Decision Making)     --Will admit to ICU for further medical management  --Supplemental O2 - weaned off BIPAP in ER   --antibiotic therapy  --IVF bolus in ER / NTG patch removed  --has lasix ordered with  but now hypotensive  --daily labs    More than 50% of the time documented was spent in face-to-face contact with the patient and in the care of the patient on the floor/unit where the patient is located. Bryan Cortes, TAMIKA  Lungs: crackles and rhonchi on left  Heart:  RRR with no Murmur/Rubs/Gallops    Additional Comments:  Admit to icu, wean off bipap, iv antibx     I have spoken with and examined the patient. I agree with the above assessment and plan as documented.     Kartik Ritchie MD

## 2017-08-12 NOTE — PROGRESS NOTES
Patient is peaceful with loving daughter at bedside. Encouraged with presence and prayer.   Signed by chaplain Karli

## 2017-08-13 ENCOUNTER — APPOINTMENT (OUTPATIENT)
Dept: GENERAL RADIOLOGY | Age: 82
DRG: 291 | End: 2017-08-13
Attending: INTERNAL MEDICINE
Payer: MEDICARE

## 2017-08-13 PROBLEM — N17.9 AKI (ACUTE KIDNEY INJURY) (HCC): Status: ACTIVE | Noted: 2017-08-13

## 2017-08-13 LAB
ALBUMIN SERPL BCP-MCNC: 2.8 G/DL (ref 3.2–4.6)
ALBUMIN/GLOB SERPL: 0.9 {RATIO} (ref 1.2–3.5)
ALP SERPL-CCNC: 66 U/L (ref 50–136)
ALT SERPL-CCNC: 27 U/L (ref 12–65)
ANION GAP BLD CALC-SCNC: 10 MMOL/L (ref 7–16)
AST SERPL W P-5'-P-CCNC: 25 U/L (ref 15–37)
BILIRUB SERPL-MCNC: 0.5 MG/DL (ref 0.2–1.1)
BUN SERPL-MCNC: 36 MG/DL (ref 8–23)
CALCIUM SERPL-MCNC: 8 MG/DL (ref 8.3–10.4)
CHLORIDE SERPL-SCNC: 102 MMOL/L (ref 98–107)
CO2 SERPL-SCNC: 27 MMOL/L (ref 21–32)
CREAT SERPL-MCNC: 1.84 MG/DL (ref 0.6–1)
GLOBULIN SER CALC-MCNC: 3 G/DL (ref 2.3–3.5)
GLUCOSE BLD STRIP.AUTO-MCNC: 104 MG/DL (ref 65–100)
GLUCOSE BLD STRIP.AUTO-MCNC: 161 MG/DL (ref 65–100)
GLUCOSE BLD STRIP.AUTO-MCNC: 208 MG/DL (ref 65–100)
GLUCOSE BLD STRIP.AUTO-MCNC: 216 MG/DL (ref 65–100)
GLUCOSE SERPL-MCNC: 161 MG/DL (ref 65–100)
POTASSIUM SERPL-SCNC: 4.4 MMOL/L (ref 3.5–5.1)
PROT SERPL-MCNC: 5.8 G/DL (ref 6.3–8.2)
SODIUM SERPL-SCNC: 139 MMOL/L (ref 136–145)

## 2017-08-13 PROCEDURE — 99232 SBSQ HOSP IP/OBS MODERATE 35: CPT | Performed by: INTERNAL MEDICINE

## 2017-08-13 PROCEDURE — 74011250637 HC RX REV CODE- 250/637: Performed by: INTERNAL MEDICINE

## 2017-08-13 PROCEDURE — 80053 COMPREHEN METABOLIC PANEL: CPT | Performed by: INTERNAL MEDICINE

## 2017-08-13 PROCEDURE — 82962 GLUCOSE BLOOD TEST: CPT

## 2017-08-13 PROCEDURE — 36415 COLL VENOUS BLD VENIPUNCTURE: CPT | Performed by: INTERNAL MEDICINE

## 2017-08-13 PROCEDURE — 65660000000 HC RM CCU STEPDOWN

## 2017-08-13 PROCEDURE — 74011636637 HC RX REV CODE- 636/637: Performed by: INTERNAL MEDICINE

## 2017-08-13 PROCEDURE — 97161 PT EVAL LOW COMPLEX 20 MIN: CPT

## 2017-08-13 PROCEDURE — 74011000258 HC RX REV CODE- 258: Performed by: INTERNAL MEDICINE

## 2017-08-13 PROCEDURE — 77030019605

## 2017-08-13 PROCEDURE — 74011250636 HC RX REV CODE- 250/636: Performed by: INTERNAL MEDICINE

## 2017-08-13 PROCEDURE — 71010 XR CHEST SNGL V: CPT

## 2017-08-13 PROCEDURE — 92610 EVALUATE SWALLOWING FUNCTION: CPT

## 2017-08-13 RX ORDER — HEPARIN SODIUM 5000 [USP'U]/ML
5000 INJECTION, SOLUTION INTRAVENOUS; SUBCUTANEOUS EVERY 8 HOURS
Status: DISCONTINUED | OUTPATIENT
Start: 2017-08-13 | End: 2017-08-16 | Stop reason: HOSPADM

## 2017-08-13 RX ORDER — SODIUM CHLORIDE 9 MG/ML
50 INJECTION, SOLUTION INTRAVENOUS CONTINUOUS
Status: DISCONTINUED | OUTPATIENT
Start: 2017-08-13 | End: 2017-08-14

## 2017-08-13 RX ADMIN — HEPARIN SODIUM 5000 UNITS: 5000 INJECTION, SOLUTION INTRAVENOUS; SUBCUTANEOUS at 21:35

## 2017-08-13 RX ADMIN — FUROSEMIDE 40 MG: 10 INJECTION, SOLUTION INTRAMUSCULAR; INTRAVENOUS at 08:09

## 2017-08-13 RX ADMIN — GABAPENTIN 600 MG: 300 CAPSULE ORAL at 22:16

## 2017-08-13 RX ADMIN — CEFTRIAXONE 2 G: 2 INJECTION, POWDER, FOR SOLUTION INTRAMUSCULAR; INTRAVENOUS at 08:03

## 2017-08-13 RX ADMIN — Medication 5 ML: at 06:00

## 2017-08-13 RX ADMIN — PANTOPRAZOLE SODIUM 40 MG: 40 TABLET, DELAYED RELEASE ORAL at 05:59

## 2017-08-13 RX ADMIN — FUROSEMIDE 40 MG: 10 INJECTION, SOLUTION INTRAMUSCULAR; INTRAVENOUS at 16:08

## 2017-08-13 RX ADMIN — SODIUM CHLORIDE 50 ML/HR: 900 INJECTION, SOLUTION INTRAVENOUS at 21:46

## 2017-08-13 RX ADMIN — INSULIN LISPRO 6 UNITS: 100 INJECTION, SOLUTION INTRAVENOUS; SUBCUTANEOUS at 16:08

## 2017-08-13 RX ADMIN — LEVOTHYROXINE SODIUM 25 MCG: 50 TABLET ORAL at 05:59

## 2017-08-13 RX ADMIN — ASPIRIN 81 MG: 81 TABLET, COATED ORAL at 09:00

## 2017-08-13 RX ADMIN — CARVEDILOL 12.5 MG: 12.5 TABLET, FILM COATED ORAL at 16:05

## 2017-08-13 RX ADMIN — CARVEDILOL 12.5 MG: 12.5 TABLET, FILM COATED ORAL at 08:03

## 2017-08-13 RX ADMIN — INSULIN LISPRO 6 UNITS: 100 INJECTION, SOLUTION INTRAVENOUS; SUBCUTANEOUS at 11:10

## 2017-08-13 RX ADMIN — AZITHROMYCIN MONOHYDRATE 500 MG: 500 INJECTION, POWDER, LYOPHILIZED, FOR SOLUTION INTRAVENOUS at 06:00

## 2017-08-13 RX ADMIN — STANDARDIZED SENNA CONCENTRATE AND DOCUSATE SODIUM 1 TABLET: 8.6; 5 TABLET, FILM COATED ORAL at 21:35

## 2017-08-13 RX ADMIN — LISINOPRIL 10 MG: 5 TABLET ORAL at 08:09

## 2017-08-13 RX ADMIN — Medication 5 ML: at 14:00

## 2017-08-13 RX ADMIN — POTASSIUM CHLORIDE 10 MEQ: 750 TABLET, EXTENDED RELEASE ORAL at 08:09

## 2017-08-13 RX ADMIN — SODIUM CHLORIDE 50 ML/HR: 900 INJECTION, SOLUTION INTRAVENOUS at 12:45

## 2017-08-13 RX ADMIN — Medication 5 ML: at 21:36

## 2017-08-13 NOTE — PROGRESS NOTES
Problem: Falls - Risk of  Goal: *Absence of Falls  Document Jose Fall Risk and appropriate interventions in the flowsheet.    Outcome: Progressing Towards Goal  Fall Risk Interventions:  Mobility Interventions: Assess mobility with egress test, OT consult for ADLs, Strengthening exercises (ROM-active/passive), Patient to call before getting OOB, Bed/chair exit alarm, PT Consult for assist device competence           Medication Interventions: Patient to call before getting OOB, Evaluate medications/consider consulting pharmacy, Teach patient to arise slowly, Bed/chair exit alarm     Elimination Interventions: Patient to call for help with toileting needs, Call light in reach, Toileting schedule/hourly rounds

## 2017-08-13 NOTE — PROGRESS NOTES
Patient stable with no complaints at this time. Patient is tolerating a mech. Soft diet well. Family at bedside for support. Call light within reach and patient instructed to call if assistance is needed.   Report to be given to oncoming RN 7p-7a

## 2017-08-13 NOTE — PROGRESS NOTES
Problem: Mobility Impaired (Adult and Pediatric)  Goal: *Acute Goals and Plan of Care (Insert Text)  Discharge Goals:  (1.)Ms. Juni Hamlin will move from supine to sit and sit to supine , scoot up and down and roll side to side with INDEPENDENT within 7 day(s). (2.)Ms. Juni Hamlin will transfer from bed to chair and chair to bed with MODIFIED INDEPENDENCE using the least restrictive device within 7 day(s). (3.)Ms. Juni Hamlin will ambulate with SUPERVISION for 250+ feet with the least restrictive device within 7 day(s). ________________________________________________________________________________________________      PHYSICAL THERAPY: INITIAL ASSESSMENT, TREATMENT DAY: DAY OF ASSESSMENT, PM 8/13/2017  INPATIENT: Hospital Day: 2  [place] / [place] / Product Type: Medicare /      NAME/AGE/GENDER: Aidee Rushing is a 80 y.o. female        PRIMARY DIAGNOSIS: Acute respiratory failure with hypoxemia ([address]  Respiratory failure ([facility] CAP (community acquired pneumonia) CAP (community acquired pneumonia)        ICD-10: Treatment Diagnosis:       · Generalized Muscle Weakness (M62.81)  · Difficulty in walking, Not elsewhere classified (R26.2)  · History of falling (Z91.81)   Precaution/Allergies:  Tylenol [acetaminophen]       ASSESSMENT:      Ms. Juni Hamlin is supine in bed upon contact and agreeable to PT evaluation with daughter at bedside. Pt reports 0/10 pain and is currently on 4L O2 NC. Pt reports living in 1 story home with her  with 0 steps to enter. Pt reports her  has dementia and they have a caregiver that assists them from 8-5, 7 days a week and family that comes and stays from 5-8. Pt reports use of rollator for household distances and daughter states pt does not always use rollator. Pt and daughter reports frequent falls. Pt does not use supplemental O2 at baseline. Pt is currently at 94% while on 4 L O2. Pt removed from oxygen prior to activity.  Pt transitioned supine to sit EOB with SBA with O2 sat at 97%. Pt performed sit to stand with CGA and ambulated 30 ft around room with HHA from PT. Pt returned to sitting with O2 sat at 87% no RA. PT educated pt on pursed lip breathing and O2 sat quickly returned to 91%. Pt returned to supine in bed with SBA and returned to O2 NC. Pt left supine in bed with all needs met and within reach with daughter at bedside. Pt would benefit from HHPT at discharge from acute setting. Heath Buenoro will benefit from skilled PT (medically necessary) to address decreased strength, decreased balance, decreased functional tolerance, decreased cardiopulmonary endurance affecting participation in basic ADLs and functional tasks. This section established at most recent assessment   PROBLEM LIST (Impairments causing functional limitations):  1. Decreased Strength  2. Decreased ADL/Functional Activities  3. Decreased Transfer Abilities  4. Decreased Ambulation Ability/Technique  5. Decreased Balance  6. Decreased Activity Tolerance  7. Decreased Pacing Skills  8. Increased Fatigue  9. Increased Shortness of Breath  10. Decreased [place] with Home Exercise Program    INTERVENTIONS PLANNED: (Benefits and precautions of physical therapy have been discussed with the patient.)  1. Balance Exercise  2. Bed Mobility  3. Family Education  4. Gait Training  5. Home Exercise Program (HEP)  6. Neuromuscular Re-education/Strengthening  7. Range of Motion (ROM)  8. Therapeutic Activites  9. Therapeutic Exercise/Strengthening  10. Transfer Training  11. Group Therapy      TREATMENT PLAN: Frequency/Duration: 3 times a week for duration of hospital stay  Rehabilitation Potential For Stated Goals: GOOD      RECOMMENDED REHABILITATION/EQUIPMENT: (at time of discharge pending progress): Continue Skilled Therapy and Home Health: Physical Therapy. HISTORY:   History of Present Injury/Illness (Reason for Referral):  See H&P below  Patient is a 80 y.o.  female presents with sob. Patient has a history of hypothyroidism, HTN, HL, a.fib on ASA alone due to fall risk, CAD, ischemic cardiomyopathy, SSS s/p PPM placement, CKD, and DM. Patient is lethargic and history is provided by patient's daughter. Patient was in her usual state of health yesterday, ate dinner well and went to bed last night as usual.  Apparently she woke this am with c/o sob and was \"rattling\". She has chronic pain in her neck and shoulder which is unchanged from her baseline and daughter reports no additional c/o chest discomfort. Daughter states that patient then either cough up or vomited up a small amount of liquid but was in enough distress at that point that she called EMS. On their arrival patient's O2 sats were in the 70s and she was in respiratory distress. She was placed on CPAP and brought to the ER for further evaluation. Her initial o2 sat on CPAP were 86% and she was transitioned to BiPAP. She was also significantly hypertensive with /97 - NTG patch placed by EMS. , WBC 8.2, PCT pending. On exam patient remains extremely drowsy but does occasionally wake and lifts her head. She was transitioned to NC with o2 sats remaining in the 90s. BP dropped to 80/50s (without additional tx of HTN in ER), IVF bolus was started and NGT patch removed. Per patient's daughter she has no hx of lung disease, does not require home O2, and does not smoke. Past Medical History/Comorbidities:   Ms. Maranda Lu  has a past medical history of A fib; Abnormal loss of weight; Acquired hypothyroidism (9/12/2012); Anemia; Anorexia (9/2/2014); Arthritis associated with another disorder; Atrial fibrillation ([address] (9/12/2012); Autonomic neuropathy (1/13/2013); CAD (coronary artery disease); CAD in native artery (5/5/2016); Cervical radiculopathy (5/23/2013); Chest pain; Chronic pain syndrome (1/13/2013); CKD (chronic kidney disease), stage III (9/12/2012); Diabetes ([address]  Diabetes mellitus; Diabetes mellitus, type 2 ([facility] (9/12/2012); Dysphagia (9/2/2014); Gait disorder (9/12/2012); GERD (gastroesophageal reflux disease); Heart failure ([facility] HTN; Hypertension (9/12/2012); Hypothyroid; IBS (irritable bowel syndrome) (9/12/2012); Iron deficiency (9/12/2012); Lumbar disc disease; MCI (mild cognitive impairment) (1/13/2013); Mixed hyperlipidemia (9/12/2012); Orthostatic hypotension (5/5/2016); Pacemaker; and SSS (sick sinus syndrome) ([facility] (5/5/2016). Ms. Alysia Christian  has a past surgical history that includes cervical fusion; appendectomy; carpal tunnel release; colonoscopy (Nov 2005); pacemaker (6/08); and pacemaker (2017). Social History/Living Environment:   Home Environment: Private residence  # Steps to Enter: 0  One/Two Story Residence: One story  Living Alone: No  Support Systems: Spouse/Significant Other/Partner, Home care staff, Child(malia)  Patient Expects to be Discharged to[de-identified] Private residence  Current DME Used/Available at Home: Melinda Fish, rollator, Shower chair, Cane, straight  Tub or Shower Type: Shower  Prior Level of Function/Work/Activity:  Lives with , home care staff and family support, use of rollator for household distances, frequent falls. Number of Personal Factors/Comorbidities that affect the Plan of Care: 3+: HIGH COMPLEXITY   EXAMINATION:   Most Recent Physical Functioning:   Gross Assessment:  AROM: Generally decreased, functional  Strength: Generally decreased, functional  Coordination: Generally decreased, functional  Sensation: Intact               Posture:     Balance:  Sitting: Intact  Standing: Intact; With support Bed Mobility:  Supine to Sit: Stand-by asssistance  Sit to Supine: Stand-by asssistance  Wheelchair Mobility:     Transfers:  Sit to Stand: Contact guard assistance  Stand to Sit: Contact guard assistance  Gait:     Base of Support: Narrowed  Speed/Chandni: Slow  Step Length: Left shortened;Right shortened  Distance (ft): 30 Feet (ft)  Assistive Device:  (HHA)  Ambulation - Level of Assistance: Minimal assistance  Interventions: Safety awareness training; Tactile cues; Verbal cues       Body Structures Involved:  1. Heart  2. Lungs  3. Bones  4. Joints  5. Muscles Body Functions Affected:  1. Cardio  2. Respiratory  3. Neuromusculoskeletal  4. Movement Related Activities and Participation Affected:  1. General Tasks and Demands  2. Mobility  3. Self Care  4. Domestic Life  5. Interpersonal Interactions and Relationships  6. Community, Social and [place]   Number of elements that affect the Plan of Care: 4+: HIGH COMPLEXITY   CLINICAL PRESENTATION:   Presentation: Evolving clinical presentation with changing clinical characteristics: MODERATE COMPLEXITY   CLINICAL DECISION MAKIN [facility] Mobility Inpatient Short Form  How much difficulty does the patient currently have. .. Unable A Lot A Little None   1. Turning over in bed (including adjusting bedclothes, sheets and blankets)? [ ] 1   [ ] 2   [X] 3   [ ] 4   2. Sitting down on and standing up from a chair with arms ( e.g., wheelchair, bedside commode, etc.)   [ ] 1   [ ] 2   [X] 3   [ ] 4   3. Moving from lying on back to sitting on the side of the bed? [ ] 1   [ ] 2   [X] 3   [ ] 4   How much help from another person does the patient currently need. .. Total A Lot A Little None   4. Moving to and from a bed to a chair (including a wheelchair)? [ ] 1   [ ] 2   [X] 3   [ ] 4   5. Need to walk in hospital room? [ ] 1   [ ] 2   [X] 3   [ ] 4   6. Climbing 3-5 steps with a railing? [ ] 1   [ ] 2   [X] 3   [ ] 4   © , Trustees of [address] [place] [address] [address] [address], under license to [employer]. All rights reserved    Score:  Initial: 18 Most Recent: X (Date: -- )     Interpretation of Tool:  Represents activities that are increasingly more difficult (i.e. Bed mobility, Transfers, Gait).        Score 24 23 22-20 19-15 14-10 9-7 6       Modifier CH CI CJ CK CL CM CN · Mobility - Walking and Moving Around:               - CURRENT STATUS:    CK - 40%-59% impaired, limited or restricted               - GOAL STATUS:           CJ - 20%-39% impaired, limited or restricted               - D/C STATUS:                       ---------------To be determined---------------  [place] / [place] / Product Type: Medicare /       Medical Necessity:     · Patient is expected to demonstrate progress in strength, range of motion, balance, coordination and functional technique to decrease assistance required with gait, transfers, and functional mobility. Reason for Services/Other Comments:  · Patient continues to require skilled intervention due to decreased strength, decreased balance, decreased functional tolerance, decreased cardiopulmonary endurance affecting participation in basic ADLs and functional tasks. Use of outcome tool(s) and clinical judgement create a POC that gives a: Clear prediction of patient's progress: LOW COMPLEXITY                 TREATMENT:   (In addition to Assessment/Re-Assessment sessions the following treatments were rendered)   Pre-treatment Symptoms/Complaints:  Fatigue and SOB with activity  Pain: Initial:   Pain Intensity 1: 0  Post Session:  fatigued      Assessment/Reassessment only, no treatment provided today     Braces/Orthotics/Lines/Etc:   · O2 Device: Nasal cannula  Treatment/Session Assessment:    · Response to Treatment:  Pt ambulated 30 ft with HHA, desat to 87% on RA  · Interdisciplinary Collaboration:  · Physical Therapist  · Registered Nurse  · After treatment position/precautions:  · Supine in bed  · Bed/Chair-wheels locked  · Bed in low position  · Caregiver at bedside  · Call light within reach  · Compliance with Program/Exercises: Will assess as treatment progresses. · Recommendations/Intent for next treatment session:   \"Next visit will focus on advancements to more challenging activities and reduction in assistance provided\".   Total Treatment Duration:  PT Patient Time In/Time Out  Time In: 1207  Time Out: [address]

## 2017-08-13 NOTE — PROGRESS NOTES
Jessica Fragoso  Admission Date: 8/12/2017             Daily Progress Note: 8/13/2017    The patient's chart is reviewed and the patient is discussed with the staff. Jessica Fragoso is a 66INR with h/o systolic CHF, atrial fibrillation, nearly 40lb weight loss, possible recent diverticulitis, h/o esophageal spasm who was admitted yesterday with a left>right lung infiltrate, new hypoxemia, sputum production with cough and hemoptysis. BNP mildly elevated, low procalcitonin and WBC, no fevers, +cough. Also has mild RUSS. She has had multiple hospitalizations this year    Subjective:   CXR appears improved already today. ? Whether this may have been an aspiration due to esophageal spasm. She reports some L-sided chest \"soreness. \"  Yesterday she was quite hypertensive, then hypotensive. Now her BPs seem to have stabilized. Creatinine is up to 1.84 today.     Current Facility-Administered Medications   Medication Dose Route Frequency    0.9% sodium chloride infusion  50 mL/hr IntraVENous CONTINUOUS    aspirin delayed-release tablet 81 mg  81 mg Oral DAILY    carvedilol (COREG) tablet 12.5 mg  12.5 mg Oral BID WITH MEALS    gabapentin (NEURONTIN) capsule 600 mg  600 mg Oral QHS PRN    levothyroxine (SYNTHROID) tablet 25 mcg  25 mcg Oral ACB    lisinopril (PRINIVIL, ZESTRIL) tablet 10 mg  10 mg Oral DAILY    pantoprazole (PROTONIX) tablet 40 mg  40 mg Oral ACB    potassium chloride (KLOR-CON) tablet 10 mEq  10 mEq Oral DAILY    senna-docusate (PERICOLACE) 8.6-50 mg per tablet 1 Tab  1 Tab Oral QHS    zolpidem (AMBIEN) tablet 5 mg  5 mg Oral QHS PRN    sodium chloride (NS) flush 5 mL  5 mL InterCATHeter Q8H    sodium chloride (NS) flush 5-10 mL  5-10 mL InterCATHeter PRN    azithromycin (ZITHROMAX) 500 mg in 0.9% sodium chloride (MBP/ADV) 250 mL  500 mg IntraVENous Q24H    enoxaparin (LOVENOX) injection 30 mg  30 mg SubCUTAneous Q24H    furosemide (LASIX) injection 40 mg  40 mg IntraVENous BID    insulin lispro (HUMALOG) injection   SubCUTAneous AC&HS    cefTRIAXone (ROCEPHIN) 2 g in 0.9% sodium chloride (MBP/ADV) 50 mL MBP  2 g IntraVENous Q24H       Review of Systems  Constitutional: negative for fever, chills, sweats  Cardiovascular: +left-sided chest wall pain  Gastrointestinal:  negative for dysphagia, reflux, vomiting, diarrhea, abdominal pain, or melena  Neurologic:  negative for focal weakness, numbness, headache    Objective:     Vitals:    08/13/17 0004 08/13/17 0410 08/13/17 0714 08/13/17 1125   BP: 136/58 146/54 141/57 129/54   Pulse: 75 77 76 76   Resp: 18 16 17 18   Temp: 97.6 °F (36.4 °C) 97.5 °F (36.4 °C) 97.9 °F (36.6 °C) 98 °F (36.7 °C)   SpO2: 100% 98% 99% 100%   Weight:       Height:         Intake and Output:   08/11 1901 - 08/13 0700  In: 0   Out: 500 [Urine:500]  08/13 0701 - 08/13 1900  In: 0   Out: 300 [Urine:300]    Physical Exam:   Constitution:  the patient is well developed and in no acute distress  EENMT:  Sclera clear, pupils equal, oral mucosa moist  Respiratory: crackles bilaterally, worse on left  Cardiovascular:  RRR without M,G,R  Gastrointestinal: soft and non-tender; with positive bowel sounds. Musculoskeletal: warm without cyanosis. There is nolower leg edema.   Skin:  no jaundice or rashes, no wounds   Neurologic: no gross neuro deficits     Psychiatric:  alert and oriented x 3    CXR:   On admission        LAB  Recent Labs      08/13/17   1145  08/13/17   0518  08/12/17   2047  08/12/17   1601  08/12/17   1135   GLUCPOC  208*  161*  170*  186*  156*      Recent Labs      08/12/17   0545   WBC  8.2   HGB  12.2   HCT  35.6*   PLT  260     Recent Labs      08/13/17   0816  08/12/17   0545   NA  139  136   K  4.4  4.5   CL  102  97*   CO2  27  29   GLU  161*  164*   BUN  36*  22   CREA  1.84*  1.52*   MG   --   1.8   CA  8.0*  8.4   ALB  2.8*  3.6   TBILI  0.5  0.7   ALT  27  42   SGOT  25  66*     Recent Labs      08/12/17   0540   PH  7.41   PCO2 38   PO2  72*   HCO3  23     No results for input(s): LCAD, LAC in the last 72 hours. Assessment:  (Medical Decision Making)   Unclear whether this is cardiogenic pulmonary edema vs pneumonia or other cause for infiltrates. Onset was somewhat abrupt, BNP not excessively elevated in chronic heart failure patient, no fevers, no leukocytosis. Initial hospital course notable for both hypertension and hypotension. Hospital Problems  Date Reviewed: 8/12/2017          Codes Class Noted POA    RUSS (acute kidney injury) ([facility] ICD-10-CM: N17.9  ICD-9-CM: 584.9  8/13/2017 Unknown    IVF gently    Diabetes mellitus, type 2 (HCC) (Chronic) ICD-10-CM: E11.9  ICD-9-CM: 250.00  8/12/2017 Yes    FSBS currently 150s-200s    Hypertension (Chronic) ICD-10-CM: I10  ICD-9-CM: 401.9  8/12/2017 Yes    Overview Signed 9/12/2012  3:43 PM by Kely Arce     Orthostatic hypotension due to autonomic neuropathy             CKD (chronic kidney disease), stage III (Chronic) ICD-10-CM: N18.3  ICD-9-CM: 585.3  8/12/2017 Yes        Atrial fibrillation (HCC) (Chronic) ICD-10-CM: I48.91  ICD-9-CM: 427.31  8/12/2017 Yes        Acute respiratory failure with hypoxemia (HCC) ICD-10-CM: J96.01  ICD-9-CM: 518.81  8/12/2017 Yes        * (Principal)CAP (community acquired pneumonia) ICD-10-CM: J18.9  ICD-9-CM: 910  8/12/2017 Yes    Continue ceftriaxone    DNR (do not resuscitate) (Chronic) ICD-10-CM: Z66  ICD-9-CM: V49.86  8/12/2017 Yes        Respiratory failure ([facility] ICD-10-CM: J96.90  ICD-9-CM: 518.81  8/12/2017 Unknown    With 4lpm O2 requirement    Cardiomyopathy ([facility] ICD-10-CM: I42.9  ICD-9-CM: 425.4  3/30/2017 Yes              Plan:  (Medical Decision Making)     --Gentle IVF at 50/h and f/u BNP, renal function tomorrow. If creatinine rising, may actually need diuretics. --Continue antibiotics day #2 for possible pneumonia.   --Consider cardiology consult if clinical course more consistent with acute heart failure    More than 50% of the time documented was spent in face-to-face contact with the patient and in the care of the patient on the floor/unit where the patient is located.     Anahi Chun MD

## 2017-08-13 NOTE — PROGRESS NOTES
Speech Pathology Note:    Bedside Swallow Evaluation completed. Recommend mechanical soft/thin liquids. Medications whole with liquid wash as tolerated. Full report to follow. Balaji Conley.  MS Edy, CCC-SLP

## 2017-08-13 NOTE — PROGRESS NOTES
Problem: Falls - Risk of  Goal: *Absence of Falls  Document Jose Fall Risk and appropriate interventions in the flowsheet.    Outcome: Progressing Towards Goal  Fall Risk Interventions:  Mobility Interventions: Assess mobility with egress test           Medication Interventions: Patient to call before getting OOB     Elimination Interventions: Call light in reach, Patient to call for help with toileting needs

## 2017-08-13 NOTE — PROGRESS NOTES
Physical asseessment completed, pt alert and oriented, denies pain or distress, on 4 liters of oxygen via nasal cannula, family members at bedside, call light within reach.

## 2017-08-13 NOTE — PROGRESS NOTES
Patient voices no concerns at this time. Patient is resting in bed with family at bedside for support. Call light within reach and patient instructed to call if assistance is needed. Will continue to monitor.

## 2017-08-13 NOTE — PROGRESS NOTES
PT note:    Chart reviewed and attempted PT evaluation this morning however pt is currently on bedside commode and requested PT return later. Will check back later time/date as schedule allows.      Thanks,   Thelma Chairez, KELLEYT

## 2017-08-13 NOTE — PROGRESS NOTES
Problem: Dysphagia (Adult)  Goal: *Acute Goals and Plan of Care (Insert Text)  ST. Pt. Will tolerate mechanical soft/thin liquids with no overt s/sx of aspiration/penetration. 2. Pt. Will participate in modified barium swallow with 100% participation to fully evaluate swallow function if further indicated in plan of care. LTG: Pt. Will tolerate least restrictive diet without respiratory decline. SPEECH LANGUAGE PATHOLOGY: BEDSIDE SWALLOW NOTE: INITIAL ASSESSMENT     NAME/AGE/GENDER: Cherelle Denney is a 80 y.o. female  DATE: 2017  PRIMARY DIAGNOSIS: Acute respiratory failure with hypoxemia (HCC)  Respiratory failure ([facility])       ICD-10: Treatment Diagnosis: R13.12 Dysphagia, Oropharyngeal Phase  INTERDISCIPLINARY COLLABORATION: Registered Nurse  PRECAUTIONS/ALLERGIES: Tylenol [acetaminophen]   ASSESSMENT:   Based on the objective data described below, Ms. Miriam Henderson presents with no overt s/sx but intermittent complaints of globus sensation when eating meats (prior to hospitalization). Does not denote any overt choking episodes. Patient tolerated any/all consistencies presented without overt s/sx. Mildly increased mastication time with mixed consistency fruit and moderately increased mastication time and mild oral residue with cracker. Recommend initiate mechanical soft diet textures/thin liquids. Follow for diet tolerance. Patient will benefit from skilled intervention to address the below impairments. ?????? ? ? This section established at most recent assessment??????????  PROBLEM LIST (Impairments causing functional limitations): 1. pneumonia  REHABILITATION POTENTIAL FOR STATED GOALS: GOOD      PLAN OF CARE:   Patient will benefit from skilled intervention to address the following impairments.   RECOMMENDATIONS AND PLANNED INTERVENTIONS (Benefits and precautions of therapy have been discussed with the patient.):  · PO:  Mechanical soft  · Liquids:  regular thin  MEDICATIONS:  · With liquid  COMPENSATORY STRATEGIES/MODIFICATIONS INCLUDING:  · Alternate liquids/solids  OTHER RECOMMENDATIONS (including follow up treatment recommendations): · Family training/education  · Patient education  RECOMMENDED DIET MODIFICATIONS DISCUSSED WITH:  · Family  · Patient  FREQUENCY/DURATION: Continue to follow patient 3 times a week as able or until goals met. RECOMMENDED REHABILITATION/EQUIPMENT: (at time of discharge pending progress):   Continue Skilled Therapy. SUBJECTIVE:   \"I'm thirsty\"  History of Present Injury/Illness: Ms. Lois Montana  has a past medical history of A fib; Abnormal loss of weight; Acquired hypothyroidism (9/12/2012); Anemia; Anorexia (9/2/2014); Arthritis associated with another disorder; Atrial fibrillation ([facility] [address] (9/12/2012); Autonomic neuropathy (1/13/2013); CAD (coronary artery disease); CAD in native artery (5/5/2016); Cervical radiculopathy (5/23/2013); Chest pain; Chronic pain syndrome (1/13/2013); CKD (chronic kidney disease), stage III (9/12/2012); Diabetes ([facility] [address] Diabetes mellitus; Diabetes mellitus, type 2 ([address] (9/12/2012); Dysphagia (9/2/2014); Gait disorder (9/12/2012); GERD (gastroesophageal reflux disease); Heart failure ([facility] [address] HTN; Hypertension (9/12/2012); Hypothyroid; IBS (irritable bowel syndrome) (9/12/2012); Iron deficiency (9/12/2012); Lumbar disc disease; MCI (mild cognitive impairment) (1/13/2013); Mixed hyperlipidemia (9/12/2012); Orthostatic hypotension (5/5/2016); Pacemaker; and SSS (sick sinus syndrome) ([facility] [address] (5/5/2016). .  She also  has a past surgical history that includes cervical fusion; appendectomy; carpal tunnel release; colonoscopy (Nov 2005); pacemaker (6/08); and pacemaker (2017). Present Symptoms:    Pain Intensity 1: 0  Current Medications:   No current facility-administered medications on file prior to encounter.         Current Outpatient Prescriptions on File Prior to Encounter   Medication Sig Dispense Refill    HYDROcodone-acetaminophen (NORCO) 7.5-325 mg per tablet Take 1 Tab by mouth every six (6) hours as needed. Max Daily Amount: 4 Tabs. Indications: Pain 90 Tab 0    lisinopril (PRINIVIL, ZESTRIL) 10 mg tablet Take 1 Tab by mouth daily. 90 Tab 3    potassium chloride (KLOR-CON) 10 mEq tablet Take 1 Tab by mouth daily. 90 Tab 3    levothyroxine (SYNTHROID) 25 mcg tablet Take 1 Tab by mouth Daily (before breakfast). 90 Tab 3    senna-docusate (PERICOLACE) 8.6-50 mg per tablet Take 1 Tab by mouth nightly. 90 Tab 3    gabapentin (NEURONTIN) 600 mg tablet Take 1 Tab by mouth nightly as needed. (Patient taking differently: Take 600 mg by mouth three (3) times daily.) 90 Tab 1    furosemide (LASIX) 40 mg tablet Take 1 Tab by mouth daily. (Patient taking differently: Take 40 mg by mouth two (2) times a day.) 60 Tab 11    carvedilol (COREG) 12.5 mg tablet Take 1 Tab by mouth two (2) times daily (with meals). 60 Tab 6    zolpidem (AMBIEN) 5 mg tablet Take 1 Tab by mouth nightly as needed for Sleep. Max Daily Amount: 5 mg. 30 Tab 1    aspirin delayed-release 81 mg tablet Take  by mouth daily.  omeprazole (PRILOSEC) 20 mg capsule Take 1 Cap by mouth two (2) times a day. 180 Cap 3    polyethylene glycol (MIRALAX) 17 gram packet Take 1 Packet by mouth daily. 30 Packet 5    nitroglycerin (NITROSTAT) 0.4 mg SL tablet 1 Tab by SubLINGual route every five (5) minutes as needed. Indications: ANGINA 1 Bottle 3    cholecalciferol (VITAMIN D-3) 400 unit Tab tablet Take 1,000 Units by mouth daily.  calcium carbonate (OS-) 500 mg (1,250 mg) tablet take 1 Tab by mouth two (2) times a day.           Current Dietary Status:  NPO pending speech consult           History of reflux:  YES   · Reflux medication:prilosec  Social History/Home Situation:    Home Environment: Private residence  # Steps to Enter: 0  One/Two Story Residence: One story  Living Alone: No  Support Systems: Child(malia)  Patient Expects to be Discharged to[de-identified] Private residence  Current DME Used/Available at Home: [employer], straight, Walker, Shower chair      OBJECTIVE:   Respiratory Status:        CXR Results: There is a stable left-sided cardiac pacer. Previously noted increased densities  in the left mid to lower lung have markedly improved. Right lung is relatively  clear. No pneumothorax or pulmonary edema. Cardiomediastinal contour and the  surrounding bones are stable. MRI/CT Results:none noted this admission  Oral Motor Structure/Speech:  Oral-Motor Structure/Motor Speech  Labial: Decreased rate  Dentition: Intact  Oral Hygiene: fair  Lingual: Decreased rate     Cognitive and Communication Status:  Neurologic State: Alert  Orientation Level: Oriented to person;Oriented to place; Disoriented to situation  Cognition: Decreased attention/concentration; Follows commands  Perception: Appears intact  Perseveration: No perseveration noted  Safety/Judgement: Decreased insight into deficits     BEDSIDE SWALLOW EVALUATION  Oral Assessment:  Oral Assessment  Labial: Decreased rate  Dentition: Intact  Oral Hygiene: fair  Lingual: Decreased rate  P.O. Trials:  Patient Position: upright in bed     The patient was given bite/sip amounts of the following:   Consistency Presented: Solid;Puree; Thin liquid;Mixed consistency; Ice chips  How Presented: Self-fed/presented;SLP-fed/presented;Cup/sip;Spoon;Straw;Successive swallows     ORAL PHASE:  Bolus Acceptance: No impairment  Bolus Formation/Control: Impaired  Propulsion: Delayed (# of seconds)  Type of Impairment: Delayed;Mastication  Oral Residue: Less than 10% of bolus     PHARYNGEAL PHASE:  Initiation of Swallow: No impairment  Laryngeal Elevation: Functional  Aspiration Signs/Symptoms: None  Vocal Quality: No impairment     Effective Modifications: Alternate liquids/solids     Pharyngeal Phase Characteristics: No impairment, issues, or problems      OTHER OBSERVATIONS:  Rate/bite size: WNL         Endurance: WNL            Comments:        Tool Used: Dysphagia Outcome and Severity Scale (MUSTAPHA)     Score Comments   Normal Diet  [ ] 7 With no strategies or extra time needed   Functional Swallow  [ ] 6 May have mild oral or pharyngeal delay         Mild Dysphagia     [X] 5 Which may require one diet consistency restricted (those who demonstrate penetration which is entirely cleared on MBS would be included)   Mild-Moderate Dysphagia  [ ] 4 With 1-2 diet consistencies restricted         Moderate Dysphagia  [ ] 3 With 2 or more diet consistencies restricted         Moderately Severe Dysphagia  [ ] 2 With partial PO strategies (trials with ST only)         Severe Dysphagia  [ ] 1 With inability to tolerate any PO safely            Score:  Initial: 5 Most Recent: X (Date: -- )   Interpretation of Tool: The Dysphagia Outcome and Severity Scale (MUSTAPHA) is a simple, easy-to-use, 7-point scale developed to systematically rate the functional severity of dysphagia based on objective assessment and make recommendations for diet level, independence level, and type of nutrition.        Score 7 6 5 4 3 2 1   Modifier CH CI CJ CK CL CM CN   · Swallowing:               - CURRENT STATUS:           CJ - 20%-39% impaired, limited or restricted               - GOAL STATUS:                   CJ - 20%-39% impaired, limited or restricted               - D/C STATUS:                       ---------------To be determined---------------  [place] / Plan[place] PART [place] B / Product Type: Medicare /       TREATMENT:         (In addition to Assessment/Re-Assessment sessions the following treatments were rendered)  Assessment/Reassessment only, no treatment provided today  MODALITIES:                                                                     ORAL MOTOR  EXERCISES:                                                                                                                                                                       LARYNGEAL / PHARYNGEAL EXERCISES: __________________________________________________________________________________________________  Safety:   After treatment position/precautions:  · Call light within reach  · Family at bedside  · Upright in Bed  Treatment Assessment:  Patient actively participated in evaluation. Progression/Medical Necessity:   · Skilled intervention continues to be required due to patient still consuming a modified diet. Compliance with Program/Exercises: Will assess as treatment progresses. Reason for Continuation of Services/Other Comments:  · Patient continues to require skilled intervention due to complaints of history of dysphagia. Recommendations/Intent for next treatment session: \"Treatment next visit will focus on diet tolerance and determining further acute speech therapy needs\"  Total Treatment Duration:  Time In: 8363  Time Out: [address]  MS Edy, CCC-SLP

## 2017-08-14 LAB
ANION GAP BLD CALC-SCNC: 10 MMOL/L (ref 7–16)
BNP SERPL-MCNC: 232 PG/ML
BUN SERPL-MCNC: 42 MG/DL (ref 8–23)
CALCIUM SERPL-MCNC: 8.1 MG/DL (ref 8.3–10.4)
CHLORIDE SERPL-SCNC: 104 MMOL/L (ref 98–107)
CO2 SERPL-SCNC: 27 MMOL/L (ref 21–32)
CREAT SERPL-MCNC: 1.68 MG/DL (ref 0.6–1)
D DIMER PPP FEU-MCNC: 1.67 UG/ML(FEU)
ERYTHROCYTE [DISTWIDTH] IN BLOOD BY AUTOMATED COUNT: 14.1 % (ref 11.9–14.6)
GLUCOSE BLD STRIP.AUTO-MCNC: 105 MG/DL (ref 65–100)
GLUCOSE BLD STRIP.AUTO-MCNC: 215 MG/DL (ref 65–100)
GLUCOSE BLD STRIP.AUTO-MCNC: 272 MG/DL (ref 65–100)
GLUCOSE BLD STRIP.AUTO-MCNC: 95 MG/DL (ref 65–100)
GLUCOSE SERPL-MCNC: 147 MG/DL (ref 65–100)
HCT VFR BLD AUTO: 29 % (ref 35.8–46.3)
HGB BLD-MCNC: 9.7 G/DL (ref 11.7–15.4)
MCH RBC QN AUTO: 30.5 PG (ref 26.1–32.9)
MCHC RBC AUTO-ENTMCNC: 33.4 G/DL (ref 31.4–35)
MCV RBC AUTO: 91.2 FL (ref 79.6–97.8)
PLATELET # BLD AUTO: 180 K/UL (ref 150–450)
PMV BLD AUTO: 11.5 FL (ref 10.8–14.1)
POTASSIUM SERPL-SCNC: 4.2 MMOL/L (ref 3.5–5.1)
RBC # BLD AUTO: 3.18 M/UL (ref 4.05–5.25)
SODIUM SERPL-SCNC: 141 MMOL/L (ref 136–145)
WBC # BLD AUTO: 17.8 K/UL (ref 4.3–11.1)

## 2017-08-14 PROCEDURE — 36415 COLL VENOUS BLD VENIPUNCTURE: CPT | Performed by: INTERNAL MEDICINE

## 2017-08-14 PROCEDURE — 99232 SBSQ HOSP IP/OBS MODERATE 35: CPT | Performed by: INTERNAL MEDICINE

## 2017-08-14 PROCEDURE — 65660000000 HC RM CCU STEPDOWN

## 2017-08-14 PROCEDURE — 83880 ASSAY OF NATRIURETIC PEPTIDE: CPT | Performed by: INTERNAL MEDICINE

## 2017-08-14 PROCEDURE — 80048 BASIC METABOLIC PNL TOTAL CA: CPT | Performed by: INTERNAL MEDICINE

## 2017-08-14 PROCEDURE — 85027 COMPLETE CBC AUTOMATED: CPT | Performed by: INTERNAL MEDICINE

## 2017-08-14 PROCEDURE — 74011250636 HC RX REV CODE- 250/636: Performed by: INTERNAL MEDICINE

## 2017-08-14 PROCEDURE — 92526 ORAL FUNCTION THERAPY: CPT

## 2017-08-14 PROCEDURE — 76450000000

## 2017-08-14 PROCEDURE — 74011636637 HC RX REV CODE- 636/637: Performed by: INTERNAL MEDICINE

## 2017-08-14 PROCEDURE — 65270000029 HC RM PRIVATE

## 2017-08-14 PROCEDURE — 74011250637 HC RX REV CODE- 250/637: Performed by: INTERNAL MEDICINE

## 2017-08-14 PROCEDURE — 82962 GLUCOSE BLOOD TEST: CPT

## 2017-08-14 PROCEDURE — 85379 FIBRIN DEGRADATION QUANT: CPT | Performed by: INTERNAL MEDICINE

## 2017-08-14 PROCEDURE — 74011000258 HC RX REV CODE- 258: Performed by: INTERNAL MEDICINE

## 2017-08-14 RX ORDER — AZITHROMYCIN 250 MG/1
500 TABLET, FILM COATED ORAL DAILY
Status: DISCONTINUED | OUTPATIENT
Start: 2017-08-15 | End: 2017-08-16 | Stop reason: HOSPADM

## 2017-08-14 RX ADMIN — Medication 5 ML: at 21:38

## 2017-08-14 RX ADMIN — CEFTRIAXONE 2 G: 2 INJECTION, POWDER, FOR SOLUTION INTRAMUSCULAR; INTRAVENOUS at 08:22

## 2017-08-14 RX ADMIN — HEPARIN SODIUM 5000 UNITS: 5000 INJECTION, SOLUTION INTRAVENOUS; SUBCUTANEOUS at 21:38

## 2017-08-14 RX ADMIN — LISINOPRIL 10 MG: 5 TABLET ORAL at 08:21

## 2017-08-14 RX ADMIN — FUROSEMIDE 40 MG: 10 INJECTION, SOLUTION INTRAMUSCULAR; INTRAVENOUS at 08:22

## 2017-08-14 RX ADMIN — PANTOPRAZOLE SODIUM 40 MG: 40 TABLET, DELAYED RELEASE ORAL at 05:43

## 2017-08-14 RX ADMIN — FUROSEMIDE 40 MG: 10 INJECTION, SOLUTION INTRAMUSCULAR; INTRAVENOUS at 16:18

## 2017-08-14 RX ADMIN — INSULIN LISPRO 9 UNITS: 100 INJECTION, SOLUTION INTRAVENOUS; SUBCUTANEOUS at 11:44

## 2017-08-14 RX ADMIN — STANDARDIZED SENNA CONCENTRATE AND DOCUSATE SODIUM 1 TABLET: 8.6; 5 TABLET, FILM COATED ORAL at 21:38

## 2017-08-14 RX ADMIN — ASPIRIN 81 MG: 81 TABLET, COATED ORAL at 08:21

## 2017-08-14 RX ADMIN — CARVEDILOL 12.5 MG: 12.5 TABLET, FILM COATED ORAL at 16:19

## 2017-08-14 RX ADMIN — Medication 5 ML: at 05:53

## 2017-08-14 RX ADMIN — Medication 5 ML: at 14:00

## 2017-08-14 RX ADMIN — GABAPENTIN 600 MG: 300 CAPSULE ORAL at 20:51

## 2017-08-14 RX ADMIN — CARVEDILOL 12.5 MG: 12.5 TABLET, FILM COATED ORAL at 08:21

## 2017-08-14 RX ADMIN — POTASSIUM CHLORIDE 10 MEQ: 750 TABLET, EXTENDED RELEASE ORAL at 08:21

## 2017-08-14 RX ADMIN — HEPARIN SODIUM 5000 UNITS: 5000 INJECTION, SOLUTION INTRAVENOUS; SUBCUTANEOUS at 14:00

## 2017-08-14 RX ADMIN — AZITHROMYCIN MONOHYDRATE 500 MG: 500 INJECTION, POWDER, LYOPHILIZED, FOR SOLUTION INTRAVENOUS at 05:45

## 2017-08-14 RX ADMIN — HEPARIN SODIUM 5000 UNITS: 5000 INJECTION, SOLUTION INTRAVENOUS; SUBCUTANEOUS at 05:43

## 2017-08-14 RX ADMIN — LEVOTHYROXINE SODIUM 25 MCG: 50 TABLET ORAL at 05:43

## 2017-08-14 RX ADMIN — INSULIN LISPRO 6 UNITS: 100 INJECTION, SOLUTION INTRAVENOUS; SUBCUTANEOUS at 21:44

## 2017-08-14 NOTE — CONSULTS
Palliative Care    Patient: Heidy Cerrato MRN: [id number]  SSN: [id number]    YOB: 1927  Age: 80 y.o. Sex: female       Date of Request: 8/14/2017  Date of Consult:  8/14/2017  Reason for Consult:  assist in chronic disease management and goals of care  Requesting Physician: Dr. Eloy Reddy     Assessment/Plan:     Principal Diagnosis:    Debility, Unspecified  R53.81  Additional Diagnoses:   · Dyspnea  R06.00- on exertion, currently denies  · Frailty  R54  · Counseling, Encounter for Medical Advice  Z71.9  · Encounter for Palliative Care  Z51.5    Palliative Performance Scale (PPS):  PPS: 60    Medical Decision Making:   Reviewed and summarized chart from admission to present. Discussed case with appropriate providers: primary RN  Reviewed laboratory and x-ray data: CBC, BMP, BNP    Patient resting in bed, her son, Radha Pfeiffer, is at the bedside. Introduced the role of palliative care and reviewed patient's current medical condition. Patient and family have good understanding of her acute and chronic issues. Patient complains of dyspnea on exertion at baseline. Counseled on energy conservation. Her goal is to return home to help care for her , who has dementia. He has caregivers in the morning, and one of patient's four children spend the night every night. Listened as patient talked about their 68 year marriage. Patient has HCPOA; encouraged family member to bring a copy for chart. DNR established and on problem list.  We discussed home health, which patient would rather not have, but open to if recommended. We also discussed home based palliative care at some point in the future. No further palliative care needs identified, will not follow. Please re-consult if needs arise. Thank you for the opportunity to participate in Ms. Olson's care.     Will discuss findings with members of the interdisciplinary team.           .    Subjective:     History obtained from:  Patient, Family, Care Provider and Chart    Chief Complaint: CAP, respiratory failure  History of Present Illness:  Ms. Millicent Simpson is a 79 yo female with PMH of heart failure, a fib, CKD, DM, HTN, HLD, CAD, debility, and other history as listed below. Patient presented to [facility] ER on 8/12 with shortness of breath and hypoxic per EMS. Patient transitioned from CPAP to BiPAP upon arrival to ER. CXR showed large left infiltrate. She was admitted to ICU for management of CAP vs aspiration pneumonia. Patient has subsequently weaned off of BiPAP and transferred out of ICU. She has been cleared for regular diet. Advance Directive: Yes       Code Status:  DNR            Health Care Power of : Yes - Patient states she has a [address]; family member to bring copy.     Past Medical History:   Diagnosis Date    A fib     Abnormal loss of weight     Acquired hypothyroidism 9/12/2012    Anemia     Anorexia 9/2/2014    Arthritis associated with another disorder     Atrial fibrillation ([address] 9/12/2012    Paroxysmal.  Coumadin contraindicated due to falls      Autonomic neuropathy 1/13/2013    CAD (coronary artery disease)     CAD in native artery 5/5/2016    Cervical radiculopathy 5/23/2013    Chest pain     Chronic pain syndrome 1/13/2013    Back, right shoulder, neck: Peripheral neuropathy, spinal stenosis C7-T1, foraminal narrowing C4-5     CKD (chronic kidney disease), stage III 9/12/2012    Diabetes ([address]     about 30 years    Diabetes mellitus     Diabetes mellitus, type 2 ([address] 9/12/2012    Dysphagia 9/2/2014    Due to esophageal spasm seen on modified barium swallow 2015     Gait disorder 9/12/2012    GERD (gastroesophageal reflux disease)     Heart failure ([address]     HTN     Hypertension 9/12/2012    Orthostatic hypotension due to autonomic neuropathy     Hypothyroid     IBS (irritable bowel syndrome) 9/12/2012    Iron deficiency 9/12/2012    Lumbar disc disease     MCI (mild cognitive impairment) 1/13/2013    Mixed hyperlipidemia 9/12/2012    Orthostatic hypotension 5/5/2016    Pacemaker     SSS (sick sinus syndrome) ([facility] [address] 5/5/2016      Past Surgical History:   Procedure Laterality Date    HX APPENDECTOMY      HX CARPAL TUNNEL RELEASE      HX CERVICAL FUSION      HX COLONOSCOPY  Nov 2005    HX PACEMAKER  6/08    HX PACEMAKER  2017    Replacement      History reviewed. No pertinent family history. Social History   Substance Use Topics    Smoking status: Never Smoker    Smokeless tobacco: Never Used    Alcohol use No     Prior to Admission medications    Medication Sig Start Date End Date Taking? Authorizing Provider   HYDROcodone-acetaminophen (NORCO) 7.5-325 mg per tablet Take 1 Tab by mouth every six (6) hours as needed. Max Daily Amount: 4 Tabs. Indications: Pain 7/22/17 April JEMAL Fields MD   lisinopril (PRINIVIL, ZESTRIL) 10 mg tablet Take 1 Tab by mouth daily. 7/13/17 April Clayton Haywood MD   potassium chloride (KLOR-CON) 10 mEq tablet Take 1 Tab by mouth daily. 7/13/17 April JEMAL Fields MD   levothyroxine (SYNTHROID) 25 mcg tablet Take 1 Tab by mouth Daily (before breakfast). 6/16/17 April Clayton Haywood MD   senna-docusate (PERICOLACE) 8.6-50 mg per tablet Take 1 Tab by mouth nightly. 5/17/17   Sonia Fields MD   gabapentin (NEURONTIN) 600 mg tablet Take 1 Tab by mouth nightly as needed. Patient taking differently: Take 600 mg by mouth three (3) times daily. 4/5/17   Sonia Fields MD   furosemide (LASIX) 40 mg tablet Take 1 Tab by mouth daily. Patient taking differently: Take 40 mg by mouth two (2) times a day. 3/30/17   Lara Sellers MD   carvedilol (COREG) 12.5 mg tablet Take 1 Tab by mouth two (2) times daily (with meals). 2/26/17   ALBERTO Dupont   zolpidem (AMBIEN) 5 mg tablet Take 1 Tab by mouth nightly as needed for Sleep. Max Daily Amount: 5 mg. 1/3/17   Raeann Sherman MD   aspirin delayed-release 81 mg tablet Take  by mouth daily.     Historical Provider omeprazole (PRILOSEC) 20 mg capsule Take 1 Cap by mouth two (2) times a day. 8/8/16   Rafita Masterson MD   polyethylene glycol (MIRALAX) 17 gram packet Take 1 Packet by mouth daily. 6/16/16   Rafita Masterson MD   nitroglycerin (NITROSTAT) 0.4 mg SL tablet 1 Tab by SubLINGual route every five (5) minutes as needed. Indications: ANGINA 2/25/14   Rafita Masterson MD   cholecalciferol (VITAMIN D-3) 400 unit Tab tablet Take 1,000 Units by mouth daily. Historical Provider   calcium carbonate (OS-) 500 mg (1,250 mg) tablet take 1 Tab by mouth two (2) times a day. Phys Other, MD       Allergies   Allergen Reactions    Tylenol [Acetaminophen] Unknown (comments)     insomnia        Review of Systems:  A comprehensive review of systems was negative except for:   Respiratory: Positive for dyspnea on exertion, currently denies. Objective:     Visit Vitals    /69    Pulse 75    Temp 98.9 °F (37.2 °C)    Resp 16    Ht 5' 4\" (1.626 m)    Wt 43.5 kg (95 lb 14.4 oz)    SpO2 98%    Breastfeeding No    BMI 16.46 kg/m2        Physical Exam:    General:  Frail and elderly. Cooperative. No acute distress. Eyes:  Conjunctivae/corneas clear. Nose: Nares normal. Septum midline. O2 via NC. Neck: Supple, symmetrical, trachea midline. Lungs:   Crackles L>R, unlabored. Heart:  Regular rate and rhythm. Abdomen:   Soft, non-tender, non-distended. Extremities: Normal, atraumatic, no cyanosis or edema. Skin: Skin color, texture, turgor normal. No rash. Neurologic: Nonfocal.   Psych: Alert and oriented.       Assessment:     Hospital Problems  Date Reviewed: 8/12/2017          Codes Class Noted POA    RUSS (acute kidney injury) ([facility] ICD-10-CM: N17.9  ICD-9-CM: 584.9  8/13/2017 Unknown        Diabetes mellitus, type 2 ([facility] (Chronic) ICD-10-CM: E11.9  ICD-9-CM: 250.00  8/12/2017 Yes        Hypertension (Chronic) ICD-10-CM: I10  ICD-9-CM: 401.9  8/12/2017 Yes    Overview Signed 9/12/2012  3:43 PM by Ginna Dejesus Nas     Orthostatic hypotension due to autonomic neuropathy             CKD (chronic kidney disease), stage III (Chronic) ICD-10-CM: N18.3  ICD-9-CM: 585.3  8/12/2017 Yes        Atrial fibrillation (HCC) (Chronic) ICD-10-CM: I48.91  ICD-9-CM: 427.31  8/12/2017 Yes        Acute respiratory failure with hypoxemia [facility]) ICD-10-CM: J96.01  ICD-9-CM: 518.81  8/12/2017 Yes        * (Principal)CAP (community acquired pneumonia) ICD-10-CM: J18.9  ICD-9-CM: 787  8/12/2017 Yes        DNR (do not resuscitate) (Chronic) ICD-10-CM: Z66  ICD-9-CM: V49.86  8/12/2017 Yes        Respiratory failure ([facility] [address] ICD-10-CM: J96.90  ICD-9-CM: 518.81  8/12/2017 Unknown        Cardiomyopathy ([facility] [address] ICD-10-CM: I42.9  ICD-9-CM: 425.4  3/30/2017 Yes              Signed By: Anahi Bonilla NP     August 14, 2017

## 2017-08-14 NOTE — PROGRESS NOTES
Kassidy Ott  Admission Date: 8/12/2017             Daily Progress Note: 8/14/2017    The patient's chart is reviewed and the patient is discussed with the staff. Kassidy Ott is a 53XUH with h/o systolic CHF, atrial fibrillation, nearly 40lb weight loss, possible recent diverticulitis, h/o esophageal spasm who was admitted yesterday with a left>right lung infiltrate, new hypoxemia, sputum production with cough and hemoptysis. BNP mildly elevated, low procalcitonin and WBC, no fevers, +cough. Also has mild RUSS.     She has had multiple hospitalizations this year    Subjective:   Seems better  Son at the [facility]      Current Facility-Administered Medications   Medication Dose Route Frequency    heparin (porcine) injection 5,000 Units  5,000 Units SubCUTAneous Q8H    aspirin delayed-release tablet 81 mg  81 mg Oral DAILY    carvedilol (COREG) tablet 12.5 mg  12.5 mg Oral BID WITH MEALS    gabapentin (NEURONTIN) capsule 600 mg  600 mg Oral QHS PRN    levothyroxine (SYNTHROID) tablet 25 mcg  25 mcg Oral ACB    lisinopril (PRINIVIL, ZESTRIL) tablet 10 mg  10 mg Oral DAILY    pantoprazole (PROTONIX) tablet 40 mg  40 mg Oral ACB    potassium chloride (KLOR-CON) tablet 10 mEq  10 mEq Oral DAILY    senna-docusate (PERICOLACE) 8.6-50 mg per tablet 1 Tab  1 Tab Oral QHS    zolpidem (AMBIEN) tablet 5 mg  5 mg Oral QHS PRN    sodium chloride (NS) flush 5 mL  5 mL InterCATHeter Q8H    sodium chloride (NS) flush 5-10 mL  5-10 mL InterCATHeter PRN    azithromycin (ZITHROMAX) 500 mg in 0.9% sodium chloride (MBP/ADV) 250 mL  500 mg IntraVENous Q24H    furosemide (LASIX) injection 40 mg  40 mg IntraVENous BID    insulin lispro (HUMALOG) injection   SubCUTAneous AC&HS    cefTRIAXone (ROCEPHIN) 2 g in 0.9% sodium chloride (MBP/ADV) 50 mL MBP  2 g IntraVENous Q24H       Review of Systems  Constitutional: negative for fever, chills, sweats  Cardiovascular: +left-sided chest wall pain  Gastrointestinal:  negative for dysphagia, reflux, vomiting, diarrhea, abdominal pain, or melena  Neurologic:  negative for focal weakness, numbness, headache    Objective:     Vitals:    08/14/17 0015 08/14/17 0337 08/14/17 0543 08/14/17 0730   BP: 137/54 159/73  182/85   Pulse:  77  75   Resp:  16  16   Temp:  98.5 °F (36.9 °C)  98.7 °F (37.1 °C)   SpO2:  98%  99%   Weight:   95 lb 14.4 oz (43.5 kg)    Height:         Intake and Output:   08/12 1901 - 08/14 0700  In: 195 [P.O.:195]  Out: 504 [Urine:504]       Physical Exam:   Constitution:  the patient is well developed and in no acute distress  EENMT:  Sclera clear, pupils equal, oral mucosa moist  Respiratory: crackles bilaterally, worse on left  Cardiovascular:  RRR without M,G,R  Gastrointestinal: soft and non-tender; with positive bowel sounds. Musculoskeletal: warm without cyanosis. There is nolower leg edema. Skin:  no jaundice or rashes, no wounds   Neurologic: no gross neuro deficits     Psychiatric:  alert and oriented x 3    CXR:   On admission        LAB  Recent Labs      08/14/17   0509  08/13/17   2056  08/13/17   1608  08/13/17   1145  08/13/17   0518   GLUCPOC  105*  104*  216*  208*  161*      Recent Labs      08/14/17   0742  08/12/17   0545   WBC  17.8*  8.2   HGB  9.7*  12.2   HCT  29.0*  35.6*   PLT  180  260     Recent Labs      08/14/17   0742  08/13/17   0816  08/12/17   0545   NA  141  139  136   K  4.2  4.4  4.5   CL  104  102  97*   CO2  27  27  29   GLU  147*  161*  164*   BUN  42*  36*  22   CREA  1.68*  1.84*  1.52*   MG   --    --   1.8   CA  8.1*  8.0*  8.4   ALB   --   2.8*  3.6   TBILI   --   0.5  0.7   ALT   --   27  42   SGOT   --   25  66*     Recent Labs      08/12/17   0540   PH  7.41   PCO2  38   PO2  72*   HCO3  23         Assessment:  (Medical Decision Making)   Unclear whether this is cardiogenic pulmonary edema vs pneumonia or other cause for infiltrates.   Onset was somewhat abrupt, BNP not excessively elevated in chronic heart failure patient, no fevers, no leukocytosis. Initial hospital course notable for both hypertension and hypotension. Hospital Problems  Date Reviewed: 8/12/2017          Codes Class Noted POA    RUSS [facility]acute [facility]) [facility] ICD-10-CM: N17.9  ICD-9-CM: 584.9  8/13/2017 Unknown    IVF gently    Diabetes mellitus, type 2 (HCC) (Chronic) ICD-10-CM: E11.9  ICD-9-CM: 250.00  8/12/2017 Yes    FSBS currently 150s-200s    Hypertension (Chronic) ICD-10-CM: I10  ICD-9-CM: 401.9  8/12/2017 Yes    Overview Signed 9/12/2012  3:43 PM by Gege Paula     Orthostatic hypotension due to autonomic neuropathy             CKD (chronic kidney disease), stage III (Chronic) ICD-10-CM: N18.3  ICD-9-CM: 585.3  8/12/2017 Yes        Atrial fibrillation (HCC) (Chronic)   HR controlled   ICD-10-CM: I48.91  ICD-9-CM: 427.31  8/12/2017 Yes        Acute respiratory failure with hypoxemia (HCC) ICD-10-CM: J96.01  ICD-9-CM: 518.81  8/12/2017 Yes        * (Principal)CAP (community acquired pneumonia) ICD-10-CM: J18.9  ICD-9-CM: 704  8/12/2017 Yes    Continue ceftriaxone      DNR (do not resuscitate) (Chronic) ICD-10-CM: Z66  ICD-9-CM: V49.86  8/12/2017 Yes        Respiratory failure ([facility] ICD-10-CM: J96.90  ICD-9-CM: 518.81  8/12/2017 Unknown    With 4lpm O2 requirement      Cardiomyopathy ([facility]   EF 35 %  ICD-10-CM: I42.9  ICD-9-CM: 425.4  3/30/2017 Yes              Plan:  (Medical Decision Making)   -seems more like CHF, will stop iv fluids, also on iv lasix, will continue that one more day  -PT  - will continue ABX  -recheck labs in AM    More than 50% of the time documented was spent in face-to-face contact with the patient and in the care of the patient on the floor/unit where the patient is located.     Nichole Blum MD

## 2017-08-14 NOTE — PROGRESS NOTES
Pt resting in bed, alert and oriented, breathing even and unlabored, cooperative with care, daughter at bedside, call light within reach will continue monitor.

## 2017-08-14 NOTE — PROGRESS NOTES
Patient in bed resting with no complaints at this time. Patient is alert and orientated with no distress noted. IV intact and patent with no s/s of infection noted. Respirations even and unlabored with heart rate regular. Patient unable to ambulate independently without assistance; needs x1 r/t weakness. Family at bedside for support. Bed in low locked position with call light within reach. Patient instructed to call if assistance is needed. Will continue to monitor.

## 2017-08-14 NOTE — PROGRESS NOTES
Problem: Falls - Risk of  Goal: *Absence of Falls  Document Jose Fall Risk and appropriate interventions in the flowsheet.    Outcome: Progressing Towards Goal  Fall Risk Interventions:  Mobility Interventions: Assess mobility with egress test, Communicate number of staff needed for ambulation/transfer, PT Consult for assist device competence, PT Consult for mobility concerns, Patient to call before getting OOB, OT consult for ADLs, Strengthening exercises (ROM-active/passive)           Medication Interventions: Patient to call before getting OOB, Teach patient to arise slowly, Evaluate medications/consider consulting pharmacy     Elimination Interventions: Call light in reach, Patient to call for help with toileting needs, Toilet paper/wipes in reach, Toileting schedule/hourly rounds

## 2017-08-14 NOTE — PROGRESS NOTES
Problem: Falls - Risk of  Goal: *Absence of Falls  Document Jose Fall Risk and appropriate interventions in the flowsheet.    Outcome: Progressing Towards Goal  Fall Risk Interventions:  Mobility Interventions: Assess mobility with egress test           Medication Interventions: Patient to call before getting OOB     Elimination Interventions: Call light in reach

## 2017-08-14 NOTE — PROGRESS NOTES
Problem: Dysphagia (Adult)  Goal: *Acute Goals and Plan of Care (Insert Text)  ST. Pt. Will tolerate regular/thin liquids with no overt s/sx of aspiration/penetration. (goal updated and met 17)  2. Pt. Will participate in modified barium swallow with 100% participation to fully evaluate swallow function if further indicated in plan of care. LTG: Pt. Will tolerate least restrictive diet without respiratory decline. SPEECH LANGUAGE PATHOLOGY: BEDSIDE SWALLOW NOTE: Daily Note 1 and DISCHARGE     NAME/AGE/GENDER: Gwendolyn Parra is a 80 y.o. female  DATE: 2017  PRIMARY DIAGNOSIS: Acute respiratory failure with hypoxemia (HCC)  Respiratory failure (HCC)       ICD-10: Treatment Diagnosis: R13.12 Dysphagia, Oropharyngeal Phase  INTERDISCIPLINARY COLLABORATION: Registered Nurse  PRECAUTIONS/ALLERGIES: Tylenol [acetaminophen]   ASSESSMENT:   Patient admitted with cardiogenic pulmonary edema vs pneumonia; per MD notes presentation more consistent with CHF with cardiology consult pending. Patient's son is at bedside and reports patient consumes a regular diet prior to admission and denies hx of dysphagia. However, per notes patient has a hx of diverticulitis and esophageal spasms. Oral motor exam is grossly within functional limits. She is missing a few upper back teeth. No overt signs/sx of aspiration with thin liquids via the straw. Mild increased time for mastication with the mixed and solids with trace lingual residue only. Patient reports she would prefer to dice up her own meats. Reports she takes large pills one at a time and several small pills together without difficulty. Recommend regular textures/thin liquids. Reviewed safe swallowing strategies related to positioning and rate/quantity. No further ST indicated at this time. ?????? ? ? This section established at most recent assessment??????????  PROBLEM LIST (Impairments causing functional limitations):   1. pneumonia  REHABILITATION POTENTIAL FOR STATED GOALS: GOOD      PLAN OF CARE:   Patient will benefit from skilled intervention to address the following impairments. RECOMMENDATIONS AND PLANNED INTERVENTIONS (Benefits and precautions of therapy have been discussed with the patient.):  · PO:  Regular and Liquids:  regular thin  MEDICATIONS:  · With liquid  COMPENSATORY STRATEGIES/MODIFICATIONS INCLUDING:  · Alternate liquids/solids  OTHER RECOMMENDATIONS (including follow up treatment recommendations): · Family training/education  · Patient education  RECOMMENDED DIET MODIFICATIONS DISCUSSED WITH:  · Family  · Patient  FREQUENCY/DURATION: Will not continue to follow patient until goals met. RECOMMENDED REHABILITATION/EQUIPMENT: (at time of discharge pending progress):   None. SUBJECTIVE:   Cooperative. History of Present Injury/Illness: Ms. Doris De Anda  has a past medical history of A fib; Abnormal loss of weight; Acquired hypothyroidism (9/12/2012); Anemia; Anorexia (9/2/2014); Arthritis associated with another disorder; Atrial fibrillation ([facility] (9/12/2012); Autonomic neuropathy (1/13/2013); CAD (coronary artery disease); CAD in native artery (5/5/2016); Cervical radiculopathy (5/23/2013); Chest pain; Chronic pain syndrome (1/13/2013); CKD (chronic kidney disease), stage III (9/12/2012); Diabetes ([address] Diabetes mellitus; Diabetes mellitus, type 2 ([address] (9/12/2012); Dysphagia (9/2/2014); Gait disorder (9/12/2012); GERD (gastroesophageal reflux disease); Heart failure ([address] HTN; Hypertension (9/12/2012); Hypothyroid; IBS (irritable bowel syndrome) (9/12/2012); Iron deficiency (9/12/2012); Lumbar disc disease; MCI (mild cognitive impairment) (1/13/2013); Mixed hyperlipidemia (9/12/2012); Orthostatic hypotension (5/5/2016); Pacemaker; and SSS (sick sinus syndrome) ([facility] (5/5/2016). .  She also  has a past surgical history that includes cervical fusion; appendectomy; carpal tunnel release; colonoscopy (Nov 2005); pacemaker (6/08); and pacemaker (2017). Present Symptoms:    Pain Intensity 1: 0  Current Medications:   No current facility-administered medications on file prior to encounter. Current Outpatient Prescriptions on File Prior to Encounter   Medication Sig Dispense Refill    HYDROcodone-acetaminophen (NORCO) 7.5-325 mg per tablet Take 1 Tab by mouth every six (6) hours as needed. Max Daily Amount: 4 Tabs. Indications: Pain 90 Tab 0    lisinopril (PRINIVIL, ZESTRIL) 10 mg tablet Take 1 Tab by mouth daily. 90 Tab 3    potassium chloride (KLOR-CON) 10 mEq tablet Take 1 Tab by mouth daily. 90 Tab 3    levothyroxine (SYNTHROID) 25 mcg tablet Take 1 Tab by mouth Daily (before breakfast). 90 Tab 3    senna-docusate (PERICOLACE) 8.6-50 mg per tablet Take 1 Tab by mouth nightly. 90 Tab 3    gabapentin (NEURONTIN) 600 mg tablet Take 1 Tab by mouth nightly as needed. (Patient taking differently: Take 600 mg by mouth three (3) times daily.) 90 Tab 1    furosemide (LASIX) 40 mg tablet Take 1 Tab by mouth daily. (Patient taking differently: Take 40 mg by mouth two (2) times a day.) 60 Tab 11    carvedilol (COREG) 12.5 mg tablet Take 1 Tab by mouth two (2) times daily (with meals). 60 Tab 6    zolpidem (AMBIEN) 5 mg tablet Take 1 Tab by mouth nightly as needed for Sleep. Max Daily Amount: 5 mg. 30 Tab 1    aspirin delayed-release 81 mg tablet Take  by mouth daily.  omeprazole (PRILOSEC) 20 mg capsule Take 1 Cap by mouth two (2) times a day. 180 Cap 3    polyethylene glycol (MIRALAX) 17 gram packet Take 1 Packet by mouth daily. 30 Packet 5    nitroglycerin (NITROSTAT) 0.4 mg SL tablet 1 Tab by SubLINGual route every five (5) minutes as needed. Indications: ANGINA 1 Bottle 3    cholecalciferol (VITAMIN D-3) 400 unit Tab tablet Take 1,000 Units by mouth daily.  calcium carbonate (OS-) 500 mg (1,250 mg) tablet take 1 Tab by mouth two (2) times a day.         Current Dietary Status:  [facility] soft/thin liquids           History of reflux:  YES   · Reflux medication:prilosec  Social History/Home Situation:    Home Environment: Private residence  # Steps to Enter: 0  One/Two Story Residence: One story  Living Alone: No  Support Systems: Spouse/Significant Other/Partner, Home care staff, Child(malia)  Patient Expects to be Discharged to[de-identified] Private residence  Current DME Used/Available at Home: [employer], rollator, Shower chair, Cane, straight  Tub or Shower Type: Shower      OBJECTIVE:   Respiratory Status:        CXR Results: There is a stable left-sided cardiac pacer. Previously noted increased densities  in the left mid to lower lung have markedly improved. Right lung is relatively  clear. No pneumothorax or pulmonary edema. Cardiomediastinal contour and the  surrounding bones are stable. MRI/CT Results:none noted this admission  Oral Motor Structure/Speech:  Oral-Motor Structure/Motor Speech  Labial: No impairment  Dentition: Intact  Oral Hygiene: fair  Lingual: Decreased rate     Cognitive and Communication Status:  Neurologic State: Alert  Orientation Level: Oriented X4  Cognition: Appropriate for age attention/concentration  Perception: Appears intact  Perseveration: No perseveration noted        BEDSIDE SWALLOW EVALUATION  Oral Assessment:  Oral Assessment  Labial: No impairment  Dentition: Intact  Lingual: Decreased rate  P.O. Trials:  Patient Position: upright in bed     The patient was given bite/sip amounts of the following:   Consistency Presented: Mixed consistency; Solid; Thin liquid;Mechanical soft  How Presented: Self-fed/presented;Straw     ORAL PHASE:  Bolus Acceptance: No impairment  Bolus Formation/Control: Impaired  Propulsion: No impairment  Type of Impairment: Delayed;Mastication (mild)  Oral Residue: Less than 10% of bolus     PHARYNGEAL PHASE:  Initiation of Swallow: No impairment     Aspiration Signs/Symptoms: None  Vocal Quality: No impairment           Pharyngeal Phase Characteristics: No impairment, issues, or problems OTHER OBSERVATIONS:  Rate/bite size: WNL         Endurance: WNL            Comments: Tool Used: Dysphagia Outcome and Severity Scale (MUSTAPHA)     Score Comments   Normal Diet  [ ] 7 With no strategies or extra time needed   Functional Swallow  [ ] 6 May have mild oral or pharyngeal delay         Mild Dysphagia     [X] 5 Which may require one diet consistency restricted (those who demonstrate penetration which is entirely cleared on MBS would be included)   Mild-Moderate Dysphagia  [ ] 4 With 1-2 diet consistencies restricted         Moderate Dysphagia  [ ] 3 With 2 or more diet consistencies restricted         Moderately Severe Dysphagia  [ ] 2 With partial PO strategies (trials with ST only)         Severe Dysphagia  [ ] 1 With inability to tolerate any PO safely            Score:  Initial: 5 Most Recent: X (Date: -- )   Interpretation of Tool: The Dysphagia Outcome and Severity Scale (MUSTAPHA) is a simple, easy-to-use, 7-point scale developed to systematically rate the functional severity of dysphagia based on objective assessment and make recommendations for diet level, independence level, and type of nutrition.        Score 7 6 5 4 3 2 1   Modifier CH CI CJ CK CL CM CN   · Swallowing:               - CURRENT STATUS:           CJ - 20%-39% impaired, limited or restricted               - GOAL STATUS:                   CJ - 20%-39% impaired, limited or restricted               - D/C STATUS:                       ---------------To be determined---------------  [place] / [place] / Product Type: Medicare /       TREATMENT:         (In addition to Assessment/Re-Assessment sessions the following treatments were rendered)  Assessment/Reassessment only, no treatment provided today  MODALITIES:                                                                     ORAL MOTOR  EXERCISES: LARYNGEAL / PHARYNGEAL EXERCISES:                                                                                                                                      __________________________________________________________________________________________________  Safety:   After treatment position/precautions:  · Call light within reach  · Family at bedside  · Upright in Bed    Time In: 1100  Time Out: [facility] MS, CCC-SLP

## 2017-08-14 NOTE — PROGRESS NOTES
Patient is a potential Pneumonia Bundled Payment for Care Improvement (BPCI) patient and will be followed for 90 days post acute care. Maybe more CHF related. Respiratory Failure is primary diagnosis. RRAT- 33- will consult Palliative Care for goals of care and CDM. Consider hospice per Cardiology. May be eligible for Health  home visits. Per , home with [facility]. Cares for  of 68 years with dementia. Very supportive family. Patient left hospital before offering [facility] program.    [facility] to follow up.        Gabriela Self RN- [facility], BSN  [facility] [employer]  [phone]

## 2017-08-14 NOTE — PROGRESS NOTES
Patient resting with no complaint at this time. Family at bedside for support. Call light within reach and patient instructed to call if assistance is needed.   Report to be given to oncoming RN 7p-7a

## 2017-08-15 LAB
ANION GAP BLD CALC-SCNC: 10 MMOL/L (ref 7–16)
BUN SERPL-MCNC: 39 MG/DL (ref 8–23)
CALCIUM SERPL-MCNC: 8.6 MG/DL (ref 8.3–10.4)
CHLORIDE SERPL-SCNC: 100 MMOL/L (ref 98–107)
CO2 SERPL-SCNC: 31 MMOL/L (ref 21–32)
CREAT SERPL-MCNC: 1.43 MG/DL (ref 0.6–1)
GLUCOSE BLD STRIP.AUTO-MCNC: 104 MG/DL (ref 65–100)
GLUCOSE BLD STRIP.AUTO-MCNC: 196 MG/DL (ref 65–100)
GLUCOSE BLD STRIP.AUTO-MCNC: 233 MG/DL (ref 65–100)
GLUCOSE BLD STRIP.AUTO-MCNC: 99 MG/DL (ref 65–100)
GLUCOSE SERPL-MCNC: 90 MG/DL (ref 65–100)
POTASSIUM SERPL-SCNC: 3.6 MMOL/L (ref 3.5–5.1)
SODIUM SERPL-SCNC: 141 MMOL/L (ref 136–145)

## 2017-08-15 PROCEDURE — 74011250636 HC RX REV CODE- 250/636: Performed by: INTERNAL MEDICINE

## 2017-08-15 PROCEDURE — 74011636637 HC RX REV CODE- 636/637: Performed by: INTERNAL MEDICINE

## 2017-08-15 PROCEDURE — 82962 GLUCOSE BLOOD TEST: CPT

## 2017-08-15 PROCEDURE — 74011250637 HC RX REV CODE- 250/637: Performed by: INTERNAL MEDICINE

## 2017-08-15 PROCEDURE — 36415 COLL VENOUS BLD VENIPUNCTURE: CPT | Performed by: INTERNAL MEDICINE

## 2017-08-15 PROCEDURE — 99232 SBSQ HOSP IP/OBS MODERATE 35: CPT | Performed by: INTERNAL MEDICINE

## 2017-08-15 PROCEDURE — 65270000029 HC RM PRIVATE

## 2017-08-15 PROCEDURE — 94760 N-INVAS EAR/PLS OXIMETRY 1: CPT

## 2017-08-15 PROCEDURE — 65660000000 HC RM CCU STEPDOWN

## 2017-08-15 PROCEDURE — 80048 BASIC METABOLIC PNL TOTAL CA: CPT | Performed by: INTERNAL MEDICINE

## 2017-08-15 PROCEDURE — 74011000258 HC RX REV CODE- 258: Performed by: INTERNAL MEDICINE

## 2017-08-15 RX ORDER — FUROSEMIDE 40 MG/1
40 TABLET ORAL 2 TIMES DAILY
Status: DISCONTINUED | OUTPATIENT
Start: 2017-08-15 | End: 2017-08-16 | Stop reason: HOSPADM

## 2017-08-15 RX ADMIN — CARVEDILOL 12.5 MG: 12.5 TABLET, FILM COATED ORAL at 17:16

## 2017-08-15 RX ADMIN — CEFTRIAXONE 2 G: 2 INJECTION, POWDER, FOR SOLUTION INTRAMUSCULAR; INTRAVENOUS at 08:41

## 2017-08-15 RX ADMIN — FUROSEMIDE 40 MG: 10 INJECTION, SOLUTION INTRAMUSCULAR; INTRAVENOUS at 08:40

## 2017-08-15 RX ADMIN — FUROSEMIDE 40 MG: 40 TABLET ORAL at 17:16

## 2017-08-15 RX ADMIN — Medication 5 ML: at 21:49

## 2017-08-15 RX ADMIN — STANDARDIZED SENNA CONCENTRATE AND DOCUSATE SODIUM 1 TABLET: 8.6; 5 TABLET, FILM COATED ORAL at 21:43

## 2017-08-15 RX ADMIN — LISINOPRIL 10 MG: 5 TABLET ORAL at 08:40

## 2017-08-15 RX ADMIN — PANTOPRAZOLE SODIUM 40 MG: 40 TABLET, DELAYED RELEASE ORAL at 05:47

## 2017-08-15 RX ADMIN — POTASSIUM CHLORIDE 10 MEQ: 750 TABLET, EXTENDED RELEASE ORAL at 08:39

## 2017-08-15 RX ADMIN — INSULIN LISPRO 6 UNITS: 100 INJECTION, SOLUTION INTRAVENOUS; SUBCUTANEOUS at 21:44

## 2017-08-15 RX ADMIN — GABAPENTIN 600 MG: 300 CAPSULE ORAL at 21:43

## 2017-08-15 RX ADMIN — Medication 5 ML: at 12:32

## 2017-08-15 RX ADMIN — Medication 5 ML: at 22:00

## 2017-08-15 RX ADMIN — INSULIN LISPRO 3 UNITS: 100 INJECTION, SOLUTION INTRAVENOUS; SUBCUTANEOUS at 12:30

## 2017-08-15 RX ADMIN — HEPARIN SODIUM 5000 UNITS: 5000 INJECTION, SOLUTION INTRAVENOUS; SUBCUTANEOUS at 12:31

## 2017-08-15 RX ADMIN — CARVEDILOL 12.5 MG: 12.5 TABLET, FILM COATED ORAL at 08:40

## 2017-08-15 RX ADMIN — Medication 5 ML: at 05:47

## 2017-08-15 RX ADMIN — HEPARIN SODIUM 5000 UNITS: 5000 INJECTION, SOLUTION INTRAVENOUS; SUBCUTANEOUS at 05:47

## 2017-08-15 RX ADMIN — LEVOTHYROXINE SODIUM 25 MCG: 50 TABLET ORAL at 05:47

## 2017-08-15 RX ADMIN — ASPIRIN 81 MG: 81 TABLET, COATED ORAL at 08:40

## 2017-08-15 RX ADMIN — HEPARIN SODIUM 5000 UNITS: 5000 INJECTION, SOLUTION INTRAVENOUS; SUBCUTANEOUS at 21:44

## 2017-08-15 RX ADMIN — AZITHROMYCIN 500 MG: 250 TABLET, FILM COATED ORAL at 08:39

## 2017-08-15 NOTE — PROGRESS NOTES
Alert and oriented patient resting in bedside chair. Resp even and unlabored. IV to left arm. O2 via NC at 2L. Daughter at bedside. Call light to left side, no distress noted.

## 2017-08-15 NOTE — PROGRESS NOTES
Ale Bob  Admission Date: 8/12/2017             Daily Progress Note: 8/15/2017    The patient's chart is reviewed and the patient is discussed with the staff. Ale Bob is a 16VIJ with h/o systolic CHF, atrial fibrillation, nearly 40lb weight loss, possible recent diverticulitis, h/o esophageal spasm who was admitted yesterday with a left>right lung infiltrate, new hypoxemia, sputum production with cough and hemoptysis. BNP mildly elevated, low procalcitonin and WBC, no fevers, +cough. Also has mild RUSS.     She has had multiple hospitalizations this year    Subjective:   Stronger per her daughter  Still on 02   Her daughter Asking to have cardiologist to see her -she would feel better      Current Facility-Administered Medications   Medication Dose Route Frequency    furosemide (LASIX) tablet 40 mg  40 mg Oral BID    azithromycin (ZITHROMAX) tablet 500 mg  500 mg Oral DAILY    heparin (porcine) injection 5,000 Units  5,000 Units SubCUTAneous Q8H    aspirin delayed-release tablet 81 mg  81 mg Oral DAILY    carvedilol (COREG) tablet 12.5 mg  12.5 mg Oral BID WITH MEALS    gabapentin (NEURONTIN) capsule 600 mg  600 mg Oral QHS PRN    levothyroxine (SYNTHROID) tablet 25 mcg  25 mcg Oral ACB    lisinopril (PRINIVIL, ZESTRIL) tablet 10 mg  10 mg Oral DAILY    pantoprazole (PROTONIX) tablet 40 mg  40 mg Oral ACB    potassium chloride (KLOR-CON) tablet 10 mEq  10 mEq Oral DAILY    senna-docusate (PERICOLACE) 8.6-50 mg per tablet 1 Tab  1 Tab Oral QHS    zolpidem (AMBIEN) tablet 5 mg  5 mg Oral QHS PRN    sodium chloride (NS) flush 5 mL  5 mL InterCATHeter Q8H    sodium chloride (NS) flush 5-10 mL  5-10 mL InterCATHeter PRN    insulin lispro (HUMALOG) injection   SubCUTAneous AC&HS    cefTRIAXone (ROCEPHIN) 2 g in 0.9% sodium chloride (MBP/ADV) 50 mL MBP  2 g IntraVENous Q24H       Review of Systems  Constitutional: negative for fever, chills, sweats  Cardiovascular: +left-sided chest wall pain  Gastrointestinal:  negative for dysphagia, reflux, vomiting, diarrhea, abdominal pain, or melena  Neurologic:  negative for focal weakness, numbness, headache    Objective:     Vitals:    08/14/17 2357 08/15/17 0141 08/15/17 0402 08/15/17 0727   BP: 149/71  162/77 176/86   Pulse: 75  75 77   Resp: 20  20 20   Temp: 98.5 °F (36.9 °C)  98.6 °F (37 °C) 98.7 °F (37.1 °C)   SpO2: (!) 69%  99% 100%   Weight:  92 lb 9.6 oz (42 kg)     Height:         Intake and Output:   08/13 1901 - 08/15 0700  In: 600 [P.O.:600]  Out: 9 [Urine:9]  08/15 0701 - 08/15 1900  In: 360 [P.O.:360]  Out: -     Physical Exam:   Constitution:  the patient is well developed and in no acute distress  EENMT:  Sclera clear, pupils equal, oral mucosa moist  Respiratory: crackles bilaterally, worse on left  Cardiovascular:  RRR without M,G,R  Gastrointestinal: soft and non-tender; with positive bowel sounds. Musculoskeletal: warm without cyanosis. There is nolower leg edema. Skin:  no jaundice or rashes, no wounds   Neurologic: no gross neuro deficits     Psychiatric:  alert and oriented x 3    CXR:   On admission        LAB  Recent Labs      08/15/17   0512  08/14/17   2133  08/14/17   1616  08/14/17   1123  08/14/17   0509   GLUCPOC  104*  215*  95  272*  105*      Recent Labs      08/14/17   0742   WBC  17.8*   HGB  9.7*   HCT  29.0*   PLT  180     Recent Labs      08/15/17   0719  08/14/17   0742  08/13/17   0816   NA  141  141  139   K  3.6  4.2  4.4   CL  100  104  102   CO2  31  27  27   GLU  90  147*  161*   BUN  39*  42*  36*   CREA  1.43*  1.68*  1.84*   CA  8.6  8.1*  8.0*   ALB   --    --   2.8*   TBILI   --    --   0.5   ALT   --    --   27   SGOT   --    --   25     No results for input(s): PH, PCO2, PO2, HCO3 in the last 72 hours. Assessment:  (Medical Decision Making)   Unclear whether this is cardiogenic pulmonary edema vs pneumonia or other cause for infiltrates.   Onset was somewhat abrupt, BNP not excessively elevated in chronic heart failure patient, no fevers, no leukocytosis. Initial hospital course notable for both hypertension and hypotension. Hospital Problems  Date Reviewed: 8/12/2017          Codes Class Noted POA    RUSS (acute kidney injury) ([facility] ICD-10-CM: N17.9  ICD-9-CM: 584.9  8/13/2017 Unknown    Better with lasix     Diabetes mellitus, type 2 ([facility] (Chronic) ICD-10-CM: E11.9  ICD-9-CM: 250.00  8/12/2017 Yes    FSBS currently 150s-200s    Hypertension (Chronic) ICD-10-CM: I10  ICD-9-CM: 401.9  8/12/2017 Yes    Overview Signed 9/12/2012  3:43 PM by Armani Larsen     Orthostatic hypotension due to autonomic neuropathy             CKD (chronic kidney disease), stage III (Chronic) better will lasix  ICD-10-CM: N18.3  ICD-9-CM: 585.3  8/12/2017 Yes        Atrial fibrillation (HCC) (Chronic)   HR controlled   ICD-10-CM: I48.91  ICD-9-CM: 427.31  8/12/2017 Yes        Acute respiratory failure with hypoxemia [facility]) ICD-10-CM: J96.01  ICD-9-CM: 518.81  8/12/2017 Yes        * (Principal)CAP (community acquired pneumonia) ICD-10-CM: J18.9  ICD-9-CM: 072  8/12/2017 Yes    Continue ceftriaxone D 4          Respiratory failure ([facility] ICD-10-CM: J96.90  ICD-9-CM: 518.81  8/12/2017 Unknown    With 4lpm O2 requirement      Cardiomyopathy ([facility]   EF 35 %  ICD-10-CM: I42.9  ICD-9-CM: 425.4  3/30/2017 Yes            -she responded to lasix, CXR better in 1 one,  Probably more mild  CHF but is treated with ABX    Plan:  (Medical Decision Making)   - improved with few doses of lasix, will change back to PO  -continue Rocephin  - wean 02  -hopefully home soon   -cardiology consult -family request    More than 50% of the time documented was spent in face-to-face contact with the patient and in the care of the patient on the floor/unit where the patient is located.     Leonel Canales MD

## 2017-08-15 NOTE — PROGRESS NOTES
Patient's daughter voices that patient does not want to eat due to patient mouth feeling usual; request for magic mouthwash. No open areas or patchy white or yellow areas observed in patient's mouth. New order received from Dr. Hattie Keenan for magic mouth wash.

## 2017-08-15 NOTE — PROGRESS NOTES
Patient resting in bed alert and oriented times three and stable. Son at bedside. Report to be given to oncoming 7p-7a nurse. Remote telemetry C-paced at 75.

## 2017-08-15 NOTE — PROGRESS NOTES
Resting in bedside chair, a&o times three, resp even and unlabored. Daughter at bedside. Call light to left side. No distress noted.

## 2017-08-15 NOTE — PROGRESS NOTES
Physical assessment completed, pt alert and oriented, resting in bed no visual s/s of pain or distress, on 4 liters of Oxygen via nasal cannula, daughter at bedside, call light within reach, will keep monitoring.

## 2017-08-15 NOTE — CONSULTS
[facility] Cardiology Consult                Date of  Admission: 8/12/2017  5:31 AM     Primary Care Physician: Dr Ivette Curtis  Primary Cardiologist: Dr Candleario Joe  Referring Physician: Dr Anup Sloan  Consulting Physician: Dr Betty Ang    CC/Reason for consult: CHF      Aidee Rushing is a 80 y.o. female admitted for Acute respiratory failure with hypoxemia ([address]  Respiratory failure ([address] She is frail and very lethargic, minimally able to give history, and most medical history obtained from daughter at bedside. She has a h/o hypothyroidism, HTN, HL, a.fib on ASA alone due to fall risk s/p AV node ablation 2017 and BIV [facility] PM, CAD, ischemic cardiomyopathy w echo 2-2017 w EF 30-35% with mild FADY, mild-mod AMR, CKD, and DM. She lives at home with her  but has around the clock care givers, but per daughter has been sleeping a lot lately and lethargic with poor po intake and weight loss. She was in usual health, went to be dthe night of 8-11 and woke the next morning with SOB and rattle in her chest. No CP, no recent palpitations, dizziness or syncope. The pt vomited or coughed up a small amount of liquid, EMS was called, O2 sat in the 70's, placed on CPAP and transitioned to BIPAP. , CXR showed diffuse alveolar densities mid to lower L lung, felt to be most likely pneumonia, w nml WBC. BIPAP was tapered and she is now on 2L O2. She was hydrated for renal failure but then felt to be volume overloaded and diuresed with IV lasix. Volume status +350 cc. She is now laying flat without distress. Repeat CXR decreased airspace in L lung, . BP on admission 232/97 (though h/o orthostatic hypotension) and BP now 176/86. EKG shows v paced at 75, tele shows av and v pacing. Unable to obtain any further HPI from pt due to lethargy. She is currently DNR.      Patient Active Problem List   Diagnosis Code    Diabetes mellitus, type 2 ([facility] E11.9    Hypertension I10    Acquired hypothyroidism E03.9    Mixed hyperlipidemia E78.2    CKD (chronic kidney disease), stage III N18.3    GERD (gastroesophageal reflux disease) K21.9    IBS (irritable bowel syndrome) K58.9    Gait disorder R26.9    Atrial fibrillation with RVR ([facility]) I48.91    Autonomic neuropathy G90.9    MCI (mild cognitive impairment) G31.84    Chronic pain syndrome G89.4    Cervical radiculopathy M54.12    Anorexia R63.0    Dysphagia R13.10    Pacemaker Z95.0    SSS (sick sinus syndrome) ([facility]) I49.5    Orthostatic hypotension I95.1    CAD in native artery I25.10    Dyspnea R06.00    Atrial fibrillation with rapid ventricular response ([facility]) I48.91    Altered mental state R41.82    Chest pain, pleuritic R07.81    Atrial fibrillation ([facility]) I48.91    Altered mental status R41.82    CHF (congestive heart failure) ([facility]) I50.9    Cardiomyopathy ([facility]) I42.9    Ischemic cardiomyopathy I25.5    Acute respiratory failure with hypoxemia ([facility]) J96.01    CAP (community acquired pneumonia) J18.9    DNR (do not resuscitate) Z66    Respiratory failure ([address] J96.90    RUSS (acute kidney injury) ([address] N17.9       Past Medical History:   Diagnosis Date    A fib     Abnormal loss of weight     Acquired hypothyroidism 9/12/2012    Anemia     Anorexia 9/2/2014    Arthritis associated with another disorder     Atrial fibrillation ([address] 9/12/2012    Paroxysmal.  Coumadin contraindicated due to falls      Autonomic neuropathy 1/13/2013    CAD (coronary artery disease)     CAD in native artery 5/5/2016    Cervical radiculopathy 5/23/2013    Chest pain     Chronic pain syndrome 1/13/2013    Back, right shoulder, neck: Peripheral neuropathy, spinal stenosis C7-T1, foraminal narrowing C4-5     CKD (chronic kidney disease), stage III 9/12/2012    Diabetes ([address]     about 30 years    Diabetes mellitus     Diabetes mellitus, type 2 ([address] 9/12/2012    Dysphagia 9/2/2014    Due to esophageal spasm seen on modified barium swallow 2015     Gait disorder 9/12/2012    GERD (gastroesophageal reflux disease)     Heart failure ([facility]     HTN     Hypertension 9/12/2012    Orthostatic hypotension due to autonomic neuropathy     Hypothyroid     IBS (irritable bowel syndrome) 9/12/2012    Iron deficiency 9/12/2012    Lumbar disc disease     MCI (mild cognitive impairment) 1/13/2013    Mixed hyperlipidemia 9/12/2012    Orthostatic hypotension 5/5/2016    Pacemaker     SSS (sick sinus syndrome) ([facility] 5/5/2016      Past Surgical History:   Procedure Laterality Date    HX APPENDECTOMY      HX CARPAL TUNNEL RELEASE      HX CERVICAL FUSION      HX COLONOSCOPY  Nov 2005    HX PACEMAKER  6/08    HX PACEMAKER  2017    Replacement      Allergies   Allergen Reactions    Tylenol [Acetaminophen] Unknown (comments)     insomnia      History reviewed. No pertinent family history.    Social History   Substance Use Topics    Smoking status: Never Smoker    Smokeless tobacco: Never Used    Alcohol use No        Current Facility-Administered Medications   Medication Dose Route Frequency    furosemide (LASIX) tablet 40 mg  40 mg Oral BID    azithromycin (ZITHROMAX) tablet 500 mg  500 mg Oral DAILY    heparin (porcine) injection 5,000 Units  5,000 Units SubCUTAneous Q8H    aspirin delayed-release tablet 81 mg  81 mg Oral DAILY    carvedilol (COREG) tablet 12.5 mg  12.5 mg Oral BID WITH MEALS    gabapentin (NEURONTIN) capsule 600 mg  600 mg Oral QHS PRN    levothyroxine (SYNTHROID) tablet 25 mcg  25 mcg Oral ACB    lisinopril (PRINIVIL, ZESTRIL) tablet 10 mg  10 mg Oral DAILY    pantoprazole (PROTONIX) tablet 40 mg  40 mg Oral ACB    potassium chloride (KLOR-CON) tablet 10 mEq  10 mEq Oral DAILY    senna-docusate (PERICOLACE) 8.6-50 mg per tablet 1 Tab  1 Tab Oral QHS    zolpidem (AMBIEN) tablet 5 mg  5 mg Oral QHS PRN    sodium chloride (NS) flush 5 mL  5 mL InterCATHeter Q8H    sodium chloride (NS) flush 5-10 mL  5-10 mL InterCATHeter PRN    insulin lispro (HUMALOG) injection   SubCUTAneous AC&HS    cefTRIAXone (ROCEPHIN) 2 g in 0.9% sodium chloride (MBP/ADV) 50 mL MBP  2 g IntraVENous Q24H       Review of Symptoms:  General: + weight loss, weakness, no fever or chills  Skin: no rashes, lumps, or other skin changes  HEENT: no headache, dizziness, lightheadedness, vision changes, hearing changes, tinnitus, vertigo, sinus pressure/pain, bleeding gums, sore throat, or hoarseness  Neck: no swollen glands, goiter, pain or stiffness  Respiratory: + cough, sputum, no hemoptysis, + dyspnea, no wheezing  Cardiovascular: + as per HPI  Gastrointestinal: + poor po intake, + GERD, constipation   Urinary: no frequency, urgency , hematuria, burning/pain with urination, recent flank pain, polyuria, nocturia, or difficulty urinating  Peripheral Vascular: no claudication, leg cramps, prior DVTs, swelling of calves, legs, or feet, color change, or swelling with redness or tenderness  Musculoskeletal: + DJD, needs a lot of assistance, no recent falls  Psychiatric: no depression or excessive stress  Neurological: no sensory or motor loss, seizures, syncope, tremors, numbness, no dementia  Hematologic: no anemia, easy bruising or bleeding  Endocrine: + thyroid problems on synthroid, no heat or cold intolerance, excessive sweating, polyuria, polydipsia, no diabetes.        Physical Exam  Vitals:    08/14/17 2357 08/15/17 0141 08/15/17 0402 08/15/17 0727   BP: 149/71  162/77 176/86   Pulse: 75  75 77   Resp: 20  20 20   Temp: 98.5 °F (36.9 °C)  98.6 °F (37 °C) 98.7 °F (37.1 °C)   SpO2: (!) 69%  99% 100%   Weight:  42 kg (92 lb 9.6 oz)     Height:           Physical Exam:  General: Frail, lethargic, arouses and briefly answers questions  HEENT: pupils equal and round, no abnormalities noted  Neck: supple, no JVD, no carotid bruits  Heart: S1S2 with RRR without murmurs or gallops  Lungs: Few crackles on L   Abd: soft, nontender, nondistended, with good bowel sounds  Ext: warm, no edema  Skin: warm and dry  Psychiatric: oriented to situation, year but lethargic   Neurologic: normal muscle tone        Labs:   Recent Labs      08/15/17   0719  08/14/17   0742   NA  141  141   K  3.6  4.2   BUN  39*  42*   CREA  1.43*  1.68*   GLU  90  147*   WBC   --   17.8*   HGB   --   9.7*   HCT   --   29.0*   PLT   --   180        Assessment/Plan:     Assessment:   CAP (community acquired pneumonia) (8/12/2017)- 2L O2, zithromax, rocephin. Diabetes mellitus, type 2- Cont current meds. Hypertension - h/o orthostatic hypotension due to autonomic neuropathy, cont coreg, lisinopril. Atrial fibrillation - S/P AV node ablation and [facility] BIV PM, not anticoagulation candidate due to falls, cont BB. Cardiomyopathy- EF 30-35%, good diuresis w IV lasix now on po lasix 40 mg po BID (home dose), cont BB/ACE-I. Acute respiratory failure with hypoxemia - 2L O2 by NC.     DNR (do not resuscitate) (8/12/2017)    RUSS (acute kidney injury) ([facility] (8/13/2017)- Cr stable. Thank you very much for this referral. We appreciate the opportunity to participate in this patient's care. We will follow along with above stated plan.     Scott Palomo PA-C  Consulting MD: Edy Horn

## 2017-08-16 VITALS
HEIGHT: 64 IN | RESPIRATION RATE: 18 BRPM | BODY MASS INDEX: 15.47 KG/M2 | OXYGEN SATURATION: 94 % | DIASTOLIC BLOOD PRESSURE: 82 MMHG | TEMPERATURE: 97.6 F | SYSTOLIC BLOOD PRESSURE: 173 MMHG | HEART RATE: 74 BPM | WEIGHT: 90.6 LBS

## 2017-08-16 LAB
GLUCOSE BLD STRIP.AUTO-MCNC: 131 MG/DL (ref 65–100)
GLUCOSE BLD STRIP.AUTO-MCNC: 203 MG/DL (ref 65–100)

## 2017-08-16 PROCEDURE — 82962 GLUCOSE BLOOD TEST: CPT

## 2017-08-16 PROCEDURE — 74011250636 HC RX REV CODE- 250/636: Performed by: INTERNAL MEDICINE

## 2017-08-16 PROCEDURE — 74011000258 HC RX REV CODE- 258: Performed by: INTERNAL MEDICINE

## 2017-08-16 PROCEDURE — 74011250637 HC RX REV CODE- 250/637: Performed by: INTERNAL MEDICINE

## 2017-08-16 PROCEDURE — 99239 HOSP IP/OBS DSCHRG MGMT >30: CPT | Performed by: INTERNAL MEDICINE

## 2017-08-16 RX ORDER — CEFDINIR 300 MG/1
300 CAPSULE ORAL 2 TIMES DAILY
Qty: 6 CAP | Refills: 0 | Status: SHIPPED | OUTPATIENT
Start: 2017-08-16 | End: 2017-08-19

## 2017-08-16 RX ADMIN — AZITHROMYCIN 500 MG: 250 TABLET, FILM COATED ORAL at 08:17

## 2017-08-16 RX ADMIN — PANTOPRAZOLE SODIUM 40 MG: 40 TABLET, DELAYED RELEASE ORAL at 05:41

## 2017-08-16 RX ADMIN — ASPIRIN 81 MG: 81 TABLET, COATED ORAL at 08:17

## 2017-08-16 RX ADMIN — LEVOTHYROXINE SODIUM 25 MCG: 50 TABLET ORAL at 05:42

## 2017-08-16 RX ADMIN — LISINOPRIL 10 MG: 5 TABLET ORAL at 08:17

## 2017-08-16 RX ADMIN — Medication 5 ML: at 05:42

## 2017-08-16 RX ADMIN — HEPARIN SODIUM 5000 UNITS: 5000 INJECTION, SOLUTION INTRAVENOUS; SUBCUTANEOUS at 05:42

## 2017-08-16 RX ADMIN — CEFTRIAXONE 2 G: 2 INJECTION, POWDER, FOR SOLUTION INTRAMUSCULAR; INTRAVENOUS at 08:17

## 2017-08-16 RX ADMIN — FUROSEMIDE 40 MG: 40 TABLET ORAL at 08:17

## 2017-08-16 RX ADMIN — CARVEDILOL 12.5 MG: 12.5 TABLET, FILM COATED ORAL at 08:17

## 2017-08-16 RX ADMIN — POTASSIUM CHLORIDE 10 MEQ: 750 TABLET, EXTENDED RELEASE ORAL at 08:17

## 2017-08-16 NOTE — DISCHARGE SUMMARY
DISCHARGE NOTE    Amparo Fernandez  Admission date:  8/12/2017  Discharge date:  8/16/2017    Admitting Diagnosis:  Acute respiratory failure with hypoxemia ([facility] Respiratory *    Discharge Diagnoses:    Hospital Problems  Date Reviewed: 8/12/2017          Codes Class Noted POA    RUSS (acute kidney injury) ([facility] ICD-10-CM: N17.9  ICD-9-CM: 584.9  8/13/2017 Unknown        Diabetes mellitus, type 2 ([facility] (Chronic) ICD-10-CM: E11.9  ICD-9-CM: 250.00  8/12/2017 Yes        Hypertension (Chronic) ICD-10-CM: I10  ICD-9-CM: 401.9  8/12/2017 Yes    Overview Signed 9/12/2012  3:43 PM by Heri Sharpe     Orthostatic hypotension due to autonomic neuropathy             CKD (chronic kidney disease), stage III (Chronic) ICD-10-CM: N18.3  ICD-9-CM: 585.3  8/12/2017 Yes        Atrial fibrillation (HCC) (Chronic) ICD-10-CM: I48.91  ICD-9-CM: 427.31  8/12/2017 Yes        Acute respiratory failure with hypoxemia (HCC) ICD-10-CM: J96.01  ICD-9-CM: 518.81  8/12/2017 Yes        * (Principal)CAP (community acquired pneumonia) ICD-10-CM: J18.9  ICD-9-CM: 688  8/12/2017 Yes        DNR (do not resuscitate) (Chronic) ICD-10-CM: Z66  ICD-9-CM: V49.86  8/12/2017 Yes        Respiratory failure ([facility] ICD-10-CM: J96.90  ICD-9-CM: 518.81  8/12/2017 Unknown        Cardiomyopathy ([facility] ICD-10-CM: I42.9  ICD-9-CM: 425.4  3/30/2017 Yes              Consultants:cardiology  Palliative care    Studies/Procedures:CXR      Condition on Discharge:  Stable     Disposition:  Home       Presenting Illness:         Patient is a 80 y.o.  female presents with sob. Patient has a history of hypothyroidism, HTN, HL, a.fib on ASA alone due to fall risk, CAD, ischemic cardiomyopathy, SSS s/p PPM placement, CKD, and DM. Patient is lethargic and history is provided by patient's daughter. Patient was in her usual state of health yesterday, ate dinner well and went to bed last night as usual.  Apparently she woke this am with c/o sob and was \"rattling\". She has chronic pain in her neck and shoulder which is unchanged from her baseline and daughter reports no additional c/o chest discomfort. Daughter states that patient then either cough up or vomited up a small amount of liquid but was in enough distress at that point that she called EMS. On their arrival patient's O2 sats were in the 70s and she was in respiratory distress. She was placed on CPAP and brought to the ER for further evaluation. Her initial o2 sat on CPAP were 86% and she was transitioned to BiPAP. She was also significantly hypertensive with /97 - NTG patch placed by EMS. , WBC 8.2, PCT pending.     On exam patient remains extremely drowsy but does occasionally wake and lifts her head. She was transitioned to NC with o2 sats remaining in the 90s. BP dropped to 80/50s (without additional tx of HTN in ER), IVF bolus was started and NGT patch removed. Per patient's daughter she has no hx of lung disease, does not require home O2, and does not smoke.              Hospital course: She was admitted, treated with ABX. She was given fluids, her CR actually got worse, then she was restarted on her home lasix and got extra dose of lasix and her Cr improved. She was seen by cardiology per family request and no change in medications were made. She was weaned off oxygen and stable for discharge home and follow up with CXR in 3-4 weeks and also will finish 8 days course of ABX. She was seen by Palliative care and she is now DNR. Physical Exam:   Constitution:  the patient is well developed and in no acute distress  EENMT:  Sclera clear, pupils equal, oral mucosa moist  Respiratory: clear  Cardiovascular:  RRR without M,G,R  Gastrointestinal: soft and non-tender; with positive bowel sounds. Musculoskeletal: warm without cyanosis. There is no lower leg edema.   Skin:  no jaundice or rashes, no wounds   Neurologic: no gross neuro deficits     Psychiatric:  alert and oriented x 3      LAB  Recent Labs      08/14/17   0742   WBC  17.8*   HGB  9.7*   HCT  29.0*   PLT  180     Recent Labs      08/15/17   0719  08/14/17   0742   NA  141  141   K  3.6  4.2   CL  100  104   CO2  31  27   BUN  39*  42*   CREA  1.43*  1.68*   CA  8.6  8.1*     No results for input(s): PH, PCO2, PO2, HCO3 in the last 72 hours. Discharge Medications:   Current Discharge Medication List      START taking these medications    Details   cefdinir (OMNICEF) 300 mg capsule Take 1 Cap by mouth two (2) times a day for 3 days. Qty: 6 Cap, Refills: 0         CONTINUE these medications which have NOT CHANGED    Details   HYDROcodone-acetaminophen (NORCO) 7.5-325 mg per tablet Take 1 Tab by mouth every six (6) hours as needed. Max Daily Amount: 4 Tabs. Indications: Pain  Qty: 90 Tab, Refills: 0    Associated Diagnoses: Congestive heart failure, unspecified congestive heart failure chronicity, unspecified congestive heart failure type (HCC)      lisinopril (PRINIVIL, ZESTRIL) 10 mg tablet Take 1 Tab by mouth daily. Qty: 90 Tab, Refills: 3      potassium chloride (KLOR-CON) 10 mEq tablet Take 1 Tab by mouth daily. Qty: 90 Tab, Refills: 3      levothyroxine (SYNTHROID) 25 mcg tablet Take 1 Tab by mouth Daily (before breakfast). Qty: 90 Tab, Refills: 3      senna-docusate (PERICOLACE) 8.6-50 mg per tablet Take 1 Tab by mouth nightly. Qty: 90 Tab, Refills: 3    Associated Diagnoses: Rib pain on left side      gabapentin (NEURONTIN) 600 mg tablet Take 1 Tab by mouth nightly as needed. Qty: 90 Tab, Refills: 1      furosemide (LASIX) 40 mg tablet Take 1 Tab by mouth daily. Qty: 60 Tab, Refills: 11      carvedilol (COREG) 12.5 mg tablet Take 1 Tab by mouth two (2) times daily (with meals). Qty: 60 Tab, Refills: 6      zolpidem (AMBIEN) 5 mg tablet Take 1 Tab by mouth nightly as needed for Sleep. Max Daily Amount: 5 mg. Qty: 30 Tab, Refills: 1      aspirin delayed-release 81 mg tablet Take  by mouth daily.       omeprazole (PRILOSEC) 20 mg capsule Take 1 Cap by mouth two (2) times a day. Qty: 180 Cap, Refills: 3    Associated Diagnoses: Rib pain on left side      polyethylene glycol (MIRALAX) 17 gram packet Take 1 Packet by mouth daily. Qty: 30 Packet, Refills: 5    Associated Diagnoses: Rib pain on left side      nitroglycerin (NITROSTAT) 0.4 mg SL tablet 1 Tab by SubLINGual route every five (5) minutes as needed. Indications: ANGINA  Qty: 1 Bottle, Refills: 3      cholecalciferol (VITAMIN D-3) 400 unit Tab tablet Take 1,000 Units by mouth daily. Associated Diagnoses: Rib pain on left side      calcium carbonate (OS-) 500 mg (1,250 mg) tablet take 1 Tab by mouth two (2) times a day. Condition on Discharge:  stable      Followup/Outpt Studies:  --Follow up appointment with [facility] in 3-4  weeks with CXR   --Total discharge greater than 30 minutes in duration. More than 50% of the time documented was spent in face-to-face contact with the patient and in the care of the patient on the floor/unit where the patient is located.     Tara Soriano MD

## 2017-08-16 NOTE — PROGRESS NOTES
Discharge instructions, follow up information, medication list, prescription information, and medication side effects sheet provided and explained to the pt. IV removed by primary RN,  Remote telemetry removed. Opportunity for questions provided. Instructed to call once ready to leave.

## 2017-08-16 NOTE — PROGRESS NOTES
Patient in bed resting with no complaints at this time. Patient is alert and orientated with no distress noted. IV intact and patient with no s/s of infection noted. Respirations even and unlabored with heart rate regular. Patient unable to ambulate independently without assistance; needs x1 r/t weakness and dyspnea. Bed in low locked position with call light within reach. Patient instructed to call if assistance is needed. Will continue to monitor.

## 2017-08-16 NOTE — DISCHARGE INSTRUCTIONS
DISCHARGE SUMMARY from Nurse    The following personal items are in your possession at time of discharge:    Dental Appliances: None  Visual Aid: None     Home Medications: None  Jewelry: None  Clothing: Pajamas  Other Valuables: None  Personal Items Sent to Safe: none          PATIENT INSTRUCTIONS:    After general anesthesia or intravenous sedation, for 24 hours or while taking prescription Narcotics:  · Limit your activities  · Do not drive and operate hazardous machinery  · Do not make important personal or business decisions  · Do  not drink alcoholic beverages  · If you have not urinated within 8 hours after discharge, please contact your surgeon on call. Report the following to your surgeon:  · Excessive pain, swelling, redness or odor of or around the surgical area  · Temperature over 100.5  · Nausea and vomiting lasting longer than 4 hours or if unable to take medications  · Any signs of decreased circulation or nerve impairment to extremity: change in color, persistent  numbness, tingling, coldness or increase pain  · Any questions        What to do at Home:  Recommended activity: Activity as tolerated. If you experience any of the following symptoms temp > 101.5, worsening cough or wheezing, shortness of breath or fatigue not relieved with rest, please follow up with MD.      *  Please give a list of your current medications to your Primary Care Provider. *  Please update this list whenever your medications are discontinued, doses are      changed, or new medications (including over-the-counter products) are added. *  Please carry medication information at all times in case of emergency situations. These are general instructions for a healthy lifestyle:    No smoking/ No tobacco products/ Avoid exposure to second hand smoke    Surgeon General's Warning:  Quitting smoking now greatly reduces serious risk to your health.     Obesity, smoking, and sedentary lifestyle greatly increases your risk for illness    A healthy diet, regular physical exercise & weight monitoring are important for maintaining a healthy lifestyle    You may be retaining fluid if you have a history of heart failure or if you experience any of the following symptoms:  Weight gain of 3 pounds or more overnight or 5 pounds in a week, increased swelling in our hands or feet or shortness of breath while lying flat in bed. Please call your doctor as soon as you notice any of these symptoms; do not wait until your next office visit. Recognize signs and symptoms of STROKE:    F-face looks uneven    A-arms unable to move or move unevenly    S-speech slurred or non-existent    T-time-call 911 as soon as signs and symptoms begin-DO NOT go       Back to bed or wait to see if you get better-TIME IS BRAIN. Warning Signs of HEART ATTACK     Call 911 if you have these symptoms:   Chest discomfort. Most heart attacks involve discomfort in the center of the chest that lasts more than a few minutes, or that goes away and comes back. It can feel like uncomfortable pressure, squeezing, fullness, or pain.  Discomfort in other areas of the upper body. Symptoms can include pain or discomfort in one or both arms, the back, neck, jaw, or stomach.  Shortness of breath with or without chest discomfort.  Other signs may include breaking out in a cold sweat, nausea, or lightheadedness. Don't wait more than five minutes to call 911 - MINUTES MATTER! Fast action can save your life. Calling 911 is almost always the fastest way to get lifesaving treatment. Emergency Medical Services staff can begin treatment when they arrive -- up to an hour sooner than if someone gets to the hospital by car. The discharge information has been reviewed with the patient. The patient verbalized understanding. Discharge medications reviewed with the patient and appropriate educational materials and side effects teaching were provided. Pneumonia: Care Instructions  Your Care Instructions    Pneumonia is an infection of the lungs. Most cases are caused by infections from bacteria or viruses. Pneumonia may be mild or very severe. If it is caused by bacteria, you will be treated with antibiotics. It may take a few weeks to a few months to recover fully from pneumonia, depending on how sick you were and whether your overall health is good. Follow-up care is a key part of your treatment and safety. Be sure to make and go to all appointments, and call your doctor if you are having problems. Its also a good idea to know your test results and keep a list of the medicines you take. How can you care for yourself at home? · Take your antibiotics exactly as directed. Do not stop taking the medicine just because you are feeling better. You need to take the full course of antibiotics. · Take your medicines exactly as prescribed. Call your doctor if you think you are having a problem with your medicine. · Get plenty of rest and sleep. You may feel weak and tired for a while, but your energy level will improve with time. · To prevent dehydration, drink plenty of fluids, enough so that your urine is light yellow or clear like water. Choose water and other caffeine-free clear liquids until you feel better. If you have kidney, heart, or liver disease and have to limit fluids, talk with your doctor before you increase the amount of fluids you drink. · Take care of your cough so you can rest. A cough that brings up mucus from your lungs is common with pneumonia. It is one way your body gets rid of the infection. But if coughing keeps you from resting or causes severe fatigue and chest-wall pain, talk to your doctor. He or she may suggest that you take a medicine to reduce the cough. · Use a vaporizer or humidifier to add moisture to your bedroom. Follow the directions for cleaning the machine. · Do not smoke or allow others to smoke around you.  Smoke will make your cough last longer. If you need help quitting, talk to your doctor about stop-smoking programs and medicines. These can increase your chances of quitting for good. · Take an over-the-counter pain medicine, such as acetaminophen (Tylenol), ibuprofen (Advil, Motrin), or naproxen (Aleve). Read and follow all instructions on the label. · Do not take two or more pain medicines at the same time unless the doctor told you to. Many pain medicines have acetaminophen, which is Tylenol. Too much acetaminophen (Tylenol) can be harmful. · If you were given a spirometer to measure how well your lungs are working, use it as instructed. This can help your doctor tell how your recovery is going. · To prevent pneumonia in the future, talk to your doctor about getting a flu vaccine (once a year) and a pneumococcal vaccine (one time only for most people). When should you call for help? Call 911 anytime you think you may need emergency care. For example, call if:  · You have severe trouble breathing. Call your doctor now or seek immediate medical care if:  · You cough up dark brown or bloody mucus (sputum). · You have new or worse trouble breathing. · You are dizzy or lightheaded, or you feel like you may faint. Watch closely for changes in your health, and be sure to contact your doctor if:  · You have a new or higher fever. · You are coughing more deeply or more often. · You are not getting better after 2 days (48 hours). · You do not get better as expected. Where can you learn more? Go to http://david-rui.info/. Enter [id number] in the search box to learn more about \"Pneumonia: Care Instructions. \"  Current as of: March 25, 2017  Content Version: 11.3  © 9382-5609 [employer]. Care instructions adapted under license by [employer] (which disclaims liability or warranty for this information).  If you have questions about a medical condition or this instruction, always ask your healthcare professional. [place] [address] any warranty or liability for your use of this information.

## 2017-08-16 NOTE — PROGRESS NOTES
[facility] CARDIOLOGY PROGRESS NOTE           8/16/2017 8:41 AM    Admit Date: 8/12/2017      Subjective:   Pt is alert today Feels better No c/o SOB Wants to go back home    ROS:  Cardiovascular:  As noted above    Objective:      Vitals:    08/15/17 2219 08/16/17 0358 08/16/17 0611 08/16/17 0727   BP: 164/67 158/72  178/79   Pulse: 77 76  75   Resp: 20 18  18   Temp: 98.7 °F (37.1 °C) 98 °F (36.7 °C)  98.4 °F (36.9 °C)   SpO2: 97% 95%  96%   Weight:   41.1 kg (90 lb 9.6 oz)    Height:           Physical Exam:  General-No Acute Distress  Neck- supple, no JVD  CV- regular rate and rhythm  Lung- clear bilaterally  Abd- soft, nontender, nondistended  Ext- no edema bilaterally.   Skin- warm and dry    Data Review:   Recent Labs      08/15/17   0719  08/14/17   0742   NA  141  141   K  3.6  4.2   BUN  39*  42*   CREA  1.43*  1.68*   GLU  90  147*   WBC   --   17.8*   HGB   --   9.7*   HCT   --   29.0*   PLT   --   180       Assessment/Plan:     Principal Problem:    CAP (community acquired pneumonia) (8/12/2017)    Active Problems:    Diabetes mellitus, type 2 ([address] (8/12/2017)      Hypertension (8/12/2017)      Overview: Orthostatic hypotension due to autonomic neuropathy      CKD (chronic kidney disease), stage III (8/12/2017)      Atrial fibrillation ([address] (8/12/2017)stable      Cardiomyopathy ([address] (3/30/2017)      Acute respiratory failure with hypoxemia ([address] (8/12/2017)      DNR (do not resuscitate) (8/12/2017)      Respiratory failure ([address] (8/12/2017)better now      RUSS (acute kidney injury) ([address] (8/13/2017)    Pt is alert today Continue meds and support Call us if needed      Rico Gore MD  8/16/2017 8:41 AM

## 2017-08-17 ENCOUNTER — PATIENT OUTREACH (OUTPATIENT)
Dept: CASE MANAGEMENT | Age: 82
End: 2017-08-17

## 2017-08-17 NOTE — PROGRESS NOTES
This note will not be viewable in [address] Transition of Care Discharge Follow-up Questionnaire   Date/Time of Call:   August 17, 2017 12:30PM   What was the patient hospitalized for? Patient hospitalized for CAP (community acquired pneumonia)             Does the patient understand his/her diagnosis and/or treatment and what happened during the hospitalization? Patient states understanding of diagnosis and treatment during hospitalization. Did the patient receive discharge instructions? Yes     Review any discharge instructions ([facility] Ask patient if they understand these. Do they have any questions? Patient states understanding of discharge instructions, patient states no questions. Were home services ordered (nursing, PT, OT, ST, etc.)? Yes, home health services ordered (PT/Nursing). If so, has the first visit occurred? If not, why? (Assist with coordination of services if necessary.) Patient states home health was scheduled to come out today, but she declined and scheduled for tomorrow due to not feeling up to home visit today. Was any DME ordered? No durable medical equipment ordered. If so, has it been received? If not, why?  (Assist with coordination of arranging DME orders if necessary. ) NA         Complete a review of all medications (new, continued and discontinued meds per the D/C instructions and medication tab in [facility]). Care Coordinator reviewed all medications with patient per [facility], one new medication prescribed. Were all new prescriptions filled? If not, why?  (Assist with obtainment of medications if necessary.) Yes, patient states prescription filled and currently being taken per doctor's order. Does the patient understand the purpose and dosing instructions for all medications?   (If patient has questions, provide explanation and education.) Patient states understanding of current medications and dosing instructions. Does the patient have any problems in performing ADLs? (If patient is unable to perform ADLs  what is the limiting factor(s)? Do they have a support system that can assist? If no support system is present, discuss possible assistance that they may be able to obtain.) Patient states she is independent with ADL's and uses a Walker to assist with ambulation. Patient states she has a caregiver that is with her 8AM-5PM. Patient states her(daughter) who is a Nurse is there with her once caregiver leaves. Patient states she is feeling okay and is recovering. Does the patient have all follow-up appointments scheduled? 7 day f/up with PCP?    7-14 day f/up with specialist?    If f/up has not been made  what actions has the care coordinator made to accomplish this? Has transportation been arranged? [facility] Pulmonary follow-up should be within 7 days of discharge; all others should have PCP follow-up within 7 days of discharge; follow-ups with other specialists should be within 7-14 days of discharge.) Yes  8/23/2017 11:15 AM MD AMBERLY [address]     9/6/2017 11:20 AM ALBERTO Davidson [facility]         Yes, patient states she has transportation to appointments. NA         Any other questions or concerns expressed by the patient? Patient states no questions or concerns. Schedule next appointment with ZAKIA Mnuoz or refer to RN Case Manager/  per the workflow guidelines. When is care coordinators next follow-up call scheduled? If referred for CCM  what RN care manager was the referral assigned?  NA          NA      NA   ABRAN Call Completed By: NIK Amin ACO  Care Coordinator

## 2017-08-18 NOTE — PROGRESS NOTES
Primary diagnosis of CHF. Patient will not be a Pneumonia Bundled Payment patient. Ambulatory Care will follow.      Mena Barber RN-BC, BSN  Care Transition Navigator

## 2017-08-23 ENCOUNTER — HOME HEALTH ADMISSION (OUTPATIENT)
Dept: HOME HEALTH SERVICES | Facility: HOME HEALTH | Age: 82
End: 2017-08-23

## 2017-09-15 ENCOUNTER — HOSPITAL ENCOUNTER (OUTPATIENT)
Dept: GENERAL RADIOLOGY | Age: 82
Discharge: HOME | End: 2017-09-15
Payer: MEDICARE

## 2017-09-15 DIAGNOSIS — J96.01 ACUTE RESPIRATORY FAILURE WITH HYPOXEMIA (HCC): ICD-10-CM

## 2017-09-15 DIAGNOSIS — J96.90 RESPIRATORY FAILURE, UNSPECIFIED CHRONICITY, UNSPECIFIED WHETHER WITH HYPOXIA OR HYPERCAPNIA (HCC): ICD-10-CM

## 2017-09-15 DIAGNOSIS — J18.9 CAP (COMMUNITY ACQUIRED PNEUMONIA): ICD-10-CM

## 2017-09-15 PROCEDURE — 71020 XR CHEST PA LAT: CPT

## 2017-10-19 PROBLEM — R41.82 ALTERED MENTAL STATE: Status: RESOLVED | Noted: 2017-03-09 | Resolved: 2017-10-19

## 2017-10-19 PROBLEM — N17.9 AKI (ACUTE KIDNEY INJURY) (HCC): Status: RESOLVED | Noted: 2017-08-13 | Resolved: 2017-10-19

## 2017-10-19 PROBLEM — R41.82 ALTERED MENTAL STATUS: Status: RESOLVED | Noted: 2017-03-10 | Resolved: 2017-10-19

## 2017-10-19 PROBLEM — R07.81 CHEST PAIN, PLEURITIC: Status: RESOLVED | Noted: 2017-03-09 | Resolved: 2017-10-19

## 2018-01-16 PROBLEM — Z95.0 BIVENTRICULAR CARDIAC PACEMAKER IN SITU: Status: ACTIVE | Noted: 2018-01-16

## 2018-01-16 PROBLEM — Z98.890 S/P AV NODAL ABLATION: Status: ACTIVE | Noted: 2018-01-16

## 2018-01-25 PROBLEM — E11.21 TYPE 2 DIABETES MELLITUS WITH NEPHROPATHY (HCC): Status: ACTIVE | Noted: 2018-01-25

## 2018-04-06 ENCOUNTER — APPOINTMENT (OUTPATIENT)
Dept: GENERAL RADIOLOGY | Age: 83
End: 2018-04-06
Attending: EMERGENCY MEDICINE
Payer: MEDICARE

## 2018-04-06 ENCOUNTER — HOSPITAL ENCOUNTER (EMERGENCY)
Age: 83
Discharge: HOME | End: 2018-04-06
Attending: EMERGENCY MEDICINE
Payer: MEDICARE

## 2018-04-06 ENCOUNTER — APPOINTMENT (OUTPATIENT)
Dept: ULTRASOUND IMAGING | Age: 83
End: 2018-04-06
Attending: EMERGENCY MEDICINE
Payer: MEDICARE

## 2018-04-06 VITALS
HEART RATE: 75 BPM | OXYGEN SATURATION: 100 % | HEIGHT: 64 IN | TEMPERATURE: 98 F | SYSTOLIC BLOOD PRESSURE: 191 MMHG | BODY MASS INDEX: 13.66 KG/M2 | DIASTOLIC BLOOD PRESSURE: 86 MMHG | WEIGHT: 80 LBS | RESPIRATION RATE: 20 BRPM

## 2018-04-06 DIAGNOSIS — M54.32 SCIATICA OF LEFT SIDE: Primary | ICD-10-CM

## 2018-04-06 DIAGNOSIS — G89.4 CHRONIC PAIN SYNDROME: ICD-10-CM

## 2018-04-06 LAB
ANION GAP SERPL CALC-SCNC: 8 MMOL/L (ref 7–16)
BUN SERPL-MCNC: 21 MG/DL (ref 8–23)
CALCIUM SERPL-MCNC: 9.4 MG/DL (ref 8.3–10.4)
CHLORIDE SERPL-SCNC: 100 MMOL/L (ref 98–107)
CO2 SERPL-SCNC: 29 MMOL/L (ref 21–32)
CREAT SERPL-MCNC: 1.43 MG/DL (ref 0.6–1)
ERYTHROCYTE [DISTWIDTH] IN BLOOD BY AUTOMATED COUNT: 12.7 % (ref 11.9–14.6)
GLUCOSE SERPL-MCNC: 179 MG/DL (ref 65–100)
HCT VFR BLD AUTO: 35.7 % (ref 35.8–46.3)
HGB BLD-MCNC: 11.7 G/DL (ref 11.7–15.4)
MAGNESIUM SERPL-MCNC: 2 MG/DL (ref 1.8–2.4)
MCH RBC QN AUTO: 30.5 PG (ref 26.1–32.9)
MCHC RBC AUTO-ENTMCNC: 32.8 G/DL (ref 31.4–35)
MCV RBC AUTO: 93 FL (ref 79.6–97.8)
PHOSPHATE SERPL-MCNC: 3.4 MG/DL (ref 2.3–3.7)
PLATELET # BLD AUTO: 302 K/UL (ref 150–450)
PMV BLD AUTO: 10.6 FL (ref 10.8–14.1)
POTASSIUM SERPL-SCNC: 3.8 MMOL/L (ref 3.5–5.1)
RBC # BLD AUTO: 3.84 M/UL (ref 4.05–5.25)
SODIUM SERPL-SCNC: 137 MMOL/L (ref 136–145)
WBC # BLD AUTO: 9.7 K/UL (ref 4.3–11.1)

## 2018-04-06 PROCEDURE — 96374 THER/PROPH/DIAG INJ IV PUSH: CPT | Performed by: EMERGENCY MEDICINE

## 2018-04-06 PROCEDURE — 80048 BASIC METABOLIC PNL TOTAL CA: CPT | Performed by: EMERGENCY MEDICINE

## 2018-04-06 PROCEDURE — 83735 ASSAY OF MAGNESIUM: CPT | Performed by: EMERGENCY MEDICINE

## 2018-04-06 PROCEDURE — 85027 COMPLETE CBC AUTOMATED: CPT | Performed by: EMERGENCY MEDICINE

## 2018-04-06 PROCEDURE — 74011250636 HC RX REV CODE- 250/636: Performed by: EMERGENCY MEDICINE

## 2018-04-06 PROCEDURE — 93971 EXTREMITY STUDY: CPT

## 2018-04-06 PROCEDURE — 74011250637 HC RX REV CODE- 250/637: Performed by: EMERGENCY MEDICINE

## 2018-04-06 PROCEDURE — 84100 ASSAY OF PHOSPHORUS: CPT | Performed by: EMERGENCY MEDICINE

## 2018-04-06 PROCEDURE — 99284 EMERGENCY DEPT VISIT MOD MDM: CPT | Performed by: EMERGENCY MEDICINE

## 2018-04-06 PROCEDURE — 72100 X-RAY EXAM L-S SPINE 2/3 VWS: CPT

## 2018-04-06 RX ORDER — CARVEDILOL 3.12 MG/1
12.5 TABLET ORAL ONCE
Status: COMPLETED | OUTPATIENT
Start: 2018-04-06 | End: 2018-04-06

## 2018-04-06 RX ORDER — METHYLPREDNISOLONE 4 MG/1
TABLET ORAL
Qty: 1 DOSE PACK | Refills: 0 | Status: SHIPPED | OUTPATIENT
Start: 2018-04-06 | End: 2018-04-23

## 2018-04-06 RX ORDER — LISINOPRIL 5 MG/1
10 TABLET ORAL ONCE
Status: COMPLETED | OUTPATIENT
Start: 2018-04-06 | End: 2018-04-06

## 2018-04-06 RX ORDER — MORPHINE SULFATE 4 MG/ML
4 INJECTION, SOLUTION INTRAMUSCULAR; INTRAVENOUS
Status: COMPLETED | OUTPATIENT
Start: 2018-04-06 | End: 2018-04-06

## 2018-04-06 RX ORDER — GABAPENTIN 600 MG/1
600 TABLET ORAL 2 TIMES DAILY
Qty: 90 TAB | Refills: 1 | Status: SHIPPED | OUTPATIENT
Start: 2018-04-06 | End: 2018-04-12 | Stop reason: SDUPTHER

## 2018-04-06 RX ADMIN — LISINOPRIL 10 MG: 5 TABLET ORAL at 10:38

## 2018-04-06 RX ADMIN — CARVEDILOL 12.5 MG: 3.12 TABLET, FILM COATED ORAL at 10:38

## 2018-04-06 RX ADMIN — MORPHINE SULFATE 4 MG: 4 INJECTION, SOLUTION INTRAMUSCULAR; INTRAVENOUS at 11:23

## 2018-04-06 NOTE — ED NOTES
I have reviewed discharge instructions with the patient. The patient verbalized understanding. Patient left ED via Discharge Method: wheelchair to Home with Daughter. Opportunity for questions and clarification provided. Patient given 2 scripts. To continue your aftercare when you leave the hospital, you may receive an automated call from our care team to check in on how you are doing. This is a free service and part of our promise to provide the best care and service to meet your aftercare needs.  If you have questions, or wish to unsubscribe from this service please call [phone]. Thank you for Choosing our [employer] [facility].

## 2018-04-06 NOTE — DISCHARGE INSTRUCTIONS
Sciatica: Care Instructions  Your Care Instructions    Sciatica (say \"pgp-ZW-ny-kuh\") is an irritation of one of the sciatic nerves, which come from the spinal cord in the lower back. The sciatic nerves and their branches extend down through the buttock to the foot. Sciatica can develop when an injured disc in the back presses against a spinal nerve root. Its main symptom is pain, numbness, or weakness that is often worse in the leg or foot than in the back. Sciatica often will improve and go away with time. Early treatment usually includes medicines and exercises to relieve pain. Follow-up care is a key part of your treatment and safety. Be sure to make and go to all appointments, and call your doctor if you are having problems. It's also a good idea to know your test results and keep a list of the medicines you take. How can you care for yourself at home? · Take pain medicines exactly as directed. ¨ If the doctor gave you a prescription medicine for pain, take it as prescribed. ¨ If you are not taking a prescription pain medicine, ask your doctor if you can take an over-the-counter medicine. · Use heat or ice to relieve pain. ¨ To apply heat, put a warm water bottle, heating pad set on low, or warm cloth on your back. Do not go to sleep with a heating pad on your skin. ¨ To use ice, put ice or a cold pack on the area for 10 to 20 minutes at a time. Put a thin cloth between the ice and your skin. · Avoid sitting if possible, unless it feels better than standing. · Alternate lying down with short walks. Increase your walking distance as you are able to without making your symptoms worse. · Do not do anything that makes your symptoms worse. When should you call for help? Call 911 anytime you think you may need emergency care. For example, call if:  · You are unable to move a leg at all.   Call your doctor now or seek immediate medical care if:  · You have new or worse symptoms in your legs or buttocks. Symptoms may include:  ¨ Numbness or tingling. ¨ Weakness. ¨ Pain. · You lose bladder or bowel control. Watch closely for changes in your health, and be sure to contact your doctor if:  · You are not getting better as expected. Where can you learn more? Go to http://david-rui.info/. Enter [phone] in the search box to learn more about \"Sciatica: Care Instructions. \"  Current as of: March 21, 2017  Content Version: 11.4  © 6944-3121 [employer]. Care instructions adapted under license by [employer] (which disclaims liability or warranty for this information). If you have questions about a medical condition or this instruction, always ask your healthcare professional. [address] any warranty or liability for your use of this information.

## 2018-04-06 NOTE — ED PROVIDER NOTES
Patient is a 80 y.o. female presenting with leg pain. The history is provided by the patient and a relative. Leg Pain    This is a recurrent problem. The current episode started 6 to 12 hours ago. The problem occurs constantly. The problem has not changed since onset. The pain is present in the left lower leg and left upper leg. The quality of the pain is described as aching and constant (bburning). The pain is severe. Associated symptoms include tingling, back pain and neck pain. The symptoms are aggravated by standing and activity. Treatments tried: ppatient chronically on Norco 7.5. The treatment provided no relief. There has been no history of extremity trauma.         Past Medical History:   Diagnosis Date    A fib     Abnormal loss of weight     Acquired hypothyroidism 9/12/2012    Acute respiratory failure with hypoxemia ([address] 8/12/2017    Anemia     Anorexia 9/2/2014    Arthritis associated with another disorder     Atrial fibrillation ([address] 9/12/2012    Paroxysmal.  Coumadin contraindicated due to falls      Autonomic neuropathy 1/13/2013    CAD (coronary artery disease)     CAD in native artery 5/5/2016    CAP (community acquired pneumonia) 8/12/2017    Cervical radiculopathy 5/23/2013    Chest pain     Chronic pain syndrome 1/13/2013    Back, right shoulder, neck: Peripheral neuropathy, spinal stenosis C7-T1, foraminal narrowing C4-5     CKD (chronic kidney disease), stage III 9/12/2012    Diabetes ([address]     about 30 years    Diabetes mellitus     Diabetes mellitus, type 2 ([address] 9/12/2012    Dysphagia 9/2/2014    Due to esophageal spasm seen on modified barium swallow 2015     Gait disorder 9/12/2012    GERD (gastroesophageal reflux disease)     Heart failure ([address]     HTN     Hypertension 9/12/2012    Orthostatic hypotension due to autonomic neuropathy     Hypothyroid     IBS (irritable bowel syndrome) 9/12/2012    Iron deficiency 9/12/2012    Lumbar disc disease     MCI (mild cognitive impairment) 1/13/2013    Mixed hyperlipidemia 9/12/2012    Orthostatic hypotension 5/5/2016    Pacemaker     SSS (sick sinus syndrome) ([facility] 5/5/2016       Past Surgical History:   Procedure Laterality Date    HX APPENDECTOMY      HX CARPAL TUNNEL RELEASE      HX CERVICAL FUSION      HX COLONOSCOPY  Nov 2005    HX PACEMAKER  6/08    HX PACEMAKER  2017    Replacement          No family history on file. Social History     Social History    Marital status:      Spouse name: N/A    Number of children: N/A    Years of education: N/A     Occupational History    Not on file. Social History Main Topics    Smoking status: Never Smoker    Smokeless tobacco: Never Used    Alcohol use No    Drug use: No    Sexual activity: Not on file     Other Topics Concern    Not on file     Social History Narrative    Lives with  who has mild vascular dementia, patient is primary caretaker but daughters come over to house 4-5 times a week, [employer] has HCPOA         ALLERGIES: Tylenol [acetaminophen]    Review of Systems   Constitutional: Negative for chills and fever. HENT: Negative for congestion, ear pain and rhinorrhea. Eyes: Negative for photophobia and discharge. Respiratory: Negative for cough and shortness of breath. Cardiovascular: Negative for chest pain and palpitations. Gastrointestinal: Negative for abdominal pain, constipation, diarrhea and vomiting. Endocrine: Negative for cold intolerance and heat intolerance. Genitourinary: Negative for dysuria and flank pain. Musculoskeletal: Positive for arthralgias, back pain, gait problem, myalgias, neck pain and neck stiffness. Negative for joint swelling. Skin: Negative for rash and wound. Allergic/Immunologic: Negative for environmental allergies and food allergies. Neurological: Positive for tingling. Negative for syncope and headaches. Hematological: Negative for adenopathy.  Does not bruise/bleed easily. Psychiatric/Behavioral: Negative for dysphoric mood. The patient is not nervous/anxious. All other systems reviewed and are negative. Vitals:    04/06/18 0935   BP: (!) 228/15   Pulse: 73   Resp: 18   Temp: 98 °F (36.7 °C)   SpO2: 94%   Weight: 36.3 kg (80 lb)   Height: 5' 4\" (1.626 m)            Physical Exam   Constitutional: She is oriented to person, place, and time. She appears well-developed and well-nourished. She appears distressed. Thin; somewhat frail appearing   HENT:   Head: Normocephalic and atraumatic. Mouth/Throat: Oropharynx is clear and moist.   Eyes: Conjunctivae and EOM are normal. Pupils are equal, round, and reactive to light. Right eye exhibits no discharge. Left eye exhibits no discharge. No scleral icterus. Neck: Normal range of motion. Neck supple. No thyromegaly present. Cardiovascular: Normal rate, regular rhythm, normal heart sounds and intact distal pulses. Exam reveals no gallop and no friction rub. No murmur heard. Pulmonary/Chest: Effort normal and breath sounds normal. No respiratory distress. She has no wheezes. She exhibits no tenderness. Abdominal: Soft. Bowel sounds are normal. She exhibits no distension. There is no hepatosplenomegaly. There is no tenderness. There is no rebound and no guarding. No hernia. Musculoskeletal: Normal range of motion. She exhibits no edema or tenderness. Capillary refill in the left toes is less than 2 seconds. Dorsalis pedis and posterior tibial pulses are faintly palpable  No lesions no swelling no erythema or ecchymosis   Neurological: She is alert and oriented to person, place, and time. No cranial nerve deficit. She exhibits normal muscle tone. Skin: Skin is warm. No rash noted. She is not diaphoretic. No erythema. Psychiatric: Her speech is normal. Judgment and thought content normal. Her mood appears anxious. She is hyperactive. She exhibits abnormal recent memory.    Nursing note and vitals reviewed. MDM  Number of Diagnoses or Management Options  Chronic pain syndrome: established and worsening  Sciatica of left side: established and worsening  Diagnosis management comments: Patient's controlled substance prescription records reviewed @ the [facility] website. Information will be utilized for accurate and appropriate patient care. Differential diagnosis  Lumbar strain, sciatica, DVT, atherosclerotic disease    With regards to patient's hypertension, patient had not taken her medicines and was in significant amount of discomfort. Once we were able to give her her usual oral medications and some pain control her blood pressure has improved significantly. Amount and/or Complexity of Data Reviewed  Clinical lab tests: ordered and reviewed  Tests in the radiology section of CPT®: ordered and reviewed  Tests in the medicine section of CPT®: ordered and reviewed  Review and summarize past medical records: yes    Risk of Complications, Morbidity, and/or Mortality  Presenting problems: moderate  Diagnostic procedures: moderate  Management options: moderate  General comments: Elements of this note have been dictated via voice recognition software. Text and phrases may be limited by the accuracy of the software. The chart has been reviewed, but errors may still be present.       Patient Progress  Patient progress: improved        ED Course       Procedures

## 2018-04-06 NOTE — ED TRIAGE NOTES
Per the family the pt started having lower left leg pain that started this morning. Ems reports pain is from the knee down on the back of he knee. No injury.

## 2018-04-23 ENCOUNTER — APPOINTMENT (OUTPATIENT)
Dept: ULTRASOUND IMAGING | Age: 83
DRG: 253 | End: 2018-04-23
Attending: EMERGENCY MEDICINE
Payer: MEDICARE

## 2018-04-23 ENCOUNTER — HOSPITAL ENCOUNTER (EMERGENCY)
Age: 83
Discharge: HOME | DRG: 253 | End: 2018-04-23
Attending: EMERGENCY MEDICINE
Payer: MEDICARE

## 2018-04-23 VITALS
HEART RATE: 75 BPM | WEIGHT: 90 LBS | SYSTOLIC BLOOD PRESSURE: 109 MMHG | BODY MASS INDEX: 15.36 KG/M2 | TEMPERATURE: 97.5 F | HEIGHT: 64 IN | OXYGEN SATURATION: 97 % | RESPIRATION RATE: 16 BRPM | DIASTOLIC BLOOD PRESSURE: 71 MMHG

## 2018-04-23 DIAGNOSIS — I73.9 LOWER EXTREMITY ARTERIAL INSUFFICIENCY, SEVERE, LEFT (HCC): Primary | ICD-10-CM

## 2018-04-23 LAB
ALBUMIN SERPL-MCNC: 3.4 G/DL (ref 3.2–4.6)
ALBUMIN/GLOB SERPL: 0.9 {RATIO} (ref 1.2–3.5)
ALP SERPL-CCNC: 163 U/L (ref 50–136)
ALT SERPL-CCNC: 22 U/L (ref 12–65)
ANION GAP SERPL CALC-SCNC: 9 MMOL/L (ref 7–16)
AST SERPL-CCNC: 35 U/L (ref 15–37)
BASOPHILS # BLD: 0 K/UL (ref 0–0.2)
BASOPHILS NFR BLD: 0 % (ref 0–2)
BILIRUB SERPL-MCNC: 0.6 MG/DL (ref 0.2–1.1)
BUN SERPL-MCNC: 20 MG/DL (ref 8–23)
CALCIUM SERPL-MCNC: 9.1 MG/DL (ref 8.3–10.4)
CHLORIDE SERPL-SCNC: 95 MMOL/L (ref 98–107)
CO2 SERPL-SCNC: 30 MMOL/L (ref 21–32)
CREAT SERPL-MCNC: 1.4 MG/DL (ref 0.6–1)
DIFFERENTIAL METHOD BLD: ABNORMAL
EOSINOPHIL # BLD: 0.1 K/UL (ref 0–0.8)
EOSINOPHIL NFR BLD: 2 % (ref 0.5–7.8)
ERYTHROCYTE [DISTWIDTH] IN BLOOD BY AUTOMATED COUNT: 12.6 % (ref 11.9–14.6)
GLOBULIN SER CALC-MCNC: 3.9 G/DL (ref 2.3–3.5)
GLUCOSE SERPL-MCNC: 144 MG/DL (ref 65–100)
HCT VFR BLD AUTO: 33.5 % (ref 35.8–46.3)
HGB BLD-MCNC: 11.4 G/DL (ref 11.7–15.4)
IMM GRANULOCYTES # BLD: 0 K/UL (ref 0–0.5)
IMM GRANULOCYTES NFR BLD AUTO: 0 % (ref 0–5)
INR PPP: 1.1
LYMPHOCYTES # BLD: 1.2 K/UL (ref 0.5–4.6)
LYMPHOCYTES NFR BLD: 15 % (ref 13–44)
MCH RBC QN AUTO: 31.3 PG (ref 26.1–32.9)
MCHC RBC AUTO-ENTMCNC: 34 G/DL (ref 31.4–35)
MCV RBC AUTO: 92 FL (ref 79.6–97.8)
MONOCYTES # BLD: 0.8 K/UL (ref 0.1–1.3)
MONOCYTES NFR BLD: 9 % (ref 4–12)
NEUTS SEG # BLD: 6.1 K/UL (ref 1.7–8.2)
NEUTS SEG NFR BLD: 74 % (ref 43–78)
PLATELET # BLD AUTO: 310 K/UL (ref 150–450)
PMV BLD AUTO: 10.5 FL (ref 10.8–14.1)
POTASSIUM SERPL-SCNC: 4.7 MMOL/L (ref 3.5–5.1)
PROT SERPL-MCNC: 7.3 G/DL (ref 6.3–8.2)
PROTHROMBIN TIME: 13.3 SEC (ref 11.5–14.5)
RBC # BLD AUTO: 3.64 M/UL (ref 4.05–5.25)
SODIUM SERPL-SCNC: 134 MMOL/L (ref 136–145)
WBC # BLD AUTO: 8.2 K/UL (ref 4.3–11.1)

## 2018-04-23 PROCEDURE — 85610 PROTHROMBIN TIME: CPT | Performed by: EMERGENCY MEDICINE

## 2018-04-23 PROCEDURE — 99283 EMERGENCY DEPT VISIT LOW MDM: CPT | Performed by: EMERGENCY MEDICINE

## 2018-04-23 PROCEDURE — 80053 COMPREHEN METABOLIC PANEL: CPT | Performed by: EMERGENCY MEDICINE

## 2018-04-23 PROCEDURE — 93925 LOWER EXTREMITY STUDY: CPT

## 2018-04-23 PROCEDURE — 85025 COMPLETE CBC W/AUTO DIFF WBC: CPT | Performed by: EMERGENCY MEDICINE

## 2018-04-23 NOTE — ED PROVIDER NOTES
HPI:  69-year-old female sent here from outpatient clinic with concern for left leg pain with decreased pulses. Was seen here and recently had DVT workup negative for left leg pain. Continue to have pain with treatment. Denies any fall. There were concerned that she has coolness and paleness of the left leg compared to the right. She denies any fever. ROS  Constitutional: No fever, no chills  Skin: no rash  Eye: No vision changes  ENMT: No sore throat  Respiratory: No shortness of breath  Cardiovascular: No chest pain  Gastrointestinal: No vomiting, no nausea, no diarrhea, no abdominal pain  : No dysuria, no hematuria  MSK: + back pain, no muscle pain  Neuro: No headache, no change in mental status, no numbness, no tingling, no weakness    All other review of systems positive per history of present illness and the above otherwise negative or noncontributory.     Visit Vitals    /64 (BP 1 Location: Right arm, BP Patient Position: At rest)    Pulse 76    Temp 97.5 °F (36.4 °C)    Resp 20    Ht 5' 4\" (1.626 m)    Wt 40.8 kg (90 lb)    SpO2 98%    BMI 15.45 kg/m2     Past Medical History:   Diagnosis Date    A fib     Abnormal loss of weight     Acquired hypothyroidism 9/12/2012    Acute respiratory failure with hypoxemia (HCC) 8/12/2017    Anemia     Anorexia 9/2/2014    Arthritis associated with another disorder     Atrial fibrillation ([facility] 9/12/2012    Paroxysmal.  Coumadin contraindicated due to falls      Autonomic neuropathy 1/13/2013    CAD (coronary artery disease)     CAD in native artery 5/5/2016    CAP (community acquired pneumonia) 8/12/2017    Cervical radiculopathy 5/23/2013    Chest pain     Chronic pain syndrome 1/13/2013    Back, right shoulder, neck: Peripheral neuropathy, spinal stenosis C7-T1, foraminal narrowing C4-5     CKD (chronic kidney disease), stage III 9/12/2012    Diabetes ([address]     about 30 years    Diabetes mellitus     [facility] 9/12/2012    Dysphagia 9/2/2014    Due to esophageal spasm seen on modified barium swallow 2015     Gait disorder 9/12/2012    GERD (gastroesophageal reflux disease)     [facility] failure [facility]     HTN     Hypertension 9/12/2012    Orthostatic hypotension due to autonomic neuropathy     Hypothyroid     IBS (irritable bowel syndrome) 9/12/2012    Iron deficiency 9/12/2012    Lumbar disc disease     MCI (mild cognitive impairment) 1/13/2013    Mixed hyperlipidemia 9/12/2012    Orthostatic hypotension 5/5/2016    Pacemaker     SSS (sick sinus syndrome) ([facility] 5/5/2016     Past Surgical History:   Procedure Laterality Date    HX APPENDECTOMY      HX CARPAL TUNNEL RELEASE      HX CERVICAL FUSION      HX COLONOSCOPY  Nov 2005    HX PACEMAKER  6/08    HX PACEMAKER  2017    Replacement      Prior to Admission Medications   Prescriptions Last Dose Informant Patient Reported? Taking? HYDROcodone-acetaminophen (NORCO) 7.5-325 mg per tablet 4/23/2018 at Unknown time  No Yes   Sig: Take 1 Tab by mouth every six (6) hours as needed. Max Daily Amount: 4 Tabs. Indications: Pain   aspirin delayed-release 81 mg tablet 4/23/2018 at Unknown time  Yes Yes   Sig: Take  by mouth daily. calcium carbonate (OS-) 500 mg (1,250 mg) tablet 4/23/2018 at Unknown time  Yes Yes   Sig: take 1 Tab by mouth two (2) times a day. carvedilol (COREG) 12.5 mg tablet 4/23/2018 at Unknown time  No Yes   Sig: TAKE 1 TABLET BY MOUTH TWICE DAILY WITH MEALS   furosemide (LASIX) 20 mg tablet 4/23/2018 at Unknown time  No Yes   Sig: Take 1 Tab by mouth daily. gabapentin (NEURONTIN) 600 mg tablet 4/23/2018 at Unknown time  No Yes   Sig: Take 1 Tab by mouth three (3) times daily. levothyroxine (SYNTHROID) 25 mcg tablet 4/23/2018 at Unknown time  No Yes   Sig: Take 1 Tab by mouth Daily (before breakfast). lisinopril (PRINIVIL, ZESTRIL) 10 mg tablet 4/23/2018 at Unknown time  No Yes   Sig: Take 1 Tab by mouth daily. nitroglycerin (NITROSTAT) 0.4 mg SL tablet Unknown at Unknown time  No No   Si Tab by SubLINGual route every five (5) minutes as needed. Indications: ANGINA   omeprazole (PRILOSEC) 20 mg capsule   No No   Sig: Take 1 Cap by mouth two (2) times a day. Patient taking differently: Take 20 mg by mouth daily. senna-docusate (PERICOLACE) 8.6-50 mg per tablet 2018 at Unknown time  No Yes   Sig: Take 1 Tab by mouth nightly. traZODone (DESYREL) 50 mg tablet   No No   Sig: Take 1 Tab by mouth nightly. Facility-Administered Medications: None         Adult Exam   General: alert, no acute distress  Head: normocephalic, atraumatic  ENT: moist mucous membranes  Neck: supple, non-tender; full range of motion  Cardiovascular: regular rate and rhythm,  Respiratory: lungs are clear to auscultation; normal respirations;  Gastrointestinal: soft, non-tender; no rebound or guarding, no peritoneal signs, no distension. No pulsatile mass  Back: non-tender, full range of motion  Musculoskeletal: normal range of motion, normal strength, no gross deformities   Left anterior lower leg with area of redness, tenderness to palpation. Left leg is cool compared to the right leg. Dorsalis pedis and tibialis posterior pulses is significantly diminished compared to the right. She has complained of pain with movement of her foot. Capillary refill in left toes diminished and greater than 2 seconds  Neurological: alert and oriented x 4, no gross focal deficits; normal speech  Psychiatric: cooperative; appropriate mood and affect    MDM: given exam and obtain arterial ultrasound for assessment. Low suspicion for dissection. Right leg with chronic changes. 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  There is decreased flow noted in the ankle. Do not suspect compartment syndrome, acute fracture, aortic dissection with tears into the extremities, cellulitis.     2100 -   I spoke with Dr. Cliffton Rubinstein from vascular surgery after reviewed the preliminary arterial ultrasound. He suspect this is chronic vascular changes. He is aware that she has pain. With her age group, it appeared to be chronic changes with the fact that I have a low suspicion for compartment syndrome, acute fracture, dissection he feel would be appropriate for her to be discharged home. He will see her in the office tomorrow morning at 8:30 AM.  She has pain medication at home. I spoke with her family member about the follow-up plan. They are in agreement with the plan. They have no further concerns at this point. Recent Results (from the past 12 hour(s))   CBC WITH AUTOMATED DIFF    Collection Time: 04/23/18  5:00 PM   Result Value Ref Range    WBC 8.2 4.3 - 11.1 K/uL    RBC 3.64 (L) 4.05 - 5.25 M/uL    HGB 11.4 (L) 11.7 - 15.4 g/dL    HCT 33.5 (L) 35.8 - 46.3 %    MCV 92.0 79.6 - 97.8 FL    MCH 31.3 26.1 - 32.9 PG    MCHC 34.0 31.4 - 35.0 g/dL    RDW 12.6 11.9 - 14.6 %    PLATELET 415 562 - 886 K/uL    MPV 10.5 (L) 10.8 - 14.1 FL    DF AUTOMATED      NEUTROPHILS 74 43 - 78 %    LYMPHOCYTES 15 13 - 44 %    MONOCYTES 9 4.0 - 12.0 %    EOSINOPHILS 2 0.5 - 7.8 %    BASOPHILS 0 0.0 - 2.0 %    IMMATURE GRANULOCYTES 0 0.0 - 5.0 %    ABS. NEUTROPHILS 6.1 1.7 - 8.2 K/UL    ABS. LYMPHOCYTES 1.2 0.5 - 4.6 K/UL    ABS. MONOCYTES 0.8 0.1 - 1.3 K/UL    ABS. EOSINOPHILS 0.1 0.0 - 0.8 K/UL    ABS. BASOPHILS 0.0 0.0 - 0.2 K/UL    ABS. IMM.  GRANS. 0.0 0.0 - 0.5 K/UL   METABOLIC PANEL, COMPREHENSIVE    Collection Time: 04/23/18  5:00 PM   Result Value Ref Range    Sodium 134 (L) 136 - 145 mmol/L    Potassium 4.7 3.5 - 5.1 mmol/L    Chloride 95 (L) 98 - 107 mmol/L    CO2 30 21 - 32 mmol/L    Anion gap 9 7 - 16 mmol/L    Glucose 144 (H) 65 - 100 mg/dL    BUN 20 8 - 23 MG/DL    Creatinine 1.40 (H) 0.6 - 1.0 MG/DL    GFR est AA 45 (L) >60 ml/min/1.73m2    GFR est non-AA 38 (L) >60 ml/min/1.73m2    Calcium 9.1 8.3 - 10.4 MG/DL    Bilirubin, total 0.6 0.2 - 1.1 MG/DL    ALT (SGPT) 22 12 - 65 U/L    AST (SGOT) 35 15 - 37 U/L    Alk. phosphatase 163 (H) 50 - 136 U/L    Protein, total 7.3 6.3 - 8.2 g/dL    Albumin 3.4 3.2 - 4.6 g/dL    Globulin 3.9 (H) 2.3 - 3.5 g/dL    A-G Ratio 0.9 (L) 1.2 - 3.5     PROTHROMBIN TIME + INR    Collection Time: 04/23/18  5:00 PM   Result Value Ref Range    Prothrombin time 13.3 11.5 - 14.5 sec    INR 1.1             Dragon voice recognition software was used to create this note. Although the note has been reviewed and corrected where necessary, additional errors may have been overlooked and remain in the text.

## 2018-04-23 NOTE — ED NOTES
Patient to ED from PMD office. Patient with pain to LLE, small rash. L foot cooler than R. NP at MD office reports absent pulses. Patient discomfort x 4-5 days. Patient with recent hx of sciatic nerve pain x 1.5-2 weeks ago. MD Ta to triage to evaluate.

## 2018-04-24 ENCOUNTER — HOSPITAL ENCOUNTER (OUTPATIENT)
Dept: SURGERY | Age: 83
Discharge: HOME | DRG: 253 | End: 2018-04-24
Payer: MEDICARE

## 2018-04-24 VITALS
HEIGHT: 64 IN | OXYGEN SATURATION: 98 % | RESPIRATION RATE: 18 BRPM | DIASTOLIC BLOOD PRESSURE: 50 MMHG | WEIGHT: 88.19 LBS | HEART RATE: 75 BPM | SYSTOLIC BLOOD PRESSURE: 99 MMHG | BODY MASS INDEX: 15.06 KG/M2 | TEMPERATURE: 97.5 F

## 2018-04-24 LAB — GLUCOSE BLD STRIP.AUTO-MCNC: 100 MG/DL (ref 65–100)

## 2018-04-24 PROCEDURE — 82962 GLUCOSE BLOOD TEST: CPT

## 2018-04-24 NOTE — PERIOP NOTES
Patient verified name, , and surgery as listed in [facility]. Patient provided medical/health information and PTA medications to the best of their ability. TYPE  CASE:1B  Orders per surgeon: Received and dated 18. Labs per surgeon:T/S DOS. Labs per anesthesia protocol: SQBS =100. EKG  :  18   Office note from Dr Hans Hobson 18pacemaker check 18, scheduling notified of pacemaker. History reviewed with Dr Yessi Conklin, he does not need to see pt today and does not need pacemaker rep for surgery. Patient provided with and instructed on education handouts including Guide to Surgery, blood transfusions, pain management, and hand hygiene for the family and community, and [facility] brochure.     hibiclens and instructions given per hospital policy. Instructed patient to continue previous medications as prescribed prior to surgery unless otherwise directed and to take the following medications the day of surgery according to anesthesia guidelines : Carvedilol, Gabapentin, Norco, levothyroxine, Omeprazole . Instructed patient to hold  the following medications: calcium. Original medication prescription bottles not visualized during patient appointment. Patient teach back successful and patient demonstrates knowledge of instruction.

## 2018-04-24 NOTE — ED NOTES
I have reviewed discharge instructions with the patient and spouse. The patient verbalized understanding. Patient left ED via Discharge Method: wheelchair to Home with family. Opportunity for questions and clarification provided. Patient given 0 scripts. To continue your aftercare when you leave the hospital, you may receive an automated call from our care team to check in on how you are doing. This is a free service and part of our promise to provide the best care and service to meet your aftercare needs.  If you have questions, or wish to unsubscribe from this service please call [phone]. Thank you for Choosing our [facility] Emergency Department.

## 2018-04-25 ENCOUNTER — ANESTHESIA EVENT (OUTPATIENT)
Dept: SURGERY | Age: 83
DRG: 253 | End: 2018-04-25
Payer: MEDICARE

## 2018-04-25 ENCOUNTER — HOSPITAL ENCOUNTER (INPATIENT)
Age: 83
LOS: 5 days | Discharge: NURSING, REHAB OR LONG-TERM CARE | DRG: 253 | End: 2018-04-30
Attending: SURGERY | Admitting: SURGERY
Payer: MEDICARE

## 2018-04-25 ENCOUNTER — ANESTHESIA (OUTPATIENT)
Dept: SURGERY | Age: 83
DRG: 253 | End: 2018-04-25
Payer: MEDICARE

## 2018-04-25 ENCOUNTER — APPOINTMENT (OUTPATIENT)
Dept: GENERAL RADIOLOGY | Age: 83
DRG: 253 | End: 2018-04-25
Attending: SURGERY
Payer: MEDICARE

## 2018-04-25 ENCOUNTER — APPOINTMENT (OUTPATIENT)
Dept: INTERVENTIONAL RADIOLOGY/VASCULAR | Age: 83
DRG: 253 | End: 2018-04-25
Attending: SURGERY
Payer: MEDICARE

## 2018-04-25 DIAGNOSIS — Z98.890 S/P AV NODAL ABLATION: ICD-10-CM

## 2018-04-25 DIAGNOSIS — I25.5 ISCHEMIC CARDIOMYOPATHY: ICD-10-CM

## 2018-04-25 DIAGNOSIS — I50.9 CONGESTIVE HEART FAILURE, UNSPECIFIED HF CHRONICITY, UNSPECIFIED HEART FAILURE TYPE (HCC): Primary | ICD-10-CM

## 2018-04-25 DIAGNOSIS — M54.12 CERVICAL RADICULOPATHY: ICD-10-CM

## 2018-04-25 DIAGNOSIS — K21.9 GASTROESOPHAGEAL REFLUX DISEASE WITHOUT ESOPHAGITIS: Chronic | ICD-10-CM

## 2018-04-25 DIAGNOSIS — I50.22 CHRONIC SYSTOLIC CONGESTIVE HEART FAILURE (HCC): ICD-10-CM

## 2018-04-25 DIAGNOSIS — R80.9 TYPE 2 DIABETES MELLITUS WITH MICROALBUMINURIA, WITHOUT LONG-TERM CURRENT USE OF INSULIN (HCC): Chronic | ICD-10-CM

## 2018-04-25 DIAGNOSIS — Z95.0 PACEMAKER: ICD-10-CM

## 2018-04-25 DIAGNOSIS — I25.10 CAD IN NATIVE ARTERY: ICD-10-CM

## 2018-04-25 DIAGNOSIS — E78.2 MIXED HYPERLIPIDEMIA: Chronic | ICD-10-CM

## 2018-04-25 DIAGNOSIS — N18.30 CKD (CHRONIC KIDNEY DISEASE), STAGE III (HCC): Chronic | ICD-10-CM

## 2018-04-25 DIAGNOSIS — I48.91 ATRIAL FIBRILLATION WITH RAPID VENTRICULAR RESPONSE (HCC): ICD-10-CM

## 2018-04-25 DIAGNOSIS — K58.9 IRRITABLE BOWEL SYNDROME WITHOUT DIARRHEA: Chronic | ICD-10-CM

## 2018-04-25 DIAGNOSIS — I10 ESSENTIAL HYPERTENSION: Chronic | ICD-10-CM

## 2018-04-25 DIAGNOSIS — I49.5 SSS (SICK SINUS SYNDROME) (HCC): ICD-10-CM

## 2018-04-25 DIAGNOSIS — R06.00 DYSPNEA, UNSPECIFIED TYPE: ICD-10-CM

## 2018-04-25 DIAGNOSIS — G89.4 CHRONIC PAIN SYNDROME: ICD-10-CM

## 2018-04-25 DIAGNOSIS — E11.29 TYPE 2 DIABETES MELLITUS WITH MICROALBUMINURIA, WITHOUT LONG-TERM CURRENT USE OF INSULIN (HCC): Chronic | ICD-10-CM

## 2018-04-25 DIAGNOSIS — I48.20 CHRONIC ATRIAL FIBRILLATION (HCC): ICD-10-CM

## 2018-04-25 DIAGNOSIS — I48.91 ATRIAL FIBRILLATION WITH RVR (HCC): ICD-10-CM

## 2018-04-25 DIAGNOSIS — I42.9 CARDIOMYOPATHY, UNSPECIFIED TYPE (HCC): ICD-10-CM

## 2018-04-25 DIAGNOSIS — G90.9 AUTONOMIC NEUROPATHY: ICD-10-CM

## 2018-04-25 DIAGNOSIS — G31.84 MCI (MILD COGNITIVE IMPAIRMENT): ICD-10-CM

## 2018-04-25 DIAGNOSIS — R13.19 OTHER DYSPHAGIA: Chronic | ICD-10-CM

## 2018-04-25 DIAGNOSIS — E11.21 TYPE 2 DIABETES MELLITUS WITH NEPHROPATHY (HCC): ICD-10-CM

## 2018-04-25 DIAGNOSIS — E03.9 ACQUIRED HYPOTHYROIDISM: Chronic | ICD-10-CM

## 2018-04-25 DIAGNOSIS — Z95.0 BIVENTRICULAR CARDIAC PACEMAKER IN SITU: ICD-10-CM

## 2018-04-25 DIAGNOSIS — Z66 DNR (DO NOT RESUSCITATE): Chronic | ICD-10-CM

## 2018-04-25 DIAGNOSIS — R26.9 GAIT DISORDER: Chronic | ICD-10-CM

## 2018-04-25 DIAGNOSIS — I95.1 ORTHOSTATIC HYPOTENSION: ICD-10-CM

## 2018-04-25 DIAGNOSIS — I70.202 FEMORAL ARTERY OCCLUSION, LEFT (HCC): ICD-10-CM

## 2018-04-25 LAB
ABO + RH BLD: NORMAL
ANION GAP SERPL CALC-SCNC: 7 MMOL/L (ref 7–16)
BLOOD GROUP ANTIBODIES SERPL: NORMAL
BUN SERPL-MCNC: 17 MG/DL (ref 8–23)
CALCIUM SERPL-MCNC: 9 MG/DL (ref 8.3–10.4)
CHLORIDE SERPL-SCNC: 99 MMOL/L (ref 98–107)
CO2 SERPL-SCNC: 31 MMOL/L (ref 21–32)
CREAT SERPL-MCNC: 1.26 MG/DL (ref 0.6–1)
ERYTHROCYTE [DISTWIDTH] IN BLOOD BY AUTOMATED COUNT: 12.5 % (ref 11.9–14.6)
GLUCOSE BLD STRIP.AUTO-MCNC: 130 MG/DL (ref 65–100)
GLUCOSE SERPL-MCNC: 122 MG/DL (ref 65–100)
HCT VFR BLD AUTO: 30.8 % (ref 35.8–46.3)
HGB BLD-MCNC: 10.3 G/DL (ref 11.7–15.4)
MAGNESIUM SERPL-MCNC: 2.1 MG/DL (ref 1.8–2.4)
MCH RBC QN AUTO: 30.8 PG (ref 26.1–32.9)
MCHC RBC AUTO-ENTMCNC: 33.4 G/DL (ref 31.4–35)
MCV RBC AUTO: 92.2 FL (ref 79.6–97.8)
PLATELET # BLD AUTO: 260 K/UL (ref 150–450)
PMV BLD AUTO: 10 FL (ref 10.8–14.1)
POTASSIUM SERPL-SCNC: 4.3 MMOL/L (ref 3.5–5.1)
RBC # BLD AUTO: 3.34 M/UL (ref 4.05–5.25)
SODIUM SERPL-SCNC: 137 MMOL/L (ref 136–145)
SPECIMEN EXP DATE BLD: NORMAL
WBC # BLD AUTO: 6.7 K/UL (ref 4.3–11.1)

## 2018-04-25 PROCEDURE — 86900 BLOOD TYPING SEROLOGIC ABO: CPT | Performed by: SURGERY

## 2018-04-25 PROCEDURE — 83735 ASSAY OF MAGNESIUM: CPT | Performed by: SURGERY

## 2018-04-25 PROCEDURE — 77030002996 HC SUT SLK J&J -A: Performed by: SURGERY

## 2018-04-25 PROCEDURE — 04CL0ZZ EXTIRPATION OF MATTER FROM LEFT FEMORAL ARTERY, OPEN APPROACH: ICD-10-PCS | Performed by: SURGERY

## 2018-04-25 PROCEDURE — 77030002987 HC SUT PROL J&J -B: Performed by: SURGERY

## 2018-04-25 PROCEDURE — 77030020143 HC AIRWY LARYN INTUB CGAS -A: Performed by: ANESTHESIOLOGY

## 2018-04-25 PROCEDURE — 76010000129 HC CV SURG 1.5 TO 2 HR: Performed by: SURGERY

## 2018-04-25 PROCEDURE — 77030031139 HC SUT VCRL2 J&J -A: Performed by: SURGERY

## 2018-04-25 PROCEDURE — 65660000004 HC RM CVT STEPDOWN

## 2018-04-25 PROCEDURE — 77030034888 HC SUT PROL 2 J&J -B: Performed by: SURGERY

## 2018-04-25 PROCEDURE — 74011250636 HC RX REV CODE- 250/636: Performed by: SURGERY

## 2018-04-25 PROCEDURE — 77030037400 HC ADH TISS HI VISC EXOFIN CHMP -B: Performed by: SURGERY

## 2018-04-25 PROCEDURE — 77030020782 HC GWN BAIR PAWS FLX 3M -B: Performed by: ANESTHESIOLOGY

## 2018-04-25 PROCEDURE — 74011000258 HC RX REV CODE- 258: Performed by: SURGERY

## 2018-04-25 PROCEDURE — 77030002986 HC SUT PROL J&J -A: Performed by: SURGERY

## 2018-04-25 PROCEDURE — 74011250637 HC RX REV CODE- 250/637: Performed by: SURGERY

## 2018-04-25 PROCEDURE — 77030014008 HC SPNG HEMSTAT J&J -C: Performed by: SURGERY

## 2018-04-25 PROCEDURE — 77030018673: Performed by: SURGERY

## 2018-04-25 PROCEDURE — 76060000034 HC ANESTHESIA 1.5 TO 2 HR: Performed by: SURGERY

## 2018-04-25 PROCEDURE — 76210000006 HC OR PH I REC 0.5 TO 1 HR: Performed by: SURGERY

## 2018-04-25 PROCEDURE — 74011000250 HC RX REV CODE- 250: Performed by: SURGERY

## 2018-04-25 PROCEDURE — 82962 GLUCOSE BLOOD TEST: CPT

## 2018-04-25 PROCEDURE — 77030011640 HC PAD GRND REM COVD -A: Performed by: SURGERY

## 2018-04-25 PROCEDURE — C1894 INTRO/SHEATH, NON-LASER: HCPCS | Performed by: SURGERY

## 2018-04-25 PROCEDURE — C1769 GUIDE WIRE: HCPCS | Performed by: SURGERY

## 2018-04-25 PROCEDURE — 74011000250 HC RX REV CODE- 250

## 2018-04-25 PROCEDURE — 80048 BASIC METABOLIC PNL TOTAL CA: CPT | Performed by: SURGERY

## 2018-04-25 PROCEDURE — 74011250636 HC RX REV CODE- 250/636

## 2018-04-25 PROCEDURE — 77030010512 HC APPL CLP LIG J&J -C: Performed by: SURGERY

## 2018-04-25 PROCEDURE — 77030002933 HC SUT MCRYL J&J -A: Performed by: SURGERY

## 2018-04-25 PROCEDURE — C1757 CATH, THROMBECTOMY/EMBOLECT: HCPCS | Performed by: SURGERY

## 2018-04-25 PROCEDURE — 74011250636 HC RX REV CODE- 250/636: Performed by: ANESTHESIOLOGY

## 2018-04-25 PROCEDURE — 77030021532 HC CATH ANGI DX IMPRS MRTM -B: Performed by: SURGERY

## 2018-04-25 PROCEDURE — 85027 COMPLETE CBC AUTOMATED: CPT | Performed by: SURGERY

## 2018-04-25 RX ORDER — MIDAZOLAM HYDROCHLORIDE 1 MG/ML
2 INJECTION, SOLUTION INTRAMUSCULAR; INTRAVENOUS
Status: DISCONTINUED | OUTPATIENT
Start: 2018-04-25 | End: 2018-04-25 | Stop reason: HOSPADM

## 2018-04-25 RX ORDER — ACETAMINOPHEN 500 MG
500 TABLET ORAL
Status: DISCONTINUED | OUTPATIENT
Start: 2018-04-25 | End: 2018-04-30 | Stop reason: HOSPADM

## 2018-04-25 RX ORDER — HEPARIN SODIUM 1000 [USP'U]/ML
INJECTION, SOLUTION INTRAVENOUS; SUBCUTANEOUS AS NEEDED
Status: DISCONTINUED | OUTPATIENT
Start: 2018-04-25 | End: 2018-04-25 | Stop reason: HOSPADM

## 2018-04-25 RX ORDER — PROTAMINE SULFATE 10 MG/ML
INJECTION, SOLUTION INTRAVENOUS AS NEEDED
Status: DISCONTINUED | OUTPATIENT
Start: 2018-04-25 | End: 2018-04-25 | Stop reason: HOSPADM

## 2018-04-25 RX ORDER — SODIUM CHLORIDE 0.9 % (FLUSH) 0.9 %
5-10 SYRINGE (ML) INJECTION AS NEEDED
Status: DISCONTINUED | OUTPATIENT
Start: 2018-04-25 | End: 2018-04-25 | Stop reason: HOSPADM

## 2018-04-25 RX ORDER — HEPARIN SODIUM 5000 [USP'U]/ML
5000 INJECTION, SOLUTION INTRAVENOUS; SUBCUTANEOUS EVERY 8 HOURS
Status: DISCONTINUED | OUTPATIENT
Start: 2018-04-26 | End: 2018-04-26

## 2018-04-25 RX ORDER — SODIUM CHLORIDE 0.9 % (FLUSH) 0.9 %
5-10 SYRINGE (ML) INJECTION EVERY 8 HOURS
Status: DISCONTINUED | OUTPATIENT
Start: 2018-04-25 | End: 2018-04-30 | Stop reason: HOSPADM

## 2018-04-25 RX ORDER — NALOXONE HYDROCHLORIDE 0.4 MG/ML
0.4 INJECTION, SOLUTION INTRAMUSCULAR; INTRAVENOUS; SUBCUTANEOUS AS NEEDED
Status: DISCONTINUED | OUTPATIENT
Start: 2018-04-25 | End: 2018-04-30 | Stop reason: HOSPADM

## 2018-04-25 RX ORDER — ASPIRIN 81 MG/1
81 TABLET ORAL DAILY
Status: DISCONTINUED | OUTPATIENT
Start: 2018-04-26 | End: 2018-04-30 | Stop reason: HOSPADM

## 2018-04-25 RX ORDER — LISINOPRIL 5 MG/1
10 TABLET ORAL DAILY
Status: DISCONTINUED | OUTPATIENT
Start: 2018-04-26 | End: 2018-04-27

## 2018-04-25 RX ORDER — HYDROMORPHONE HYDROCHLORIDE 2 MG/ML
0.5 INJECTION, SOLUTION INTRAMUSCULAR; INTRAVENOUS; SUBCUTANEOUS
Status: DISCONTINUED | OUTPATIENT
Start: 2018-04-25 | End: 2018-04-25 | Stop reason: HOSPADM

## 2018-04-25 RX ORDER — NICARDIPINE HYDROCHLORIDE 0.1 MG/ML
5-15 INJECTION INTRAVENOUS
Status: DISCONTINUED | OUTPATIENT
Start: 2018-04-25 | End: 2018-04-25 | Stop reason: ALTCHOICE

## 2018-04-25 RX ORDER — SODIUM CHLORIDE, SODIUM LACTATE, POTASSIUM CHLORIDE, CALCIUM CHLORIDE 600; 310; 30; 20 MG/100ML; MG/100ML; MG/100ML; MG/100ML
1000 INJECTION, SOLUTION INTRAVENOUS CONTINUOUS
Status: DISCONTINUED | OUTPATIENT
Start: 2018-04-25 | End: 2018-04-25 | Stop reason: HOSPADM

## 2018-04-25 RX ORDER — ASPIRIN 81 MG/1
81 TABLET ORAL DAILY
Status: DISCONTINUED | OUTPATIENT
Start: 2018-04-26 | End: 2018-04-25 | Stop reason: SDUPTHER

## 2018-04-25 RX ORDER — PROPOFOL 10 MG/ML
INJECTION, EMULSION INTRAVENOUS
Status: DISCONTINUED | OUTPATIENT
Start: 2018-04-25 | End: 2018-04-25 | Stop reason: HOSPADM

## 2018-04-25 RX ORDER — AMOXICILLIN 250 MG
1 CAPSULE ORAL
Status: DISCONTINUED | OUTPATIENT
Start: 2018-04-25 | End: 2018-04-30 | Stop reason: HOSPADM

## 2018-04-25 RX ORDER — CARVEDILOL 12.5 MG/1
12.5 TABLET ORAL 2 TIMES DAILY WITH MEALS
Status: DISCONTINUED | OUTPATIENT
Start: 2018-04-26 | End: 2018-04-29

## 2018-04-25 RX ORDER — BUPIVACAINE HYDROCHLORIDE 5 MG/ML
INJECTION, SOLUTION EPIDURAL; INTRACAUDAL AS NEEDED
Status: DISCONTINUED | OUTPATIENT
Start: 2018-04-25 | End: 2018-04-25 | Stop reason: HOSPADM

## 2018-04-25 RX ORDER — LEVOTHYROXINE SODIUM 50 UG/1
25 TABLET ORAL
Status: DISCONTINUED | OUTPATIENT
Start: 2018-04-26 | End: 2018-04-30 | Stop reason: HOSPADM

## 2018-04-25 RX ORDER — CALCIUM CARBONATE 500(1250)
500 TABLET ORAL 2 TIMES DAILY
Status: DISCONTINUED | OUTPATIENT
Start: 2018-04-26 | End: 2018-04-30 | Stop reason: HOSPADM

## 2018-04-25 RX ORDER — ONDANSETRON 2 MG/ML
4 INJECTION INTRAMUSCULAR; INTRAVENOUS
Status: DISCONTINUED | OUTPATIENT
Start: 2018-04-25 | End: 2018-04-30 | Stop reason: HOSPADM

## 2018-04-25 RX ORDER — TRAZODONE HYDROCHLORIDE 50 MG/1
50 TABLET ORAL
Status: DISCONTINUED | OUTPATIENT
Start: 2018-04-25 | End: 2018-04-30 | Stop reason: HOSPADM

## 2018-04-25 RX ORDER — OXYCODONE AND ACETAMINOPHEN 7.5; 325 MG/1; MG/1
1 TABLET ORAL
Status: DISCONTINUED | OUTPATIENT
Start: 2018-04-25 | End: 2018-04-27

## 2018-04-25 RX ORDER — MAGNESIUM SULFATE 1 G/100ML
1 INJECTION INTRAVENOUS AS NEEDED
Status: DISCONTINUED | OUTPATIENT
Start: 2018-04-25 | End: 2018-04-30 | Stop reason: HOSPADM

## 2018-04-25 RX ORDER — GABAPENTIN 300 MG/1
600 CAPSULE ORAL 3 TIMES DAILY
Status: DISCONTINUED | OUTPATIENT
Start: 2018-04-26 | End: 2018-04-30 | Stop reason: HOSPADM

## 2018-04-25 RX ORDER — DIPHENHYDRAMINE HCL 25 MG
25 CAPSULE ORAL
Status: DISCONTINUED | OUTPATIENT
Start: 2018-04-25 | End: 2018-04-30 | Stop reason: HOSPADM

## 2018-04-25 RX ORDER — HYDROCODONE BITARTRATE AND ACETAMINOPHEN 7.5; 325 MG/1; MG/1
1 TABLET ORAL
Status: DISCONTINUED | OUTPATIENT
Start: 2018-04-25 | End: 2018-04-27

## 2018-04-25 RX ORDER — ONDANSETRON 2 MG/ML
INJECTION INTRAMUSCULAR; INTRAVENOUS AS NEEDED
Status: DISCONTINUED | OUTPATIENT
Start: 2018-04-25 | End: 2018-04-25 | Stop reason: HOSPADM

## 2018-04-25 RX ORDER — ONDANSETRON 2 MG/ML
4 INJECTION INTRAMUSCULAR; INTRAVENOUS
Status: DISCONTINUED | OUTPATIENT
Start: 2018-04-25 | End: 2018-04-25 | Stop reason: HOSPADM

## 2018-04-25 RX ORDER — HYDRALAZINE HYDROCHLORIDE 20 MG/ML
10 INJECTION INTRAMUSCULAR; INTRAVENOUS ONCE
Status: COMPLETED | OUTPATIENT
Start: 2018-04-25 | End: 2018-04-25

## 2018-04-25 RX ORDER — FUROSEMIDE 20 MG/1
20 TABLET ORAL DAILY
Status: DISCONTINUED | OUTPATIENT
Start: 2018-04-26 | End: 2018-04-30 | Stop reason: HOSPADM

## 2018-04-25 RX ORDER — LIDOCAINE HYDROCHLORIDE 10 MG/ML
0.1 INJECTION INFILTRATION; PERINEURAL AS NEEDED
Status: DISCONTINUED | OUTPATIENT
Start: 2018-04-25 | End: 2018-04-25 | Stop reason: HOSPADM

## 2018-04-25 RX ORDER — NITROGLYCERIN 0.4 MG/1
0.4 TABLET SUBLINGUAL AS NEEDED
Status: DISCONTINUED | OUTPATIENT
Start: 2018-04-25 | End: 2018-04-30 | Stop reason: HOSPADM

## 2018-04-25 RX ORDER — MORPHINE SULFATE 2 MG/ML
2 INJECTION, SOLUTION INTRAMUSCULAR; INTRAVENOUS
Status: DISCONTINUED | OUTPATIENT
Start: 2018-04-25 | End: 2018-04-27

## 2018-04-25 RX ORDER — PROPOFOL 10 MG/ML
INJECTION, EMULSION INTRAVENOUS AS NEEDED
Status: DISCONTINUED | OUTPATIENT
Start: 2018-04-25 | End: 2018-04-25 | Stop reason: HOSPADM

## 2018-04-25 RX ORDER — CEFAZOLIN SODIUM/WATER 2 G/20 ML
2 SYRINGE (ML) INTRAVENOUS
Status: COMPLETED | OUTPATIENT
Start: 2018-04-25 | End: 2018-04-25

## 2018-04-25 RX ORDER — LIDOCAINE HYDROCHLORIDE 10 MG/ML
INJECTION INFILTRATION; PERINEURAL AS NEEDED
Status: DISCONTINUED | OUTPATIENT
Start: 2018-04-25 | End: 2018-04-25 | Stop reason: HOSPADM

## 2018-04-25 RX ORDER — PANTOPRAZOLE SODIUM 40 MG/1
40 TABLET, DELAYED RELEASE ORAL
Status: DISCONTINUED | OUTPATIENT
Start: 2018-04-26 | End: 2018-04-30 | Stop reason: HOSPADM

## 2018-04-25 RX ORDER — SODIUM CHLORIDE 0.9 % (FLUSH) 0.9 %
5-10 SYRINGE (ML) INJECTION EVERY 8 HOURS
Status: DISCONTINUED | OUTPATIENT
Start: 2018-04-25 | End: 2018-04-25 | Stop reason: HOSPADM

## 2018-04-25 RX ORDER — ALBUTEROL SULFATE 0.83 MG/ML
2.5 SOLUTION RESPIRATORY (INHALATION) AS NEEDED
Status: DISCONTINUED | OUTPATIENT
Start: 2018-04-25 | End: 2018-04-25 | Stop reason: HOSPADM

## 2018-04-25 RX ORDER — SODIUM CHLORIDE 0.9 % (FLUSH) 0.9 %
5-10 SYRINGE (ML) INJECTION AS NEEDED
Status: DISCONTINUED | OUTPATIENT
Start: 2018-04-25 | End: 2018-04-30 | Stop reason: HOSPADM

## 2018-04-25 RX ORDER — DIPHENHYDRAMINE HCL 25 MG
25 CAPSULE ORAL
Status: DISCONTINUED | OUTPATIENT
Start: 2018-04-25 | End: 2018-04-25 | Stop reason: SDUPTHER

## 2018-04-25 RX ADMIN — SODIUM CHLORIDE, SODIUM LACTATE, POTASSIUM CHLORIDE, AND CALCIUM CHLORIDE 1000 ML: 600; 310; 30; 20 INJECTION, SOLUTION INTRAVENOUS at 16:05

## 2018-04-25 RX ADMIN — ONDANSETRON 4 MG: 2 INJECTION INTRAMUSCULAR; INTRAVENOUS at 18:30

## 2018-04-25 RX ADMIN — PROPOFOL 70 MG: 10 INJECTION, EMULSION INTRAVENOUS at 18:02

## 2018-04-25 RX ADMIN — PROPOFOL 140 MCG/KG/MIN: 10 INJECTION, EMULSION INTRAVENOUS at 18:02

## 2018-04-25 RX ADMIN — CEFAZOLIN SODIUM 1 G: 1 INJECTION, POWDER, FOR SOLUTION INTRAMUSCULAR; INTRAVENOUS at 23:36

## 2018-04-25 RX ADMIN — TRAZODONE HYDROCHLORIDE 50 MG: 50 TABLET ORAL at 21:56

## 2018-04-25 RX ADMIN — Medication 10 ML: at 22:00

## 2018-04-25 RX ADMIN — MORPHINE SULFATE 2 MG: 2 INJECTION, SOLUTION INTRAMUSCULAR; INTRAVENOUS at 21:57

## 2018-04-25 RX ADMIN — Medication 2 G: at 18:05

## 2018-04-25 RX ADMIN — HYDRALAZINE HYDROCHLORIDE 10 MG: 20 INJECTION INTRAMUSCULAR; INTRAVENOUS at 20:25

## 2018-04-25 RX ADMIN — STANDARDIZED SENNA CONCENTRATE AND DOCUSATE SODIUM 1 TABLET: 8.6; 5 TABLET, FILM COATED ORAL at 21:57

## 2018-04-25 RX ADMIN — PROTAMINE SULFATE 25 MG: 10 INJECTION, SOLUTION INTRAVENOUS at 18:58

## 2018-04-25 RX ADMIN — HEPARIN SODIUM 5000 UNITS: 1000 INJECTION, SOLUTION INTRAVENOUS; SUBCUTANEOUS at 18:42

## 2018-04-25 NOTE — IP AVS SNAPSHOT
[address] 
[phone] Patient: Kinga Ni MRN: [id number] :8/10/1927 About your hospitalization You were admitted on:  2018 You last received care in the:  [facility] 2 CV STEPDOWN You were discharged on:  2018 Why you were hospitalized Your primary diagnosis was:  Femoral Artery Occlusion, Left (Hcc) Your diagnoses also included:  Chf (Congestive Heart Failure) (Hcc), Atrial Fibrillation (Hcc), Chronic Systolic Congestive Heart Failure (Hcc) Follow-up Information Follow up With Details Comments Contact Info Jillian Preston MD On 2018 9:45 AM  [address] [address] 
[phone] Your Scheduled Appointments Wednesday May 23, 2018  9:45 AM EDT Global Post Op with Jillian Preston MD  
[facility] VASCULAR SURGERY ([facility]) [address] [id number] [phone] Discharge Orders None A check benedicto indicates which time of day the medication should be taken. My Medications ASK your doctor about these medications Instructions Each Dose to Equal  
 Morning Noon Evening Bedtime  
 aspirin delayed-release 81 mg tablet Your last dose was: Your next dose is: Take  by mouth daily. calcium carbonate 500 mg calcium (1,250 mg) tablet Commonly known as:  OS-CAN Your last dose was: Your next dose is:    
   
   
 take 1 Tab by mouth two (2) times a day. 1 Tab  
    
   
   
   
  
 carvedilol 12.5 mg tablet Commonly known as:  Haskel Scarce Your last dose was: Your next dose is: TAKE 1 TABLET BY MOUTH TWICE DAILY WITH MEALS  
     
   
   
   
  
 furosemide 20 mg tablet Commonly known as:  LASIX Your last dose was: Your next dose is: Take 1 Tab by mouth daily. 20 mg  
    
   
   
   
  
 gabapentin 600 mg tablet Commonly known as:  NEURONTIN Your last dose was: Your next dose is: Take 1 Tab by mouth three (3) times daily. 600 mg HYDROcodone-acetaminophen 7.5-325 mg per tablet Commonly known as:  Lina Oyster Your last dose was: Your next dose is: Take 1 Tab by mouth every six (6) hours as needed. Max Daily Amount: 4 Tabs. Indications: Pain 1 Tab  
    
   
   
   
  
 levothyroxine 25 mcg tablet Commonly known as:  SYNTHROID Your last dose was: Your next dose is: Take 1 Tab by mouth Daily (before breakfast). 25 mcg  
    
   
   
   
  
 lisinopril 10 mg tablet Commonly known as:  Ora Bee Your last dose was: Your next dose is: Take 1 Tab by mouth daily. 10 mg  
    
   
   
   
  
 nitroglycerin 0.4 mg SL tablet Commonly known as:  NITROSTAT Your last dose was: Your next dose is:    
   
   
 1 Tab by SubLINGual route every five (5) minutes as needed. Indications: ANGINA  
 0.4 mg  
    
   
   
   
  
 omeprazole 20 mg capsule Commonly known as:  PRILOSEC Your last dose was: Your next dose is: Take 1 Cap by mouth two (2) times a day. 20 mg  
    
   
   
   
  
 senna-docusate 8.6-50 mg per tablet Commonly known as:  Magui Pinon Your last dose was: Your next dose is: Take 1 Tab by mouth nightly. 1 Tab  
    
   
   
   
  
 traZODone 50 mg tablet Commonly known as:  Rebecca Lassiter Your last dose was: Your next dose is: Take 1 Tab by mouth nightly. 50 mg Opioid Education  Prescription Opioids: What You Need to Know: 
 
Prescription opioids can be used to help relieve moderate-to-severe pain and are often prescribed following a surgery or injury, or for certain health conditions. These medications can be an important part of treatment but also come with serious risks. Opioids are strong pain medicines. Examples include hydrocodone, oxycodone, fentanyl, and morphine. Heroin is an example of an illegal opioid. It is important to work with your health care provider to make sure you are getting the safest, most effective care. WHAT ARE THE RISKS AND SIDE EFFECTS OF OPIOID USE? Prescription opioids carry serious risks of addiction and overdose, especially with prolonged use. An opioid overdose, often marked by slow breathing, can cause sudden death. The use of prescription opioids can have a number of side effects as well, even when taken as directed. · Tolerance-meaning you might need to take more of a medication for the same pain relief · Physical dependence-meaning you have symptoms of withdrawal when the medication is stopped. Withdrawal symptoms can include nausea, sweating, chills, diarrhea, stomach cramps, and muscle aches. Withdrawal can last up to several weeks, depending on which drug you took and how long you took it. · Increased sensitivity to pain · Constipation · Nausea, vomiting, and dry mouth · Sleepiness and dizziness · Confusion · Depression · Low levels of testosterone that can result in lower sex drive, energy, and strength · Itching and sweating RISKS ARE GREATER WITH:      
· History of drug misuse, substance use disorder, or overdose · Mental health conditions (such as depression or anxiety) · Sleep apnea · Older age (72 years or older) · Pregnancy Avoid alcohol while taking prescription opioids. Also, unless specifically advised by your health care provider, medications to avoid include: · Benzodiazepines (such as Xanax or Valium) · Muscle relaxants (such as Soma or Flexeril) · Hypnotics (such as Ambien or Lunesta) · Other prescription opioids KNOW YOUR OPTIONS Talk to your health care provider about ways to manage your pain that don't involve prescription opioids. Some of these options may actually work better and have fewer risks and side effects. Options may include: 
· Pain relievers such as acetaminophen, ibuprofen, and naproxen · Some medications that are also used for depression or seizures · Physical therapy and exercise · Counseling to help patients learn how to cope better with triggers of pain and stress. · Application of heat or cold compress · Massage therapy · Relaxation techniques Be Informed Make sure you know the name of your medication, how much and how often to take it, and its potential risks & side effects. IF YOU ARE PRESCRIBED OPIOIDS FOR PAIN: 
· Never take opioids in greater amounts or more often than prescribed. Remember the goal is not to be pain-free but to manage your pain at a tolerable level. · Follow up with your primary care provider to: · Work together to create a plan on how to manage your pain. · Talk about ways to help manage your pain that don't involve prescription opioids. · Talk about any and all concerns and side effects. · Help prevent misuse and abuse. · Never sell or share prescription opioids · Help prevent misuse and abuse. · Store prescription opioids in a secure place and out of reach of others (this may include visitors, children, friends, and family). · Safely dispose of unused/unwanted prescription opioids: Find your community drug take-back program or your pharmacy mail-back program, or flush them down the toilet, following guidance from the Food and Drug Administration (www.fda.gov/Drugs/ResourcesForYou). · Visit www.cdc.gov/drugoverdose to learn about the risks of opioid abuse and overdose. · If you believe you may be struggling with addiction, tell your health care provider and ask for guidance or call [facility] at 9-265-986-WJDA. Discharge Instructions None ACO Transitions of Care Introducing [employer] [address] offers a voluntary care coordination program to provide high quality service and care to [facility] fee-for-service beneficiaries. Marisol Crooks was designed to help you enhance your health and well-being through the following services: ? Transitions of Care  support for individuals who are transitioning from one care setting to another (example: Hospital to home). ? Chronic and Complex Care Coordination  support for individuals and caregivers of those with serious or chronic illnesses or with more than one chronic (ongoing) condition and those who take a number of different medications. If you meet specific medical criteria, a [address] [place] [address] Rd may call you directly to coordinate your care with your primary care physician and your other care providers. For questions about the [facility] programs, please, contact your physicians office. For general questions or additional information about Accountable Care Organizations: 
Please visit www.medicare.gov/acos. html or call 1-800-MEDICARE (8-156.313.4993) TTY users should call 2-293.848.7080. Introducing [facility] HE[facility]! Dear Roberto Reynar: Thank you for requesting a Loop88 account. Our records indicate that you already have an active Loop88 account. You can access your account anytime at [email or web address] Did you know that you can access your hospital and ER discharge instructions at any time in Loop88? You can also review all of your test results from your hospital stay or ER visit. Additional Information If you have questions, please visit the Frequently Asked Questions section of the Loop88 website at [email or web address] Remember, MyChart is NOT to be used for urgent needs. For medical emergencies, dial 911. Now available from your iPhone and Android! Introducing Ricci Mix As a Chava Garcia patient, I wanted to make you aware of our electronic visit tool called Ricci Mix. [employer] Asset[employer] 24/7 allows you to connect within minutes with a medical provider 24 hours a day, seven days a week via a mobile device or tablet or logging into a secure website from your computer. You can access Ricci Mix from anywhere in the [place]. A virtual visit might be right for you when you have a simple condition and feel like you just dont want to get out of bed, or cant get away from work for an appointment, when your regular Leeguido Radha provider is not available (evenings, weekends or holidays), or when youre out of town and need minor care. Electronic visits cost only $49 and if the [employer] 24/7 provider determines a prescription is needed to treat your condition, one can be electronically transmitted to a nearby pharmacy*. Please take a moment to enroll today if you have not already done so. The enrollment process is free and takes just a few minutes. To enroll, please download the [employer] AssetPoint 24/iDubba sanjay to your tablet or phone, or visit [email or web address] to enroll on your computer. And, as an [address] [place] [address] patient with a [employer] account, the results of your visits will be scanned into your electronic medical record and your primary care provider will be able to view the scanned results. We urge you to continue to see your regular Leeleticiawalter Garcia provider for your ongoing medical care. And while your primary care provider may not be the one available when you seek a Ricci Mix virtual visit, the peace of mind you get from getting a real diagnosis real time can be priceless.    
 
For more information on Ricci Jose Carlosluisfin, view our Frequently Asked Questions (FAQs) at [email or web address]. Sincerely, 
 
Jade Clark MD 
Chief Medical Officer [address] *:  certain medications cannot be prescribed via Ricci Mix Providers Seen During Your Hospitalization Provider Specialty Primary office phone Orlin Dick MD Vascular Surgery [phone] Immunizations Administered for This Admission Name Date  
 TB Skin Test (PPD) Intradermal  Deferred (),  Deferred (), 4/28/2018 Your Primary Care Physician (PCP) Primary Care Physician Office Phone Office Fax Andreina Deng [phone] You are allergic to the following Allergen Reactions Tylenol (Acetaminophen) Unknown (comments)  
 insomnia Recent Documentation Weight Breastfeeding? BMI OB Status Smoking Status 45.5 kg No 17.2 kg/m2 Postmenopausal Never Smoker Emergency Contacts Name Discharge Info Relation Home Work Mobile Gayathri Bailey  Daughter [21] [phone] Eliecer Deluca  Son [22] [phone] Mira Guy DISCHARGE CAREGIVER [3] Daughter [21]   [phone] Patient Belongings The following personal items are in your possession at time of discharge: 
  Dental Appliances: None  Visual Aid: Glasses   Hearing Aids/Status: Right  Home Medications: None   Jewelry: None  Clothing: None    Other Valuables: None Discharge Instructions Attachments/References SURGERY: GENERIC: POST-OP (ENGLISH) HEALTHY DIET: HEART (ENGLISH) SECONDHAND SMOKE (ENGLISH) Patient Handouts Surgery: What to Expect at [facility] Your Recovery This care sheet gives you a general idea about how long it will take for you to recover from your surgery. But each person recovers at a different pace. This care sheet gives you a general idea about how long it will take for you to recover from your surgery. But each person recovers at a different pace. How can you care for yourself at home? Activity ? · Allow your body to heal. Don't move quickly or lift anything heavy until you are feeling better. ? · Rest when you feel tired. ? · Your doctor may give you specific instructions on when you can do your normal activities again, such as driving and going back to work. ? · Be active. Walking is a good choice. Diet ? · You can eat your normal diet when you feel well. If your stomach is upset, try bland, low-fat foods like plain rice, broiled chicken, toast, and yogurt. ? · If your bowel movements are not regular right after surgery, try to avoid constipation and straining. Drink plenty of water. Your doctor may suggest fiber, a stool softener, or a mild laxative. Medicines ? · Your doctor will tell you if and when you can restart your medicines. He or she will also give you instructions about taking any new medicines. ? · If you take blood thinners, such as warfarin (Coumadin), clopidogrel (Plavix), or aspirin, be sure to talk to your doctor. He or she will tell you if and when to start taking those medicines again. Make sure that you understand exactly what your doctor wants you to do. ? · Be safe with medicines. Read and follow all instructions on the label. ¨ If the doctor gave you a prescription medicine for pain, take it as prescribed. ¨ If you are not taking a prescription pain medicine, ask your doctor if you can take an over-the-counter medicine. Incision care ? If your doctor told you how to care for your cut (incision), follow your doctor's instructions. If you did not get instructions, follow this general advice: 
? · You will have a dressing over the cut. A dressing helps the incision heal and protects it. Your doctor will tell you how to take care of this. ? · If you have strips of tape on the cut the doctor made, leave the tape on for a week or until it falls off. ? · If you had stitches or staples, your doctor will tell you when to come back to have them removed. ? · If you have skin adhesive on the cut, leave it on until it falls off. Skin adhesive is also called liquid stitches. ? · Change the bandage every day. ? · Wash the area daily with warm water, and pat it dry. Don't use hydrogen peroxide or alcohol. They can slow healing. ? · You may cover the area with a gauze bandage if it oozes fluid or rubs against clothing. ? · You may shower 24 to 48 hours after surgery. Pat the incision dry. Don't swim or take a bath for the first 2 weeks, or until your doctor tells you it is okay. Follow-up care is a key part of your treatment and safety. Be sure to make and go to all appointments, and call your doctor if you are having problems. It's also a good idea to know your test results and keep a list of the medicines you take. When should you call for help? Call 911 anytime you think you may need emergency care. For example, call if: 
? · You passed out (lost consciousness). ? · You are short of breath. ?Call your doctor now or seek immediate medical care if: 
? · You have pain that does not get better after you take pain medicine. ? · You cannot pass stool or gas. ? · You are sick to your stomach and cannot drink fluids. ? · You have loose stitches, or your incision comes open. ? · You have signs of a blood clot in your leg (called a deep vein thrombosis), such as: 
¨ Pain in your calf, back of the knee, thigh, or groin. ¨ Redness and swelling in your leg or groin. ? · You have signs of infection, such as: 
¨ Increased pain, swelling, warmth, or redness. ¨ Red streaks leading from the incision. ¨ Pus draining from the incision. ¨ A fever. ? · Bright red blood has soaked through your bandage. ? Watch closely for any changes in your health, and be sure to contact your doctor if you have any problems. Where can you learn more? Go to http://david-rui.info/. Enter Z547 in the search box to learn more about \"Surgery: What to Expect at Home. \" Current as of: May 12, 2017 Content Version: 11.4 © 3145-5739 [employer]. Care instructions adapted under license by [employer] (which disclaims liability or warranty for this information). If you have questions about a medical condition or this instruction, always ask your healthcare professional. [address] any warranty or liability for your use of this information. Heart-Healthy Diet: Care Instructions Your Care Instructions A heart-healthy diet has lots of vegetables, fruits, nuts, beans, and whole grains, and is low in salt. It limits foods that are high in saturated fat, such as meats, cheeses, and fried foods. It may be hard to change your diet, but even small changes can lower your risk of heart attack and heart disease. Follow-up care is a key part of your treatment and safety. Be sure to make and go to all appointments, and call your doctor if you are having problems. It's also a good idea to know your test results and keep a list of the medicines you take. How can you care for yourself at home? Watch your portions · Learn what a serving is. A \"serving\" and a \"portion\" are not always the same thing. Make sure that you are not eating larger portions than are recommended. For example, a serving of pasta is ½ cup. A serving size of meat is 2 to 3 ounces. A 3-ounce serving is about the size of a deck of cards. Measure serving sizes until you are good at [place]" them. Keep in mind that restaurants often serve portions that are 2 or 3 times the size of one serving. · To keep your energy level up and keep you from feeling hungry, eat often but in smaller portions. · Eat only the number of calories you need to stay at a healthy weight.  If you need to lose weight, eat fewer calories than your body burns (through exercise and other physical activity). Eat more fruits and vegetables · Eat a variety of fruit and vegetables every day. Dark green, deep orange, red, or yellow fruits and vegetables are especially good for you. Examples include spinach, carrots, peaches, and berries. · Keep carrots, celery, and other veggies handy for snacks. Buy fruit that is in season and store it where you can see it so that you will be tempted to eat it. · Cook dishes that have a lot of veggies in them, such as stir-fries and soups. Limit saturated and trans fat · Read food labels, and try to avoid saturated and trans fats. They increase your risk of heart disease. Trans fat is found in many processed foods such as cookies and crackers. · Use olive or canola oil when you cook. Try cholesterol-lowering spreads, such as Benecol or Take Control. · Bake, broil, grill, or steam foods instead of frying them. · Choose lean meats instead of high-fat meats such as hot dogs and sausages. Cut off all visible fat when you prepare meat. · Eat fish, skinless poultry, and meat alternatives such as soy products instead of high-fat meats. Soy products, such as tofu, may be especially good for your heart. · Choose low-fat or fat-free milk and dairy products. Eat fish · Eat at least two servings of fish a week. Certain fish, such as salmon and tuna, contain omega-3 fatty acids, which may help reduce your risk of heart attack. Eat foods high in fiber · Eat a variety of grain products every day. Include whole-grain foods that have lots of fiber and nutrients. Examples of whole-grain foods include oats, whole wheat bread, and brown rice. · Buy whole-grain breads and cereals, instead of white bread or pastries. Limit salt and sodium · Limit how much salt and sodium you eat to help lower your blood pressure. · Taste food before you salt it. Add only a little salt when you think you need it. With time, your taste buds will adjust to less salt. · Eat fewer snack items, fast foods, and other high-salt, processed foods. Check food labels for the amount of sodium in packaged foods. · Choose low-sodium versions of canned goods (such as soups, vegetables, and beans). Limit sugar · Limit drinks and foods with added sugar. These include candy, desserts, and soda pop. Limit alcohol · Limit alcohol to no more than 2 drinks a day for men and 1 drink a day for women. Too much alcohol can cause health problems. When should you call for help? Watch closely for changes in your health, and be sure to contact your doctor if: 
? · You would like help planning heart-healthy meals. Where can you learn more? Go to http://davidSantaris Pharmarui.info/. Enter V137 in the search box to learn more about \"Heart-Healthy Diet: Care Instructions. \" Current as of: September 21, 2016 Content Version: 11.4 © 3784-3927 [employer]. Care instructions adapted under license by [employer] (which disclaims liability or warranty for this information). If you have questions about a medical condition or this instruction, always ask your healthcare professional. [address] any warranty or liability for your use of this information. Secondhand Smoke: Care Instructions Your Care Instructions Secondhand smoke comes from the burning end of a cigarette, cigar, or pipe and the smoke that a smoker exhales. The smoke contains nicotine and many other harmful chemicals. Breathing secondhand smoke can cause or worsen health problems including cancer, asthma, coronary artery disease, and respiratory infections. It can make your eyes and nose burn and cause a sore throat.  
Secondhand smoke is especially bad for babies and young children whose lungs are still developing. Babies whose parents smoke are more likely to have ear infections, pneumonia, and bronchitis in the first few years of their lives. Secondhand smoke can make asthma symptoms worse in children. If you are pregnant, it is important that you not smoke and that you avoid secondhand smoke. You are more likely to give birth to a baby who weighs less than expected (low birth weight) if you smoke. And your baby may have a greater risk for sudden infant death syndrome (SIDS). Babies whose mothers are exposed to secondhand smoke during pregnancy have a higher risk for health problems. Follow-up care is a key part of your treatment and safety. Be sure to make and go to all appointments, and call your doctor if you are having problems. It's also a good idea to know your test results and keep a list of the medicines you take. How can you care for yourself at home? · Do not smoke or let anyone else smoke in your home. If people must smoke, ask them to go outside. · If people do smoke in your home, choose a room where you can open a window or use a fan to get the smoke outside. · Do not let anyone smoke in your car. If someone must smoke, pull over in a safe place and let him or her smoke away from the car. · Ask your employer to make sure that you have a smoke-free work area. · Make sure that your children are not exposed to secondhand smoke at day care, school, and after-school programs. · Try to choose nonsmoking bars, restaurants, and other public places when you go out. · Help your family and friends who smoke to quit by encouraging them to try. Tell them about treatment resources. Having support from others often helps. · If you smoke, quit. Quitting is hard, but there are ways to boost your chance of quitting tobacco for good. ¨ Use nicotine gum, patches, or lozenges. Call a quitline. Ask your doctor about stop-smoking programs and medicines. ¨ Keep trying. When should you call for help? Watch closely for changes in your health, and be sure to contact your doctor if you have any problems. Where can you learn more? Go to http://david-rui.info/. Enter L004 in the search box to learn more about \"Secondhand Smoke: Care Instructions. \" Current as of: March 20, 2017 Content Version: 11.4 © 2006-2017 [employer]. Care instructions adapted under license by [employer] (which disclaims liability or warranty for this information). If you have questions about a medical condition or this instruction, always ask your healthcare professional. [address] any warranty or liability for your use of this information. Please provide this summary of care documentation to your next provider. Signatures-by signing, you are acknowledging that this After Visit Summary has been reviewed with you and you have received a copy. Patient Signature:  ____________________________________________________________ Date:  ____________________________________________________________  
  
Isaac Walsh Provider Signature:  ____________________________________________________________ Date:  ____________________________________________________________

## 2018-04-25 NOTE — ANESTHESIA POSTPROCEDURE EVALUATION
Post-Anesthesia Evaluation and Assessment    Patient: Barbara Steven MRN: [id number]  SSN: [id number]    YOB: 1927  Age: 80 y.o. Sex: female       Cardiovascular Function/Vital Signs  Visit Vitals    /58    Pulse 75    Temp 37 °C (98.6 °F)    Resp 16    Wt 39.9 kg (88 lb)    SpO2 100%    BMI 15.11 kg/m2       Patient is status post total IV anesthesia anesthesia for Procedure(s):  Left femoral artery embolectomy  PT HAS PACEMAKER. Nausea/Vomiting: None    Postoperative hydration reviewed and adequate. Pain:  Pain Scale 1: Numeric (0 - 10) (04/25/18 1629)  Pain Intensity 1: 8 (04/25/18 1629)   Managed    Neurological Status:   Neuro (WDL): Within Defined Limits (04/25/18 1634)   At baseline    Mental Status and Level of Consciousness: Arousable    Pulmonary Status:   O2 Device: Nasal cannula (04/25/18 1929)   Adequate oxygenation and airway patent    Complications related to anesthesia: None    Post-anesthesia assessment completed.  No concerns    Signed By: Jessica Prieto MD     April 25, 2018

## 2018-04-25 NOTE — ANESTHESIA PREPROCEDURE EVALUATION
Anesthetic History   No history of anesthetic complications            Review of Systems / Medical History  Patient summary reviewed and pertinent labs reviewed    Pulmonary          Shortness of breath (associated with AFib)         Neuro/Psych             Comments: \"mild cognitive impairment\" Cardiovascular    Hypertension      CHF (last EF 2/2017 30% with RVSP 40)  Dysrhythmias (SSS) : atrial fibrillation  Pacemaker and CAD    Exercise tolerance: <4 METS  Comments: Orthostatic hypotension. DDDR mode. GI/Hepatic/Renal     GERD    Renal disease: CRI      Comments: IBS    Dysphagia Endo/Other    Diabetes: poorly controlled, type 2  Hypothyroidism       Other Findings   Comments: Autonomic neuropathy    Anorexia    Chronic Pain    Cervical Radiculopathy    Presents for LFA occlusion. Physical Exam    Airway  Mallampati: II  TM Distance: 4 - 6 cm  Neck ROM: normal range of motion   Mouth opening: Normal     Cardiovascular    Rhythm: irregular  Rate: normal         Dental  No notable dental hx       Pulmonary  Breath sounds clear to auscultation               Abdominal  GI exam deferred       Other Findings            Anesthetic Plan    ASA: 3  Anesthesia type: total IV anesthesia          Induction: Intravenous  Anesthetic plan and risks discussed with: Patient      Will plan LMA TIVA with BIS to titrate to minimal anesthetic given age related delerium concerns.

## 2018-04-25 NOTE — BRIEF OP NOTE
BRIEF OPERATIVE NOTE    Date of Procedure: 4/25/2018   Preoperative Diagnosis: LEFT FEMORAL ARTERY OCCLUSION  Postoperative Diagnosis: LEFT FEMORAL ARTERY OCCLUSION    Procedure(s):  Left femoral artery embolectomy    Surgeon(s) and Role:     * Shreyas Dennis MD - Primary       Surgical Staff:  Circ-1: Mendel Maha, RN  Circ-2: Jamil Waldrop  Radiology Technician: Marya Stephens, RT, R, CT  Scrub Tech-1: Gilma Torres Benitez  Scrub Tech-2: Deri Falling  Event Time In   Incision Start [address]   Incision [address] 1921     Anesthesia: General   Estimated Blood Loss: 75 mL  Specimens: * No specimens in log *   Findings: Organized thrombus extracted from left CFA/PFA/SFA with excellent inflow and back-bleeding after embolectomy. +CW Doppler signals L foot at completion.    Complications: None  Implants: * No implants in log *

## 2018-04-26 PROBLEM — I50.22 CHRONIC SYSTOLIC CONGESTIVE HEART FAILURE (HCC): Status: ACTIVE | Noted: 2018-04-26

## 2018-04-26 PROBLEM — I50.9 CHF (CONGESTIVE HEART FAILURE) (HCC): Status: RESOLVED | Noted: 2017-03-18 | Resolved: 2018-04-26

## 2018-04-26 LAB
GLUCOSE BLD STRIP.AUTO-MCNC: 199 MG/DL (ref 65–100)
MAGNESIUM SERPL-MCNC: 2 MG/DL (ref 1.8–2.4)

## 2018-04-26 PROCEDURE — 82962 GLUCOSE BLOOD TEST: CPT

## 2018-04-26 PROCEDURE — 74011000258 HC RX REV CODE- 258: Performed by: SURGERY

## 2018-04-26 PROCEDURE — 36415 COLL VENOUS BLD VENIPUNCTURE: CPT | Performed by: SURGERY

## 2018-04-26 PROCEDURE — 74011250637 HC RX REV CODE- 250/637: Performed by: SURGERY

## 2018-04-26 PROCEDURE — 74011250637 HC RX REV CODE- 250/637: Performed by: PHYSICIAN ASSISTANT

## 2018-04-26 PROCEDURE — 74011250636 HC RX REV CODE- 250/636: Performed by: SURGERY

## 2018-04-26 PROCEDURE — 83735 ASSAY OF MAGNESIUM: CPT | Performed by: SURGERY

## 2018-04-26 PROCEDURE — 65660000004 HC RM CVT STEPDOWN

## 2018-04-26 RX ORDER — DEXTROSE, SODIUM CHLORIDE, AND POTASSIUM CHLORIDE 5; .45; .15 G/100ML; G/100ML; G/100ML
75 INJECTION INTRAVENOUS CONTINUOUS
Status: DISCONTINUED | OUTPATIENT
Start: 2018-04-26 | End: 2018-04-30 | Stop reason: HOSPADM

## 2018-04-26 RX ADMIN — APIXABAN 2.5 MG: 2.5 TABLET, FILM COATED ORAL at 20:53

## 2018-04-26 RX ADMIN — GABAPENTIN 600 MG: 300 CAPSULE ORAL at 16:56

## 2018-04-26 RX ADMIN — Medication 10 ML: at 14:35

## 2018-04-26 RX ADMIN — FUROSEMIDE 20 MG: 20 TABLET ORAL at 08:29

## 2018-04-26 RX ADMIN — LISINOPRIL 10 MG: 5 TABLET ORAL at 08:30

## 2018-04-26 RX ADMIN — DEXTROSE MONOHYDRATE, SODIUM CHLORIDE, AND POTASSIUM CHLORIDE 75 ML/HR: 50; 4.5; 1.49 INJECTION, SOLUTION INTRAVENOUS at 18:20

## 2018-04-26 RX ADMIN — Medication 500 MG: at 16:56

## 2018-04-26 RX ADMIN — CARVEDILOL 12.5 MG: 12.5 TABLET, FILM COATED ORAL at 08:32

## 2018-04-26 RX ADMIN — ASPIRIN 81 MG: 81 TABLET, COATED ORAL at 08:33

## 2018-04-26 RX ADMIN — POTASSIUM CHLORIDE: 2 INJECTION, SOLUTION, CONCENTRATE INTRAVENOUS at 17:52

## 2018-04-26 RX ADMIN — CEFAZOLIN SODIUM 1 G: 1 INJECTION, POWDER, FOR SOLUTION INTRAMUSCULAR; INTRAVENOUS at 08:52

## 2018-04-26 RX ADMIN — GABAPENTIN 600 MG: 300 CAPSULE ORAL at 08:31

## 2018-04-26 RX ADMIN — CARVEDILOL 12.5 MG: 12.5 TABLET, FILM COATED ORAL at 16:56

## 2018-04-26 RX ADMIN — Medication 10 ML: at 05:48

## 2018-04-26 RX ADMIN — ACETAMINOPHEN 500 MG: 500 TABLET, FILM COATED ORAL at 12:50

## 2018-04-26 RX ADMIN — HEPARIN SODIUM 5000 UNITS: 5000 INJECTION, SOLUTION INTRAVENOUS; SUBCUTANEOUS at 08:28

## 2018-04-26 RX ADMIN — Medication 500 MG: at 08:29

## 2018-04-26 NOTE — PROGRESS NOTES
Patient difficult to arouse. Unable to answer questions and only responds to vigorous stimulation. Does not awaken for blood draws, finger stick blood sugars. Vitals are WNL. ALBERTO Jett notified and is present to assess patient.

## 2018-04-26 NOTE — PROGRESS NOTES
ALVERTO called by Pramod Rome, regarding patient's code status which shows not on file in the indicator on the chart but there's a signed DNR scanned in.  ALVERTO spoke with Dr. Elidia Ni to gain his thoughts. He said during surgery, the DNR is revoked. However, he and the family didn't discuss what should be done after surgery. His team will discuss it with the family and go from there.

## 2018-04-26 NOTE — PROGRESS NOTES
Problem: Nutrition Deficit  Goal: *Optimize nutritional status  Nutrition F/U  Reason for assessment:Received referral from Malnutrition Screening Tool:Recently Lost weight without trying [Unsure]. Amount of weight loss [Unsure]  Eating poorly d/t decreased appetite [Yes]  Assessment:   Diet order(s): Clear liquid diet, advance as tolerated to patient's regular diet  Food/Nutrition Patient History:  Pt very lethargic and minimally responsive. Does not answer queries. Hx obtained from daughter, Antonio Thomas. She reports pt has had weight loss over the past several years but she hasn't noticed any significant decline in oral intake in the past year or so. At baseline she eats a bowl of cream of wheat for breakfast, 1/2 sandwich or just a slice of bread for lunch and then eats the best at supper when family brings her a meal like cornbread, dried beans and green beans. The family can't get her to consume much Boost as she doesn't like to drink it but will eat it if it's frozen. She does seem to have an overall preference for sweets and will eats cake or pie well. She has some difficulty chewing meats but daughter doubts pt would accept ground meat.   Anthropometrics:Height 5' 4\",  Weight: 47.3 kg (104 lb 3.2 oz), pre-op standing weight 88#,  BMI 15.1 c/w underweight or severe thinness  Weight hx per EMR ( Based on [facility] care functionality, RD cannot know if these weight are actual versus stated):   WT / BMI WEIGHT   4/24/2018 88 lb 3 oz   4/24/2018 90 lb   4/23/2018 90 lb   4/12/2018 86 lb   4/6/2018 80 lb   1/16/2018 87 lb   1/10/2018 90 lb   10/19/2017 93 lb   9/15/2017 94 lb   8/23/2017 91 lb 3.2 oz   8/16/2017 90 lb 9.6 oz   7/13/2017 93 lb 3.2 oz   7/11/2017 93 lb 12.8 oz   6/20/2017 94 lb   4/18/2017 97 lb 1.6 oz   4/5/2017 94 lb   3/30/2017 92 lb 6 oz   3/21/2017 88 lb 14.4 oz   3/15/2017 95 lb   3/13/2017 100 lb 9.6 oz   3/8/2017 93 lb   2/26/2017 93 lb 3.2 oz   2/24/2017 100 lb   1/3/2017 98 lb    Expect weight variances d/t fluid changes given hx of CHF and CKD  Macronutrient needs:  EER:  5281-9607 kcal /day (30-35 kcal/kg pre-op BW)  EPR:  44-55 grams protein/day (0.8-1 grams/kg IBW)  Intake/Comparative Standards: Current clear liquid diet does not meet kcal or protein needs  Acute Illness, Chronic Illness, or Social/Enviornmental: Chronic illness  Energy Intake: Less than/equal to 75% of est energy req for greater than/equal to 1 month  Body Fat: Severe  Muscle Mass: Severe  Chronic Illness - Malnutrition Diagnosis: Severe malnutrition     Nutrition Diagnosis: Inadequate oral intake r/t decreased ability to consume sufficient oral intake as evidenced by lethargy precludes po intake at this time, chronic decreased appetite and po intake and low BMI as above    Intervention:  Meals and snacks: Progress to regular diet but hold po unless awake and alert. Nutrition Supplement Therapy: Magic cup bid.    Discharge nutrition plan: High kcal diet, encourage best po possible    Dyan Zapata, [address], [place], [facility] [phone]

## 2018-04-26 NOTE — OP NOTES
[facility] REPORT    Clearance Kareem  MR#: [id number]  : 08/10/1927  ACCOUNT #: [de-identified]   DATE OF SERVICE: 2018    PREOPERATIVE DIAGNOSIS:  Left lower extremity ischemia with common femoral artery occlusion. POSTOPERATIVE DIAGNOSIS:  Left lower extremity ischemia with common femoral artery occlusion due to embolus of the left common femoral artery. PROCEDURES PERFORMED:  Left common femoral, profunda femoris and superficial femoral artery embolectomy through leg incision. SURGEON:  Fanta Castillo MD.    ANESTHESIA:  General with local supplement. ESTIMATED BLOOD LOSS:  75 mL. SPECIMEN REMOVED:  None. IMPLANTS:  None. COMPLICATIONS:  None. ASSISTANT:  None. STATEMENT OF MEDICAL NECESSITY:  The patient is a 80-year-old woman who presented to my office yesterday with a 2-week history of sudden onset left lower extremity pain. She had seen multiple providers and initially was thought to have sciatica, but then the evening before my evaluation, she was seen in the emergency department and found to have diminished Doppler pulses and so a duplex ultrasound was performed confirming occlusion of the left common femoral artery. PROCEDURE:  The patient was taken to the operating room in the supine position and general anesthesia was provided. The lower torso and left leg were prepped and draped in the usual sterile fashion. Rentae Bourdon covers were used. The skin was anesthetized at the inguinal skin crease with a mixture of Marcaine, lidocaine and epinephrine provided by the anesthesia service and administered by me. A standard longitudinal groin incision was made and the common femoral artery was dissected out from the inguinal ligament to about 2 cm beyond the femoral bifurcation. The common femoral, profunda femoris and superficial femoral arteries were encircled with vessel loops.     Heparin 5000 units was given intravenously and then 2 minutes later a transverse arteriotomy was made at the femoral bifurcation so that the origins of both the superficial femoral and profunda femoris could be optimally visualized. Once the artery was opened, it was found to be occluded with what appeared to be organized thrombus. A #4 Satish catheter was passed proximally and the balloon was inflated and the catheter was gently withdrawn. The catheter was placed approximately 6-7 cm above the arteriotomy. The passage of the Satish, a large organized thrombus was extracted and this resulted in vigorous arterial inflow. The Satish was passed again, it was clean. There were strong arterial inflow and so the common femoral artery was then gently occluded with a small Neri vascular clamp. The 4 Satish catheter was gently passed into the origin of the profunda femoris and thrombus was extracted there and this resulted in good arterial backbleeding from the profunda. The profunda was then occluded with a small Neri clamp. The superficial femoral artery was also occluded at this level and so the #4 Satish catheter was gently passed distally, the balloon inflated and then gently and slowly withdrawn, and a large amount of clot was extracted. The Satish was passed again and additional clot was extracted, which appeared to have a small tail at the end of it. The Satish was passed again and was clear. There was restoration of good arterial backbleeding with this. The superficial femoral artery was then irrigated with heparinized saline and instilled with a David tipped needle into the origin of the superficial femoral artery. The superficial femoral artery was then gently occluded with a small Neri clamp. The arteriotomy was then repaired with 2 running 5-0 Prolene sutures, flushed and backbled, and then flow restored into the profunda and then into the superficial femoral artery.   There was a strong pulse at the common femoral, profunda femoris and superficial femoral arteries at completion. The foot was then examined and was nice and pink and there were good Doppler signals at both the posterior tibial and dorsalis pedis arteries of the left foot. Protamine was then given slowly to reverse the remaining heparin. The incision was thoroughly irrigated and re-anesthetized. Hemostasis was achieved. A small amount of fibrillar was used to assist with topical hemostasis. The incision was then closed in layers with 2 layers of running 3-0 Vicryl in the fascial layers and running 4-0 subcuticular Monocryl in the skin. Dermabond was applied to the skin incision. The patient tolerated the procedure well and went to recovery in stable condition.       MD [place] / Diandra Moy  D: 04/25/2018 19:38     T: 04/26/2018 06:28  JOB #: [id number]

## 2018-04-26 NOTE — PROGRESS NOTES
TRANSFER - IN REPORT:    Verbal report received from [facility], RN (name) on Carlos Berrios  being received from [facility]) for routine post - op      Report consisted of patients Situation, Background, Assessment and   Recommendations(SBAR). Information from the following report(s) SBAR, Kardex, Procedure Summary, Recent Results and Cardiac Rhythm Paced was reviewed with the receiving nurse. Opportunity for questions and clarification was provided. Assessment completed upon patients arrival to unit and care assumed. Dual nurse skin assessment completed. Skin warm, dry and intact. Sacrum assessed, skin intact with no signs of skin breakdown. Scab on L knee, red abrasion on L shin. Scab present on L lower arm. L subclavian PPM.  No other skin issues noted.

## 2018-04-26 NOTE — PERIOP NOTES
TRANSFER - OUT REPORT:    Verbal report given to April RN(name) on Caryl Paniagua  being transferred to 2226(unit) for routine post - op       Report consisted of patients Situation, Background, Assessment and   Recommendations(SBAR). Information from the following report(s) SBAR, Kardex, OR Summary, Procedure Summary, Intake/Output and MAR was reviewed with the receiving nurse. Lines:   Peripheral IV 04/25/18 Left; Lower Forearm (Active)   Site Assessment Clean, dry, & intact 4/25/2018  7:29 PM   Phlebitis Assessment 0 4/25/2018  7:29 PM   Infiltration Assessment 0 4/25/2018  7:29 PM   Dressing Status Clean, dry, & intact 4/25/2018  7:29 PM   Dressing Type Tape;Transparent 4/25/2018  7:29 PM   Hub Color/Line Status Pink; Infusing 4/25/2018  7:29 PM   Action Taken Blood drawn 4/25/2018  4:00 PM        Opportunity for questions and clarification was provided. Patient transported with:   O2 @ 2 liters    VTE prophylaxis orders have not been written for Caryl Paniagua. Patient and family given floor number and nurses name. Family updated re: pt status after security code verified.

## 2018-04-26 NOTE — PROGRESS NOTES
Progress Note    Patient: Katelin Alanis MRN: [id number]  SSN: [id number]    YOB: 1927  Age: 80 y.o. Sex: female      Admission Date: 4/25/2018    LOS: 1 day     1 Day Post-Op    PROCEDURE(S):  Left femoral artery embolectomy  PT HAS PACEMAKER    Subjective:     Requested by primary RN to see patient now, as patient has become increasingly difficult to awaken over the course of the day. Last dose morphine was yesterday 22h00. Patient was seen and examined with daughter, primary RN in room. Ms. Jerod Jack awakened to voice and responded to 2 questions but quickly dozed. She could be awakened again easily and wasn't oriented to place but followed commands, found to have no focal deficits.      Objective:     Vitals:    04/26/18 0709 04/26/18 0715 04/26/18 1057 04/26/18 1319   BP: 176/77  (!) 137/92    Pulse: 75  76    Resp: 17  17    Temp: 99.1 °F (37.3 °C)  99.7 °F (37.6 °C) 99.3 °F (37.4 °C)   SpO2: 93% 93% 94%    Weight:            Physical Exam:   GENERAL: thin, frail, dozing but cooperative when awakened; tongue midline, facial symmetry noted  LUNG: clear to auscultation bilaterally  normal respiratory effort  HEART: paced, regular rate and rhythm  ABDOMEN: soft, nontender, nondistended, bowel sounds normoactive  EXTREMITIES: left groin with incision intact with skin glue, no hematoma / induration / ecchymosis  PULSES: radial and dorsalis pedis 2+ palpable and symmetric bilaterally    Lab/Data Review:  Current and past lab results reviewed, including pre-admission CMP, etc.       Assessment / Plan:     Principal Problem:    Femoral artery occlusion, left ([facility] [address] (4/25/2018)    Active Problems:    Atrial fibrillation (HCC) (8/12/2017)      Chronic systolic congestive heart failure ([address] (4/26/2018)        Patient hemodynamically stable, appears neurologically without deficits  Staff to continue to monitor  Will review code status with patient and family      Signed By: Warden Mellisa PA-C April 26, 2018      Physician Assistant with Vascular Surgery Dionte Shrestha MD / Dimitry Beltran.  Wesly Park MD / Chani Heard MD

## 2018-04-26 NOTE — CONSULTS
[address] Cardiology Consult                Date of  Admission: 4/25/2018  3:09 PM     Primary Care Physician: Dr. Amanda Mariano  Primary Cardiologist: Dr. Todd Aiken  Referring Provider: Pilar Thomas  Consulting Physician: Dr. Malina Lawson    CC/Reason for consult: SFA thrombus       Saint Davis is a 80 y.o. female who presented to the ER in early April with left leg pain. She was discharged home with diagnosis of sciatica pain. She followed up with PCP and had no improvement. She was seen at urgent care facility and referred back to the ER. US showed occlusion of left common femoral artery. She underwent left common femoral, profunda femoris and superficial femoral artery embolectomy through leg incision. She has permanent a-fib and has not been on anticoagulation due to fall risk. Her daughter states she has become weaker over the last few weeks since this started and fell at home.       Patient Active Problem List   Diagnosis Code    Diabetes mellitus, type 2 ([facility] E11.9    Hypertension I10    Acquired hypothyroidism E03.9    Mixed hyperlipidemia E78.2    CKD (chronic kidney disease), stage III N18.3    GERD (gastroesophageal reflux disease) K21.9    IBS (irritable bowel syndrome) K58.9    Gait disorder R26.9    Atrial fibrillation with RVR ([facility] I48.91    Autonomic neuropathy G90.9    MCI (mild cognitive impairment) G31.84    Chronic pain syndrome G89.4    Cervical radiculopathy M54.12    Dysphagia R13.10    Pacemaker Z95.0    SSS (sick sinus syndrome) ([facility]) I49.5    Orthostatic hypotension I95.1    CAD in native artery I25.10    Dyspnea R06.00    Atrial fibrillation with rapid ventricular response ([facility]) I48.91    Atrial fibrillation ([facility]) I48.91    CHF (congestive heart failure) ([facility]) I50.9    Cardiomyopathy ([facility]) I42.9    Ischemic cardiomyopathy I25.5    DNR (do not resuscitate) Z66    S/P AV noris ablation Z98.890    Biventricular cardiac pacemaker in situ Z95.0    Type 2 diabetes mellitus with nephropathy (HCC) E11.21    Femoral artery occlusion, 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  Coumadin contraindicated due to falls      Autonomic neuropathy 1/13/2013    CAD (coronary artery disease)     CAD in native artery 5/5/2016    CAP (community acquired pneumonia) 8/12/2017    Cervical radiculopathy 5/23/2013    Chest pain     Chronic kidney disease     Chronic pain syndrome 1/13/2013    Back, right shoulder, neck: Peripheral neuropathy, spinal stenosis C7-T1, foraminal narrowing C4-5     CKD (chronic kidney disease), stage III 9/12/2012    Diabetes ([address]     about 30 years    Diabetes mellitus     does not check sugar     Diabetes mellitus, type 2 ([address] 9/12/2012    Dysphagia 9/2/2014    Due to esophageal spasm seen on modified barium swallow 2015     Gait disorder 9/12/2012    GERD (gastroesophageal reflux disease)     Heart failure ([address]     HTN     Hypertension 9/12/2012    Orthostatic hypotension due to autonomic neuropathy     Hypothyroid     IBS (irritable bowel syndrome) 9/12/2012    Iron deficiency 9/12/2012    Lumbar disc disease     MCI (mild cognitive impairment) 1/13/2013    Mixed hyperlipidemia 9/12/2012    Orthostatic hypotension 5/5/2016    Pacemaker     SSS (sick sinus syndrome) ([address] 5/5/2016      Past Surgical History:   Procedure Laterality Date    HX APPENDECTOMY      HX CARPAL TUNNEL RELEASE      HX CERVICAL FUSION      HX COLONOSCOPY  Nov 2005    HX PACEMAKER  6/08    HX PACEMAKER  2017    Replacement      Allergies   Allergen Reactions    Tylenol [Acetaminophen] Unknown (comments)     insomnia      No family history on file. Current Facility-Administered Medications   Medication Dose Route Frequency    aspirin delayed-release tablet 81 mg  81 mg Oral DAILY    calcium carbonate (OS-CAN) tablet 500 mg [elemental]  500 mg Oral BID    carvedilol (COREG) tablet 12.5 mg  12.5 mg Oral BID WITH MEALS    furosemide (LASIX) tablet 20 mg  20 mg Oral DAILY    gabapentin (NEURONTIN) capsule 600 mg  600 mg Oral TID    HYDROcodone-acetaminophen (NORCO) 7.5-325 mg per tablet 1 Tab  1 Tab Oral Q6H PRN    levothyroxine (SYNTHROID) tablet 25 mcg  25 mcg Oral ACB    lisinopril (PRINIVIL, ZESTRIL) tablet 10 mg  10 mg Oral DAILY    nitroglycerin (NITROSTAT) tablet 0.4 mg  0.4 mg SubLINGual PRN    pantoprazole (PROTONIX) tablet 40 mg  40 mg Oral ACB    senna-docusate (PERICOLACE) 8.6-50 mg per tablet 1 Tab  1 Tab Oral QHS    traZODone (DESYREL) tablet 50 mg  50 mg Oral QHS    dextrose 5 % - 0.45% NaCl 1,000 mL with potassium chloride 20 mEq infusion   IntraVENous CONTINUOUS    sodium chloride (NS) flush 5-10 mL  5-10 mL IntraVENous Q8H    sodium chloride (NS) flush 5-10 mL  5-10 mL IntraVENous PRN    acetaminophen (TYLENOL) tablet 500 mg  500 mg Oral Q4H PRN    oxyCODONE-acetaminophen (PERCOCET 7.5) 7.5-325 mg per tablet 1 Tab  1 Tab Oral Q4H PRN    morphine injection 2 mg  2 mg IntraVENous Q4H PRN    naloxone (NARCAN) injection 0.4 mg  0.4 mg IntraVENous PRN    ondansetron (ZOFRAN) injection 4 mg  4 mg IntraVENous Q4H PRN    diphenhydrAMINE (BENADRYL) capsule 25 mg  25 mg Oral Q4H PRN    magnesium sulfate 1 g/100 ml IVPB (premix or compounded)  1 g IntraVENous PRN    heparin (porcine) injection 5,000 Units  5,000 Units SubCUTAneous Q8H       Review of Systems   Constitution: Positive for weakness and malaise/fatigue. Negative for chills, fever, weight gain and weight loss. HENT: Negative for ear pain, hearing loss, nosebleeds, sore throat and tinnitus.     Eyes: Negative for blurred vision, vision loss in left eye and vision loss in right eye. Cardiovascular: Negative for chest pain, dyspnea on exertion, leg swelling, near-syncope, orthopnea, palpitations, paroxysmal nocturnal dyspnea and syncope. Respiratory: Negative for cough, hemoptysis, shortness of breath, sputum production and wheezing. Endocrine: Negative for cold intolerance, heat intolerance and polydipsia. Hematologic/Lymphatic: Does not bruise/bleed easily. Skin: Negative for color change and rash. Musculoskeletal: Negative for back pain, joint pain, joint swelling and myalgias. Gastrointestinal: Negative for abdominal pain, constipation, diarrhea, dysphagia, heartburn, hematemesis, melena, nausea and vomiting. Genitourinary: Negative for dysuria, frequency, hematuria and urgency. Neurological: Negative for difficulty with concentration, dizziness, headaches, light-headedness, numbness, paresthesias, seizures and vertigo. Psychiatric/Behavioral: Negative for altered mental status and depression.           Physical Exam  Vitals:    04/26/18 0330 04/26/18 0709 04/26/18 0715 04/26/18 1057   BP: 151/69 176/77  (!) 137/92   Pulse: 75 75  76   Resp: 16 17  17   Temp: 98.2 °F (36.8 °C) 99.1 °F (37.3 °C)  99.7 °F (37.6 °C)   SpO2: 93% 93% 93% 94%   Weight: 47.3 kg (104 lb 3.2 oz)          Physical Exam:  General: Well Developed, Well Nourished, No Acute Distress  HEENT: pupils equal and round, no abnormalities noted  Neck: supple, no JVD  Heart: S1S2 with RRR  Lungs: Clear throughout auscultation bilaterally  Abd: soft, nontender, nondistended, with good bowel sounds  Ext: warm, no edema, calves supple/nontender, pulses 2+ bilaterally  Skin: warm and dry  Psychiatric: Normal mood and affect  Neurologic: Alert and oriented X 3      Cardiographics    Telemetry: paced rhythm     Labs:   Recent Labs      04/26/18   0421  04/25/18   2100  04/23/18   1700   NA   --   137  134*   K   --   4.3  4.7   MG  2.0  2.1   --    BUN   --   17  20   CREA   --   1.26*  1.40*   GLU   -- 122*  144*   WBC   --   6.7  8.2   HGB   --   10.3*  11.4*   HCT   --   30.8*  33.5*   PLT   --   260  310   INR   --    --   1.1       Lab Results   Component Value Date/Time    Cholesterol, total 170 04/12/2018 04:02 PM    HDL Cholesterol 62 04/12/2018 04:02 PM    LDL, calculated 74 04/12/2018 04:02 PM    VLDL, calculated 34 04/12/2018 04:02 PM    Triglyceride 168 (H) 04/12/2018 04:02 PM    CHOL/HDL Ratio 2.6 12/19/2016 08:50 AM       Assessment/Plan:      Principal Problem:    Femoral artery occlusion, left ([address] (4/25/2018)  S/p embolectomy, will start Eliquis BID    Atrial fibrillation  Previously not on anticoagulation due to fall risk but had organized thrombus in left CFA, PFA, SFA, will start Eliquis BID     Debility  PT to see       Thank you very much for this referral. We appreciate the opportunity to participate in this patient's care. We will follow along with above stated plan. Brandee Kern PA-C  Consulting MD: Dr. Harjit Meyer     4/26/2018     12:26 PM    I have personally seen and examined Jennifer Briceno with [facility]. I agree and confirm findings in history, physical exam, and assessment/plan as outlined above with following pertinent additions/exceptions:   Patient is a 80-year-old female who is followed in our office for chronic atrial fibrillation and CHF. Her most recent echocardiogram was February 2017 which showed EF 30-35% with mild to moderate mitral regurgitation. Her most recent pacemaker interrogation shows that she is 99% biventricular paced with chronic/persistent atrial fibrillation. She has been having persistent left leg pain. Initially this was incorrectly diagnosed as sciatica discomfort. She continued to have symptoms with difficulty with ambulation. She ultimately underwent ultrasound which showed showed occlusion of her left common femoral artery.   She underwent left common femoral, profunda femoralis and superficial femoral artery embolectomy. She is currently sedated but will answer questions. She denies any active chest pain or dyspnea. PE: CV: RRR  L[de-identified] CTA bialterally E: No edema. ASS/Plan:  Monitor closely in perioperative setting given her LV dysfunction for CHF. She currently appears to be compensated and is on appropriate CHF regimen. In regards to her atrial fibrillation and recent thromboembolic event, we'll start Elocon was 2.5 mg twice a day. I discussed this with her daughter in detail. She is at increased risk of bleeding given her age, but the risk appeared to be outweighed by the benefits. Given her recent event.       Allyn Hernandez MD

## 2018-04-26 NOTE — PROGRESS NOTES
Patient more alert now. Still appears very drowsy, but able to respond to some questions. Vital signs are stable and patient is in no apparent distress.

## 2018-04-27 PROCEDURE — 97165 OT EVAL LOW COMPLEX 30 MIN: CPT

## 2018-04-27 PROCEDURE — 99221 1ST HOSP IP/OBS SF/LOW 40: CPT | Performed by: PHYSICAL MEDICINE & REHABILITATION

## 2018-04-27 PROCEDURE — 74011250637 HC RX REV CODE- 250/637: Performed by: PHYSICIAN ASSISTANT

## 2018-04-27 PROCEDURE — 74011250637 HC RX REV CODE- 250/637: Performed by: SURGERY

## 2018-04-27 PROCEDURE — 97535 SELF CARE MNGMENT TRAINING: CPT

## 2018-04-27 PROCEDURE — 65660000004 HC RM CVT STEPDOWN

## 2018-04-27 PROCEDURE — 74011250637 HC RX REV CODE- 250/637: Performed by: INTERNAL MEDICINE

## 2018-04-27 PROCEDURE — 97161 PT EVAL LOW COMPLEX 20 MIN: CPT

## 2018-04-27 RX ORDER — LISINOPRIL 5 MG/1
10 TABLET ORAL ONCE
Status: COMPLETED | OUTPATIENT
Start: 2018-04-27 | End: 2018-04-27

## 2018-04-27 RX ORDER — LISINOPRIL 20 MG/1
20 TABLET ORAL DAILY
Status: DISCONTINUED | OUTPATIENT
Start: 2018-04-28 | End: 2018-04-30 | Stop reason: HOSPADM

## 2018-04-27 RX ORDER — TRAMADOL HYDROCHLORIDE 50 MG/1
50 TABLET ORAL
Status: DISCONTINUED | OUTPATIENT
Start: 2018-04-27 | End: 2018-04-28

## 2018-04-27 RX ADMIN — PANTOPRAZOLE SODIUM 40 MG: 40 TABLET, DELAYED RELEASE ORAL at 05:44

## 2018-04-27 RX ADMIN — TRAMADOL HYDROCHLORIDE 50 MG: 50 TABLET, FILM COATED ORAL at 19:39

## 2018-04-27 RX ADMIN — LEVOTHYROXINE SODIUM 25 MCG: 50 TABLET ORAL at 05:44

## 2018-04-27 RX ADMIN — CARVEDILOL 12.5 MG: 12.5 TABLET, FILM COATED ORAL at 10:23

## 2018-04-27 RX ADMIN — LISINOPRIL 10 MG: 5 TABLET ORAL at 10:24

## 2018-04-27 RX ADMIN — Medication 10 ML: at 22:00

## 2018-04-27 RX ADMIN — FUROSEMIDE 20 MG: 20 TABLET ORAL at 10:23

## 2018-04-27 RX ADMIN — LISINOPRIL 10 MG: 5 TABLET ORAL at 17:13

## 2018-04-27 RX ADMIN — HYDROCODONE BITARTRATE AND ACETAMINOPHEN 1 TABLET: 7.5; 325 TABLET ORAL at 01:16

## 2018-04-27 RX ADMIN — STANDARDIZED SENNA CONCENTRATE AND DOCUSATE SODIUM 1 TABLET: 8.6; 5 TABLET, FILM COATED ORAL at 21:44

## 2018-04-27 RX ADMIN — ASPIRIN 81 MG: 81 TABLET, COATED ORAL at 10:23

## 2018-04-27 RX ADMIN — GABAPENTIN 600 MG: 300 CAPSULE ORAL at 17:13

## 2018-04-27 RX ADMIN — GABAPENTIN 600 MG: 300 CAPSULE ORAL at 10:24

## 2018-04-27 RX ADMIN — GABAPENTIN 600 MG: 300 CAPSULE ORAL at 21:44

## 2018-04-27 RX ADMIN — Medication 500 MG: at 10:23

## 2018-04-27 RX ADMIN — APIXABAN 2.5 MG: 2.5 TABLET, FILM COATED ORAL at 21:44

## 2018-04-27 RX ADMIN — Medication 10 ML: at 17:13

## 2018-04-27 RX ADMIN — APIXABAN 2.5 MG: 2.5 TABLET, FILM COATED ORAL at 10:23

## 2018-04-27 RX ADMIN — Medication 500 MG: at 17:13

## 2018-04-27 RX ADMIN — ACETAMINOPHEN 500 MG: 500 TABLET, FILM COATED ORAL at 17:12

## 2018-04-27 RX ADMIN — TRAZODONE HYDROCHLORIDE 50 MG: 50 TABLET ORAL at 21:44

## 2018-04-27 RX ADMIN — Medication 10 ML: at 05:41

## 2018-04-27 RX ADMIN — CARVEDILOL 12.5 MG: 12.5 TABLET, FILM COATED ORAL at 17:13

## 2018-04-27 NOTE — PROGRESS NOTES
Transient drop in SBP to 130s after morning antihypertensives, but SBP trends today mostly 160s-170s. Dr. Tessy Holden notified. Orders for 10 mg PO lisinopril now and to increase daily dose PO lisinopril to 20 mg, see MAR.

## 2018-04-27 NOTE — INTERDISCIPLINARY ROUNDS
Interdisciplinary team rounds were held 4/27/2018 with the following team members:Care Management, Nursing and Clinical Coordinator and the patient and child(malia). Trying to get placement on 9th floor for rehab. Plan of care discussed. See clinical pathway and/or care plan for interventions and desired outcomes.

## 2018-04-27 NOTE — PROGRESS NOTES
POD #2  Awake and alert, eating breakfast  Blood pressure 178/74, pulse 75, temperature 98.3 °F (36.8 °C), resp. rate 16, weight 103 lb 12.8 oz (47.1 kg), SpO2 94 %, not currently breastfeeding. Incision looks normal  L foot is warm, no ischemic changes, +motor function  No tenderness    Appreciate cardiology assistance and input    A/P: PT and Physiatry consults. Likely needs rehab prior to home DC.

## 2018-04-27 NOTE — PROGRESS NOTES
Called to patient's room by primary RN with concern for erythema to left 1st toe, patient's complaints of discomfort there this AM. Patient sitting up finishing lunch, visiting with family, reports she has had a shower and has less foot pain now. AVSS, elevated systolic  Much more alert and appropriately interactive today  Extremities: warm, normal ROM; left 1st toe and forefoot slightly edematous and more erythematous then right, mild tenderness to touch, brisk capillary refill; left leg anterior shin with large patch of various-sized pink-red macules from PTA stable  Pulses: right PT and DP palpable, left pulses nonpalpable but signals easily obtained with Doppler    Monitor foot and VS  Elevate L LE when not ambulating      Doug Julio PA-C  Physician Assistant with Vascular Surgery Shahnaz Perea MD / Dougie Alegria.  Fabi Watson MD / Minnie Toth MD

## 2018-04-27 NOTE — CONSULTS
PM&R Rehab Consult    Subjective:     Date of Consultation:  April 27, 2018    Referring Physician: Trever Harris  PCP ; Dr Tracy Joseph  Patient is a 80 y.o. female who is being seen for rehab recommendations s/p vascular surgery due to LLE arterial disease and claudication. Principal Problem:    Femoral artery occlusion, left ([facility] [address] (4/25/2018)    Active Problems:    Atrial fibrillation ([facility] [address] (8/12/2017)      Chronic systolic congestive heart failure ([facility] [address] (4/26/2018)    HPI; Ms. Gerry Ariza is a pleasant, functionally mod I, 79yo female with significant comorbidities including a fib, cervical myelopathy, DM, CAD, neuropathy, SSS s/p pacer, CHF, CKD and dysphagia/wt loss who had presented to the ED in the beginning of April with LLE pain. She was diagnosed with sciatica and placed on a steroid taper. She f/u with her PCP and was on neurontin for neuropathic pain due to sciatica. All with no relief. In fact, she only worsened. She could not ambulate and had a few falls. She presented to the ED once again on 4/24 from an Urgent Care facility and was found to have an occlusion of the left femoral artery on US. She was taken to the operating room on 4/25 and had left femoral artery embolectomy. The organized thrombus was extracted from the left CFA/PFA/SFA with excellent inflow and back bleeding after the embolectomy. Postop she has been very lethargic and minimally responsive until this a.m. It was felt to be due to pain meds and anesthesia. The dietician has evaluated the pt due to wt loss, was 100lb a year ago and 88lbs now. She lost her  of 74years last month. This may have played a role as well as recent pain and inactivity due to that. She is hypertensive and hypoxic without 3-5L O2 supplement at this time. Her hbg dropped a gram after surgery and is being monitored. She also has noted Hyponatremia with a Na of 133-134.  Creat is 1.40, A lipid profile was performed that shows a triglyceride of 168, chol 170, HDL 62 and LDL of 74. The pt was seen by Cardiology due to chronic a fib. She has not been on anticoagulation due to fall risk. It was decided to start her on Eliquis 2.5 bid. PT and OT are pending    Past Medical History:   Diagnosis Date    A fib     Abnormal loss of weight     Acquired hypothyroidism 9/12/2012    Acute respiratory failure with hypoxemia ([address] 8/12/2017    Anemia     Anorexia 9/2/2014    Arthritis associated with another disorder     Atrial fibrillation ([address] 9/12/2012    Paroxysmal.  Coumadin contraindicated due to falls      Autonomic neuropathy 1/13/2013    CAD (coronary artery disease)     CAD in native artery 5/5/2016    CAP (community acquired pneumonia) 8/12/2017    Cervical radiculopathy 5/23/2013    Chest pain     Chronic kidney disease     Chronic pain syndrome 1/13/2013    Back, right shoulder, neck: Peripheral neuropathy, spinal stenosis C7-T1, foraminal narrowing C4-5     CKD (chronic kidney disease), stage III 9/12/2012    Diabetes ([address]     about 30 years    Diabetes mellitus     does not check sugar     Diabetes mellitus, type 2 ([address] 9/12/2012    Dysphagia 9/2/2014    Due to esophageal spasm seen on modified barium swallow 2015     Gait disorder 9/12/2012    GERD (gastroesophageal reflux disease)     Heart failure ([address]     HTN     Hypertension 9/12/2012    Orthostatic hypotension due to autonomic neuropathy     Hypothyroid     IBS (irritable bowel syndrome) 9/12/2012    Iron deficiency 9/12/2012    Lumbar disc disease     MCI (mild cognitive impairment) 1/13/2013    Mixed hyperlipidemia 9/12/2012    Orthostatic hypotension 5/5/2016    Pacemaker     SSS (sick sinus syndrome) ([address] 5/5/2016      No family history on file. Social History   Substance Use Topics    Smoking status: Never Smoker    Smokeless tobacco: Never Used    Alcohol use No   lives in a home with one tiny step to enter. She ambulates with a RW , over the past 2 mos.  She is independent in ADLs, has never driven. Her  of 74yrs passed away last month. She has 3 supportive children who switch off on staying with her at  [facility] and has a hired caregiver to bring her places and does light housework. Her son, Waldemar Orona, reports pt is extremely active in that, they were actually trying to get her to stop doing so much. The recent pain has slowed her down significantly. Past Surgical History:   Procedure Laterality Date    HX APPENDECTOMY      HX CARPAL TUNNEL RELEASE      HX CERVICAL FUSION      HX COLONOSCOPY  Nov 2005    HX PACEMAKER  6/08    HX PACEMAKER  2017    Replacement       Prior to Admission medications    Medication Sig Start Date End Date Taking? Authorizing Provider   HYDROcodone-acetaminophen (NORCO) 7.5-325 mg per tablet Take 1 Tab by mouth every six (6) hours as needed. Max Daily Amount: 4 Tabs. Indications: Pain 4/15/18  Yes Sonia Fields MD   gabapentin (NEURONTIN) 600 mg tablet Take 1 Tab by mouth three (3) times daily. 4/12/18  Yes Sonia Pennington MD   furosemide (LASIX) 20 mg tablet Take 1 Tab by mouth daily. 1/25/18  Yes Sonia Fields MD   carvedilol (COREG) 12.5 mg tablet TAKE 1 TABLET BY MOUTH TWICE DAILY WITH MEALS 1/25/18  Yes Sonia Fields MD   lisinopril (PRINIVIL, ZESTRIL) 10 mg tablet Take 1 Tab by mouth daily. 1/25/18  Yes Sonia Pennington MD   levothyroxine (SYNTHROID) 25 mcg tablet Take 1 Tab by mouth Daily (before breakfast). 1/25/18  Yes Sonia Pennington MD   omeprazole (PRILOSEC) 20 mg capsule Take 1 Cap by mouth two (2) times a day. Patient taking differently: Take 20 mg by mouth daily. 1/25/18  Yes Sonia Fields MD   senna-docusate (PERICOLACE) 8.6-50 mg per tablet Take 1 Tab by mouth nightly. 5/17/17  Yes Sonia Fields MD   aspirin delayed-release 81 mg tablet Take  by mouth daily. Yes Historical Provider   calcium carbonate (OS-) 500 mg (1,250 mg) tablet take 1 Tab by mouth two (2) times a day.     Yes MD Jm Hooker (DESYREL) 50 mg tablet Take 1 Tab by mouth nightly. Patient taking differently: Take 50 mg by mouth as needed. 18 Jennifer Alvarez MD   nitroglycerin (NITROSTAT) 0.4 mg SL tablet 1 Tab by SubLINGual route every five (5) minutes as needed. Indications: ANGINA 14   Seng Crow MD     Allergies   Allergen Reactions    Tylenol [Acetaminophen] Unknown (comments)     insomnia        Review of Systems: reports wt loss, anorexia, weakness. She denies headaches, cp, sob, nausea, diarrhea or constipation; no f/c   + chronic back pain and neck pain. Occasional tingling in hands and feet. Urge incontinence    Objective:     Vitals:  Blood pressure 178/74, pulse 75, temperature 98.3 °F (36.8 °C), resp. rate 16, weight 103 lb 12.8 oz (47.1 kg), SpO2 94 %, not currently breastfeeding. Temp (24hrs), Av.6 °F (37 °C), Min:97.7 °F (36.5 °C), Max:99.3 °F (37.4 °C)      Intake and Output:   1901 -  0700  In: 1160 [P.O.:360; I.V.:800]  Out: 8844 [Urine:1325]    Physical Exam:  General:  Alert, oriented and mood affect appropriate; [place]; frail appearing   Lungs:   Clear to auscultation bilaterally. Heart:  Regular rate and rhythm, S1, S2 stable, no murmur, click, rub or gallop. Abdomen:   Soft, non-tender. Bowel sounds present. No masses,  No organomegaly. Genitourinary: Continent and PVR is stable   Neuro Muscular: Generalized prox>distal weakness. Dec light touch distally in LEs, poor proprioception,   Skin:  No rashes, lesions, or signs/symptoms or infection.  Left incision c/d/i; feet warm, DP 2+ bilaterally       Labs/Studies:  Recent Results (from the past 72 hour(s))   GLUCOSE, POC    Collection Time: 18  2:11 PM   Result Value Ref Range    Glucose (POC) 100 65 - 100 mg/dL   TYPE & SCREEN    Collection Time: 18  4:09 PM   Result Value Ref Range    Crossmatch Expiration 2018     ABO/Rh(D) Maxcine Remington POSITIVE     Antibody screen NEG    GLUCOSE, POC    Collection Time: 18  4:15 PM   Result Value Ref Range    Glucose (POC) 130 (H) 65 - 187 mg/dL   METABOLIC PANEL, BASIC    Collection Time: 04/25/18  9:00 PM   Result Value Ref Range    Sodium 137 136 - 145 mmol/L    Potassium 4.3 3.5 - 5.1 mmol/L    Chloride 99 98 - 107 mmol/L    CO2 31 21 - 32 mmol/L    Anion gap 7 7 - 16 mmol/L    Glucose 122 (H) 65 - 100 mg/dL    BUN 17 8 - 23 MG/DL    Creatinine 1.26 (H) 0.6 - 1.0 MG/DL    GFR est AA 51 (L) >60 ml/min/1.73m2    GFR est non-AA 42 (L) >60 ml/min/1.73m2    Calcium 9.0 8.3 - 10.4 MG/DL   MAGNESIUM    Collection Time: 04/25/18  9:00 PM   Result Value Ref Range    Magnesium 2.1 1.8 - 2.4 mg/dL   CBC W/O DIFF    Collection Time: 04/25/18  9:00 PM   Result Value Ref Range    WBC 6.7 4.3 - 11.1 K/uL    RBC 3.34 (L) 4.05 - 5.25 M/uL    HGB 10.3 (L) 11.7 - 15.4 g/dL    HCT 30.8 (L) 35.8 - 46.3 %    MCV 92.2 79.6 - 97.8 FL    MCH 30.8 26.1 - 32.9 PG    MCHC 33.4 31.4 - 35.0 g/dL    RDW 12.5 11.9 - 14.6 %    PLATELET 025 499 - 085 K/uL    MPV 10.0 (L) 10.8 - 14.1 FL   MAGNESIUM    Collection Time: 04/26/18  4:21 AM   Result Value Ref Range    Magnesium 2.0 1.8 - 2.4 mg/dL   GLUCOSE, POC    Collection Time: 04/26/18  1:02 PM   Result Value Ref Range    Glucose (POC) 199 (H) 65 - 100 mg/dL         Functional Assessment:  Functional Assessment  Fall in Past 12 Months: Yes  Fall With Injury: Yes (Describe)  Decline in Gait/Transfer/Balance: Yes (comment)  Decline in Capacity to Feed/Dress/Bathe: No  Developmental Delay: No  Chewing/Swallowing Problems: No  Difficulty with Secretions: No  Speech Slurred/Thick/Garbled: No         Ambulation:  Activity and Safety  Activity Level: Bed Rest  Activity: Family/Visitors present, In bed  Repositioned: Supine (back), Heels off loaded  Patient Turned: Supine  Head of Bed Elevated: HOB less then 20  Safety Measures: Bed/Chair alarm on, Bed/Chair-Wheels locked, Bed in low position, Call light within reach, Family at bedside, Gripper socks     Impression/Plan:     Principal Problem:    Femoral artery occlusion, left (HCC) (4/25/2018)    Active Problems:    Atrial fibrillation (HCC) (8/12/2017)      Chronic systolic congestive heart failure ([facility] [address] (4/26/2018)     Functional/Physical Debility s/p femoral artery occlusion with subsequent embolectomy     Recommendations: Continue Acute Rehab Program  Coordination of rehab/medical care  Counseling of PM & R care issues management  Monitoring and management of medical conditions per plan of care/orders   -PT/OT pending. A month ago pt was independent. Most recently hindered by vascular claudication with severe pain and falls. She has multiple medical comorbidities and requires ongoing mgt of diabetes, htn, post op anemia, a fib with institution anticoagulation. She has a supportive family. She would benefit from ST as well due to dysphagia and wt loss. I have no doubt that she can benefit from, tolerate and actively particpate, in [facility] level of care with 3hrs of therapy daily. -will plan for admission Monday. Need therapy evals to determine goals.  Family and pt in agreement  Discussion with Family/Caregiver/Staff  Reviewed Therapies/Labs/Meds/Records  Thank you   Signed By:  Ancil Halsted, MD     April 27, 2018

## 2018-04-27 NOTE — PROGRESS NOTES
Problem: Self Care Deficits Care Plan (Adult)  Goal: *Acute Goals and Plan of Care (Insert Text)  1. Patient will complete lower body bathing and dressing with MOD I and adaptive equipment as needed. 2. Patient will complete toileting with MOD I.   3. Patient will tolerate 23 minutes of OT treatment with less than 4 rest breaks to increase activity tolerance for ADLs. 4. Patient will complete functional transfers with MOD I and adaptive equipment as needed. Timeframe: 7 visits     Comments:     OCCUPATIONAL THERAPY: Initial Assessment, Daily Note, Treatment Day: Day of Assessment and 1st and PM 4/27/2018  INPATIENT: Hospital Day: 3  [place] / [place] / Product Type: Medicare /      NAME/AGE/GENDER: Paramjit Wisdom is a 80 y.o. 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 Lucio Clark presents to hospital for above for above. Pt lives alone in a one-level home, where she is typically independent to mod I with ADLs. Pt uses rolling walker at baseline for functional mobility; pt endorses 0 falls in the last 6 months. Today, pt is found supine in bed upon arrival, AOx3, and agreeable to OT evaluation. Per BUE screen, AROM, strength, and coordination are generally decreased, but functional. Pt able to move supine to sit with min A, demonstrating intact sitting balance at EOB. Pt completes STS with CGA and completes functional transfer to toilet with min A/HHA, as pt declined use of AD in bathroom, demonstrating fair balance in standing.   Pt complete hand hygiene while standing at sink with CGA as pt is swaying L <> R secondary to reports of fatigue. Pt left supine in bed with possessions in reach and all needs met. Ms. Gabriel Cuenca presents with functional limitations listed below and appears to be functioning below baseline. They will benefit from continued skilled OT services to maximize safety and independence with ADLs. Will follow during acute stay. This section established at most recent assessment   PROBLEM LIST (Impairments causing functional limitations):  1. Decreased Strength  2. Decreased ADL/Functional Activities  3. Decreased Transfer Abilities  4. Decreased Ambulation Ability/Technique  5. Decreased Balance  6. Increased Pain  7. Decreased Activity Tolerance  8. Decreased Work Simplification/Energy Conservation Techniques  9. Edema/Girth   INTERVENTIONS PLANNED: (Benefits and precautions of occupational therapy have been discussed with the patient.)  1. Activities of daily living training  2. Adaptive equipment training  3. Balance training  4. Clothing management  5. Donning&doffing training  6. Group therapy  7. Therapeutic activity  8. Therapeutic exercise     TREATMENT PLAN: Frequency/Duration: Follow patient 3x/week to address above goals. Rehabilitation Potential For Stated Goals: Good     RECOMMENDED REHABILITATION/EQUIPMENT: (at time of discharge pending progress): Due to the probability of continued deficits (see above) this patient will likely need continued skilled occupational therapy after discharge. Equipment:    None at this time              OCCUPATIONAL PROFILE AND HISTORY:   History of Present Injury/Illness (Reason for Referral):  See H&P  Past Medical History/Comorbidities:   Ms. Gabriel Cuenca  has a past medical history of A fib; Abnormal loss of weight; Acquired hypothyroidism (9/12/2012); Acute respiratory failure with hypoxemia ([facility] [address] (8/12/2017); Anemia; Anorexia (9/2/2014); Arthritis associated with another disorder; Atrial fibrillation ([address] (9/12/2012);  Autonomic neuropathy (1/13/2013); CAD (coronary artery disease); CAD in native artery (5/5/2016); CAP (community acquired pneumonia) (8/12/2017); Cervical radiculopathy (5/23/2013); Chest pain; Chronic kidney disease; Chronic pain syndrome (1/13/2013); CKD (chronic kidney disease), stage III (9/12/2012); Diabetes ([facility] [address] Diabetes mellitus; Diabetes mellitus, type 2 ([facility] [address] (9/12/2012); Dysphagia (9/2/2014); Gait disorder (9/12/2012); GERD (gastroesophageal reflux disease); Heart failure ([facility] [address] HTN; Hypertension (9/12/2012); Hypothyroid; IBS (irritable bowel syndrome) (9/12/2012); Iron deficiency (9/12/2012); Lumbar disc disease; MCI (mild cognitive impairment) (1/13/2013); Mixed hyperlipidemia (9/12/2012); Orthostatic hypotension (5/5/2016); Pacemaker; and SSS (sick sinus syndrome) ([facility] [address] (5/5/2016). She also has no past medical history of Difficult intubation; Malignant hyperthermia due to anesthesia; Nausea & vomiting; or Pseudocholinesterase deficiency. Ms. Toni Mosley  has a past surgical history that includes hx cervical fusion; hx appendectomy; hx carpal tunnel release; hx colonoscopy (Nov 2005); hx pacemaker (6/08); and hx pacemaker (2017). Social History/Living Environment:   Home Environment: Private residence  # Steps to Enter: 0  One/Two Story Residence: One story  Living Alone: Yes  Support Systems: Child(malia), Family member(s)  Patient Expects to be Discharged to[de-identified] Rehabilitation facility  Current DME Used/Available at Home: Walker, rollator, Grab bars  Tub or Shower Type: Shower  Prior Level of Function/Work/Activity:  Independent to [employer] I with ADLs  Personal Factors:          Sex:  female        Age:  80 y.o.    Number of Personal Factors/Comorbidities that affect the Plan of Care: Expanded review of therapy/medical records (1-2):  MODERATE COMPLEXITY   ASSESSMENT OF OCCUPATIONAL PERFORMANCE[de-identified]   Activities of Daily Living:           Basic ADLs (From Assessment) Complex ADLs (From Assessment)   Feeding: Setup  Oral Facial Hygiene/Grooming: Setup  Bathing: Minimum assistance  Upper Body Dressing: Setup  Lower Body Dressing: Minimum assistance  Toileting: Minimum assistance     Grooming/Bathing/Dressing Activities of Daily Living     Cognitive Retraining  Safety/Judgement: Awareness of environment; Fall prevention;Decreased awareness of need for assistance                 Functional Transfers  Toilet Transfer : Minimum assistance     Bed/Mat Mobility  Rolling: Contact guard assistance  Supine to Sit: Minimum assistance  Sit to Stand: Contact guard assistance  Scooting: Contact guard assistance       Most Recent Physical Functioning:   Gross Assessment:  AROM: Generally decreased, functional  Strength: Generally decreased, functional               Posture:     Balance:  Sitting: Intact  Standing: Impaired  Standing - Static: Fair  Standing - Dynamic : Fair Bed Mobility:  Rolling: Contact guard assistance  Supine to Sit: Minimum assistance  Scooting: Contact guard assistance  Wheelchair Mobility:     Transfers:  Sit to Stand: Contact guard assistance  Stand to Sit: Contact guard assistance                Patient Vitals for the past 6 hrs:   BP BP Patient Position SpO2 Pulse   18 1130 176/81 At rest 92 % 75   18 1220 135/63 - - 75       Mental Status  Neurologic State: Alert  Orientation Level: Oriented X4  Cognition: Follows commands  Perception: Appears intact  Perseveration: No perseveration noted  Safety/Judgement: Awareness of environment, Fall prevention, Decreased awareness of need for assistance                          Physical Skills Involved:  1. Balance  2. Strength  3. Activity Tolerance  4. Pain (acute)  5. Edema Cognitive Skills Affected (resulting in the inability to perform in a timely and safe manner):  1. n/a Psychosocial Skills Affected:  1.  Habits/Routines   Number of elements that affect the Plan of Care: 5+:  HIGH COMPLEXITY   CLINICAL DECISION MAKIN [facility] [address] [id number] AM-PAC 6 Clicks   Daily Activity Inpatient Short Form  How much help from another person does the patient currently need. .. Total A Lot A Little None   1. Putting on and taking off regular lower body clothing? [] 1   [] 2   [x] 3   [] 4   2. Bathing (including washing, rinsing, drying)? [] 1   [] 2   [x] 3   [] 4   3. Toileting, which includes using toilet, bedpan or urinal?   [] 1   [] 2   [x] 3   [] 4   4. Putting on and taking off regular upper body clothing? [] 1   [] 2   [] 3   [x] 4   5. Taking care of personal grooming such as brushing teeth? [] 1   [] 2   [] 3   [x] 4   6. Eating meals? [] 1   [] 2   [] 3   [x] 4   © 2007, Trustees of [facility] [employer], under license to [employer]. All rights reserved      Score:  Initial: 21 Most Recent: X (Date: -- )    Interpretation of Tool:  Represents activities that are increasingly more difficult (i.e. Bed mobility, Transfers, Gait). Score 24 23 22-20 19-15 14-10 9-7 6     Modifier CH CI CJ CK CL CM CN      ? Self Care:     - CURRENT STATUS: CJ - 20%-39% impaired, limited or restricted    - GOAL STATUS: CI - 1%-19% impaired, limited or restricted    - D/C STATUS:  ---------------To be determined---------------  [place] / [place] / Product Type: Medicare /      Medical Necessity:     · Patient is expected to demonstrate progress in strength, balance and functional technique to increase independence with ADLs. Reason for Services/Other Comments:  · Patient continues to require skilled intervention due to medical complications.    Use of outcome tool(s) and clinical judgement create a POC that gives a: LOW COMPLEXITY         TREATMENT:   (In addition to Assessment/Re-Assessment sessions the following treatments were rendered)     Pre-treatment Symptoms/Complaints:    Pain: Initial:     0/10 Post Session:  same     Self Care: (10 minutes): Procedure(s) (per grid) utilized to improve and/or restore self-care/home management as related to toileting and grooming. Required minimal verbal and manual cueing to facilitate activities of daily living skills. Assessment/Reassessment only, no treatment provided today    Assessment/Reassessment only, no treatment provided today    Braces/Orthotics/Lines/Etc:   · O2 Device: Room air  Treatment/Session Assessment:    · Response to Treatment:  Tolerated well w/o complications or complaints   · Interdisciplinary Collaboration:   o Physical Therapist  o Occupational Therapist  o Registered Nurse  · After treatment position/precautions:   o Supine in bed  o Bed/Chair-wheels locked  o Bed in low position  o Caregiver at bedside  o Call light within reach  o RN notified   · Compliance with Program/Exercises: compliant all of the time. · Recommendations/Intent for next treatment session: \"Next visit will focus on advancements to more challenging activities and reduction in assistance provided\".   Total Treatment Duration:  OT Patient Time In/Time Out  Time In: 1445  Time Out: 125 [facility]

## 2018-04-27 NOTE — PHYSICIAN ADVISORY
Letter of Determination: Inpatient Status Appropriate    This patient was originally hospitalized as Inpatient Status on 4/25/2018 for scheduled left common femoral artery embolectomy. This patient is appropriate for Inpatient Admission in accordance with CMS regulation Section 43 .3. Specifically, patient's stay is expected to be more than Two Midnights and was medically necessary. The patient's stay was medically necessary based on extreme advanced age, and ultrasound doppler demonstrating left common femoral artery occlusion, and history of diabetes mellitus type 2, chronic kidney disease stage 3, coronary artery disesae, atrial fibrillation, and pacemaker placement. Consistent with CMS guidelines, patient meets for inpatient status. It is our recommendation that this patient's hospitalization status should be INPATIENT status.      The final decision regarding the patient's hospitalization status depends on the attending physician's judgement.     Charlie Estrada MD, PJ,   Physician Rodney Yanes.

## 2018-04-27 NOTE — PROGRESS NOTES
Problem: Mobility Impaired (Adult and Pediatric)  Goal: *Acute Goals and Plan of Care (Insert Text)  STG:  (1.)Ms. Abbey Swift will move from supine to sit and sit to supine , scoot up and down and roll side to side with STAND BY ASSIST within 3 treatment day(s). (2.)Ms. Abbey Swift will transfer from bed to chair and chair to bed with STAND BY ASSIST using the least restrictive device within 3 treatment day(s). (3.)Ms. Abbey Swift will ambulate with STAND BY ASSIST for 100 feet with the least restrictive device within 3 treatment day(s). LTG:  (1.)Ms. Abbey Swift will move from supine to sit and sit to supine , scoot up and down and roll side to side in bed with MODIFIED INDEPENDENCE within 7 treatment day(s). (2.)Ms. Abbey Swift will transfer from bed to chair and chair to bed with MODIFIED INDEPENDENCE using the least restrictive device within 7 treatment day(s). (3.)Ms. Abbey Swift will ambulate with MODIFIED INDEPENDENCE for 250+ feet with the least restrictive device within 7 treatment day(s). ________________________________________________________________________________________________      PHYSICAL THERAPY: Initial Assessment, Treatment Day: Day of Assessment, PM 4/27/2018  INPATIENT: Hospital Day: 3  [place] / Product Type: Medicare /      NAME/AGE/GENDER: Rahat Baez is a 80 y.o. 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 Abbey Swift is pleasant 80 y.o. Female s/p Left femoral artery embolectomy  PT HAS PACEMAKER (Left) . Pt is supine in bed with daughter at bedside upon contact, and agreeable to PT evaluation. Pt is A&O x 4, and reports no pain currently, but states her L foot is swollen and feels a \"burning\" sensation when something touches it. Daughter says nursing is aware, and the NP came to assess her foot already earlier. Pt states she lives by herself in single story home with no steps to enter, and a walk in shower with grab bars and a seat. Pt states she uses a rollator for household and community distances, and is independent with ADLs. Pt states she has had many falls in the last 6 months, and is unable to explain what causes her to fall. Pt transferred from supine to sitting EOB with CGA-Manan, requiring additional time. Pt performed STS with CGA, pulling on walker to stand. Pt ambulated 60ft with CGA and RW, requiring occasional verbal cuing for environmental awareness, before returning to bedside chair. Pt left sitting upright in chair with all needs met and within reach with daughter at bedside. Educated pt to call for assistance before returning to bed. Pt verbalized understanding. Pt will benefit from skilled therapy for duration of hospital stay to address decreased activity tolerance and functional mobility. Pt may benefit from rehab upon discharge from hospital.    This section established at most recent assessment   PROBLEM LIST (Impairments causing functional limitations):  1. Decreased Strength  2. Decreased ADL/Functional Activities  3. Decreased Transfer Abilities  4. Decreased Ambulation Ability/Technique  5. Decreased Balance  6. Increased Pain  7. Decreased Activity Tolerance  8. Decreased Pacing Skills  9. Increased Fatigue  10. Decreased [place] with Home Exercise Program   INTERVENTIONS PLANNED: (Benefits and precautions of physical therapy have been discussed with the patient.)  1. Balance Exercise  2. Bed Mobility  3. Cold  4. Family Education  5. Gait Training  6.  Home Exercise Program (HEP)  7. Manual Therapy  8. Neuromuscular Re-education/Strengthening  9. Range of Motion (ROM)  10. Therapeutic Activites  11. Therapeutic Exercise/Strengthening  12. Transfer Training  13. Group Therapy     TREATMENT PLAN: Frequency/Duration: daily for duration of hospital stay  Rehabilitation Potential For Stated Goals: Good     RECOMMENDED REHABILITATION/EQUIPMENT: (at time of discharge pending progress): Due to the probability of continued deficits (see above) this patient will likely need continued skilled physical therapy after discharge. Equipment:    None at this time              HISTORY:   History of Present Injury/Illness (Reason for Referral):  Procedure(s) (LRB):  Left femoral artery embolectomy  PT HAS PACEMAKER (Left)  Past Medical History/Comorbidities:   Ms. Stew Mao  has a past medical history of A fib; Abnormal loss of weight; Acquired hypothyroidism (9/12/2012); Acute respiratory failure with hypoxemia ([facility] [address] (8/12/2017); Anemia; Anorexia (9/2/2014); Arthritis associated with another disorder; Atrial fibrillation ([address] (9/12/2012); Autonomic neuropathy (1/13/2013); CAD (coronary artery disease); CAD in native artery (5/5/2016); CAP (community acquired pneumonia) (8/12/2017); Cervical radiculopathy (5/23/2013); Chest pain; Chronic kidney disease; Chronic pain syndrome (1/13/2013); CKD (chronic kidney disease), stage III (9/12/2012); Diabetes ([address] Diabetes mellitus; Diabetes mellitus, type 2 ([address] (9/12/2012); Dysphagia (9/2/2014); Gait disorder (9/12/2012); GERD (gastroesophageal reflux disease); Heart failure ([address] HTN; Hypertension (9/12/2012); Hypothyroid; IBS (irritable bowel syndrome) (9/12/2012); Iron deficiency (9/12/2012); Lumbar disc disease; MCI (mild cognitive impairment) (1/13/2013); Mixed hyperlipidemia (9/12/2012); Orthostatic hypotension (5/5/2016); Pacemaker; and SSS (sick sinus syndrome) ([address] (5/5/2016).  She also has no past medical history of Difficult intubation; Malignant hyperthermia due to anesthesia; Nausea & vomiting; or Pseudocholinesterase deficiency. Ms. Allie Crawford  has a past surgical history that includes hx cervical fusion; hx appendectomy; hx carpal tunnel release; hx colonoscopy (Nov 2005); hx pacemaker (6/08); and hx pacemaker (2017). Social History/Living Environment:   Home Environment: Private residence  # Steps to Enter: 0  One/Two Story Residence: One story  Living Alone: Yes  Support Systems: Child(malia), Family member(s)  Patient Expects to be Discharged to[de-identified] Rehabilitation facility  Current DME Used/Available at Home: Oosrio Dynes, rollator, Grab bars  Tub or Shower Type: Shower  Prior Level of Function/Work/Activity:  Independent, living by herself, does not drive, frequent falls, rollator for ambulation   Number of Personal Factors/Comorbidities that affect the Plan of Care: 3+: HIGH COMPLEXITY   EXAMINATION:   Most Recent Physical Functioning:   Gross Assessment:  AROM: Generally decreased, functional  Strength: Generally decreased, functional  Coordination: Generally decreased, functional               Posture:     Balance:  Sitting: Intact; Without support  Standing: Impaired  Standing - Static: Fair  Standing - Dynamic : Fair Bed Mobility:  Rolling: Contact guard assistance  Supine to Sit: Minimum assistance  Scooting: Contact guard assistance  Wheelchair Mobility:     Transfers:  Sit to Stand: Contact guard assistance  Stand to Sit: Contact guard assistance  Gait:     Base of Support: Narrowed  Speed/Chandni: Shuffled; Slow  Step Length: Left shortened;Right shortened  Gait Abnormalities: Decreased step clearance;Shuffling gait  Distance (ft): 60 Feet (ft)  Assistive Device: Walker, rolling;Gait belt  Ambulation - Level of Assistance: Contact guard assistance      Body Structures Involved:  1. Heart  2. Lungs  3. Bones  4. Joints  5. Muscles  6. Ligaments Body Functions Affected:  1. Sensory/Pain  2. Cardio  3. Respiratory  4. Neuromusculoskeletal  5.  Movement Related Activities and Participation Affected:  1. Mobility  2. Self Care  3. Domestic Life  4. Interpersonal Interactions and Relationships  5. Community, Social and [place]   Number of elements that affect the Plan of Care: 4+: HIGH COMPLEXITY   CLINICAL PRESENTATION:   Presentation: Stable and uncomplicated: LOW COMPLEXITY   CLINICAL DECISION MAKIN [facility] Mobility Inpatient Short Form  How much difficulty does the patient currently have. .. Unable A Lot A Little None   1. Turning over in bed (including adjusting bedclothes, sheets and blankets)? [] 1   [] 2   [x] 3   [] 4   2. Sitting down on and standing up from a chair with arms ( e.g., wheelchair, bedside commode, etc.)   [] 1   [] 2   [x] 3   [] 4   3. Moving from lying on back to sitting on the side of the bed? [] 1   [] 2   [x] 3   [] 4   How much help from another person does the patient currently need. .. Total A Lot A Little None   4. Moving to and from a bed to a chair (including a wheelchair)? [] 1   [] 2   [x] 3   [] 4   5. Need to walk in hospital room? [] 1   [] 2   [x] 3   [] 4   6. Climbing 3-5 steps with a railing? [x] 1   [] 2   [] 3   [] 4   © , Trustees of [address] [place], [place] [address], under license to [employer] All rights reserved      Score:  Initial: 16 Most Recent: X (Date: -- )    Interpretation of Tool:  Represents activities that are increasingly more difficult (i.e. Bed mobility, Transfers, Gait). Score 24 23 22-20 19-15 14-10 9-7 6     Modifier CH CI CJ CK CL CM CN      ?  Mobility - Walking and Moving Around:     - CURRENT STATUS: CK - 40%-59% impaired, limited or restricted    - GOAL STATUS: CJ - 20%-39% impaired, limited or restricted    - D/C STATUS:  ---------------To be determined---------------  [place] / [place] / Product Type: Medicare /      Medical Necessity:     · Patient demonstrates good rehab potential due to higher previous functional level. Reason for Services/Other Comments:  · Patient continues to require skilled intervention due to impaired functional mobility and activity tolerance. Use of outcome tool(s) and clinical judgement create a POC that gives a: Clear prediction of patient's progress: LOW COMPLEXITY            TREATMENT:   (In addition to Assessment/Re-Assessment sessions the following treatments were rendered)   Pre-treatment Symptoms/Complaints:  \"L foot is swollen, and feels like burning when someone touches it\"  Pain: Initial:   Pain Intensity 1: 0  Post Session:  Increased with mobility, 0/10 after     Assessment/Reassessment only, no treatment provided today    Braces/Orthotics/Lines/Etc:   · O2 Device: Room air  Treatment/Session Assessment:    · Response to Treatment:  Ambulated 60ft with RW and CGA  · Interdisciplinary Collaboration:   o Physical Therapist  o Registered Nurse  o SPT  · After treatment position/precautions:   o Up in chair  o Bed/Chair-wheels locked  o Bed in low position  o Caregiver at bedside  o Call light within reach   · Compliance with Program/Exercises: Will assess as treatment progresses. · Recommendations/Intent for next treatment session: \"Next visit will focus on advancements to more challenging activities and reduction in assistance provided\".   Total Treatment Duration:  PT Patient Time In/Time Out  Time In: 1256  Time Out: [address]

## 2018-04-27 NOTE — PROGRESS NOTES
Bedside shift change report given to Dorie Soulier (oncoming nurse) by Buffy Lama (offgoing nurse). Report included the following information SBAR, Kardex, [facility] and Cardiac Rhythm Paced.

## 2018-04-27 NOTE — PROGRESS NOTES
ALVERTO co-assessed pt with Dr. Erika Daniel s/p Left common femoral, profunda femoris and superficial femoral artery embolectomy through leg incision. Son Negrita Maddox ([phone]) at bedside. She lives alone in a one level home with one small step at entrance. She has a caregiver who assists with ADLs during the day and family rotates being with her at night. Her insurance and PCP are confirmed as listed and she doesn't have any trouble with medications. Her daughter Cara Plaza and son Negrita Maddox are listed as her HCPOAs. At discharge, she would like to go [address] [place] [address]. Pt has been accepted by [facility] and per Dr. Fuad Cooper, she can transfer today. Dr. Erika Daniel will check to see if she can accept pt today. ALVERTO spoke with Manpreet Condon in Therapy and requested that an OT assess pt ASAP. CM following. Care Management Interventions  PCP Verified by CM: Yes  Mode of Transport at Discharge: Other (see comment) (Bed)  Transition of Care Consult (CM Consult):  Other, Discharge Planning (Physiatry)  Physical Therapy Consult: Yes  Occupational Therapy Consult: Yes  Current Support Network: Own Home, Family Lives Nearby  Confirm Follow Up Transport: Family  Plan discussed with Pt/Family/Caregiver: Yes  Freedom of Choice Offered: Yes  Discharge Location  Discharge Placement: Rehab Unit Subacute ([facility])

## 2018-04-27 NOTE — PROGRESS NOTES
[facility] CARDIOLOGY PROGRESS NOTE           4/27/2018 5:04 PM    Admit Date: 4/25/2018         Subjective: eliquis started yesterday, BP remains high, doing well, HR controlled    ROS:  Cardiovascular:  As noted above    Objective:      Vitals:    04/27/18 0722 04/27/18 1130 04/27/18 1220 04/27/18 1533   BP: 178/74 176/81 135/63 179/70   Pulse: 75 75 75 75   Resp: 16 16  16   Temp: 98.3 °F (36.8 °C) 98.4 °F (36.9 °C)  98.7 °F (37.1 °C)   SpO2: 94% 92%  94%   Weight:             Physical Exam:  General: Well Developed, Well Nourished, No Acute Distress, Alert & Oriented x 3, Appropriate mood  Neck: supple, no JVD  Heart: S1S2 with RRR without murmurs or gallops  Lungs: Clear throughout auscultation bilaterally without adventitious sounds  Abd: soft, nontender, nondistended, with good bowel sounds  Ext: no edema bilaterally  Skin: warm and dry      Data Review:   Recent Labs      04/26/18   0421  04/25/18   2100   NA   --   137   K   --   4.3   MG  2.0  2.1   BUN   --   17   CREA   --   1.26*   GLU   --   122*   WBC   --   6.7   HGB   --   10.3*   HCT   --   30.8*   PLT   --   260       No results for input(s): TNIPOC, TROIQ in the last 72 hours.       Assessment/Plan:     Principal Problem:    Femoral artery occlusion, left ([address] (4/25/2018)    Active Problems:    Atrial fibrillation ([address] (8/12/2017)      Chronic systolic congestive heart failure ([address] (4/26/2018)    1) Afib - eliquis and coreg follow CBCs  2) HTN - increase lisinopril to 20 mg daily check BMP in AM  3) sCHF - ACE and BB continue lasix recheck BMP in AM  4) CKD - BMP as above  5) CAD - aspirin, need to be on a statin but will differ to primary cardiologist      Harmony Haynes MD  4/27/2018 5:04 PM

## 2018-04-28 LAB
ANION GAP SERPL CALC-SCNC: 7 MMOL/L (ref 7–16)
BASOPHILS # BLD: 0 K/UL (ref 0–0.2)
BASOPHILS NFR BLD: 0 % (ref 0–2)
BUN SERPL-MCNC: 16 MG/DL (ref 8–23)
CALCIUM SERPL-MCNC: 8.5 MG/DL (ref 8.3–10.4)
CHLORIDE SERPL-SCNC: 99 MMOL/L (ref 98–107)
CO2 SERPL-SCNC: 29 MMOL/L (ref 21–32)
CREAT SERPL-MCNC: 1.18 MG/DL (ref 0.6–1)
DIFFERENTIAL METHOD BLD: ABNORMAL
EOSINOPHIL # BLD: 0.2 K/UL (ref 0–0.8)
EOSINOPHIL NFR BLD: 4 % (ref 0.5–7.8)
ERYTHROCYTE [DISTWIDTH] IN BLOOD BY AUTOMATED COUNT: 12.6 % (ref 11.9–14.6)
GLUCOSE SERPL-MCNC: 98 MG/DL (ref 65–100)
HCT VFR BLD AUTO: 27.2 % (ref 35.8–46.3)
HGB BLD-MCNC: 9 G/DL (ref 11.7–15.4)
IMM GRANULOCYTES # BLD: 0 K/UL (ref 0–0.5)
IMM GRANULOCYTES NFR BLD AUTO: 0 % (ref 0–5)
LYMPHOCYTES # BLD: 1.2 K/UL (ref 0.5–4.6)
LYMPHOCYTES NFR BLD: 22 % (ref 13–44)
MCH RBC QN AUTO: 30.1 PG (ref 26.1–32.9)
MCHC RBC AUTO-ENTMCNC: 33.1 G/DL (ref 31.4–35)
MCV RBC AUTO: 91 FL (ref 79.6–97.8)
MONOCYTES # BLD: 0.9 K/UL (ref 0.1–1.3)
MONOCYTES NFR BLD: 16 % (ref 4–12)
NEUTS SEG # BLD: 3.2 K/UL (ref 1.7–8.2)
NEUTS SEG NFR BLD: 58 % (ref 43–78)
PLATELET # BLD AUTO: 281 K/UL (ref 150–450)
PMV BLD AUTO: 10 FL (ref 10.8–14.1)
POTASSIUM SERPL-SCNC: 3.9 MMOL/L (ref 3.5–5.1)
RBC # BLD AUTO: 2.99 M/UL (ref 4.05–5.25)
SODIUM SERPL-SCNC: 135 MMOL/L (ref 136–145)
WBC # BLD AUTO: 5.5 K/UL (ref 4.3–11.1)

## 2018-04-28 PROCEDURE — 36415 COLL VENOUS BLD VENIPUNCTURE: CPT | Performed by: INTERNAL MEDICINE

## 2018-04-28 PROCEDURE — 74011250637 HC RX REV CODE- 250/637: Performed by: SURGERY

## 2018-04-28 PROCEDURE — 80048 BASIC METABOLIC PNL TOTAL CA: CPT | Performed by: INTERNAL MEDICINE

## 2018-04-28 PROCEDURE — 74011000302 HC RX REV CODE- 302: Performed by: SURGERY

## 2018-04-28 PROCEDURE — 74011250637 HC RX REV CODE- 250/637: Performed by: INTERNAL MEDICINE

## 2018-04-28 PROCEDURE — 86580 TB INTRADERMAL TEST: CPT | Performed by: SURGERY

## 2018-04-28 PROCEDURE — 97530 THERAPEUTIC ACTIVITIES: CPT

## 2018-04-28 PROCEDURE — 85025 COMPLETE CBC W/AUTO DIFF WBC: CPT | Performed by: INTERNAL MEDICINE

## 2018-04-28 PROCEDURE — 74011250637 HC RX REV CODE- 250/637: Performed by: PHYSICIAN ASSISTANT

## 2018-04-28 PROCEDURE — 65660000004 HC RM CVT STEPDOWN

## 2018-04-28 RX ORDER — TRAMADOL HYDROCHLORIDE 50 MG/1
50 TABLET ORAL
Status: DISCONTINUED | OUTPATIENT
Start: 2018-04-28 | End: 2018-04-30 | Stop reason: HOSPADM

## 2018-04-28 RX ADMIN — PANTOPRAZOLE SODIUM 40 MG: 40 TABLET, DELAYED RELEASE ORAL at 05:26

## 2018-04-28 RX ADMIN — Medication 10 ML: at 20:17

## 2018-04-28 RX ADMIN — APIXABAN 2.5 MG: 2.5 TABLET, FILM COATED ORAL at 09:33

## 2018-04-28 RX ADMIN — STANDARDIZED SENNA CONCENTRATE AND DOCUSATE SODIUM 1 TABLET: 8.6; 5 TABLET, FILM COATED ORAL at 20:23

## 2018-04-28 RX ADMIN — LEVOTHYROXINE SODIUM 25 MCG: 50 TABLET ORAL at 05:26

## 2018-04-28 RX ADMIN — ASPIRIN 81 MG: 81 TABLET, COATED ORAL at 09:33

## 2018-04-28 RX ADMIN — GABAPENTIN 600 MG: 300 CAPSULE ORAL at 09:32

## 2018-04-28 RX ADMIN — TRAMADOL HYDROCHLORIDE 50 MG: 50 TABLET, FILM COATED ORAL at 05:33

## 2018-04-28 RX ADMIN — Medication 500 MG: at 09:33

## 2018-04-28 RX ADMIN — GABAPENTIN 600 MG: 300 CAPSULE ORAL at 16:27

## 2018-04-28 RX ADMIN — TRAMADOL HYDROCHLORIDE 50 MG: 50 TABLET, FILM COATED ORAL at 13:05

## 2018-04-28 RX ADMIN — CARVEDILOL 12.5 MG: 12.5 TABLET, FILM COATED ORAL at 09:33

## 2018-04-28 RX ADMIN — Medication 500 MG: at 16:27

## 2018-04-28 RX ADMIN — Medication 10 ML: at 16:33

## 2018-04-28 RX ADMIN — TUBERCULIN PURIFIED PROTEIN DERIVATIVE 5 UNITS: 5 INJECTION, SOLUTION INTRADERMAL at 09:45

## 2018-04-28 RX ADMIN — LISINOPRIL 20 MG: 20 TABLET ORAL at 09:33

## 2018-04-28 RX ADMIN — TRAMADOL HYDROCHLORIDE 50 MG: 50 TABLET, FILM COATED ORAL at 18:38

## 2018-04-28 RX ADMIN — GABAPENTIN 600 MG: 300 CAPSULE ORAL at 20:22

## 2018-04-28 RX ADMIN — FUROSEMIDE 20 MG: 20 TABLET ORAL at 09:33

## 2018-04-28 RX ADMIN — APIXABAN 2.5 MG: 2.5 TABLET, FILM COATED ORAL at 20:22

## 2018-04-28 RX ADMIN — CARVEDILOL 12.5 MG: 12.5 TABLET, FILM COATED ORAL at 16:27

## 2018-04-28 RX ADMIN — TRAZODONE HYDROCHLORIDE 50 MG: 50 TABLET ORAL at 20:24

## 2018-04-28 NOTE — PROGRESS NOTES
[address]   [address] [place] [address]. [address] FAX: [phone]          VASCULAR SURGERY FLOOR PROGRESS NOTE    Admit Date: 2018  POD: 3 Days Post-Op    Procedure:  Procedure(s):  Left femoral artery embolectomy      Subjective:     Patient has no new complaints. Some left leg and incisional pain, managed with Ultram.  Much more alert now. ROS - unchanged except as noted above. Objective:     Vitals:  Blood pressure 131/61, pulse 75, temperature 98.8 °F (37.1 °C), resp. rate 16, weight 104 lb 11.2 oz (47.5 kg), SpO2 96 %, not currently breastfeeding. Temp (24hrs), Av.5 °F (36.9 °C), Min:98 °F (36.7 °C), Max:98.9 °F (37.2 °C)      Intake / Output:    Intake/Output Summary (Last 24 hours) at 18 1210  Last data filed at 18 1147   Gross per 24 hour   Intake              740 ml   Output                0 ml   Net              740 ml       Physical Exam:    GEN: alert, cooperative, no distress, appears stated age  Incision:  left leg  healing well  Left foot is warm, no ischemic changes.     Labs:   Recent Labs      18   0458  18   2100   HGB  9.0*  10.3*   WBC  5.5  6.7   K  3.9  4.3   GLU  98  122*       Data Review reviewed  Consultants documentation and Nursing documentation    Assessment:     Patient Active Problem List    Diagnosis Date Noted    Chronic systolic congestive heart failure ([address] 2018    Femoral artery occlusion, left ([address] 2018    Type 2 diabetes mellitus with nephropathy ([address] 2018    S/P AV noris ablation 2018    Biventricular cardiac pacemaker in situ 2018    Diabetes mellitus, type 2 ([address] 2017    Hypertension 2017    CKD (chronic kidney disease), stage III 2017    Dysphagia 2017    Atrial fibrillation ([address] 2017    DNR (do not resuscitate) 2017    Ischemic cardiomyopathy 2017    Cardiomyopathy ([address] 2017    Dyspnea 2017    Atrial fibrillation with rapid ventricular response (HCC) 02/25/2017    SSS (sick sinus syndrome) ([facility] [address] 05/05/2016    Orthostatic hypotension 05/05/2016    CAD in native artery 05/05/2016    Pacemaker 11/10/2015    Cervical radiculopathy 05/23/2013    Autonomic neuropathy 01/13/2013    MCI (mild cognitive impairment) 01/13/2013    Chronic pain syndrome 01/13/2013    Acquired hypothyroidism 09/12/2012    Mixed hyperlipidemia 09/12/2012    GERD (gastroesophageal reflux disease) 09/12/2012    IBS (irritable bowel syndrome) 09/12/2012    Gait disorder 09/12/2012    Atrial fibrillation with RVR ([facility] [address] 09/12/2012       Plan/Recommendations/Medical Decision Making:     Continue present treatment  Awaiting rehab. Appreciate cardiology input and assistance. Elements of this note have been dictated using speech recognition software. As a result, errors of speech recognition may have occurred.

## 2018-04-28 NOTE — PROGRESS NOTES
Problem: Mobility Impaired (Adult and Pediatric)  Goal: *Acute Goals and Plan of Care (Insert Text)  STG:  (1.)Ms. Jojo Gaitan will move from supine to sit and sit to supine , scoot up and down and roll side to side with STAND BY ASSIST within 3 treatment day(s). (2.)Ms. Jojo Gaitan will transfer from bed to chair and chair to bed with STAND BY ASSIST using the least restrictive device within 3 treatment day(s). (3.)Ms. Jojo Gaitan will ambulate with STAND BY ASSIST for 100 feet with the least restrictive device within 3 treatment day(s). LTG:  (1.)Ms. Jojo Gaitan will move from supine to sit and sit to supine , scoot up and down and roll side to side in bed with MODIFIED INDEPENDENCE within 7 treatment day(s). (2.)Ms. Jojo Gaitan will transfer from bed to chair and chair to bed with MODIFIED INDEPENDENCE using the least restrictive device within 7 treatment day(s). (3.)Ms. Jojo Gaitan will ambulate with MODIFIED INDEPENDENCE for 250+ feet with the least restrictive device within 7 treatment day(s). ________________________________________________________________________________________________      PHYSICAL THERAPY: Daily Note, Treatment Day: 1st, AM 4/28/2018  INPATIENT: Hospital Day: 4  [place] / Product Type: Medicare /      NAME/AGE/GENDER: Alli Beach is a 80 y.o. 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 Jojo Gaitan is pleasant 80 y.o. Female s/p Left femoral artery embolectomy  PT HAS PACEMAKER (Left) .  Pt is supine in bed and agreeable to PT evaluation. Pt is A&O x 4, and reports no pain currently, but states her L foot is swollen and feels a \"burning\" sensation when something touches it. It is slightly red. Pt states she lives by herself in single story home with no steps to enter, and a walk in shower with grab bars and a seat. Pt states she uses a rollator for household and community distances, and is independent with ADLs. Pt states she has had many falls in the last 6 months, and is unable to explain what causes her to fall. Pt transferred from supine to sitting EOB with CGA-Manan, requiring additional time. Pt performed STS with min assist.  Pt ambulated 70ft with CGA and RW, requiring occasional verbal cuing for environmental awareness. Pt left sitting upright in chair with all needs met and within reach with daughter at bedside. Good session. Progress demonstrated. Pt will benefit from skilled therapy for duration of hospital stay to address decreased activity tolerance and functional mobility. Pt may benefit from rehab upon discharge from hospital. Will continue Pt efforts. This section established at most recent assessment   PROBLEM LIST (Impairments causing functional limitations):  1. Decreased Strength  2. Decreased ADL/Functional Activities  3. Decreased Transfer Abilities  4. Decreased Ambulation Ability/Technique  5. Decreased Balance  6. Increased Pain  7. Decreased Activity Tolerance  8. Decreased Pacing Skills  9. Increased Fatigue  10. Decreased [place] with Home Exercise Program   INTERVENTIONS PLANNED: (Benefits and precautions of physical therapy have been discussed with the patient.)  1. Balance Exercise  2. Bed Mobility  3. Cold  4. Family Education  5. Gait Training  6. Home Exercise Program (HEP)  7. Manual Therapy  8. Neuromuscular Re-education/Strengthening  9. Range of Motion (ROM)  10. Therapeutic Activites  11. Therapeutic Exercise/Strengthening  12. Transfer Training  13.  Group Therapy TREATMENT PLAN: Frequency/Duration: daily for duration of hospital stay  Rehabilitation Potential For Stated Goals: Good     RECOMMENDED REHABILITATION/EQUIPMENT: (at time of discharge pending progress): Due to the probability of continued deficits (see above) this patient will likely need continued skilled physical therapy after discharge. Equipment:    None at this time              HISTORY:   History of Present Injury/Illness (Reason for Referral):  Procedure(s) (LRB):  Left femoral artery embolectomy  PT HAS PACEMAKER (Left)  Past Medical History/Comorbidities:   Ms. Allie Crawford  has a past medical history of A fib; Abnormal loss of weight; Acquired hypothyroidism (9/12/2012); Acute respiratory failure with hypoxemia ([address] (8/12/2017); Anemia; Anorexia (9/2/2014); Arthritis associated with another disorder; Atrial fibrillation ([address] (9/12/2012); Autonomic neuropathy (1/13/2013); CAD (coronary artery disease); CAD in native artery (5/5/2016); CAP (community acquired pneumonia) (8/12/2017); Cervical radiculopathy (5/23/2013); Chest pain; Chronic kidney disease; Chronic pain syndrome (1/13/2013); CKD (chronic kidney disease), stage III (9/12/2012); Diabetes ([address] Diabetes mellitus; Diabetes mellitus, type 2 ([address] (9/12/2012); Dysphagia (9/2/2014); Gait disorder (9/12/2012); GERD (gastroesophageal reflux disease); Heart failure ([address] HTN; Hypertension (9/12/2012); Hypothyroid; IBS (irritable bowel syndrome) (9/12/2012); Iron deficiency (9/12/2012); Lumbar disc disease; MCI (mild cognitive impairment) (1/13/2013); Mixed hyperlipidemia (9/12/2012); Orthostatic hypotension (5/5/2016); Pacemaker; and SSS (sick sinus syndrome) ([address] (5/5/2016). She also has no past medical history of Difficult intubation; Malignant hyperthermia due to anesthesia; Nausea & vomiting; or Pseudocholinesterase deficiency.   Ms. Allie Crawford  has a past surgical history that includes hx cervical fusion; hx appendectomy; hx carpal tunnel release; hx colonoscopy (Nov 2005); hx pacemaker (6/08); and hx pacemaker (2017). Social History/Living Environment:   Home Environment: Private residence  # Steps to Enter: 0  One/Two Story Residence: One story  Living Alone: Yes  Support Systems: Child(malia), Family member(s)  Patient Expects to be Discharged to[de-identified] Rehabilitation facility  Current DME Used/Available at Home: Chelsea Daniel, rollator, Grab bars  Tub or Shower Type: Shower  Prior Level of Function/Work/Activity:  Independent, living by herself, does not drive, frequent falls, rollator for ambulation   Number of Personal Factors/Comorbidities that affect the Plan of Care: 3+: HIGH COMPLEXITY   EXAMINATION:   Most Recent Physical Functioning:   Gross Assessment:                  Posture:     Balance:  Sitting: Intact  Standing: Impaired  Standing - Static: Fair  Standing - Dynamic : Fair Bed Mobility:  Rolling: Contact guard assistance  Supine to Sit: Contact guard assistance  Scooting: Contact guard assistance  Wheelchair Mobility:     Transfers:  Sit to Stand: Contact guard assistance;Minimum assistance  Stand to Sit: Contact guard assistance  Gait:     Base of Support: Center of gravity altered;Narrowed  Speed/Chandni: Shuffled; Slow  Step Length: Left shortened;Right shortened  Distance (ft): 70 Feet (ft) (70)  Assistive Device: Gait belt;Walker, rolling  Ambulation - Level of Assistance: Contact guard assistance;Minimal assistance      Body Structures Involved:  1. Heart  2. Lungs  3. Bones  4. Joints  5. Muscles  6. Ligaments Body Functions Affected:  1. Sensory/Pain  2. Cardio  3. Respiratory  4. Neuromusculoskeletal  5. Movement Related Activities and Participation Affected:  1. Mobility  2. Self Care  3. Domestic Life  4. Interpersonal Interactions and Relationships  5.  Community, Social and [place]   Number of elements that affect the Plan of Care: 4+: HIGH COMPLEXITY   CLINICAL PRESENTATION:   Presentation: Stable and uncomplicated: LOW COMPLEXITY   CLINICAL DECISION MAKIN[address] [place] [id number] AM-PAC 6 Clicks   Basic Mobility Inpatient Short Form  How much difficulty does the patient currently have. .. Unable A Lot A Little None   1. Turning over in bed (including adjusting bedclothes, sheets and blankets)? [] 1   [] 2   [x] 3   [] 4   2. Sitting down on and standing up from a chair with arms ( e.g., wheelchair, bedside commode, etc.)   [] 1   [] 2   [x] 3   [] 4   3. Moving from lying on back to sitting on the side of the bed? [] 1   [] 2   [x] 3   [] 4   How much help from another person does the patient currently need. .. Total A Lot A Little None   4. Moving to and from a bed to a chair (including a wheelchair)? [] 1   [] 2   [x] 3   [] 4   5. Need to walk in hospital room? [] 1   [] 2   [x] 3   [] 4   6. Climbing 3-5 steps with a railing? [x] 1   [] 2   [] 3   [] 4   © 2007, Trustees of [address] [place] [id number], under license to [employer]. All rights reserved      Score:  Initial: 16 Most Recent: X (Date: -- )    Interpretation of Tool:  Represents activities that are increasingly more difficult (i.e. Bed mobility, Transfers, Gait). Score 24 23 22-20 19-15 14-10 9-7 6     Modifier CH CI CJ CK CL CM CN      ? Mobility - Walking and Moving Around:     - CURRENT STATUS: CK - 40%-59% impaired, limited or restricted    - GOAL STATUS: CJ - 20%-39% impaired, limited or restricted    - D/C STATUS:  ---------------To be determined---------------  [place] / Product Type: Medicare /      Medical Necessity:     · Patient demonstrates good rehab potential due to higher previous functional level. Reason for Services/Other Comments:  · Patient continues to require skilled intervention due to impaired functional mobility and activity tolerance.    Use of outcome tool(s) and clinical judgement create a POC that gives a: Clear prediction of patient's progress: LOW COMPLEXITY            TREATMENT:   (In addition to Assessment/Re-Assessment sessions the following treatments were rendered)   Pre-treatment Symptoms/Complaints:  \"I can try\"  Pain: Initial:   Pain Intensity 1: 0  Post Session:  No c/o pain voiced     Therapeutic Activity: (    15 minutes)  Therapeutic activities including bed mobility, scooting , sit to stand and gait training  to improve mobility, strength, balance and coordination. Required min assist to contact guard assist    to promote static and dynamic balance in sitting. Braces/Orthotics/Lines/Etc:   · O2 Device: Room air  Treatment/Session Assessment:    · Response to Treatment:  Tolerated well  · Interdisciplinary Collaboration:   o Physical Therapy Assistant  o Registered Nurse  · After treatment position/precautions:   o Up in chair  o Bed/Chair-wheels locked  o Bed in low position  o Call light within reach  o Family at bedside  o Nurse at bedside   · Compliance with Program/Exercises: compliant  · Recommendations/Intent for next treatment session: \"Next visit will focus on advancements to more challenging activities and reduction in assistance provided\".   Total Treatment Duration:  PT Patient Time In/Time Out  Time In: 0901  Time Out: 0916    Juanjose Gaming, PTA

## 2018-04-28 NOTE — PROGRESS NOTES
4/28/2018 9:41 AM    Admit Date: 4/25/2018    Admit Diagnosis: Stenosis of left femoral artery (HCC) [I70.202]      Subjective:   No cp or sob      Objective:      Visit Vitals    /66    Pulse 77    Temp 98.9 °F (37.2 °C)    Resp 16    Wt 47.5 kg (104 lb 11.2 oz)    SpO2 95%    Breastfeeding No    BMI 17.97 kg/m2       Physical Exam:  Va [place], Well Nourished, No Acute Distress, Alert & Oriented x 3, appropriate mood. Neck- supple, no JVD  CV- regular rate and rhythm no MRG  Lung- clear bilaterally  Abd- soft, nontender, nondistended  Ext- no edema bilaterally. Skin- warm and dry        Data Review:   Recent Labs      04/28/18   0458   NA  135*   K  3.9   BUN  16   CREA  1.18*   WBC  5.5   HGB  9.0*   HCT  27.2*   PLT  281       Assessment/Plan:     Principal Problem:    Femoral artery occlusion, left ([address] (4/25/2018) on eliquis- per [facility]    Active Problems:    Atrial fibrillation ([address] (8/12/2017)- stable on eliquis- will continue      Chronic systolic congestive heart failure ([address] (4/26/2018)Improved with current therapy.  Will continue medications  BP better on increased lisinopril dose

## 2018-04-29 LAB
MM INDURATION POC: 0 MM (ref 0–5)
PPD POC: NORMAL NEGATIVE

## 2018-04-29 PROCEDURE — 74011250637 HC RX REV CODE- 250/637: Performed by: PHYSICIAN ASSISTANT

## 2018-04-29 PROCEDURE — 97530 THERAPEUTIC ACTIVITIES: CPT

## 2018-04-29 PROCEDURE — 65660000004 HC RM CVT STEPDOWN

## 2018-04-29 PROCEDURE — 74011250637 HC RX REV CODE- 250/637: Performed by: INTERNAL MEDICINE

## 2018-04-29 PROCEDURE — 74011250637 HC RX REV CODE- 250/637: Performed by: SURGERY

## 2018-04-29 RX ORDER — CARVEDILOL 12.5 MG/1
25 TABLET ORAL 2 TIMES DAILY WITH MEALS
Status: DISCONTINUED | OUTPATIENT
Start: 2018-04-29 | End: 2018-04-30 | Stop reason: HOSPADM

## 2018-04-29 RX ORDER — CARVEDILOL 12.5 MG/1
25 TABLET ORAL 2 TIMES DAILY WITH MEALS
Status: DISCONTINUED | OUTPATIENT
Start: 2018-04-29 | End: 2018-04-29 | Stop reason: SDUPTHER

## 2018-04-29 RX ADMIN — PANTOPRAZOLE SODIUM 40 MG: 40 TABLET, DELAYED RELEASE ORAL at 05:49

## 2018-04-29 RX ADMIN — STANDARDIZED SENNA CONCENTRATE AND DOCUSATE SODIUM 1 TABLET: 8.6; 5 TABLET, FILM COATED ORAL at 20:23

## 2018-04-29 RX ADMIN — CARVEDILOL 25 MG: 12.5 TABLET, FILM COATED ORAL at 16:24

## 2018-04-29 RX ADMIN — APIXABAN 2.5 MG: 2.5 TABLET, FILM COATED ORAL at 20:23

## 2018-04-29 RX ADMIN — TRAMADOL HYDROCHLORIDE 50 MG: 50 TABLET, FILM COATED ORAL at 09:24

## 2018-04-29 RX ADMIN — FUROSEMIDE 20 MG: 20 TABLET ORAL at 09:19

## 2018-04-29 RX ADMIN — LEVOTHYROXINE SODIUM 25 MCG: 50 TABLET ORAL at 05:49

## 2018-04-29 RX ADMIN — Medication 10 ML: at 16:27

## 2018-04-29 RX ADMIN — GABAPENTIN 600 MG: 300 CAPSULE ORAL at 09:19

## 2018-04-29 RX ADMIN — APIXABAN 2.5 MG: 2.5 TABLET, FILM COATED ORAL at 09:19

## 2018-04-29 RX ADMIN — ACETAMINOPHEN 500 MG: 500 TABLET, FILM COATED ORAL at 10:43

## 2018-04-29 RX ADMIN — Medication 10 ML: at 21:00

## 2018-04-29 RX ADMIN — ASPIRIN 81 MG: 81 TABLET, COATED ORAL at 09:19

## 2018-04-29 RX ADMIN — Medication 500 MG: at 09:19

## 2018-04-29 RX ADMIN — LISINOPRIL 20 MG: 20 TABLET ORAL at 09:19

## 2018-04-29 RX ADMIN — GABAPENTIN 600 MG: 300 CAPSULE ORAL at 16:24

## 2018-04-29 RX ADMIN — TRAZODONE HYDROCHLORIDE 50 MG: 50 TABLET ORAL at 20:23

## 2018-04-29 RX ADMIN — Medication 500 MG: at 16:24

## 2018-04-29 RX ADMIN — TRAMADOL HYDROCHLORIDE 50 MG: 50 TABLET, FILM COATED ORAL at 20:35

## 2018-04-29 RX ADMIN — CARVEDILOL 25 MG: 12.5 TABLET, FILM COATED ORAL at 10:39

## 2018-04-29 RX ADMIN — GABAPENTIN 600 MG: 300 CAPSULE ORAL at 20:23

## 2018-04-29 NOTE — PROGRESS NOTES
Problem: Mobility Impaired (Adult and Pediatric)  Goal: *Acute Goals and Plan of Care (Insert Text)  STG:  (1.)Ms. Alexi Garcia will move from supine to sit and sit to supine , scoot up and down and roll side to side with STAND BY ASSIST within 3 treatment day(s). (2.)Ms. Alexi Garcia will transfer from bed to chair and chair to bed with STAND BY ASSIST using the least restrictive device within 3 treatment day(s). (3.)Ms. Alexi Garcia will ambulate with STAND BY ASSIST for 100 feet with the least restrictive device within 3 treatment day(s). LTG:  (1.)Ms. Alexi Garcia will move from supine to sit and sit to supine , scoot up and down and roll side to side in bed with MODIFIED INDEPENDENCE within 7 treatment day(s). (2.)Ms. Alexi Garcia will transfer from bed to chair and chair to bed with MODIFIED INDEPENDENCE using the least restrictive device within 7 treatment day(s). (3.)Ms. Alexi Garcia will ambulate with MODIFIED INDEPENDENCE for 250+ feet with the least restrictive device within 7 treatment day(s). ________________________________________________________________________________________________      PHYSICAL THERAPY: Daily Note, Treatment Day: 2nd, AM 4/29/2018  INPATIENT: Hospital Day: 5  [place] / Product Type: Medicare /      NAME/AGE/GENDER: Joan Stewart is a 80 y.o. 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 Alexi Garcia is pleasant 80 y.o. Female s/p Left femoral artery embolectomy  PT HAS PACEMAKER (Left) .  Pt is supine in bed and agreeable to PT. Family present. Left foot less red today. Pt states she lives by herself in single story home with no steps to enter, and a walk in shower with grab bars and a seat. Pt states she uses a rollator for household and community distances, and is independent with ADLs. Pt states she has had many falls in the last 6 months, and is unable to explain what causes her to fall. Pt transferred from supine to sitting EOB with supervision. Patient assisted with donning her shoes. Sit to stand with contact guard to supervision. Pt ambulated 200ft with CGA and RW did better avoiding obstacles. Chandni shows improvement. Patient returned to supine in bed with all needs met and within reach with daughter at bedside. Good session. Progress demonstrated. Pt will benefit from skilled therapy for duration of hospital stay to address decreased activity tolerance and functional mobility. Pt may benefit from rehab upon discharge from hospital. Will continue Pt efforts. This section established at most recent assessment   PROBLEM LIST (Impairments causing functional limitations):  1. Decreased Strength  2. Decreased ADL/Functional Activities  3. Decreased Transfer Abilities  4. Decreased Ambulation Ability/Technique  5. Decreased Balance  6. Increased Pain  7. Decreased Activity Tolerance  8. Decreased Pacing Skills  9. Increased Fatigue  10. Decreased [place] with Home Exercise Program   INTERVENTIONS PLANNED: (Benefits and precautions of physical therapy have been discussed with the patient.)  1. Balance Exercise  2. Bed Mobility  3. Cold  4. Family Education  5. Gait Training  6. Home Exercise Program (HEP)  7. Manual Therapy  8. Neuromuscular Re-education/Strengthening  9. Range of Motion (ROM)  10. Therapeutic Activites  11. Therapeutic Exercise/Strengthening  12. Transfer Training  13.  Group Therapy     TREATMENT PLAN: Frequency/Duration: daily for duration of hospital stay  Rehabilitation Potential For Stated Goals: Good     RECOMMENDED REHABILITATION/EQUIPMENT: (at time of discharge pending progress): Due to the probability of continued deficits (see above) this patient will likely need continued skilled physical therapy after discharge. Equipment:    None at this time              HISTORY:   History of Present Injury/Illness (Reason for Referral):  Procedure(s) (LRB):  Left femoral artery embolectomy  PT HAS PACEMAKER (Left)  Past Medical History/Comorbidities:   Ms. Pato Muro  has a past medical history of A fib; Abnormal loss of weight; Acquired hypothyroidism (9/12/2012); Acute respiratory failure with hypoxemia ([address] (8/12/2017); Anemia; Anorexia (9/2/2014); Arthritis associated with another disorder; Atrial fibrillation ([address] (9/12/2012); Autonomic neuropathy (1/13/2013); CAD (coronary artery disease); CAD in native artery (5/5/2016); CAP (community acquired pneumonia) (8/12/2017); Cervical radiculopathy (5/23/2013); Chest pain; Chronic kidney disease; Chronic pain syndrome (1/13/2013); CKD (chronic kidney disease), stage III (9/12/2012); Diabetes ([address] Diabetes mellitus; Diabetes mellitus, type 2 ([address] (9/12/2012); Dysphagia (9/2/2014); Gait disorder (9/12/2012); GERD (gastroesophageal reflux disease); Heart failure ([address] HTN; Hypertension (9/12/2012); Hypothyroid; IBS (irritable bowel syndrome) (9/12/2012); Iron deficiency (9/12/2012); Lumbar disc disease; MCI (mild cognitive impairment) (1/13/2013); Mixed hyperlipidemia (9/12/2012); Orthostatic hypotension (5/5/2016); Pacemaker; and SSS (sick sinus syndrome) ([address] (5/5/2016). She also has no past medical history of Difficult intubation; Malignant hyperthermia due to anesthesia; Nausea & vomiting; or Pseudocholinesterase deficiency. Ms. Pato Muro  has a past surgical history that includes hx cervical fusion; hx appendectomy; hx carpal tunnel release; hx colonoscopy (Nov 2005); hx pacemaker (6/08); and hx pacemaker (2017).   Social History/Living Environment:   Home Environment: Private residence  # Steps to Enter: 0  One/Two Story Residence: One story  Living Alone: Yes  Support Systems: Child(malia), Family member(s)  Patient Expects to be Discharged to[de-identified] Rehabilitation facility  Current DME Used/Available at Home: Azam Canavan, rollator, Grab bars  Tub or Shower Type: Shower  Prior Level of Function/Work/Activity:  Independent, living by herself, does not drive, frequent falls, rollator for ambulation   Number of Personal Factors/Comorbidities that affect the Plan of Care: 3+: HIGH COMPLEXITY   EXAMINATION:   Most Recent Physical Functioning:   Gross Assessment:                  Posture:     Balance:  Sitting: Intact  Standing: Impaired  Standing - Static: Fair  Standing - Dynamic : Fair Bed Mobility:  Rolling: Supervision  Supine to Sit: Supervision  Sit to Supine: Supervision  Scooting: Supervision  Wheelchair Mobility:     Transfers:  Sit to Stand: Contact guard assistance  Stand to Sit: Contact guard assistance  Gait:     Base of Support: Center of gravity altered;Narrowed  Speed/Chandni: Shuffled; Slow  Step Length: Left shortened;Right shortened  Distance (ft): 200 Feet (ft)  Assistive Device: Gait belt;Walker, rolling  Ambulation - Level of Assistance: Contact guard assistance      Body Structures Involved:  1. Heart  2. Lungs  3. Bones  4. Joints  5. Muscles  6. Ligaments Body Functions Affected:  1. Sensory/Pain  2. Cardio  3. Respiratory  4. Neuromusculoskeletal  5. Movement Related Activities and Participation Affected:  1. Mobility  2. Self Care  3. Domestic Life  4. Interpersonal Interactions and Relationships  5.  Community, Social and [place]   Number of elements that affect the Plan of Care: 4+: HIGH COMPLEXITY   CLINICAL PRESENTATION:   Presentation: Stable and uncomplicated: LOW COMPLEXITY   CLINICAL DECISION MAKIN [facility] Inpatient Short Form  How much difficulty does the patient currently have... Unable A Lot A Little None   1. Turning over in bed (including adjusting bedclothes, sheets and blankets)? [] 1   [] 2   [x] 3   [] 4   2. Sitting down on and standing up from a chair with arms ( e.g., wheelchair, bedside commode, etc.)   [] 1   [] 2   [x] 3   [] 4   3. Moving from lying on back to sitting on the side of the bed? [] 1   [] 2   [x] 3   [] 4   How much help from another person does the patient currently need. .. Total A Lot A Little None   4. Moving to and from a bed to a chair (including a wheelchair)? [] 1   [] 2   [x] 3   [] 4   5. Need to walk in hospital room? [] 1   [] 2   [x] 3   [] 4   6. Climbing 3-5 steps with a railing? [x] 1   [] 2   [] 3   [] 4   © 2007, Trustees of [facility] [employer], under license to [employer]. All rights reserved      Score:  Initial: 16 Most Recent: X (Date: -- )    Interpretation of Tool:  Represents activities that are increasingly more difficult (i.e. Bed mobility, Transfers, Gait). Score 24 23 22-20 19-15 14-10 9-7 6     Modifier CH CI CJ CK CL CM CN      ? Mobility - Walking and Moving Around:     - CURRENT STATUS: CK - 40%-59% impaired, limited or restricted    - GOAL STATUS: CJ - 20%-39% impaired, limited or restricted    - D/C STATUS:  ---------------To be determined---------------  [place] / Product Type: Medicare /      Medical Necessity:     · Patient demonstrates good rehab potential due to higher previous functional level. Reason for Services/Other Comments:  · Patient continues to require skilled intervention due to impaired functional mobility and activity tolerance.    Use of outcome tool(s) and clinical judgement create a POC that gives a: Clear prediction of patient's progress: LOW COMPLEXITY            TREATMENT:   (In addition to Assessment/Re-Assessment sessions the following treatments were rendered)   Pre-treatment Symptoms/Complaints:  \"Good morning\"  Pain: Initial:   Pain Intensity 1: 0  Post Session:  No c/o pain voiced     Therapeutic Activity: (    15 minutes)  Therapeutic activities including bed mobility, scooting , sit to stand and gait training  to improve mobility, strength, balance and coordination. Required min assist to contact guard assist    to promote static and dynamic balance in sitting. Braces/Orthotics/Lines/Etc:   · O2 Device: Room air  Treatment/Session Assessment:    · Response to Treatment:  Tolerated well  · Interdisciplinary Collaboration:   o Physical Therapy Assistant  o Registered Nurse  · After treatment position/precautions:   o Supine in bed  o Bed alarm/tab alert on  o Bed/Chair-wheels locked  o Bed in low position  o Call light within reach  o RN notified  o Family at bedside   · Compliance with Program/Exercises: compliant  · Recommendations/Intent for next treatment session: \"Next visit will focus on advancements to more challenging activities and reduction in assistance provided\".   Total Treatment Duration:  PT Patient Time In/Time Out  Time In: 0920  Time Out: 0935    Shawna Galvez, PTA

## 2018-04-29 NOTE — PROGRESS NOTES
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 Neck- supple, no JVD  CV- regular rate and rhythm no MRG  Lung- clear bilaterally  Abd- soft, nontender, nondistended  Ext- no edema bilaterally. Skin- warm and dry        Data Review:   Recent Labs      04/28/18   0458   NA  135*   K  3.9   BUN  16   CREA  1.18*   WBC  5.5   HGB  9.0*   HCT  27.2*   PLT  281       Assessment/Plan:     Principal Problem:    Femoral artery occlusion, left ([facility] (4/25/2018)    Active Problems:    Atrial fibrillation ([address] (8/12/2017)on eliquis      Chronic systolic congestive heart failure ([address] (4/26/2018)Stable. Continue current medical therapy.   HTN- worse- increase coreg- lisinopril increased a few days ago  Will sign off

## 2018-04-29 NOTE — PROGRESS NOTES
[address] FAX: [phone]          VASCULAR SURGERY FLOOR PROGRESS NOTE    Admit Date: 2018  POD: 4 Days Post-Op    Procedure:  Procedure(s):  Left femoral artery embolectomy  PT HAS PACEMAKER    Subjective:     Patient has no new complaints. ROS - unchanged except as noted above. Objective:     Vitals:  Blood pressure 142/64, pulse 75, temperature 98.8 °F (37.1 °C), resp. rate 14, weight 100 lb 1.6 oz (45.4 kg), SpO2 93 %, not currently breastfeeding. Temp (24hrs), Av.3 °F (36.8 °C), Min:97.9 °F (36.6 °C), Max:98.8 °F (37.1 °C)      Intake / Output:    Intake/Output Summary (Last 24 hours) at 18 1242  Last data filed at 18 1153   Gross per 24 hour   Intake              960 ml   Output                0 ml   Net              960 ml       Physical Exam:    GEN: alert, cooperative, no distress, appears stated age  Incision:  left leg  healing well  L foot is warm, neuro intact, no ischemic changes.     Labs:   Recent Labs      18   0458   HGB  9.0*   WBC  5.5   K  3.9   GLU  98       Data Review reviewed  Consultants documentation and Nursing documentation    Assessment:     Patient Active Problem List    Diagnosis Date Noted    Chronic systolic congestive heart failure ([address] 2018    Femoral artery occlusion, left ([address] 2018    Type 2 diabetes mellitus with nephropathy ([address] 2018    S/P AV noris ablation 2018    Biventricular cardiac pacemaker in situ 2018    Diabetes mellitus, type 2 ([address] 2017    Hypertension 2017    CKD (chronic kidney disease), stage III 2017    Dysphagia 2017    Atrial fibrillation ([address] 2017    DNR (do not resuscitate) 2017    Ischemic cardiomyopathy 2017    Cardiomyopathy ([address] 2017    Dyspnea 2017    Atrial fibrillation with rapid ventricular response ([address] 2017    SSS (sick sinus syndrome) ([facility] 05/05/2016    Orthostatic hypotension 05/05/2016    CAD in native artery 05/05/2016    Pacemaker 11/10/2015    Cervical radiculopathy 05/23/2013    Autonomic neuropathy 01/13/2013    MCI (mild cognitive impairment) 01/13/2013    Chronic pain syndrome 01/13/2013    Acquired hypothyroidism 09/12/2012    Mixed hyperlipidemia 09/12/2012    GERD (gastroesophageal reflux disease) 09/12/2012    IBS (irritable bowel syndrome) 09/12/2012    Gait disorder 09/12/2012    Atrial fibrillation with RVR ([facility] 09/12/2012       Plan/Recommendations/Medical Decision Making:     Continue present treatment  Rehab when bed available. Elements of this note have been dictated using speech recognition software. As a result, errors of speech recognition may have occurred.

## 2018-04-30 ENCOUNTER — HOSPITAL ENCOUNTER (INPATIENT)
Age: 83
LOS: 15 days | Discharge: HOME HEALTH | DRG: 949 | End: 2018-05-15
Attending: PHYSICAL MEDICINE & REHABILITATION | Admitting: PHYSICAL MEDICINE & REHABILITATION
Payer: MEDICARE

## 2018-04-30 VITALS
OXYGEN SATURATION: 98 % | HEART RATE: 75 BPM | BODY MASS INDEX: 17.2 KG/M2 | TEMPERATURE: 98.9 F | SYSTOLIC BLOOD PRESSURE: 187 MMHG | DIASTOLIC BLOOD PRESSURE: 85 MMHG | WEIGHT: 100.2 LBS | RESPIRATION RATE: 16 BRPM

## 2018-04-30 DIAGNOSIS — I50.9 CONGESTIVE HEART FAILURE, UNSPECIFIED HF CHRONICITY, UNSPECIFIED HEART FAILURE TYPE (HCC): ICD-10-CM

## 2018-04-30 DIAGNOSIS — Z95.0 BIVENTRICULAR CARDIAC PACEMAKER IN SITU: ICD-10-CM

## 2018-04-30 DIAGNOSIS — G90.9 AUTONOMIC NEUROPATHY: ICD-10-CM

## 2018-04-30 DIAGNOSIS — I70.202 FEMORAL ARTERY OCCLUSION, LEFT (HCC): ICD-10-CM

## 2018-04-30 DIAGNOSIS — N18.30 ANEMIA OF CHRONIC KIDNEY FAILURE, STAGE 3 (MODERATE) (HCC): ICD-10-CM

## 2018-04-30 DIAGNOSIS — I48.20 CHRONIC ATRIAL FIBRILLATION (HCC): ICD-10-CM

## 2018-04-30 DIAGNOSIS — M54.12 CERVICAL RADICULOPATHY: ICD-10-CM

## 2018-04-30 DIAGNOSIS — G31.84 MCI (MILD COGNITIVE IMPAIRMENT): ICD-10-CM

## 2018-04-30 DIAGNOSIS — I48.91 ATRIAL FIBRILLATION WITH RAPID VENTRICULAR RESPONSE (HCC): ICD-10-CM

## 2018-04-30 DIAGNOSIS — R13.19 OTHER DYSPHAGIA: Chronic | ICD-10-CM

## 2018-04-30 DIAGNOSIS — M85.80 OSTEOPENIA, UNSPECIFIED LOCATION: ICD-10-CM

## 2018-04-30 DIAGNOSIS — I10 ESSENTIAL HYPERTENSION: Chronic | ICD-10-CM

## 2018-04-30 DIAGNOSIS — K58.9 IRRITABLE BOWEL SYNDROME WITHOUT DIARRHEA: Chronic | ICD-10-CM

## 2018-04-30 DIAGNOSIS — N18.30 CKD (CHRONIC KIDNEY DISEASE), STAGE III (HCC): Chronic | ICD-10-CM

## 2018-04-30 DIAGNOSIS — I50.22 CHRONIC SYSTOLIC CONGESTIVE HEART FAILURE (HCC): ICD-10-CM

## 2018-04-30 DIAGNOSIS — I49.5 SSS (SICK SINUS SYNDROME) (HCC): ICD-10-CM

## 2018-04-30 DIAGNOSIS — I25.5 ISCHEMIC CARDIOMYOPATHY: ICD-10-CM

## 2018-04-30 DIAGNOSIS — K21.9 GASTROESOPHAGEAL REFLUX DISEASE WITHOUT ESOPHAGITIS: Chronic | ICD-10-CM

## 2018-04-30 DIAGNOSIS — Z98.890 S/P AV NODAL ABLATION: ICD-10-CM

## 2018-04-30 DIAGNOSIS — I48.91 ATRIAL FIBRILLATION WITH RVR (HCC): ICD-10-CM

## 2018-04-30 DIAGNOSIS — D50.9 IRON DEFICIENCY ANEMIA, UNSPECIFIED IRON DEFICIENCY ANEMIA TYPE: ICD-10-CM

## 2018-04-30 DIAGNOSIS — R26.9 GAIT DISORDER: Chronic | ICD-10-CM

## 2018-04-30 DIAGNOSIS — R06.00 DYSPNEA, UNSPECIFIED TYPE: ICD-10-CM

## 2018-04-30 DIAGNOSIS — I42.9 CARDIOMYOPATHY, UNSPECIFIED TYPE (HCC): ICD-10-CM

## 2018-04-30 DIAGNOSIS — D63.1 ANEMIA OF CHRONIC KIDNEY FAILURE, STAGE 3 (MODERATE) (HCC): ICD-10-CM

## 2018-04-30 DIAGNOSIS — G89.4 CHRONIC PAIN SYNDROME: ICD-10-CM

## 2018-04-30 DIAGNOSIS — E03.9 ACQUIRED HYPOTHYROIDISM: Chronic | ICD-10-CM

## 2018-04-30 DIAGNOSIS — Z66 DNR (DO NOT RESUSCITATE): Chronic | ICD-10-CM

## 2018-04-30 DIAGNOSIS — R80.9 TYPE 2 DIABETES MELLITUS WITH MICROALBUMINURIA, WITHOUT LONG-TERM CURRENT USE OF INSULIN (HCC): Chronic | ICD-10-CM

## 2018-04-30 DIAGNOSIS — R53.81 PHYSICAL DEBILITY: Primary | ICD-10-CM

## 2018-04-30 DIAGNOSIS — E78.2 MIXED HYPERLIPIDEMIA: Chronic | ICD-10-CM

## 2018-04-30 DIAGNOSIS — N30.00 ACUTE CYSTITIS WITHOUT HEMATURIA: ICD-10-CM

## 2018-04-30 DIAGNOSIS — I25.10 CAD IN NATIVE ARTERY: ICD-10-CM

## 2018-04-30 DIAGNOSIS — E11.21 TYPE 2 DIABETES MELLITUS WITH NEPHROPATHY (HCC): ICD-10-CM

## 2018-04-30 DIAGNOSIS — Z95.0 PACEMAKER: ICD-10-CM

## 2018-04-30 DIAGNOSIS — I95.1 ORTHOSTATIC HYPOTENSION: ICD-10-CM

## 2018-04-30 DIAGNOSIS — E11.29 TYPE 2 DIABETES MELLITUS WITH MICROALBUMINURIA, WITHOUT LONG-TERM CURRENT USE OF INSULIN (HCC): Chronic | ICD-10-CM

## 2018-04-30 LAB
MM INDURATION POC: NORMAL MM (ref 0–5)
PPD POC: NEGATIVE NEGATIVE

## 2018-04-30 PROCEDURE — 97161 PT EVAL LOW COMPLEX 20 MIN: CPT

## 2018-04-30 PROCEDURE — 97530 THERAPEUTIC ACTIVITIES: CPT

## 2018-04-30 PROCEDURE — 97535 SELF CARE MNGMENT TRAINING: CPT

## 2018-04-30 PROCEDURE — 74011250637 HC RX REV CODE- 250/637: Performed by: PHYSICAL MEDICINE & REHABILITATION

## 2018-04-30 PROCEDURE — 74011250637 HC RX REV CODE- 250/637: Performed by: INTERNAL MEDICINE

## 2018-04-30 PROCEDURE — 74011250637 HC RX REV CODE- 250/637: Performed by: SURGERY

## 2018-04-30 PROCEDURE — 65310000000 HC RM PRIVATE REHAB

## 2018-04-30 PROCEDURE — 97116 GAIT TRAINING THERAPY: CPT

## 2018-04-30 PROCEDURE — 74011250637 HC RX REV CODE- 250/637: Performed by: PHYSICIAN ASSISTANT

## 2018-04-30 PROCEDURE — 99223 1ST HOSP IP/OBS HIGH 75: CPT | Performed by: PHYSICAL MEDICINE & REHABILITATION

## 2018-04-30 PROCEDURE — 97165 OT EVAL LOW COMPLEX 30 MIN: CPT

## 2018-04-30 RX ORDER — GABAPENTIN 300 MG/1
600 CAPSULE ORAL 3 TIMES DAILY
Status: CANCELLED | OUTPATIENT
Start: 2018-04-30

## 2018-04-30 RX ORDER — ASPIRIN 81 MG/1
81 TABLET ORAL DAILY
Status: DISCONTINUED | OUTPATIENT
Start: 2018-05-01 | End: 2018-05-15 | Stop reason: HOSPADM

## 2018-04-30 RX ORDER — ONDANSETRON 4 MG/1
4 TABLET, ORALLY DISINTEGRATING ORAL
Status: DISCONTINUED | OUTPATIENT
Start: 2018-04-30 | End: 2018-05-15 | Stop reason: HOSPADM

## 2018-04-30 RX ORDER — CALCIUM CARBONATE 500(1250)
500 TABLET ORAL 2 TIMES DAILY
Status: DISCONTINUED | OUTPATIENT
Start: 2018-04-30 | End: 2018-05-03

## 2018-04-30 RX ORDER — ONDANSETRON 4 MG/1
4 TABLET, ORALLY DISINTEGRATING ORAL
Status: CANCELLED | OUTPATIENT
Start: 2018-04-30

## 2018-04-30 RX ORDER — ASPIRIN 81 MG/1
81 TABLET ORAL DAILY
Status: CANCELLED | OUTPATIENT
Start: 2018-05-01

## 2018-04-30 RX ORDER — PANTOPRAZOLE SODIUM 40 MG/1
40 TABLET, DELAYED RELEASE ORAL
Status: DISCONTINUED | OUTPATIENT
Start: 2018-05-01 | End: 2018-05-15 | Stop reason: HOSPADM

## 2018-04-30 RX ORDER — LEVOTHYROXINE SODIUM 50 UG/1
25 TABLET ORAL
Status: CANCELLED | OUTPATIENT
Start: 2018-05-01

## 2018-04-30 RX ORDER — ACETAMINOPHEN 500 MG
500 TABLET ORAL
Status: DISCONTINUED | OUTPATIENT
Start: 2018-04-30 | End: 2018-05-15 | Stop reason: HOSPADM

## 2018-04-30 RX ORDER — LISINOPRIL 20 MG/1
20 TABLET ORAL DAILY
Status: CANCELLED | OUTPATIENT
Start: 2018-05-01

## 2018-04-30 RX ORDER — NITROGLYCERIN 0.4 MG/1
0.4 TABLET SUBLINGUAL AS NEEDED
Status: CANCELLED | OUTPATIENT
Start: 2018-04-30

## 2018-04-30 RX ORDER — DIPHENHYDRAMINE HCL 25 MG
25 CAPSULE ORAL
Status: CANCELLED | OUTPATIENT
Start: 2018-04-30

## 2018-04-30 RX ORDER — LISINOPRIL 20 MG/1
20 TABLET ORAL DAILY
Status: DISCONTINUED | OUTPATIENT
Start: 2018-05-01 | End: 2018-05-15 | Stop reason: HOSPADM

## 2018-04-30 RX ORDER — PANTOPRAZOLE SODIUM 40 MG/1
40 TABLET, DELAYED RELEASE ORAL
Status: CANCELLED | OUTPATIENT
Start: 2018-05-01

## 2018-04-30 RX ORDER — GABAPENTIN 300 MG/1
600 CAPSULE ORAL 3 TIMES DAILY
Status: DISCONTINUED | OUTPATIENT
Start: 2018-04-30 | End: 2018-05-02

## 2018-04-30 RX ORDER — TRAZODONE HYDROCHLORIDE 50 MG/1
50 TABLET ORAL
Status: DISCONTINUED | OUTPATIENT
Start: 2018-04-30 | End: 2018-05-02

## 2018-04-30 RX ORDER — FUROSEMIDE 20 MG/1
20 TABLET ORAL DAILY
Status: DISCONTINUED | OUTPATIENT
Start: 2018-05-01 | End: 2018-05-02

## 2018-04-30 RX ORDER — AMOXICILLIN 250 MG
1 CAPSULE ORAL
Status: CANCELLED | OUTPATIENT
Start: 2018-04-30

## 2018-04-30 RX ORDER — AMOXICILLIN 250 MG
1 CAPSULE ORAL
Status: DISCONTINUED | OUTPATIENT
Start: 2018-04-30 | End: 2018-05-15 | Stop reason: HOSPADM

## 2018-04-30 RX ORDER — CARVEDILOL 12.5 MG/1
25 TABLET ORAL 2 TIMES DAILY WITH MEALS
Status: CANCELLED | OUTPATIENT
Start: 2018-04-30

## 2018-04-30 RX ORDER — TRAZODONE HYDROCHLORIDE 50 MG/1
50 TABLET ORAL
Status: CANCELLED | OUTPATIENT
Start: 2018-04-30

## 2018-04-30 RX ORDER — TRAMADOL HYDROCHLORIDE 50 MG/1
50 TABLET ORAL
Status: CANCELLED | OUTPATIENT
Start: 2018-04-30

## 2018-04-30 RX ORDER — DIPHENHYDRAMINE HCL 25 MG
25 CAPSULE ORAL
Status: DISCONTINUED | OUTPATIENT
Start: 2018-04-30 | End: 2018-05-15 | Stop reason: HOSPADM

## 2018-04-30 RX ORDER — CARVEDILOL 25 MG/1
25 TABLET ORAL 2 TIMES DAILY WITH MEALS
Qty: 1 TAB | Refills: 0 | Status: SHIPPED
Start: 2018-04-30 | End: 2018-06-08

## 2018-04-30 RX ORDER — TRAMADOL HYDROCHLORIDE 50 MG/1
50 TABLET ORAL
Status: DISCONTINUED | OUTPATIENT
Start: 2018-04-30 | End: 2018-05-15 | Stop reason: HOSPADM

## 2018-04-30 RX ORDER — FUROSEMIDE 20 MG/1
20 TABLET ORAL DAILY
Status: CANCELLED | OUTPATIENT
Start: 2018-05-01

## 2018-04-30 RX ORDER — TRAMADOL HYDROCHLORIDE 50 MG/1
50 TABLET ORAL
Qty: 20 TAB | Refills: 0 | Status: SHIPPED
Start: 2018-04-30 | End: 2018-05-15

## 2018-04-30 RX ORDER — CALCIUM CARBONATE 500(1250)
500 TABLET ORAL 2 TIMES DAILY
Status: CANCELLED | OUTPATIENT
Start: 2018-04-30

## 2018-04-30 RX ORDER — CARVEDILOL 25 MG/1
25 TABLET ORAL 2 TIMES DAILY WITH MEALS
Status: DISCONTINUED | OUTPATIENT
Start: 2018-04-30 | End: 2018-05-15 | Stop reason: HOSPADM

## 2018-04-30 RX ORDER — LISINOPRIL 20 MG/1
20 TABLET ORAL DAILY
Qty: 1 TAB | Refills: 0 | Status: SHIPPED
Start: 2018-04-30 | End: 2018-05-15

## 2018-04-30 RX ORDER — LEVOTHYROXINE SODIUM 50 UG/1
25 TABLET ORAL
Status: DISCONTINUED | OUTPATIENT
Start: 2018-05-01 | End: 2018-05-15 | Stop reason: HOSPADM

## 2018-04-30 RX ORDER — NITROGLYCERIN 0.4 MG/1
0.4 TABLET SUBLINGUAL AS NEEDED
Status: DISCONTINUED | OUTPATIENT
Start: 2018-04-30 | End: 2018-05-15 | Stop reason: HOSPADM

## 2018-04-30 RX ORDER — ACETAMINOPHEN 500 MG
500 TABLET ORAL
Status: CANCELLED | OUTPATIENT
Start: 2018-04-30

## 2018-04-30 RX ADMIN — APIXABAN 2.5 MG: 2.5 TABLET, FILM COATED ORAL at 09:19

## 2018-04-30 RX ADMIN — LISINOPRIL 20 MG: 20 TABLET ORAL at 05:16

## 2018-04-30 RX ADMIN — LEVOTHYROXINE SODIUM 25 MCG: 50 TABLET ORAL at 05:16

## 2018-04-30 RX ADMIN — ASPIRIN 81 MG: 81 TABLET, COATED ORAL at 09:20

## 2018-04-30 RX ADMIN — CARVEDILOL 25 MG: 12.5 TABLET, FILM COATED ORAL at 09:19

## 2018-04-30 RX ADMIN — Medication 500 MG: at 16:43

## 2018-04-30 RX ADMIN — Medication 500 MG: at 09:19

## 2018-04-30 RX ADMIN — GABAPENTIN 600 MG: 300 CAPSULE ORAL at 09:18

## 2018-04-30 RX ADMIN — TRAMADOL HYDROCHLORIDE 50 MG: 50 TABLET, FILM COATED ORAL at 05:22

## 2018-04-30 RX ADMIN — APIXABAN 2.5 MG: 2.5 TABLET, FILM COATED ORAL at 21:21

## 2018-04-30 RX ADMIN — PANTOPRAZOLE SODIUM 40 MG: 40 TABLET, DELAYED RELEASE ORAL at 05:16

## 2018-04-30 RX ADMIN — TRAZODONE HYDROCHLORIDE 50 MG: 50 TABLET ORAL at 21:21

## 2018-04-30 RX ADMIN — GABAPENTIN 600 MG: 300 CAPSULE ORAL at 16:44

## 2018-04-30 RX ADMIN — TRAMADOL HYDROCHLORIDE 50 MG: 50 TABLET, FILM COATED ORAL at 10:45

## 2018-04-30 RX ADMIN — GABAPENTIN 600 MG: 300 CAPSULE ORAL at 21:21

## 2018-04-30 RX ADMIN — Medication 10 ML: at 06:00

## 2018-04-30 RX ADMIN — CARVEDILOL 25 MG: 25 TABLET, FILM COATED ORAL at 16:43

## 2018-04-30 RX ADMIN — STANDARDIZED SENNA CONCENTRATE AND DOCUSATE SODIUM 1 TABLET: 8.6; 5 TABLET, FILM COATED ORAL at 21:21

## 2018-04-30 RX ADMIN — FUROSEMIDE 20 MG: 20 TABLET ORAL at 09:20

## 2018-04-30 NOTE — PROGRESS NOTES
TRANSFER - OUT REPORT:    Verbal report given to [facility], RN(name) on Caryl Paniagua  being transferred to [address] [place] [address](unit) for routine post - op       Report consisted of patients Situation, Background, Assessment and   Recommendations(SBAR). Information from the following report(s) SBAR, Kardex, Intake/Output and Cardiac Rhythm paced was reviewed with the receiving nurse. Lines:   Peripheral IV 04/26/18 Left;Upper Arm (Active)   Site Assessment Clean, dry, & intact 4/29/2018  3:00 PM   Phlebitis Assessment 0 4/29/2018  3:00 PM   Infiltration Assessment 0 4/29/2018  3:00 PM   Dressing Status Clean, dry, & intact 4/29/2018  3:00 PM   Dressing Type Tape;Transparent 4/29/2018  3:00 PM   Hub Color/Line Status Blue;Patent 4/27/2018  3:36 PM   Action Taken Open ports on tubing capped 4/26/2018  3:02 PM   Alcohol Cap Used No 4/26/2018  3:02 PM        Opportunity for questions and clarification was provided.       Patient transported with:   family member and belongings

## 2018-04-30 NOTE — IP AVS SNAPSHOT
[address] 
[phone] Patient: Eleanor Red MRN: [id number] :8/10/1927 About your hospitalization You were admitted on:  2018 You last received care in the:  [address] You were discharged on:  May 15, 2018 Why you were hospitalized Your primary diagnosis was:  Physical Debility Your diagnoses also included:  Diabetes Mellitus, Type 2 (Hcc), Hypertension, Acquired Hypothyroidism, Mixed Hyperlipidemia, Ckd (Chronic Kidney Disease), Stage Iii, Ibs (Irritable Bowel Syndrome), Gait Disorder, Dysphagia, Atrial Fibrillation (Hcc), Dnr (Do Not Resuscitate), Autonomic Neuropathy, Mci (Mild Cognitive Impairment), Chronic Pain Syndrome, Sss (Sick Sinus Syndrome) ([facility]), Cervical Radiculopathy Follow-up Information Follow up With Details Comments Contact Info Nieves Butler MD In 1 week routine follow up s/p surgical procedure [address] [facility] [id number] [phone] [facility] OFFICE In 1 month pt to schedule at their convenience  [address] [place] [phone] [facility] 
[phone] Nieves Butler MD On 2018 time 09:45  [address] [facility] [address] 
[phone] Eduardo Alfaro MD On 2018 time 11:00 [facility] [address] 
[phone] [facility] OFFICE On 2018 08:30 [address] [place] [address] [facility] 
[phone] Your Scheduled Appointments Wednesday May 23, 2018  9:45 AM EDT Global Post Op with Nieves Butler MD  
[facility] VASCULAR SURGERY ([facility]) [address] [address] [phone] Wednesday May 23, 2018 11:00 AM EDT Office Visit with Eduardo Alfaro MD  
 [facility]) [address] [address] [phone] Tuesday May 29, 2018  8:30 AM EDT HOSPITAL FOLLOW-UP with Dileep Arcos, [address] ([facility]) [address] [place]  
[phone] Discharge Orders None A check benedicto indicates which time of day the medication should be taken. My Medications START taking these medications Instructions Each Dose to Equal  
 Morning Noon Evening Bedtime  
 amitriptyline 10 mg tablet Commonly known as:  ELAVIL Your last dose was: Your next dose is: Take 1 Tab by mouth nightly. Indications: NEUROPATHIC PAIN  
 10 mg  
    
   
   
   
  
 ferrous sulfate 325 mg (65 mg iron) tablet Your last dose was: Your next dose is: Take 1 Tab by mouth two (2) times daily (with meals). Indications: Iron Deficiency Anemia 325 mg Saccharomyces boulardii 250 mg capsule Commonly known as:  Boubacar Valencia Your last dose was: Your next dose is: Take 2 Caps by mouth two (2) times a day for 7 days. 500 mg CHANGE how you take these medications Instructions Each Dose to Equal  
 Morning Noon Evening Bedtime * carvedilol 25 mg tablet Commonly known as:  Kaleb Nieto What changed:  Another medication with the same name was added. Make sure you understand how and when to take each. Your last dose was: Your next dose is: Take 1 Tab by mouth two (2) times daily (with meals). 25 mg  
    
   
   
   
  
 * carvedilol 25 mg tablet Commonly known as:  Kaleb Nieto What changed: You were already taking a medication with the same name, and this prescription was added. Make sure you understand how and when to take each. Your last dose was: Your next dose is: Take 1 Tab by mouth two (2) times daily (with meals). 25 mg  
    
   
   
   
  
 furosemide 20 mg tablet Commonly known as:  LASIX What changed:   
- how much to take 
- how to take this - when to take this 
- additional instructions Your last dose was: Your next dose is:    
   
   
 1/2 tab po daily  
     
   
   
   
  
 omeprazole 20 mg capsule Commonly known as:  PRILOSEC What changed:  when to take this Your last dose was: Your next dose is: Take 1 Cap by mouth two (2) times a day. 20 mg  
    
   
   
   
  
 traZODone 50 mg tablet Commonly known as:  Tuyet Bonilla What changed:   
- when to take this 
- reasons to take this Your last dose was: Your next dose is: Take 1 Tab by mouth nightly. 50 mg  
    
   
   
   
  
 * Notice: This list has 2 medication(s) that are the same as other medications prescribed for you. Read the directions carefully, and ask your doctor or other care provider to review them with you. CONTINUE taking these medications Instructions Each Dose to Equal  
 Morning Noon Evening Bedtime  
 apixaban 2.5 mg tablet Commonly known as:  Verl Nailer Your last dose was: Your next dose is: Take 1 Tab by mouth two (2) times a day. 2.5 mg  
    
   
   
   
  
 aspirin delayed-release 81 mg tablet Your last dose was: Your next dose is: Take  by mouth daily. calcium carbonate 500 mg calcium (1,250 mg) tablet Commonly known as:  OS-CAN Your last dose was: Your next dose is:    
   
   
 take 1 Tab by mouth two (2) times a day. 1 Tab  
    
   
   
   
  
 gabapentin 600 mg tablet Commonly known as:  NEURONTIN Your last dose was: Your next dose is: Take 1 Tab by mouth three (3) times daily.   
 600 mg  
    
   
   
   
  
 levothyroxine 25 mcg tablet Commonly known as:  SYNTHROID Your last dose was: Your next dose is: Take 1 Tab by mouth Daily (before breakfast). 25 mcg  
    
   
   
   
  
 lisinopril 20 mg tablet Commonly known as:  Velora Vitaliy Your last dose was: Your next dose is: Take 1 Tab by mouth daily. 20 mg  
    
   
   
   
  
 nitroglycerin 0.4 mg SL tablet Commonly known as:  NITROSTAT Your last dose was: Your next dose is:    
   
   
 1 Tab by SubLINGual route every five (5) minutes as needed. Indications: ANGINA  
 0.4 mg  
    
   
   
   
  
 senna-docusate 8.6-50 mg per tablet Commonly known as:  Sherran Fan Your last dose was: Your next dose is: Take 1 Tab by mouth nightly. 1 Tab  
    
   
   
   
  
 traMADol 50 mg tablet Commonly known as:  ULTRAM  
   
Your last dose was: Your next dose is: Take 1 Tab by mouth every six (6) hours as needed. Max Daily Amount: 200 mg.  
 50 mg Where to Get Your Medications These medications were sent to [facility], [address] AT [facility], [address] [place] [address]. Phone:  [phone]  
  amitriptyline 10 mg tablet  
 apixaban 2.5 mg tablet  
 carvedilol 25 mg tablet  
 ferrous sulfate 325 mg (65 mg iron) tablet  
 furosemide 20 mg tablet  
 lisinopril 20 mg tablet Saccharomyces boulardii 250 mg capsule Information on where to get these meds will be given to you by the nurse or doctor. ! Ask your nurse or doctor about these medications  
  traMADol 50 mg tablet Opioid Education  Prescription Opioids: What You Need to Know: 
 
 
 
F-face looks uneven A-arms unable to move or move unevenly S-speech slurred or non-existent T-time-call 911 as soon as signs and symptoms begin-DO NOT go Back to bed or wait to see if you get better-TIME IS BRAIN. Warning Signs of HEART ATTACK Call 911 if you have these symptoms: 
? Chest discomfort. Most heart attacks involve discomfort in the center of the chest that lasts more than a few minutes, or that goes away and comes back. It can feel like uncomfortable pressure, squeezing, fullness, or pain. ? Discomfort in other areas of the upper body. Symptoms can include pain or discomfort in one or both arms, the back, neck, jaw, or stomach. ? Shortness of breath with or without chest discomfort. ? Other signs may include breaking out in a cold sweat, nausea, or lightheadedness. Don't wait more than five minutes to call [address]! Fast action can save your life. Calling 911 is almost always the fastest way to get lifesaving treatment. Emergency Medical Services staff can begin treatment when they arrive  up to an hour sooner than if someone gets to the hospital by car. The discharge information has been reviewed with the            . The            verbalized understanding. Discharge medications reviewed with the          and appropriate educational materials and side effects teaching were provided. ___________________________________________________________________________________________________________________________________ ACO Transitions of Care Introducing [employer] [address] Diann Reyes offers a voluntary care coordination program to provide high quality service and care to [facility] fee-for-service beneficiaries. Fransico Elena was designed to help you enhance your health and well-being through the following services: ? Transitions of Care  support for individuals who are transitioning from one care setting to another (example: Hospital to home). ? Chronic and Complex Care Coordination  support for individuals and caregivers of those with serious or chronic illnesses or with more than one chronic (ongoing) condition and those who take a number of different medications. If you meet specific medical criteria, a [address] [place] [address] Rd may call you directly to coordinate your care with your primary care physician and your other care providers. For questions about the [facility] programs, please, contact your physicians office. For general questions or additional information about Accountable Care Organizations: 
Please visit www.medicare.gov/acos. html or call 1-800-MEDICARE (1-594.225.2012) TTY users should call 2-486.599.9582. Caviar Announcement We are excited to announce that we are making your provider's discharge notes available to you in Caviar. You will see these notes when they are completed and signed by the physician that discharged you from your recent hospital stay. If you have any questions or concerns about any information you see in OptiNosehart, please call the Health Information Department where you were seen or reach out to your Primary Care Provider for more information about your plan of care. Introducing [facility]! Dear Merritt Hensley: Thank you for requesting a Caviar account. Our records indicate that you already have an active Caviar account. You can access your account anytime at [email or web address] Did you know that you can access your hospital and ER discharge instructions at any time in [employer] MediaGr[employer]pt? You can also review all of your test results from your hospital stay or ER visit. Additional Information If you have questions, please visit the Frequently Asked Questions section of the EZ-Ticket website at [email or web address] [email or web address] Remember, EZ-Ticket is NOT to be used for urgent needs. For medical emergencies, dial 911. Now available from your iPhone and Android! Introducing Ricci Mix As a [facility] patient, I wanted to make you aware of our electronic visit tool called Ricci Mix. Shoutlet 24/7 allows you to connect within minutes with a medical provider 24 hours a day, seven days a week via a mobile device or tablet or logging into a secure website from your computer. You can access Ricci Mix from anywhere in the [place]. A virtual visit might be right for you when you have a simple condition and feel like you just dont want to get out of bed, or cant get away from work for an appointment, when your regular [facility] provider is not available (evenings, weekends or holidays), or when youre out of town and need minor care. Electronic visits cost only $49 and if the [facility] provider determines a prescription is needed to treat your condition, one can be electronically transmitted to a nearby pharmacy*. Please take a moment to enroll today if you have not already done so. The enrollment process is free and takes just a few minutes. To enroll, please download the Shoutlet 24/7 sanjay to your tablet or phone, or visit [email or web address] to enroll on your computer. And, as an [address] [place] [address] patient with a Pigit account, the results of your visits will be scanned into your electronic medical record and your primary care provider will be able to view the scanned results.    
We urge you to continue to see your regular [facility] provider for your ongoing medical care. And while your primary care provider may not be the one available when you seek a Ricci Bellfin virtual visit, the peace of mind you get from getting a real diagnosis real time can be priceless. For more information on Ricci Bellfin, view our Frequently Asked Questions (FAQs) at [email or web address]. Sincerely, 
 
Jayden Onofre MD 
Chief Medical Officer Carl Reyes *:  certain medications cannot be prescribed via [employer] Unresulted Labs-Please follow up with your PCP about these lab tests Order Current Status PROTEIN ELEC WITH BRI, SERUM In process SOLUBLE TRANSFERRIN RECEPTOR In process Providers Seen During Your Hospitalization Provider Specialty Primary office phone Elaine Scruggs MD Physical Medicine and Rehab [phone] Your Primary Care Physician (PCP) Primary Care Physician Office Phone Office Fax Zari Nadine [phone] You are allergic to the following Allergen Reactions Tylenol (Acetaminophen) Unknown (comments)  
 insomnia Recent Documentation OB Status Smoking Status Postmenopausal Never Smoker Emergency Contacts Name Discharge Info Relation Home Work Mobile Colby Pfeiffer  Daughter [21] [phone] Tosin Adams  Son [22] [phone] MalenaMira DISCHARGE CAREGIVER [3] Daughter [21]   [phone] Patient Belongings The following personal items are in your possession at time of discharge: 
     Visual Aid: Glasses                  Other Valuables: None Please provide this summary of care documentation to your next provider. Signatures-by signing, you are acknowledging that this After Visit Summary has been reviewed with you and you have received a copy.   
  
 
  
    
    
 Patient Signature: ____________________________________________________________ Date:  ____________________________________________________________  
  
Vahe Ott Provider Signature:  ____________________________________________________________ Date:  ____________________________________________________________

## 2018-04-30 NOTE — H&P
[address], [address] [facility]  Tel: [phone]  Fax: [phone]      HISTORY AND PHYSICAL  IRC       Admit Date: 4/30/2018  Surgeon: Dr Stephanie Vo  Primary Care Physician: Marce Bell MD  Specialty Group: Cardiology    Chief Complaint : Gait dysfunction secondary to below. Admit Diagnosis: left feneseal embolictomy; Physical debility  (left femoral embolectomy)  Acute Rehab Dx:  Gait impairment/ gait dysfunction  Debility    deconditioning  Mobility and ambulation deficits  Self Care/ADL deficits    Medical Dx:  Past Medical History:   Diagnosis Date    A fib     Abnormal loss of weight     Acquired hypothyroidism 9/12/2012    Acute respiratory failure with hypoxemia ([address] 8/12/2017    Anemia     Anorexia 9/2/2014    Arthritis associated with another disorder     Atrial fibrillation ([address] 9/12/2012    Paroxysmal.  Coumadin contraindicated due to falls      Autonomic neuropathy 1/13/2013    CAD (coronary artery disease)     CAD in native artery 5/5/2016    CAP (community acquired pneumonia) 8/12/2017    Cervical radiculopathy 5/23/2013    Chest pain     Chronic kidney disease     Chronic pain syndrome 1/13/2013    Back, right shoulder, neck: Peripheral neuropathy, spinal stenosis C7-T1, foraminal narrowing C4-5     CKD (chronic kidney disease), stage III 9/12/2012    Diabetes ([address]     about 30 years    Diabetes mellitus     does not check sugar     Diabetes mellitus, type 2 ([address] 9/12/2012    Dysphagia 9/2/2014    Due to esophageal spasm seen on modified barium swallow 2015     Gait disorder 9/12/2012    GERD (gastroesophageal reflux disease)     Heart failure ([address]     HTN     Hypertension 9/12/2012    Orthostatic hypotension due to autonomic neuropathy     Hypothyroid     IBS (irritable bowel syndrome) 9/12/2012    Iron deficiency 9/12/2012    Lumbar disc disease     MCI (mild cognitive impairment) 1/13/2013    Mixed hyperlipidemia 9/12/2012    Orthostatic hypotension 5/5/2016    Pacemaker     SSS (sick sinus syndrome) ([facility] 5/5/2016       Date of Evaluation:  April 30, 2018    HPI: Michelle Ibarra is a 80 y.o. female patient at [facility] who was admitted on 4/30/2018  by Yumiko Stringer MD with below mentioned medical history ,is being seen for Physical Medicine and Rehabilitation. HPI; Ms. Monika Watson is a pleasant, functionally mod I, 79yo female with significant comorbidities including a fib, cervical myelopathy, DM, CAD, neuropathy, SSS s/p pacer, CHF, CKD and dysphagia/wt loss who had presented to the ED in the beginning of April with LLE pain. She was diagnosed with sciatica and placed on a steroid taper. She f/u with her PCP and was on neurontin for neuropathic pain due to sciatica. All with no relief. In fact, she only worsened. She could not ambulate and had a few falls. She presented to the ED once again on 4/24 from an Urgent Care facility and was found to have an occlusion of the left femoral artery on US. She was taken to the operating room on 4/25 and had left femoral artery embolectomy. The organized thrombus was extracted from the left CFA/PFA/SFA with excellent inflow and back bleeding after the embolectomy. Postop she has been very lethargic and minimally responsive until this a.m. It was felt to be due to pain meds and anesthesia. The dietician has evaluated the pt due to wt loss, was 100lb a year ago and 88lbs now. She lost her  of 74years last month. This may have played a role as well as recent pain and inactivity due to that. She is hypertensive and hypoxic without 3-5L O2 supplement at this time. Her hbg dropped a gram after surgery and is being monitored. She also has noted Hyponatremia with a Na of 133-134.  Creat was 1.40 at time of my consult but its currently 1.18. , A lipid profile was performed that shows a triglyceride of 168, chol 170, HDL 62 and LDL of 74. The pt was seen by Cardiology due to chronic a fib. She has not been on anticoagulation due to fall risk. It was decided to start her on Eliquis 2.5 bid. Her Na has improved to 135, K 3.9, hbg is low at 9.0 from 11.7    Physical therapy was initiated and patient was starting to mobilize. However, she shows  functional deficits due to prolonged immobility and hospitalization. Our service was consulted for rehab needs and we recommended inpatient hospital rehabilitation is reasonable and necessary due to ongoing acute medical issues which have not changed since initial pre-admission evaluation. and rehab needs still likely best managed in IRU setting. The patient was evaluated by [facility] admissions coordinators. I reviewed the preadmission screening and have approved for admission to the [facility]. The patient was cleared for transfer to rehab today. Patient continues to have ongoing  medical issues outlined above requiring physician medical supervision and functional deficits which would benefit from continued intensive therapies. Current Level of Function: (evaluated by acute therapy staff, with bed mobility, transfers, balance personally observed post-admission in the IRF setting minutes prior to submission of document) min assist for transfers, CGA with bed mobility, amb 70ft RW CGA to min A .  She is min A with ADLS    Prior Level of Function/Home Situation:   Home Environment: Private residence  # Steps to Enter: 0  One/Two Story Residence: One story  Living Alone: Yes  Support Systems: Child(malia), Family member(s)  Patient Expects to be Discharged to[de-identified] Rehabilitation facility  Current DME Used/Available at Home: [address], rollator, Grab bars  Tub or Shower Type: Shower  Prior Level of Function/Work/Activity:  Independent to mod I with ADLs    Past Medical History:   Diagnosis Date    A fib     Abnormal loss of weight     Acquired hypothyroidism 9/12/2012    Acute respiratory failure with hypoxemia ([address] 8/12/2017    Anemia     Anorexia 9/2/2014    Arthritis associated with another disorder     Atrial fibrillation ([address] 9/12/2012    Paroxysmal.  Coumadin contraindicated due to falls      Autonomic neuropathy 1/13/2013    CAD (coronary artery disease)     CAD in native artery 5/5/2016    CAP (community acquired pneumonia) 8/12/2017    Cervical radiculopathy 5/23/2013    Chest pain     Chronic kidney disease     Chronic pain syndrome 1/13/2013    Back, right shoulder, neck: Peripheral neuropathy, spinal stenosis C7-T1, foraminal narrowing C4-5     CKD (chronic kidney disease), stage III 9/12/2012    Diabetes ([address]     about 30 years    Diabetes mellitus     does not check sugar     Diabetes mellitus, type 2 ([address] 9/12/2012    Dysphagia 9/2/2014    Due to esophageal spasm seen on modified barium swallow 2015     Gait disorder 9/12/2012    GERD (gastroesophageal reflux disease)     Heart failure ([address]     HTN     Hypertension 9/12/2012    Orthostatic hypotension due to autonomic neuropathy     Hypothyroid     IBS (irritable bowel syndrome) 9/12/2012    Iron deficiency 9/12/2012    Lumbar disc disease     MCI (mild cognitive impairment) 1/13/2013    Mixed hyperlipidemia 9/12/2012    Orthostatic hypotension 5/5/2016    Pacemaker     SSS (sick sinus syndrome) ([address] 5/5/2016      Past Surgical History:   Procedure Laterality Date    HX APPENDECTOMY      HX CARPAL TUNNEL RELEASE      HX CERVICAL FUSION      HX COLONOSCOPY  Nov 2005    HX PACEMAKER  6/08    HX PACEMAKER  2017    Replacement      Allergies   Allergen Reactions    Tylenol [Acetaminophen] Unknown (comments)     insomnia      No family history on file.    Social History   Substance Use Topics    Smoking status: Never Smoker    Smokeless tobacco: Never Used    Alcohol use No      Current Facility-Administered Medications   Medication Dose Route Frequency    acetaminophen (TYLENOL) tablet 500 mg  500 mg Oral Q4H PRN    diphenhydrAMINE (BENADRYL) capsule 25 mg  25 mg Oral Q4H PRN    apixaban (ELIQUIS) tablet 2.5 mg  2.5 mg Oral BID    [START ON 5/1/2018] aspirin delayed-release tablet 81 mg  81 mg Oral DAILY    calcium carbonate (OS-CAN) tablet 500 mg [elemental]  500 mg Oral BID    carvedilol (COREG) tablet 25 mg  25 mg Oral BID WITH MEALS    [START ON 5/1/2018] furosemide (LASIX) tablet 20 mg  20 mg Oral DAILY    gabapentin (NEURONTIN) capsule 600 mg  600 mg Oral TID    [START ON 5/1/2018] levothyroxine (SYNTHROID) tablet 25 mcg  25 mcg Oral ACB    [START ON 5/1/2018] lisinopril (PRINIVIL, ZESTRIL) tablet 20 mg  20 mg Oral DAILY    nitroglycerin (NITROSTAT) tablet 0.4 mg  0.4 mg SubLINGual PRN    ondansetron (ZOFRAN ODT) tablet 4 mg  4 mg Oral Q6H PRN    [START ON 5/1/2018] pantoprazole (PROTONIX) tablet 40 mg  40 mg Oral ACB    senna-docusate (PERICOLACE) 8.6-50 mg per tablet 1 Tab  1 Tab Oral QHS    traMADol (ULTRAM) tablet 50 mg  50 mg Oral Q6H PRN    traZODone (DESYREL) tablet 50 mg  50 mg Oral QHS       Review of Systems:  Denies: fevers, chills, sweats, malaise, Positive; anorexia, weight loss, fatigue  Denies: blurry vision,  eye pain, photophobia Positive; vision loss with age  Denies: ringing in the ears, earache, epistaxis Positive; hearing loss  Denies: chest pain, palpitations, syncope, orthopnea, paroxysmal nocturnal dyspnea, resolved claudication   Denies: odynophagia, nausea, vomiting, diarrhea, constipation, abdominal pain, jaundice, melena Positive; dysphagia  Denies: dysuria, nocturia, urinary incontinence, stones, hematuria Positive; urinary frequence  Denies: polydipsia/polyuria, skin changes, temperature intolerance, unexpected weight gain   Denies: joint pain, joint swelling, muscle pain, muscle weakness Positive; back and neck pain  Denies: bleeding problems, blood transfusions, bruising, pallor, swollen lymph nodes   Denies: headache, dysarthria, blurred vision, diplopia,seizure, focal deficits. Objective:     Visit Vitals    /76    Pulse 75    Temp 97.3 °F (36.3 °C)    Resp 16    SpO2 99%      Physical Exam:   Psych: Patient was oriented to person, place, and time. Without hallucinations, abnormal affect or abnormal behaviors during the examination. +[place]   General:   Alert, appears stated age, No acute distress. HEENT:  Normocephalic, EOMI, facial symmetry  Intact. Oral mucosa pink and moist. No nasal discharge. Lungs:  CTA Bilaterally,Respiration even and unlabored   No apparent use of accessory muscles for respiration. No nasal alar flaring. Heart:  Regular rate and rhythm, S1, S2, No obvious murmur, click, rub or gallop. Genitourinary: defered   Abdomen:  Soft, non-tender to palpation in all four quadrants. Bowel sounds present. No obvious masses palpated. Neuromuscular:  PERRL, EOMI  Generalized prox>distal weakness.  Dec light touch distally in LEs, poor proprioception  Sensory - intact  Plantars - down going  DTR 1 symm     Skin:  Intact, dry, good skin turgor, age related changes present   Edema: trace   Incision:  Clean, dry , intact        Lab Review:    Recent Results (from the past 72 hour(s))   METABOLIC PANEL, BASIC    Collection Time: 04/28/18  4:58 AM   Result Value Ref Range    Sodium 135 (L) 136 - 145 mmol/L    Potassium 3.9 3.5 - 5.1 mmol/L    Chloride 99 98 - 107 mmol/L    CO2 29 21 - 32 mmol/L    Anion gap 7 7 - 16 mmol/L    Glucose 98 65 - 100 mg/dL    BUN 16 8 - 23 MG/DL    Creatinine 1.18 (H) 0.6 - 1.0 MG/DL    GFR est AA 55 (L) >60 ml/min/1.73m2    GFR est non-AA 46 (L) >60 ml/min/1.73m2    Calcium 8.5 8.3 - 10.4 MG/DL   CBC WITH AUTOMATED DIFF    Collection Time: 04/28/18  4:58 AM   Result Value Ref Range    WBC 5.5 4.3 - 11.1 K/uL    RBC 2.99 (L) 4.05 - 5.25 M/uL    HGB 9.0 (L) 11.7 - 15.4 g/dL    HCT 27.2 (L) 35.8 - 46.3 %    MCV 91.0 79.6 - 97.8 FL    MCH 30.1 26.1 - 32.9 PG    MCHC 33.1 31.4 - 35.0 g/dL RDW 12.6 11.9 - 14.6 %    PLATELET 926 385 - 437 K/uL    MPV 10.0 (L) 10.8 - 14.1 FL    DF AUTOMATED      NEUTROPHILS 58 43 - 78 %    LYMPHOCYTES 22 13 - 44 %    MONOCYTES 16 (H) 4.0 - 12.0 %    EOSINOPHILS 4 0.5 - 7.8 %    BASOPHILS 0 0.0 - 2.0 %    IMMATURE GRANULOCYTES 0 0.0 - 5.0 %    ABS. NEUTROPHILS 3.2 1.7 - 8.2 K/UL    ABS. LYMPHOCYTES 1.2 0.5 - 4.6 K/UL    ABS. MONOCYTES 0.9 0.1 - 1.3 K/UL    ABS. EOSINOPHILS 0.2 0.0 - 0.8 K/UL    ABS. BASOPHILS 0.0 0.0 - 0.2 K/UL    ABS. IMM. GRANS. 0.0 0.0 - 0.5 K/UL   PLEASE READ & DOCUMENT PPD TEST IN 24 HRS    Collection Time: 04/29/18  9:45 AM   Result Value Ref Range    PPD neg Negative    mm Induration 0 mm   PLEASE READ & DOCUMENT PPD TEST IN 48 HRS    Collection Time: 04/30/18 10:07 AM   Result Value Ref Range    PPD negative Negative    mm Induration 0mm mm     Condition on Admission: Good      Assessment: Physical Debility s/p ischemic LLE s/p Left femoral artery embolectomy    The Post Assessment Physician Evaluation (NIKI) found the current functional status to be comparable with the Pre-admission Screening. The Patient is a good candidate for acute inpatient rehabilitation. Nothing since the Pre-admission screen has changed that determination. Rehabilitation Plan  The patient has shown the ability to tolerate and benefit from 3 hours of therapy daily and is being admitted to a comprehensive acute inpatient rehabilitation program consisting of at least 3 hours of combined physical and occupational therapies. Begin intensive Physical Therapy for a minimum of 1.5 hours a day, at least 5 out of 7 days per week to address bed mobility, transfers, ambulation, strengthening, balance, and endurance. Begin intensive Occupational Therapy for a minimum of 1.5 hours a day, at least 5 out of 7 days per week to address ADL ( bathing, LE dressing, toileting) and adaptive equipment as needed.     The patient will also require 24-hour skilled rehabilitation nursing for bowel and bladder management, skin care for decubitus ulcer prevention , pain management and ongoing medication administration     ST for complaints of dysphagia with subsequent anorexia and wt loss    Continue daily physician medical management:  Pneumonia prophylaxis- Incentive spirometer every hour while awake. Aspiration precautions    Dysphagia; consider MBS; will have ST eval. HOB > 30d    Post op anemia due to blood loss; gi prophylaxis, monitor wound; check stool. Check iron studies as well    DVT risk / DVT Prophylaxis- Will require daily physician exam to assess for signs and symptoms as patient is at increased risk for of thromboembolism. Mobilization as tolerated. Intermittent pneumatic compression devices when in bed Thigh-high or knee-high thromboembolic deterrent hose when out of bed. Eliquis/ASA    Cervical stenosis with autonomic dysfxn; orthostatic hypotension; monitor with therapies. Cont TEDs    Pain Control: stable, mild-to-moderate joint symptoms intermittently, reasonably well controlled by PRN meds. Will require regular pain assessment and comprenhensive pain management,   -pt with vascular pain and hypersensitivity to LLE; cont Neurontin, tolerating 600mg tid; does not wake her up at noc; cont ultram as well    Hypocalcemia; cont Oscal and add Vit D. Will see when pt had last bone density scan. F/u with PCP    Wound Care: Monitor wound status daily per staff and physician. At risk for failure. Will require 24/7 rehab nursing. Keep wound clean and dry, Reinforce dressing PRN and Ice to area for comfort    Hypertension - BP uncontrolled, fluctuating, managed medically. Cont Coreg, lasix, zestril     RUSS ; monitor labs. Check UA due to [place]. Improving as of 4/28; no labs since that time; may need to consider adjusting Lasix and zestril    Hypothyroidism; cont synthroid    Urinary retention/ neurogenic bladder - schedule voids q6-8 hrs. Check post-void residual as needed;  In and Out catheterize if post-void residual is more than 400 cc.    bowel program - add prn bowel program    GERD - resume PPI. At times may need additional antacids, Maalox prn. The patient's prognosis for significant practical improvement within a reasonable period of time appears good and the estimated length of stay is 10 days and he is expected to return home with spouse and continued rehabilitation with home health therapy. Given the patient's complex neurologic/medical condition and the risk of further medical complications including: DVT, PE, skin breakdown, pneumonia due to decreased mobility,   infection at surgical site , CVA, MI, cardiac arrhythmias due to HTN, increased risk of thromboembolism secondary to decreased blood volume and increased energy expenditure in a patient with known acute blood loss could potentially impact the IRF program with decreased cardiovascular efficiency, postural hypotension and cardiac arrhythmia. For these ongoing medical issues, rehabilitation services could not be safely provided at a lower level of care such as a skilled nursing facility or nursing home.         Signed By: Marshia Goltz, MD     April 30, 2018

## 2018-04-30 NOTE — PROGRESS NOTES
Spoke with Tianna Bailey, liaison with [facility]/9th floor, states pt can d/c to 9th today if MD ready. Carly Mosher RN on [facility] notified.

## 2018-04-30 NOTE — DISCHARGE INSTRUCTIONS
MD Instructions:   Wound care:  - Wash groin wound daily with liquid Dial soap (or other liquid antibacterial soap); use fingers or soft washcloth gently then pat area dry. - Keep incision clean and dry, may cover with gauze. - Do not apply lotions, creams or ointments to incisions.  - Glue will fall off on its own. Diet:  - As tolerated before surgery. Activity:  - PT/OT per facility staff. - Keep legs propped up when not walking.  - No driving while taking narcotics. - Do not drink alcohol while taking narcotics. - May shower - no tub baths, soaking or swimming. Medications:  - Use prescription pain medications if needed; if you do not wish to use narcotic pain medication or require less pain control, you may take acetaminophen (Tylenol, for example) or naproxen (Aleve, for example) following instructions on the label.  - Resume other home medications.     Follow up in the office with Dr. Chanel Durham on 5/23/2018 at 9:45 AM.    If problems or questions arise, please call our office at [phone].

## 2018-04-30 NOTE — PROGRESS NOTES
[facility] OCCUPATIONAL THERAPY INITIAL EVALUATION    Time In: 3039  Time Out: 1433    Precautions: Falls, Bed Alarm and existing pacemaker    Vitals: Patient Vitals for the past 8 hrs:   Temp Pulse Resp BP SpO2   04/30/18 1130 97.3 °F (36.3 °C) 75 16 137/76 99 %         Pain: Patient rated pain 0 out of 10. History of Presenting Illness (per previous reports): Ms. Maranda Lu is a pleasant, functionally mod I, 81yo female with significant comorbidities including a fib, cervical myelopathy, DM, CAD, neuropathy, SSS s/p pacer, CHF, CKD and dysphagia/wt loss who had presented to the ED in the beginning of April with LLE pain. She was diagnosed with sciatica and placed on a steroid taper. She f/u with her PCP and was on neurontin for neuropathic pain due to sciatica. All with no relief. In fact, she only worsened. She could not ambulate and had a few falls. She presented to the ED once again on 4/24 from an Urgent Care facility and was found to have an occlusion of the left femoral artery on US. She was taken to the operating room on 4/25 and had left femoral artery embolectomy. The organized thrombus was extracted from the left CFA/PFA/SFA with excellent inflow and back bleeding after the embolectomy. Postop she has been very lethargic and minimally responsive until this a.m. It was felt to be due to pain meds and anesthesia. The dietician has evaluated the pt due to wt loss, was 100lb a year ago and 88lbs now. She lost her  of 74years last month. This may have played a role as well as recent pain and inactivity due to that. She is hypertensive and hypoxic without 3-5L O2 supplement at this time. Her hbg dropped a gram after surgery and is being monitored. She also has noted Hyponatremia with a Na of 133-134. Creat is 1.40, A lipid profile was performed that shows a triglyceride of 168, chol 170, HDL 62 and LDL of 74. The pt was seen by Cardiology due to chronic a fib.  She has not been on anticoagulation due to fall risk. It was decided to start her on Eliquis 2.5 bid. Past Medical History/ Co-morbidities:   Past Medical History:   Diagnosis Date    A fib     Abnormal loss of weight     Acquired hypothyroidism 9/12/2012    Acute respiratory failure with hypoxemia (HCC) 8/12/2017    Anemia     Anorexia 9/2/2014    Arthritis associated with another disorder     Atrial fibrillation ([address] 9/12/2012    Paroxysmal.  Coumadin contraindicated due to falls      Autonomic neuropathy 1/13/2013    CAD (coronary artery disease)     CAD in native artery 5/5/2016    CAP (community acquired pneumonia) 8/12/2017    Cervical radiculopathy 5/23/2013    Chest pain     Chronic kidney disease     Chronic pain syndrome 1/13/2013    Back, right shoulder, neck: Peripheral neuropathy, spinal stenosis C7-T1, foraminal narrowing C4-5     CKD (chronic kidney disease), stage III 9/12/2012    Diabetes ([address]     about 30 years    Diabetes mellitus     does not check sugar     Diabetes mellitus, type 2 ([address] 9/12/2012    Dysphagia 9/2/2014    Due to esophageal spasm seen on modified barium swallow 2015     Gait disorder 9/12/2012    GERD (gastroesophageal reflux disease)     Heart failure ([address]     HTN     Hypertension 9/12/2012    Orthostatic hypotension due to autonomic neuropathy     Hypothyroid     IBS (irritable bowel syndrome) 9/12/2012    Iron deficiency 9/12/2012    Lumbar disc disease     MCI (mild cognitive impairment) 1/13/2013    Mixed hyperlipidemia 9/12/2012    Orthostatic hypotension 5/5/2016    Pacemaker     SSS (sick sinus syndrome) ([address] 5/5/2016       Patient's Goal: \"I just want to accomplish, so I can move around, do things with my children, grandchildren; get back to where I can go back to [employer]. \"     Previous Level of Function: Previously patient was independent with all ADL tasks. Family completed IADL tasks including cooking/cleaning/medication/finances. Patient ambulated with a rollator. Patient endorses multiple falls in the past 6 months.      [address]    Lives Alone Yes    Support Systems Child(malia)    Shower Situation Shower    Current DME Walker, rollator, Commode, bedside, Shower chair, Grab bars, Cane, straight    Lift Chair      Stairs to Enter 0       Rails         W/C Ramp No    Interior Steps        Upper Extremity Function   ISA CIFUENTES   FMC  Intact  Intact   GMC  Intact  Intact   Light Touch Severe deficit Severe deficit   Sharp/Dull Discrimination Not tested Not tested   Hot/Cold No apparent deficit No apparent deficit   Proprioception No apparent deficit No apparent deficit   Stereognosis No apparent deficit No apparent deficit   9 Hole Peg Test  (50 seconds) Normal (38 seconds)   General Comments        ISA CIFUENTES   General Evalutaion       AROM Within defined limits Within defined limits   Shoulder Flexion 4 4   Shoulder Extension  4 4   Shoulder Abduction 4 4   Shoulder Adduction 4 4   Elbow Flexion 4+ 4+   Elbow Extension  4 4   Wrist Flexion/Extension        4- (18 pounds) 4 (22 pounds)   General Comments     0/5 No palpable muscle contraction  1/5 Palpable muscle contraction, no joint movement  2-/5 Less than full range of motion in gravity eliminated position  2/5 Able to complete full range of motion in gravity eliminated position  2+/5 Able to initiate movement against gravity  3-/5 More than half but not full range of motion against gravity  3/5 Able to complete full range of motion against gravity  3+/5 Completes full range of motion against gravity with minimal resistance  4-/5 Completes full range of motion against gravity with minimal-moderate resistance  4/5 Completes full range of motion against gravity with moderate resistance  4+/5 Completes full range of motion against gravity with moderate-maximum resistance  5/5 Completes full range of motion against gravity with maximum resistance    Cognition Performance Comments   Orientation Level Disoriented to time    Comprehension Level 6 ([place] Mode: Auditory  Hearing Aid:Right  Glasses:Glasses   Expression (Native Language) 7 Mode: Verbal      Social Interaction/Pragmatics 7 pleasant and cooperative    Problem Solving 5 solves routine problems 91-99% of the time    Memory 5 executes 3/3 steps; recalled 2/3 words    Comments       Activities of Daily Living    Score Comments   Self-Feeding 5 setup assist   Grooming 4 Tasks completed by patient: Brushed hair, Washed face, Washed hands, Brushed teeth  CGA in standing; assist with hair    Bathing 4 Body Parts Bathe: Abdomen, Arm, left, Arm, right, Buttocks, Chest, Lower leg and foot, left, Lower leg and foot, right, [place] area, Thigh, left, Thigh, right  min A for balance in standing    Tub/Shower Transfer [place] 4 Type of Shower: Shower  Adaptive  Equipment:Tub transfer bench and Grab bars   Upper Body  Dressing/Undressing 5 Items Applied:Pullover (4 steps)  setup assist    Lower Body Dressing/Undressing 3 Items Applied:Elastic waist pants (3 steps), Sock, left (1 step), Sock, right (1 step), Underpants (3 steps), Fasten shoe (1 step), Shoe, left (1 step), Shoe, right (1 step)  assist with B socks and shoes; CGA for balance during clothing management up    Toileting 4 CGA during clothing management    Toilet Transfer 3 lifting assist off toilet      Vision/Perception: No changes from pre-morbid function where patient wore glasses and reading glasses. Instrumental Activities of Daily Living   Performance Comments   Meal Preperation Total assistance    Homemaking Total assistance    Medication Management Total assistance    Financial Management Total assistance        Session: Patient in recliner on arrival to room and agreeable to OT evaluation. Completed ADL; see above for details. Patient's daughter present in room for duration of ADL. Patient completed all mobility around room with CGA and HHA.  Patient did experience LOB to posterior in shower requiring min A to correct. Patient with noted unsteadiness during both static and dynamic balance tasks. In gym, completed BUE assessment and short cognitive assessment. Patient recalled 2/3 words and reports it is 80. Patient completed moderately complex cognitive task matching pieces of familiar shapes with moderate verbal cues. Patient able to identify and solve errors with increased time. Patient required toileting three times during session. Provided patient with medium density foam block and educated patient in use to increase  strength with verbalized and demonstrated understanding. Patient was left supine in bed with bed alarm on and call light/all needs in reach. Interdisciplinary Communication: with PT regarding schedule, LOS, and POC     Patient/Family Education: Patient and Family was/were educated On the role of OT, On POC, On IRC expectations and use of call bell, yellow sock policy. Problem List: Activity Tolerance, Visual Perceptual , Safety Awareness, Strength, Pain, Sitting Balance, Standing Balance and Cognition    Functional Limitations: ADL, IADL, Functional Transfers and Functional Mobility    Goals: Please see Care Plan    OT order received and chart reviewed. OT orders have been acknowledged. Patient will benefit from skilled OT services to address ADL, functional transfers, UE strength, UE coordination, balance, cognition and activity tolerance to maximize functional performance with daily self-care tasks and functional mobility. Treatment is likely to include ADL, Balance, Strength, Activity tolerance, Visual perceptual, Cognitive, DME, AE, Family  and Safety awareness training to increase independence with self-care. Patient will be seen for 1.5-2 hours of skilled OT services 5-6 days a week.      Rico Thompson MS, OTR/L  4/30/2018

## 2018-04-30 NOTE — PROGRESS NOTES
Progress Note    Patient: Sorin Echevarria MRN: [id number]  SSN: [id number]    YOB: 1927  Age: 80 y.o. Sex: female      Admission Date: 4/25/2018    LOS: 5 days     5 Days Post-Op    PROCEDURE(S):  Procedure(s):  Left femoral artery embolectomy    Subjective:     Patient has no complaints, ready to go to [facility] rehab. Objective:     Vitals:    04/29/18 2224 04/30/18 0207 04/30/18 0209 04/30/18 0705   BP: 165/86 179/79  187/85   Pulse: 75 75  75   Resp: 18 18  16   Temp: 97 °F (36.1 °C) 98.2 °F (36.8 °C)  98.9 °F (37.2 °C)   SpO2: 98% 96%  98%   Weight:   100 lb 3.2 oz (45.5 kg)         Physical Exam:   GENERAL: alert, cooperative, no distress  EYE: EOM intact, no nystagmus  LUNG: clear to auscultation bilaterally, normal respiratory effort  HEART: regular rate and rhythm  ABDOMEN: soft, nontender, nondistended, bowel sounds normoactive  EXTREMITIES: warm, normal ROM; left 1st toe still mildly erythematous, sensorineuro intact; left groin incision intact with skin glue, mildly tender with no erythema / induration / drainage; left leg anterior shin as before  PULSES: radial and brachial palpable 2+ and symmetric bilaterally; right PT and DP palpable, left pedal signals easily obtained with Doppler    Lab/Data Review: All lab results for the last 24 hours reviewed. Assessment / Plan:     Principal Problem:    Femoral artery occlusion, left ([address] (4/25/2018)    Active Problems:    Atrial fibrillation ([address] (8/12/2017)      Chronic systolic congestive heart failure ([address] (4/26/2018)        Discharge to [facility] today      Signed By: Fernando Yancey PA-C    April 30, 2018      Physician Assistant with Vascular Surgery Dewayne Luz MD / Rasta Elizondo.  Anu Lopez MD / Prasanth Burns MD

## 2018-04-30 NOTE — DISCHARGE SUMMARY
[address] [facility]          Physician Discharge Summary     Patient: Adria Bar MRN: [id number]  SSN: [id number]    YOB: 1927  Age: 80 y.o.   Sex: female       Admission Date: 4/25/2018    Discharge Date: 4/30/2018      Admitting Physician: Simon Cabello MD     Discharge Provider: Yonathan Medley PA-C    Admission Diagnoses: Stenosis of left femoral artery [facility]) [I70.202]    Discharge Diagnoses:   Problem List as of 4/30/2018  Date Reviewed: 4/30/2018          Codes Class Noted - Resolved    Chronic systolic congestive heart failure ([facility] ICD-10-CM: I50.22  ICD-9-CM: 428.22, 428.0  4/26/2018 - Present        * (Principal)Femoral artery occlusion, left (HCC) ICD-10-CM: R24.607  ICD-9-CM: 444.22  4/25/2018 - Present        Type 2 diabetes mellitus with nephropathy (HCC) ICD-10-CM: E11.21  ICD-9-CM: 250.40, 583.81  1/25/2018 - Present        S/P AV noris ablation ICD-10-CM: Z98.890  ICD-9-CM: V45.89  1/16/2018 - Present        Biventricular cardiac pacemaker in situ ICD-10-CM: Z95.0  ICD-9-CM: V45.01  1/16/2018 - Present        Diabetes mellitus, type 2 (HCC) (Chronic) ICD-10-CM: E11.9  ICD-9-CM: 250.00  8/12/2017 - Present        Hypertension (Chronic) ICD-10-CM: I10  ICD-9-CM: 401.9  8/12/2017 - Present    Overview Signed 9/12/2012  3:43 PM by Hudson Jay     Orthostatic hypotension due to autonomic neuropathy             CKD (chronic kidney disease), stage III (Chronic) ICD-10-CM: N18.3  ICD-9-CM: 585.3  8/12/2017 - Present        Dysphagia (Chronic) ICD-10-CM: R13.10  ICD-9-CM: 787.20  8/12/2017 - Present    Overview Signed 4/5/2016  5:24 PM by Jayson Jasso MD     Due to esophageal spasm seen on modified barium swallow 2015             Atrial fibrillation ([facility] (Chronic) ICD-10-CM: I48.91  ICD-9-CM: 427.31  8/12/2017 - Present        DNR (do not resuscitate) (Chronic) ICD-10-CM: V51  ICD-9-CM: V49.86 8/12/2017 - Present        Ischemic cardiomyopathy ICD-10-CM: I25.5  ICD-9-CM: 414.8  7/11/2017 - Present        Cardiomyopathy ([facility] ICD-10-CM: I42.9  ICD-9-CM: 425.4  3/30/2017 - Present        Dyspnea ICD-10-CM: R06.00  ICD-9-CM: 786.09  2/25/2017 - Present        Atrial fibrillation with rapid ventricular response ([facility] ICD-10-CM: I48.91  ICD-9-CM: 427.31  2/25/2017 - Present        SSS (sick sinus syndrome) (HCC) ICD-10-CM: I49.5  ICD-9-CM: 427.81  5/5/2016 - Present        Orthostatic hypotension ICD-10-CM: I95.1  ICD-9-CM: 458.0  5/5/2016 - Present        CAD in native artery ICD-10-CM: I25.10  ICD-9-CM: 414.01  5/5/2016 - Present        Pacemaker ICD-10-CM: Z95.0  ICD-9-CM: V45.01  11/10/2015 - Present        Cervical radiculopathy ICD-10-CM: M54.12  ICD-9-CM: 723.4  5/23/2013 - Present    Overview Addendum 8/26/2013  4:59 PM by Kristel Jenkins MD     Chronic right shoulder pain S/P eval Dr Rey Anthony POC August 2013, previous cervical spinal fusion             Autonomic neuropathy ICD-10-CM: G90.9  ICD-9-CM: 337.9  1/13/2013 - Present    Overview Signed 1/13/2013  8:18 PM by Kristel Jenkins MD     Severe orthostatic hypotension             MCI (mild cognitive impairment) ICD-10-CM: G31.84  ICD-9-CM: 331.83  1/13/2013 - Present    Overview Addendum 12/16/2013  4:43 PM by Kristel Jenkins MD     MMSE 27/30 Nov 2009, 27/30 Dec 2013, remembers 3/3 objects 5 min later and draws normal clock             Chronic pain syndrome ICD-10-CM: G89.4  ICD-9-CM: 338.4  1/13/2013 - Present    Overview Addendum 11/10/2015  5:15 PM by Kristel Jenkins MD     Back, right shoulder, neck: Peripheral neuropathy, spinal stenosis C7-T1, foraminal narrowing C4-5             Acquired hypothyroidism (Chronic) ICD-10-CM: E03.9  ICD-9-CM: 244.9  9/12/2012 - Present        Mixed hyperlipidemia (Chronic) ICD-10-CM: V92.8  ICD-9-CM: 272.2  9/12/2012 - Present    Overview Addendum 9/12/2012  3:51 PM by Eloise Enciso     With high HDL  Intolerant of pravastatin,lovastatin and lescol             GERD (gastroesophageal reflux disease) (Chronic) ICD-10-CM: K21.9  ICD-9-CM: 530.81  9/12/2012 - Present        IBS (irritable bowel syndrome) (Chronic) ICD-10-CM: K58.9  ICD-9-CM: 564.1  9/12/2012 - Present    Overview Signed 9/12/2012  3:52 PM by Emely Clarke     Chronic enema abuse  diverticulosis             Gait disorder (Chronic) ICD-10-CM: R26.9  ICD-9-CM: 781.2  9/12/2012 - Present    Overview Signed 9/12/2012  3:53 PM by Emely Clarke     Diabetic peripheral neuropathy              Atrial fibrillation with RVR ([facility] ICD-10-CM: I48.91  ICD-9-CM: 427.31  9/12/2012 - Present    Overview Addendum 10/14/2012  9:34 AM by Traci Lundberg MD     Paroxysmal.  Coumadin contraindicated due to falls               RESOLVED: RUSS (acute kidney injury) ([facility] ICD-10-CM: N17.9  ICD-9-CM: 584.9  8/13/2017 - 10/19/2017        RESOLVED: Acute respiratory failure with hypoxemia (HCC) ICD-10-CM: J96.01  ICD-9-CM: 518.81  8/12/2017 - 9/15/2017        RESOLVED: CAP (community acquired pneumonia) ICD-10-CM: J18.9  ICD-9-CM: 486  8/12/2017 - 9/15/2017        RESOLVED: CHF (congestive heart failure) ([facility] ICD-10-CM: I50.9  ICD-9-CM: 428.0  3/18/2017 - 4/26/2018        RESOLVED: Altered mental status ICD-10-CM: R41.82  ICD-9-CM: 780.97  3/10/2017 - 10/19/2017        RESOLVED: Altered mental state ICD-10-CM: R41.82  ICD-9-CM: 780.97  3/9/2017 - 10/19/2017        RESOLVED: Chest pain, pleuritic ICD-10-CM: R07.81  ICD-9-CM: 786.52  3/9/2017 - 10/19/2017        RESOLVED: Weight loss ICD-10-CM: R63.4  ICD-9-CM: 783.21  9/2/2014 - 11/10/2015        RESOLVED: Anorexia ICD-10-CM: R63.0  ICD-9-CM: 783.0  9/2/2014 - 10/19/2017        RESOLVED: Iron deficiency anemia, unspecified ICD-10-CM: D50.9  ICD-9-CM: 280.9  9/12/2012 - 4/16/2013    Overview Addendum 1/13/2013  8:21 PM by Traci Lundberg MD     AVKANA in the proximal colon                    Procedures for this Admission: Procedure(s):  Left common femoral, profunda femoris and superficial femoral artery embolectomy through leg incision    Discharged Condition: stable    Brief History: Maximo Torres was admitted with the following history of present illness. The patient is a 40-year-old woman who presented to Dr. Edmundo Mendosa office 4/24/2018 with a 2-week history of sudden-onset left lower extremity pain. She had seen multiple providers and initially was thought to have sciatica, but then the evening before his evaluation, she was seen in the emergency department and found to have diminished Doppler pulses. A duplex ultrasound was performed confirming occlusion of the left common femoral artery. She was seen urgently and Dr. Naz Garduno recommended left leg embolectomy; after discussing need for surgery, risks, alternatives, potential complications and anticipated outcomes, the patient elected to proceed. Hospital Course: On day of admission, the patient was taken to the operating room and underwent the above-noted procedure. After extubation in the OR and brief stay in PACU, the patient was transferred to [facility] to begin formal recovery. Her pain was soon controlled, but she experienced episodes of excessive somnolence and difficulty to awaken. She remained hemodynamically stable all the while. She tolerated an advancing diet. She worked with physical therapy. Cardiology was consulted to oversee her cardiac care. She voided without difficulty and her bowel function returned. On POD #5, she was stable and deemed suitable for discharge to inpatient rehabilitation.     Consults: Cardiology and Physical Medicine and Rehabilitation    Significant Diagnostic Studies: labs    Treatments:   IV hydration  antibiotics: Ancef  analgesia: acetaminophen, hydrocodone w/ acetaminophen, morphine, tramadol  cardiac meds: lisinopril and carvedilol  anticoagulation: ASA, Eliquis  surgery: as noted above    Discharge Exam:  GENERAL: alert, cooperative, no distress  EYE: EOM intact, no nystagmus  LUNG: clear to auscultation bilaterally, normal respiratory effort  HEART: regular rate and rhythm  ABDOMEN: soft, nontender, nondistended, bowel sounds normoactive  EXTREMITIES: warm, normal ROM; left 1st toe still mildly erythematous, sensorineuro intact; left groin incision intact with skin glue, mildly tender with no erythema / induration / drainage; left leg anterior shin as before  PULSES: radial and brachial palpable 2+ and symmetric bilaterally; right PT and DP palpable, left pedal signals easily obtained with Doppler    Disposition: inpatient rehabilitation, 9th floor      Patient Instructions:   Current Discharge Medication List      START taking these medications    Details   apixaban (ELIQUIS) 2.5 mg tablet Take 1 Tab by mouth two (2) times a day. Qty: 1 Tab, Refills: 0    Associated Diagnoses: Femoral artery occlusion, left (HCC)      traMADol (ULTRAM) 50 mg tablet Take 1 Tab by mouth every six (6) hours as needed. Max Daily Amount: 200 mg. Qty: 20 Tab, Refills: 0    Associated Diagnoses: Femoral artery occlusion, left (HCC)         CONTINUE these medications which have CHANGED    Details   carvedilol (COREG) 25 mg tablet Take 1 Tab by mouth two (2) times daily (with meals). Qty: 1 Tab, Refills: 0      lisinopril (PRINIVIL, ZESTRIL) 20 mg tablet Take 1 Tab by mouth daily. Qty: 1 Tab, Refills: 0         CONTINUE these medications which have NOT CHANGED    Details   gabapentin (NEURONTIN) 600 mg tablet Take 1 Tab by mouth three (3) times daily. Qty: 180 Tab, Refills: 1      furosemide (LASIX) 20 mg tablet Take 1 Tab by mouth daily. Qty: 90 Tab, Refills: 3      levothyroxine (SYNTHROID) 25 mcg tablet Take 1 Tab by mouth Daily (before breakfast). Qty: 90 Tab, Refills: 3      omeprazole (PRILOSEC) 20 mg capsule Take 1 Cap by mouth two (2) times a day.   Qty: 180 Cap, Refills: 3    Associated Diagnoses: Rib pain on left side      senna-docusate (PERICOLACE) 8.6-50 mg per tablet Take 1 Tab by mouth nightly. Qty: 90 Tab, Refills: 3    Associated Diagnoses: Rib pain on left side      aspirin delayed-release 81 mg tablet Take  by mouth daily. calcium carbonate (OS-) 500 mg (1,250 mg) tablet take 1 Tab by mouth two (2) times a day. traZODone (DESYREL) 50 mg tablet Take 1 Tab by mouth nightly. Qty: 30 Tab, Refills: 5      nitroglycerin (NITROSTAT) 0.4 mg SL tablet 1 Tab by SubLINGual route every five (5) minutes as needed. Indications: ANGINA  Qty: 1 Bottle, Refills: 3         STOP taking these medications       HYDROcodone-acetaminophen (NORCO) 7.5-325 mg per tablet Comments:   Reason for Stopping:               Reference MD discharge instructions, as well as those provided by nursing for diet, labs, medications, activity, wound care and any outpatient referrals. Follow-up Appointments   Procedures    FOLLOW UP VISIT Appointment in: Other (Specify) Follow up in office with Dr. China Park on 5/23/2018 at 9:45 AM.     Follow up in office with Dr. China Park on 5/23/2018. Standing Status:   Standing     Number of Occurrences:   1     Order Specific Question:   Appointment in     Answer: Other (Specify)        Signed: Rocco Ganser, PA-C  4/30/2018 10:48 AM  Physician Assistant with Vascular Surgery Sherry Nicholson MD / Deneen Capps.  Verlin Koyanagi, MD / Radha Lai MD

## 2018-04-30 NOTE — PROGRESS NOTES
End Of Shift Functional Summary, Nursing      TOILETING:    Does patient need assist with adjusting pants up or down and/or pericare? yes  If yes, please indicate what the patient needs help with:  Clothes up and down  (i.e. pants up, pants down, pericare)  Pt uses walker. Does the patient require extra time? yes  Does the patient require standby assistance? yes  Does the patient require contact guard assistance? yes  Does the patient require more than one staff member for assistance? no    TOILET TRANSFER:    Pt requires moderate assistance. Pt uses walker. Does the patient require extra time? yes  Does the patient require contact guard assistance? yes  Does the patient require more than one staff member for assistance?  no    BLADDER:    Pt does not have a templeton catheter that staff manages. Pt does not take medication. Pt is continent. of bladder and voids in toilet  Pt requires staff to empty device Pt has had 0 bladder accidents during this shift requiring moderate assistance to clean up. (An accident is when the episode is not contained in a brief AND/OR the clothing/linen requires changing/cleaning up.)    BOWEL:  Pt does take medication. Pt is continent of bowel and uses toilet. Pt requires staff to empty device    Pt has had 0 bowel accidents during this shift requiring moderate assistance from staff to clean up. (An accident is when the episode is not contained in a brief AND/OR the clothing/linen requires changing/cleaning up.)    BED/CHAIR TRANSFER  Pt requires moderate assistance. Pt uses walker  Does the patient require extra time? yes  Does the patient require contact guard assistance? yes  Does the patient require more than one staff member for assistance? no                   EATING  Pt requires setup. Pt does not wear dentures. TUBE FEEDINGS:  Pt does not  receive nutrition through tube feedings.              Documentation reviewed and plan of care discussed/reviewed with   patient, physician, therapists, oncoming nurse, patient assistant and family/spouse during the shift.

## 2018-04-30 NOTE — PROGRESS NOTES
TRANSFER - IN REPORT:    Verbal report received from Zari Galvin  on Berta Dominguez  being received from  stepdown for routine progression of care      Report consisted of patients Situation, Background, Assessment and   Recommendations(SBAR). Information from the following report(s) SBAR, Kardex, [facility] and Recent Results was reviewed with the receiving nurse. Opportunity for questions and clarification was provided. Assessment completed upon patients arrival to unit and care assumed.

## 2018-04-30 NOTE — PROGRESS NOTES
PHYSICAL THERAPY EXAMINATION    Time in: [address]  Time out: 1242    Patient Name: Berta Dominguez  Patient Age: 80 y.o.   Past Medical History:   Past Medical History:   Diagnosis Date    A fib     Abnormal loss of weight     Acquired hypothyroidism 9/12/2012    Acute respiratory failure with hypoxemia ([address] 8/12/2017    Anemia     Anorexia 9/2/2014    Arthritis associated with another disorder     Atrial fibrillation ([address] 9/12/2012    Paroxysmal.  Coumadin contraindicated due to falls      Autonomic neuropathy 1/13/2013    CAD (coronary artery disease)     CAD in native artery 5/5/2016    CAP (community acquired pneumonia) 8/12/2017    Cervical radiculopathy 5/23/2013    Chest pain     Chronic kidney disease     Chronic pain syndrome 1/13/2013    Back, right shoulder, neck: Peripheral neuropathy, spinal stenosis C7-T1, foraminal narrowing C4-5     CKD (chronic kidney disease), stage III 9/12/2012    Diabetes ([address]     about 30 years    Diabetes mellitus     does not check sugar     Diabetes mellitus, type 2 ([address] 9/12/2012    Dysphagia 9/2/2014    Due to esophageal spasm seen on modified barium swallow 2015     Gait disorder 9/12/2012    GERD (gastroesophageal reflux disease)     Heart failure ([address]     HTN     Hypertension 9/12/2012    Orthostatic hypotension due to autonomic neuropathy     Hypothyroid     IBS (irritable bowel syndrome) 9/12/2012    Iron deficiency 9/12/2012    Lumbar disc disease     MCI (mild cognitive impairment) 1/13/2013    Mixed hyperlipidemia 9/12/2012    Orthostatic hypotension 5/5/2016    Pacemaker     SSS (sick sinus syndrome) ([address] 5/5/2016       Medical Diagnosis:  left feneseal embolictomy  Physical debility <principal problem not specified>    Precautions at Admission: Other (comment) (pacemaker, fall risk)    Therapy Diagnosis:   Difficulty with bed mobility  [x]     Difficulty with functional transfers  [x]     Difficulty with ambulation [x]     Difficulty with stair negotiations  [x]       Problem List:    Decreased strength B LE  [x]     Decreased strength trunk/core  [x]     Decreased AROM   [x]     Decreased PROM  []    Decreased endurance  [x]     Decreased balance sitting  []     Decreased balance standing  [x]     Pain   [x]     Slow ambulation velocity  [x]    Decreased coordination  [x]    Decreased safety awareness  [x]      Functional Limitations:   Decreased independence with bed mobility  [x]     Decreased independence with functional transfers  [x]     Decreased independence with ambulation  [x]     Decreased independence with stair negotiation  [x]       Previous Functional Level: Previously patient was independent with all ADL tasks. Family completed IADL tasks including cooking/cleaning/medication/finances. Patient ambulated with a rollator. Patient endorses multiple falls in the past 6 months. Home Environment: Home Environment: Private residence  # Steps to Enter: 0  Rails to Enter: No  Wheelchair Ramp: No  One/Two Story Residence: One story  Living Alone: Yes  Support Systems: Child(malia)  Patient Expects to be Discharged to[de-identified] Unknown  Current DME Used/Available at Home: Cane, straight, Commode, bedside, Grab bars, Shower chair, Walker, rollator  Tub or Shower Type: Shower         Outcome Measures: Vital Signs:   Patient Vitals for the past 8 hrs:   Temp Pulse Resp BP SpO2   04/30/18 1512 97.3 °F (36.3 °C) 76 16 128/78 99 %   04/30/18 1130 97.3 °F (36.3 °C) 75 16 137/76 99 %           Pain level: No pain level given. Pain location: L Big toe  Pain interventions: Provided rest breaks, notified nursing    Patient education: Oriented patient to [facility] and daily schedule.     Interdisciplinary Communication: N/A       MMT Initial Asssessment   Right Lower Extremity Left Lower Extremity   Hip Flexion 4 4   Knee Extension 4 4   Knee Flexion 4 4   Ankle Dorsiflexion 4- 4-   0/5 No palpable muscle contraction  1/5 Palpable muscle contraction, no joint movement  2-/5 Less than full range of motion in gravity eliminated position  2/5 Able to complete full range of motion in gravity eliminated position  2+/5 Able to initiate movement against gravity  3-/5 More than half but not full range of motion against gravity  3/5 Able to complete full range of motion against gravity  3+/5 Completes full range of motion against gravity with minimal resistance  4-/5 Completes full range of motion against gravity with minimal-moderate resistance  4/5 Completes full range of motion against gravity with moderate resistance  4+/5 Completes full range of motion against gravity with moderate-maximum resistance  5/5 Completes full range of motion against gravity with maximum resistance     AROM: WFL    FIM SCORES Initial Assessment   Bed/Chair/Wheelchair Transfers 4   Wheelchair Mobility 0 (Secondary to patient fatigue)   Walking [place]     Steps/Stairs 0 (Secondary to patient fatigue)   PRIMARY MODE OF LOCOMOTION: Ambulation  Please see IRC Interdisciplinary Eval: Coordination/Balance Section for details regarding FIM score description.     BED/CHAIR/WHEELCHAIR TRANSFERS Initial Assessment   Rolling Right     Rolling Left     Supine to Sit 4 (Contact guard assistance)   Sit to Stand Minimal assistance   Sit to Supine 4 (Minimal assistance)   Transfer Assist Score 4   Transfer Type SPT without device   Comments Increased time and effort, slow shuffled step   Car Transfer Not tested   Car Type         WHEELCHAIR MOBILITY/MANAGEMENT Initial Assessment   Able to Propel 0 feet   Functional Level 0 (Secondary to patient fatigue)   Curbs/ramps assistance required 0 (Not tested)   Wheelchair set up assistance required 0 (Not tested)   Wheelchair management         WALKING INDEPENDENCE Initial Assessment   Assistive device Walker, rollator, Gait belt   Ambulation assistance - level surface 4 (Minimal assistance)   Distance 40 Feet (ft)   Functional Level     Comments Decreased step length B LE, decreased step clearance L LE, foot flat at IC of L LE, partial step through gait pattern   Ambulation assistance - unlevel surface 0 (Not tested)       STEPS/STAIRS Initial Assessment   Steps/Stairs ambulated 0   Rail Use     Functional Level 0 (Secondary to patient fatigue)   Comments     Curbs/Ramps         QUALITY INDICATOR ASSIST COMMENTS   Walk 10 feet MIN A    Walk 50 feet with 2 turns NT Secondary to decreased functional strength and patient fatigue. Walk 150 feet NT Secondary to decreased functional strength and patient fatigue. Walk 10 feet on uneven  NT Secondary to decreased functional strength and patient fatigue. 1 step/curb NT Secondary to decreased functional strength and patient fatigue. 4 steps NT Secondary to decreased functional strength and patient fatigue. 12 steps NT Secondary to decreased functional strength and patient fatigue.  object NT Secondary to decreased functional strength and patient fatigue. Wheel 50' w/2 turns NT Secondary to decreased functional strength and patient fatigue. Wheel 150' NT Secondary to decreased functional strength and patient fatigue. Patient returned to room lying supine in bed with all needs in reach, bed alarm on. PHYSICAL THERAPY PLAN OF CARE    Therapy Diagnosis:   Please see table above    Order received from MD for physical therapy services and chart reviewed. Pt to be seen at least 5 times per week for at least 1.5 hours of physical therapy per day for 10 days. Thank you for the referral.    LTGs:   LTG 1. Patient transfer supine<>sit with MOD I in 10 days  LTG 2. Patient transfer sit<>stand and perform stand pivot transfer with LRAD and supervision in 10 days  LTG 3. Patient ambulate 150 feet with LRAD and supervision in 10 days  LTG 4. Patient ambulate up/down 1 step without railings and supervision in 10 days  LTG 5.  Patient propel wheelchair 200 ft with supervision   in 10 days      Pt would benefit from skilled physical therapy in order to improve independent functional mobility within the home. Interventions may include range of motion (AROM, PROM B LE/trunk), motor function (B LE/trunk strengthening/coordination), activity tolerance (vitals, oxygen saturation levels), bed mobility training, balance activities, gait training (progressive ambulation program), and functional transfer training. Please see IRC; Interdisciplinary Eval, Care Plan, and Patient Education for further information regarding physical therapy examination and plan of care.      Gaby Sprague  4/30/2018

## 2018-04-30 NOTE — PROGRESS NOTES
Patient to floor/room 915. Transferred to bed. Duel skin assessment completed with Olga Mares RN. Excoriations noted to lower extremities. and ecchymosis to all extremities. Lung sounds clear. S1S2, bowel sounds active. Inciision  to left femoral area. Positive pedal pulses. Blood return less than 3 seconds to nail beds. No complaint of pain or discomfort at present time. Bed placed in low locked position with side rails up x 2. Call light, telephone, and remote placed within reach. Family member at bedside. Door left open for closer observation. [place] pad placed in bed under patient. No other verbalized needs made known at present time. Assessment completed. See doc flow sheet for further assessments.

## 2018-04-30 NOTE — PROGRESS NOTES
Problem: Falls - Risk of  Goal: *Absence of Falls  Document Jose Fall Risk and appropriate interventions in the flowsheet.   Outcome: Progressing Towards Goal  Fall Risk Interventions:  Mobility Interventions: Bed/chair exit alarm, Communicate number of staff needed for ambulation/transfer, Patient to call before getting OOB    Mentation Interventions: Adequate sleep, hydration, pain control, Evaluate medications/consider consulting pharmacy, Increase mobility, Reorient patient    Medication Interventions: Bed/chair exit alarm, Evaluate medications/consider consulting pharmacy, Patient to call before getting OOB    Elimination Interventions: Call light in reach    History of Falls Interventions: Consult care management for discharge planning, Door open when patient unattended, Evaluate medications/consider consulting pharmacy

## 2018-05-01 ENCOUNTER — APPOINTMENT (OUTPATIENT)
Dept: GENERAL RADIOLOGY | Age: 83
DRG: 949 | End: 2018-05-01
Attending: PHYSICAL MEDICINE & REHABILITATION
Payer: MEDICARE

## 2018-05-01 ENCOUNTER — PATIENT OUTREACH (OUTPATIENT)
Dept: CASE MANAGEMENT | Age: 83
End: 2018-05-01

## 2018-05-01 LAB
ALBUMIN SERPL-MCNC: 2.8 G/DL (ref 3.2–4.6)
ALBUMIN/GLOB SERPL: 0.8 {RATIO} (ref 1.2–3.5)
ALP SERPL-CCNC: 162 U/L (ref 50–136)
ALT SERPL-CCNC: 11 U/L (ref 12–65)
ANION GAP SERPL CALC-SCNC: 8 MMOL/L (ref 7–16)
AST SERPL-CCNC: 17 U/L (ref 15–37)
BASOPHILS # BLD: 0 K/UL (ref 0–0.2)
BASOPHILS NFR BLD: 0 % (ref 0–2)
BILIRUB SERPL-MCNC: 0.4 MG/DL (ref 0.2–1.1)
BUN SERPL-MCNC: 19 MG/DL (ref 8–23)
CALCIUM SERPL-MCNC: 8.6 MG/DL (ref 8.3–10.4)
CHLORIDE SERPL-SCNC: 95 MMOL/L (ref 98–107)
CO2 SERPL-SCNC: 32 MMOL/L (ref 21–32)
CREAT SERPL-MCNC: 1.28 MG/DL (ref 0.6–1)
DIFFERENTIAL METHOD BLD: ABNORMAL
EOSINOPHIL # BLD: 0.1 K/UL (ref 0–0.8)
EOSINOPHIL NFR BLD: 2 % (ref 0.5–7.8)
ERYTHROCYTE [DISTWIDTH] IN BLOOD BY AUTOMATED COUNT: 12.3 % (ref 11.9–14.6)
GLOBULIN SER CALC-MCNC: 3.5 G/DL (ref 2.3–3.5)
GLUCOSE SERPL-MCNC: 136 MG/DL (ref 65–100)
HCT VFR BLD AUTO: 30 % (ref 35.8–46.3)
HGB BLD-MCNC: 10.1 G/DL (ref 11.7–15.4)
IMM GRANULOCYTES # BLD: 0 K/UL (ref 0–0.5)
IMM GRANULOCYTES NFR BLD AUTO: 0 % (ref 0–5)
LYMPHOCYTES # BLD: 1.2 K/UL (ref 0.5–4.6)
LYMPHOCYTES NFR BLD: 18 % (ref 13–44)
MAGNESIUM SERPL-MCNC: 1.9 MG/DL (ref 1.8–2.4)
MCH RBC QN AUTO: 30.8 PG (ref 26.1–32.9)
MCHC RBC AUTO-ENTMCNC: 33.7 G/DL (ref 31.4–35)
MCV RBC AUTO: 91.5 FL (ref 79.6–97.8)
MONOCYTES # BLD: 0.6 K/UL (ref 0.1–1.3)
MONOCYTES NFR BLD: 9 % (ref 4–12)
NEUTS SEG # BLD: 4.8 K/UL (ref 1.7–8.2)
NEUTS SEG NFR BLD: 71 % (ref 43–78)
PLATELET # BLD AUTO: 411 K/UL (ref 150–450)
PMV BLD AUTO: 10.2 FL (ref 10.8–14.1)
POTASSIUM SERPL-SCNC: 3.9 MMOL/L (ref 3.5–5.1)
PROT SERPL-MCNC: 6.3 G/DL (ref 6.3–8.2)
RBC # BLD AUTO: 3.28 M/UL (ref 4.05–5.25)
SODIUM SERPL-SCNC: 135 MMOL/L (ref 136–145)
WBC # BLD AUTO: 6.8 K/UL (ref 4.3–11.1)

## 2018-05-01 PROCEDURE — 74011250637 HC RX REV CODE- 250/637: Performed by: PHYSICAL MEDICINE & REHABILITATION

## 2018-05-01 PROCEDURE — 65310000000 HC RM PRIVATE REHAB

## 2018-05-01 PROCEDURE — 77030019605

## 2018-05-01 PROCEDURE — 97535 SELF CARE MNGMENT TRAINING: CPT

## 2018-05-01 PROCEDURE — 77030011256 HC DRSG MEPILEX <16IN NO BORD MOLN -A

## 2018-05-01 PROCEDURE — 92610 EVALUATE SWALLOWING FUNCTION: CPT

## 2018-05-01 PROCEDURE — 97530 THERAPEUTIC ACTIVITIES: CPT

## 2018-05-01 PROCEDURE — 74011250636 HC RX REV CODE- 250/636: Performed by: PHYSICAL MEDICINE & REHABILITATION

## 2018-05-01 PROCEDURE — 74022 RADEX COMPL AQT ABD SERIES: CPT

## 2018-05-01 PROCEDURE — 77030011943

## 2018-05-01 PROCEDURE — 97116 GAIT TRAINING THERAPY: CPT

## 2018-05-01 PROCEDURE — 36415 COLL VENOUS BLD VENIPUNCTURE: CPT | Performed by: PHYSICAL MEDICINE & REHABILITATION

## 2018-05-01 PROCEDURE — 51798 US URINE CAPACITY MEASURE: CPT

## 2018-05-01 PROCEDURE — 83735 ASSAY OF MAGNESIUM: CPT | Performed by: PHYSICAL MEDICINE & REHABILITATION

## 2018-05-01 PROCEDURE — 97110 THERAPEUTIC EXERCISES: CPT

## 2018-05-01 PROCEDURE — 80053 COMPREHEN METABOLIC PANEL: CPT | Performed by: PHYSICAL MEDICINE & REHABILITATION

## 2018-05-01 PROCEDURE — 85025 COMPLETE CBC W/AUTO DIFF WBC: CPT | Performed by: PHYSICAL MEDICINE & REHABILITATION

## 2018-05-01 PROCEDURE — 99232 SBSQ HOSP IP/OBS MODERATE 35: CPT | Performed by: PHYSICAL MEDICINE & REHABILITATION

## 2018-05-01 RX ORDER — POTASSIUM CHLORIDE AND SODIUM CHLORIDE 450; 150 MG/100ML; MG/100ML
INJECTION, SOLUTION INTRAVENOUS CONTINUOUS
Status: DISCONTINUED | OUTPATIENT
Start: 2018-05-01 | End: 2018-05-03

## 2018-05-01 RX ORDER — FACIAL-BODY WIPES
10 EACH TOPICAL DAILY PRN
Status: DISCONTINUED | OUTPATIENT
Start: 2018-05-01 | End: 2018-05-15 | Stop reason: HOSPADM

## 2018-05-01 RX ORDER — POLYETHYLENE GLYCOL 3350 17 G/17G
17 POWDER, FOR SOLUTION ORAL DAILY
Status: DISCONTINUED | OUTPATIENT
Start: 2018-05-01 | End: 2018-05-15 | Stop reason: HOSPADM

## 2018-05-01 RX ADMIN — GABAPENTIN 600 MG: 300 CAPSULE ORAL at 08:18

## 2018-05-01 RX ADMIN — ASPIRIN 81 MG: 81 TABLET, COATED ORAL at 08:18

## 2018-05-01 RX ADMIN — FUROSEMIDE 20 MG: 20 TABLET ORAL at 08:18

## 2018-05-01 RX ADMIN — POLYETHYLENE GLYCOL (3350) 17 G: 17 POWDER, FOR SOLUTION ORAL at 08:19

## 2018-05-01 RX ADMIN — LISINOPRIL 20 MG: 20 TABLET ORAL at 08:18

## 2018-05-01 RX ADMIN — SODIUM CHLORIDE AND POTASSIUM CHLORIDE: 4.5; 1.49 INJECTION, SOLUTION INTRAVENOUS at 19:39

## 2018-05-01 RX ADMIN — APIXABAN 2.5 MG: 2.5 TABLET, FILM COATED ORAL at 08:18

## 2018-05-01 RX ADMIN — Medication 500 MG: at 08:19

## 2018-05-01 RX ADMIN — LEVOTHYROXINE SODIUM 25 MCG: 50 TABLET ORAL at 06:25

## 2018-05-01 RX ADMIN — PANTOPRAZOLE SODIUM 40 MG: 40 TABLET, DELAYED RELEASE ORAL at 06:24

## 2018-05-01 RX ADMIN — CARVEDILOL 25 MG: 25 TABLET, FILM COATED ORAL at 08:18

## 2018-05-01 NOTE — PROGRESS NOTES
Pt assisted up to bathroom x1. Voided. Voices of burning when she urinates. Places toilet tissue in perineum when she urinates to reduce the burning.  No BM yet

## 2018-05-01 NOTE — PROGRESS NOTES
Pt assisted up to bathroom x1 to have a BM. Brief changed for yellowish brown stool. Once up to the bathroom/toilet. Disimpacted of hard brown stool. On toilet x 30 min. Pt pushed more stool out and then pt assisted up to the bed.

## 2018-05-01 NOTE — PROGRESS NOTES
Speech therapy note  Attempted to see pt this am, however, pt in bathroom and then curled up in bed.  Will follow at lunch meal.     Arvis Spatz MA/SHIV/SLP

## 2018-05-01 NOTE — PROGRESS NOTES
IV attempted x5, unsuccessful. Access team called and message left for a return call.  To Xray shortly for films

## 2018-05-01 NOTE — PROGRESS NOTES
PHYSICAL THERAPY DAILY NOTE  Time In: 1300  Time Out: 7721  Patient Seen For: PM;Other (see progress notes);Gait training; Therapeutic exercise;Transfer training    Subjective: Patient drowsy and complained of not being able to have a bowel movement. Objective: Discussed with Dr. Zeny Hahn and her nurse Karla Choe concerning patient s pain and drowsiness. Took patient to bathroom . She only urinated. Other (comment) (has pacemaker; fall risk )  GROSS ASSESSMENT Daily Assessment            BED/MAT MOBILITY Daily Assessment    Supine to Sit : 4 (Minimal assistance)  Sit to Supine : 4 (Minimal assistance)       TRANSFERS Daily Assessment    Transfer Type: SPT without device  Transfer Assistance : 4 (Minimal assistance)  Sit to Stand Assistance: Minimal assistance       GAIT Daily Assessment    Amount of Assistance: 4 (Minimal assistance)  Distance (ft): 40 Feet (ft)  Assistive Device: Walker, rollator;Gait belt       STEPS or STAIRS Daily Assessment    Level of Assist : 0 (Not tested)       BALANCE Daily Assessment            WHEELCHAIR MOBILITY Daily Assessment            LOWER EXTREMITY EXERCISES Daily Assessment    Extremity: Both  Exercise Type #1: Seated lower extremity strengthening  Sets Performed: 2  Reps Performed: 10  Level of Assist: Minimal assistance          Assessment: Patient very drowsy during both PT sessions today. Plan of Care: Continue with plan of care to reach PT goals. Returned to room to bed with alarm on and call wagner at reach.     Lorena Guerrero, PTA  5/1/2018

## 2018-05-01 NOTE — PROGRESS NOTES
OT Note:     Attempted to see patient for second OT session; however, patient declined, stating she was too tired to participate. Will continue to follow.      Sharif Waldron MS, OTR/L  5/1/2018

## 2018-05-01 NOTE — PROGRESS NOTES
Speech therapy note  Attempted to see pt again this am, however, pt would not awake to eat any lunch. Will check back later during lunch time.      Jr Moyer MA/SHIV/SLP

## 2018-05-01 NOTE — PROGRESS NOTES
Dr. Lyle Breen on unit, ordered XRays and IV fluids. Daughter at bedside. Bladder scanned, 450ml. 16 Fr Straight cathed used to empty bladder via sterile technique. 600ml in urine bag. Clear yellow urine without any sediment.

## 2018-05-01 NOTE — PROGRESS NOTES
End Of Shift Functional Summary, Nursing        TOILETING:    Does patient need assist with adjusting pants up or down and/or pericare? yes  If yes, please indicate what the patient needs help with:  Clothes up and down  (i.e. pants up, pants down, pericare)  Pt uses walker. Does the patient require extra time? yes  Does the patient require standby assistance? yes  Does the patient require contact guard assistance? yes  Does the patient require more than one staff member for assistance? no     TOILET TRANSFER:    Pt requires moderate assistance. Pt uses walker. Does the patient require extra time? yes  Does the patient require contact guard assistance? yes  Does the patient require more than one staff member for assistance? no     BLADDER:    Pt does not have a templeton catheter that staff manages. Pt does not take medication. Pt is continent of bladder and voids in toilet. Pt requires staff to walk to BR. Pt has had 0 bladder accidents during this shift.  (An accident is when the episode is not contained in a brief AND/OR the clothing/linen requires changing/cleaning up.)     BOWEL:  Pt does take medication. Pt is continent of bowel and uses toilet. Pt requires staff to assist to BR. Constipation. Pt has had 0 bowel movement during this shift. (An accident is when the episode is not contained in a brief AND/OR the clothing/linen requires changing/cleaning up.)     BED/CHAIR TRANSFER  Pt requires moderate assistance. Pt uses walker  Does the patient require extra time? yes  Does the patient require contact guard assistance? yes  Does the patient require more than one staff member for assistance?  no        Documentation reviewed and plan of care discussed/reviewed with   patient, oncoming nurse and patient assistant during the shift.

## 2018-05-01 NOTE — PROGRESS NOTES
Problem: Falls - Risk of  Goal: *Absence of Falls  Document Jose Fall Risk and appropriate interventions in the flowsheet.    Outcome: Progressing Towards Goal  Fall Risk Interventions:  Mobility Interventions: Utilize walker, cane, or other assitive device    Mentation Interventions: Door open when patient unattended    Medication Interventions: Patient to call before getting OOB    Elimination Interventions: Call light in reach    History of Falls Interventions: Door open when patient unattended

## 2018-05-01 NOTE — PROGRESS NOTES
05/01/18 1305   Mental Status   Neurologic State Alert   Cognition Follows commands   Perception Appears intact   Perseveration No perseveration noted   Safety/Judgement Fall prevention   Oral Assessment   Labial No impairment   Oral Hygiene adequate   Lingual No impairment   PO Trials   Assessment Method(s) Observation   Patient Position upright in bed   Vocal Quality No impairment   Consistency Presented Solid; Thin liquid   How Presented Straw;Spoon;Self-fed/presented   Bolus Acceptance No impairment   Bolus Formation/Control No impairment   Propulsion No impairment   Oral Residue None   Initiation of Swallow No impairment   Aspiration Signs/Symptoms None   Pharyngeal Phase Characteristics No impairment, issues, or problems    Pt was finally awake enough to eat lunch. Pt tolerated lunch tray of regular/thin with no overt signs/sx of aspiration. Poor intake. Recommend continued diet at this time. No further ST indicated at this time. Please re-consult if indicated.    Theodore Walter MA/SHIV/SLP

## 2018-05-01 NOTE — PROGRESS NOTES
OT NOTE:     Spent 17 minutes attempting to awaken patient; utilized washcloth on face, lights on in room, and loud voice. Patient's eyes open briefly to stimulus but then shut again. Repositioned for comfort; will re-attempt session later in the day when patient is awake.      Lisa Sandoval MS, OTR/L  5/1/2018

## 2018-05-01 NOTE — PROGRESS NOTES
05/01/18 1020   Time With Patient   Time In 0916   Time Out 1006   Interdisciplinary Eval   Eval Timepoint Other (comment)  (second day admission FIMs)   Comprehension Mode   Primary Mode of Comprehension Auditory   Hearing Aid Right   Score 5   Expression (Native Language)   Primary Mode of Expression Verbal   Score 5   Comments low volume; head forward flexed for majority of session; limited participation    Social Interaction/Pragmatics   Score 4   Comments not awake for majority of session; difficult to awaken; limited participation    Problem Solving   Score 4   Comments cues for routine problems; cues for participation    Memory   Score 4   Comments impulsive; does not call for assistance with toileting    Toilet Transfer [place]   Toilet Transfer Score 3   Comments lifting assist off toilet    S: \"I need to go to the bathroom. \"   O: Patient presented seated up on toilet on arrival to room. Agreeable to treatment but required much encouragement. Patient refused shower or sponge bath, stating she was in too much discomfort/pain from the need to evacuate bowels. Patient attempted toileting 3 times during session with no success. Patient donned B socks in supine with setup assist. Patient transferred supine to sit with max A. Patient transferred bed to [facility] to R using SPT with min A. Patient brushed teeth with supervision. In gym, patient attempted ot participate in theraputty activity using light resistance putty, working on  strengthening and fine motor coordination. Very limited ability to participate due to somnolence. A: Very limited ability to participate this session; limited by both need for toileting (though unsuccessful) and somnolence. Patient tolerated session fairly with complaint of pain and discomfort in rectum/anus and stomach.   P: Continue OT POC with focus on ADL/IADL skills, functional transfers, functional mobility, coordination, strength, static and dynamic balance, and activity tolerance to maximize safety and independence with ADLs and functional transfers. Patient was left seated up in [facility] in gym for PT with supervision. Interdisciplinary communication: Collaborated with nurse regarding toileting and somnolence (per nurse patient received medication for sleeping approximately 9 pm last night) and with PT regarding functional status.      Supriya Reaves MS, OTR/L  5/1/2018

## 2018-05-01 NOTE — PROGRESS NOTES
Branda Shone, MD,   Medical Director  [address] [facility], [address] [facility]  Tel: [phone]       SFD PROGRESS NOTE    Katherine Mendes  Admit Date: 4/30/2018  Admit Diagnosis: left feneseal embolictomy; Physical debility    Subjective   Having a difficult day. Has not had a bm for 4d and is very uncomfortable. Added lactulose, pericolace and miralax this a.m and nurse gave a suppository and it appears she may be impacted. Pt reports that she uses enemas at home  Denies n/v. Has eaten very little. RN able to get a moderate amt of stool out. Pt now sleepy. Didn't sleep well last noc due to needing to have BM.    Objective:     Current Facility-Administered Medications   Medication Dose Route Frequency    polyethylene glycol (MIRALAX) packet 17 g  17 g Oral DAILY    bisacodyl (DULCOLAX) suppository 10 mg  10 mg Rectal DAILY PRN    acetaminophen (TYLENOL) tablet 500 mg  500 mg Oral Q4H PRN    diphenhydrAMINE (BENADRYL) capsule 25 mg  25 mg Oral Q4H PRN    apixaban (ELIQUIS) tablet 2.5 mg  2.5 mg Oral BID    aspirin delayed-release tablet 81 mg  81 mg Oral DAILY    calcium carbonate (OS-CAN) tablet 500 mg [elemental]  500 mg Oral BID    carvedilol (COREG) tablet 25 mg  25 mg Oral BID WITH MEALS    furosemide (LASIX) tablet 20 mg  20 mg Oral DAILY    gabapentin (NEURONTIN) capsule 600 mg  600 mg Oral TID    levothyroxine (SYNTHROID) tablet 25 mcg  25 mcg Oral ACB    lisinopril (PRINIVIL, ZESTRIL) tablet 20 mg  20 mg Oral DAILY    nitroglycerin (NITROSTAT) tablet 0.4 mg  0.4 mg SubLINGual PRN    ondansetron (ZOFRAN ODT) tablet 4 mg  4 mg Oral Q6H PRN    pantoprazole (PROTONIX) tablet 40 mg  40 mg Oral ACB    senna-docusate (PERICOLACE) 8.6-50 mg per tablet 1 Tab  1 Tab Oral QHS    traMADol (ULTRAM) tablet 50 mg  50 mg Oral Q6H PRN    traZODone (DESYREL) tablet 50 mg  50 mg Oral QHS     Review of Systems:Denies chest pain, shortness of breath, cough, headache, visual problems,calf pain. Pertinent positives are as noted in the medical records and unremarkable otherwise. Urinary freq/ anal pain  Visit Vitals    /84    Pulse 82    Temp 98.4 °F (36.9 °C)    Resp 16    SpO2 100%        Physical Exam:   General: Sleeping but awakens to name and  appropriately oriented. No acute cardio respiratory distress. HEENT: Normocephalic,no scleral icterus  Oral mucosa moist without cyanosis   Lungs: Clear to auscultation  bilaterally. Respiration even and unlabored   Heart: Regular rate and rhythm, S1, S2 ; paced   No  murmurs, clicks, rub or gallops   Abdomen: Soft, non-tender, nondistended. Bowel sounds high pitched, dec LLQ  No organomegaly. Genitourinary: Defer   Neuromuscular:      Generalized weakness  Non focal. Sleepy; speech minimal   Skin/extremity: No rashes, no erythema.  No calf tenderness BLE  No edema                                                                            Functional Assessment:          Balance  Sitting - Static: Good (unsupported) (04/30/18 1600)  Sitting - Dynamic: Good (unsupported) (04/30/18 1600)  Standing - Static: Fair (04/30/18 1600)                     Simona Ritchie Fall Risk Assessment:  Simona Ritchie Fall Risk  Mobility: Ambulates or transfers with assist devices or assistance (05/01/18 0947)  Mobility Interventions: Utilize walker, cane, or other assitive device (05/01/18 0947)  Mentation: Alert, oriented x 3 (05/01/18 0947)  Mentation Interventions: Door open when patient unattended;Gait belt with transfers/ambulation (05/01/18 0947)  Medication: Patient receiving anticonvulsants, sedatives(tranquilizers), psychotropics or hypnotics, hypoglycemics, narcotics, sleep aids, antihypertensives, laxatives, or diuretics (05/01/18 0947)  Medication Interventions: Patient to call before getting OOB (05/01/18 2962)  Elimination: Needs assistance with toileting (05/01/18 0947)  Elimination Interventions: Call light in reach (05/01/18 5097)  Prior Fall History: Before admission in past 12 months _home or previous inpatient care) (05/01/18 4291)  History of Falls Interventions: Door open when patient unattended (05/01/18 0947)  Total Score: 4 (05/01/18 0947)  Standard Fall Precautions: Yes (04/30/18 1100)  High Fall Risk: Yes (05/01/18 0947)     Speech Assessment:  Aspiration Signs/Symptoms: None (05/01/18 1305)      Ambulation:  Gait  Distance (ft): 40 Feet (ft) (05/01/18 1524)  Assistive Device: [place], rollator;Gait belt (05/01/18 1524)     Labs/Studies:  Recent Results (from the past 72 hour(s))   PLEASE READ & DOCUMENT PPD TEST IN 24 HRS    Collection Time: 04/29/18  9:45 AM   Result Value Ref Range    PPD neg Negative    mm Induration 0 mm   PLEASE READ & DOCUMENT PPD TEST IN 48 HRS    Collection Time: 04/30/18 10:07 AM   Result Value Ref Range    PPD negative Negative    mm Induration 0mm mm   CBC WITH AUTOMATED DIFF    Collection Time: 05/01/18  6:50 AM   Result Value Ref Range    WBC 6.8 4.3 - 11.1 K/uL    RBC 3.28 (L) 4.05 - 5.25 M/uL    HGB 10.1 (L) 11.7 - 15.4 g/dL    HCT 30.0 (L) 35.8 - 46.3 %    MCV 91.5 79.6 - 97.8 FL    MCH 30.8 26.1 - 32.9 PG    MCHC 33.7 31.4 - 35.0 g/dL    RDW 12.3 11.9 - 14.6 %    PLATELET 106 806 - 890 K/uL    MPV 10.2 (L) 10.8 - 14.1 FL    DF AUTOMATED      NEUTROPHILS 71 43 - 78 %    LYMPHOCYTES 18 13 - 44 %    MONOCYTES 9 4.0 - 12.0 %    EOSINOPHILS 2 0.5 - 7.8 %    BASOPHILS 0 0.0 - 2.0 %    IMMATURE GRANULOCYTES 0 0.0 - 5.0 %    ABS. NEUTROPHILS 4.8 1.7 - 8.2 K/UL    ABS. LYMPHOCYTES 1.2 0.5 - 4.6 K/UL    ABS. MONOCYTES 0.6 0.1 - 1.3 K/UL    ABS. EOSINOPHILS 0.1 0.0 - 0.8 K/UL    ABS. BASOPHILS 0.0 0.0 - 0.2 K/UL    ABS. IMM.  GRANS. 0.0 0.0 - 0.5 K/UL   MAGNESIUM    Collection Time: 05/01/18  6:50 AM   Result Value Ref Range    Magnesium 1.9 1.8 - 2.4 mg/dL   METABOLIC PANEL, COMPREHENSIVE    Collection Time: 05/01/18  6:50 AM   Result Value Ref Range    Sodium 135 (L) 136 - 145 mmol/L    Potassium 3.9 3.5 - 5.1 mmol/L    Chloride 95 (L) 98 - 107 mmol/L    CO2 32 21 - 32 mmol/L    Anion gap 8 7 - 16 mmol/L    Glucose 136 (H) 65 - 100 mg/dL    BUN 19 8 - 23 MG/DL    Creatinine 1.28 (H) 0.6 - 1.0 MG/DL    GFR est AA 50 (L) >60 ml/min/1.73m2    GFR est non-AA 42 (L) >60 ml/min/1.73m2    Calcium 8.6 8.3 - 10.4 MG/DL    Bilirubin, total 0.4 0.2 - 1.1 MG/DL    ALT (SGPT) 11 (L) 12 - 65 U/L    AST (SGOT) 17 15 - 37 U/L    Alk.  phosphatase 162 (H) 50 - 136 U/L    Protein, total 6.3 6.3 - 8.2 g/dL    Albumin 2.8 (L) 3.2 - 4.6 g/dL    Globulin 3.5 2.3 - 3.5 g/dL    A-G Ratio 0.8 (L) 1.2 - 3.5         Assessment:     Problem List as of 5/1/2018  Date Reviewed: 4/30/2018          Codes Class Noted - Resolved    * (Principal)Physical debility ICD-10-CM: R53.81  ICD-9-CM: 799.3  4/30/2018 - Present        Chronic systolic congestive heart failure (HCC) ICD-10-CM: I50.22  ICD-9-CM: 428.22, 428.0  4/26/2018 - Present        Femoral artery occlusion, left (HCC) ICD-10-CM: I09.176  ICD-9-CM: 444.22  4/25/2018 - Present        Type 2 diabetes mellitus with nephropathy (HCC) ICD-10-CM: E11.21  ICD-9-CM: 250.40, 583.81  1/25/2018 - Present        S/P AV noris ablation ICD-10-CM: Z98.890  ICD-9-CM: V45.89  1/16/2018 - Present        Biventricular cardiac pacemaker in situ ICD-10-CM: Z95.0  ICD-9-CM: V45.01  1/16/2018 - Present        Diabetes mellitus, type 2 (HCC) (Chronic) ICD-10-CM: E11.9  ICD-9-CM: 250.00  8/12/2017 - Present        Hypertension (Chronic) ICD-10-CM: I10  ICD-9-CM: 401.9  8/12/2017 - Present    Overview Signed 9/12/2012  3:43 PM by Barry Elizabeth     Orthostatic hypotension due to autonomic neuropathy             CKD (chronic kidney disease), stage III (Chronic) ICD-10-CM: N18.3  ICD-9-CM: 585.3  8/12/2017 - Present        Dysphagia (Chronic) ICD-10-CM: R13.10  ICD-9-CM: 787.20  8/12/2017 - Present    Overview Signed 4/5/2016  5:24 PM by Zuhair Fortune MD     Due to esophageal spasm seen on modified barium swallow 2015             Atrial fibrillation (HCC) (Chronic) ICD-10-CM: I48.91  ICD-9-CM: 427.31  8/12/2017 - Present        DNR (do not resuscitate) (Chronic) ICD-10-CM: Z66  ICD-9-CM: V49.86  8/12/2017 - Present        Ischemic cardiomyopathy ICD-10-CM: I25.5  ICD-9-CM: 414.8  7/11/2017 - Present        Cardiomyopathy ([facility] ICD-10-CM: I42.9  ICD-9-CM: 425.4  3/30/2017 - Present        Dyspnea ICD-10-CM: R06.00  ICD-9-CM: 786.09  2/25/2017 - Present        Atrial fibrillation with rapid ventricular response ([facility] ICD-10-CM: I48.91  ICD-9-CM: 427.31  2/25/2017 - Present        SSS (sick sinus syndrome) ([facility] ICD-10-CM: I49.5  ICD-9-CM: 427.81  5/5/2016 - Present        Orthostatic hypotension ICD-10-CM: I95.1  ICD-9-CM: 458.0  5/5/2016 - Present        CAD in native artery ICD-10-CM: I25.10  ICD-9-CM: 414.01  5/5/2016 - Present        Pacemaker ICD-10-CM: Z95.0  ICD-9-CM: V45.01  11/10/2015 - Present        Cervical radiculopathy ICD-10-CM: M54.12  ICD-9-CM: 723.4  5/23/2013 - Present    Overview Addendum 8/26/2013  4:59 PM by Ari Scales MD     Chronic right shoulder pain S/P eval Dr Melinda Bhatt POC August 2013, previous cervical spinal fusion             Autonomic neuropathy ICD-10-CM: G90.9  ICD-9-CM: 337.9  1/13/2013 - Present    Overview Signed 1/13/2013  8:18 PM by Ari Scales MD     Severe orthostatic hypotension             MCI (mild cognitive impairment) ICD-10-CM: G31.84  ICD-9-CM: 331.83  1/13/2013 - Present    Overview Addendum 12/16/2013  4:43 PM by Ari Scales MD     MMSE 27/30 Nov 2009, 27/30 Dec 2013, remembers 3/3 objects 5 min later and draws normal clock             Chronic pain syndrome ICD-10-CM: G89.4  ICD-9-CM: 338.4  1/13/2013 - Present    Overview Addendum 11/10/2015  5:15 PM by Ari Scales MD     Back, right shoulder, neck: Peripheral neuropathy, spinal stenosis C7-T1, foraminal narrowing C4-5             Acquired hypothyroidism (Chronic) ICD-10-CM: E03.9  ICD-9-CM: 244.9  9/12/2012 - Present Mixed hyperlipidemia (Chronic) ICD-10-CM: E78.2  ICD-9-CM: 272.2  9/12/2012 - Present    Overview Addendum 9/12/2012  3:51 PM by Armani Larsen     With high HDL  Intolerant of pravastatin,lovastatin and lescol             GERD (gastroesophageal reflux disease) (Chronic) ICD-10-CM: K21.9  ICD-9-CM: 530.81  9/12/2012 - Present        IBS (irritable bowel syndrome) (Chronic) ICD-10-CM: K58.9  ICD-9-CM: 564.1  9/12/2012 - Present    Overview Signed 9/12/2012  3:52 PM by Armani Larsen     Chronic enema abuse  diverticulosis             Gait disorder (Chronic) ICD-10-CM: R26.9  ICD-9-CM: 781.2  9/12/2012 - Present    Overview Signed 9/12/2012  3:53 PM by Armani Larsen     Diabetic peripheral neuropathy              Atrial fibrillation with RVR ([facility] ICD-10-CM: I48.91  ICD-9-CM: 427.31  9/12/2012 - Present    Overview Addendum 10/14/2012  9:34 AM by Raeann Sherman MD     Paroxysmal.  Coumadin contraindicated due to falls               RESOLVED: RUSS (acute kidney injury) ([facility] ICD-10-CM: N17.9  ICD-9-CM: 584.9  8/13/2017 - 10/19/2017        RESOLVED: Acute respiratory failure with hypoxemia (HCC) ICD-10-CM: J96.01  ICD-9-CM: 518.81  8/12/2017 - 9/15/2017        RESOLVED: CAP (community acquired pneumonia) ICD-10-CM: J18.9  ICD-9-CM: 486  8/12/2017 - 9/15/2017        RESOLVED: CHF (congestive heart failure) ([facility] ICD-10-CM: I50.9  ICD-9-CM: 428.0  3/18/2017 - 4/26/2018        RESOLVED: Altered mental status ICD-10-CM: R41.82  ICD-9-CM: 780.97  3/10/2017 - 10/19/2017        RESOLVED: Altered mental state ICD-10-CM: R41.82  ICD-9-CM: 780.97  3/9/2017 - 10/19/2017        RESOLVED: Chest pain, pleuritic ICD-10-CM: R07.81  ICD-9-CM: 786.52  3/9/2017 - 10/19/2017        RESOLVED: Weight loss ICD-10-CM: R63.4  ICD-9-CM: 783.21  9/2/2014 - 11/10/2015        RESOLVED: Anorexia ICD-10-CM: R63.0  ICD-9-CM: 783.0  9/2/2014 - 10/19/2017        RESOLVED: Iron deficiency anemia, unspecified ICD-10-CM: D50.9  ICD-9-CM: 280.9  9/12/2012 - 4/16/2013    Overview Addendum 1/13/2013  8:21 PM by MD RUBIA Trujillo in the proximal colon                 Assessment: Physical Debility s/p ischemic LLE s/p Left femoral artery embolectomy    Continue daily physician medical management:  Pneumonia prophylaxis- Incentive spirometer every hour while awake. Aspiration precautions     Dysphagia; consider MBS; will have ST eval. HOB > 30d     Post op anemia due to blood loss; gi prophylaxis, monitor wound; check stool. Check iron studies as well     DVT risk / DVT Prophylaxis- Will require daily physician exam to assess for signs and symptoms as patient is at increased risk for of thromboembolism. Mobilization as tolerated. Intermittent pneumatic compression devices when in bed Thigh-high or knee-high thromboembolic deterrent hose when out of bed. Eliquis/ASA     Cervical stenosis with autonomic dysfxn; orthostatic hypotension; monitor with therapies. Cont TEDs     Pain Control: stable, mild-to-moderate joint symptoms intermittently, reasonably well controlled by PRN meds. Will require regular pain assessment and comprenhensive pain management,   -pt with vascular pain and hypersensitivity to LLE; cont Neurontin, tolerating 600mg tid; does not wake her up at noc; cont ultram as well     Hypocalcemia; cont Oscal and add Vit D. Will see when pt had last bone density scan. Improved 8.6 F/u with PCP     Wound Care: Monitor wound status daily per staff and physician. At risk for failure. Will require 24/7 rehab nursing. Keep wound clean and dry, Reinforce dressing PRN and Ice to area for comfort     Hypertension - BP uncontrolled, fluctuating, managed medically. Cont Coreg, lasix, zestril   -poorly controlled 5/1 sbp 175-187; however dropped to 112; has hx of orthostatics; monitor with activity     RUSS ; monitor labs. Check UA due to [place].  Improving as of 4/28; no labs since that time; may need to consider adjusting Lasix and zestril  -5/1 slt bump in creat from 1.18- 1.28 ; recheck daily. Very little po today, may need IV fluids       Hypothyroidism; cont synthroid     Urinary retention/ neurogenic bladder - schedule voids q6-8 hrs. Check post-void residual as needed; In and Out catheterize if post-void residual is more than 400 cc.  -5/1 urin frq; check UA, check post void residual     bowel program - add prn bowel program  -5/1 impacted, did not sleep. Given suppos and disimpacted for mod amt of stool . No n/v. Limited po today. Labs stable. No gaurding or rebound on exam  -add anusol suppos due to rectal pain and suspected internal hemmor. With blood streaked stool.     GERD - resume PPI. At times may need additional antacids, Maalox prn.       Time spent was 25 minutes with over 1/2 in direct patient care/examination, consultation and coordination of care.      Signed By: Maye Mills MD     May 1, 2018

## 2018-05-01 NOTE — PROGRESS NOTES
PHYSICAL THERAPY DAILY NOTE  Time In: 1004  Time Out: 0824  Patient Seen For: AM;Other (see progress notes);Gait training; Therapeutic exercise;Transfer training    Subjective: Patient had no complaints. Objective: No pain noted. Patient going back and forth to bathroom during therapy but no movement with  Bowels. Other (comment) (has pacemaker; fall risk )  GROSS ASSESSMENT Daily Assessment            BED/MAT MOBILITY Daily Assessment    Supine to Sit : 4 (Contact guard assistance)  Sit to Supine : 4 (Minimal assistance)       TRANSFERS Daily Assessment    Transfer Type: SPT without device  Transfer Assistance : 4 (Minimal assistance)  Sit to Stand Assistance: Minimal assistance       GAIT Daily Assessment    Amount of Assistance: 4 (Minimal assistance)  Distance (ft): 40 Feet (ft)  Assistive Device: Walker, rollator;Gait belt       STEPS or STAIRS Daily Assessment    Level of Assist : 0 (Not tested)       BALANCE Daily Assessment            WHEELCHAIR MOBILITY Daily Assessment            LOWER EXTREMITY EXERCISES Daily Assessment    Extremity: Both  Exercise Type #1: Other (comment) (motomed x 10 minutes)  Sets Performed: 1  Reps Performed: 10  Level of Assist: Supervision          Assessment: Patient very drowsy today. Plan of Care: Continue with plan of care to reach PT goals. Returned to room with call bell at [facility].     Ed Milner PTA  5/1/2018

## 2018-05-01 NOTE — PROGRESS NOTES
This note will not be viewable in [address] Patient discharged on 4/30/18 to [address] [place] [address] (9th floor). ABRAN outreached postponed for 7 days due to discharge to [facility].

## 2018-05-01 NOTE — PROGRESS NOTES
Problem: Falls - Risk of  Goal: *Absence of Falls  Document Jose Fall Risk and appropriate interventions in the flowsheet.    Outcome: Progressing Towards Goal  Fall Risk Interventions:  Mobility Interventions: Utilize walker, cane, or other assitive device    Mentation Interventions: Door open when patient unattended, Gait belt with transfers/ambulation    Medication Interventions: Patient to call before getting OOB    Elimination Interventions: Call light in reach    History of Falls Interventions: Door open when patient unattended

## 2018-05-02 ENCOUNTER — APPOINTMENT (OUTPATIENT)
Dept: CT IMAGING | Age: 83
DRG: 949 | End: 2018-05-02
Attending: PHYSICAL MEDICINE & REHABILITATION
Payer: MEDICARE

## 2018-05-02 LAB
ANION GAP SERPL CALC-SCNC: 10 MMOL/L (ref 7–16)
APPEARANCE UR: CLEAR
BACTERIA URNS QL MICRO: ABNORMAL /HPF
BILIRUB UR QL: NEGATIVE
BUN SERPL-MCNC: 19 MG/DL (ref 8–23)
CALCIUM SERPL-MCNC: 8.1 MG/DL (ref 8.3–10.4)
CASTS URNS QL MICRO: 0 /LPF
CHLORIDE SERPL-SCNC: 96 MMOL/L (ref 98–107)
CO2 SERPL-SCNC: 28 MMOL/L (ref 21–32)
COLOR UR: YELLOW
CREAT SERPL-MCNC: 1.11 MG/DL (ref 0.6–1)
EPI CELLS #/AREA URNS HPF: 0 /HPF
FERRITIN SERPL-MCNC: 139 NG/ML (ref 8–388)
GLUCOSE BLD STRIP.AUTO-MCNC: 135 MG/DL (ref 65–100)
GLUCOSE BLD STRIP.AUTO-MCNC: 143 MG/DL (ref 65–100)
GLUCOSE SERPL-MCNC: 148 MG/DL (ref 65–100)
GLUCOSE UR STRIP.AUTO-MCNC: NEGATIVE MG/DL
HGB UR QL STRIP: NEGATIVE
IRON SATN MFR SERPL: 10 %
IRON SERPL-MCNC: 18 UG/DL (ref 35–150)
KETONES UR QL STRIP.AUTO: NEGATIVE MG/DL
LEUKOCYTE ESTERASE UR QL STRIP.AUTO: NEGATIVE
NITRITE UR QL STRIP.AUTO: POSITIVE
PH UR STRIP: 7 [PH] (ref 5–9)
POTASSIUM SERPL-SCNC: 4.2 MMOL/L (ref 3.5–5.1)
PROT UR STRIP-MCNC: NEGATIVE MG/DL
RBC #/AREA URNS HPF: ABNORMAL /HPF
SODIUM SERPL-SCNC: 134 MMOL/L (ref 136–145)
SP GR UR REFRACTOMETRY: 1.01 (ref 1–1.02)
TIBC SERPL-MCNC: 186 UG/DL (ref 250–450)
UROBILINOGEN UR QL STRIP.AUTO: 0.2 EU/DL (ref 0.2–1)
WBC URNS QL MICRO: ABNORMAL /HPF

## 2018-05-02 PROCEDURE — 97116 GAIT TRAINING THERAPY: CPT

## 2018-05-02 PROCEDURE — 80048 BASIC METABOLIC PNL TOTAL CA: CPT | Performed by: PHYSICAL MEDICINE & REHABILITATION

## 2018-05-02 PROCEDURE — 74011250637 HC RX REV CODE- 250/637: Performed by: PHYSICAL MEDICINE & REHABILITATION

## 2018-05-02 PROCEDURE — 83540 ASSAY OF IRON: CPT | Performed by: PHYSICAL MEDICINE & REHABILITATION

## 2018-05-02 PROCEDURE — 74011250636 HC RX REV CODE- 250/636: Performed by: PHYSICAL MEDICINE & REHABILITATION

## 2018-05-02 PROCEDURE — 65310000000 HC RM PRIVATE REHAB

## 2018-05-02 PROCEDURE — 70450 CT HEAD/BRAIN W/O DYE: CPT

## 2018-05-02 PROCEDURE — 82962 GLUCOSE BLOOD TEST: CPT

## 2018-05-02 PROCEDURE — 81001 URINALYSIS AUTO W/SCOPE: CPT | Performed by: PHYSICAL MEDICINE & REHABILITATION

## 2018-05-02 PROCEDURE — 97535 SELF CARE MNGMENT TRAINING: CPT

## 2018-05-02 PROCEDURE — 99232 SBSQ HOSP IP/OBS MODERATE 35: CPT | Performed by: PHYSICAL MEDICINE & REHABILITATION

## 2018-05-02 PROCEDURE — 97530 THERAPEUTIC ACTIVITIES: CPT

## 2018-05-02 PROCEDURE — 82728 ASSAY OF FERRITIN: CPT | Performed by: PHYSICAL MEDICINE & REHABILITATION

## 2018-05-02 PROCEDURE — 74011000250 HC RX REV CODE- 250: Performed by: PHYSICAL MEDICINE & REHABILITATION

## 2018-05-02 PROCEDURE — 97110 THERAPEUTIC EXERCISES: CPT

## 2018-05-02 PROCEDURE — 36415 COLL VENOUS BLD VENIPUNCTURE: CPT | Performed by: PHYSICAL MEDICINE & REHABILITATION

## 2018-05-02 RX ORDER — NITROFURANTOIN MACROCRYSTALS 50 MG/1
50 CAPSULE ORAL EVERY 6 HOURS
Status: DISCONTINUED | OUTPATIENT
Start: 2018-05-02 | End: 2018-05-04

## 2018-05-02 RX ORDER — LOPERAMIDE HYDROCHLORIDE 2 MG/1
4 CAPSULE ORAL
Status: DISCONTINUED | OUTPATIENT
Start: 2018-05-02 | End: 2018-05-15 | Stop reason: HOSPADM

## 2018-05-02 RX ORDER — FUROSEMIDE 20 MG/1
10 TABLET ORAL DAILY
Status: DISCONTINUED | OUTPATIENT
Start: 2018-05-02 | End: 2018-05-07

## 2018-05-02 RX ORDER — GABAPENTIN 300 MG/1
300 CAPSULE ORAL 3 TIMES DAILY
Status: DISCONTINUED | OUTPATIENT
Start: 2018-05-02 | End: 2018-05-03

## 2018-05-02 RX ORDER — SAME BUTANEDISULFONATE/BETAINE 400-600 MG
500 POWDER IN PACKET (EA) ORAL 2 TIMES DAILY
Status: DISCONTINUED | OUTPATIENT
Start: 2018-05-02 | End: 2018-05-15 | Stop reason: HOSPADM

## 2018-05-02 RX ADMIN — CARVEDILOL 25 MG: 25 TABLET, FILM COATED ORAL at 13:19

## 2018-05-02 RX ADMIN — TRAMADOL HYDROCHLORIDE 50 MG: 50 TABLET, FILM COATED ORAL at 20:14

## 2018-05-02 RX ADMIN — Medication 500 MG: at 16:55

## 2018-05-02 RX ADMIN — APIXABAN 2.5 MG: 2.5 TABLET, FILM COATED ORAL at 13:18

## 2018-05-02 RX ADMIN — GABAPENTIN 300 MG: 300 CAPSULE ORAL at 13:26

## 2018-05-02 RX ADMIN — Medication 500 MG: at 13:18

## 2018-05-02 RX ADMIN — SODIUM CHLORIDE AND POTASSIUM CHLORIDE: 4.5; 1.49 INJECTION, SOLUTION INTRAVENOUS at 20:39

## 2018-05-02 RX ADMIN — LISINOPRIL 20 MG: 20 TABLET ORAL at 13:15

## 2018-05-02 RX ADMIN — LEVOTHYROXINE SODIUM 25 MCG: 50 TABLET ORAL at 05:45

## 2018-05-02 RX ADMIN — NITROFURANTOIN MACROCRYSTALS 50 MG: 50 CAPSULE ORAL at 20:15

## 2018-05-02 RX ADMIN — FUROSEMIDE 10 MG: 20 TABLET ORAL at 13:17

## 2018-05-02 RX ADMIN — PANTOPRAZOLE SODIUM 40 MG: 40 TABLET, DELAYED RELEASE ORAL at 05:45

## 2018-05-02 RX ADMIN — GABAPENTIN 300 MG: 300 CAPSULE ORAL at 20:15

## 2018-05-02 RX ADMIN — CARVEDILOL 25 MG: 25 TABLET, FILM COATED ORAL at 16:55

## 2018-05-02 RX ADMIN — ASPIRIN 81 MG: 81 TABLET, COATED ORAL at 13:19

## 2018-05-02 RX ADMIN — Medication 500 MG: at 17:51

## 2018-05-02 RX ADMIN — SODIUM CHLORIDE AND POTASSIUM CHLORIDE: 4.5; 1.49 INJECTION, SOLUTION INTRAVENOUS at 04:53

## 2018-05-02 RX ADMIN — LIDOCAINE HYDROCHLORIDE 1 G: 10 INJECTION, SOLUTION EPIDURAL; INFILTRATION; INTRACAUDAL; PERINEURAL at 20:33

## 2018-05-02 RX ADMIN — APIXABAN 2.5 MG: 2.5 TABLET, FILM COATED ORAL at 20:14

## 2018-05-02 NOTE — PROGRESS NOTES
Harriet Garcia MD,   Medical Director  [address] [facility], [address] [facility]  Tel: [phone]       D PROGRESS NOTE    Jose Cast  Admit Date: 4/30/2018  Admit Diagnosis: left feneseal embolictomy; Physical debility    Subjective     Very lethargic, wakes up to tactile cues but firm. Non verbal, wont fcs. RN said no sleep aide given. Had been up most of the previous noc, had constipation issues all day yesterday and finally resolved. Eventually, when covers removed, she responded \"Leave me alone! \"    Objective:     Current Facility-Administered Medications   Medication Dose Route Frequency    polyethylene glycol (MIRALAX) packet 17 g  17 g Oral DAILY    bisacodyl (DULCOLAX) suppository 10 mg  10 mg Rectal DAILY PRN    phenylephrine suppository 1 Suppository  1 Suppository Rectal TID PRN    0.45% sodium chloride with KCl 20 mEq/L infusion   IntraVENous CONTINUOUS    acetaminophen (TYLENOL) tablet 500 mg  500 mg Oral Q4H PRN    diphenhydrAMINE (BENADRYL) capsule 25 mg  25 mg Oral Q4H PRN    apixaban (ELIQUIS) tablet 2.5 mg  2.5 mg Oral BID    aspirin delayed-release tablet 81 mg  81 mg Oral DAILY    calcium carbonate (OS-CAN) tablet 500 mg [elemental]  500 mg Oral BID    carvedilol (COREG) tablet 25 mg  25 mg Oral BID WITH MEALS    furosemide (LASIX) tablet 20 mg  20 mg Oral DAILY    gabapentin (NEURONTIN) capsule 600 mg  600 mg Oral TID    levothyroxine (SYNTHROID) tablet 25 mcg  25 mcg Oral ACB    lisinopril (PRINIVIL, ZESTRIL) tablet 20 mg  20 mg Oral DAILY    nitroglycerin (NITROSTAT) tablet 0.4 mg  0.4 mg SubLINGual PRN    ondansetron (ZOFRAN ODT) tablet 4 mg  4 mg Oral Q6H PRN    pantoprazole (PROTONIX) tablet 40 mg  40 mg Oral ACB    senna-docusate (PERICOLACE) 8.6-50 mg per tablet 1 Tab  1 Tab Oral QHS    traMADol (ULTRAM) tablet 50 mg  50 mg Oral Q6H PRN    traZODone (DESYREL) tablet 50 mg  50 mg Oral QHS     Review of Systems; pt will not respond  Visit Vitals    /73    Pulse 78    Temp 99.7 °F (37.6 °C)    Resp 16    SpO2 99%        Physical Exam:   General: Lethargic, pushes me away; frail  No acute cardio respiratory distress. HEENT: Normocephalic,no scleral icterus     Lungs: Clear to auscultation  bilaterally. Respiration even and unlabored   Heart: Regular rate and rhythm, S1, S2   No  murmurs, clicks, rub or gallops   Abdomen: Soft, non-tender, nondistended. Bowel sounds present. No organomegaly. Genitourinary: defer   Neuromuscular:      Moves all extremities, no participating   Skin/extremity: No rashes, no erythema. No calf tenderness BLE  dp pulse 2+                                                                            Functional Assessment:          Balance  Sitting - Static: Good (unsupported) (04/30/18 1600)  Sitting - Dynamic: Good (unsupported) (04/30/18 1600)  Standing - Static: Fair (04/30/18 1600)                     Zofia Jones Fall Risk Assessment:  Zofia Jones Fall Risk  Mobility: Ambulates or transfers with assist devices or assistance (05/01/18 2037)  Mobility Interventions: Assess mobility with egress test;Bed/chair exit alarm;Communicate number of staff needed for ambulation/transfer;OT consult for ADLs; Patient to call before getting OOB;PT Consult for mobility concerns;PT Consult for assist device competence;Strengthening exercises (ROM-active/passive); Utilize walker, cane, or other assitive device (05/01/18 2037)  Mentation: Periodic confusion (05/01/18 2037)  Mentation Interventions: Adequate sleep, hydration, pain control;Door open when patient unattended;Eyeglasses and hearing aids; Familiar objects from home; Increase mobility;More frequent rounding;Reorient patient;Room close to nurse's station; Toileting rounds;Update white board (05/01/18 2037)  Medication: Patient receiving anticonvulsants, sedatives(tranquilizers), psychotropics or hypnotics, hypoglycemics, narcotics, sleep aids, antihypertensives, laxatives, or diuretics (05/01/18 2037)  Medication Interventions: Assess postural VS orthostatic hypotension; Evaluate medications/consider consulting pharmacy; Patient to call before getting OOB; Teach patient to arise slowly (05/01/18 2037)  Elimination: Needs assistance with toileting (05/01/18 2037)  Elimination Interventions: Bed/chair exit alarm;Call light in reach; Patient to call for help with toileting needs; Toilet paper/wipes in reach; Toileting schedule/hourly rounds (05/01/18 2037)  Prior Fall History: Before admission in past 12 months _home or previous inpatient care) (05/01/18 2037)  History of Falls Interventions: Consult care management for discharge planning;Bed/chair exit alarm;Evaluate medications/consider consulting pharmacy; Investigate reason for fall;Room close to nurse's station (05/01/18 2037)  Total Score: 5 (05/01/18 2037)  Standard Fall Precautions: Yes (05/01/18 2037)  High Fall Risk: Yes (05/01/18 2037)     Speech Assessment:  Aspiration Signs/Symptoms: None (05/01/18 1305)      Ambulation:  Gait  Distance (ft): 40 Feet (ft) (05/01/18 1524)  Assistive Device: Clover Roldan, rollator;Gait belt (05/01/18 1524)     Labs/Studies:  Recent Results (from the past 72 hour(s))   PLEASE READ & DOCUMENT PPD TEST IN 24 HRS    Collection Time: 04/29/18  9:45 AM   Result Value Ref Range    PPD neg Negative    mm Induration 0 mm   PLEASE READ & DOCUMENT PPD TEST IN 48 HRS    Collection Time: 04/30/18 10:07 AM   Result Value Ref Range    PPD negative Negative    mm Induration 0mm mm   CBC WITH AUTOMATED DIFF    Collection Time: 05/01/18  6:50 AM   Result Value Ref Range    WBC 6.8 4.3 - 11.1 K/uL    RBC 3.28 (L) 4.05 - 5.25 M/uL    HGB 10.1 (L) 11.7 - 15.4 g/dL    HCT 30.0 (L) 35.8 - 46.3 %    MCV 91.5 79.6 - 97.8 FL    MCH 30.8 26.1 - 32.9 PG    MCHC 33.7 31.4 - 35.0 g/dL    RDW 12.3 11.9 - 14.6 %    PLATELET 443 930 - 909 K/uL    MPV 10.2 (L) 10.8 - 14.1 FL    DF AUTOMATED      NEUTROPHILS 71 43 - 78 %    LYMPHOCYTES 18 13 - 44 %    MONOCYTES 9 4.0 - 12.0 %    EOSINOPHILS 2 0.5 - 7.8 %    BASOPHILS 0 0.0 - 2.0 %    IMMATURE GRANULOCYTES 0 0.0 - 5.0 %    ABS. NEUTROPHILS 4.8 1.7 - 8.2 K/UL    ABS. LYMPHOCYTES 1.2 0.5 - 4.6 K/UL    ABS. MONOCYTES 0.6 0.1 - 1.3 K/UL    ABS. EOSINOPHILS 0.1 0.0 - 0.8 K/UL    ABS. BASOPHILS 0.0 0.0 - 0.2 K/UL    ABS. IMM. GRANS. 0.0 0.0 - 0.5 K/UL   MAGNESIUM    Collection Time: 05/01/18  6:50 AM   Result Value Ref Range    Magnesium 1.9 1.8 - 2.4 mg/dL   METABOLIC PANEL, COMPREHENSIVE    Collection Time: 05/01/18  6:50 AM   Result Value Ref Range    Sodium 135 (L) 136 - 145 mmol/L    Potassium 3.9 3.5 - 5.1 mmol/L    Chloride 95 (L) 98 - 107 mmol/L    CO2 32 21 - 32 mmol/L    Anion gap 8 7 - 16 mmol/L    Glucose 136 (H) 65 - 100 mg/dL    BUN 19 8 - 23 MG/DL    Creatinine 1.28 (H) 0.6 - 1.0 MG/DL    GFR est AA 50 (L) >60 ml/min/1.73m2    GFR est non-AA 42 (L) >60 ml/min/1.73m2    Calcium 8.6 8.3 - 10.4 MG/DL    Bilirubin, total 0.4 0.2 - 1.1 MG/DL    ALT (SGPT) 11 (L) 12 - 65 U/L    AST (SGOT) 17 15 - 37 U/L    Alk.  phosphatase 162 (H) 50 - 136 U/L    Protein, total 6.3 6.3 - 8.2 g/dL    Albumin 2.8 (L) 3.2 - 4.6 g/dL    Globulin 3.5 2.3 - 3.5 g/dL    A-G Ratio 0.8 (L) 1.2 - 3.5     METABOLIC PANEL, BASIC    Collection Time: 05/02/18  6:43 AM   Result Value Ref Range    Sodium 134 (L) 136 - 145 mmol/L    Potassium 4.2 3.5 - 5.1 mmol/L    Chloride 96 (L) 98 - 107 mmol/L    CO2 28 21 - 32 mmol/L    Anion gap 10 7 - 16 mmol/L    Glucose 148 (H) 65 - 100 mg/dL    BUN 19 8 - 23 MG/DL    Creatinine 1.11 (H) 0.6 - 1.0 MG/DL    GFR est AA 59 (L) >60 ml/min/1.73m2    GFR est non-AA 49 (L) >60 ml/min/1.73m2    Calcium 8.1 (L) 8.3 - 10.4 MG/DL       Assessment:     Problem List as of 5/2/2018  Date Reviewed: 4/30/2018          Codes Class Noted - Resolved    * (Principal)Physical debility ICD-10-CM: R53.81  ICD-9-CM: 799.3  4/30/2018 - Present        Chronic systolic congestive heart failure ([facility] ICD-10-CM: I50.22  ICD-9-CM: 428.22, 428.0  4/26/2018 - Present        Femoral artery occlusion, left (HCC) ICD-10-CM: Q17.911  ICD-9-CM: 444.22  4/25/2018 - Present        Type 2 diabetes mellitus with nephropathy (HCC) ICD-10-CM: E11.21  ICD-9-CM: 250.40, 583.81  1/25/2018 - Present        S/P AV noris ablation ICD-10-CM: Z98.890  ICD-9-CM: V45.89  1/16/2018 - Present        Biventricular cardiac pacemaker in situ ICD-10-CM: Z95.0  ICD-9-CM: V45.01  1/16/2018 - Present        Diabetes mellitus, type 2 (HCC) (Chronic) ICD-10-CM: E11.9  ICD-9-CM: 250.00  8/12/2017 - Present        Hypertension (Chronic) ICD-10-CM: I10  ICD-9-CM: 401.9  8/12/2017 - Present    Overview Signed 9/12/2012  3:43 PM by Mary Jane Barrientos     Orthostatic hypotension due to autonomic neuropathy             CKD (chronic kidney disease), stage III (Chronic) ICD-10-CM: N18.3  ICD-9-CM: 585.3  8/12/2017 - Present        Dysphagia (Chronic) ICD-10-CM: R13.10  ICD-9-CM: 787.20  8/12/2017 - Present    Overview Signed 4/5/2016  5:24 PM by Vikki Kanner, MD     Due to esophageal spasm seen on modified barium swallow 2015             Atrial fibrillation ([facility] (Chronic) ICD-10-CM: I48.91  ICD-9-CM: 427.31  8/12/2017 - Present        DNR (do not resuscitate) (Chronic) ICD-10-CM: Z66  ICD-9-CM: V49.86  8/12/2017 - Present        Ischemic cardiomyopathy ICD-10-CM: I25.5  ICD-9-CM: 414.8  7/11/2017 - Present        Cardiomyopathy ([facility] ICD-10-CM: I42.9  ICD-9-CM: 425.4  3/30/2017 - Present        Dyspnea ICD-10-CM: R06.00  ICD-9-CM: 786.09  2/25/2017 - Present        Atrial fibrillation with rapid ventricular response ([facility] ICD-10-CM: I48.91  ICD-9-CM: 427.31  2/25/2017 - Present        SSS (sick sinus syndrome) ([facility] ICD-10-CM: I49.5  ICD-9-CM: 427.81  5/5/2016 - Present        Orthostatic hypotension ICD-10-CM: I95.1  ICD-9-CM: 458.0  5/5/2016 - Present        CAD in native artery ICD-10-CM: I25.10  ICD-9-CM: 414.01  5/5/2016 - Present Pacemaker ICD-10-CM: Z95.0  ICD-9-CM: V45.01  11/10/2015 - Present        Cervical radiculopathy ICD-10-CM: M54.12  ICD-9-CM: 723.4  5/23/2013 - Present    Overview Addendum 8/26/2013  4:59 PM by Wendy Melgoza MD     Chronic right shoulder pain S/P eval Dr Gillette Ready POC August 2013, previous cervical spinal fusion             Autonomic neuropathy ICD-10-CM: G90.9  ICD-9-CM: 337.9  1/13/2013 - Present    Overview Signed 1/13/2013  8:18 PM by Wendy Melgoza MD     Severe orthostatic hypotension             MCI (mild cognitive impairment) ICD-10-CM: G31.84  ICD-9-CM: 331.83  1/13/2013 - Present    Overview Addendum 12/16/2013  4:43 PM by Wendy Melgoza MD     MMSE 27/30 Nov 2009, 27/30 Dec 2013, remembers 3/3 objects 5 min later and draws normal clock             Chronic pain syndrome ICD-10-CM: G89.4  ICD-9-CM: 338.4  1/13/2013 - Present    Overview Addendum 11/10/2015  5:15 PM by Wendy Melgoza MD     Back, right shoulder, neck: Peripheral neuropathy, spinal stenosis C7-T1, foraminal narrowing C4-5             Acquired hypothyroidism (Chronic) ICD-10-CM: E03.9  ICD-9-CM: 244.9  9/12/2012 - Present        Mixed hyperlipidemia (Chronic) ICD-10-CM: E78.2  ICD-9-CM: 272.2  9/12/2012 - Present    Overview Addendum 9/12/2012  3:51 PM by Drew Whyte     With high HDL  Intolerant of pravastatin,lovastatin and lescol             GERD (gastroesophageal reflux disease) (Chronic) ICD-10-CM: K21.9  ICD-9-CM: 530.81  9/12/2012 - Present        IBS (irritable bowel syndrome) (Chronic) ICD-10-CM: K58.9  ICD-9-CM: 564.1  9/12/2012 - Present    Overview Signed 9/12/2012  3:52 PM by Drew Whyte     Chronic enema abuse  diverticulosis             Gait disorder (Chronic) ICD-10-CM: R26.9  ICD-9-CM: 781.2  9/12/2012 - Present    Overview Signed 9/12/2012  3:53 PM by Drew Whyte     Diabetic peripheral neuropathy              Atrial fibrillation with RVR ([facility] ICD-10-CM: I48.91  ICD-9-CM: 427.31  9/12/2012 - Present Overview Addendum 10/14/2012  9:34 AM by Sirena Dickerson MD     Paroxysmal.  Coumadin contraindicated due to falls               RESOLVED: RUSS (acute kidney injury) ([facility] ICD-10-CM: N17.9  ICD-9-CM: 584.9  8/13/2017 - 10/19/2017        RESOLVED: Acute respiratory failure with hypoxemia (HCC) ICD-10-CM: J96.01  ICD-9-CM: 518.81  8/12/2017 - 9/15/2017        RESOLVED: CAP (community acquired pneumonia) ICD-10-CM: J18.9  ICD-9-CM: 486  8/12/2017 - 9/15/2017        RESOLVED: CHF (congestive heart failure) ([facility] ICD-10-CM: I50.9  ICD-9-CM: 428.0  3/18/2017 - 4/26/2018        RESOLVED: Altered mental status ICD-10-CM: R41.82  ICD-9-CM: 780.97  3/10/2017 - 10/19/2017        RESOLVED: Altered mental state ICD-10-CM: R41.82  ICD-9-CM: 780.97  3/9/2017 - 10/19/2017        RESOLVED: Chest pain, pleuritic ICD-10-CM: R07.81  ICD-9-CM: 786.52  3/9/2017 - 10/19/2017        RESOLVED: Weight loss ICD-10-CM: R63.4  ICD-9-CM: 783.21  9/2/2014 - 11/10/2015        RESOLVED: Anorexia ICD-10-CM: R63.0  ICD-9-CM: 783.0  9/2/2014 - 10/19/2017        RESOLVED: Iron deficiency anemia, unspecified ICD-10-CM: D50.9  ICD-9-CM: 280.9  9/12/2012 - 4/16/2013    Overview Addendum 1/13/2013  8:21 PM by Sirena Dickerson MD     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 Aspiration precautions      Dysphagia; consider MBS; will have ST eval. HOB > 30d  -5/2 ST eval yesterday, no overt signs of dysphagia/ aspiration      Post op anemia due to blood loss; gi prophylaxis, monitor wound; check stool. Check iron studies as well  -hgb 10.1      DVT risk / DVT Prophylaxis- Will require daily physician exam to assess for signs and symptoms as patient is at increased risk for of thromboembolism. Mobilization as tolerated.  Intermittent pneumatic compression devices when in bed Thigh-high or knee-high thromboembolic deterrent hose when out of bed. Eliquis/ASA      Cervical stenosis with autonomic dysfxn; orthostatic hypotension; monitor with therapies. Cont TEDs  -5/2 no overt symptoms      Pain Control: stable, mild-to-moderate joint symptoms intermittently, reasonably well controlled by PRN meds. Will require regular pain assessment and comprenhensive pain management,   -pt with vascular pain and hypersensitivity to LLE; cont Neurontin, tolerating 600mg tid; does not wake her up at noc; cont ultram as well  -5/2 lethargic, multifactorial; dec neurontin     Hx of DM; on amaryl in the past, currently nothing; bs 143, change to diab diet      Hypocalcemia; cont Oscal and add Vit D. Will see when pt had last bone density scan. Improved 8.6 F/u with PCP  -5/2 calcium 5.2, check Vit d as outpt      Wound Care: Monitor wound status daily per staff and physician. At risk for failure. Will require 24/7 rehab nursing. Keep wound clean and dry, Reinforce dressing PRN and Ice to area for comfort      Hypertension - BP uncontrolled, fluctuating, managed medically. Cont Coreg, lasix, zestril   -poorly controlled 5/1 sbp 175-187; however dropped to 112; has hx of orthostatics; monitor with activity     RUSS ; monitor labs. Check UA due to [place]. Improving as of 4/28; no labs since that time; may need to consider adjusting Lasix and zestril  -5/1 slt bump in creat from 1.18- 1.28 ; recheck daily. Very little po today, may need IV fluids  -5/2 IVFs overnoc, creat 1.11, improved     Hypothyroidism; cont synthroid      Urinary retention/ neurogenic bladder - schedule voids q6-8 hrs. Check post-void residual as needed; In and Out catheterize if post-void residual is more than 400 cc.  -5/1 urin frq; check UA, check post void residual      bowel program - add prn bowel program  -5/1 impacted, did not sleep. Given suppos and disimpacted for mod amt of stool . No n/v. Limited po today. Labs stable. No gaurding or rebound on exam  -add anusol suppos due to rectal pain and suspected internal hemmor. With blood streaked stool.      GERD - resume PPI. At times may need additional antacids, Maalox prn.         Time spent was 25 minutes with over 1/2 in direct patient care/examination, consultation and coordination of care.      Signed By: Dianne Velazquez MD     May 2, 2018

## 2018-05-02 NOTE — PROGRESS NOTES
05/02/18 1214   Time Spent With Patient   Time In 1030   Time Out 1115   Patient Seen For: AM;ADLs   Feeding/Eating   Feeding/Eating Assistance S   Comments setup assist, seated in WC   Grooming   Grooming Assistance  S   Comments setup assist    Upper Body Bathing   Bathing Assistance, Upper S   Lower Body Bathing   Bathing Assistance, Lower  [address] bar   Position Performed Standing; Other (comment)  (seated on tub transfer bench )   Comments max verbal cues to hold onto grab bar when standing; had to stand approximately 8 minutes during deion care as patient continuously voided incontinent stool during deion care    Toileting   Toileting Assistance (FIM Score) Dep   Cues Physical assistance for pants down;Physical assistance for pants up   Adaptive Equipment Grab bars   Upper Body Dressing    Dressing Assistance  S   Comments setup assist    Lower Body Dressing    Dressing Assistance  Mod A   Leg Crossed Method Used No   Position Performed Seated in chair;Standing   Comments assist with B socks and mod A to maintain standing balance during clothing management up    Functional Transfers   Toilet Transfer  Elevated seat;Grab bars;Stand pivot transfer without device   Amount of Assistance Required Mod A   Tub or Shower Type Shower   Amount of Assistance Required Mod A   Adaptive Equipment Grab bars   S: \"I don't know why I'm still going. \"   O: Patient presented seated edge of bed. Agreeable to treatment. Patient transferred bed to [facility] using LPT with mod A. Patient completed ADL; see above for FIMs. Patient was incontinent of stool throughout shower and required max cues to maintain LUE hold on grab bar when standing for deion care. Patient was able to stand for duration of deion care and used approximately 10 wash cloths attempting to clean deion area. Patient ambulated to toilet with mod A to complete voiding.  Nurse in to assist. Patient required max cues to follow simple instructions during parts of ADL tasks. A: Limited by incontinent bowel movements. Patient tolerated session well with no complaint of pain. P: Continue OT POC with focus on ADL/IADL skills, functional transfers, functional mobility, coordination, strength, static and dynamic balance, and activity tolerance to maximize safety and independence with ADLs and functional transfers. Patient was left seated up in [facility] in room with call bell/all other needs in reach. Chair alarm in place and activated. Interdisciplinary communication: Collaborated with PT and nurse regarding bowel incontinence.      Lisa Sandoval MS, OTR/L  5/2/2018

## 2018-05-02 NOTE — PROGRESS NOTES
Have tried several times to wake patient up. Pt remains very drowsy. When uncovered she tries to cover her self back up. RN place a cold cloth to forehead and chest. Pt verbal said \"stop, that's cold\" pt 0.45NS 20 K infusing to 22 g to back of right arm. /73 HR 78 O2 97 Temp. 97.7, temp elevated, but may be elevated due to a dry mouth.

## 2018-05-02 NOTE — PROGRESS NOTES
Pt having loose, grainy stool leak from rectum--not formed. Pt being monitored for stool frequently and changed.

## 2018-05-02 NOTE — PROGRESS NOTES
Pt had not voided all shift. Pt assisted to toilet. Pt was able to have large BM and also urinated. Unable to quantify urine output as it was mixed with the large BM. Pt assisted back to bed and positioned for comfort. Bed alarm on.

## 2018-05-02 NOTE — PROGRESS NOTES
PHYSICAL THERAPY DAILY NOTE  Time In: 3707  Time Out: 1435  Patient Seen For: PM;Gait training; Other (see progress notes); Therapeutic exercise;Transfer training    Subjective: \" My bottom hurts since I have been going to bathroom all day. \"         Objective: Not seen in Am by PT secondary to patient having loose bowels every few minutes. She was confused at times during therapy. At other times patient was clear. Discussed with Dr. Naty Head concerning her loose bowels. Other (comment) (has pacemaker; fall risk )  GROSS ASSESSMENT Daily Assessment            BED/MAT MOBILITY Daily Assessment    Supine to Sit : 3 (Moderate assistance)  Sit to Supine : 4 (Minimal assistance)       TRANSFERS Daily Assessment    Transfer Type: SPT with walker  Transfer Assistance : 3 (Moderate assistance )  Sit to Stand Assistance: Minimal assistance       GAIT Daily Assessment    Amount of Assistance: 3 (Moderate assistance)  Distance (ft): 20 Feet (ft)  Assistive Device: Gait belt;Walker, rolling       STEPS or STAIRS Daily Assessment    Level of Assist : 0 (Not tested)       BALANCE Daily Assessment            WHEELCHAIR MOBILITY Daily Assessment            LOWER EXTREMITY EXERCISES Daily Assessment    Extremity: Both  Exercise Type #1: Seated lower extremity strengthening  Sets Performed: 2  Reps Performed: 10  Level of Assist: Minimal assistance  Exercise Type #2: Other (comment) (motomed x 10 minutes)  Sets Performed: 1  Reps Performed: 10  Level of Assist: Supervision          Assessment: She seemed very weak today and drowsy. Plan of Care:  Continue with plan of care to reach PT goals. Returned to room took her to bathroom again . Notified nurse Chana Ferrell concerning her loose bowels. Patient was able to perform hygiene.  She did require assistance for clothes    Curlee Sails, PTA  5/2/2018

## 2018-05-02 NOTE — PROGRESS NOTES
05/02/18 1436   Time Spent With Patient   Time In 1300   Time Out 1347   Patient Seen For: [employer]   Toileting   Toileting Assistance (FIM Score) Dep   Cues Physical assistance for pants down;Physical assistance for pants up   Adaptive Equipment Elevated seat;Grab bars   Functional Transfers   Toilet Transfer  Elevated seat;Grab bars   Amount of Assistance Required Mod A   S: \"Ohhhhh, I can't stand it. \" [rectal/anal pain from constant BMs]  O: Patient presented supine in bed. Agreeable to treatment. Patient reported that she had been incontinent of bowel in brief. Changed brief; directly after change, patient was incontinent again of bowel in brief, requiring second brief change. Patient was incontinent of bowel a third time while ambulating to toilet. Patient sat on toilet to attempt to continue to void bowels. Patient completed deion care seated on toilet but required max cues to not fold wipes in half due to getting BM on hands when attempting to do so. Patient completed BUE  strengthening task using medium density therablock with verbal cues for technique. Educated patient in multiple ways to complete therablock exercises with demonstrated understanding. A: Participation severely limited by constant incontinent bowel movements this date. Nurse aware. Patient tolerated session fairly with complaint of pain in anus/rectum due to multiple bowel movements this date. Cream was applied per nurse. P: Continue OT POC with focus on ADL/IADL skills, functional transfers, functional mobility, coordination, strength, static and dynamic balance, and activity tolerance to maximize safety and independence with ADLs and functional transfers. Patient was left seated up in [facility] in gym for PT with supervision. Interdisciplinary communication: Collaborated with nurse regarding BMs.       Tra Hand MS, OTR/L  5/2/2018

## 2018-05-02 NOTE — DISCHARGE INSTRUCTIONS
DISCHARGE SUMMARY from Nurse    PATIENT INSTRUCTIONS:    After general anesthesia or intravenous sedation, for 24 hours or while taking prescription Narcotics:  · Limit your activities  · Do not drive and operate hazardous machinery  · Do not make important personal or business decisions  · Do  not drink alcoholic beverages  · If you have not urinated within 8 hours after discharge, please contact your surgeon on call. Report the following to your surgeon:  · Excessive pain, swelling, redness or odor of or around the surgical area  · Temperature over 100.5  · Nausea and vomiting lasting longer than 4 hours or if unable to take medications  · Any signs of decreased circulation or nerve impairment to extremity: change in color, persistent  numbness, tingling, coldness or increase pain  · Any questions    What to do at Home:  Recommended activity: Activity as tolerated, and follow therapist's recommendations for safety:   *  Please give a list of your current medications to your Primary Care Provider. *  Please update this list whenever your medications are discontinued, doses are      changed, or new medications (including over-the-counter products) are added. *  Please carry medication information at all times in case of emergency situations. These are general instructions for a healthy lifestyle:    No smoking/ No tobacco products/ Avoid exposure to second hand smoke  Surgeon General's Warning:  Quitting smoking now greatly reduces serious risk to your health.     Obesity, smoking, and sedentary lifestyle greatly increases your risk for illness    A healthy diet, regular physical exercise & weight monitoring are important for maintaining a healthy lifestyle    You may be retaining fluid if you have a history of heart failure or if you experience any of the following symptoms:  Weight gain of 3 pounds or more overnight or 5 pounds in a week, increased swelling in our hands or feet or shortness of breath while lying flat in bed. Please call your doctor as soon as you notice any of these symptoms; do not wait until your next office visit. Recognize signs and symptoms of STROKE:    F-face looks uneven    A-arms unable to move or move unevenly    S-speech slurred or non-existent    T-time-call 911 as soon as signs and symptoms begin-DO NOT go       Back to bed or wait to see if you get better-TIME IS BRAIN. Warning Signs of HEART ATTACK     Call 911 if you have these symptoms:   Chest discomfort. Most heart attacks involve discomfort in the center of the chest that lasts more than a few minutes, or that goes away and comes back. It can feel like uncomfortable pressure, squeezing, fullness, or pain.  Discomfort in other areas of the upper body. Symptoms can include pain or discomfort in one or both arms, the back, neck, jaw, or stomach.  Shortness of breath with or without chest discomfort.  Other signs may include breaking out in a cold sweat, nausea, or lightheadedness. Don't wait more than five minutes to call 911 - MINUTES MATTER! Fast action can save your life. Calling 911 is almost always the fastest way to get lifesaving treatment. Emergency Medical Services staff can begin treatment when they arrive -- up to an hour sooner than if someone gets to the hospital by car. The discharge information has been reviewed with the            . The            verbalized understanding. Discharge medications reviewed with the          and appropriate educational materials and side effects teaching were provided.   ___________________________________________________________________________________________________________________________________

## 2018-05-02 NOTE — PROGRESS NOTES
Ms Ricks Plan has had multiple stools today. Gluteal folds with redness but blanchable. Pt c/o burning around rectum. Rectal burning. Cream applied to folds and skin around anus. Made Dr. Akash Tolbert aware and got order for imodium 4 mg po. Pt is sleeping at this time. bed alarm intact to bed. Sr up x 2, call light with in reach. Benson Lucero

## 2018-05-02 NOTE — PROGRESS NOTES
Several times RN has tried to wake pt up,  Sternal rub Pt was asked what was her name and pt stated her name was anna marie chavez. She has a bit of humor, but still would not  Fully wake up. Pt just curled back up in the fetal position and began snoring. Pt has not eaten breakfast, nor has she been able to take any medication. Will continue to monitor. See vitals in flow sheet. Elaina Jung

## 2018-05-02 NOTE — PROGRESS NOTES
Problem: Falls - Risk of  Goal: *Absence of Falls  Document Jose Fall Risk and appropriate interventions in the flowsheet.    Outcome: Progressing Towards Goal  Fall Risk Interventions:  Mobility Interventions: Assess mobility with egress test, Bed/chair exit alarm, Communicate number of staff needed for ambulation/transfer, OT consult for ADLs, Patient to call before getting OOB, PT Consult for mobility concerns, PT Consult for assist device competence, Strengthening exercises (ROM-active/passive), Utilize walker, cane, or other assitive device    Mentation Interventions: Adequate sleep, hydration, pain control, Door open when patient unattended, Eyeglasses and hearing aids, Familiar objects from home, Increase mobility, More frequent rounding, Reorient patient, Room close to nurse's station, Toileting rounds, Update white board    Medication Interventions: Assess postural VS orthostatic hypotension, Evaluate medications/consider consulting pharmacy, Patient to call before getting OOB, Teach patient to arise slowly    Elimination Interventions: Bed/chair exit alarm, Call light in reach, Patient to call for help with toileting needs, Toilet paper/wipes in reach, Toileting schedule/hourly rounds    History of Falls Interventions: Consult care management for discharge planning, Bed/chair exit alarm, Evaluate medications/consider consulting pharmacy, Investigate reason for fall, Room close to nurse's station

## 2018-05-02 NOTE — PROGRESS NOTES
End Of Shift Functional Summary, Nursing      TOILETING:    Does patient need assist with adjusting pants up or down and/or pericare? yes  If yes, please indicate what the patient needs help with:  Total care  (i.e. pants up, pants down, pericare)  Pt uses walker. Does the patient require extra time? yes  Does the patient require standby assistance? yes  Does the patient require contact guard assistance? yes  Does the patient require more than one staff member for assistance? no      BLADDER:    Pt does not have a templeton catheter that staff manages. Pt does not take medication. If so, please indicate which medication:    Pt is continent and incontinent at times of bladder and voids in brief. Pt requires staff to change brief Pt has had 0 bladder accidents during this shift requiring minimal assistance to clean up. (An accident is when the episode is not contained in a brief AND/OR the clothing/linen requires changing/cleaning up.)    BOWEL:  Pt does take medication. Pt is incontinent of bowel and uses brief. Pt requires staff to change brief    Pt has had 0 bowel accidents during this shift . (An accident is when the episode is not contained in a brief AND/OR the clothing/linen requires changing/cleaning up.)            Documentation reviewed and plan of care discussed/reviewed with   patient and oncoming nurse during the shift.

## 2018-05-02 NOTE — PROGRESS NOTES
OT Note:     Attempted to complete ADL; however, unable to awaken patient despite verbal and tactile cues. Patient was curled up on her side in fetal position. Removed covers with no response but does open eyes very briefly to loud verbal stimulus. Unable to complete ADL at this time due to extreme lethargy and patient unable to participate. Will continue to follow as able.      Rico Thompson MS, OTR/L  5/2/2018

## 2018-05-03 LAB
ANION GAP SERPL CALC-SCNC: 9 MMOL/L (ref 7–16)
BASOPHILS # BLD: 0 K/UL (ref 0–0.2)
BASOPHILS NFR BLD: 0 % (ref 0–2)
BUN SERPL-MCNC: 17 MG/DL (ref 8–23)
CALCIUM SERPL-MCNC: 8 MG/DL (ref 8.3–10.4)
CHLORIDE SERPL-SCNC: 95 MMOL/L (ref 98–107)
CO2 SERPL-SCNC: 27 MMOL/L (ref 21–32)
CREAT SERPL-MCNC: 1.03 MG/DL (ref 0.6–1)
DIFFERENTIAL METHOD BLD: ABNORMAL
EOSINOPHIL # BLD: 0.3 K/UL (ref 0–0.8)
EOSINOPHIL NFR BLD: 4 % (ref 0.5–7.8)
ERYTHROCYTE [DISTWIDTH] IN BLOOD BY AUTOMATED COUNT: 12.4 % (ref 11.9–14.6)
GLUCOSE SERPL-MCNC: 102 MG/DL (ref 65–100)
HCT VFR BLD AUTO: 25.1 % (ref 35.8–46.3)
HGB BLD-MCNC: 8.4 G/DL (ref 11.7–15.4)
IMM GRANULOCYTES # BLD: 0 K/UL (ref 0–0.5)
IMM GRANULOCYTES NFR BLD AUTO: 0 % (ref 0–5)
LYMPHOCYTES # BLD: 1.6 K/UL (ref 0.5–4.6)
LYMPHOCYTES NFR BLD: 23 % (ref 13–44)
MAGNESIUM SERPL-MCNC: 1.7 MG/DL (ref 1.8–2.4)
MCH RBC QN AUTO: 30.3 PG (ref 26.1–32.9)
MCHC RBC AUTO-ENTMCNC: 33.5 G/DL (ref 31.4–35)
MCV RBC AUTO: 90.6 FL (ref 79.6–97.8)
MONOCYTES # BLD: 0.6 K/UL (ref 0.1–1.3)
MONOCYTES NFR BLD: 9 % (ref 4–12)
NEUTS SEG # BLD: 4.4 K/UL (ref 1.7–8.2)
NEUTS SEG NFR BLD: 64 % (ref 43–78)
PLATELET # BLD AUTO: 355 K/UL (ref 150–450)
PMV BLD AUTO: 9.7 FL (ref 10.8–14.1)
POTASSIUM SERPL-SCNC: 4.1 MMOL/L (ref 3.5–5.1)
RBC # BLD AUTO: 2.77 M/UL (ref 4.05–5.25)
SODIUM SERPL-SCNC: 131 MMOL/L (ref 136–145)
WBC # BLD AUTO: 6.9 K/UL (ref 4.3–11.1)

## 2018-05-03 PROCEDURE — 97116 GAIT TRAINING THERAPY: CPT

## 2018-05-03 PROCEDURE — 85025 COMPLETE CBC W/AUTO DIFF WBC: CPT | Performed by: PHYSICAL MEDICINE & REHABILITATION

## 2018-05-03 PROCEDURE — 51798 US URINE CAPACITY MEASURE: CPT

## 2018-05-03 PROCEDURE — 97150 GROUP THERAPEUTIC PROCEDURES: CPT

## 2018-05-03 PROCEDURE — 97535 SELF CARE MNGMENT TRAINING: CPT

## 2018-05-03 PROCEDURE — 77030011943

## 2018-05-03 PROCEDURE — 99233 SBSQ HOSP IP/OBS HIGH 50: CPT | Performed by: PHYSICAL MEDICINE & REHABILITATION

## 2018-05-03 PROCEDURE — 83735 ASSAY OF MAGNESIUM: CPT | Performed by: PHYSICAL MEDICINE & REHABILITATION

## 2018-05-03 PROCEDURE — 82652 VIT D 1 25-DIHYDROXY: CPT | Performed by: PHYSICAL MEDICINE & REHABILITATION

## 2018-05-03 PROCEDURE — 65310000000 HC RM PRIVATE REHAB

## 2018-05-03 PROCEDURE — 74011250637 HC RX REV CODE- 250/637: Performed by: PHYSICAL MEDICINE & REHABILITATION

## 2018-05-03 PROCEDURE — 97110 THERAPEUTIC EXERCISES: CPT

## 2018-05-03 PROCEDURE — 97530 THERAPEUTIC ACTIVITIES: CPT

## 2018-05-03 PROCEDURE — 80048 BASIC METABOLIC PNL TOTAL CA: CPT | Performed by: PHYSICAL MEDICINE & REHABILITATION

## 2018-05-03 PROCEDURE — 36415 COLL VENOUS BLD VENIPUNCTURE: CPT | Performed by: PHYSICAL MEDICINE & REHABILITATION

## 2018-05-03 RX ORDER — GABAPENTIN 300 MG/1
600 CAPSULE ORAL 3 TIMES DAILY
Status: DISCONTINUED | OUTPATIENT
Start: 2018-05-03 | End: 2018-05-15 | Stop reason: HOSPADM

## 2018-05-03 RX ORDER — LANOLIN ALCOHOL/MO/W.PET/CERES
1 CREAM (GRAM) TOPICAL 2 TIMES DAILY WITH MEALS
Status: DISCONTINUED | OUTPATIENT
Start: 2018-05-03 | End: 2018-05-15 | Stop reason: HOSPADM

## 2018-05-03 RX ORDER — AMLODIPINE BESYLATE 5 MG/1
5 TABLET ORAL DAILY
Status: DISCONTINUED | OUTPATIENT
Start: 2018-05-03 | End: 2018-05-04

## 2018-05-03 RX ORDER — AMITRIPTYLINE HYDROCHLORIDE 10 MG/1
10 TABLET, FILM COATED ORAL
Status: DISCONTINUED | OUTPATIENT
Start: 2018-05-03 | End: 2018-05-15 | Stop reason: HOSPADM

## 2018-05-03 RX ORDER — CALCIUM CARBONATE 500(1250)
1000 TABLET ORAL 2 TIMES DAILY
Status: DISCONTINUED | OUTPATIENT
Start: 2018-05-03 | End: 2018-05-15 | Stop reason: HOSPADM

## 2018-05-03 RX ORDER — MELATONIN
2000 DAILY
Status: DISCONTINUED | OUTPATIENT
Start: 2018-05-03 | End: 2018-05-15 | Stop reason: HOSPADM

## 2018-05-03 RX ADMIN — LEVOTHYROXINE SODIUM 25 MCG: 50 TABLET ORAL at 05:14

## 2018-05-03 RX ADMIN — FERROUS SULFATE TAB 325 MG (65 MG ELEMENTAL FE) 325 MG: 325 (65 FE) TAB at 09:58

## 2018-05-03 RX ADMIN — GABAPENTIN 600 MG: 300 CAPSULE ORAL at 14:11

## 2018-05-03 RX ADMIN — VITAMIN D, TAB 1000IU (100/BT) 2000 UNITS: 25 TAB at 09:57

## 2018-05-03 RX ADMIN — TRAMADOL HYDROCHLORIDE 50 MG: 50 TABLET, FILM COATED ORAL at 16:21

## 2018-05-03 RX ADMIN — Medication 500 MG: at 17:20

## 2018-05-03 RX ADMIN — NITROFURANTOIN MACROCRYSTALS 50 MG: 50 CAPSULE ORAL at 22:04

## 2018-05-03 RX ADMIN — Medication 1000 MG: at 16:22

## 2018-05-03 RX ADMIN — NITROFURANTOIN MACROCRYSTALS 50 MG: 50 CAPSULE ORAL at 08:05

## 2018-05-03 RX ADMIN — FERROUS SULFATE TAB 325 MG (65 MG ELEMENTAL FE) 325 MG: 325 (65 FE) TAB at 16:21

## 2018-05-03 RX ADMIN — Medication 1000 MG: at 08:06

## 2018-05-03 RX ADMIN — AMITRIPTYLINE HYDROCHLORIDE 10 MG: 10 TABLET, FILM COATED ORAL at 22:04

## 2018-05-03 RX ADMIN — AMLODIPINE BESYLATE 5 MG: 5 TABLET ORAL at 08:05

## 2018-05-03 RX ADMIN — TRAMADOL HYDROCHLORIDE 50 MG: 50 TABLET, FILM COATED ORAL at 22:04

## 2018-05-03 RX ADMIN — TRAMADOL HYDROCHLORIDE 50 MG: 50 TABLET, FILM COATED ORAL at 02:00

## 2018-05-03 RX ADMIN — GABAPENTIN 300 MG: 300 CAPSULE ORAL at 05:14

## 2018-05-03 RX ADMIN — Medication 500 MG: at 08:05

## 2018-05-03 RX ADMIN — GABAPENTIN 600 MG: 300 CAPSULE ORAL at 22:04

## 2018-05-03 RX ADMIN — STANDARDIZED SENNA CONCENTRATE AND DOCUSATE SODIUM 1 TABLET: 8.6; 5 TABLET, FILM COATED ORAL at 22:04

## 2018-05-03 RX ADMIN — CARVEDILOL 25 MG: 25 TABLET, FILM COATED ORAL at 08:05

## 2018-05-03 RX ADMIN — NITROFURANTOIN MACROCRYSTALS 50 MG: 50 CAPSULE ORAL at 14:11

## 2018-05-03 RX ADMIN — LISINOPRIL 20 MG: 20 TABLET ORAL at 08:05

## 2018-05-03 RX ADMIN — CARVEDILOL 25 MG: 25 TABLET, FILM COATED ORAL at 16:22

## 2018-05-03 RX ADMIN — PANTOPRAZOLE SODIUM 40 MG: 40 TABLET, DELAYED RELEASE ORAL at 05:15

## 2018-05-03 RX ADMIN — FUROSEMIDE 10 MG: 20 TABLET ORAL at 08:06

## 2018-05-03 RX ADMIN — APIXABAN 2.5 MG: 2.5 TABLET, FILM COATED ORAL at 22:04

## 2018-05-03 RX ADMIN — POLYETHYLENE GLYCOL (3350) 17 G: 17 POWDER, FOR SOLUTION ORAL at 08:07

## 2018-05-03 NOTE — PROGRESS NOTES
Team conference; discharge deferred possible in 10 to 14 days. Discussed with pt and daughter; agreeable.

## 2018-05-03 NOTE — PROGRESS NOTES
PHYSICAL THERAPY DAILY NOTE  Time In: 1346  Time Out: 1426  Patient Seen For: PM;Balance activities;Gait training;Patient education; Therapeutic exercise;Transfer training; Other (see progress notes)    Subjective: patient reporting she feels OK. Reports no change in LLE pain. Reports elevating LLE helps. Reports she walks with a rollator at home         Objective:Vital Signs:  Patient Vitals for the past 12 hrs:   Temp Pulse Resp BP SpO2   05/03/18 1507 97.8 °F (36.6 °C) 67 16 166/82 99 %   05/03/18 0746 97.8 °F (36.6 °C) 75 12 168/74 98 %       Patient education:Bed mobility training,transfer training, gait training, fall precautions, activity pacing, body mechanics, rollator parts management, Patient verbalizing understanPain level:patient did not rate on pain scale but visual scale pain level of 3 to 6 out of 10      Pain location:left foot and ankle  Pain interventions:Pain medication per RN, rest, positioning(elevation), added ELR to w/cding and demonstrating partial understanding of patient education. Recommend follow up education. Interdisciplinary Communication:NA    Other (comment) (has pacemaker; fall risk )  GROSS ASSESSMENT Daily Assessment     NA       BED/MAT MOBILITY Daily Assessment    Rolling Right : 6 (Modified independent)  Rolling Left : 6 (Modified independent)  Supine to Sit : 5 (Supervision)  Sit to Supine : 6 (Modified independent)       TRANSFERS Daily Assessment   Impaired balance with cues for body mechanics, cues for rollator set up   Transfer Type: SPT without device  Transfer Assistance : 4 (Contact guard assistance)  Sit to Stand Assistance: Contact guard assistance  Car Transfers: Not tested       GAIT Daily Assessment    Amount of Assistance: 4 (Contact guard assistance)  Distance (ft): 100 Feet (ft)  Assistive Device: Walker, rollator   Gait training with cues for improved LLE step clearance and step length. Foot flat with decreased knee ext at initial contact LLE.  Steppage pattern LLE during swing due to DF weakness    STEPS or STAIRS Daily Assessment   Single step at a time leading up with RLE and down with LLE. 3 min sitting rest break after going up steps. Steps/Stairs Ambulated (#): 4  Level of Assist : 4 (Contact guard assistance)  Rail Use: Both       BALANCE Daily Assessment    Sitting - Static: Good (unsupported)  Sitting - Dynamic: Fair (occasional)  Standing - Static: Fair  Standing - Dynamic : Impaired       WHEELCHAIR MOBILITY Daily Assessment    Curbs/Ramps Assist Required (FIM Score): 0 (Not tested)  Wheelchair Setup Assist Required : 0 (Not tested)       LOWER EXTREMITY EXERCISES Daily Assessment    Extremity: Both  Exercise Type #1: Supine lower extremity strengthening  Sets Performed: 2  Reps Performed: 10  Level of Assist: Minimal assistance     SUPINE EXERCISES Sets Reps Comments   Ankle Pumps 1 20 Min assist with LLE   Bridging 2 10 Min assist   Heel Slides 2 10 Min assist   Hip Abduction 2 10 Min assist   Short Arc Quad               Assessment: Progressing towards goals. LLE pain limiting functional mobility. Improved gait distance and gait pattern ambulating with rollator vs RW       Patient returned to room at end of treatment. Patient supine in bed with head of bed elevated and bed rails up x 2. Needs placed in reach of patient. LLE elevated on 2 pillows    Plan of Care: Continue with POC and progress as tolerated.      Laly Burk, PT  5/3/2018

## 2018-05-03 NOTE — PROGRESS NOTES
Care Management Interventions  PCP Verified by CM: Yes  Transition of Care Consult (CM Consult): Discharge Planning  Current Support Network: Own Home, Family Lives Nearby (family provides 25 hour care)  Confirm Follow Up Transport: Family  Plan discussed with Pt/Family/Caregiver: Yes  Freedom of Choice Offered: Yes  Discharge Location  Discharge Placement: Home with family assistance    Pt admitted from [facility] - debility. Pt was independent to mod I with adls; used a rollator at times for ambulation. Has access to a wc,walker, bsc shower chair and rollator. Has Medicare, Supplemental insurance and RX coverage. No difficulty getting meds. Lives in a one level house with I shahid. Pt has grab bars in bathroom. Family has been providing 24 hr supervision. Contact Adair Gonzalez daughter [phone]. Plan for discharge to home with family support.

## 2018-05-03 NOTE — PROGRESS NOTES
End Of Shift Functional Summary, Nursing      TOILETING:    Does patient need assist with adjusting pants up or down and/or pericare? no  If yes, please indicate what the patient needs help with:  no    Pt uses walker. Does the patient require extra time? yes  Does the patient require standby assistance? no  Does the patient require contact guard assistance? no  Does the patient require more than one staff member for assistance? no    TOILET TRANSFER:    Pt requires minimal assistance. Pt uses walker. Does the patient require extra time? yes  Does the patient require contact guard assistance? no  Does the patient require more than one staff member for assistance? no    BLADDER:    Pt does not have a templeton catheter that staff manages. Pt does not take medication. If so, please indicate which medication: none   Pt is continent of bladder and voids in toilet  Pt requires staff to empty device Pt has had 1 bladder accidents during this shift requiring minimal assistance to clean up. (An accident is when the episode is not contained in a brief AND/OR the clothing/linen requires changing/cleaning up.)    BOWEL:  Pt does take medication. Pt is continent of bowel and uses toilet. Pt requires staff to empty device    Pt has had 1 bowel accidents during this shift requiring moderate assistance from staff to clean up. (An accident is when the episode is not contained in a brief AND/OR the clothing/linen requires changing/cleaning up.)    BED/CHAIR TRANSFER  Pt requires minimal assistance. Pt uses walker  Does the patient require extra time? yes  Does the patient require contact guard assistance? no  Does the patient require more than one staff member for assistance? no    EATING  Pt requires no assistance. Pt does not wear dentures. Documentation reviewed and plan of care discussed/reviewed with   oncoming nurse and patient assistant during the shift.

## 2018-05-03 NOTE — PROGRESS NOTES
05/03/18 1317   Time Spent With Patient   Time In 1037   Time Out 1115   Patient Seen For: AM;ADLs;Other (see progress notes)   Toileting   Toileting Assistance (FIM Score) Min A   Adaptive Equipment Elevated seat;Grab bars   Functional Transfers   Toilet Transfer  Elevated seat;Grab bars;Stand pivot transfer without device   Amount of Assistance Required CGA   S: \"I've got to go to the bathroom. \"   O: Patient presented up following PT. Agreeable to treatment. Patient participated in bilateral adrián exercises with 8 pounds total weight 15 x 2 sets x shoulder flexion/extension, shoulder horizontal abduction/adduction, V-pattern, and large circles, working on BUE strengthening and increasing activity tolerance. Patient required toileting twice during session; see above. Elevated foot of bed and placed L foot on pillow for edema management. A: Limited ability to participate due to toileting and pain in L ankle. Patient tolerated session well with complaint of pain in L ankle, unquantified; attempted multiple changes in position with no change in reported pain. P: Continue OT POC with focus on ADL/IADL skills, functional transfers, functional mobility, coordination, strength, static and dynamic balance, and activity tolerance to maximize safety and independence with ADLs and functional transfers. Patient was left supine in bed with call bell/all other needs in reach. Bed alarm activated. Interdisciplinary communication: Collaborated with PT regarding LLE swelling and pain.       Elias Spurling, MS, OTR/L  5/3/2018

## 2018-05-03 NOTE — PROGRESS NOTES
Pt assisted back to bathroom x2 in short time span. Upon return from 2nd trip to bathroom pt reports LLE is burning and numb. Assessment of leg unchanged from earlier this shift. Pain medication given.

## 2018-05-03 NOTE — PROGRESS NOTES
Problem: Falls - Risk of  Goal: *Absence of Falls  Document Jose Fall Risk and appropriate interventions in the flowsheet.    Outcome: Progressing Towards Goal  Fall Risk Interventions:  Mobility Interventions: Patient to call before getting OOB, Utilize walker, cane, or other assitive device    Mentation Interventions: Adequate sleep, hydration, pain control, Door open when patient unattended, Increase mobility, Toileting rounds    Medication Interventions: Patient to call before getting OOB    Elimination Interventions: Call light in reach, Patient to call for help with toileting needs, Toileting schedule/hourly rounds    History of Falls Interventions: Bed/chair exit alarm, Consult care management for discharge planning, Door open when patient unattended

## 2018-05-03 NOTE — PROGRESS NOTES
Sara Krishnan MD,   Medical Director  [address] [facility], [address] [facility]  Tel: [phone]       D PROGRESS NOTE    Barbara Steven  Admit Date: 4/30/2018  Admit Diagnosis: left feneseal embolictomy; Physical debility    Subjective   MUCH BETTER THIS A.M. More alert, conversive, cognition is good. + UA; given IM Rocephin last evening. Her only complaint is left ankle and foot burning/throbbing pain.  Made sleeping difficult    Objective:     Current Facility-Administered Medications   Medication Dose Route Frequency    amLODIPine (NORVASC) tablet 5 mg  5 mg Oral DAILY    amitriptyline (ELAVIL) tablet 10 mg  10 mg Oral QHS    gabapentin (NEURONTIN) capsule 300 mg  300 mg Oral TID    furosemide (LASIX) tablet 10 mg  10 mg Oral DAILY    loperamide (IMODIUM) capsule 4 mg  4 mg Oral Q6H PRN    Saccharomyces boulardii (FLORASTOR) capsule 500 mg  500 mg Oral BID    nitrofurantoin (MACRODANTIN) capsule 50 mg  50 mg Oral Q6H    polyethylene glycol (MIRALAX) packet 17 g  17 g Oral DAILY    bisacodyl (DULCOLAX) suppository 10 mg  10 mg Rectal DAILY PRN    phenylephrine suppository 1 Suppository  1 Suppository Rectal TID PRN    acetaminophen (TYLENOL) tablet 500 mg  500 mg Oral Q4H PRN    diphenhydrAMINE (BENADRYL) capsule 25 mg  25 mg Oral Q4H PRN    apixaban (ELIQUIS) tablet 2.5 mg  2.5 mg Oral BID    aspirin delayed-release tablet 81 mg  81 mg Oral DAILY    calcium carbonate (OS-CAN) tablet 500 mg [elemental]  500 mg Oral BID    carvedilol (COREG) tablet 25 mg  25 mg Oral BID WITH MEALS    levothyroxine (SYNTHROID) tablet 25 mcg  25 mcg Oral ACB    lisinopril (PRINIVIL, ZESTRIL) tablet 20 mg  20 mg Oral DAILY    nitroglycerin (NITROSTAT) tablet 0.4 mg  0.4 mg SubLINGual PRN    ondansetron (ZOFRAN ODT) tablet 4 mg  4 mg Oral Q6H PRN    pantoprazole (PROTONIX) tablet 40 mg  40 mg Oral ACB    senna-docusate (PERICOLACE) 8.6-50 mg per tablet 1 Tab  1 Tab Oral QHS    traMADol (ULTRAM) tablet 50 mg  50 mg Oral Q6H PRN     Review of Systems:Denies chest pain, shortness of breath, cough, headache, visual problems, abdominal pain, dysurea, calf pain. Pertinent positives are as noted in the medical records and unremarkable otherwise. Visit Vitals    /70 (BP 1 Location: Left arm, BP Patient Position: At rest)    Pulse 75    Temp 98 °F (36.7 °C)    Resp 18    SpO2 99%        Physical Exam:   General: Alert and age appropriately oriented. No acute cardio respiratory distress. HEENT: Normocephalic,no scleral icterus  Oral mucosa moist without cyanosis   Lungs: Clear to auscultation  bilaterally. Respiration even and unlabored   Heart: Regular rate and rhythm, S1, S2   No  murmurs, clicks, rub or gallops   Abdomen: Soft, non-tender, nondistended. Bowel sounds present. No organomegaly. Genitourinary: defer . Neuromuscular:      Grossly no focal motor deficits noted. Moves ankles. Exam limited by pain. Hypersensitivity to touch distal LLE   Skin/extremity: No rashes, no erythema. No calf tenderness BLE  Wound healing, left groin. Has trace edema to left ankle. Strong DP pulse RLE, cannot palpate on the left, possibly trace. Both feet are warm , normal cap refill. No skin discoloration                                                                            Functional Assessment:          Balance  Sitting - Static: Good (unsupported) (04/30/18 1600)  Sitting - Dynamic: Good (unsupported) (04/30/18 1600)  Standing - Static: Fair (04/30/18 1600)           Toileting  Cues: Physical assistance for pants down;Physical assistance for pants up (05/02/18 1436)  Adaptive Equipment: Elevated seat;Grab bars (05/02/18 1436)         Zofia Masters Fall Risk Assessment:  Noamen Masters Fall Risk  Mobility: Ambulates or transfers with assist devices or assistance (05/02/18 9911)  Mobility Interventions: Utilize walker, cane, or other assitive device; Patient to call before getting OOB;Communicate number of staff needed for ambulation/transfer (05/02/18 1249)  Mentation: Alert, oriented x 3 (05/02/18 0953)  Mentation Interventions: Adequate sleep, hydration, pain control;Door open when patient unattended;Eyeglasses and hearing aids; Evaluate medications/consider consulting pharmacy; Increase mobility (05/02/18 2760)  Medication: Patient receiving anticonvulsants, sedatives(tranquilizers), psychotropics or hypnotics, hypoglycemics, narcotics, sleep aids, antihypertensives, laxatives, or diuretics (05/02/18 0953)  Medication Interventions: Assess postural VS orthostatic hypotension; Patient to call before getting OOB; Teach patient to arise slowly; Evaluate medications/consider consulting pharmacy (05/02/18 7495)  Elimination: Needs assistance with toileting (05/02/18 3297)  Elimination Interventions: Bed/chair exit alarm;Elevated toilet seat;Call light in reach; Patient to call for help with toileting needs; Toilet paper/wipes in reach (05/02/18 7722)  Prior Fall History: Before admission in past 12 months _home or previous inpatient care) (05/02/18 4752)  History of Falls Interventions: Bed/chair exit alarm; Door open when patient unattended; Investigate reason for fall;Room close to nurse's station;Utilize gait belt for transfer/ambulation (05/02/18 0953)  Total Score: 4 (05/02/18 0953)  Standard Fall Precautions: Yes (05/01/18 2037)  High Fall Risk: Yes (05/02/18 0953)     Speech Assessment:  Aspiration Signs/Symptoms: None (05/01/18 1305)      Ambulation:  Gait  Distance (ft): 20 Feet (ft) (05/02/18 1521)  Assistive Device: Gait belt;Walker, rolling (05/02/18 1521)     Labs/Studies:  Recent Results (from the past 72 hour(s))   PLEASE READ & DOCUMENT PPD TEST IN 48 HRS    Collection Time: 04/30/18 10:07 AM   Result Value Ref Range    PPD negative Negative    mm Induration 0mm mm   CBC WITH AUTOMATED DIFF    Collection Time: 05/01/18  6:50 AM   Result Value Ref Range    WBC 6.8 4.3 - 11.1 K/uL    RBC 3.28 (L) 4.05 - 5.25 M/uL    HGB 10.1 (L) 11.7 - 15.4 g/dL    HCT 30.0 (L) 35.8 - 46.3 %    MCV 91.5 79.6 - 97.8 FL    MCH 30.8 26.1 - 32.9 PG    MCHC 33.7 31.4 - 35.0 g/dL    RDW 12.3 11.9 - 14.6 %    PLATELET 992 358 - 693 K/uL    MPV 10.2 (L) 10.8 - 14.1 FL    DF AUTOMATED      NEUTROPHILS 71 43 - 78 %    LYMPHOCYTES 18 13 - 44 %    MONOCYTES 9 4.0 - 12.0 %    EOSINOPHILS 2 0.5 - 7.8 %    BASOPHILS 0 0.0 - 2.0 %    IMMATURE GRANULOCYTES 0 0.0 - 5.0 %    ABS. NEUTROPHILS 4.8 1.7 - 8.2 K/UL    ABS. LYMPHOCYTES 1.2 0.5 - 4.6 K/UL    ABS. MONOCYTES 0.6 0.1 - 1.3 K/UL    ABS. EOSINOPHILS 0.1 0.0 - 0.8 K/UL    ABS. BASOPHILS 0.0 0.0 - 0.2 K/UL    ABS. IMM. GRANS. 0.0 0.0 - 0.5 K/UL   MAGNESIUM    Collection Time: 05/01/18  6:50 AM   Result Value Ref Range    Magnesium 1.9 1.8 - 2.4 mg/dL   METABOLIC PANEL, COMPREHENSIVE    Collection Time: 05/01/18  6:50 AM   Result Value Ref Range    Sodium 135 (L) 136 - 145 mmol/L    Potassium 3.9 3.5 - 5.1 mmol/L    Chloride 95 (L) 98 - 107 mmol/L    CO2 32 21 - 32 mmol/L    Anion gap 8 7 - 16 mmol/L    Glucose 136 (H) 65 - 100 mg/dL    BUN 19 8 - 23 MG/DL    Creatinine 1.28 (H) 0.6 - 1.0 MG/DL    GFR est AA 50 (L) >60 ml/min/1.73m2    GFR est non-AA 42 (L) >60 ml/min/1.73m2    Calcium 8.6 8.3 - 10.4 MG/DL    Bilirubin, total 0.4 0.2 - 1.1 MG/DL    ALT (SGPT) 11 (L) 12 - 65 U/L    AST (SGOT) 17 15 - 37 U/L    Alk.  phosphatase 162 (H) 50 - 136 U/L    Protein, total 6.3 6.3 - 8.2 g/dL    Albumin 2.8 (L) 3.2 - 4.6 g/dL    Globulin 3.5 2.3 - 3.5 g/dL    A-G Ratio 0.8 (L) 1.2 - 3.5     METABOLIC PANEL, BASIC    Collection Time: 05/02/18  6:43 AM   Result Value Ref Range    Sodium 134 (L) 136 - 145 mmol/L    Potassium 4.2 3.5 - 5.1 mmol/L    Chloride 96 (L) 98 - 107 mmol/L    CO2 28 21 - 32 mmol/L    Anion gap 10 7 - 16 mmol/L    Glucose 148 (H) 65 - 100 mg/dL    BUN 19 8 - 23 MG/DL    Creatinine 1.11 (H) 0.6 - 1.0 MG/DL    GFR est AA 59 (L) >60 ml/min/1.73m2    GFR est non-AA 49 (L) >60 ml/min/1.73m2    Calcium 8.1 (L) 8.3 - 10.4 MG/DL   FERRITIN    Collection Time: 05/02/18  6:43 AM   Result Value Ref Range    Ferritin 139 8 - 388 NG/ML   TRANSFERRIN SATURATION    Collection Time: 05/02/18  6:43 AM   Result Value Ref Range    Iron 18 (L) 35 - 150 ug/dL    TIBC 186 (L) 250 - 450 ug/dL    Transferrin Saturation 10 (L) >20 %   GLUCOSE, POC    Collection Time: 05/02/18  7:43 AM   Result Value Ref Range    Glucose (POC) 143 (H) 65 - 100 mg/dL   GLUCOSE, POC    Collection Time: 05/02/18 11:26 AM   Result Value Ref Range    Glucose (POC) 135 (H) 65 - 100 mg/dL   URINALYSIS W/ RFLX MICROSCOPIC    Collection Time: 05/02/18  3:59 PM   Result Value Ref Range    Color YELLOW      Appearance CLEAR      Specific gravity 1.009 1.001 - 1.023      pH (UA) 7.0 5.0 - 9.0      Protein NEGATIVE  NEG mg/dL    Glucose NEGATIVE  mg/dL    Ketone NEGATIVE  NEG mg/dL    Bilirubin NEGATIVE  NEG      Blood NEGATIVE  NEG      Urobilinogen 0.2 0.2 - 1.0 EU/dL    Nitrites POSITIVE (A) NEG      Leukocyte Esterase NEGATIVE  NEG      WBC 0-3 0 /hpf    RBC 0-3 0 /hpf    Epithelial cells 0 0 /hpf    Bacteria 4+ (H) 0 /hpf    Casts 0 0 /lpf   CBC WITH AUTOMATED DIFF    Collection Time: 05/03/18  6:30 AM   Result Value Ref Range    WBC 6.9 4.3 - 11.1 K/uL    RBC 2.77 (L) 4.05 - 5.25 M/uL    HGB 8.4 (L) 11.7 - 15.4 g/dL    HCT 25.1 (L) 35.8 - 46.3 %    MCV 90.6 79.6 - 97.8 FL    MCH 30.3 26.1 - 32.9 PG    MCHC 33.5 31.4 - 35.0 g/dL    RDW 12.4 11.9 - 14.6 %    PLATELET 935 348 - 419 K/uL    MPV 9.7 (L) 10.8 - 14.1 FL    DF AUTOMATED      NEUTROPHILS 64 43 - 78 %    LYMPHOCYTES 23 13 - 44 %    MONOCYTES 9 4.0 - 12.0 %    EOSINOPHILS 4 0.5 - 7.8 %    BASOPHILS 0 0.0 - 2.0 %    IMMATURE GRANULOCYTES 0 0.0 - 5.0 %    ABS. NEUTROPHILS 4.4 1.7 - 8.2 K/UL    ABS. LYMPHOCYTES 1.6 0.5 - 4.6 K/UL    ABS. MONOCYTES 0.6 0.1 - 1.3 K/UL    ABS. EOSINOPHILS 0.3 0.0 - 0.8 K/UL    ABS. BASOPHILS 0.0 0.0 - 0.2 K/UL    ABS. IMM.  GRANS. 0.0 0.0 - 0.5 K/UL Assessment:     Problem List as of 5/3/2018  Date Reviewed: 4/30/2018          Codes Class Noted - Resolved    * (Principal)Physical debility ICD-10-CM: R53.81  ICD-9-CM: 799.3  4/30/2018 - Present        Chronic systolic congestive heart failure (HCC) ICD-10-CM: I50.22  ICD-9-CM: 428.22, 428.0  4/26/2018 - Present        Femoral artery occlusion, left (HCC) ICD-10-CM: S68.161  ICD-9-CM: 444.22  4/25/2018 - Present        Type 2 diabetes mellitus with nephropathy ([facility] ICD-10-CM: E11.21  ICD-9-CM: 250.40, 583.81  1/25/2018 - Present        S/P AV noris ablation ICD-10-CM: Z98.890  ICD-9-CM: V45.89  1/16/2018 - Present        Biventricular cardiac pacemaker in situ ICD-10-CM: Z95.0  ICD-9-CM: V45.01  1/16/2018 - Present        Diabetes mellitus, type 2 ([facility] (Chronic) ICD-10-CM: E11.9  ICD-9-CM: 250.00  8/12/2017 - Present        Hypertension (Chronic) ICD-10-CM: I10  ICD-9-CM: 401.9  8/12/2017 - Present    Overview Signed 9/12/2012  3:43 PM by Monica Arnold     Orthostatic hypotension due to autonomic neuropathy             CKD (chronic kidney disease), stage III (Chronic) ICD-10-CM: N18.3  ICD-9-CM: 585.3  8/12/2017 - Present        Dysphagia (Chronic) ICD-10-CM: R13.10  ICD-9-CM: 787.20  8/12/2017 - Present    Overview Signed 4/5/2016  5:24 PM by Chan Gotti MD     Due to esophageal spasm seen on modified barium swallow 2015             Atrial fibrillation ([facility] (Chronic) ICD-10-CM: I48.91  ICD-9-CM: 427.31  8/12/2017 - Present        DNR (do not resuscitate) (Chronic) ICD-10-CM: N74  ICD-9-CM: V49.86  8/12/2017 - Present        Ischemic cardiomyopathy ICD-10-CM: I25.5  ICD-9-CM: 414.8  7/11/2017 - Present        Cardiomyopathy ([facility] ICD-10-CM: I42.9  ICD-9-CM: 425.4  3/30/2017 - Present        Dyspnea ICD-10-CM: R06.00  ICD-9-CM: 786.09  2/25/2017 - Present        Atrial fibrillation with rapid ventricular response ([facility] ICD-10-CM: I48.91  ICD-9-CM: 427.31  2/25/2017 - Present        SSS (sick sinus syndrome) ([facility] ICD-10-CM: I49.5  ICD-9-CM: 427.81  5/5/2016 - Present        Orthostatic hypotension ICD-10-CM: I95.1  ICD-9-CM: 458.0  5/5/2016 - Present        CAD in native artery ICD-10-CM: I25.10  ICD-9-CM: 414.01  5/5/2016 - Present        Pacemaker ICD-10-CM: Z95.0  ICD-9-CM: V45.01  11/10/2015 - Present        Cervical radiculopathy ICD-10-CM: M54.12  ICD-9-CM: 723.4  5/23/2013 - Present    Overview Addendum 8/26/2013  4:59 PM by Zari Goncalves MD     Chronic right shoulder pain S/P eval Dr Licha Avitia POC August 2013, previous cervical spinal fusion             Autonomic neuropathy ICD-10-CM: G90.9  ICD-9-CM: 337.9  1/13/2013 - Present    Overview Signed 1/13/2013  8:18 PM by Zari Goncalves MD     Severe orthostatic hypotension             MCI (mild cognitive impairment) ICD-10-CM: G31.84  ICD-9-CM: 331.83  1/13/2013 - Present    Overview Addendum 12/16/2013  4:43 PM by Zari Goncalves MD     MMSE 27/30 Nov 2009, 27/30 Dec 2013, remembers 3/3 objects 5 min later and draws normal clock             Chronic pain syndrome ICD-10-CM: G89.4  ICD-9-CM: 338.4  1/13/2013 - Present    Overview Addendum 11/10/2015  5:15 PM by Zari Goncalves MD     Back, right shoulder, neck: Peripheral neuropathy, spinal stenosis C7-T1, foraminal narrowing C4-5             Acquired hypothyroidism (Chronic) ICD-10-CM: E03.9  ICD-9-CM: 244.9  9/12/2012 - Present        Mixed hyperlipidemia (Chronic) ICD-10-CM: E78.2  ICD-9-CM: 272.2  9/12/2012 - Present    Overview Addendum 9/12/2012  3:51 PM by Oxana Santos     With high HDL  Intolerant of pravastatin,lovastatin and lescol             GERD (gastroesophageal reflux disease) (Chronic) ICD-10-CM: K21.9  ICD-9-CM: 530.81  9/12/2012 - Present        IBS (irritable bowel syndrome) (Chronic) ICD-10-CM: K58.9  ICD-9-CM: 564.1  9/12/2012 - Present    Overview Signed 9/12/2012  3:52 PM by Oxana Santos     Chronic enema abuse  diverticulosis             Gait disorder (Chronic) ICD-10-CM: R26.9  ICD-9-CM: 781.2  9/12/2012 - Present    Overview Signed 9/12/2012  3:53 PM by Nai Shah     Diabetic peripheral neuropathy              Atrial fibrillation with RVR ([facility] ICD-10-CM: I48.91  ICD-9-CM: 427.31  9/12/2012 - Present    Overview Addendum 10/14/2012  9:34 AM by Joesph Will MD     Paroxysmal.  Coumadin contraindicated due to falls               RESOLVED: RUSS (acute kidney injury) ([facility] ICD-10-CM: N17.9  ICD-9-CM: 584.9  8/13/2017 - 10/19/2017        RESOLVED: Acute respiratory failure with hypoxemia (HCC) ICD-10-CM: J96.01  ICD-9-CM: 518.81  8/12/2017 - 9/15/2017        RESOLVED: CAP (community acquired pneumonia) ICD-10-CM: J18.9  ICD-9-CM: 486  8/12/2017 - 9/15/2017        RESOLVED: CHF (congestive heart failure) ([facility] ICD-10-CM: I50.9  ICD-9-CM: 428.0  3/18/2017 - 4/26/2018        RESOLVED: Altered mental status ICD-10-CM: R41.82  ICD-9-CM: 780.97  3/10/2017 - 10/19/2017        RESOLVED: Altered mental state ICD-10-CM: R41.82  ICD-9-CM: 780.97  3/9/2017 - 10/19/2017        RESOLVED: Chest pain, pleuritic ICD-10-CM: R07.81  ICD-9-CM: 786.52  3/9/2017 - 10/19/2017        RESOLVED: Weight loss ICD-10-CM: R63.4  ICD-9-CM: 783.21  9/2/2014 - 11/10/2015        RESOLVED: Anorexia ICD-10-CM: R63.0  ICD-9-CM: 783.0  9/2/2014 - 10/19/2017        RESOLVED: Iron deficiency anemia, unspecified ICD-10-CM: D50.9  ICD-9-CM: 280.9  9/12/2012 - 4/16/2013    Overview Addendum 1/13/2013  8:21 PM by MD RUBIA Lopes in the proximal colon                    Assessment: Physical Debility s/p ischemic LLE s/p Left femoral artery embolectomy      Continue daily physician medical management:  Pneumonia prophylaxis- Incentive spirometer every hour while awake. Aspiration precautions      Dysphagia; consider MBS; will have ST eval. HOB > 30d  -5/2 ST eval yesterday, no overt signs of dysphagia/ aspiration      Post op anemia due to blood loss; gi prophylaxis, monitor wound; check stool.  Check iron studies as well  -hgb 10.1  -5/2 drop in Hgb to 8.4; significant iron deficiency; start supplement, hemmoc stool; hold aspirin/eliquis until stool comes back ; no gi symptoms to suggest gastritis/ ulcer.      DVT risk / DVT Prophylaxis- Will require daily physician exam to assess for signs and symptoms as patient is at increased risk for of thromboembolism. Mobilization as tolerated. Intermittent pneumatic compression devices when in bed Thigh-high or knee-high thromboembolic deterrent hose when out of bed. Eliquis/ASA      Cervical stenosis with autonomic dysfxn; orthostatic hypotension; monitor with therapies. Cont TEDs  -5/2 no overt symptoms; bp stable      Pain Control: stable, mild-to-moderate joint symptoms intermittently, reasonably well controlled by PRN meds. Will require regular pain assessment and comprenhensive pain management,   -pt with vascular pain and hypersensitivity to LLE; cont Neurontin, tolerating 600mg tid; does not wake her up at noc; cont ultram as well  -5/2 lethargic, multifactorial; dec neurontin   -5/3 much more alert. Has been on Neurontin for years; can inc back to home dose; lethargy likely due to UTI and poor sleep; monitor; cont ultram and add 10mg Elavil; having burning pain in Left foot/ankle; lacks DP pulse; will see if we can doppler; however, foot warm to touch, no discoloration with nl cap refill     Hx of DM; on amaryl in the past, currently nothing; bs 143, change to diab diet      Hypocalcemia; cont Oscal and add Vit D. Will see when pt had last bone density scan. Improved 8.6 F/u with PCP  -5/2 calcium 8.1 from 8.6, check Vit d ; 5/3 inc supplement      Wound Care: Monitor wound status daily per staff and physician. At risk for failure. Will require 24/7 rehab nursing. Keep wound clean and dry, Reinforce dressing PRN and Ice to area for comfort      Hypertension - BP uncontrolled, fluctuating, managed medically.  Cont Coreg, lasix, zestril   -poorly controlled 5/1 sbp 175-187; however dropped to 112; has hx of orthostatics; monitor with activity  -5/3 bp 170s, add norvasc with parameters      RUSS ; monitor labs. Check UA due to [place]. Improving as of 4/28; no labs since that time; may need to consider adjusting Lasix and zestril  -5/1 slt bump in creat from 1.18- 1.28 ; recheck daily. Very little po today, may need IV fluids  -5/2 IVFs overnoc, creat 1.11, improved      Hypothyroidism; cont synthroid      Urinary retention/ neurogenic bladder - schedule voids q6-8 hrs. Check post-void residual as needed; In and Out catheterize if post-void residual is more than 400 cc.  -5/1 urin frq; check UA, check post void residual  -5/3 urine finally sent last pm. 4+ bact, nitrite positive, leuk neg and few bacteria; received one gram IM Rocephin and started on Macrobid; await cx. No dysuria + freq      bowel program - add prn bowel program  -5/1 impacted, did not sleep. Given suppos and disimpacted for mod amt of stool . No n/v. Limited po today. Labs stable. No gaurding or rebound on exam  -add anusol suppos due to rectal pain and suspected internal hemmor. With blood streaked stool.  -5/3 now just mucus, no occult blood. hbg has dropped; check hemoccult      GERD - resume PPI. At times may need additional antacids, Maalox prn.            Time spent was 35 minutes with over 1/2 in direct patient care/examination, consultation and coordination of care.      Signed By: Clarissa Forrester MD     May 3, 2018

## 2018-05-03 NOTE — PROGRESS NOTES
Problem: Falls - Risk of  Goal: *Absence of Falls  Document Jose Fall Risk and appropriate interventions in the flowsheet.    Outcome: Progressing Towards Goal  Fall Risk Interventions:  Mobility Interventions: Utilize walker, cane, or other assitive device, Patient to call before getting OOB, Communicate number of staff needed for ambulation/transfer    Mentation Interventions: Adequate sleep, hydration, pain control, Door open when patient unattended, Eyeglasses and hearing aids, Evaluate medications/consider consulting pharmacy, Increase mobility    Medication Interventions: Assess postural VS orthostatic hypotension, Patient to call before getting OOB, Teach patient to arise slowly, Evaluate medications/consider consulting pharmacy    Elimination Interventions: Bed/chair exit alarm, Elevated toilet seat, Call light in reach, Patient to call for help with toileting needs, Toilet paper/wipes in reach    History of Falls Interventions: Bed/chair exit alarm, Door open when patient unattended, Investigate reason for fall, Room close to nurse's station, Utilize gait belt for transfer/ambulation

## 2018-05-03 NOTE — PROGRESS NOTES
End Of Shift Functional Summary, Nursing      TOILETING:    Does patient need assist with adjusting pants up or down and/or pericare? yes  If yes, please indicate what the patient needs help with:  Pants up and down  (i.e. pants up, pants down, pericare)  Pt uses walker. Does the patient require extra time? yes  Does the patient require standby assistance? no  Does the patient require contact guard assistance? yes  Does the patient require more than one staff member for assistance? no    TOILET TRANSFER:    Pt requires minimal assistance. Pt uses walker. Does the patient require extra time? yes  Does the patient require contact guard assistance? yes  Does the patient require more than one staff member for assistance? no    BLADDER:    Pt does not have a [place] catheter that staff manages. Pt does not take medication. If so, please indicate which medication:    Pt is continent. of bladder and voids in toilet  Pt requires staff to empty device Pt has had 0 bladder accidents during this shift .  (An accident is when the episode is not contained in a brief AND/OR the clothing/linen requires changing/cleaning up.)    BOWEL:  Pt does take medication. Pt is continent of bowel and uses toilet. Pt requires staff to empty device    Pt has had 0 bowel accidents during this shift . (An accident is when the episode is not contained in a brief AND/OR the clothing/linen requires changing/cleaning up.)          Documentation reviewed and plan of care discussed/reviewed with   patient and oncoming nurse during the shift.

## 2018-05-03 NOTE — PROGRESS NOTES
05/03/18 1443   Time Spent With Patient   Time In 1300   Time Out 1345   Patient Seen For: PM;Other (see progress notes); ADLs   Toileting   Toileting Assistance (FIM Score) Min A   Functional Transfers   Toilet Transfer  Elevated seat;Grab bars;Stand pivot transfer without device   Amount of Assistance Required Min A   S: \"I need to go to the bathroom. \"   O: Patient presented seated up on toilet on arrival to room. Agreeable to treatment. Patient completed toileting; see above for details. At end of session patient required toileting a second time with same FIMs. Patient required cues for safety during functional mobility. Patient performed simple home management task of folding clothes, including zippers and buttons with good quality and extra time, with 1 pound weight donned on LUE. Patient completed cognitive task following written instructions using small pegs with good quality and no errors in 7/7 rows. Patient removed all small pegs using tip to palm translation with good quality. Patient performed reaching activity using different vertical heights and small rings with LUE only with 4 pound clothespin, working on shoulder stabilization for overhead reaching and  strengthening. A: Remains limited by need for frequent toileting. Patient tolerated session well with complaint of pain in L ankle; attempted repositioning on/off elevated legrest with no change in pain. P: Continue OT POC with focus on ADL/IADL skills, functional transfers, functional mobility, coordination, strength, static and dynamic balance, and activity tolerance to maximize safety and independence with ADLs and functional transfers. Patient was left seated up on toilet; PT present. Interdisciplinary communication: Collaborated with PT regarding toileting.      Dhara Farris MS, OTR/L  5/3/2018

## 2018-05-03 NOTE — PROGRESS NOTES
PHYSICAL THERAPY DAILY NOTE  Time In: 5250  Time Out: 6800  Patient Seen For: AM;Balance activities;Gait training;Patient education; Therapeutic exercise;Transfer training; Other (see progress notes)    Subjective: patient reporting she feels better today. Reports her left foot is hurting more. Objective:Vital Signs:  Patient Vitals for the past 12 hrs:   Temp Pulse Resp BP SpO2   05/03/18 0746 97.8 °F (36.6 °C) 75 12 168/74 98 %     Pain level:patient did not rate on pain scale but visual scale pain level of 3 to 6 out of 10  Pain location:left foot and ankle  Pain interventions:Pain medication per RN, rest, positioning(elevation), added ELR to w/c      Patient education:Bed mobility training,transfer training, gait training, fall precautions, activity pacing, body mechanics,Patient verbalizing understanding and demonstrating partial understanding of patient education. Recommend follow up education. Interdisciplinary Communication:spoke with MD regarding LLE pain, weakness and erythema    Other (comment) (has pacemaker; fall risk )  GROSS ASSESSMENT Daily Assessment     NA       BED/MAT MOBILITY Daily Assessment    Rolling Right : 6 (Modified independent)  Rolling Left : 6 (Modified independent)  Supine to Sit : 5 (Supervision)  Sit to Supine : 6 (Modified independent)       TRANSFERS Daily Assessment   Increased time and effort to complete with cues for body mechanics   Transfer Type: SPT without device  Transfer Assistance : 4 (Contact guard assistance)  Sit to Stand Assistance: Contact guard assistance  Car Transfers: Not tested       GAIT Daily Assessment    Amount of Assistance: 4 (Minimal assistance)  Distance (ft): 50 Feet (ft)  Assistive Device: Walker, rolling   Gait training with cues for improved LLE step clearance and step length. Foot flat with decreased knee ext at initial contact LLE. Steppage pattern LLE during swing due to DF weakness.     STEPS or STAIRS Daily Assessment    Steps/Stairs Ambulated (#): 0  Level of Assist : 0 (Not tested)       BALANCE Daily Assessment   Static standing without assistive device and SBA to CGA Sitting - Static: Good (unsupported)  Sitting - Dynamic: Fair (occasional)  Standing - Static: Fair  Standing - Dynamic : Impaired       WHEELCHAIR MOBILITY Daily Assessment    Curbs/Ramps Assist Required (FIM Score): 0 (Not tested)  Wheelchair Setup Assist Required : 0 (Not tested)       LOWER EXTREMITY EXERCISES Daily Assessment    Extremity: Both  Exercise Type #1: Supine lower extremity strengthening  Sets Performed: 2  Reps Performed: 10  Level of Assist: Minimal assistance     SUPINE EXERCISES Sets Reps Comments   Ankle Pumps 1 20 Min assist with LLE   Bridging 2 10 Min assist   Heel Slides 2 10 Min assist   Hip Abduction 2 10 Min assist   Short Arc Quad 2 10             Assessment: Improved tolerance to treatment with improved functional mobility. Decreased safety awareness during mobility with cues for safe body mechanics. Improved ability to follow commands and participate with treatment. Increase in left foot/ankle pain with decrease in strength       Patient to OT at end of treatment    Plan of Care: Continue with POC and progress as tolerated.      Char Miranda, PT  5/3/2018

## 2018-05-04 LAB
1,25(OH)2D3 SERPL-MCNC: 41 PG/ML (ref 19.9–79.3)
ANION GAP SERPL CALC-SCNC: 9 MMOL/L (ref 7–16)
BUN SERPL-MCNC: 19 MG/DL (ref 8–23)
CALCIUM SERPL-MCNC: 8.3 MG/DL (ref 8.3–10.4)
CHLORIDE SERPL-SCNC: 95 MMOL/L (ref 98–107)
CO2 SERPL-SCNC: 27 MMOL/L (ref 21–32)
CREAT SERPL-MCNC: 1.01 MG/DL (ref 0.6–1)
GLUCOSE SERPL-MCNC: 84 MG/DL (ref 65–100)
HCT VFR BLD AUTO: 23.7 % (ref 35.8–46.3)
HEMOCCULT STL QL: NEGATIVE
HGB BLD-MCNC: 8.1 G/DL (ref 11.7–15.4)
POTASSIUM SERPL-SCNC: 3.7 MMOL/L (ref 3.5–5.1)
SODIUM SERPL-SCNC: 131 MMOL/L (ref 136–145)

## 2018-05-04 PROCEDURE — 83520 IMMUNOASSAY QUANT NOS NONAB: CPT | Performed by: PHYSICAL MEDICINE & REHABILITATION

## 2018-05-04 PROCEDURE — 97110 THERAPEUTIC EXERCISES: CPT

## 2018-05-04 PROCEDURE — 74011250636 HC RX REV CODE- 250/636: Performed by: PHYSICAL MEDICINE & REHABILITATION

## 2018-05-04 PROCEDURE — 97116 GAIT TRAINING THERAPY: CPT

## 2018-05-04 PROCEDURE — 51798 US URINE CAPACITY MEASURE: CPT

## 2018-05-04 PROCEDURE — 85014 HEMATOCRIT: CPT | Performed by: PHYSICAL MEDICINE & REHABILITATION

## 2018-05-04 PROCEDURE — 36415 COLL VENOUS BLD VENIPUNCTURE: CPT | Performed by: PHYSICAL MEDICINE & REHABILITATION

## 2018-05-04 PROCEDURE — 80048 BASIC METABOLIC PNL TOTAL CA: CPT | Performed by: PHYSICAL MEDICINE & REHABILITATION

## 2018-05-04 PROCEDURE — 97535 SELF CARE MNGMENT TRAINING: CPT

## 2018-05-04 PROCEDURE — 74011250637 HC RX REV CODE- 250/637: Performed by: PHYSICAL MEDICINE & REHABILITATION

## 2018-05-04 PROCEDURE — 97530 THERAPEUTIC ACTIVITIES: CPT

## 2018-05-04 PROCEDURE — 74011000258 HC RX REV CODE- 258: Performed by: PHYSICAL MEDICINE & REHABILITATION

## 2018-05-04 PROCEDURE — 82272 OCCULT BLD FECES 1-3 TESTS: CPT | Performed by: PHYSICAL MEDICINE & REHABILITATION

## 2018-05-04 PROCEDURE — 65310000000 HC RM PRIVATE REHAB

## 2018-05-04 PROCEDURE — 99233 SBSQ HOSP IP/OBS HIGH 50: CPT | Performed by: PHYSICAL MEDICINE & REHABILITATION

## 2018-05-04 RX ORDER — AMLODIPINE BESYLATE 10 MG/1
10 TABLET ORAL DAILY
Status: DISCONTINUED | OUTPATIENT
Start: 2018-05-05 | End: 2018-05-14

## 2018-05-04 RX ORDER — SODIUM CHLORIDE 0.9 % (FLUSH) 0.9 %
5 SYRINGE (ML) INJECTION EVERY 8 HOURS
Status: DISCONTINUED | OUTPATIENT
Start: 2018-05-05 | End: 2018-05-08

## 2018-05-04 RX ADMIN — LEVOTHYROXINE SODIUM 25 MCG: 50 TABLET ORAL at 05:26

## 2018-05-04 RX ADMIN — Medication 500 MG: at 09:24

## 2018-05-04 RX ADMIN — TRAMADOL HYDROCHLORIDE 50 MG: 50 TABLET, FILM COATED ORAL at 23:24

## 2018-05-04 RX ADMIN — NITROFURANTOIN MACROCRYSTALS 50 MG: 50 CAPSULE ORAL at 02:45

## 2018-05-04 RX ADMIN — GABAPENTIN 600 MG: 300 CAPSULE ORAL at 22:58

## 2018-05-04 RX ADMIN — APIXABAN 2.5 MG: 2.5 TABLET, FILM COATED ORAL at 22:58

## 2018-05-04 RX ADMIN — Medication 500 MG: at 17:03

## 2018-05-04 RX ADMIN — PANTOPRAZOLE SODIUM 40 MG: 40 TABLET, DELAYED RELEASE ORAL at 05:25

## 2018-05-04 RX ADMIN — ASPIRIN 81 MG: 81 TABLET, COATED ORAL at 09:24

## 2018-05-04 RX ADMIN — CEFTRIAXONE SODIUM 1 G: 1 INJECTION, POWDER, FOR SOLUTION INTRAMUSCULAR; INTRAVENOUS at 13:59

## 2018-05-04 RX ADMIN — FERROUS SULFATE TAB 325 MG (65 MG ELEMENTAL FE) 325 MG: 325 (65 FE) TAB at 09:25

## 2018-05-04 RX ADMIN — AMLODIPINE BESYLATE 5 MG: 5 TABLET ORAL at 09:25

## 2018-05-04 RX ADMIN — AMITRIPTYLINE HYDROCHLORIDE 10 MG: 10 TABLET, FILM COATED ORAL at 22:59

## 2018-05-04 RX ADMIN — Medication 1000 MG: at 09:24

## 2018-05-04 RX ADMIN — LISINOPRIL 20 MG: 20 TABLET ORAL at 09:25

## 2018-05-04 RX ADMIN — GABAPENTIN 600 MG: 300 CAPSULE ORAL at 05:25

## 2018-05-04 RX ADMIN — NITROFURANTOIN MACROCRYSTALS 50 MG: 50 CAPSULE ORAL at 09:24

## 2018-05-04 RX ADMIN — VITAMIN D, TAB 1000IU (100/BT) 2000 UNITS: 25 TAB at 09:25

## 2018-05-04 RX ADMIN — STANDARDIZED SENNA CONCENTRATE AND DOCUSATE SODIUM 1 TABLET: 8.6; 5 TABLET, FILM COATED ORAL at 22:58

## 2018-05-04 RX ADMIN — GABAPENTIN 600 MG: 300 CAPSULE ORAL at 13:56

## 2018-05-04 RX ADMIN — APIXABAN 2.5 MG: 2.5 TABLET, FILM COATED ORAL at 09:25

## 2018-05-04 RX ADMIN — FERROUS SULFATE TAB 325 MG (65 MG ELEMENTAL FE) 325 MG: 325 (65 FE) TAB at 16:19

## 2018-05-04 RX ADMIN — CARVEDILOL 25 MG: 25 TABLET, FILM COATED ORAL at 09:25

## 2018-05-04 RX ADMIN — Medication 1000 MG: at 16:19

## 2018-05-04 RX ADMIN — TRAMADOL HYDROCHLORIDE 50 MG: 50 TABLET, FILM COATED ORAL at 13:56

## 2018-05-04 RX ADMIN — POLYETHYLENE GLYCOL (3350) 17 G: 17 POWDER, FOR SOLUTION ORAL at 09:00

## 2018-05-04 RX ADMIN — CARVEDILOL 25 MG: 25 TABLET, FILM COATED ORAL at 16:19

## 2018-05-04 RX ADMIN — Medication 5 ML: at 23:32

## 2018-05-04 NOTE — PROGRESS NOTES
PHYSICAL THERAPY DAILY NOTE  Time In: 1031  Time Out: 1115  Patient Seen For: AM;Balance activities;Gait training;Patient education; Therapeutic exercise;Transfer training; Other (see progress notes)    Subjective: patient reporting she feels OK. Reports her left leg is better but her foot is still weak. Reports she tries to move her foot while in the bed         Objective:Vital Signs:  Patient Vitals for the past 12 hrs:   Temp Pulse Resp BP SpO2   05/04/18 0742 98.2 °F (36.8 °C) 75 18 150/75 97 %     Pain level: reporting minimal pain in left foot. Pain location:left foot  Pain interventions:Pain medication per RN, rest, positioning,ROM    Patient education:Balance training,transfer training, gait training, fall precautions, activity pacing, body mechanics, rollator parts management, Patient verbalizing understanding and demonstrating partial understanding of patient education. Recommend follow up education. Interdisciplinary Communication:spoke with RN regarding decrease safety awareness with increased risk for falling. Spoke with MD regarding left DF weakness    Other (comment) (has pacemaker; fall risk )  GROSS ASSESSMENT Daily Assessment     left ankle DF -2/5       BED/MAT MOBILITY Daily Assessment    Rolling Right : 6 (Modified independent)  Rolling Left : 6 (Modified independent)  Supine to Sit : 6 (Modified independent)  Sit to Supine : 6 (Modified independent)       TRANSFERS Daily Assessment   Cues for safe body mechanics and cues for rollator set up Transfer Type: SPT without device  Transfer Assistance : 4 (Contact guard assistance)  Sit to Stand Assistance: Stand-by assistance  Car Transfers: Not tested       GAIT Daily Assessment   Gait training x 10 ft x 2 in room from bed to bathroom and bathroom to w/c. Loss of balance x 3 with min assist to correct. Patient not aware of loss of balance with limited ability to self correct loss of balance.  Amount of Assistance: 5 (Stand-by assistance)  Distance (ft): 150 Feet (ft) (150ft x 2)  Assistive Device: Walker, rollator   Gait training demonstrating steppage type pattern with LLE due to left ankle DF weakness. STEPS or STAIRS Daily Assessment   Single step at a time leading up with RLE and down with LLE, cues for improved LLE step clearance going up steps. 2 min sitting rest break after going up steps Steps/Stairs Ambulated (#): 4  Level of Assist : 4 (Contact guard assistance)  Rail Use: Both       BALANCE Daily Assessment    Sitting - Static: Good (unsupported)  Sitting - Dynamic: Good (unsupported)  Standing - Static: Fair  Standing - Dynamic : Impaired       WHEELCHAIR MOBILITY Daily Assessment    Curbs/Ramps Assist Required (FIM Score): 0 (Not tested)  Wheelchair Setup Assist Required : 0 (Not tested)       LOWER EXTREMITY EXERCISES Daily Assessment    Extremity: Left (ankle)  Exercise Type #1: Other (comment) (AAROM)  Sets Performed: 3  Reps Performed: 10  Level of Assist: Moderate assistance  Exercise Type #2: Other (comment) (AROM)  Sets Performed: 1  Reps Performed: 30  Level of Assist: Supervision  Exercise Type #3: Other (comment) (PROM)  Sets Performed: 1  Reps Performed: 20 (with 10 second hold at end range of DF.)  Level of Assist: Total assistance          Assessment: progressing towards goals. Decreased safety awareness during functional mobility. Patient is not completely aware of balance deficits and no able to consistently correct loss of balance. Patient to OT at end of treatment    Plan of Care: Continue with POC and progress as tolerated.      Susy Bullard, PT  5/4/2018

## 2018-05-04 NOTE — PROGRESS NOTES
OT Daily Note  Time In 1115   Time Out 1201     Subjective: No complaints. Pain: none reported  Education: son present for part of session; educated son on fall risks, decreased balance, and recommendation of 24/7 supervision   Interdisciplinary Communication: handoff from PT   Precautions: Other (comment) (pacemaker, fall risk)  Location on arrival: handoff from PT     Strengthening Daily Assessment   Patient participated in theraputty activity using light resistance putty, working on  strengthening and fine motor coordination. Required increased time to complete. Good response to task. Patient reports she is diligent using medium density therablock and reports wanting to \"get my hands stronger, so I don't drop things as much. \"      Cognition Daily Assessment   Patient completed 16 piece puzzle with moderate cues throughout to follow multi-step verbal instructions. Limited recall of instructions throughout task. Decreased ability to identify and solve errors during task. Would benefit from further tasks to address problem solving and following simple verbal instructions. Decreased hearing may be a contributing factor. Patient was left seated up in [facility] in room; nurse aware and nurse on the way to assist patient. Assessment: Progressing with ability to participate. No complaints of pain this session. Remains limited by impulsivity and decreased safety. Plan: Continue OT POC with focus on ADL/IADL skills, functional transfers, functional mobility, coordination, strength, static and dynamic balance, and activity tolerance to maximize safety and independence with ADLs and functional transfers.      Elias Spurling, MS, OTR/L  5/4/2018

## 2018-05-04 NOTE — PROGRESS NOTES
PHYSICAL THERAPY DAILY NOTE  Time In: 1346  Time Out: 1430  Patient Seen For: PM;Balance activities;Gait training;Patient education; Therapeutic exercise;Transfer training; Other (see progress notes)    Subjective: patient reporting her left foot is hurting more this PM. Reports keeping left foot elevated helps to control pain at times.  Reports arthritis type pain in left knee         Objective:Vital Signs:  Patient Vitals for the past 12 hrs:   Temp Pulse Resp BP SpO2   05/04/18 1509 98 °F (36.7 °C) 77 16 129/69 94 %   05/04/18 0742 98.2 °F (36.8 °C) 75 18 150/75 97 %     Pain level: patient reporting a 5 to 8 pain level in left foot and ankle and left knee  Pain location:as noted above  Pain interventions:Pain medication per RN, rest, positioning,bio freeze to left knee      Patient education:transfer training, gait training, fall precautions, activity pacing, body mechanics, rollator parts management      Interdisciplinary Communication:spoke with RN regarding pain level    Other (comment) (has pacemaker; fall risk )  GROSS ASSESSMENT Daily Assessment     NA       BED/MAT MOBILITY Daily Assessment    Rolling Right : 6 (Modified independent)  Rolling Left : 6 (Modified independent)  Supine to Sit : 6 (Modified independent)  Sit to Supine : 6 (Modified independent)       TRANSFERS Daily Assessment   Verbal cues for safe body mechanics and safe set up of rollator Transfer Type: SPT without device  Transfer Assistance : 5 (Stand-by assistance)  Sit to Stand Assistance: Stand-by assistance  Car Transfers: Not tested       GAIT Daily Assessment    Amount of Assistance: 5 (Stand-by assistance)  Distance (ft): 150 Feet (ft)  Assistive Device: Walker, rollator   Gait training with cues for improved LLE step clearance    STEPS or STAIRS Daily Assessment    Steps/Stairs Ambulated (#): 0  Level of Assist : 0 (Not tested)  Rail Use: Both       BALANCE Daily Assessment    Sitting - Static: Good (unsupported)  Sitting - Dynamic: Good (unsupported)  Standing - Static: Fair  Standing - Dynamic : Impaired       WHEELCHAIR MOBILITY Daily Assessment    Curbs/Ramps Assist Required (FIM Score): 0 (Not tested)  Wheelchair Setup Assist Required : 0 (Not tested)       LOWER EXTREMITY EXERCISES Daily Assessment   LLE elevated on wedge and 2 pillows while doing RLE exercises Min assist with LE exercises     SUPINE EXERCISES Sets Reps Comments   Ankle Pumps 1 20    Bridging 2 10    Heel Slides 2 10    Hip Abduction 2 10    Short Arc Quad 2 10    Straight Leg Raise 2 10             Assessment: Progressing towards goals. Impaired safety awareness with tendency to be impulsive during functional mobility. At increased risk for falling and will recommend 24 hour supervision at home. No change in left ankle strength, remains at a -2/5       Patient returned to room at end of treatment. Patient supine in bed with head of bed elevated and bed rails up x 2. Needs placed in reach of patient. LLE elevated on 2 pillows    Plan of Care: Continue with POC and progress as tolerated.      Jerry Duenas, PT  5/4/2018

## 2018-05-04 NOTE — PROGRESS NOTES
Lloyd Montemayor MD,   Medical Director  [address] [facility], [address] [facility]  Tel: [phone]       D PROGRESS NOTE    Osmany Ricci  Admit Date: 4/30/2018  Admit Diagnosis: left feneseal embolictomy; Physical debility    Subjective     Feeling well. Less LLE pain. No nausea/ appetite poor. Will drink supplement but wasn't sent on tray this a.m. +dysuria but mild. Affect bright. \"I need to gain weight. \"     Objective:     Current Facility-Administered Medications   Medication Dose Route Frequency    amLODIPine (NORVASC) tablet 5 mg  5 mg Oral DAILY    amitriptyline (ELAVIL) tablet 10 mg  10 mg Oral QHS    calcium carbonate (OS-CAN) tablet 1,000 mg [elemental]  1,000 mg Oral BID    cholecalciferol (VITAMIN D3) tablet 2,000 Units  2,000 Units Oral DAILY    gabapentin (NEURONTIN) capsule 600 mg  600 mg Oral TID    ferrous sulfate tablet 325 mg  1 Tab Oral BID WITH MEALS    furosemide (LASIX) tablet 10 mg  10 mg Oral DAILY    loperamide (IMODIUM) capsule 4 mg  4 mg Oral Q6H PRN    Saccharomyces boulardii (FLORASTOR) capsule 500 mg  500 mg Oral BID    nitrofurantoin (MACRODANTIN) capsule 50 mg  50 mg Oral Q6H    polyethylene glycol (MIRALAX) packet 17 g  17 g Oral DAILY    bisacodyl (DULCOLAX) suppository 10 mg  10 mg Rectal DAILY PRN    phenylephrine suppository 1 Suppository  1 Suppository Rectal TID PRN    acetaminophen (TYLENOL) tablet 500 mg  500 mg Oral Q4H PRN    diphenhydrAMINE (BENADRYL) capsule 25 mg  25 mg Oral Q4H PRN    apixaban (ELIQUIS) tablet 2.5 mg  2.5 mg Oral BID    aspirin delayed-release tablet 81 mg  81 mg Oral DAILY    carvedilol (COREG) tablet 25 mg  25 mg Oral BID WITH MEALS    levothyroxine (SYNTHROID) tablet 25 mcg  25 mcg Oral ACB    lisinopril (PRINIVIL, ZESTRIL) tablet 20 mg  20 mg Oral DAILY    nitroglycerin (NITROSTAT) tablet 0.4 mg  0.4 mg SubLINGual PRN    ondansetron (ZOFRAN ODT) tablet 4 mg  4 mg Oral Q6H PRN  pantoprazole (PROTONIX) tablet 40 mg  40 mg Oral ACB    senna-docusate (PERICOLACE) 8.6-50 mg per tablet 1 Tab  1 Tab Oral QHS    traMADol (ULTRAM) tablet 50 mg  50 mg Oral Q6H PRN     Review of Systems:Denies chest pain, shortness of breath, cough, headache, visual problems, abdominal pain, dysurea, calf pain. Pertinent positives are as noted in the medical records and unremarkable otherwise. Visit Vitals    /75    Pulse 75    Temp 98.2 °F (36.8 °C)    Resp 18    SpO2 97%        Physical Exam:   General: Alert and age appropriately oriented. Frail appearing, not ill appearing  No acute cardio respiratory distress. HEENT: Normocephalic,no scleral icterus  Oral mucosa moist without cyanosis   Lungs: Clear to auscultation  bilaterally. Respiration even and unlabored   Heart: Regular rate and rhythm, S1, S2   No  murmurs, clicks, rub or gallops   Abdomen: Soft, non-tender, nondistended. Bowel sounds present. No organomegaly. Genitourinary: defer . Neuromuscular:      Grossly wfl x left foot DF 3+, PF 4, EHL 2+  Less tender with ROM to left ankle   Skin/extremity: No rashes, no erythema.  No calf tenderness BLE; only trace edema to left ankle today; no erythema + DP pulse 2 LLE , strong on the right  Wound healing left groin, no hematoma                                                                            Functional Assessment:          Balance  Sitting - Static: Good (unsupported) (05/03/18 1600)  Sitting - Dynamic: Fair (occasional) (05/03/18 1600)  Standing - Static: Fair (05/03/18 1600)  Standing - Dynamic : Impaired (05/03/18 1600)           Toileting  Cues: Physical assistance for pants down;Physical assistance for pants up (05/02/18 1436)  Adaptive Equipment: Elevated seat;Grab bars (05/03/18 1317)         Tess Benitez Fall Risk Assessment:  Tess Benitez Fall Risk  Mobility: Ambulates or transfers with assist devices or assistance (05/03/18 7353)  Mobility Interventions: Assess mobility with egress test;Communicate number of staff needed for ambulation/transfer;OT consult for ADLs; Patient to call before getting OOB;PT Consult for mobility concerns;PT Consult for assist device competence;Strengthening exercises (ROM-active/passive); Utilize walker, cane, or other assitive device (05/03/18 2243)  Mentation: Periodic confusion (05/03/18 2243)  Mentation Interventions: Adequate sleep, hydration, pain control;Bed/chair exit alarm; Door open when patient unattended;Eyeglasses and hearing aids; Increase mobility;More frequent rounding;Reorient patient; Toileting rounds;Update white board (05/03/18 2243)  Medication: Patient receiving anticonvulsants, sedatives(tranquilizers), psychotropics or hypnotics, hypoglycemics, narcotics, sleep aids, antihypertensives, laxatives, or diuretics (05/03/18 2243)  Medication Interventions: Assess postural VS orthostatic hypotension;Bed/chair exit alarm;Evaluate medications/consider consulting pharmacy; Patient to call before getting OOB (05/03/18 2243)  Elimination: Needs assistance with toileting (05/03/18 2243)  Elimination Interventions: Bed/chair exit alarm;Call light in reach; Patient to call for help with toileting needs; Toilet paper/wipes in reach; Toileting schedule/hourly rounds (05/03/18 2243)  Prior Fall History: Before admission in past 12 months _home or previous inpatient care) (05/03/18 2243)  History of Falls Interventions: Bed/chair exit alarm; Consult care management for discharge planning;Door open when patient unattended; Investigate reason for fall;Room close to nurse's station (05/03/18 2243)  Total Score: 5 (05/03/18 2243)  Standard Fall Precautions: Yes (05/01/18 2037)  High Fall Risk: Yes (05/03/18 2243)     Speech Assessment:  Aspiration Signs/Symptoms: None (05/01/18 1305)      Ambulation:  Gait  Distance (ft): 100 Feet (ft) (05/03/18 1600)  Assistive Device: [address], rollator (05/03/18 1600)  Rail Use: Both (05/03/18 1600)     Labs/Studies:  Recent Results (from the past 72 hour(s))   METABOLIC PANEL, BASIC    Collection Time: 05/02/18  6:43 AM   Result Value Ref Range    Sodium 134 (L) 136 - 145 mmol/L    Potassium 4.2 3.5 - 5.1 mmol/L    Chloride 96 (L) 98 - 107 mmol/L    CO2 28 21 - 32 mmol/L    Anion gap 10 7 - 16 mmol/L    Glucose 148 (H) 65 - 100 mg/dL    BUN 19 8 - 23 MG/DL    Creatinine 1.11 (H) 0.6 - 1.0 MG/DL    GFR est AA 59 (L) >60 ml/min/1.73m2    GFR est non-AA 49 (L) >60 ml/min/1.73m2    Calcium 8.1 (L) 8.3 - 10.4 MG/DL   FERRITIN    Collection Time: 05/02/18  6:43 AM   Result Value Ref Range    Ferritin 139 8 - 388 NG/ML   TRANSFERRIN SATURATION    Collection Time: 05/02/18  6:43 AM   Result Value Ref Range    Iron 18 (L) 35 - 150 ug/dL    TIBC 186 (L) 250 - 450 ug/dL    Transferrin Saturation 10 (L) >20 %   GLUCOSE, POC    Collection Time: 05/02/18  7:43 AM   Result Value Ref Range    Glucose (POC) 143 (H) 65 - 100 mg/dL   GLUCOSE, POC    Collection Time: 05/02/18 11:26 AM   Result Value Ref Range    Glucose (POC) 135 (H) 65 - 100 mg/dL   URINALYSIS W/ RFLX MICROSCOPIC    Collection Time: 05/02/18  3:59 PM   Result Value Ref Range    Color YELLOW      Appearance CLEAR      Specific gravity 1.009 1.001 - 1.023      pH (UA) 7.0 5.0 - 9.0      Protein NEGATIVE  NEG mg/dL    Glucose NEGATIVE  mg/dL    Ketone NEGATIVE  NEG mg/dL    Bilirubin NEGATIVE  NEG      Blood NEGATIVE  NEG      Urobilinogen 0.2 0.2 - 1.0 EU/dL    Nitrites POSITIVE (A) NEG      Leukocyte Esterase NEGATIVE  NEG      WBC 0-3 0 /hpf    RBC 0-3 0 /hpf    Epithelial cells 0 0 /hpf    Bacteria 4+ (H) 0 /hpf    Casts 0 0 /lpf   METABOLIC PANEL, BASIC    Collection Time: 05/03/18  6:30 AM   Result Value Ref Range    Sodium 131 (L) 136 - 145 mmol/L    Potassium 4.1 3.5 - 5.1 mmol/L    Chloride 95 (L) 98 - 107 mmol/L    CO2 27 21 - 32 mmol/L    Anion gap 9 7 - 16 mmol/L    Glucose 102 (H) 65 - 100 mg/dL    BUN 17 8 - 23 MG/DL    Creatinine 1.03 (H) 0.6 - 1.0 MG/DL    GFR est AA >60 >60 ml/min/1.73m2    GFR est non-AA 54 (L) >60 ml/min/1.73m2    Calcium 8.0 (L) 8.3 - 10.4 MG/DL   MAGNESIUM    Collection Time: 05/03/18  6:30 AM   Result Value Ref Range    Magnesium 1.7 (L) 1.8 - 2.4 mg/dL   CBC WITH AUTOMATED DIFF    Collection Time: 05/03/18  6:30 AM   Result Value Ref Range    WBC 6.9 4.3 - 11.1 K/uL    RBC 2.77 (L) 4.05 - 5.25 M/uL    HGB 8.4 (L) 11.7 - 15.4 g/dL    HCT 25.1 (L) 35.8 - 46.3 %    MCV 90.6 79.6 - 97.8 FL    MCH 30.3 26.1 - 32.9 PG    MCHC 33.5 31.4 - 35.0 g/dL    RDW 12.4 11.9 - 14.6 %    PLATELET 314 361 - 942 K/uL    MPV 9.7 (L) 10.8 - 14.1 FL    DF AUTOMATED      NEUTROPHILS 64 43 - 78 %    LYMPHOCYTES 23 13 - 44 %    MONOCYTES 9 4.0 - 12.0 %    EOSINOPHILS 4 0.5 - 7.8 %    BASOPHILS 0 0.0 - 2.0 %    IMMATURE GRANULOCYTES 0 0.0 - 5.0 %    ABS. NEUTROPHILS 4.4 1.7 - 8.2 K/UL    ABS. LYMPHOCYTES 1.6 0.5 - 4.6 K/UL    ABS. MONOCYTES 0.6 0.1 - 1.3 K/UL    ABS. EOSINOPHILS 0.3 0.0 - 0.8 K/UL    ABS. BASOPHILS 0.0 0.0 - 0.2 K/UL    ABS. IMM.  GRANS. 0.0 0.0 - 0.5 K/UL   HGB & HCT    Collection Time: 05/04/18  5:24 AM   Result Value Ref Range    HGB 8.1 (L) 11.7 - 15.4 g/dL    HCT 23.7 (L) 35.8 - 14.3 %   METABOLIC PANEL, BASIC    Collection Time: 05/04/18  5:24 AM   Result Value Ref Range    Sodium 131 (L) 136 - 145 mmol/L    Potassium 3.7 3.5 - 5.1 mmol/L    Chloride 95 (L) 98 - 107 mmol/L    CO2 27 21 - 32 mmol/L    Anion gap 9 7 - 16 mmol/L    Glucose 84 65 - 100 mg/dL    BUN 19 8 - 23 MG/DL    Creatinine 1.01 (H) 0.6 - 1.0 MG/DL    GFR est AA >60 >60 ml/min/1.73m2    GFR est non-AA 55 (L) >60 ml/min/1.73m2    Calcium 8.3 8.3 - 10.4 MG/DL       Assessment:     Problem List as of 5/4/2018  Date Reviewed: 4/30/2018          Codes Class Noted - Resolved    * (Principal)Physical debility ICD-10-CM: R53.81  ICD-9-CM: 799.3  4/30/2018 - Present        Chronic systolic congestive heart failure (HCC) ICD-10-CM: I50.22  ICD-9-CM: 428.22, 428.0  4/26/2018 - Present        Femoral artery occlusion, 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[facility]) ICD-10-CM: F65.958  ICD-9-CM: 444.22  4/25/2018 - Present        Type 2 diabetes mellitus with nephropathy ([facility] ICD-10-CM: E11.21  ICD-9-CM: 250.40, 583.81  1/25/2018 - Present        S/P AV noris ablation ICD-10-CM: Z98.890  ICD-9-CM: V45.89  1/16/2018 - Present        Biventricular cardiac pacemaker in situ ICD-10-CM: Z95.0  ICD-9-CM: V45.01  1/16/2018 - Present        Diabetes mellitus, type 2 (HCC) (Chronic) ICD-10-CM: E11.9  ICD-9-CM: 250.00  8/12/2017 - Present        Hypertension (Chronic) ICD-10-CM: I10  ICD-9-CM: 401.9  8/12/2017 - Present    Overview Signed 9/12/2012  3:43 PM by Cathy Barcenas     Orthostatic hypotension due to autonomic neuropathy             CKD (chronic kidney disease), stage III (Chronic) ICD-10-CM: N18.3  ICD-9-CM: 585.3  8/12/2017 - Present        Dysphagia (Chronic) ICD-10-CM: R13.10  ICD-9-CM: 787.20  8/12/2017 - Present    Overview Signed 4/5/2016  5:24 PM by Nida Red MD     Due to esophageal spasm seen on modified barium swallow 2015             Atrial fibrillation ([facility] (Chronic) ICD-10-CM: I48.91  ICD-9-CM: 427.31  8/12/2017 - Present        DNR (do not resuscitate) (Chronic) ICD-10-CM: Z66  ICD-9-CM: V49.86  8/12/2017 - Present        Ischemic cardiomyopathy ICD-10-CM: I25.5  ICD-9-CM: 414.8  7/11/2017 - Present        Cardiomyopathy ([facility] ICD-10-CM: I42.9  ICD-9-CM: 425.4  3/30/2017 - Present        Dyspnea ICD-10-CM: R06.00  ICD-9-CM: 786.09  2/25/2017 - Present        Atrial fibrillation with rapid ventricular response ([facility] ICD-10-CM: I48.91  ICD-9-CM: 427.31  2/25/2017 - Present        SSS (sick sinus syndrome) ([facility] ICD-10-CM: I49.5  ICD-9-CM: 427.81  5/5/2016 - Present        Orthostatic hypotension ICD-10-CM: I95.1  ICD-9-CM: 458.0  5/5/2016 - Present        CAD in native artery ICD-10-CM: I25.10  ICD-9-CM: 414.01  5/5/2016 - Present        Pacemaker ICD-10-CM: Z95.0  ICD-9-CM: V45.01  11/10/2015 - Present        Cervical radiculopathy ICD-10-CM: M54.12  ICD-9-CM: 723.4  5/23/2013 - Present    Overview Addendum 8/26/2013  4:59 PM by Janice Real MD     Chronic right shoulder pain S/P eval Dr Hodges End POC August 2013, previous cervical spinal fusion             Autonomic neuropathy ICD-10-CM: G90.9  ICD-9-CM: 337.9  1/13/2013 - Present    Overview Signed 1/13/2013  8:18 PM by Janice Real MD     Severe orthostatic hypotension             MCI (mild cognitive impairment) ICD-10-CM: G31.84  ICD-9-CM: 331.83  1/13/2013 - Present    Overview Addendum 12/16/2013  4:43 PM by Janice Real MD     MMSE 27/30 Nov 2009, 27/30 Dec 2013, remembers 3/3 objects 5 min later and draws normal clock             Chronic pain syndrome ICD-10-CM: G89.4  ICD-9-CM: 338.4  1/13/2013 - Present    Overview Addendum 11/10/2015  5:15 PM by Janice Real MD     Back, right shoulder, neck: Peripheral neuropathy, spinal stenosis C7-T1, foraminal narrowing C4-5             Acquired hypothyroidism (Chronic) ICD-10-CM: E03.9  ICD-9-CM: 244.9  9/12/2012 - Present        Mixed hyperlipidemia (Chronic) ICD-10-CM: E78.2  ICD-9-CM: 272.2  9/12/2012 - Present    Overview Addendum 9/12/2012  3:51 PM by Pretty Batista     With high HDL  Intolerant of pravastatin,lovastatin and lescol             GERD (gastroesophageal reflux disease) (Chronic) ICD-10-CM: K21.9  ICD-9-CM: 530.81  9/12/2012 - Present        IBS (irritable bowel syndrome) (Chronic) ICD-10-CM: K58.9  ICD-9-CM: 564.1  9/12/2012 - Present    Overview Signed 9/12/2012  3:52 PM by Pretty Batista     Chronic enema abuse  diverticulosis             Gait disorder (Chronic) ICD-10-CM: R26.9  ICD-9-CM: 781.2  9/12/2012 - Present    Overview Signed 9/12/2012  3:53 PM by Pretty Batista     Diabetic peripheral neuropathy              Atrial fibrillation with RVR ([facility] ICD-10-CM: I48.91  ICD-9-CM: 427.31  9/12/2012 - Present    Overview Addendum 10/14/2012  9:34 AM by Janice Real MD     Paroxysmal.  Coumadin contraindicated due to ras Estrella             RESOLVED: RUSS (acute kidney injury) ([facility] ICD-10-CM: N17.9  ICD-9-CM: 584.9  8/13/2017 - 10/19/2017        RESOLVED: Acute respiratory failure with hypoxemia (HCC) ICD-10-CM: J96.01  ICD-9-CM: 518.81  8/12/2017 - 9/15/2017        RESOLVED: CAP (community acquired pneumonia) ICD-10-CM: J18.9  ICD-9-CM: 486  8/12/2017 - 9/15/2017        RESOLVED: CHF (congestive heart failure) ([facility] ICD-10-CM: I50.9  ICD-9-CM: 428.0  3/18/2017 - 4/26/2018        RESOLVED: Altered mental status ICD-10-CM: R41.82  ICD-9-CM: 780.97  3/10/2017 - 10/19/2017        RESOLVED: Altered mental state ICD-10-CM: R41.82  ICD-9-CM: 780.97  3/9/2017 - 10/19/2017        RESOLVED: Chest pain, pleuritic ICD-10-CM: R07.81  ICD-9-CM: 786.52  3/9/2017 - 10/19/2017        RESOLVED: Weight loss ICD-10-CM: R63.4  ICD-9-CM: 783.21  9/2/2014 - 11/10/2015        RESOLVED: Anorexia ICD-10-CM: R63.0  ICD-9-CM: 783.0  9/2/2014 - 10/19/2017        RESOLVED: Iron deficiency anemia, unspecified ICD-10-CM: D50.9  ICD-9-CM: 280.9  9/12/2012 - 4/16/2013    Overview Addendum 1/13/2013  8:21 PM by Radha Gonzalez MD     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 Aspiration precautions      Dysphagia; consider MBS; will have ST eval. HOB > 30d  -5/2 ST eval yesterday, no overt signs of dysphagia/ aspiration      Post op anemia due to blood loss; gi prophylaxis, monitor wound; check stool. Check iron studies as well  -hgb 10.1  -5/2 drop in Hgb to 8.4; significant iron deficiency; start supplement, hemmoc stool; hold aspirin/eliquis until stool comes back ; no gi symptoms to suggest gastritis/ ulcer.  -5/4 staff did not send last stool; pending. hgb now 8.1; asymp. Denies melena, no hematoma at wound site.  Started iron yesterday for severe iron deficiency ; gi consult if stool positive      DVT risk / DVT Prophylaxis- Will require daily physician exam to assess for signs and symptoms as patient is at increased risk for of thromboembolism. Mobilization as tolerated. Intermittent pneumatic compression devices when in bed Thigh-high or knee-high thromboembolic deterrent hose when out of bed. Eliquis/ASA      Cervical stenosis with autonomic dysfxn; orthostatic hypotension; monitor with therapies. Cont TEDs  -5/2 no overt symptoms; bp stable      Pain Control: stable, mild-to-moderate joint symptoms intermittently, reasonably well controlled by PRN meds. Will require regular pain assessment and comprenhensive pain management,   -pt with vascular pain and hypersensitivity to LLE; cont Neurontin, tolerating 600mg tid; does not wake her up at noc; cont ultram as well  -5/2 lethargic, multifactorial; dec neurontin   -5/3 much more alert. Has been on Neurontin for years; can inc back to home dose; lethargy likely due to UTI and poor sleep; monitor; cont ultram and add 10mg Elavil; having burning pain in Left foot/ankle; lacks DP pulse; will see if we can doppler; however, foot warm to touch, no discoloration with nl cap refill  -5/4 left DP pulse found easier today with dec in edema. No discoloration/ foot warm     Hx of DM; on amaryl in the past, currently nothing; bs 143, change to diab diet      Hypocalcemia; cont Oscal and add Vit D. Will see when pt had last bone density scan. Improved 8.6 F/u with PCP  -5/2 calcium 8.1 from 8.6, check Vit d ; 5/3 inc supplement      Wound Care: Monitor wound status daily per staff and physician. At risk for failure. Will require 24/7 rehab nursing. Keep wound clean and dry, Reinforce dressing PRN and Ice to area for comfort      Hypertension - BP uncontrolled, fluctuating, managed medically.  Cont Coreg, lasix, zestril   -poorly controlled 5/1 sbp 175-187; however dropped to 112; has hx of orthostatics; monitor with activity  -5/3 bp 170s, add norvasc with parameters; 5/4 150/75; slt improvement; inc Norvasc but watch for edema    Hyponatremia; Na 131 5/3 and 5/4; can hold lasix, zestril and coreg contributing but bp not controlled without it, added norvasc; recheck in a.m. ; free water restrict; DC MACRODANTIN; known cause, raghu in elderly on diuretics      RUSS ; monitor labs. Check UA due to [place]. Improving as of 4/28; no labs since that time; may need to consider adjusting Lasix and zestril  -5/1 slt bump in creat from 1.18- 1.28 ; recheck daily. Very little po today, may need IV fluids  -5/2 IVFs overnoc, creat 1.11, improved      Hypothyroidism; cont synthroid; 5/4 check TSH      Urinary retention/ neurogenic bladder - schedule voids q6-8 hrs. Check post-void residual as needed; In and Out catheterize if post-void residual is more than 400 cc.  -5/1 urin frq; check UA, check post void residual  -5/3 urine finally sent last pm. 4+ bact, nitrite positive, leuk neg and few bacteria; received one gram IM Rocephin and started on Macrobid; await cx. No dysuria + freq  -5/4 reorder cx; not done; +dysuria, but otherwise asymptomatic x frequency (may be chronic though); no fever, WBC nl; DC macrodantin due to hyponatremia; Rocephin 1g IV x 3d      bowel program - add prn bowel program  -5/1 impacted, did not sleep. Given suppos and disimpacted for mod amt of stool . No n/v. Limited po today. Labs stable. No gaurding or rebound on exam  -add anusol suppos due to rectal pain and suspected internal hemmor. With blood streaked stool.  -5/3 now just mucus, no occult blood. hbg has dropped; check hemoccult      GERD - resume PPI. At times may need additional antacids, Maalox prn.               Time spent was 35 minutes with over 1/2 in direct patient care/examination, consultation and coordination of care.      Signed By: Davi Donato MD     May 4, 2018

## 2018-05-04 NOTE — PROGRESS NOTES
Problem: Falls - Risk of  Goal: *Absence of Falls  Document Jose Fall Risk and appropriate interventions in the flowsheet.    Outcome: Progressing Towards Goal  Fall Risk Interventions:  Mobility Interventions: Assess mobility with egress test, Communicate number of staff needed for ambulation/transfer, OT consult for ADLs, Patient to call before getting OOB, PT Consult for mobility concerns, PT Consult for assist device competence, Strengthening exercises (ROM-active/passive), Utilize walker, cane, or other assitive device    Mentation Interventions: Adequate sleep, hydration, pain control, Bed/chair exit alarm, Door open when patient unattended, Eyeglasses and hearing aids, Increase mobility, More frequent rounding, Reorient patient, Toileting rounds, Update white board    Medication Interventions: Assess postural VS orthostatic hypotension, Bed/chair exit alarm, Evaluate medications/consider consulting pharmacy, Patient to call before getting OOB    Elimination Interventions: Bed/chair exit alarm, Call light in reach, Patient to call for help with toileting needs, Toilet paper/wipes in reach, Toileting schedule/hourly rounds    History of Falls Interventions: Bed/chair exit alarm, Consult care management for discharge planning, Door open when patient unattended, Investigate reason for fall, Room close to nurse's station

## 2018-05-04 NOTE — PROGRESS NOTES
Bed alarms on, briskly walked back to room, pt found in bathroom on toilet with pants down. Reaching out for walker and pt about fell. Pt advised to call when she needs up to go to the bathroom. Alert and oriented x3, voices understanding. Advised she is a high risk of falls. Voiced understanding and states she will be compliant.

## 2018-05-04 NOTE — PROGRESS NOTES
2034--pt assisted to bathroom and back. Pt has been to bathroom about every 30min with the feeling on needing to urinate. .  Each trip to the bathroom, there is little urine noted in toilet. Pt was bladder scanned to assess urinary retention. Bladder scan showed 105ml urine as a PVR. 2054--pt called to go to the bathroom, stating she had to urinate. Sterile I&O cath was performed to assess for urinary retention. 200ml clear yellow urine obtained. As staff was leaving room after positioning pt for comfort, pt stated that she felt she had to go to the bathroom to urinate. Explained to pt that with the catheterization just performed, she had no urine in her bladder. Pt continued to state that she had to go to the bathroom. Assured pt that staff will re-assess in short time. Visitor at bedside. 2200--pt assisted to bathroom. Collection hat was placed in the toilet prior to pt sitting down. When pt reported she was finished urinating, there was no urine in the collection hat. Will continue to monitor the situation. Pt assisted back to bed and positioned for comfort. Pt medicated for pain in her LLE.

## 2018-05-04 NOTE — PROGRESS NOTES
OT Daily Note  Time In 1300   Time Out 1342     Subjective: \"My knee and my leg are really starting to hurt. \"   Pain: L knee and foot; applied Biofreeze to L knee and notified nurse of patient's request for pain medication   Education: technique for safety during toileting; use of grab bar   Interdisciplinary Communication: with nurse regarding patient's complaint of pain; with PT regarding complaints of pain  Precautions: Other (comment) (pacemaker, fall risk)  Location on arrival: long sitting in bed    Self-Care Daily Assessment   Self Care Task: Toileting  Level of Assist: 4 (Minimal assistance) (steadying assist during clothing management )  Adaptive Equipment: Elevated seat, Grab bars  Patient required toileting; see above for details. Very limited voiding of both urine and bowel. Patient required close SBA during deion care due to tendency to lean to L while reaching around to wipe with RUE. Patient generally unaware when she is losing her balance and no righting reactions present, with patient relying 100% on therapist to correct balance. Anticipate patient will require at least SBA during mobility at home due to high fall risk and decreased righting reactions. Coordination Daily Assessment   Patient completed cognition and coordination task re-sequencing numbers 1-60 using [place] Rate of Manipulation test numbered 1-60 with good quality and extra time, with no errors and no assist.     Patient performed reaching activity using different vertical heights and small rings with 1 UE at a time with 4 pound clothespin, working on shoulder stabilization for overhead reaching and  strengthening. Good performance with all tasks. Patient was left seated up in gym for PT. Notified nurse of patient's request for pain medication. Assessment: Making good progress, but remains limited by LLE pain. Remains very impulsive during transfers and unstable during functional mobility.    Plan: Continue OT POC with focus on ADL/IADL skills, functional transfers, functional mobility, coordination, strength, static and dynamic balance, and activity tolerance to maximize safety and independence with ADLs and functional transfers.      Cinthya Breen MS, OTR/L  5/4/2018

## 2018-05-04 NOTE — PROGRESS NOTES
Problem: Falls - Risk of  Goal: *Absence of Falls  Document Jose Fall Risk and appropriate interventions in the flowsheet.    Outcome: Progressing Towards Goal  Fall Risk Interventions:  Mobility Interventions: Patient to call before getting OOB    Mentation Interventions: Adequate sleep, hydration, pain control, Bed/chair exit alarm    Medication Interventions: Assess postural VS orthostatic hypotension, Bed/chair exit alarm, Patient to call before getting OOB    Elimination Interventions: Bed/chair exit alarm, Call light in reach    History of Falls Interventions: Bed/chair exit alarm

## 2018-05-04 NOTE — PROGRESS NOTES
05/04/18 1240   Time Spent With Patient   Time In 0830   Time Out 0917   Patient Seen For: AM;ADLs   Grooming   Grooming Assistance  S   Comments setup assist    Upper Body Bathing   Bathing Assistance, Upper S   Position Performed Other (comment)  (seated on tub transfer bench )   Adaptive Equipment Tub bench   Lower Body Bathing   Bathing Assistance, Lower  CGA   Adaptive Equipment Grab bar   Position Performed Standing; Other (comment)  (seated on tub transfer bench)   Adaptive Equipment Tub bench   Comments multiple verbal cues to not try to stand on one leg    Toileting   Toileting Assistance (FIM Score) Min A  (steadying assist during clothing management )   Adaptive Equipment Elevated seat;Grab bars   Upper Body Dressing    Dressing Assistance  S   Comments setup assist    Lower Body Dressing    Dressing Assistance  Min A   Leg Crossed Method Used No   Position Performed Seated edge of bed;Standing   Comments assist with L shoe    Functional Transfers   Toilet Transfer  Elevated seat;Grab bars;Stand pivot transfer with walker   Amount of Assistance Required Min A   Tub or Shower Type Shower   Amount of Assistance Required Min A   Adaptive Equipment Grab bars; Tub transfer bench;Walker (comment)   S: \"I'm used to just getting up and doing for myself. \"   O: Patient presented long sitting in bed. Agreeable to treatment. Patient completed ADL; see above for FIMs. Utilized rollator for functional mobility with poor quality and poor safety awareness for duration of use. Required min A for balance with multiple losses of balance throughout session requiring min A to correct. A: No awareness of losses of balance throughout session. Patient remains a very high fall risk due to impulsivity and decreased balance. Patient tolerated session well with no complaint of pain. Color in L foot appears better than yesterday.    P: Continue OT POC with focus on ADL/IADL skills, functional transfers, functional mobility, cognition, safety awareness, coordination, strength, static and dynamic balance, and activity tolerance to maximize safety and independence with ADLs and functional transfers. Patient was left supine in bed with call bell/all other needs in reach. Bed alarm activated. Interdisciplinary communication: Collaborated with nursing regarding ADL.      Toro Cast MS, OTR/L  5/4/2018

## 2018-05-04 NOTE — PROGRESS NOTES
End Of Shift Functional Summary, Nursing        TOILETING:    Does patient need assist with adjusting pants up or down and/or pericare? no  If yes, please indicate what the patient needs help with:    (i.e. pants up, pants down, pericare)  Pt uses walker. Does the patient require extra time? yes  Does the patient require standby assistance? no  Does the patient require contact guard assistance? yes  Does the patient require more than one staff member for assistance? no     TOILET TRANSFER:    Pt requires minimal assistance. Pt uses walker. Does the patient require extra time? yes  Does the patient require contact guard assistance? yes  Does the patient require more than one staff member for assistance? no     BLADDER:    Pt does not have a [place] catheter that staff manages. Pt does not take medication. If so, please indicate which medication:    Pt is continent.  of bladder and voids in toilet  Pt requires staff to empty device Pt has had 0 bladder accidents during this shift .  (An accident is when the episode is not contained in a brief AND/OR the clothing/linen requires changing/cleaning up.)           Documentation reviewed and plan of care discussed/reviewed with   patient and oncoming nurse during the shift.

## 2018-05-05 LAB
ANION GAP SERPL CALC-SCNC: 8 MMOL/L (ref 7–16)
APPEARANCE UR: CLEAR
BILIRUB UR QL: NEGATIVE
BUN SERPL-MCNC: 18 MG/DL (ref 8–23)
CALCIUM SERPL-MCNC: 8 MG/DL (ref 8.3–10.4)
CHLORIDE SERPL-SCNC: 95 MMOL/L (ref 98–107)
CO2 SERPL-SCNC: 28 MMOL/L (ref 21–32)
COLOR UR: YELLOW
CREAT SERPL-MCNC: 1.28 MG/DL (ref 0.6–1)
GLUCOSE SERPL-MCNC: 166 MG/DL (ref 65–100)
GLUCOSE UR STRIP.AUTO-MCNC: NEGATIVE MG/DL
HCT VFR BLD AUTO: 23.9 % (ref 35.8–46.3)
HGB BLD-MCNC: 8 G/DL (ref 11.7–15.4)
HGB UR QL STRIP: NEGATIVE
KETONES UR QL STRIP.AUTO: NEGATIVE MG/DL
LEUKOCYTE ESTERASE UR QL STRIP.AUTO: NEGATIVE
NITRITE UR QL STRIP.AUTO: NEGATIVE
PH UR STRIP: 6.5 [PH] (ref 5–9)
POTASSIUM SERPL-SCNC: 4.2 MMOL/L (ref 3.5–5.1)
PROT UR STRIP-MCNC: NEGATIVE MG/DL
SODIUM SERPL-SCNC: 131 MMOL/L (ref 136–145)
SODIUM SERPL-SCNC: 131 MMOL/L (ref 136–145)
SP GR UR REFRACTOMETRY: 1.01 (ref 1–1.02)
TSH RECEP AB SER-ACNC: <0.5 IU/L (ref 0–1.75)
UROBILINOGEN UR QL STRIP.AUTO: 0.2 EU/DL (ref 0.2–1)

## 2018-05-05 PROCEDURE — 97110 THERAPEUTIC EXERCISES: CPT

## 2018-05-05 PROCEDURE — 74011000258 HC RX REV CODE- 258: Performed by: PHYSICAL MEDICINE & REHABILITATION

## 2018-05-05 PROCEDURE — 99233 SBSQ HOSP IP/OBS HIGH 50: CPT | Performed by: PHYSICAL MEDICINE & REHABILITATION

## 2018-05-05 PROCEDURE — 97116 GAIT TRAINING THERAPY: CPT

## 2018-05-05 PROCEDURE — 74011250637 HC RX REV CODE- 250/637: Performed by: PHYSICAL MEDICINE & REHABILITATION

## 2018-05-05 PROCEDURE — 85018 HEMOGLOBIN: CPT | Performed by: PHYSICAL MEDICINE & REHABILITATION

## 2018-05-05 PROCEDURE — 81003 URINALYSIS AUTO W/O SCOPE: CPT | Performed by: PHYSICAL MEDICINE & REHABILITATION

## 2018-05-05 PROCEDURE — 87086 URINE CULTURE/COLONY COUNT: CPT | Performed by: PHYSICAL MEDICINE & REHABILITATION

## 2018-05-05 PROCEDURE — 36415 COLL VENOUS BLD VENIPUNCTURE: CPT | Performed by: PHYSICAL MEDICINE & REHABILITATION

## 2018-05-05 PROCEDURE — 97150 GROUP THERAPEUTIC PROCEDURES: CPT

## 2018-05-05 PROCEDURE — 65310000000 HC RM PRIVATE REHAB

## 2018-05-05 PROCEDURE — 97535 SELF CARE MNGMENT TRAINING: CPT

## 2018-05-05 PROCEDURE — 74011250636 HC RX REV CODE- 250/636: Performed by: PHYSICAL MEDICINE & REHABILITATION

## 2018-05-05 PROCEDURE — 80048 BASIC METABOLIC PNL TOTAL CA: CPT | Performed by: PHYSICAL MEDICINE & REHABILITATION

## 2018-05-05 PROCEDURE — 84295 ASSAY OF SERUM SODIUM: CPT | Performed by: PHYSICAL MEDICINE & REHABILITATION

## 2018-05-05 RX ADMIN — AMITRIPTYLINE HYDROCHLORIDE 10 MG: 10 TABLET, FILM COATED ORAL at 21:57

## 2018-05-05 RX ADMIN — PANTOPRAZOLE SODIUM 40 MG: 40 TABLET, DELAYED RELEASE ORAL at 05:55

## 2018-05-05 RX ADMIN — Medication 500 MG: at 17:30

## 2018-05-05 RX ADMIN — POLYETHYLENE GLYCOL (3350) 17 G: 17 POWDER, FOR SOLUTION ORAL at 08:21

## 2018-05-05 RX ADMIN — Medication 1000 MG: at 08:03

## 2018-05-05 RX ADMIN — ASPIRIN 81 MG: 81 TABLET, COATED ORAL at 08:21

## 2018-05-05 RX ADMIN — CARVEDILOL 25 MG: 25 TABLET, FILM COATED ORAL at 08:21

## 2018-05-05 RX ADMIN — CARVEDILOL 25 MG: 25 TABLET, FILM COATED ORAL at 16:51

## 2018-05-05 RX ADMIN — FERROUS SULFATE TAB 325 MG (65 MG ELEMENTAL FE) 325 MG: 325 (65 FE) TAB at 08:03

## 2018-05-05 RX ADMIN — APIXABAN 2.5 MG: 2.5 TABLET, FILM COATED ORAL at 21:57

## 2018-05-05 RX ADMIN — TRAMADOL HYDROCHLORIDE 50 MG: 50 TABLET, FILM COATED ORAL at 12:30

## 2018-05-05 RX ADMIN — ACETAMINOPHEN 500 MG: 500 TABLET, FILM COATED ORAL at 15:26

## 2018-05-05 RX ADMIN — AMLODIPINE BESYLATE 10 MG: 10 TABLET ORAL at 08:22

## 2018-05-05 RX ADMIN — Medication 500 MG: at 08:21

## 2018-05-05 RX ADMIN — LISINOPRIL 20 MG: 20 TABLET ORAL at 08:21

## 2018-05-05 RX ADMIN — FERROUS SULFATE TAB 325 MG (65 MG ELEMENTAL FE) 325 MG: 325 (65 FE) TAB at 16:52

## 2018-05-05 RX ADMIN — Medication 1000 MG: at 16:51

## 2018-05-05 RX ADMIN — Medication 5 ML: at 08:04

## 2018-05-05 RX ADMIN — Medication 5 ML: at 21:57

## 2018-05-05 RX ADMIN — FUROSEMIDE 10 MG: 20 TABLET ORAL at 08:21

## 2018-05-05 RX ADMIN — LEVOTHYROXINE SODIUM 25 MCG: 50 TABLET ORAL at 05:55

## 2018-05-05 RX ADMIN — Medication 5 ML: at 14:04

## 2018-05-05 RX ADMIN — GABAPENTIN 600 MG: 300 CAPSULE ORAL at 14:04

## 2018-05-05 RX ADMIN — VITAMIN D, TAB 1000IU (100/BT) 2000 UNITS: 25 TAB at 08:20

## 2018-05-05 RX ADMIN — GABAPENTIN 600 MG: 300 CAPSULE ORAL at 05:54

## 2018-05-05 RX ADMIN — STANDARDIZED SENNA CONCENTRATE AND DOCUSATE SODIUM 1 TABLET: 8.6; 5 TABLET, FILM COATED ORAL at 21:57

## 2018-05-05 RX ADMIN — GABAPENTIN 600 MG: 300 CAPSULE ORAL at 21:57

## 2018-05-05 RX ADMIN — CEFTRIAXONE SODIUM 1 G: 1 INJECTION, POWDER, FOR SOLUTION INTRAMUSCULAR; INTRAVENOUS at 10:15

## 2018-05-05 RX ADMIN — APIXABAN 2.5 MG: 2.5 TABLET, FILM COATED ORAL at 08:21

## 2018-05-05 NOTE — PROGRESS NOTES
Josephine Lawson MD,   Medical Director  [address] [facility], [address] [facility]  Tel: [phone]       [facility] PROGRESS NOTE    Derek Ford  Admit Date: 4/30/2018  Admit Diagnosis: left feneseal embolictomy; Physical debility    Subjective     Denies cp, sob, n/v. Shower this a.m. Wore her out. Woke her from napping.  Left ankle pain improved    Objective:     Current Facility-Administered Medications   Medication Dose Route Frequency    amLODIPine (NORVASC) tablet 10 mg  10 mg Oral DAILY    cefTRIAXone (ROCEPHIN) 1 g in 0.9% sodium chloride (MBP/ADV) 50 mL  1 g IntraVENous Q24H    SALINE PERIPHERAL FLUSH Q8H soln 5 mL  5 mL InterCATHeter Q8H    amitriptyline (ELAVIL) tablet 10 mg  10 mg Oral QHS    calcium carbonate (OS-CAN) tablet 1,000 mg [elemental]  1,000 mg Oral BID    cholecalciferol (VITAMIN D3) tablet 2,000 Units  2,000 Units Oral DAILY    gabapentin (NEURONTIN) capsule 600 mg  600 mg Oral TID    ferrous sulfate tablet 325 mg  1 Tab Oral BID WITH MEALS    furosemide (LASIX) tablet 10 mg - On Hold  10 mg Oral DAILY    loperamide (IMODIUM) capsule 4 mg  4 mg Oral Q6H PRN    Saccharomyces boulardii (FLORASTOR) capsule 500 mg  500 mg Oral BID    polyethylene glycol (MIRALAX) packet 17 g  17 g Oral DAILY    bisacodyl (DULCOLAX) suppository 10 mg  10 mg Rectal DAILY PRN    phenylephrine suppository 1 Suppository  1 Suppository Rectal TID PRN    acetaminophen (TYLENOL) tablet 500 mg  500 mg Oral Q4H PRN    diphenhydrAMINE (BENADRYL) capsule 25 mg  25 mg Oral Q4H PRN    apixaban (ELIQUIS) tablet 2.5 mg  2.5 mg Oral BID    aspirin delayed-release tablet 81 mg  81 mg Oral DAILY    carvedilol (COREG) tablet 25 mg  25 mg Oral BID WITH MEALS    levothyroxine (SYNTHROID) tablet 25 mcg  25 mcg Oral ACB    lisinopril (PRINIVIL, ZESTRIL) tablet 20 mg  20 mg Oral DAILY    nitroglycerin (NITROSTAT) tablet 0.4 mg  0.4 mg SubLINGual PRN    ondansetron (ZOFRAN ODT) tablet 4 mg  4 mg Oral Q6H PRN    pantoprazole (PROTONIX) tablet 40 mg  40 mg Oral ACB    senna-docusate (PERICOLACE) 8.6-50 mg per tablet 1 Tab  1 Tab Oral QHS    traMADol (ULTRAM) tablet 50 mg  50 mg Oral Q6H PRN     Review of Systems:Denies chest pain, shortness of breath, cough, headache, visual problems, abdominal pain, dysurea, calf pain. Pertinent positives are as noted in the medical records and unremarkable otherwise. Visit Vitals    /75 (BP 1 Location: Left arm, BP Patient Position: At rest)    Pulse 75    Temp 97.7 °F (36.5 °C)    Resp 16    SpO2 99%        Physical Exam:   General: Alert and age appropriately oriented. No acute cardio respiratory distress. [place]; frail   HEENT: Normocephalic,no scleral icterus  Oral mucosa moist without cyanosis   Lungs: Clear to auscultation  bilaterally. Respiration even and unlabored   Heart: Regular rate and rhythm, S1, S2   No  murmurs, clicks, rub or gallops   Abdomen: Soft, non-tender, nondistended. Bowel sounds present. No organomegaly. Genitourinary: defer   Neuromuscular:      wfl x df on left ankle 3-  Sensation intact   Skin/extremity: No rashes, no erythema. No calf tenderness BLE  Wound l groin c/d/i                                                                            Functional Assessment:          Balance  Sitting - Static: Good (unsupported) (05/04/18 1600)  Sitting - Dynamic: Good (unsupported) (05/04/18 1600)  Standing - Static: Fair (05/04/18 1600)  Standing - Dynamic : Impaired (05/04/18 1600)           Toileting  Cues: Physical assistance for pants down;Physical assistance for pants up (05/02/18 1436)  Adaptive Equipment: Elevated seat;Grab bars (05/04/18 1240)         Asiya Ramirez Fall Risk Assessment:  Asiya Ramirez Fall Risk  Mobility: Ambulates or transfers with assist devices or assistance (05/05/18 0816)  Mobility Interventions: Patient to call before getting OOB; Communicate number of staff needed for ambulation/transfer (05/05/18 0765)  Mentation: Periodic confusion (05/05/18 0816)  Mentation Interventions: Adequate sleep, hydration, pain control;Door open when patient unattended; Increase mobility; Evaluate medications/consider consulting pharmacy (05/05/18 0816)  Medication: Patient receiving anticonvulsants, sedatives(tranquilizers), psychotropics or hypnotics, hypoglycemics, narcotics, sleep aids, antihypertensives, laxatives, or diuretics (05/05/18 0816)  Medication Interventions: Assess postural VS orthostatic hypotension; Evaluate medications/consider consulting pharmacy; Patient to call before getting OOB; Teach patient to arise slowly;Utilize gait belt for transfers/ambulation (05/05/18 0816)  Elimination: Needs assistance with toileting (05/05/18 0816)  Elimination Interventions: Call light in reach;Elevated toilet seat;Patient to call for help with toileting needs (05/05/18 0816)  Prior Fall History: Before admission in past 12 months _home or previous inpatient care) (05/05/18 9262)  History of Falls Interventions: Investigate reason for fall;Room close to nurse's station; Evaluate medications/consider consulting pharmacy; Door open when patient unattended (05/05/18 0816)  Total Score: 5 (05/05/18 0816)  Standard Fall Precautions: Yes (05/01/18 2037)  High Fall Risk: Yes (05/05/18 0816)     Speech Assessment:  Aspiration Signs/Symptoms: None (05/01/18 1305)      Ambulation:  Gait  Distance (ft): 150 Feet (ft) (05/04/18 1600)  Assistive Device: Chevy Miners, rollator (05/04/18 1600)  Rail Use: Both (05/04/18 1100)     Labs/Studies:  Recent Results (from the past 72 hour(s))   GLUCOSE, POC    Collection Time: 05/02/18 11:26 AM   Result Value Ref Range    Glucose (POC) 135 (H) 65 - 100 mg/dL   URINALYSIS W/ RFLX MICROSCOPIC    Collection Time: 05/02/18  3:59 PM   Result Value Ref Range    Color YELLOW      Appearance CLEAR      Specific gravity 1.009 1.001 - 1.023      pH (UA) 7.0 5.0 - 9.0      Protein NEGATIVE NEG mg/dL    Glucose NEGATIVE  mg/dL    Ketone NEGATIVE  NEG mg/dL    Bilirubin NEGATIVE  NEG      Blood NEGATIVE  NEG      Urobilinogen 0.2 0.2 - 1.0 EU/dL    Nitrites POSITIVE (A) NEG      Leukocyte Esterase NEGATIVE  NEG      WBC 0-3 0 /hpf    RBC 0-3 0 /hpf    Epithelial cells 0 0 /hpf    Bacteria 4+ (H) 0 /hpf    Casts 0 0 /lpf   METABOLIC PANEL, BASIC    Collection Time: 05/03/18  6:30 AM   Result Value Ref Range    Sodium 131 (L) 136 - 145 mmol/L    Potassium 4.1 3.5 - 5.1 mmol/L    Chloride 95 (L) 98 - 107 mmol/L    CO2 27 21 - 32 mmol/L    Anion gap 9 7 - 16 mmol/L    Glucose 102 (H) 65 - 100 mg/dL    BUN 17 8 - 23 MG/DL    Creatinine 1.03 (H) 0.6 - 1.0 MG/DL    GFR est AA >60 >60 ml/min/1.73m2    GFR est non-AA 54 (L) >60 ml/min/1.73m2    Calcium 8.0 (L) 8.3 - 10.4 MG/DL   MAGNESIUM    Collection Time: 05/03/18  6:30 AM   Result Value Ref Range    Magnesium 1.7 (L) 1.8 - 2.4 mg/dL   CBC WITH AUTOMATED DIFF    Collection Time: 05/03/18  6:30 AM   Result Value Ref Range    WBC 6.9 4.3 - 11.1 K/uL    RBC 2.77 (L) 4.05 - 5.25 M/uL    HGB 8.4 (L) 11.7 - 15.4 g/dL    HCT 25.1 (L) 35.8 - 46.3 %    MCV 90.6 79.6 - 97.8 FL    MCH 30.3 26.1 - 32.9 PG    MCHC 33.5 31.4 - 35.0 g/dL    RDW 12.4 11.9 - 14.6 %    PLATELET 366 175 - 597 K/uL    MPV 9.7 (L) 10.8 - 14.1 FL    DF AUTOMATED      NEUTROPHILS 64 43 - 78 %    LYMPHOCYTES 23 13 - 44 %    MONOCYTES 9 4.0 - 12.0 %    EOSINOPHILS 4 0.5 - 7.8 %    BASOPHILS 0 0.0 - 2.0 %    IMMATURE GRANULOCYTES 0 0.0 - 5.0 %    ABS. NEUTROPHILS 4.4 1.7 - 8.2 K/UL    ABS. LYMPHOCYTES 1.6 0.5 - 4.6 K/UL    ABS. MONOCYTES 0.6 0.1 - 1.3 K/UL    ABS. EOSINOPHILS 0.3 0.0 - 0.8 K/UL    ABS. BASOPHILS 0.0 0.0 - 0.2 K/UL    ABS. IMM.  GRANS. 0.0 0.0 - 0.5 K/UL   VITAMIN D, 1, 25 DIHYDROXY    Collection Time: 05/03/18  8:04 AM   Result Value Ref Range    Calcitriol (Vit D 1, 25 di-OH) 41.0 19.9 - 79.3 pg/mL   HGB & HCT    Collection Time: 05/04/18  5:24 AM   Result Value Ref Range    HGB 8.1 (L) 11.7 - 15.4 g/dL    HCT 23.7 (L) 35.8 - 04.9 %   METABOLIC PANEL, BASIC    Collection Time: 05/04/18  5:24 AM   Result Value Ref Range    Sodium 131 (L) 136 - 145 mmol/L    Potassium 3.7 3.5 - 5.1 mmol/L    Chloride 95 (L) 98 - 107 mmol/L    CO2 27 21 - 32 mmol/L    Anion gap 9 7 - 16 mmol/L    Glucose 84 65 - 100 mg/dL    BUN 19 8 - 23 MG/DL    Creatinine 1.01 (H) 0.6 - 1.0 MG/DL    GFR est AA >60 >60 ml/min/1.73m2    GFR est non-AA 55 (L) >60 ml/min/1.73m2    Calcium 8.3 8.3 - 10.4 MG/DL   TSH RECEPTOR AB    Collection Time: 05/04/18  9:48 AM   Result Value Ref Range    Thyrotropin Receptor Ab, serum <0.50 0.00 - 1.75 IU/L   OCCULT BLOOD, STOOL    Collection Time: 05/04/18  3:45 PM   Result Value Ref Range    Occult blood, stool NEGATIVE  NEG     SODIUM    Collection Time: 05/05/18  6:53 AM   Result Value Ref Range    Sodium 131 (L) 136 - 145 mmol/L   HGB & HCT    Collection Time: 05/05/18  6:53 AM   Result Value Ref Range    HGB 8.0 (L) 11.7 - 15.4 g/dL    HCT 23.9 (L) 35.8 - 46.3 %       Assessment:     Problem List as of 5/5/2018  Date Reviewed: 4/30/2018          Codes Class Noted - Resolved    * (Principal)Physical debility ICD-10-CM: R53.81  ICD-9-CM: 799.3  4/30/2018 - Present        Chronic systolic congestive heart failure (HCC) ICD-10-CM: I50.22  ICD-9-CM: 428.22, 428.0  4/26/2018 - Present        Femoral artery occlusion, left (HCC) ICD-10-CM: S83.104  ICD-9-CM: 444.22  4/25/2018 - Present        Type 2 diabetes mellitus with nephropathy (HCC) ICD-10-CM: E11.21  ICD-9-CM: 250.40, 583.81  1/25/2018 - Present        S/P AV noris ablation ICD-10-CM: Z98.890  ICD-9-CM: V45.89  1/16/2018 - Present        Biventricular cardiac pacemaker in situ ICD-10-CM: Z95.0  ICD-9-CM: V45.01  1/16/2018 - Present        Diabetes mellitus, type 2 (HCC) (Chronic) ICD-10-CM: E11.9  ICD-9-CM: 250.00  8/12/2017 - Present        Hypertension (Chronic) ICD-10-CM: I10  ICD-9-CM: 401.9  8/12/2017 - Present    Overview Signed 9/12/2012  3:43 PM by Serenity Aguilar     Orthostatic hypotension due to autonomic neuropathy             CKD (chronic kidney disease), stage III (Chronic) ICD-10-CM: N18.3  ICD-9-CM: 585.3  8/12/2017 - Present        Dysphagia (Chronic) ICD-10-CM: R13.10  ICD-9-CM: 787.20  8/12/2017 - Present    Overview Signed 4/5/2016  5:24 PM by Monica Chris MD     Due to esophageal spasm seen on modified barium swallow 2015             Atrial fibrillation ([facility] (Chronic) ICD-10-CM: I48.91  ICD-9-CM: 427.31  8/12/2017 - Present        DNR (do not resuscitate) (Chronic) ICD-10-CM: Z66  ICD-9-CM: V49.86  8/12/2017 - Present        Ischemic cardiomyopathy ICD-10-CM: I25.5  ICD-9-CM: 414.8  7/11/2017 - Present        Cardiomyopathy ([facility] ICD-10-CM: I42.9  ICD-9-CM: 425.4  3/30/2017 - Present        Dyspnea ICD-10-CM: R06.00  ICD-9-CM: 786.09  2/25/2017 - Present        Atrial fibrillation with rapid ventricular response ([facility] ICD-10-CM: I48.91  ICD-9-CM: 427.31  2/25/2017 - Present        SSS (sick sinus syndrome) ([facility] ICD-10-CM: I49.5  ICD-9-CM: 427.81  5/5/2016 - Present        Orthostatic hypotension ICD-10-CM: I95.1  ICD-9-CM: 458.0  5/5/2016 - Present        CAD in native artery ICD-10-CM: I25.10  ICD-9-CM: 414.01  5/5/2016 - Present        Pacemaker ICD-10-CM: Z95.0  ICD-9-CM: V45.01  11/10/2015 - Present        Cervical radiculopathy ICD-10-CM: M54.12  ICD-9-CM: 723.4  5/23/2013 - Present    Overview Addendum 8/26/2013  4:59 PM by Monica Chris MD     Chronic right shoulder pain S/P eval Dr Fadumo Rosario POC August 2013, previous cervical spinal fusion             Autonomic neuropathy ICD-10-CM: G90.9  ICD-9-CM: 337.9  1/13/2013 - Present    Overview Signed 1/13/2013  8:18 PM by Monica Chris MD     Severe orthostatic hypotension             MCI (mild cognitive impairment) ICD-10-CM: G31.84  ICD-9-CM: 331.83  1/13/2013 - Present    Overview Addendum 12/16/2013  4:43 PM by Monica Chris MD     MMSE 27/30 Nov 2009, 27/30 Dec 2013, remembers 3/3 objects 5 min later and draws normal clock             Chronic pain syndrome ICD-10-CM: G89.4  ICD-9-CM: 338.4  1/13/2013 - Present    Overview Addendum 11/10/2015  5:15 PM by Aleyda Pina MD     Back, right shoulder, neck: Peripheral neuropathy, spinal stenosis C7-T1, foraminal narrowing C4-5             Acquired hypothyroidism (Chronic) ICD-10-CM: E03.9  ICD-9-CM: 244.9  9/12/2012 - Present        Mixed hyperlipidemia (Chronic) ICD-10-CM: E78.2  ICD-9-CM: 272.2  9/12/2012 - Present    Overview Addendum 9/12/2012  3:51 PM by Binh Torres     With high HDL  Intolerant of pravastatin,lovastatin and lescol             GERD (gastroesophageal reflux disease) (Chronic) ICD-10-CM: K21.9  ICD-9-CM: 530.81  9/12/2012 - Present        IBS (irritable bowel syndrome) (Chronic) ICD-10-CM: K58.9  ICD-9-CM: 564.1  9/12/2012 - Present    Overview Signed 9/12/2012  3:52 PM by Binh Torres     Chronic enema abuse  diverticulosis             Gait disorder (Chronic) ICD-10-CM: R26.9  ICD-9-CM: 781.2  9/12/2012 - Present    Overview Signed 9/12/2012  3:53 PM by Binh Torres     Diabetic peripheral neuropathy              Atrial fibrillation with RVR ([facility] ICD-10-CM: I48.91  ICD-9-CM: 427.31  9/12/2012 - Present    Overview Addendum 10/14/2012  9:34 AM by Aleyda Pina MD     Paroxysmal.  Coumadin contraindicated due to falls               RESOLVED: RUSS (acute kidney injury) ([facility] ICD-10-CM: N17.9  ICD-9-CM: 584.9  8/13/2017 - 10/19/2017        RESOLVED: Acute respiratory failure with hypoxemia ([facility] ICD-10-CM: J96.01  ICD-9-CM: 518.81  8/12/2017 - 9/15/2017        RESOLVED: CAP (community acquired pneumonia) ICD-10-CM: J18.9  ICD-9-CM: 486  8/12/2017 - 9/15/2017        RESOLVED: CHF (congestive heart failure) ([facility] ICD-10-CM: I50.9  ICD-9-CM: 428.0  3/18/2017 - 4/26/2018        RESOLVED: Altered mental status ICD-10-CM: B62.73  ICD-9-CM: 780.97  3/10/2017 - 10/19/2017        RESOLVED: Altered mental state ICD-10-CM: R41.82  ICD-9-CM: 780.97  3/9/2017 - 10/19/2017        RESOLVED: Chest pain, pleuritic ICD-10-CM: R07.81  ICD-9-CM: 786.52  3/9/2017 - 10/19/2017        RESOLVED: Weight loss ICD-10-CM: R63.4  ICD-9-CM: 783.21  9/2/2014 - 11/10/2015        RESOLVED: Anorexia ICD-10-CM: R63.0  ICD-9-CM: 783.0  9/2/2014 - 10/19/2017        RESOLVED: Iron deficiency anemia, unspecified ICD-10-CM: D50.9  ICD-9-CM: 280.9  9/12/2012 - 4/16/2013    Overview Addendum 1/13/2013  8:21 PM by Oralia Akins MD     AVKANA in the proximal colon                   Plan:              Assessment: Physical Debility s/p ischemic LLE s/p Left femoral artery embolectomy      Continue daily physician medical management:  Pneumonia prophylaxis- Incentive spirometer every hour while awake. Aspiration precautions      Dysphagia; consider MBS; will have ST eval. HOB > 30d  -5/2 ST eval yesterday, no overt signs of dysphagia/ aspiration      Post op anemia due to blood loss; gi prophylaxis, monitor wound; check stool. Check iron studies as well  -hgb 10.1  -5/2 drop in Hgb to 8.4; significant iron deficiency; start supplement, hemmoc stool; hold aspirin/eliquis until stool comes back ; no gi symptoms to suggest gastritis/ ulcer.  -5/4 staff did not send last stool; pending. hgb now 8.1; asymp. Denies melena, no hematoma at wound site. Started iron yesterday for severe iron deficiency ; gi consult if stool positive  -5/5 8.0 stool neg; no visible sign of bleeding; abd soft, femoral inc clean, no abd pain or back pain to suggest something internal; daily labs; consult if needed      DVT risk / DVT Prophylaxis- Will require daily physician exam to assess for signs and symptoms as patient is at increased risk for of thromboembolism. Mobilization as tolerated. Intermittent pneumatic compression devices when in bed Thigh-high or knee-high thromboembolic deterrent hose when out of bed.  Eliquis/ASA      Cervical stenosis with autonomic dysfxn; orthostatic hypotension; monitor with therapies. Cont TEDs  -5/2 no overt symptoms; bp stable      Pain Control: stable, mild-to-moderate joint symptoms intermittently, reasonably well controlled by PRN meds. Will require regular pain assessment and comprenhensive pain management,   -pt with vascular pain and hypersensitivity to LLE; cont Neurontin, tolerating 600mg tid; does not wake her up at noc; cont ultram as well  -5/2 lethargic, multifactorial; dec neurontin   -5/3 much more alert. Has been on Neurontin for years; can inc back to home dose; lethargy likely due to UTI and poor sleep; monitor; cont ultram and add 10mg Elavil; having burning pain in Left foot/ankle; lacks DP pulse; will see if we can doppler; however, foot warm to touch, no discoloration with nl cap refill  -5/4 left DP pulse found easier today with dec in edema. No discoloration/ foot warm      Hx of DM; on amaryl in the past, currently nothing; bs 143, change to diab diet; bs improved; check qam only      Hypocalcemia; cont Oscal and add Vit D. Will see when pt had last bone density scan. Improved 8.6 F/u with PCP  -5/2 calcium 8.1 from 8.6, check Vit d ; 5/3 inc supplement  -5/5 vit D levels nl at 41      Wound Care: Monitor wound status daily per staff and physician. At risk for failure. Will require 24/7 rehab nursing. Keep wound clean and dry, Reinforce dressing PRN and Ice to area for comfort      Hypertension - BP uncontrolled, fluctuating, managed medically. Cont Coreg, lasix, zestril   -poorly controlled 5/1 sbp 175-187; however dropped to 112; has hx of orthostatics; monitor with activity  -5/3 bp 170s, add norvasc with parameters; 5/4 150/75; slt improvement; inc Norvasc but watch for edema  -5/5 148/75; will not adjust meds at this time     Hyponatremia;  Na 131 5/3 and 5/4; can hold lasix, zestril and coreg contributing but bp not controlled without it, added norvasc; recheck in a.m. ; free water restrict; DC MACRODANTIN; known cause, raghu in elderly on diuretics  -5/5 Na unchanged at 131; monitor      RUSS ; monitor labs. Check UA due to [place]. Improving as of 4/28; no labs since that time; may need to consider adjusting Lasix and zestril  -5/1 slt bump in creat from 1.18- 1.28 ; recheck daily. Very little po today, may need IV fluids  -5/2 IVFs overnoc, creat 1.11, improved      Hypothyroidism; cont synthroid; 5/4 check TSH; normal      Urinary retention/ neurogenic bladder - schedule voids q6-8 hrs. Check post-void residual as needed; In and Out catheterize if post-void residual is more than 400 cc.  -5/1 urin frq; check UA, check post void residual  -5/3 urine finally sent last pm. 4+ bact, nitrite positive, leuk neg and few bacteria; received one gram IM Rocephin and started on Macrobid; await cx. No dysuria + freq  -5/4 reorder cx; not done; +dysuria, but otherwise asymptomatic x frequency (may be chronic though); no fever, WBC nl; DC macrodantin due to hyponatremia; Rocephin 1g IV x 3d  -5/5 urine cx pending      bowel program - add prn bowel program  -5/1 impacted, did not sleep. Given suppos and disimpacted for mod amt of stool . No n/v. Limited po today. Labs stable. No gaurding or rebound on exam  -add anusol suppos due to rectal pain and suspected internal hemmor. With blood streaked stool.  -5/3 now just mucus, no occult blood. hbg has dropped; check hemoccult  -5/5 hgb continues to trend down. 8.4, now 8.0 from 8.1 . Stool neg for blood. Wound site of embolectomy clean, no swelling or evidence of hemorrhage; started oral iron due to iron deficiency       GERD - resume PPI. At times may need additional antacids, Maalox prn. Time spent was 35 minutes with over 1/2 in direct patient care/examination, consultation and coordination of care.      Signed By: Myrtle Sharpe MD     May 5, 2018

## 2018-05-05 NOTE — PROGRESS NOTES
Problem: Falls - Risk of  Goal: *Absence of Falls  Document Jose Fall Risk and appropriate interventions in the flowsheet.    Outcome: Progressing Towards Goal  Fall Risk Interventions:  Mobility Interventions: Patient to call before getting OOB    Mentation Interventions: Adequate sleep, hydration, pain control    Medication Interventions: Assess postural VS orthostatic hypotension    Elimination Interventions: Bed/chair exit alarm    History of Falls Interventions: Bed/chair exit alarm

## 2018-05-05 NOTE — PROGRESS NOTES
PHYSICAL THERAPY DAILY NOTE  Time In: 0827  Time Out: 9976  Patient Seen For: AM;Balance activities;Gait training;Patient education; Therapeutic exercise;Transfer training; Other (see progress notes)    Subjective:Patient reporting she feels OK. Reports she has arthritis pain in her neck and knees. Reports her left foot and ankle are still hurting. Reports she has a neck pillow she uses at home for neck pain and elevating her LLE helps with the pain a little         Objective:Vital Signs:NT    Pain level:patient did not rate on pain scale. visual pain scale 2 to 6 out of 10  Pain location:left knee, left foot/ankle, cervical spine  Pain interventions:Pain medication per RN, rest, positioning,ROM for left knee and ankle    Patient education:Balance training,transfer training, gait training, fall precautions, activity pacing, body mechanics,rollator parts management, Patient verbalizing understanding and demonstrating partial understanding of patient education. Recommend follow up education. Interdisciplinary Communication:NA    Other (comment) (has pacemaker; fall risk )  GROSS ASSESSMENT Daily Assessment     NA       BED/MAT MOBILITY Daily Assessment    Rolling Right : 6 (Modified independent)  Rolling Left : 6 (Modified independent)  Supine to Sit : 6 (Modified independent)  Sit to Supine : 6 (Modified independent)       TRANSFERS Daily Assessment   Verbal cues for safe body mechanics and for safe rollator set up Transfer Type: SPT without device  Transfer Assistance : 5 (Stand-by assistance)  Sit to Stand Assistance: Stand-by assistance  Car Transfers: Not tested       GAIT Daily Assessment    Amount of Assistance: 5 (Stand-by assistance)  Distance (ft): 150 Feet (ft) (150ft x 1  100ft x 2)  Assistive Device: Walker, rollator   Gait training with cues for improved left LE step clearance    Loss of balance x 3 during gait training requiring with min assist and cues to correct loss of balance.  Patient does not initiate balance correction with loss of balance    STEPS or STAIRS Daily Assessment    Steps/Stairs Ambulated (#): 0  Level of Assist : 0 (Not tested)       BALANCE Daily Assessment    Sitting - Static: Good (unsupported)  Sitting - Dynamic: Good (unsupported)  Standing - Static: Fair  Standing - Dynamic : Impaired       WHEELCHAIR MOBILITY Daily Assessment    Curbs/Ramps Assist Required (FIM Score): 0 (Not tested)  Wheelchair Setup Assist Required : 0 (Not tested)       LOWER EXTREMITY EXERCISES Daily Assessment   LLE elevated on wedge and one pillow while performing RLE exercises Extremity: Both  Exercise Type #1: Supine lower extremity strengthening  Sets Performed: 3  Reps Performed: 10  Level of Assist: Minimal assistance     SUPINE EXERCISES Sets Reps Comments   Ankle Pumps 1 20     Bridging 2 10     Heel Slides 2 10     Hip Abduction 2 10     Short Arc Quad 2 10     Straight Leg Raise 2 10             Assessment: Difficult to progress beyond SBA level due to impaired balance and impaired safety awareness with increased risk for falling. Limited to no recall of safe body mechanics        Patient returned to room at end of treatment. Patient supine in bed with head of bed elevated and bed rails up x 2. Needs placed in reach of patient. posey alarm on, LLE elevated on 2 pillows    Plan of Care: Continue with POC and progress as tolerated.      Annie Perez, PT  5/5/2018

## 2018-05-05 NOTE — PROGRESS NOTES
Pt up to shower with therapy. Pt denies pain to left leg and shortness of breath. Pt ambulates with walker with 1 person assist,. Incision to left groin is open to air and intact. Able to feel pedal pulse.

## 2018-05-05 NOTE — PROGRESS NOTES
End Of Shift Functional Summary, Nursing      TOILETING:    Does patient need assist with adjusting pants up or down and/or pericare? no  If yes, please indicate what the patient needs help with:  no  Pt uses wheelchair. Does the patient require extra time? yes  Does the patient require standby assistance? yes  Does the patient require contact guard assistance? yes  Does the patient require more than one staff member for assistance? no    TOILET TRANSFER:    Pt requires minimal assistance. Pt uses walker. Does the patient require extra time? yes  Does the patient require contact guard assistance? yes  Does the patient require more than one staff member for assistance? no    BLADDER:    Pt does not have a temple[place] catheter that staff manages. Pt does not take medication. If so, please indicate which medication:   Pt is continent. of bladder and voids in toilet   Pt has had 0 bladder accidents during this shift.  (An accident is when the episode is not contained in a brief AND/OR the clothing/linen requires changing/cleaning up.)    BOWEL:  Pt does take medication. Pt is continent of bowel and uses toilet. Pt has had 0 bowel accidents during this shift requiring minimal assistance from staff to clean up. (An accident is when the episode is not contained in a brief AND/OR the clothing/linen requires changing/cleaning up.)    BED/CHAIR TRANSFER  Pt requires minimal assistance. Pt uses walker  Does the patient require extra time? yes  Does the patient require contact guard assistance? yes  Does the patient require more than one staff member for assistance? no        EATING  Pt requires no assistance. Pt wears dentures. TUBE FEEDINGS:  Pt does not  receive nutrition through tube feedings. Patient requires no assistance with feedings. Documentation reviewed and plan of care discussed/reviewed with   patient during the shift.

## 2018-05-05 NOTE — PROGRESS NOTES
05/05/18 1352   Time Spent With Patient   Time In 0700   Time Out 0754   Patient Seen For: AM;ADLs   Upper Body Bathing   Bathing Assistance, Upper S   Adaptive Equipment Tub bench;Grab bar   Comments set up    [place] [address], Lower  CGA   Adaptive Equipment Grab bar   Position Performed Bending forward method;Seated in chair;Standing   Adaptive Equipment Grab bar;Tub bench   [address] Jacobson (FIM Score) Min A   Cues Visual cues provided;Physical assistance for pants up;Physical assistance for pants down   Adaptive Equipment Grab bars;Elevated seat   Upper [facility]   Comments set up   [facility] A   Leg Crossed Method Used No   Position Performed Bending forward method;Seated in chair;Standing   Functional Transfers   Toilet Transfer  Elevated seat;Grab bars;Stand pivot transfer without device   Amount of Assistance Required CGA   Tub or Shower Type Shower   Amount of Assistance Required Min A   Adaptive Equipment Grab bars; Tub transfer bench   Patient seen for am adls. Difficult to arouse from bed. Transfer to chair with CGA. Impulsive frequently throughout session. Safety cues given constantly. Patient able to wash self and dress as stated above. Transfer to recliner. [place] alarm on and tray in front with all essentials. Continue POC.      Gerda Limon   5/5/2018

## 2018-05-05 NOTE — PROGRESS NOTES
End Of Shift Functional Summary, Nursing      TOILETING:    Does patient need assist with adjusting pants up or down and/or pericare? no  Pt uses walker. Does the patient require extra time? no  Does the patient require standby assistance? no  Does the patient require contact guard assistance? yes  Does the patient require more than one staff member for assistance? no    TOILET TRANSFER:    Pt requires minimal assistance. Pt uses walker. Does the patient require extra time? no  Does the patient require contact guard assistance? yes  Does the patient require more than one staff member for assistance? no    BLADDER:    Pt does not have a temple[place] catheter that staff manages. Pt does not take medication. Pt is continent. of bladder and voids in toilet  Pt requires staff to position device Pt has had 0 bladder accidents during this shift requiring minimal assistance to clean up. (An accident is when the episode is not contained in a brief AND/OR the clothing/linen requires changing/cleaning up.)  Plan of care reviewed with the nurse on the oncoming shift.

## 2018-05-06 LAB
HCT VFR BLD AUTO: 25 % (ref 35.8–46.3)
HGB BLD-MCNC: 8.4 G/DL (ref 11.7–15.4)

## 2018-05-06 PROCEDURE — 36415 COLL VENOUS BLD VENIPUNCTURE: CPT | Performed by: PHYSICAL MEDICINE & REHABILITATION

## 2018-05-06 PROCEDURE — 74011250636 HC RX REV CODE- 250/636: Performed by: PHYSICAL MEDICINE & REHABILITATION

## 2018-05-06 PROCEDURE — 74011000258 HC RX REV CODE- 258: Performed by: PHYSICAL MEDICINE & REHABILITATION

## 2018-05-06 PROCEDURE — 85014 HEMATOCRIT: CPT | Performed by: PHYSICAL MEDICINE & REHABILITATION

## 2018-05-06 PROCEDURE — 99232 SBSQ HOSP IP/OBS MODERATE 35: CPT | Performed by: PHYSICAL MEDICINE & REHABILITATION

## 2018-05-06 PROCEDURE — 74011250637 HC RX REV CODE- 250/637: Performed by: PHYSICAL MEDICINE & REHABILITATION

## 2018-05-06 PROCEDURE — 65310000000 HC RM PRIVATE REHAB

## 2018-05-06 RX ADMIN — PANTOPRAZOLE SODIUM 40 MG: 40 TABLET, DELAYED RELEASE ORAL at 06:11

## 2018-05-06 RX ADMIN — TRAMADOL HYDROCHLORIDE 50 MG: 50 TABLET, FILM COATED ORAL at 00:26

## 2018-05-06 RX ADMIN — TRAMADOL HYDROCHLORIDE 50 MG: 50 TABLET, FILM COATED ORAL at 15:52

## 2018-05-06 RX ADMIN — CEFTRIAXONE SODIUM 1 G: 1 INJECTION, POWDER, FOR SOLUTION INTRAMUSCULAR; INTRAVENOUS at 09:25

## 2018-05-06 RX ADMIN — GABAPENTIN 600 MG: 300 CAPSULE ORAL at 06:11

## 2018-05-06 RX ADMIN — CARVEDILOL 25 MG: 25 TABLET, FILM COATED ORAL at 09:12

## 2018-05-06 RX ADMIN — FERROUS SULFATE TAB 325 MG (65 MG ELEMENTAL FE) 325 MG: 325 (65 FE) TAB at 18:19

## 2018-05-06 RX ADMIN — AMITRIPTYLINE HYDROCHLORIDE 10 MG: 10 TABLET, FILM COATED ORAL at 20:11

## 2018-05-06 RX ADMIN — Medication 5 ML: at 20:12

## 2018-05-06 RX ADMIN — FERROUS SULFATE TAB 325 MG (65 MG ELEMENTAL FE) 325 MG: 325 (65 FE) TAB at 09:11

## 2018-05-06 RX ADMIN — CARVEDILOL 25 MG: 25 TABLET, FILM COATED ORAL at 18:20

## 2018-05-06 RX ADMIN — APIXABAN 2.5 MG: 2.5 TABLET, FILM COATED ORAL at 20:11

## 2018-05-06 RX ADMIN — AMLODIPINE BESYLATE 10 MG: 10 TABLET ORAL at 09:11

## 2018-05-06 RX ADMIN — LISINOPRIL 20 MG: 20 TABLET ORAL at 09:13

## 2018-05-06 RX ADMIN — Medication 1000 MG: at 09:11

## 2018-05-06 RX ADMIN — Medication 5 ML: at 15:54

## 2018-05-06 RX ADMIN — TRAMADOL HYDROCHLORIDE 50 MG: 50 TABLET, FILM COATED ORAL at 21:00

## 2018-05-06 RX ADMIN — Medication 1000 MG: at 15:53

## 2018-05-06 RX ADMIN — Medication 500 MG: at 09:10

## 2018-05-06 RX ADMIN — ASPIRIN 81 MG: 81 TABLET, COATED ORAL at 09:13

## 2018-05-06 RX ADMIN — FUROSEMIDE 10 MG: 20 TABLET ORAL at 09:12

## 2018-05-06 RX ADMIN — VITAMIN D, TAB 1000IU (100/BT) 2000 UNITS: 25 TAB at 09:11

## 2018-05-06 RX ADMIN — GABAPENTIN 600 MG: 300 CAPSULE ORAL at 15:52

## 2018-05-06 RX ADMIN — Medication 5 ML: at 06:00

## 2018-05-06 RX ADMIN — LEVOTHYROXINE SODIUM 25 MCG: 50 TABLET ORAL at 06:11

## 2018-05-06 RX ADMIN — GABAPENTIN 600 MG: 300 CAPSULE ORAL at 20:10

## 2018-05-06 RX ADMIN — Medication 500 MG: at 18:20

## 2018-05-06 RX ADMIN — APIXABAN 2.5 MG: 2.5 TABLET, FILM COATED ORAL at 09:12

## 2018-05-06 NOTE — PROGRESS NOTES
End Of Shift Functional Summary, Nursing        TOILETING:    Does patient need assist with adjusting pants up or down and/or pericare? no  If yes, please indicate what the patient needs help:  no  Pt uses wheelchair. Does the patient require extra time? yes  Does the patient require standby assistance? yes  Does the patient require contact guard assistance? yes  Does the patient require more than one staff member for assistance? no     TOILET TRANSFER:    Pt requires minimal assistance. Pt uses walker. Does the patient require extra time? yes  Does the patient require contact guard assistance? yes  Does the patient require more than one staff member for assistance? no     BLADDER:    Pt does not have a temple[place] catheter that staff manages. Pt does not take medication. Pt is continent of bladder and voids in toilet. Pt has had 0 bladder accidents during this shift.  (An accident is when the episode is not contained in a brief AND/OR the clothing/linen requires changing/cleaning up.)     BOWEL:  Pt does take medication. Pt is continent of bowel and uses toilet. Pt has had 0 bowel accidents during this shift requiring minimal assistance from staff to clean up. (An accident is when the episode is not contained in a brief AND/OR the clothing/linen requires changing/cleaning up.)     BED/CHAIR TRANSFER  Pt requires minimal assistance. Pt uses walker  Does the patient require extra time? yes  Does the patient require contact guard assistance? yes  Does the patient does not require more than one staff member for assistant.      Documentation reviewed and plan of care discussed/reviewed with patient, patient assistant and oncoming nurse during the shift.

## 2018-05-06 NOTE — PROGRESS NOTES
Problem: Falls - Risk of  Goal: *Absence of Falls  Document Jose Fall Risk and appropriate interventions in the flowsheet.    Outcome: Progressing Towards Goal  Fall Risk Interventions:  Mobility Interventions: Utilize walker, cane, or other assitive device    Mentation Interventions: Bed/chair exit alarm, Door open when patient unattended    Medication Interventions: Bed/chair exit alarm, Patient to call before getting OOB    Elimination Interventions: Call light in reach    History of Falls Interventions: Bed/chair exit alarm, Door open when patient unattended

## 2018-05-06 NOTE — PROGRESS NOTES
Whitney Lay MD,   Medical Director  [address] [facility], [address] [facility]  Tel: [phone]       D PROGRESS NOTE    Hannah Horner  Admit Date: 4/30/2018  Admit Diagnosis: left feneseal embolictomy; Physical debility    Subjective     Doing ok. Not happy with her breakfast this a.m. Ordered her something else and got her some juice and she was much happier. No cp, sob, n. Left foot pain worse at noc.      Objective:     Current Facility-Administered Medications   Medication Dose Route Frequency    amLODIPine (NORVASC) tablet 10 mg  10 mg Oral DAILY    SALINE PERIPHERAL FLUSH Q8H soln 5 mL  5 mL InterCATHeter Q8H    amitriptyline (ELAVIL) tablet 10 mg  10 mg Oral QHS    calcium carbonate (OS-CAN) tablet 1,000 mg [elemental]  1,000 mg Oral BID    cholecalciferol (VITAMIN D3) tablet 2,000 Units  2,000 Units Oral DAILY    gabapentin (NEURONTIN) capsule 600 mg  600 mg Oral TID    ferrous sulfate tablet 325 mg  1 Tab Oral BID WITH MEALS    furosemide (LASIX) tablet 10 mg - On Hold  10 mg Oral DAILY    loperamide (IMODIUM) capsule 4 mg  4 mg Oral Q6H PRN    Saccharomyces boulardii (FLORASTOR) capsule 500 mg  500 mg Oral BID    polyethylene glycol (MIRALAX) packet 17 g  17 g Oral DAILY    bisacodyl (DULCOLAX) suppository 10 mg  10 mg Rectal DAILY PRN    phenylephrine suppository 1 Suppository  1 Suppository Rectal TID PRN    acetaminophen (TYLENOL) tablet 500 mg  500 mg Oral Q4H PRN    diphenhydrAMINE (BENADRYL) capsule 25 mg  25 mg Oral Q4H PRN    apixaban (ELIQUIS) tablet 2.5 mg  2.5 mg Oral BID    aspirin delayed-release tablet 81 mg  81 mg Oral DAILY    carvedilol (COREG) tablet 25 mg  25 mg Oral BID WITH MEALS    levothyroxine (SYNTHROID) tablet 25 mcg  25 mcg Oral ACB    lisinopril (PRINIVIL, ZESTRIL) tablet 20 mg  20 mg Oral DAILY    nitroglycerin (NITROSTAT) tablet 0.4 mg  0.4 mg SubLINGual PRN    ondansetron (ZOFRAN ODT) tablet 4 mg  4 mg Oral Q6H PRN    pantoprazole (PROTONIX) tablet 40 mg  40 mg Oral ACB    senna-docusate (PERICOLACE) 8.6-50 mg per tablet 1 Tab  1 Tab Oral QHS    traMADol (ULTRAM) tablet 50 mg  50 mg Oral Q6H PRN     Review of Systems:Denies chest pain, shortness of breath, cough, headache, visual problems, abdominal pain, dysurea, calf pain. Pertinent positives are as noted in the medical records and unremarkable otherwise. Visit Vitals    /66 (BP 1 Location: Left arm, BP Patient Position: At rest)    Pulse 74    Temp 98 °F (36.7 °C)    Resp 16    SpO2 96%        Physical Exam:   General: Alert and age appropriately oriented. No acute cardio respiratory distress. HEENT: Normocephalic,no scleral icterus  Oral mucosa moist without cyanosis   Lungs: Clear to auscultation  bilaterally. Respiration even and unlabored   Heart: Regular rate and rhythm, S1, S2   No  murmurs, clicks, rub or gallops   Abdomen: Soft, non-tender, nondistended. Bowel sounds present. No organomegaly. Genitourinary: Benign . Neuromuscular:      Grossly no focal motor deficits noted EXCEPT dorsiflexion of left ankle; 3/5, sens intact, ehl 2     Skin/extremity: No rashes, no erythema.  No calf tenderness BLE  Wound healing well/left groin                                                                            Functional Assessment:          Balance  Sitting - Static: Good (unsupported) (05/05/18 1100)  Sitting - Dynamic: Good (unsupported) (05/05/18 1100)  Standing - Static: Fair (05/05/18 1100)  Standing - Dynamic : Impaired (05/05/18 1100)           Toileting  Cues: Visual cues provided;Physical assistance for pants up;Physical assistance for pants down (05/05/18 1352)  Adaptive Equipment: Grab bars;Elevated seat (05/05/18 1352)         Emilee Vargas Fall Risk Assessment:  Emilee Vargas Fall Risk  Mobility: Ambulates or transfers with assist devices or assistance (05/05/18 2015)  Mobility Interventions: Utilize walker, cane, or other assitive device (05/05/18 2015)  Mentation: Periodic confusion (05/05/18 2015)  Mentation Interventions: Bed/chair exit alarm; Door open when patient unattended (05/05/18 2015)  Medication: Patient receiving anticonvulsants, sedatives(tranquilizers), psychotropics or hypnotics, hypoglycemics, narcotics, sleep aids, antihypertensives, laxatives, or diuretics (05/05/18 2015)  Medication Interventions: Bed/chair exit alarm; Patient to call before getting OOB (05/05/18 2015)  Elimination: Needs assistance with toileting (05/05/18 2015)  Elimination Interventions: Call light in reach (05/05/18 2015)  Prior Fall History: Before admission in past 12 months _home or previous inpatient care) (05/05/18 2015)  History of Falls Interventions: Bed/chair exit alarm; Door open when patient unattended (05/05/18 2015)  Total Score: 5 (05/05/18 2015)  Standard Fall Precautions: Yes (05/01/18 2037)  High Fall Risk: Yes (05/05/18 2015)     Speech Assessment:  Aspiration Signs/Symptoms: None (05/01/18 1305)      Ambulation:  Gait  Distance (ft): 150 Feet (ft) (150ft x 1  100ft x 2) (05/05/18 1100)  Assistive Device: Walker, rollator (05/05/18 1100)  Rail Use: Both (05/04/18 1100)     Labs/Studies:  Recent Results (from the past 72 hour(s))   HGB & HCT    Collection Time: 05/04/18  5:24 AM   Result Value Ref Range    HGB 8.1 (L) 11.7 - 15.4 g/dL    HCT 23.7 (L) 35.8 - 21.0 %   METABOLIC PANEL, BASIC    Collection Time: 05/04/18  5:24 AM   Result Value Ref Range    Sodium 131 (L) 136 - 145 mmol/L    Potassium 3.7 3.5 - 5.1 mmol/L    Chloride 95 (L) 98 - 107 mmol/L    CO2 27 21 - 32 mmol/L    Anion gap 9 7 - 16 mmol/L    Glucose 84 65 - 100 mg/dL    BUN 19 8 - 23 MG/DL    Creatinine 1.01 (H) 0.6 - 1.0 MG/DL    GFR est AA >60 >60 ml/min/1.73m2    GFR est non-AA 55 (L) >60 ml/min/1.73m2    Calcium 8.3 8.3 - 10.4 MG/DL   TSH RECEPTOR AB    Collection Time: 05/04/18  9:48 AM   Result Value Ref Range    Thyrotropin Receptor Ab, serum <0.50 0.00 - 1. 75 IU/L   OCCULT BLOOD, STOOL    Collection Time: 05/04/18  3:45 PM   Result Value Ref Range    Occult blood, stool NEGATIVE  NEG     SODIUM    Collection Time: 05/05/18  6:53 AM   Result Value Ref Range    Sodium 131 (L) 136 - 145 mmol/L   HGB & HCT    Collection Time: 05/05/18  6:53 AM   Result Value Ref Range    HGB 8.0 (L) 11.7 - 15.4 g/dL    HCT 23.9 (L) 35.8 - 42.6 %   METABOLIC PANEL, BASIC    Collection Time: 05/05/18 12:50 PM   Result Value Ref Range    Sodium 131 (L) 136 - 145 mmol/L    Potassium 4.2 3.5 - 5.1 mmol/L    Chloride 95 (L) 98 - 107 mmol/L    CO2 28 21 - 32 mmol/L    Anion gap 8 7 - 16 mmol/L    Glucose 166 (H) 65 - 100 mg/dL    BUN 18 8 - 23 MG/DL    Creatinine 1.28 (H) 0.6 - 1.0 MG/DL    GFR est AA 50 (L) >60 ml/min/1.73m2    GFR est non-AA 42 (L) >60 ml/min/1.73m2    Calcium 8.0 (L) 8.3 - 10.4 MG/DL   URINALYSIS W/ RFLX MICROSCOPIC    Collection Time: 05/05/18  1:00 PM   Result Value Ref Range    Color YELLOW      Appearance CLEAR      Specific gravity 1.006 1.001 - 1.023      pH (UA) 6.5 5.0 - 9.0      Protein NEGATIVE  NEG mg/dL    Glucose NEGATIVE  mg/dL    Ketone NEGATIVE  NEG mg/dL    Bilirubin NEGATIVE  NEG      Blood NEGATIVE  NEG      Urobilinogen 0.2 0.2 - 1.0 EU/dL    Nitrites NEGATIVE  NEG      Leukocyte Esterase NEGATIVE  NEG     CULTURE, URINE    Collection Time: 05/05/18  1:00 PM   Result Value Ref Range    Special Requests: NO SPECIAL REQUESTS      Culture result: NO GROWTH 1 DAY     HGB & HCT    Collection Time: 05/06/18  6:50 AM   Result Value Ref Range    HGB 8.4 (L) 11.7 - 15.4 g/dL    HCT 25.0 (L) 35.8 - 46.3 %       Assessment:     Problem List as of 5/6/2018  Date Reviewed: 4/30/2018          Codes Class Noted - Resolved    * (Principal)Physical debility ICD-10-CM: R53.81  ICD-9-CM: 799.3  4/30/2018 - Present        Chronic systolic congestive heart failure (HCC) ICD-10-CM: I50.22  ICD-9-CM: 428.22, 428.0  4/26/2018 - Present        Femoral artery occlusion, left [facility]) ICD-10-CM: C20.739  ICD-9-CM: 444.22  4/25/2018 - Present        Type 2 diabetes mellitus with nephropathy ([facility] ICD-10-CM: E11.21  ICD-9-CM: 250.40, 583.81  1/25/2018 - Present        S/P AV noris ablation ICD-10-CM: Z98.890  ICD-9-CM: V45.89  1/16/2018 - Present        Biventricular cardiac pacemaker in situ ICD-10-CM: Z95.0  ICD-9-CM: V45.01  1/16/2018 - Present        Diabetes mellitus, type 2 (HCC) (Chronic) ICD-10-CM: E11.9  ICD-9-CM: 250.00  8/12/2017 - Present        Hypertension (Chronic) ICD-10-CM: I10  ICD-9-CM: 401.9  8/12/2017 - Present    Overview Signed 9/12/2012  3:43 PM by Margarita Terry     Orthostatic hypotension due to autonomic neuropathy             CKD (chronic kidney disease), stage III (Chronic) ICD-10-CM: N18.3  ICD-9-CM: 585.3  8/12/2017 - Present        Dysphagia (Chronic) ICD-10-CM: R13.10  ICD-9-CM: 787.20  8/12/2017 - Present    Overview Signed 4/5/2016  5:24 PM by Adonna Rinne, MD     Due to esophageal spasm seen on modified barium swallow 2015             Atrial fibrillation ([facility] (Chronic) ICD-10-CM: I48.91  ICD-9-CM: 427.31  8/12/2017 - Present        DNR (do not resuscitate) (Chronic) ICD-10-CM: Z66  ICD-9-CM: V49.86  8/12/2017 - Present        Ischemic cardiomyopathy ICD-10-CM: I25.5  ICD-9-CM: 414.8  7/11/2017 - Present        Cardiomyopathy ([facility] ICD-10-CM: I42.9  ICD-9-CM: 425.4  3/30/2017 - Present        Dyspnea ICD-10-CM: R06.00  ICD-9-CM: 786.09  2/25/2017 - Present        Atrial fibrillation with rapid ventricular response ([facility] ICD-10-CM: I48.91  ICD-9-CM: 427.31  2/25/2017 - Present        SSS (sick sinus syndrome) ([facility] ICD-10-CM: I49.5  ICD-9-CM: 427.81  5/5/2016 - Present        Orthostatic hypotension ICD-10-CM: I95.1  ICD-9-CM: 458.0  5/5/2016 - Present        CAD in native artery ICD-10-CM: I25.10  ICD-9-CM: 414.01  5/5/2016 - Present        Pacemaker ICD-10-CM: Z95.0  ICD-9-CM: V45.01  11/10/2015 - Present        Cervical radiculopathy ICD-10-CM: M54.12  ICD-9-CM: 723.4  5/23/2013 - Present    Overview Addendum 8/26/2013  4:59 PM by Ari Scales MD     Chronic right shoulder pain S/P eval Dr Melinda Bhatt POC August 2013, previous cervical spinal fusion             Autonomic neuropathy ICD-10-CM: G90.9  ICD-9-CM: 337.9  1/13/2013 - Present    Overview Signed 1/13/2013  8:18 PM by Ari Scales MD     Severe orthostatic hypotension             MCI (mild cognitive impairment) ICD-10-CM: G31.84  ICD-9-CM: 331.83  1/13/2013 - Present    Overview Addendum 12/16/2013  4:43 PM by Ari Scales MD     MMSE 27/30 Nov 2009, 27/30 Dec 2013, remembers 3/3 objects 5 min later and draws normal clock             Chronic pain syndrome ICD-10-CM: G89.4  ICD-9-CM: 338.4  1/13/2013 - Present    Overview Addendum 11/10/2015  5:15 PM by Ari Scales MD     Back, right shoulder, neck: Peripheral neuropathy, spinal stenosis C7-T1, foraminal narrowing C4-5             Acquired hypothyroidism (Chronic) ICD-10-CM: E03.9  ICD-9-CM: 244.9  9/12/2012 - Present        Mixed hyperlipidemia (Chronic) ICD-10-CM: E78.2  ICD-9-CM: 272.2  9/12/2012 - Present    Overview Addendum 9/12/2012  3:51 PM by Mel Coreas     With high HDL  Intolerant of pravastatin,lovastatin and lescol             GERD (gastroesophageal reflux disease) (Chronic) ICD-10-CM: K21.9  ICD-9-CM: 530.81  9/12/2012 - Present        IBS (irritable bowel syndrome) (Chronic) ICD-10-CM: K58.9  ICD-9-CM: 564.1  9/12/2012 - Present    Overview Signed 9/12/2012  3:52 PM by Mel Coreas     Chronic enema abuse  diverticulosis             Gait disorder (Chronic) ICD-10-CM: R26.9  ICD-9-CM: 781.2  9/12/2012 - Present    Overview Signed 9/12/2012  3:53 PM by Mel Coreas     Diabetic peripheral neuropathy              Atrial fibrillation with RVR ([facility] ICD-10-CM: I48.91  ICD-9-CM: 427.31  9/12/2012 - Present    Overview Addendum 10/14/2012  9:34 AM by Ari Scales MD     Paroxysmal.  Coumadin contraindicated due to ras Estrella             RESOLVED: RUSS (acute kidney injury) ([facility] ICD-10-CM: N17.9  ICD-9-CM: 584.9  8/13/2017 - 10/19/2017        RESOLVED: Acute respiratory failure with hypoxemia (HCC) ICD-10-CM: J96.01  ICD-9-CM: 518.81  8/12/2017 - 9/15/2017        RESOLVED: CAP (community acquired pneumonia) ICD-10-CM: J18.9  ICD-9-CM: 486  8/12/2017 - 9/15/2017        RESOLVED: CHF (congestive heart failure) ([facility] ICD-10-CM: I50.9  ICD-9-CM: 428.0  3/18/2017 - 4/26/2018        RESOLVED: Altered mental status ICD-10-CM: R41.82  ICD-9-CM: 780.97  3/10/2017 - 10/19/2017        RESOLVED: Altered mental state ICD-10-CM: R41.82  ICD-9-CM: 780.97  3/9/2017 - 10/19/2017        RESOLVED: Chest pain, pleuritic ICD-10-CM: R07.81  ICD-9-CM: 786.52  3/9/2017 - 10/19/2017        RESOLVED: Weight loss ICD-10-CM: R63.4  ICD-9-CM: 783.21  9/2/2014 - 11/10/2015        RESOLVED: Anorexia ICD-10-CM: R63.0  ICD-9-CM: 783.0  9/2/2014 - 10/19/2017        RESOLVED: Iron deficiency anemia, unspecified ICD-10-CM: D50.9  ICD-9-CM: 280.9  9/12/2012 - 4/16/2013    Overview Addendum 1/13/2013  8:21 PM by Monica Chris MD     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 Aspiration precautions      Dysphagia; consider MBS; will have ST eval. HOB > 30d  -5/2 ST eval yesterday, no overt signs of dysphagia/ aspiration      Post op anemia due to blood loss; gi prophylaxis, monitor wound; check stool. Check iron studies as well  -hgb 10.1  -5/2 drop in Hgb to 8.4; significant iron deficiency; start supplement, hemmoc stool; hold aspirin/eliquis until stool comes back ; no gi symptoms to suggest gastritis/ ulcer.  -5/4 staff did not send last stool; pending. hgb now 8.1; asymp. Denies melena, no hematoma at wound site.  Started iron yesterday for severe iron deficiency ; gi consult if stool positive  -5/5 8.0 stool neg; no visible sign of bleeding; abd soft, femoral inc clean, no abd pain or back pain to suggest something internal; daily labs; consult if needed  -5/6 improved at 8.4      DVT risk / DVT Prophylaxis- Will require daily physician exam to assess for signs and symptoms as patient is at increased risk for of thromboembolism. Mobilization as tolerated. Intermittent pneumatic compression devices when in bed Thigh-high or knee-high thromboembolic deterrent hose when out of bed. Eliquis/ASA      Cervical stenosis with autonomic dysfxn; orthostatic hypotension; monitor with therapies. Cont TEDs  -5/2 no overt symptoms; bp stable      Pain Control: stable, mild-to-moderate joint symptoms intermittently, reasonably well controlled by PRN meds. Will require regular pain assessment and comprenhensive pain management,   -pt with vascular pain and hypersensitivity to LLE; cont Neurontin, tolerating 600mg tid; does not wake her up at noc; cont ultram as well  -5/2 lethargic, multifactorial; dec neurontin   -5/3 much more alert. Has been on Neurontin for years; can inc back to home dose; lethargy likely due to UTI and poor sleep; monitor; cont ultram and add 10mg Elavil; having burning pain in Left foot/ankle; lacks DP pulse; will see if we can doppler; however, foot warm to touch, no discoloration with nl cap refill  -5/4 left DP pulse found easier today with dec in edema. No discoloration/ foot warm; 5/6 complains less of pain but still burns in left ankle/foot    Hx of DM; on amaryl in the past, currently nothing; bs 143, change to diab diet; bs improved; check qam only      Hypocalcemia; cont Oscal and add Vit D. Will see when pt had last bone density scan. Improved 8.6 F/u with PCP  -5/2 calcium 8.1 from 8.6, check Vit d ; 5/3 inc supplement  -5/5 vit D levels nl at 41      Wound Care: Monitor wound status daily per staff and physician.  At risk for failure. Will require 24/7 rehab nursing. Keep wound clean and dry, Reinforce dressing PRN and Ice to area for comfort      Hypertension - BP uncontrolled, fluctuating, managed medically. Cont Coreg, lasix, zestril   -poorly controlled 5/1 sbp 175-187; however dropped to 112; has hx of orthostatics; monitor with activity  -5/3 bp 170s, add norvasc with parameters; 5/4 150/75; slt improvement; inc Norvasc but watch for edema  -5/5 148/75; will not adjust meds at this time; 5/6 118/66    Hx afib; NSR, rate controlled; cont coreg and eliquis      Hyponatremia; Na 131 5/3 and 5/4; can hold lasix, zestril and coreg contributing but bp not controlled without it, added norvasc; recheck in a.m. ; free water restrict; DC MACRODANTIN; known cause, raghu in elderly on diuretics  -5/5 Na unchanged at 131; monitor      RUSS ; monitor labs. Check UA due to [place]. Improving as of 4/28; no labs since that time; may need to consider adjusting Lasix and zestril  -5/1 slt bump in creat from 1.18- 1.28 ; recheck daily. Very little po today, may need IV fluids  -5/2 IVFs overnoc, creat 1.11, improved      Hypothyroidism; cont synthroid; 5/4 check TSH; normal      Urinary retention/ neurogenic bladder - schedule voids q6-8 hrs. Check post-void residual as needed; In and Out catheterize if post-void residual is more than 400 cc.  -5/1 urin frq; check UA, check post void residual  -5/3 urine finally sent last pm. 4+ bact, nitrite positive, leuk neg and few bacteria; received one gram IM Rocephin and started on Macrobid; await cx. No dysuria + freq  -5/4 reorder cx; not done; +dysuria, but otherwise asymptomatic x frequency (may be chronic though); no fever, WBC nl; DC macrodantin due to hyponatremia; Rocephin 1g IV x 3d  -5/5 urine cx pending; NEG      bowel program - add prn bowel program  -5/1 impacted, did not sleep. Given suppos and disimpacted for mod amt of stool . No n/v. Limited po today. Labs stable.  No gaurding or rebound on exam  -add anusol suppos due to rectal pain and suspected internal hemmor. With blood streaked stool.  -5/3 now just mucus, no occult blood. hbg has dropped; check hemoccult  -5/5 hgb continues to trend down. 8.4, now 8.0 from 8.1 . Stool neg for blood. Wound site of embolectomy clean, no swelling or evidence of hemorrhage; started oral iron due to iron deficiency       GERD - resume PPI. At times may need additional antacids, Maalox prn.           Time spent was 25 minutes with over 1/2 in direct patient care/examination, consultation and coordination of care.      Signed By: Yohannes Doe MD     May 6, 2018

## 2018-05-07 LAB
ANION GAP SERPL CALC-SCNC: 6 MMOL/L (ref 7–16)
APPEARANCE UR: CLEAR
BACTERIA SPEC CULT: NORMAL
BACTERIA URNS QL MICRO: 0 /HPF
BILIRUB UR QL: NEGATIVE
BUN SERPL-MCNC: 17 MG/DL (ref 8–23)
CALCIUM SERPL-MCNC: 8.2 MG/DL (ref 8.3–10.4)
CHLORIDE SERPL-SCNC: 97 MMOL/L (ref 98–107)
CO2 SERPL-SCNC: 31 MMOL/L (ref 21–32)
COLOR UR: YELLOW
CREAT SERPL-MCNC: 1.16 MG/DL (ref 0.6–1)
GLUCOSE SERPL-MCNC: 81 MG/DL (ref 65–100)
GLUCOSE UR STRIP.AUTO-MCNC: NEGATIVE MG/DL
HCT VFR BLD AUTO: 23.6 % (ref 35.8–46.3)
HGB BLD-MCNC: 7.9 G/DL (ref 11.7–15.4)
HGB UR QL STRIP: NEGATIVE
KETONES UR QL STRIP.AUTO: NEGATIVE MG/DL
LEUKOCYTE ESTERASE UR QL STRIP.AUTO: NEGATIVE
NITRITE UR QL STRIP.AUTO: NEGATIVE
PH UR STRIP: 6 [PH] (ref 5–9)
POTASSIUM SERPL-SCNC: 4.3 MMOL/L (ref 3.5–5.1)
PROT UR STRIP-MCNC: NEGATIVE MG/DL
SERVICE CMNT-IMP: NORMAL
SODIUM SERPL-SCNC: 134 MMOL/L (ref 136–145)
SP GR UR REFRACTOMETRY: 1.01 (ref 1–1.02)
UROBILINOGEN UR QL STRIP.AUTO: 0.2 EU/DL (ref 0.2–1)

## 2018-05-07 PROCEDURE — 30233N1 TRANSFUSION OF NONAUTOLOGOUS RED BLOOD CELLS INTO PERIPHERAL VEIN, PERCUTANEOUS APPROACH: ICD-10-PCS | Performed by: PHYSICAL MEDICINE & REHABILITATION

## 2018-05-07 PROCEDURE — 36415 COLL VENOUS BLD VENIPUNCTURE: CPT | Performed by: PHYSICAL MEDICINE & REHABILITATION

## 2018-05-07 PROCEDURE — 97110 THERAPEUTIC EXERCISES: CPT

## 2018-05-07 PROCEDURE — P9016 RBC LEUKOCYTES REDUCED: HCPCS | Performed by: PHYSICAL MEDICINE & REHABILITATION

## 2018-05-07 PROCEDURE — 99233 SBSQ HOSP IP/OBS HIGH 50: CPT | Performed by: PHYSICAL MEDICINE & REHABILITATION

## 2018-05-07 PROCEDURE — 86923 COMPATIBILITY TEST ELECTRIC: CPT | Performed by: PHYSICAL MEDICINE & REHABILITATION

## 2018-05-07 PROCEDURE — 65310000000 HC RM PRIVATE REHAB

## 2018-05-07 PROCEDURE — 97530 THERAPEUTIC ACTIVITIES: CPT

## 2018-05-07 PROCEDURE — 36430 TRANSFUSION BLD/BLD COMPNT: CPT

## 2018-05-07 PROCEDURE — 74011250637 HC RX REV CODE- 250/637: Performed by: PHYSICAL MEDICINE & REHABILITATION

## 2018-05-07 PROCEDURE — 80048 BASIC METABOLIC PNL TOTAL CA: CPT | Performed by: PHYSICAL MEDICINE & REHABILITATION

## 2018-05-07 PROCEDURE — 86900 BLOOD TYPING SEROLOGIC ABO: CPT | Performed by: PHYSICAL MEDICINE & REHABILITATION

## 2018-05-07 PROCEDURE — 81003 URINALYSIS AUTO W/O SCOPE: CPT | Performed by: PHYSICAL MEDICINE & REHABILITATION

## 2018-05-07 PROCEDURE — 74011250636 HC RX REV CODE- 250/636: Performed by: PHYSICAL MEDICINE & REHABILITATION

## 2018-05-07 PROCEDURE — 97535 SELF CARE MNGMENT TRAINING: CPT

## 2018-05-07 PROCEDURE — 85018 HEMOGLOBIN: CPT | Performed by: PHYSICAL MEDICINE & REHABILITATION

## 2018-05-07 RX ORDER — FUROSEMIDE 20 MG/1
10 TABLET ORAL DAILY
Status: DISCONTINUED | OUTPATIENT
Start: 2018-05-08 | End: 2018-05-15 | Stop reason: HOSPADM

## 2018-05-07 RX ORDER — SODIUM CHLORIDE 9 MG/ML
250 INJECTION, SOLUTION INTRAVENOUS AS NEEDED
Status: DISCONTINUED | OUTPATIENT
Start: 2018-05-07 | End: 2018-05-15 | Stop reason: HOSPADM

## 2018-05-07 RX ORDER — FUROSEMIDE 10 MG/ML
40 INJECTION INTRAMUSCULAR; INTRAVENOUS ONCE
Status: COMPLETED | OUTPATIENT
Start: 2018-05-07 | End: 2018-05-07

## 2018-05-07 RX ORDER — DIPHENHYDRAMINE HCL 25 MG
25 CAPSULE ORAL ONCE
Status: COMPLETED | OUTPATIENT
Start: 2018-05-07 | End: 2018-05-07

## 2018-05-07 RX ADMIN — FERROUS SULFATE TAB 325 MG (65 MG ELEMENTAL FE) 325 MG: 325 (65 FE) TAB at 15:23

## 2018-05-07 RX ADMIN — Medication 500 MG: at 09:52

## 2018-05-07 RX ADMIN — Medication 5 ML: at 08:30

## 2018-05-07 RX ADMIN — Medication 5 ML: at 21:15

## 2018-05-07 RX ADMIN — ACETAMINOPHEN 650 MG: 325 SOLUTION ORAL at 09:52

## 2018-05-07 RX ADMIN — VITAMIN D, TAB 1000IU (100/BT) 2000 UNITS: 25 TAB at 09:52

## 2018-05-07 RX ADMIN — TRAMADOL HYDROCHLORIDE 50 MG: 50 TABLET, FILM COATED ORAL at 05:27

## 2018-05-07 RX ADMIN — APIXABAN 2.5 MG: 2.5 TABLET, FILM COATED ORAL at 21:15

## 2018-05-07 RX ADMIN — CARVEDILOL 25 MG: 25 TABLET, FILM COATED ORAL at 09:54

## 2018-05-07 RX ADMIN — STANDARDIZED SENNA CONCENTRATE AND DOCUSATE SODIUM 1 TABLET: 8.6; 5 TABLET, FILM COATED ORAL at 21:15

## 2018-05-07 RX ADMIN — FUROSEMIDE 40 MG: 10 INJECTION, SOLUTION INTRAMUSCULAR; INTRAVENOUS at 17:44

## 2018-05-07 RX ADMIN — AMLODIPINE BESYLATE 10 MG: 10 TABLET ORAL at 09:53

## 2018-05-07 RX ADMIN — ASPIRIN 81 MG: 81 TABLET, COATED ORAL at 09:52

## 2018-05-07 RX ADMIN — LISINOPRIL 20 MG: 20 TABLET ORAL at 09:54

## 2018-05-07 RX ADMIN — DIPHENHYDRAMINE HYDROCHLORIDE 25 MG: 25 CAPSULE ORAL at 09:52

## 2018-05-07 RX ADMIN — GABAPENTIN 600 MG: 300 CAPSULE ORAL at 21:15

## 2018-05-07 RX ADMIN — PANTOPRAZOLE SODIUM 40 MG: 40 TABLET, DELAYED RELEASE ORAL at 05:27

## 2018-05-07 RX ADMIN — Medication 500 MG: at 15:24

## 2018-05-07 RX ADMIN — GABAPENTIN 600 MG: 300 CAPSULE ORAL at 15:22

## 2018-05-07 RX ADMIN — GABAPENTIN 600 MG: 300 CAPSULE ORAL at 05:27

## 2018-05-07 RX ADMIN — APIXABAN 2.5 MG: 2.5 TABLET, FILM COATED ORAL at 09:54

## 2018-05-07 RX ADMIN — POLYETHYLENE GLYCOL (3350) 17 G: 17 POWDER, FOR SOLUTION ORAL at 09:54

## 2018-05-07 RX ADMIN — FERROUS SULFATE TAB 325 MG (65 MG ELEMENTAL FE) 325 MG: 325 (65 FE) TAB at 09:54

## 2018-05-07 RX ADMIN — Medication 1000 MG: at 15:22

## 2018-05-07 RX ADMIN — AMITRIPTYLINE HYDROCHLORIDE 10 MG: 10 TABLET, FILM COATED ORAL at 21:15

## 2018-05-07 RX ADMIN — LEVOTHYROXINE SODIUM 25 MCG: 50 TABLET ORAL at 05:28

## 2018-05-07 RX ADMIN — Medication 5 ML: at 05:31

## 2018-05-07 RX ADMIN — Medication 1000 MG: at 09:52

## 2018-05-07 RX ADMIN — CARVEDILOL 25 MG: 25 TABLET, FILM COATED ORAL at 15:23

## 2018-05-07 NOTE — PROGRESS NOTES
PHYSICAL THERAPY DAILY NOTE  Time In: 7736  Time Out: 1004  Patient Seen For: AM;Other (see progress notes);Gait training; Therapeutic exercise;Transfer training    Subjective: Patient had no complaints. Objective: BP taken 113/66 . Other (comment) (has pacemaker; fall risk )  GROSS ASSESSMENT Daily Assessment            BED/MAT MOBILITY Daily Assessment    Supine to Sit : 6 (Modified independent)  Sit to Supine : 6 (Modified independent)       TRANSFERS Daily Assessment    Transfer Type: SPT without device  Transfer Assistance : 5 (Stand-by assistance)  Sit to Stand Assistance: Stand-by assistance       GAIT Daily Assessment    Amount of Assistance: 0 (Not tested)       STEPS or STAIRS Daily Assessment    Level of Assist : 0 (Not tested)       BALANCE Daily Assessment            WHEELCHAIR MOBILITY Daily Assessment            LOWER EXTREMITY EXERCISES Daily Assessment    Extremity: Both  Exercise Type #1: Supine lower extremity strengthening  Sets Performed: 2  Reps Performed: 10  Level of Assist: Minimal assistance          Assessment: Patient motivated but does get confused at times. Plan of Care: Continue with plan of care to reach PT goals. Returned to room with call bell at reach in bed with alarm on.     Bert Arevalo, PTA  5/7/2018

## 2018-05-07 NOTE — PROGRESS NOTES
5/7/2018 PM : Patient not seen secondary to getting blood this PM. Checked on twice in PM . Will continue with therapy in AM.Isabella Salvador, POPPY

## 2018-05-07 NOTE — PROGRESS NOTES
Report received on pt's history and status from previous shift nurse. Pt sitting up in bed eating breakfast without any distress.

## 2018-05-07 NOTE — PROGRESS NOTES
05/07/18 1237   Time Spent With Patient   Time In 1117   Time Out 1159   Patient Seen For: AM;Other (see progress notes); ADLs   Toileting   Toileting Assistance (FIM Score) Min A   Functional Transfers   Toilet Transfer  Elevated seat;Grab bars;Stand pivot transfer without device; Other (comment)  (WC)   Amount of Assistance Required CGA   S: \"I'm a little tired, but I'll try. I better go to the bathroom first though. \"   O: Patient presented supine in bed. Agreeable to treatment. Patient transferred to edge of bed with supervision and donned B shoes with CGA, leaning far forward and requiring CGA for safety. Patient transferred bed to [facility] using SPT with CGA and verbal cues. Patient completed visual perceptual and cognitive task replicating one simple pattern using Pattern Play with multiple cues and max A to complete. A: Continues to demonstrate decreased problem solving during tabletop tasks. Remains very impulsive, requiring max cues to not get up before therapist locks wheelchair brakes and moves legrests. Patient tolerated session well with no complaint of pain. P: Continue OT POC with focus on ADL/IADL skills, functional transfers, functional mobility, coordination, strength, static and dynamic balance, and activity tolerance to maximize safety and independence with ADLs and functional transfers. Patient was left seated up in [facility] in room with call bell/all other needs in reach. Chair alarm activated. Interdisciplinary communication: Collaborated with PT and confirmed that patient is on track to meet goals.      Pretty Cali MS, OTR/L  5/7/2018

## 2018-05-07 NOTE — PROGRESS NOTES
PM Physical Therapy:  Unable to see patient this PM for weekly assessment due to patient receiving blood transfusion. Will attempt to complete in AM tomorrow.   Richard Joy, PT  5/7/2018

## 2018-05-07 NOTE — PROGRESS NOTES
OT WEEKLY PROGRESS NOTE    Time In: 0830  Time Out: 0915    COMPREHENSION MODE Initial Assessment Weekly Progress Assessment 5/7/2018   Score 6 ([place]) 5     EXPRESSION Initial Assessment Weekly Progress Assessment 5/7/2018   Primary Mode of Expression Verbal Verbal   Score 7 6   Comments low volume; head forward flexed for majority of session; limited participation  expresses simple ideas, low volume      SOCIAL INTERACTION/ PRAGMATICS Initial Assessment Weekly Progress Assessment 5/7/2018   Score 7 5   Comments pleasant and cooperative  very impulsive      PROBLEM SOLVING Initial Assessment Weekly Progress Assessment 5/7/2018   Score 5 3   Comments solves routine problems 91-99% of the time  solves routine problems 50% of the time; limited by impulsivity      MEMORY Initial Assessment Weekly Progress Assessment 5/7/2018   Score 5 4   Comments executes 3/3 steps; recalled 2/3 words  executes 2/3 step request inconsistently      EATING Initial Assessment Weekly Progress Assessment 5/7/2018   Functional Level 5 7   Comments setup assist       GROOMING Initial Assessment Weekly Progress Assessment 5/7/2018   Functional Level 4 5   Tasks completed by patient Brushed hair, Washed face, Washed hands, Brushed teeth Washed face; Washed hands;Brushed hair;Brushed teeth   Comments CGA in standing; assist with hair  setup assist; supervision due to impulsivity      BATHING Initial Assessment Weekly Progress Assessment 5/7/2018   Functional Level 4 4   Body parts patient bathed Abdomen, Arm, left, Arm, right, Buttocks, Chest, Lower leg and foot, left, Lower leg and foot, right, [place] area, Thigh, left, Thigh, right Abdomen;Arm, left;Arm, right;Buttocks; Chest;Lower leg and foot, left; Lower leg and foot, right;[place] area; Thigh, left; Thigh, right   Comments min A for balance in standing  min A for balance; multiple verbal cues for safety      TUB/SHOWER TRANSFER INDEPENDENCE Initial Assessment Weekly Progress Assessment 5/7/2018 Score 4 3   Comments min A for safety ; HHA  very unstable ambulating out of shower, requiring mod A for balance      UPPER BODY DRESSING/UNDRESSING Initial Assessment Weekly Progress Assessment 5/7/2018   Functional Level 5 5   Items applied/Steps completed Pullover (4 steps) Pullover (4 steps)   Comments setup assist  setup assist      LOWER BODY DRESSING/UNDRESSING Initial Assessment Weekly Progress Assessment 5/7/2018   Functional Level 3 4   Items applied/Steps completed Elastic waist pants (3 steps), Sock, left (1 step), Sock, right (1 step), Underpants (3 steps), Fasten shoe (1 step), Shoe, left (1 step), Shoe, right (1 step) Elastic waist pants (3 steps); Sock, left (1 step); Sock, right (1 step); Underpants (3 steps); Shoe, left (1 step); Shoe, right (1 step); Fasten shoe (1 step)   Comments assist with B socks and shoes; CGA for balance during clothing management up  CGA when standing      TOILETING Initial Assessment Weekly Progress Assessment 5/7/2018   Functional Level 4 4   Comments CGA during clothing management  min A for balance     TOILET TRANSFER INDEPENDENCE Initial Assessment Weekly Progress Assessment 5/7/2018   Transfer score 3 3   Comments lifting assist off toilet  lifting assist off toilet      Plan of Care: Please see Care Plan for updated LTGs. Family Training:  needs to be scheduled     Summary of Progress: Patient has experienced multiple medical setbacks and her HgB is 7.9 today, which may be contributing to poor performance during ADL. Patient is making slow progress with ADL skills, functional transfers, activity tolerance, strength, and coordination. Patient remains very impulsive and is a very high fall risk. Patient with no insight into balance deficits and no apparent righting reactions; patient appears unaware that she is losing her balance and relies on therapist to identify and solve balance deficits during all ADL skills and functional mobility.  Anticipate patient will require 24/7 supervision and constant CGA when ambulating due to balance impairments. Goals have been modified to reflect. Summary of Session: Patient supine in bed on arrival to room and agreeable to OT. Completed ADL; see above for FIMs. Patient required multiple cues for safety throughout session. Patient attempted to sit onto bed while still standing approximately 2 feet away and was unaware that she would have fallen posteriorly into floor if therapist had not intervened. Patient was assisted to sit onto bed with max A. Patient remains very impulsive and requires close supervision at all times. Continue OT POC with focus on ADL/IADL skills, functional transfers, functional mobility, cognition, attention, coordination, strength, static and dynamic balance, and activity tolerance to maximize safety and independence with ADLs and functional transfers.      Joe Dennis MS, OTR/L  5/7/2018

## 2018-05-07 NOTE — PROGRESS NOTES
Pawel Petit MD,   Medical Director  [facility], [address] [facility]  Tel: [phone]       D PROGRESS NOTE    Blue Edgar  Admit Date: 4/30/2018  Admit Diagnosis: left feneseal embolictomy; Physical debility    Subjective     Dizzy this am. A bit more unsteady with OT. Fatigued but great spirits. Smiling.      Objective:     Current Facility-Administered Medications   Medication Dose Route Frequency    0.9% sodium chloride infusion 250 mL  250 mL IntraVENous PRN    acetaminophen (TYLENOL) solution 650 mg  650 mg Oral ONCE    diphenhydrAMINE (BENADRYL) capsule 25 mg  25 mg Oral ONCE    [START ON 5/8/2018] furosemide (LASIX) tablet 10 mg  10 mg Oral DAILY    furosemide (LASIX) injection 40 mg  40 mg IntraVENous ONCE    amLODIPine (NORVASC) tablet 10 mg  10 mg Oral DAILY    SALINE PERIPHERAL FLUSH Q8H soln 5 mL  5 mL InterCATHeter Q8H    amitriptyline (ELAVIL) tablet 10 mg  10 mg Oral QHS    calcium carbonate (OS-CAN) tablet 1,000 mg [elemental]  1,000 mg Oral BID    cholecalciferol (VITAMIN D3) tablet 2,000 Units  2,000 Units Oral DAILY    gabapentin (NEURONTIN) capsule 600 mg  600 mg Oral TID    ferrous sulfate tablet 325 mg  1 Tab Oral BID WITH MEALS    loperamide (IMODIUM) capsule 4 mg  4 mg Oral Q6H PRN    Saccharomyces boulardii (FLORASTOR) capsule 500 mg  500 mg Oral BID    polyethylene glycol (MIRALAX) packet 17 g  17 g Oral DAILY    bisacodyl (DULCOLAX) suppository 10 mg  10 mg Rectal DAILY PRN    phenylephrine suppository 1 Suppository  1 Suppository Rectal TID PRN    acetaminophen (TYLENOL) tablet 500 mg  500 mg Oral Q4H PRN    diphenhydrAMINE (BENADRYL) capsule 25 mg  25 mg Oral Q4H PRN    apixaban (ELIQUIS) tablet 2.5 mg  2.5 mg Oral BID    aspirin delayed-release tablet 81 mg  81 mg Oral DAILY    carvedilol (COREG) tablet 25 mg  25 mg Oral BID WITH MEALS    levothyroxine (SYNTHROID) tablet 25 mcg  25 mcg Oral ACB    lisinopril (PRINIVIL, ZESTRIL) tablet 20 mg  20 mg Oral DAILY    nitroglycerin (NITROSTAT) tablet 0.4 mg  0.4 mg SubLINGual PRN    ondansetron (ZOFRAN ODT) tablet 4 mg  4 mg Oral Q6H PRN    pantoprazole (PROTONIX) tablet 40 mg  40 mg Oral ACB    senna-docusate (PERICOLACE) 8.6-50 mg per tablet 1 Tab  1 Tab Oral QHS    traMADol (ULTRAM) tablet 50 mg  50 mg Oral Q6H PRN     Review of Systems:Denies chest pain, shortness of breath, cough, headache, visual problems, abdominal pain, dysurea, calf pain. Pertinent positives are as noted in the medical records and unremarkable otherwise. Visit Vitals    /52    Pulse 75    Temp 98 °F (36.7 °C)    Resp 14    SpO2 94%        Physical Exam:   General: Alert and age appropriately oriented. No acute cardio respiratory distress. HEENT: Normocephalic,no scleral icterus  Oral mucosa moist without cyanosis   Lungs: Clear to auscultation  bilaterally. Respiration even and unlabored   Heart: Regular rate and rhythm, S1, S2   No  murmurs, clicks, rub or gallops   Abdomen: Soft, non-tender, nondistended. Bowel sounds present. No organomegaly. Genitourinary: defer . Neuromuscular:      Left foot drop otherwise neuro intact  fcs , Ox 3   Skin/extremity: No rashes, no erythema.  No calf tenderness BLE  No edema, DP 1+, feet warm                                                                            Functional Assessment:          Balance  Sitting - Static: Good (unsupported) (05/05/18 1100)  Sitting - Dynamic: Good (unsupported) (05/05/18 1100)  Standing - Static: Fair (05/05/18 1100)  Standing - Dynamic : Impaired (05/05/18 1100)           Toileting  Cues: Visual cues provided;Physical assistance for pants up;Physical assistance for pants down (05/05/18 1352)  Adaptive Equipment: Grab bars;Elevated seat (05/05/18 1352)         [place] [facility] Fall Risk Assessment:  [facility]  Mobility: Ambulates or transfers with assist devices or assistance (05/05/18 2015)  Mobility Interventions: Utilize walker, cane, or other assitive device (05/05/18 2015)  Mentation: Periodic confusion (05/05/18 2015)  Mentation Interventions: Bed/chair exit alarm; Door open when patient unattended (05/05/18 2015)  Medication: Patient receiving anticonvulsants, sedatives(tranquilizers), psychotropics or hypnotics, hypoglycemics, narcotics, sleep aids, antihypertensives, laxatives, or diuretics (05/05/18 2015)  Medication Interventions: Bed/chair exit alarm; Patient to call before getting OOB (05/05/18 2015)  Elimination: Needs assistance with toileting (05/05/18 2015)  Elimination Interventions: Call light in reach (05/05/18 2015)  Prior Fall History: Before admission in past 12 months _home or previous inpatient care) (05/05/18 2015)  History of Falls Interventions: Bed/chair exit alarm; Door open when patient unattended (05/05/18 2015)  Total Score: 5 (05/05/18 2015)  Standard Fall Precautions: Yes (05/01/18 2037)  High Fall Risk: Yes (05/05/18 2015)     Speech Assessment:  Aspiration Signs/Symptoms: None (05/01/18 1305)      Ambulation:  Gait  Distance (ft): 150 Feet (ft) (150ft x 1  100ft x 2) (05/05/18 1100)  Assistive Device: Cecile Mejía, rollator (05/05/18 1100)  Rail Use: Both (05/04/18 1100)     Labs/Studies:  Recent Results (from the past 72 hour(s))   TSH RECEPTOR AB    Collection Time: 05/04/18  9:48 AM   Result Value Ref Range    Thyrotropin Receptor Ab, serum <0.50 0.00 - 1.75 IU/L   OCCULT BLOOD, STOOL    Collection Time: 05/04/18  3:45 PM   Result Value Ref Range    Occult blood, stool NEGATIVE  NEG     SODIUM    Collection Time: 05/05/18  6:53 AM   Result Value Ref Range    Sodium 131 (L) 136 - 145 mmol/L   HGB & HCT    Collection Time: 05/05/18  6:53 AM   Result Value Ref Range    HGB 8.0 (L) 11.7 - 15.4 g/dL    HCT 23.9 (L) 35.8 - 15.9 %   METABOLIC PANEL, BASIC    Collection Time: 05/05/18 12:50 PM   Result Value Ref Range    Sodium 131 (L) 136 - 145 mmol/L    Potassium 4.2 3.5 - 5.1 mmol/L    Chloride 95 (L) 98 - 107 mmol/L    CO2 28 21 - 32 mmol/L    Anion gap 8 7 - 16 mmol/L    Glucose 166 (H) 65 - 100 mg/dL    BUN 18 8 - 23 MG/DL    Creatinine 1.28 (H) 0.6 - 1.0 MG/DL    GFR est AA 50 (L) >60 ml/min/1.73m2    GFR est non-AA 42 (L) >60 ml/min/1.73m2    Calcium 8.0 (L) 8.3 - 10.4 MG/DL   URINALYSIS W/ RFLX MICROSCOPIC    Collection Time: 05/05/18  1:00 PM   Result Value Ref Range    Color YELLOW      Appearance CLEAR      Specific gravity 1.006 1.001 - 1.023      pH (UA) 6.5 5.0 - 9.0      Protein NEGATIVE  NEG mg/dL    Glucose NEGATIVE  mg/dL    Ketone NEGATIVE  NEG mg/dL    Bilirubin NEGATIVE  NEG      Blood NEGATIVE  NEG      Urobilinogen 0.2 0.2 - 1.0 EU/dL    Nitrites NEGATIVE  NEG      Leukocyte Esterase NEGATIVE  NEG     CULTURE, URINE    Collection Time: 05/05/18  1:00 PM   Result Value Ref Range    Special Requests: NO SPECIAL REQUESTS      Culture result: NO GROWTH 2 DAYS     HGB & HCT    Collection Time: 05/06/18  6:50 AM   Result Value Ref Range    HGB 8.4 (L) 11.7 - 15.4 g/dL    HCT 25.0 (L) 35.8 - 69.6 %   METABOLIC PANEL, BASIC    Collection Time: 05/07/18  7:17 AM   Result Value Ref Range    Sodium 134 (L) 136 - 145 mmol/L    Potassium 4.3 3.5 - 5.1 mmol/L    Chloride 97 (L) 98 - 107 mmol/L    CO2 31 21 - 32 mmol/L    Anion gap 6 (L) 7 - 16 mmol/L    Glucose 81 65 - 100 mg/dL    BUN 17 8 - 23 MG/DL    Creatinine 1.16 (H) 0.6 - 1.0 MG/DL    GFR est AA 56 (L) >60 ml/min/1.73m2    GFR est non-AA 47 (L) >60 ml/min/1.73m2    Calcium 8.2 (L) 8.3 - 10.4 MG/DL   HGB & HCT    Collection Time: 05/07/18  7:17 AM   Result Value Ref Range    HGB 7.9 (L) 11.7 - 15.4 g/dL    HCT 23.6 (L) 35.8 - 46.3 %       Assessment:     Problem List as of 5/7/2018  Date Reviewed: 4/30/2018          Codes Class Noted - Resolved    * (Principal)Physical debility ICD-10-CM: R53.81  ICD-9-CM: 799.3  4/30/2018 - Present        Chronic systolic congestive heart failure (HCC) ICD-10-CM: I50.22  ICD-9-CM: 428.22, 428.0  4/26/2018 - Present        Femoral artery occlusion, left (HCC) ICD-10-CM: C72.680  ICD-9-CM: 444.22  4/25/2018 - Present        Type 2 diabetes mellitus with nephropathy ([facility] ICD-10-CM: E11.21  ICD-9-CM: 250.40, 583.81  1/25/2018 - Present        S/P AV noris ablation ICD-10-CM: Z98.890  ICD-9-CM: V45.89  1/16/2018 - Present        Biventricular cardiac pacemaker in situ ICD-10-CM: Z95.0  ICD-9-CM: V45.01  1/16/2018 - Present        Diabetes mellitus, type 2 (HCC) (Chronic) ICD-10-CM: E11.9  ICD-9-CM: 250.00  8/12/2017 - Present        Hypertension (Chronic) ICD-10-CM: I10  ICD-9-CM: 401.9  8/12/2017 - Present    Overview Signed 9/12/2012  3:43 PM by Simon Avelino     Orthostatic hypotension due to autonomic neuropathy             CKD (chronic kidney disease), stage III (Chronic) ICD-10-CM: N18.3  ICD-9-CM: 585.3  8/12/2017 - Present        Dysphagia (Chronic) ICD-10-CM: R13.10  ICD-9-CM: 787.20  8/12/2017 - Present    Overview Signed 4/5/2016  5:24 PM by Radha Oh MD     Due to esophageal spasm seen on modified barium swallow 2015             Atrial fibrillation ([facility] (Chronic) ICD-10-CM: I48.91  ICD-9-CM: 427.31  8/12/2017 - Present        DNR (do not resuscitate) (Chronic) ICD-10-CM: Z66  ICD-9-CM: V49.86  8/12/2017 - Present        Ischemic cardiomyopathy ICD-10-CM: I25.5  ICD-9-CM: 414.8  7/11/2017 - Present        Cardiomyopathy ([facility] ICD-10-CM: I42.9  ICD-9-CM: 425.4  3/30/2017 - Present        Dyspnea ICD-10-CM: R06.00  ICD-9-CM: 786.09  2/25/2017 - Present        Atrial fibrillation with rapid ventricular response ([facility] ICD-10-CM: I48.91  ICD-9-CM: 427.31  2/25/2017 - Present        SSS (sick sinus syndrome) ([facility] ICD-10-CM: I49.5  ICD-9-CM: 427.81  5/5/2016 - Present        Orthostatic hypotension ICD-10-CM: I95.1  ICD-9-CM: 458.0  5/5/2016 - Present        CAD in native artery ICD-10-CM: I25.10  ICD-9-CM: 414.01  5/5/2016 - Present        Pacemaker ICD-10-CM: Z95.0  ICD-9-CM: V45.01 11/10/2015 - Present        Cervical radiculopathy ICD-10-CM: M54.12  ICD-9-CM: 723.4  5/23/2013 - Present    Overview Addendum 8/26/2013  4:59 PM by Alphonso West MD     Chronic right shoulder pain S/P eval Dr Myles Cool POC August 2013, previous cervical spinal fusion             Autonomic neuropathy ICD-10-CM: G90.9  ICD-9-CM: 337.9  1/13/2013 - Present    Overview Signed 1/13/2013  8:18 PM by Alphonso West MD     Severe orthostatic hypotension             MCI (mild cognitive impairment) ICD-10-CM: G31.84  ICD-9-CM: 331.83  1/13/2013 - Present    Overview Addendum 12/16/2013  4:43 PM by Alphonso West MD     MMSE 27/30 Nov 2009, 27/30 Dec 2013, remembers 3/3 objects 5 min later and draws normal clock             Chronic pain syndrome ICD-10-CM: G89.4  ICD-9-CM: 338.4  1/13/2013 - Present    Overview Addendum 11/10/2015  5:15 PM by Alphonso West MD     Back, right shoulder, neck: Peripheral neuropathy, spinal stenosis C7-T1, foraminal narrowing C4-5             Acquired hypothyroidism (Chronic) ICD-10-CM: E03.9  ICD-9-CM: 244.9  9/12/2012 - Present        Mixed hyperlipidemia (Chronic) ICD-10-CM: E78.2  ICD-9-CM: 272.2  9/12/2012 - Present    Overview Addendum 9/12/2012  3:51 PM by Helyn Clonts     With high HDL  Intolerant of pravastatin,lovastatin and lescol             GERD (gastroesophageal reflux disease) (Chronic) ICD-10-CM: K21.9  ICD-9-CM: 530.81  9/12/2012 - Present        IBS (irritable bowel syndrome) (Chronic) ICD-10-CM: K58.9  ICD-9-CM: 564.1  9/12/2012 - Present    Overview Signed 9/12/2012  3:52 PM by Helyn Clonts     Chronic enema abuse  diverticulosis             Gait disorder (Chronic) ICD-10-CM: R26.9  ICD-9-CM: 781.2  9/12/2012 - Present    Overview Signed 9/12/2012  3:53 PM by Abigail Hartmann     Diabetic peripheral neuropathy              Atrial fibrillation with RVR ([facility] ICD-10-CM: I48.91  ICD-9-CM: 427.31  9/12/2012 - Present    Overview Addendum 10/14/2012  9:34 AM by Alphonso West, MD     Paroxysmal.  Coumadin contraindicated due to falls               RESOLVED: RUSS (acute kidney injury) ([facility] ICD-10-CM: N17.9  ICD-9-CM: 584.9  8/13/2017 - 10/19/2017        RESOLVED: Acute respiratory failure with hypoxemia (HCC) ICD-10-CM: J96.01  ICD-9-CM: 518.81  8/12/2017 - 9/15/2017        RESOLVED: CAP (community acquired pneumonia) ICD-10-CM: J18.9  ICD-9-CM: 486  8/12/2017 - 9/15/2017        RESOLVED: CHF (congestive heart failure) ([facility] ICD-10-CM: I50.9  ICD-9-CM: 428.0  3/18/2017 - 4/26/2018        RESOLVED: Altered mental status ICD-10-CM: R41.82  ICD-9-CM: 780.97  3/10/2017 - 10/19/2017        RESOLVED: Altered mental state ICD-10-CM: R41.82  ICD-9-CM: 780.97  3/9/2017 - 10/19/2017        RESOLVED: Chest pain, pleuritic ICD-10-CM: R07.81  ICD-9-CM: 786.52  3/9/2017 - 10/19/2017        RESOLVED: Weight loss ICD-10-CM: R63.4  ICD-9-CM: 783.21  9/2/2014 - 11/10/2015        RESOLVED: Anorexia ICD-10-CM: R63.0  ICD-9-CM: 783.0  9/2/2014 - 10/19/2017        RESOLVED: Iron deficiency anemia, unspecified ICD-10-CM: D50.9  ICD-9-CM: 280.9  9/12/2012 - 4/16/2013    Overview Addendum 1/13/2013  8:21 PM by Monica Chris MD     AVM in the proximal colon                   Plan:                   Assessment: Physical Debility s/p ischemic LLE s/p Left femoral artery embolectomy      Continue daily physician medical management:  Pneumonia prophylaxis- Incentive spirometer every hour while awake. Aspiration precautions      Dysphagia; consider MBS; will have ST eval. HOB > 30d  -5/2 ST eval yesterday, no overt signs of dysphagia/ aspiration      Post op anemia due to blood loss; gi prophylaxis, monitor wound; check stool. Check iron studies as well  -hgb 10.1  -5/2 drop in Hgb to 8.4; significant iron deficiency; start supplement, hemmoc stool; hold aspirin/eliquis until stool comes back ; no gi symptoms to suggest gastritis/ ulcer.  -5/4 staff did not send last stool; pending. hgb now 8.1; asymp.  Denies melena, no hematoma at wound site. Started iron yesterday for severe iron deficiency ; gi consult if stool positive  -5/5 8.0 stool neg; no visible sign of bleeding; abd soft, femoral inc clean, no abd pain or back pain to suggest something internal; daily labs; consult if needed  -5/6 improved at 8.4  -5/7 hgb 7.9, known iron deficiency, likely multifactoria. Reports that she [facility] to [facility] in [facility]; reced Epogen; doesn't know why and cannot find records of such. Will transfuse one unit today. Has hx of a colon AVM but stools neg, no abd pain. Will consult hematology/ at her age, aggressive txs no appropriate      DVT risk / DVT Prophylaxis- Will require daily physician exam to assess for signs and symptoms as patient is at increased risk for of thromboembolism. Mobilization as tolerated. Intermittent pneumatic compression devices when in bed Thigh-high or knee-high thromboembolic deterrent hose when out of bed. Eliquis/ASA; 5/7 hold for now due to anemia of unclear etiology      Cervical stenosis with autonomic dysfxn; orthostatic hypotension; monitor with therapies. Cont TEDs  -5/2 no overt symptoms; bp stable      Pain Control: stable, mild-to-moderate joint symptoms intermittently, reasonably well controlled by PRN meds. Will require regular pain assessment and comprenhensive pain management,   -pt with vascular pain and hypersensitivity to LLE; cont Neurontin, tolerating 600mg tid; does not wake her up at noc; cont ultram as well  -5/2 lethargic, multifactorial; dec neurontin   -5/3 much more alert. Has been on Neurontin for years; can inc back to home dose; lethargy likely due to UTI and poor sleep; monitor; cont ultram and add 10mg Elavil; having burning pain in Left foot/ankle; lacks DP pulse; will see if we can doppler; however, foot warm to touch, no discoloration with nl cap refill  -5/4 left DP pulse found easier today with dec in edema.  No discoloration/ foot warm; 5/6 complains less of pain but still burns in left ankle/foot     Hx of DM; on amaryl in the past, currently nothing; bs 143, change to diab diet; bs improved; check qam only      Hypocalcemia; cont Oscal and add Vit D. Will see when pt had last bone density scan. Improved 8.6 F/u with PCP  -5/2 calcium 8.1 from 8.6, check Vit d ; 5/3 inc supplement  -5/5 vit D levels nl at 41      Wound Care: Monitor wound status daily per staff and physician. At risk for failure. Will require 24/7 rehab nursing. Keep wound clean and dry, Reinforce dressing PRN and Ice to area for comfort      Hypertension - BP uncontrolled, fluctuating, managed medically. Cont Coreg, lasix, zestril   -poorly controlled 5/1 sbp 175-187; however dropped to 112; has hx of orthostatics; monitor with activity  -5/3 bp 170s, add norvasc with parameters; 5/4 150/75; slt improvement; inc Norvasc but watch for edema  -5/5 148/75; will not adjust meds at this time; 5/6 118/66     Hx afib; NSR, rate controlled; cont coreg and eliquis      Hyponatremia; Na 131 5/3 and 5/4; can hold lasix, zestril and coreg contributing but bp not controlled without it, added norvasc; recheck in a.m. ; free water restrict; DC MACRODANTIN; known cause, raghu in elderly on diuretics  -5/5 Na unchanged at 131; monitor; 5/7 improved off macrodantin and holding lasix; 134; restarting lasix 10 mg      RUSS ; monitor labs. Check UA due to [place]. Improving as of 4/28; no labs since that time; may need to consider adjusting Lasix and zestril  -5/1 slt bump in creat from 1.18- 1.28 ; recheck daily. Very little po today, may need IV fluids  -5/2 IVFs overnoc, creat 1.11, improved; 1.16 (1.28); had held lasix but will restart at 10mg      Hypothyroidism; cont synthroid; 5/4 check TSH; normal      Urinary retention/ neurogenic bladder - schedule voids q6-8 hrs. Check post-void residual as needed;  In and Out catheterize if post-void residual is more than 400 cc.  -5/1 urin frq; check UA, check post void residual  -5/3 urine finally sent last pm. 4+ bact, nitrite positive, leuk neg and few bacteria; received one gram IM Rocephin and started on Macrobid; await cx. No dysuria + freq  -5/4 reorder cx; not done; +dysuria, but otherwise asymptomatic x frequency (may be chronic though); no fever, WBC nl; DC macrodantin due to hyponatremia; Rocephin 1g IV x 3d  -5/5 urine cx pending; NEG      bowel program - add prn bowel program  -5/1 impacted, did not sleep. Given suppos and disimpacted for mod amt of stool . No n/v. Limited po today. Labs stable. No gaurding or rebound on exam  -add anusol suppos due to rectal pain and suspected internal hemmor. With blood streaked stool.  -5/3 now just mucus, no occult blood. hbg has dropped; check hemoccult  -5/5 hgb continues to trend down. 8.4, now 8.0 from 8.1 . Stool neg for blood. Wound site of embolectomy clean, no swelling or evidence of hemorrhage; started oral iron due to iron deficiency       GERD - resume PPI. At times may need additional antacids, Maalox prn.             Time spent was 35 minutes with over 1/2 in direct patient care/examination, consultation and coordination of care.      Signed By: Carlos Del Valle MD     May 7, 2018

## 2018-05-08 ENCOUNTER — PATIENT OUTREACH (OUTPATIENT)
Dept: CASE MANAGEMENT | Age: 83
End: 2018-05-08

## 2018-05-08 LAB
ERYTHROCYTE [DISTWIDTH] IN BLOOD BY AUTOMATED COUNT: 13.8 % (ref 11.9–14.6)
HCT VFR BLD AUTO: 29.5 % (ref 35.8–46.3)
HEMOCCULT STL QL: NEGATIVE
HGB BLD-MCNC: 10 G/DL (ref 11.7–15.4)
MCH RBC QN AUTO: 30.3 PG (ref 26.1–32.9)
MCHC RBC AUTO-ENTMCNC: 33.9 G/DL (ref 31.4–35)
MCV RBC AUTO: 89.4 FL (ref 79.6–97.8)
PLATELET # BLD AUTO: 331 K/UL (ref 150–450)
PMV BLD AUTO: 9.6 FL (ref 10.8–14.1)
RBC # BLD AUTO: 3.3 M/UL (ref 4.05–5.25)
WBC # BLD AUTO: 5.4 K/UL (ref 4.3–11.1)

## 2018-05-08 PROCEDURE — 74011250637 HC RX REV CODE- 250/637: Performed by: PHYSICAL MEDICINE & REHABILITATION

## 2018-05-08 PROCEDURE — 97116 GAIT TRAINING THERAPY: CPT

## 2018-05-08 PROCEDURE — 65310000000 HC RM PRIVATE REHAB

## 2018-05-08 PROCEDURE — 97530 THERAPEUTIC ACTIVITIES: CPT

## 2018-05-08 PROCEDURE — 99232 SBSQ HOSP IP/OBS MODERATE 35: CPT | Performed by: PHYSICAL MEDICINE & REHABILITATION

## 2018-05-08 PROCEDURE — 36415 COLL VENOUS BLD VENIPUNCTURE: CPT | Performed by: PHYSICAL MEDICINE & REHABILITATION

## 2018-05-08 PROCEDURE — 97110 THERAPEUTIC EXERCISES: CPT

## 2018-05-08 PROCEDURE — 85027 COMPLETE CBC AUTOMATED: CPT | Performed by: PHYSICAL MEDICINE & REHABILITATION

## 2018-05-08 PROCEDURE — 97535 SELF CARE MNGMENT TRAINING: CPT

## 2018-05-08 PROCEDURE — 82272 OCCULT BLD FECES 1-3 TESTS: CPT | Performed by: PHYSICAL MEDICINE & REHABILITATION

## 2018-05-08 RX ADMIN — Medication 1000 MG: at 09:04

## 2018-05-08 RX ADMIN — TRAMADOL HYDROCHLORIDE 50 MG: 50 TABLET, FILM COATED ORAL at 09:38

## 2018-05-08 RX ADMIN — GABAPENTIN 600 MG: 300 CAPSULE ORAL at 05:11

## 2018-05-08 RX ADMIN — PANTOPRAZOLE SODIUM 40 MG: 40 TABLET, DELAYED RELEASE ORAL at 05:12

## 2018-05-08 RX ADMIN — AMLODIPINE BESYLATE 10 MG: 10 TABLET ORAL at 09:01

## 2018-05-08 RX ADMIN — CARVEDILOL 25 MG: 25 TABLET, FILM COATED ORAL at 17:40

## 2018-05-08 RX ADMIN — FUROSEMIDE 10 MG: 20 TABLET ORAL at 09:00

## 2018-05-08 RX ADMIN — TRAMADOL HYDROCHLORIDE 50 MG: 50 TABLET, FILM COATED ORAL at 18:36

## 2018-05-08 RX ADMIN — Medication 500 MG: at 17:40

## 2018-05-08 RX ADMIN — APIXABAN 2.5 MG: 2.5 TABLET, FILM COATED ORAL at 09:00

## 2018-05-08 RX ADMIN — LISINOPRIL 20 MG: 20 TABLET ORAL at 09:01

## 2018-05-08 RX ADMIN — CARVEDILOL 25 MG: 25 TABLET, FILM COATED ORAL at 09:04

## 2018-05-08 RX ADMIN — AMITRIPTYLINE HYDROCHLORIDE 10 MG: 10 TABLET, FILM COATED ORAL at 20:33

## 2018-05-08 RX ADMIN — APIXABAN 2.5 MG: 2.5 TABLET, FILM COATED ORAL at 20:33

## 2018-05-08 RX ADMIN — GABAPENTIN 600 MG: 300 CAPSULE ORAL at 21:49

## 2018-05-08 RX ADMIN — FERROUS SULFATE TAB 325 MG (65 MG ELEMENTAL FE) 325 MG: 325 (65 FE) TAB at 09:04

## 2018-05-08 RX ADMIN — GABAPENTIN 600 MG: 300 CAPSULE ORAL at 14:49

## 2018-05-08 RX ADMIN — FERROUS SULFATE TAB 325 MG (65 MG ELEMENTAL FE) 325 MG: 325 (65 FE) TAB at 17:40

## 2018-05-08 RX ADMIN — Medication 1000 MG: at 17:40

## 2018-05-08 RX ADMIN — VITAMIN D, TAB 1000IU (100/BT) 2000 UNITS: 25 TAB at 09:00

## 2018-05-08 RX ADMIN — ASPIRIN 81 MG: 81 TABLET, COATED ORAL at 09:00

## 2018-05-08 RX ADMIN — Medication 500 MG: at 09:00

## 2018-05-08 RX ADMIN — LEVOTHYROXINE SODIUM 25 MCG: 50 TABLET ORAL at 05:12

## 2018-05-08 NOTE — PROGRESS NOTES
Kathy Sellers MD,   Medical Director  [address] [facility], [address] [facility]  Tel: [phone]       [facility] PROGRESS NOTE    Nneka Marion  Admit Date: 4/30/2018  Admit Diagnosis: left feneseal embolictomy; Physical debility    Subjective     Doing fine. No new complaints. Was walking from toilet last noc and CNA said she heard a \"pop\" and pt complained on knee pain; denies such this morning.  Her only pain is the same pain she's had in her left ankle    Objective:     Current Facility-Administered Medications   Medication Dose Route Frequency    0.9% sodium chloride infusion 250 mL  250 mL IntraVENous PRN    furosemide (LASIX) tablet 10 mg  10 mg Oral DAILY    amLODIPine (NORVASC) tablet 10 mg  10 mg Oral DAILY    SALINE PERIPHERAL FLUSH Q8H soln 5 mL  5 mL InterCATHeter Q8H    amitriptyline (ELAVIL) tablet 10 mg  10 mg Oral QHS    calcium carbonate (OS-CAN) tablet 1,000 mg [elemental]  1,000 mg Oral BID    cholecalciferol (VITAMIN D3) tablet 2,000 Units  2,000 Units Oral DAILY    gabapentin (NEURONTIN) capsule 600 mg  600 mg Oral TID    ferrous sulfate tablet 325 mg  1 Tab Oral BID WITH MEALS    loperamide (IMODIUM) capsule 4 mg  4 mg Oral Q6H PRN    Saccharomyces boulardii (FLORASTOR) capsule 500 mg  500 mg Oral BID    polyethylene glycol (MIRALAX) packet 17 g  17 g Oral DAILY    bisacodyl (DULCOLAX) suppository 10 mg  10 mg Rectal DAILY PRN    phenylephrine suppository 1 Suppository  1 Suppository Rectal TID PRN    acetaminophen (TYLENOL) tablet 500 mg  500 mg Oral Q4H PRN    diphenhydrAMINE (BENADRYL) capsule 25 mg  25 mg Oral Q4H PRN    apixaban (ELIQUIS) tablet 2.5 mg  2.5 mg Oral BID    aspirin delayed-release tablet 81 mg  81 mg Oral DAILY    carvedilol (COREG) tablet 25 mg  25 mg Oral BID WITH MEALS    levothyroxine (SYNTHROID) tablet 25 mcg  25 mcg Oral ACB    lisinopril (PRINIVIL, ZESTRIL) tablet 20 mg  20 mg Oral DAILY    nitroglycerin (NITROSTAT) tablet 0.4 mg  0.4 mg SubLINGual PRN    ondansetron (ZOFRAN ODT) tablet 4 mg  4 mg Oral Q6H PRN    pantoprazole (PROTONIX) tablet 40 mg  40 mg Oral ACB    senna-docusate (PERICOLACE) 8.6-50 mg per tablet 1 Tab  1 Tab Oral QHS    traMADol (ULTRAM) tablet 50 mg  50 mg Oral Q6H PRN     Review of Systems:Denies chest pain, shortness of breath, cough, headache, visual problems, abdominal pain, dysurea, calf pain. Pertinent positives are as noted in the medical records and unremarkable otherwise. Visit Vitals    /69    Pulse 76    Temp 98.3 °F (36.8 °C)    Resp 18    SpO2 94%        Physical Exam:   General: Alert and age appropriately oriented. No acute cardio respiratory distress. frail   HEENT: Normocephalic,no scleral icterus  Oral mucosa moist without cyanosis   Lungs: Clear to auscultation  bilaterally. Respiration even and unlabored   Heart: Regular rate and rhythm, S1, S2   No  murmurs, clicks, rub or gallops   Abdomen: Soft, non-tender, nondistended. Bowel sounds present. No organomegaly. Genitourinary: defer. Neuromuscular:      Generalized weakness throughout  Left foot drop unchanged  sens intact. Skin/extremity: No rashes, no erythema.  No calf tenderness BLE  Wound left groin c/d/i; no edema                                                                            Functional Assessment:          Balance  Sitting - Static: Good (unsupported) (05/05/18 1100)  Sitting - Dynamic: Good (unsupported) (05/05/18 1100)  Standing - Static: Fair (05/05/18 1100)  Standing - Dynamic : Impaired (05/05/18 1100)           Toileting  Cues: Visual cues provided;Physical assistance for pants up;Physical assistance for pants down (05/05/18 1352)  Adaptive Equipment: Grab bars;Elevated seat (05/05/18 1352)         Zofia Masters Fall Risk Assessment:  Zofia Masters Fall Risk  Mobility: Ambulates or transfers with assist devices or assistance (05/07/18 2037)  Mobility Interventions: Utilize walker, cane, or other assitive device (05/07/18 2037)  Mentation: Periodic confusion (05/07/18 2037)  Mentation Interventions: Bed/chair exit alarm (05/07/18 2037)  Medication: Patient receiving anticonvulsants, sedatives(tranquilizers), psychotropics or hypnotics, hypoglycemics, narcotics, sleep aids, antihypertensives, laxatives, or diuretics (05/07/18 2037)  Medication Interventions: Bed/chair exit alarm (05/07/18 2037)  Elimination: Needs assistance with toileting (05/07/18 2037)  Elimination Interventions: Call light in reach (05/07/18 2037)  Prior Fall History: Before admission in past 12 months _home or previous inpatient care) (05/07/18 2037)  History of Falls Interventions: Bed/chair exit alarm (05/07/18 2037)  Total Score: 5 (05/07/18 2037)  Standard Fall Precautions: Yes (05/01/18 2037)  High Fall Risk: Yes (05/07/18 2037)     Speech Assessment:  Aspiration Signs/Symptoms: None (05/01/18 1305)      Ambulation:  Gait  Distance (ft): 150 Feet (ft) (150ft x 1  100ft x 2) (05/05/18 1100)  Assistive Device: Mosetta Christoval, rollator (05/05/18 1100)  Rail Use: Both (05/04/18 1100)     Labs/Studies:  Recent Results (from the past 72 hour(s))   METABOLIC PANEL, BASIC    Collection Time: 05/05/18 12:50 PM   Result Value Ref Range    Sodium 131 (L) 136 - 145 mmol/L    Potassium 4.2 3.5 - 5.1 mmol/L    Chloride 95 (L) 98 - 107 mmol/L    CO2 28 21 - 32 mmol/L    Anion gap 8 7 - 16 mmol/L    Glucose 166 (H) 65 - 100 mg/dL    BUN 18 8 - 23 MG/DL    Creatinine 1.28 (H) 0.6 - 1.0 MG/DL    GFR est AA 50 (L) >60 ml/min/1.73m2    GFR est non-AA 42 (L) >60 ml/min/1.73m2    Calcium 8.0 (L) 8.3 - 10.4 MG/DL   URINALYSIS W/ RFLX MICROSCOPIC    Collection Time: 05/05/18  1:00 PM   Result Value Ref Range    Color YELLOW      Appearance CLEAR      Specific gravity 1.006 1.001 - 1.023      pH (UA) 6.5 5.0 - 9.0      Protein NEGATIVE  NEG mg/dL    Glucose NEGATIVE  mg/dL    Ketone NEGATIVE  NEG mg/dL    Bilirubin NEGATIVE  NEG      Blood NEGATIVE  NEG Urobilinogen 0.2 0.2 - 1.0 EU/dL    Nitrites NEGATIVE  NEG      Leukocyte Esterase NEGATIVE  NEG     CULTURE, URINE    Collection Time: 05/05/18  1:00 PM   Result Value Ref Range    Special Requests: NO SPECIAL REQUESTS      Culture result: NO GROWTH 2 DAYS     HGB & HCT    Collection Time: 05/06/18  6:50 AM   Result Value Ref Range    HGB 8.4 (L) 11.7 - 15.4 g/dL    HCT 25.0 (L) 35.8 - 16.8 %   METABOLIC PANEL, BASIC    Collection Time: 05/07/18  7:17 AM   Result Value Ref Range    Sodium 134 (L) 136 - 145 mmol/L    Potassium 4.3 3.5 - 5.1 mmol/L    Chloride 97 (L) 98 - 107 mmol/L    CO2 31 21 - 32 mmol/L    Anion gap 6 (L) 7 - 16 mmol/L    Glucose 81 65 - 100 mg/dL    BUN 17 8 - 23 MG/DL    Creatinine 1.16 (H) 0.6 - 1.0 MG/DL    GFR est AA 56 (L) >60 ml/min/1.73m2    GFR est non-AA 47 (L) >60 ml/min/1.73m2    Calcium 8.2 (L) 8.3 - 10.4 MG/DL   HGB & HCT    Collection Time: 05/07/18  7:17 AM   Result Value Ref Range    HGB 7.9 (L) 11.7 - 15.4 g/dL    HCT 23.6 (L) 35.8 - 46.3 %   TYPE + CROSSMATCH    Collection Time: 05/07/18 11:14 AM   Result Value Ref Range    Crossmatch Expiration 05/10/2018     ABO/Rh(D) O POSITIVE     Antibody screen NEG     Unit number [id number]     Blood component type [facility]     Unit division 00     Status of unit ISSUED     Crossmatch result Compatible    URINALYSIS W/ RFLX MICROSCOPIC    Collection Time: 05/07/18  9:25 PM   Result Value Ref Range    Color YELLOW      Appearance CLEAR      Specific gravity 1.010 1.001 - 1.023      pH (UA) 6.0 5.0 - 9.0      Protein NEGATIVE  NEG mg/dL    Glucose NEGATIVE  NEG mg/dL    Ketone NEGATIVE  NEG mg/dL    Bilirubin NEGATIVE  NEG      Blood NEGATIVE  NEG      Urobilinogen 0.2 0.2 - 1.0 EU/dL    Nitrites NEGATIVE  NEG      Leukocyte Esterase NEGATIVE  NEG      Bacteria 0 0 /hpf   CBC W/O DIFF    Collection Time: 05/08/18  6:09 AM   Result Value Ref Range    WBC 5.4 4.3 - 11.1 K/uL    RBC 3.30 (L) 4.05 - 5.25 M/uL    HGB 10.0 (L) 11.7 - 15.4 g/dL    HCT 29.5 (L) 35.8 - 46.3 %    MCV 89.4 79.6 - 97.8 FL    MCH 30.3 26.1 - 32.9 PG    MCHC 33.9 31.4 - 35.0 g/dL    RDW 13.8 11.9 - 14.6 %    PLATELET 356 257 - 980 K/uL    MPV 9.6 (L) 10.8 - 14.1 FL       Assessment:     Problem List as of 5/8/2018  Date Reviewed: 4/30/2018          Codes Class Noted - Resolved    * (Principal)Physical debility ICD-10-CM: R53.81  ICD-9-CM: 799.3  4/30/2018 - Present        Chronic systolic congestive heart failure (HCC) ICD-10-CM: I50.22  ICD-9-CM: 428.22, 428.0  4/26/2018 - Present        Femoral artery occlusion, left (HCC) ICD-10-CM: U71.239  ICD-9-CM: 444.22  4/25/2018 - Present        Type 2 diabetes mellitus with nephropathy ([facility] ICD-10-CM: E11.21  ICD-9-CM: 250.40, 583.81  1/25/2018 - Present        S/P AV noris ablation ICD-10-CM: Z98.890  ICD-9-CM: V45.89  1/16/2018 - Present        Biventricular cardiac pacemaker in situ ICD-10-CM: Z95.0  ICD-9-CM: V45.01  1/16/2018 - Present        Diabetes mellitus, type 2 (HCC) (Chronic) ICD-10-CM: E11.9  ICD-9-CM: 250.00  8/12/2017 - Present        Hypertension (Chronic) ICD-10-CM: I10  ICD-9-CM: 401.9  8/12/2017 - Present    Overview Signed 9/12/2012  3:43 PM by Gege Paula     Orthostatic hypotension due to autonomic neuropathy             CKD (chronic kidney disease), stage III (Chronic) ICD-10-CM: N18.3  ICD-9-CM: 585.3  8/12/2017 - Present        Dysphagia (Chronic) ICD-10-CM: R13.10  ICD-9-CM: 787.20  8/12/2017 - Present    Overview Signed 4/5/2016  5:24 PM by Leila Benitez MD     Due to esophageal spasm seen on modified barium swallow 2015             Atrial fibrillation ([facility] (Chronic) ICD-10-CM: I48.91  ICD-9-CM: 427.31  8/12/2017 - Present        DNR (do not resuscitate) (Chronic) ICD-10-CM: P34  ICD-9-CM: V49.86  8/12/2017 - Present        Ischemic cardiomyopathy ICD-10-CM: I25.5  ICD-9-CM: 414.8  7/11/2017 - Present        Cardiomyopathy ([facility] ICD-10-CM: I42.9  ICD-9-CM: 425.4  3/30/2017 - Present        Dyspnea ICD-10-CM: R06.00  ICD-9-CM: 786.09  2/25/2017 - Present        Atrial fibrillation with rapid ventricular response ([facility] ICD-10-CM: I48.91  ICD-9-CM: 427.31  2/25/2017 - Present        SSS (sick sinus syndrome) ([facility] ICD-10-CM: I49.5  ICD-9-CM: 427.81  5/5/2016 - Present        Orthostatic hypotension ICD-10-CM: I95.1  ICD-9-CM: 458.0  5/5/2016 - Present        CAD in native artery ICD-10-CM: I25.10  ICD-9-CM: 414.01  5/5/2016 - Present        Pacemaker ICD-10-CM: Z95.0  ICD-9-CM: V45.01  11/10/2015 - Present        Cervical radiculopathy ICD-10-CM: M54.12  ICD-9-CM: 723.4  5/23/2013 - Present    Overview Addendum 8/26/2013  4:59 PM by Zuhair Fortune MD     Chronic right shoulder pain S/P eval Dr Sandee Ashby POC August 2013, previous cervical spinal fusion             Autonomic neuropathy ICD-10-CM: G90.9  ICD-9-CM: 337.9  1/13/2013 - Present    Overview Signed 1/13/2013  8:18 PM by Zuhair Fortune MD     Severe orthostatic hypotension             MCI (mild cognitive impairment) ICD-10-CM: G31.84  ICD-9-CM: 331.83  1/13/2013 - Present    Overview Addendum 12/16/2013  4:43 PM by Zuhair Fortune MD     MMSE 27/30 Nov 2009, 27/30 Dec 2013, remembers 3/3 objects 5 min later and draws normal clock             Chronic pain syndrome ICD-10-CM: G89.4  ICD-9-CM: 338.4  1/13/2013 - Present    Overview Addendum 11/10/2015  5:15 PM by Zuhair Fortune MD     Back, right shoulder, neck: Peripheral neuropathy, spinal stenosis C7-T1, foraminal narrowing C4-5             Acquired hypothyroidism (Chronic) ICD-10-CM: E03.9  ICD-9-CM: 244.9  9/12/2012 - Present        Mixed hyperlipidemia (Chronic) ICD-10-CM: K91.0  ICD-9-CM: 272.2  9/12/2012 - Present    Overview Addendum 9/12/2012  3:51 PM by Barry Elizabeth     With high HDL  Intolerant of pravastatin,lovastatin and lescol             GERD (gastroesophageal reflux disease) (Chronic) ICD-10-CM: K21.9  ICD-9-CM: 530.81  9/12/2012 - Present        IBS (irritable bowel syndrome) (Chronic) ICD-10-CM: K58.9  ICD-9-CM: 564.1  9/12/2012 - Present    Overview Signed 9/12/2012  3:52 PM by Owen Tristanist     Chronic enema abuse  diverticulosis             Gait disorder (Chronic) ICD-10-CM: R26.9  ICD-9-CM: 781.2  9/12/2012 - Present    Overview Signed 9/12/2012  3:53 PM by Owen Tristanist     Diabetic peripheral neuropathy              Atrial fibrillation with RVR ([facility] ICD-10-CM: I48.91  ICD-9-CM: 427.31  9/12/2012 - Present    Overview Addendum 10/14/2012  9:34 AM by Daria Turcios MD     Paroxysmal.  Coumadin contraindicated due to falls               RESOLVED: RUSS (acute kidney injury) ([facility] ICD-10-CM: N17.9  ICD-9-CM: 584.9  8/13/2017 - 10/19/2017        RESOLVED: Acute respiratory failure with hypoxemia (HCC) ICD-10-CM: J96.01  ICD-9-CM: 518.81  8/12/2017 - 9/15/2017        RESOLVED: CAP (community acquired pneumonia) ICD-10-CM: J18.9  ICD-9-CM: 486  8/12/2017 - 9/15/2017        RESOLVED: CHF (congestive heart failure) ([facility] ICD-10-CM: I50.9  ICD-9-CM: 428.0  3/18/2017 - 4/26/2018        RESOLVED: Altered mental status ICD-10-CM: R41.82  ICD-9-CM: 780.97  3/10/2017 - 10/19/2017        RESOLVED: Altered mental state ICD-10-CM: R41.82  ICD-9-CM: 780.97  3/9/2017 - 10/19/2017        RESOLVED: Chest pain, pleuritic ICD-10-CM: R07.81  ICD-9-CM: 786.52  3/9/2017 - 10/19/2017        RESOLVED: Weight loss ICD-10-CM: R63.4  ICD-9-CM: 783.21  9/2/2014 - 11/10/2015        RESOLVED: Anorexia ICD-10-CM: R63.0  ICD-9-CM: 783.0  9/2/2014 - 10/19/2017        RESOLVED: Iron deficiency anemia, unspecified ICD-10-CM: D50.9  ICD-9-CM: 280.9  9/12/2012 - 4/16/2013    Overview Addendum 1/13/2013  8:21 PM by MD RUBIA Giron in the proximal colon                 Assessment: Physical Debility s/p ischemic LLE s/p Left femoral artery embolectomy      Continue daily physician medical management:  Pneumonia prophylaxis- Incentive spirometer every hour while awake.  Aspiration precautions      Dysphagia; consider MBS; will have ST eval. HOB > 30d  -5/2 ST eval yesterday, no overt signs of dysphagia/ aspiration      Post op anemia due to blood loss; gi prophylaxis, monitor wound; check stool. Check iron studies as well  -hgb 10.1  -5/2 drop in Hgb to 8.4; significant iron deficiency; start supplement, hemmoc stool; hold aspirin/eliquis until stool comes back ; no gi symptoms to suggest gastritis/ ulcer.  -5/4 staff did not send last stool; pending. hgb now 8.1; asymp. Denies melena, no hematoma at wound site. Started iron yesterday for severe iron deficiency ; gi consult if stool positive  -5/5 8.0 stool neg; no visible sign of bleeding; abd soft, femoral inc clean, no abd pain or back pain to suggest something internal; daily labs; consult if needed  -5/6 improved at 8.4  -5/7 hgb 7.9, known iron deficiency, likely multifactoria. Reports that she went to the [facility] in the past; reced Epogen; doesn't know why and cannot find records of such. Will transfuse one unit today. Has hx of a colon AVM but stools neg, no abd pain. Will consult hematology/ at her age, aggressive txs no appropriate  -5/8 hgb 10 s/p one unit; will watch trend; discussed with dtg over phone      DVT risk / DVT Prophylaxis- Will require daily physician exam to assess for signs and symptoms as patient is at increased risk for of thromboembolism. Mobilization as tolerated. Intermittent pneumatic compression devices when in bed Thigh-high or knee-high thromboembolic deterrent hose when out of bed. Eliquis/ASA; 5/7 hold for now due to anemia of unclear etiology  -resume eliquis and baby aspirin; needs due to severe PVD      Cervical stenosis with autonomic dysfxn; orthostatic hypotension; monitor with therapies. Cont TEDs  -5/2 no overt symptoms; bp stable      Pain Control: stable, mild-to-moderate joint symptoms intermittently, reasonably well controlled by PRN meds.  Will require regular pain assessment and comprenhensive pain management,   -pt with vascular pain and hypersensitivity to LLE; cont Neurontin, tolerating 600mg tid; does not wake her up at noc; cont ultram as well  -5/2 lethargic, multifactorial; dec neurontin   -5/3 much more alert. Has been on Neurontin for years; can inc back to home dose; lethargy likely due to UTI and poor sleep; monitor; cont ultram and add 10mg Elavil; having burning pain in Left foot/ankle; lacks DP pulse; will see if we can doppler; however, foot warm to touch, no discoloration with nl cap refill  -5/4 left DP pulse found easier today with dec in edema. No discoloration/ foot warm; 5/6 complains less of pain but still burns in left ankle/foot  -5/8 cont Neurontin; seems to complain less of left ankle paresthesias      Hx of DM; on amaryl in the past, currently nothing; bs 143, change to diab diet; bs improved; check qam only      Hypocalcemia; cont Oscal and add Vit D. Will see when pt had last bone density scan. Improved 8.6 F/u with PCP  -5/2 calcium 8.1 from 8.6, check Vit d ; 5/3 inc supplement  -5/5 vit D levels nl at 41      Wound Care: Monitor wound status daily per staff and physician. At risk for failure. Will require 24/7 rehab nursing. Keep wound clean and dry, Reinforce dressing PRN and Ice to area for comfort      Hypertension - BP uncontrolled, fluctuating, managed medically. Cont Coreg, lasix, zestril   -poorly controlled 5/1 sbp 175-187; however dropped to 112; has hx of orthostatics; monitor with activity  -5/3 bp 170s, add norvasc with parameters; 5/4 150/75; slt improvement; inc Norvasc but watch for edema  -5/5 148/75; will not adjust meds at this time; 5/6 118/66      Hx afib; NSR, rate controlled; cont coreg and eliquis      Hyponatremia;  Na 131 5/3 and 5/4; can hold lasix, zestril and coreg contributing but bp not controlled without it, added norvasc; recheck in a.m. ; free water restrict; DC MACRODANTIN; known cause, raghu in elderly on diuretics  -5/5 Na unchanged at 131; monitor; 5/7 improved off macrodantin and holding lasix; 134; restarting lasix 10 mg      RUSS ; monitor labs. Check UA due to [place]. Improving as of 4/28; no labs since that time; may need to consider adjusting Lasix and zestril  -5/1 slt bump in creat from 1.18- 1.28 ; recheck daily. Very little po today, may need IV fluids  -5/2 IVFs overnoc, creat 1.11, improved; 1.16 (1.28); had held lasix but will restart at 10mg      Hypothyroidism; cont synthroid; 5/4 check TSH; normal      Urinary retention/ neurogenic bladder - schedule voids q6-8 hrs. Check post-void residual as needed; In and Out catheterize if post-void residual is more than 400 cc.  -5/1 urin frq; check UA, check post void residual  -5/3 urine finally sent last pm. 4+ bact, nitrite positive, leuk neg and few bacteria; received one gram IM Rocephin and started on Macrobid; await cx. No dysuria + freq  -5/4 reorder cx; not done; +dysuria, but otherwise asymptomatic x frequency (may be chronic though); no fever, WBC nl; DC macrodantin due to hyponatremia; Rocephin 1g IV x 3d  -5/5 urine cx pending; NEG      bowel program - add prn bowel program  -5/1 impacted, did not sleep. Given suppos and disimpacted for mod amt of stool . No n/v. Limited po today. Labs stable. No gaurding or rebound on exam  -add anusol suppos due to rectal pain and suspected internal hemmor. With blood streaked stool.  -5/3 now just mucus, no occult blood. hbg has dropped; check hemoccult  -5/5 hgb continues to trend down. 8.4, now 8.0 from 8.1 . Stool neg for blood. Wound site of embolectomy clean, no swelling or evidence of hemorrhage; started oral iron due to iron deficiency       GERD - resume PPI. At times may need additional antacids, Maalox prn.          Time spent was 25 minutes with over 1/2 in direct patient care/examination, consultation and coordination of care.      Signed By: Margie Menezes MD     May 8, 2018

## 2018-05-08 NOTE — PROGRESS NOTES
CNA reports upon returning pt to bed after restroom, pt bent over to  sock on the floor, and heard her left knee \"pop\" nurse checked on pt, pt stated its better now. Pt resting in bed, no distress noted.

## 2018-05-08 NOTE — PROGRESS NOTES
PHYSICAL THERAPY DAILY NOTE  Time In: 5663  Time Out: 1421  Patient Seen For: PM;Balance activities; Therapeutic exercise;Transfer training;Gait training    Subjective: Pt. Reports her leg is feeling better this afternoon. Asleep upon arrival w/ increased time required to awaken. Objective: Other (comment) (has pacemaker; fall risk )    BED/MAT MOBILITY Daily Assessment    Supine to Sit : 6 (Modified independent)  Sit to Supine : 6 (Modified independent)       TRANSFERS Daily Assessment    Transfer Type: SPT without device  Transfer Assistance : 4 (Contact guard assistance)  Sit to Stand Assistance: Supervision       GAIT Daily Assessment   Flexed posture, VCs to pay attention to L foot drag, w/ posterior LOB noted Amount of Assistance: 4 (Minimal assistance)  Distance (ft): 160 Feet (ft)  Assistive Device: Walker, rollator       BALANCE Daily Assessment   Pt. Performed dynamic standing balance activity involving reaching out of GAEL & moving rapidly in another direction to challenge righting reactions. Pt. Required min A w/ posterior LOB as well as VCs due to impulsivity. Sitting - Static: Good (unsupported)  Sitting - Dynamic: Good (unsupported)  Standing - Static: Fair  Standing - Dynamic : Impaired       LOWER EXTREMITY EXERCISES Daily Assessment    Pt. Performed NuStep @ resistance level 1 x10 minutes to increase strength & endurance B LEs     Vital Signs: /69  Pulse 76  Temp 98.3 °F (36.8 °C)  Resp 18  SpO2 94%    Pain level: Pt. Reports pain L LE is \"much better\" this afternoon  Pain interventions: elevated L LE during rest breaks, diversional activities    Patient education: pt. Educated on purpose of NuStep    Interdisciplinary Communication: collaborated w/ OT regarding pt's progress    Pt. Left up in recliner in NAD, call bell in reach, chair alarm activated. Assessment: Pt.  Initially sleepy this PM, but demonstrates better participation w/ functional tasks rather than exercise based tasks. Pt. Cont. To demonstrate decreased safety awareness & decreased balance so benefits from cont. PT services to decreased falls risk. Plan of Care: Continue with POC and progress as tolerated.      Tawny Daniel, PT  5/8/2018

## 2018-05-08 NOTE — PROGRESS NOTES
Problem: Falls - Risk of  Goal: *Absence of Falls  Document Jose Fall Risk and appropriate interventions in the flowsheet.    Outcome: Progressing Towards Goal  Fall Risk Interventions:  Mobility Interventions: Utilize walker, cane, or other assitive device, Patient to call before getting OOB    Mentation Interventions: Bed/chair exit alarm, Door open when patient unattended    Medication Interventions: Patient to call before getting OOB, Teach patient to arise slowly    Elimination Interventions: Bed/chair exit alarm, Call light in reach, Patient to call for help with toileting needs, Toileting schedule/hourly rounds    History of Falls Interventions: Bed/chair exit alarm, Consult care management for discharge planning, Door open when patient unattended

## 2018-05-08 NOTE — PROGRESS NOTES
05/08/18 0828   Time Spent With Patient   Time In 0745   Time Out 0830   Patient Seen For: AM;ADLs   Feeding/Eating   Feeding/Eating Assistance S   Comments Setup in recliner at tray table   Grooming   Grooming Assistance  S   Comments Setup for materials   Upper Body Bathing   Bathing Assistance, Upper S   Position Performed Seated in chair   [address] Other (comment)  (Chair at sink)   Lower Body Bathing   Bathing Assistance, Lower  CGA   Position Performed Seated in chair;Standing   [address] Other (comment)  (Chair at sink)   Comments CGA in stance, cue to stabilize on sink counter for balance   Toileting   [address] (FIM Score) Min A   Adaptive Equipment Grab bars   Upper Body Dressing    Dressing Assistance  S   Comments Setup   Lower Body Dressing    Dressing Assistance  Min A   Leg Crossed Method Used Yes   Position Performed Seated in chair;Standing   Comments Assist to tighten L shoe laces to enable shoe tying, CGA in stance for pants on/off hips   Functional Transfers   Toilet Transfer  Grab bars;Stand pivot transfer without device   Amount of Assistance Required Min A       S: \"This L leg is sore. \" Agreeable to therapy. Focus of session was on ADL and functional mobility. Patient was able to ambulate ~30 feet without an AD, minimal assist for balance. Note after requiring maximal cues to awaken, patient impulsively transferred OOB and initiated ambulating to toilet requiring re-education regarding need from assistance from staff at this time for safety with functional mobility. Patient needs further reinforcement. Pain in LLE not rated, therapy to tolerance. Collaborated with PT, Luanne Mercado and confirmed patient is on track to reach goals as documented in the care plan.    Patient tolerated session well, but activity tolerance, balance, functional mobility, safety awareness, cognition, strength, coordination are still below baseline and require skilled facilitation to successfully and safely complete ADL's and transfers. Patient ended session in recliner with breakfast, call remote and phone within reach.      Sondra Stanton OTR/L

## 2018-05-08 NOTE — PROGRESS NOTES
Problem: Falls - Risk of  Goal: *Absence of Falls  Document Jose Fall Risk and appropriate interventions in the flowsheet.    Outcome: Progressing Towards Goal  Fall Risk Interventions:  Mobility Interventions: Utilize walker, cane, or other assitive device    Mentation Interventions: Bed/chair exit alarm    Medication Interventions: Bed/chair exit alarm    Elimination Interventions: Call light in reach    History of Falls Interventions: Bed/chair exit alarm

## 2018-05-08 NOTE — PROGRESS NOTES
End Of Shift Functional Summary, Nursing      TOILETING:    Does patient need assist with adjusting pants up or down and/or pericare? yes  If yes, please indicate what the patient needs help with: pants down up  Pt uses grab bars. Does the patient require extra time? yes  Does the patient require standby assistance? yes  Does the patient require contact guard assistance? yes  Does the patient require more than one staff member for assistance? no    TOILET TRANSFER:    Pt requires minimal assistance. Pt uses grab bars. Does the patient require extra time? yes  Does the patient require contact guard assistance? yes  Does the patient require more than one staff member for assistance? no    BLADDER:    Pt does not have a temple[place] catheter that staff manages. Pt does not take medication. Pt is continent. of bladder and voids in toilet has had 0 bladder accidents during this shift   BOWEL:  Pt does take medication. Pt is continent of bowel and uses toilet. Pt has had 0 bowel accidents during this shift   BED/CHAIR TRANSFER  Pt requires minimal assistance. Does the patient require extra time? yes  Does the patient require contact guard assistance? yes  Does the patient require more than one staff member for assistance? no        EATING  Pt requires setup. Pt wears dentures. Documentation reviewed and plan of care discussed/reviewed with   patient, physician, therapists, oncoming nurse and patient assistant during the shift.

## 2018-05-08 NOTE — PROGRESS NOTES
PT WEEKLY PROGRESS NOTE   Time In: 1003   Time Out: 1114    Subjective: Pt. Very sleepy this AM, having a hard time staying awake during therapy during first 2/3 of session. States L LE is \"burning more today. \"         Objective: Other (comment) (has pacemaker; fall risk )    Outcome Measures: Vital Signs: HR 94, O2 95% RA, /75    Pain level: 7/10  Pain location: L foot/ankle  Pain interventions: diversional activities, maintaining LE elevated during supine exercises    Patient education: provided gait training to decreased toe drag, poor carry overobserved secondary to poor attention to task - needs mass repetition w/ education    Interdisciplinary Communication: handoff communication w/ OT regarding pt's sleepiness       [facility]: [place] [place]    FIM SCORES Initial Assessment Weekly Progress Assessment 5/8/2018   Bed/Chair/Wheelchair Transfers 4 4   Wheelchair Mobility 0 (Secondary to patient fatigue) NT   Walking [place]   4   Steps/Stairs 0 (Secondary to patient fatigue) NT   Please see [facility] Interdisciplinary Eval: Coordination/Balance Section for details regarding FIM score description.     BED/CHAIR/WHEELCHAIR TRANSFERS Initial Assessment Weekly Progress Assessment 5/8/2018   Rolling Right 6 (Modified independent)     Rolling Left 6 (Modified independent)     Supine to Sit 4 (Contact guard assistance) 6 (Modified independent)   Sit to Stand Minimal assistance Supervision   Sit to Supine 4 (Minimal assistance) 6 (Modified independent)   Transfer Type SPT without device SPT without device   Comments Increased time and effort, slow shuffled step Flexed posture, max VCs for safety & CGA for balance secondary to impulsivity   Car Transfer Not tested NT   Car Type         GROSS ASSESSMENT Weekly Progress Assessment 5/8/2018   AROM Generally decreased, functional   Strength Generally decreased, functional   Coordination Generally decreased, functional   Tone normal   Sensation NT   PROM [facility]     POSTURE Weekly Progress Assessment 5/8/2018   Posture (WDL) Exceptions to WDL   Posture Assessment Forward head;Trunk flexion;Rounded shoulders     WHEELCHAIR MOBILITY/MANAGEMENT Initial Assessment Weekly Progress Assessment 5/8/2018   Able to Propel 0 feet NT   Curbs/ramps assistance required 0 (Not tested) NT   Wheelchair set up assistance required 0 (Not tested) NT   Wheelchair management   NT     [facility] [place] INDEPENDENCE Initial Assessment Weekly Progress Assessment 5/8/2018   Assistive device Walker, rollator, Gait belt Walker, rollator   Ambulation assistance - level surface 4 (Minimal assistance) 4 (Minimal assist)   Distance 40 Feet (ft) 100 Feet (ft) (x2)   Comments Decreased step length B LE, decreased step clearance L LE, foot flat at IC of L LE, partial step through gait pattern Toe drag & scissoring L LE which pt. Is unable to self-recover from LOB     GAIT Weekly Progress Assessment 5/8/2018   Gait Description (WDL) Exceptions to WDL   Gait Abnormalities Decreased step clearance;Shuffling gait;Trunk sway increased; Foot drop (foot drop L LE)     STEPS/STAIRS Initial Assessment Weekly Progress Assessment 5/8/2018   Steps/Stairs ambulated 0 NT   Rail Use Both NT   Comments   NT   Curbs/Ramps   NT     Therapeutic Exercise:  Pt. Performed supine LE exercises x10 B LE as follows:  SUPINE EXERCISES Sets Reps Comments   Ankle Pumps 1 10    hooklying hip ER/IR 1 10    bridging 1 10    Heel Slides 1 10    Hip Abduction 1 10    Short Arc Quad 1 10    Straight Leg Raise 1 10      Balance training:  Pt. Performed dynamic standing balance activity, requiring min-mod A to correct posterior LOB. Pt. Reaches within GAEL & demonstrates LOB when excursion moves out of GAEL. Pt. Left in w/c @ therapy table for OT session. Assessment: Pt. Sleepy today, having difficulty initially participating w/ PT due to fatigue. Gait cont. Impaired by LOB from L LE foot drop, but doubt she will be able to tolerate orthotic w/ pain in L ankle/foot.   Pt. Has met 1/5 goals @ this time;  Remaining goals appropriate & pt. Cont. To benefit from PT services to increase independence w/ functional mobility. Plan of Care: Please see Care Plan for updated LTGs.     Family Training: Needs to be scheduled    Fortunato Chapin, PT  5/8/2018

## 2018-05-08 NOTE — PROGRESS NOTES
OT Daily Note    Time In 1115   Time Out 1153     Subjective: \"I'm fine. \" [patient tolerated standing trials x 10 minutes before requiring seated rest breaks]  Pain: Not indicated, therapy to tolerance    Precautions: Other (comment) (pacemaker, fall risk)    Balance   Patient completed standing trials at elevated tabletop for tabletop task promoting dynamic standing balance and endurance. Patient transferred sit to stand and maintained balance at tabletop with CGA-close SBA, re-education required for sequencing and safety for stand to sit (note patient plopping impulsively into chair when offered to sit). Patient tolerated 2 standing trials x ~10 minutes each to participate in 28-piece jigsaw puzzle before requiring seated rest breaks. Self-Care   Patient requested toileting, transferred sit to stand and ambulated without AD with minimal assist ~10 feet x 2 trails <> toilet in hospital room. Patient completed toileting with CGA in stance for clothing management, washed hands standing at sink with CGA (note staggered side-step towards sink from toilet requiring assist to correct, patient appears unaware of fall risk). Patient completed grooming seated in recliner with setup to brush hair and apply hair clip. Assessment: Patient tolerated well. Decreased attention to/perseveration on LLE discomfort compared with previous PT session, per PT. Good progress with dynamic standing balance for tabletop task, however continues to transfer impulsively and with decreased awareness of fall risk/deficits requiring re-education for safety. Education: Safety and sequencing for functional mobility  Interdisciplinary Communication: Collaborated with Rosales Whitfield and agreed patient is progressing well and on-track to meet goals as stated in POC. Plan: Continue to address ADL/IADL, functional mobility, activity tolerance, balance, strengthening, coordination, education, cognition.       Sherol Hatchet, OTR/L

## 2018-05-08 NOTE — PROGRESS NOTES
End Of Shift Functional Summary, Nursing      TOILETING:    Does patient need assist with adjusting pants up or down and/or pericare? no  If yes, please indicate what the patient needs help with:      (i.e. pants up, pants down, pericare)  Pt uses walker. Does the patient require extra time? yes  Does the patient require standby assistance? yes  Does the patient require contact guard assistance? yes  Does the patient require more than one staff member for assistance? no    TOILET TRANSFER:    Pt requires minimal assistance. Pt uses walker. Does the patient require extra time? yes  Does the patient require contact guard assistance? yes  Does the patient require more than one staff member for assistance? no    BLADDER:    Pt does not have a temple[place] catheter that staff manages. Pt does not take medication. If so, please indicate which medication:      Pt is continent. of bladder and voids in toilet  Pt requires staff to empty device    BOWEL:  Pt does take medication. Pt is continent of bowel and uses toilet. Pt requires staff to empty device       Documentation reviewed and plan of care discussed/reviewed with   patient during the shift.

## 2018-05-09 LAB
ABO + RH BLD: NORMAL
BLD PROD TYP BPU: NORMAL
BLOOD GROUP ANTIBODIES SERPL: NORMAL
BPU ID: NORMAL
CROSSMATCH RESULT,%XM: NORMAL
HCT VFR BLD AUTO: 29.7 % (ref 35.8–46.3)
HGB BLD-MCNC: 9.9 G/DL (ref 11.7–15.4)
SPECIMEN EXP DATE BLD: NORMAL
STATUS OF UNIT,%ST: NORMAL
UNIT DIVISION, %UDIV: 0

## 2018-05-09 PROCEDURE — 97116 GAIT TRAINING THERAPY: CPT

## 2018-05-09 PROCEDURE — 97110 THERAPEUTIC EXERCISES: CPT

## 2018-05-09 PROCEDURE — 85018 HEMOGLOBIN: CPT | Performed by: PHYSICAL MEDICINE & REHABILITATION

## 2018-05-09 PROCEDURE — 97535 SELF CARE MNGMENT TRAINING: CPT

## 2018-05-09 PROCEDURE — 97530 THERAPEUTIC ACTIVITIES: CPT

## 2018-05-09 PROCEDURE — 36415 COLL VENOUS BLD VENIPUNCTURE: CPT | Performed by: PHYSICAL MEDICINE & REHABILITATION

## 2018-05-09 PROCEDURE — 65310000000 HC RM PRIVATE REHAB

## 2018-05-09 PROCEDURE — 74011250637 HC RX REV CODE- 250/637: Performed by: PHYSICAL MEDICINE & REHABILITATION

## 2018-05-09 PROCEDURE — 99232 SBSQ HOSP IP/OBS MODERATE 35: CPT | Performed by: PHYSICAL MEDICINE & REHABILITATION

## 2018-05-09 RX ADMIN — ACETAMINOPHEN 500 MG: 500 TABLET, FILM COATED ORAL at 12:04

## 2018-05-09 RX ADMIN — AMLODIPINE BESYLATE 10 MG: 10 TABLET ORAL at 08:24

## 2018-05-09 RX ADMIN — GABAPENTIN 600 MG: 300 CAPSULE ORAL at 05:51

## 2018-05-09 RX ADMIN — FERROUS SULFATE TAB 325 MG (65 MG ELEMENTAL FE) 325 MG: 325 (65 FE) TAB at 08:26

## 2018-05-09 RX ADMIN — APIXABAN 2.5 MG: 2.5 TABLET, FILM COATED ORAL at 21:06

## 2018-05-09 RX ADMIN — Medication 1000 MG: at 17:20

## 2018-05-09 RX ADMIN — VITAMIN D, TAB 1000IU (100/BT) 2000 UNITS: 25 TAB at 08:24

## 2018-05-09 RX ADMIN — POLYETHYLENE GLYCOL (3350) 17 G: 17 POWDER, FOR SOLUTION ORAL at 08:25

## 2018-05-09 RX ADMIN — LEVOTHYROXINE SODIUM 25 MCG: 50 TABLET ORAL at 05:51

## 2018-05-09 RX ADMIN — LISINOPRIL 20 MG: 20 TABLET ORAL at 08:25

## 2018-05-09 RX ADMIN — Medication 500 MG: at 17:23

## 2018-05-09 RX ADMIN — CARVEDILOL 25 MG: 25 TABLET, FILM COATED ORAL at 17:22

## 2018-05-09 RX ADMIN — TRAMADOL HYDROCHLORIDE 50 MG: 50 TABLET, FILM COATED ORAL at 21:06

## 2018-05-09 RX ADMIN — Medication 500 MG: at 08:24

## 2018-05-09 RX ADMIN — Medication 1000 MG: at 08:26

## 2018-05-09 RX ADMIN — GABAPENTIN 600 MG: 300 CAPSULE ORAL at 21:06

## 2018-05-09 RX ADMIN — FERROUS SULFATE TAB 325 MG (65 MG ELEMENTAL FE) 325 MG: 325 (65 FE) TAB at 17:22

## 2018-05-09 RX ADMIN — FUROSEMIDE 10 MG: 20 TABLET ORAL at 08:25

## 2018-05-09 RX ADMIN — AMITRIPTYLINE HYDROCHLORIDE 10 MG: 10 TABLET, FILM COATED ORAL at 21:06

## 2018-05-09 RX ADMIN — CARVEDILOL 25 MG: 25 TABLET, FILM COATED ORAL at 08:26

## 2018-05-09 RX ADMIN — ASPIRIN 81 MG: 81 TABLET, COATED ORAL at 08:25

## 2018-05-09 RX ADMIN — APIXABAN 2.5 MG: 2.5 TABLET, FILM COATED ORAL at 08:25

## 2018-05-09 RX ADMIN — PANTOPRAZOLE SODIUM 40 MG: 40 TABLET, DELAYED RELEASE ORAL at 05:51

## 2018-05-09 RX ADMIN — GABAPENTIN 600 MG: 300 CAPSULE ORAL at 14:40

## 2018-05-09 RX ADMIN — TRAMADOL HYDROCHLORIDE 50 MG: 50 TABLET, FILM COATED ORAL at 08:35

## 2018-05-09 NOTE — PROGRESS NOTES
PHYSICAL THERAPY DAILY NOTE  Time In: 1931  Time Out: 1428  Patient Seen For: PM    Subjective: Pt. More animated this afternoon, smiling & interacting w/ therapist.         Objective: Other (comment) (has pacemaker; fall risk )    BED/MAT MOBILITY Daily Assessment    Supine to Sit : 6 (Modified independent)       TRANSFERS Daily Assessment   Pt. Manages sit to/fro, stand w/ supervision, but upon making transition to another chair, requires CGA-min A secondary to scissoring & posterior lean. Toilet transfer CGA w/ grab bar Transfer Type: SPT without device  Transfer Assistance : 4 (Contact guard assistance)  Sit to Stand Assistance: Supervision       GAIT Daily Assessment   Flexed posture, occasional toe drag L LE, requires assistance to manage RW Amount of Assistance: 4 (Minimal assistance)  Distance (ft): 150 Feet (ft)  Assistive Device: Walker, rollator       STEPS or STAIRS Daily Assessment   Step to gait pattern    Pt. W/ posterior LOB upon descending first stair, unable to self correct & requiring intervention from therapist to prevent a fall Steps/Stairs Ambulated (#): 4  Level of Assist : 4 (Minimal assistance)  Rail Use: Both       BALANCE Daily Assessment   Standing balance during lower body dressing & hand washing min A Sitting - Static: Good (unsupported)  Sitting - Dynamic: Good (unsupported)  Standing - Static: Fair  Standing - Dynamic : Impaired       LOWER EXTREMITY EXERCISES Daily Assessment    Pt. Performed NuStep @ resistance level 1 x15 minutes to increase strength & endurance B LEs & UEs     Vital Signs: /68  Pulse 76  Temp 98.3 °F (36.8 °C)  Resp 14  SpO2 97%     Pain level: pt. Reports pain was much improved this PM    Patient education: reviewed stair management technique    Interdisciplinary Communication: collaborated w/ OT regarding pt's progress    Pt. Left up in recliner in NAD, call bell in reach. Assessment: Pt. Able to progress to stair training, but cont.  To be limited by decreased insight & poor balance reactions. Pt. Cont. To benefit from PT to improve strength, balance, & endurance to decrease falls risk. Plan of Care: Continue with POC and progress as tolerated.      Pratima Jones, PT  5/9/2018

## 2018-05-09 NOTE — PROGRESS NOTES
Pt c/o pain to left foot. She states pain medication given this morning did not help her pain to left foot. She states she could not tell the difference. Pt given Tylenol 500 mg po for pain 8/10 to left foot. Pt sitting in recliner eating lunch, BLE elevated. Call light with in reach.

## 2018-05-09 NOTE — PROGRESS NOTES
12:00-4:30a  . The documentation for this period is being entered following the guidelines as defined in the [employer] downtime policy by Lore Felipe RN.

## 2018-05-09 NOTE — PROGRESS NOTES
OT Daily Note  Time In 0917   Time Out 1000     Subjective: \"If my leg wasn't hurting I'd be fine. \"   Pain: none reported  Education: benefits of rehab, positioning for LLE edema management   Interdisciplinary Communication: handoff from PT  Precautions: Other (comment) (pacemaker, fall risk)  Location on arrival: up at OT table    Strengthening Daily Assessment   Patient participated in theraputty activity using medium light resistance putty, working on  strengthening and fine motor coordination. Patient participated in bilateral adrián exercises with 10 pounds total weight 15 x 2 sets x shoulder flexion/extension, shoulder horizontal abduction/adduction, and large circles both clockwise and counterclockwise, working on BUE strengthening and increasing activity tolerance. Required short rest breaks between each set due to fatigue. Good performance with tasks despite being graded up in difficulty. Transfers Daily Assessment   Patient transferred [address] to bed using SPT with RW with min A for balance, continuing to require cues for safe use of RW and no apparent righting reactions . Continue to anticipate patient will require CGA during functional transfers at discharge due to persistent impaired balance. Patient was left supine in bed with call light/all needs in reach and in no distress. Bed alarm activated. Assessment: See above. Remains a very high fall risk. Plan: Continue OT POC with focus on ADL/IADL skills, functional transfers, functional mobility, coordination, strength, static and dynamic balance, and activity tolerance to maximize safety and independence with ADLs and functional transfers.      Maribel Garcia MS, OTR/L  5/9/2018

## 2018-05-09 NOTE — PROGRESS NOTES
OT Daily Note  Time In 1117   Time Out 1200     Subjective: \"Yeah, I'm ready. \"   Pain: none reported  Education: hand placement during transfers, use of RW   Interdisciplinary Communication: with nurse regarding patient's request for pain medication   Precautions: Other (comment) (pacemaker, fall risk)  Location on arrival: supine in bed    Transfers Daily Assessment   Patient transferred supine to sit with supervision and into WC using SPT with RW with CGA. Remains unsteady with all functional transfers. Continue to anticipate patient will require CGA at discharge. Self-Care Daily Assessment   Self Care Task: Toileting  Level of Assist: 4 (Minimal assistance) (steadying assist during clothing management )  Adaptive Equipment: Grab bars   Patient continues to demonstrate decreased safety and balance during toileting. Visual-Perceptual Daily Assessment   Patient completed visual perceptual reasoning task using bicolor blocks with visual guidelines x 7 patterns, working on visual perceptual skills for improved safety and independence with functional transfers. Required increased time for task. Required minimal cues to identify and solve errors. Patient was left seated up in recliner with call light/all needs in reach and in no distress. Chair alarm activated. Assessment: Remains very unsteady during functional mobility around room. Continue to anticipate patient will require physical assistance   Plan: Continue OT POC with focus on ADL/IADL skills, functional transfers, functional mobility, coordination, strength, static and dynamic balance, and activity tolerance to maximize safety and independence with ADLs and functional transfers.      Otilia Tamayo MS, OTR/L  5/9/2018

## 2018-05-09 NOTE — PROGRESS NOTES
Ms Oscar Rico is A/O x 3, reoriented pt on year. Pt c/o pain to left leg 5/10. Left is warm, palpable pulse.  Pt denies SOB,

## 2018-05-09 NOTE — PROGRESS NOTES
Patient back in [place] from therapy sitting in recliner with left foot elevated to pillow. Pt states the pain in her foot is much better now, maybe 2 or 3. Pt encourage to call for any needs.

## 2018-05-09 NOTE — PROGRESS NOTES
PHYSICAL THERAPY DAILY NOTE  Time In: 0831  Time Out: 1909  Patient Seen For: AM;Balance activities;Gait training;Transfer training    Subjective: Pt. Agreeable to work w/ PT this AM.  More alert & asking to take care of her final self-care activities. Objective: Other (comment) (has pacemaker; fall risk )    BED/MAT MOBILITY Daily Assessment    Supine to Sit : 6 (Modified independent)       TRANSFERS Daily Assessment   Toilet transfer CGA using grab bar w/ VCs for safety    Pt. Impulsive during stand pivot transfers, needing cues to fully come to seat prior to stting down Transfer Type: SPT without device  Transfer Assistance : 4 (Contact guard assistance)  Sit to Stand Assistance: Supervision       GAIT Daily Assessment   Worked on ambulation around obstacles in therapy gym followed by ambulating in room during functional tasks, requiring min A for LOB as well as max VCs for safety. Pt. Then ambulated around room w/o A/D requiring min A for safety w/ balance. Amount of Assistance: 4 (Minimal assistance)  Distance (ft): 150 Feet (ft) (x1, 100'x1)  Assistive Device: Walker, rollator       BALANCE Daily Assessment   Pt. Worked on functional reaching during self care tasks, including hand washing, tooth brushing, & retrieving items throughout the room. Pt. Requires min A to correct LOB as well as max VCs for safety. Pt. Then worked on dynamic standing balance activity, requiring timed reaching out of GAEL to work in improving postural control & righting reactions Sitting - Static: Good (unsupported)  Sitting - Dynamic: Good (unsupported)  Standing - Static: Fair  Standing - Dynamic : Impaired     Vital Signs: /68  Pulse 76  Temp 98.3 °F (36.8 °C)  Resp 14  SpO2 97%    Pain level: 6/10  Pain location: L foot/ankle  Pain interventions: RN provided pain medication, therapist worked on diversional activities - pt.  Offered no mention of LE pain during therapeutic activities    Patient education: re-enforced safety & need to attend to L LE during ambulation to prevent falls - poor carry over noted    Interdisciplinary Communication: RN made aware of pt's BM for stool sample    Pt. Left in w/c @ therapy table awaiting OT session. Assessment: Pt. Able to increase functional activities this session, but cont. To demonstrate poor insight & safety awareness, requiring min A for balance. When she is attending to her L LE she is mostly able to manage foot drop & clear Le during gait. However, pt. Is easily distracted & then demonstrates toe drag & LOB. Pt. Remains at high risk for falls & will likely require 24/7 assistance upon d/c.          Plan of Care: Continue with POC and progress as tolerated.      Susie Hadley, PT  5/9/2018

## 2018-05-09 NOTE — PROGRESS NOTES
Sanjana Olea MD,   Medical Director  [address] [facility], [address] [facility]  Tel: [phone]       [facility] PROGRESS NOTE    [place]  Admit Date: 4/30/2018  Admit Diagnosis: left feneseal embolictomy; Physical debility    Subjective     Feeling well. No cp, sob, n. In good spirits.  Ambulating in room with CGA    Objective:     Current Facility-Administered Medications   Medication Dose Route Frequency    0.9% sodium chloride infusion 250 mL  250 mL IntraVENous PRN    furosemide (LASIX) tablet 10 mg  10 mg Oral DAILY    amLODIPine (NORVASC) tablet 10 mg  10 mg Oral DAILY    amitriptyline (ELAVIL) tablet 10 mg  10 mg Oral QHS    calcium carbonate (OS-CAN) tablet 1,000 mg [elemental]  1,000 mg Oral BID    cholecalciferol (VITAMIN D3) tablet 2,000 Units  2,000 Units Oral DAILY    gabapentin (NEURONTIN) capsule 600 mg  600 mg Oral TID    ferrous sulfate tablet 325 mg  1 Tab Oral BID WITH MEALS    loperamide (IMODIUM) capsule 4 mg  4 mg Oral Q6H PRN    Saccharomyces boulardii (FLORASTOR) capsule 500 mg  500 mg Oral BID    polyethylene glycol (MIRALAX) packet 17 g  17 g Oral DAILY    bisacodyl (DULCOLAX) suppository 10 mg  10 mg Rectal DAILY PRN    phenylephrine suppository 1 Suppository  1 Suppository Rectal TID PRN    acetaminophen (TYLENOL) tablet 500 mg  500 mg Oral Q4H PRN    diphenhydrAMINE (BENADRYL) capsule 25 mg  25 mg Oral Q4H PRN    apixaban (ELIQUIS) tablet 2.5 mg  2.5 mg Oral BID    aspirin delayed-release tablet 81 mg  81 mg Oral DAILY    carvedilol (COREG) tablet 25 mg  25 mg Oral BID WITH MEALS    levothyroxine (SYNTHROID) tablet 25 mcg  25 mcg Oral ACB    lisinopril (PRINIVIL, ZESTRIL) tablet 20 mg  20 mg Oral DAILY    nitroglycerin (NITROSTAT) tablet 0.4 mg  0.4 mg SubLINGual PRN    ondansetron (ZOFRAN ODT) tablet 4 mg  4 mg Oral Q6H PRN    pantoprazole (PROTONIX) tablet 40 mg  40 mg Oral ACB    senna-docusate (PERICOLACE) 8.6-50 mg per tablet 1 Tab  1 Tab Oral QHS    traMADol (ULTRAM) tablet 50 mg  50 mg Oral Q6H PRN     Review of Systems:Denies chest pain, shortness of breath, cough, headache, visual problems, abdominal pain, dysurea, calf pain. Pertinent positives are as noted in the medical records and unremarkable otherwise. Visit Vitals    /68    Pulse 76    Temp 98.3 °F (36.8 °C)    Resp 14    SpO2 97%        Physical Exam:   General: Alert and age appropriately oriented. No acute cardio respiratory distress. HEENT: Normocephalic,no scleral icterus  Oral mucosa moist without cyanosis   Lungs: Clear to auscultation  bilaterally. Respiration even and unlabored   Heart: Regular rate and rhythm, S1, S2   No  murmurs, clicks, rub or gallops   Abdomen: Soft, non-tender, nondistended. Bowel sounds present. No organomegaly. Genitourinary: Benign . Neuromuscular:      Grossly no focal motor deficits noted. Moves ankles. Ankle dorsiflexion on left 3, EHL 0  Ankle plantarflexion 5/5  No sensory deficits distally. Skin/extremity: No rashes, no erythema. No calf tenderness BLE  Wound left groin healing.                                                                             Functional Assessment:  Gross Assessment  AROM: Generally decreased, functional (05/08/18 1200)  Strength: Generally decreased, functional (05/08/18 1200)  Coordination: Generally decreased, functional (05/08/18 1200)       Balance  Sitting - Static: Good (unsupported) (05/09/18 1000)  Sitting - Dynamic: Good (unsupported) (05/09/18 1000)  Standing - Static: Fair (05/09/18 1000)  Standing - Dynamic : Impaired (05/09/18 1000)           Toileting  Cues: Visual cues provided;Physical assistance for pants up;Physical assistance for pants down (05/05/18 1352)  Adaptive Equipment: Grab bars (05/08/18 0851)         Hamp Abilio Fall Risk Assessment:  Hamp Abilio Fall Risk  Mobility: Ambulates or transfers with assist devices or assistance (05/09/18 0158)  Mobility Interventions: Strengthening exercises (ROM-active/passive); Utilize gait belt for transfers/ambulation;Communicate number of staff needed for ambulation/transfer (05/09/18 0818)  Mentation: Periodic confusion (05/09/18 0818)  Mentation Interventions: Adequate sleep, hydration, pain control;Bed/chair exit alarm; Door open when patient unattended;Evaluate medications/consider consulting pharmacy; Increase mobility (05/09/18 0818)  Medication: Patient receiving anticonvulsants, sedatives(tranquilizers), psychotropics or hypnotics, hypoglycemics, narcotics, sleep aids, antihypertensives, laxatives, or diuretics (05/09/18 0818)  Medication Interventions: Patient to call before getting OOB; Teach patient to arise slowly;Utilize gait belt for transfers/ambulation; Evaluate medications/consider consulting pharmacy (05/09/18 4006)  Elimination: Needs assistance with toileting (05/09/18 0818)  Elimination Interventions: Elevated toilet seat;Call light in reach;Bed/chair exit alarm; Patient to call for help with toileting needs; Toilet paper/wipes in reach; Toileting schedule/hourly rounds;Urinal in reach (05/09/18 0818)  Prior Fall History: Before admission in past 12 months _home or previous inpatient care) (05/09/18 0818)  History of Falls Interventions: Investigate reason for fall;Room close to nurse's station;Utilize gait belt for transfer/ambulation; Evaluate medications/consider consulting pharmacy (05/09/18 0818)  Total Score: 5 (05/09/18 0818)  Standard Fall Precautions: Yes (05/01/18 2037)  High Fall Risk: Yes (05/09/18 0818)     Speech Assessment:  Aspiration Signs/Symptoms: None (05/01/18 1305)      Ambulation:  Gait  Base of Support: Narrowed; Center of gravity altered (05/08/18 1200)  Speed/Chandni: Slow;Shuffled;Fluctuations (05/08/18 1200)  Step Length: Right shortened;Left shortened (05/08/18 1200)  Stance: Right increased; Left increased (05/08/18 1200)  Gait Abnormalities: Decreased step clearance;Shuffling gait;Trunk sway increased; Foot drop (foot drop L LE) (05/08/18 1200)  Distance (ft): 150 Feet (ft) (x1, 100'x1) (05/09/18 1000)  Assistive Device: Walker, rollator (05/09/18 1000)  Rail Use: Both (05/04/18 1100)     Labs/Studies:  Recent Results (from the past 72 hour(s))   METABOLIC PANEL, BASIC    Collection Time: 05/07/18  7:17 AM   Result Value Ref Range    Sodium 134 (L) 136 - 145 mmol/L    Potassium 4.3 3.5 - 5.1 mmol/L    Chloride 97 (L) 98 - 107 mmol/L    CO2 31 21 - 32 mmol/L    Anion gap 6 (L) 7 - 16 mmol/L    Glucose 81 65 - 100 mg/dL    BUN 17 8 - 23 MG/DL    Creatinine 1.16 (H) 0.6 - 1.0 MG/DL    GFR est AA 56 (L) >60 ml/min/1.73m2    GFR est non-AA 47 (L) >60 ml/min/1.73m2    Calcium 8.2 (L) 8.3 - 10.4 MG/DL   HGB & HCT    Collection Time: 05/07/18  7:17 AM   Result Value Ref Range    HGB 7.9 (L) 11.7 - 15.4 g/dL    HCT 23.6 (L) 35.8 - 46.3 %   TYPE + CROSSMATCH    Collection Time: 05/07/18 11:14 AM   Result Value Ref Range    Crossmatch Expiration 05/10/2018     ABO/Rh(D) O POSITIVE     Antibody screen NEG     Unit number [id number]     Blood component type  LR     Unit division 00     Status of unit TRANSFUSED     Crossmatch result Compatible    URINALYSIS W/ RFLX MICROSCOPIC    Collection Time: 05/07/18  9:25 PM   Result Value Ref Range    Color YELLOW      Appearance CLEAR      Specific gravity 1.010 1.001 - 1.023      pH (UA) 6.0 5.0 - 9.0      Protein NEGATIVE  NEG mg/dL    Glucose NEGATIVE  NEG mg/dL    Ketone NEGATIVE  NEG mg/dL    Bilirubin NEGATIVE  NEG      Blood NEGATIVE  NEG      Urobilinogen 0.2 0.2 - 1.0 EU/dL    Nitrites NEGATIVE  NEG      Leukocyte Esterase NEGATIVE  NEG      Bacteria 0 0 /hpf   CBC W/O DIFF    Collection Time: 05/08/18  6:09 AM   Result Value Ref Range    WBC 5.4 4.3 - 11.1 K/uL    RBC 3.30 (L) 4.05 - 5.25 M/uL    HGB 10.0 (L) 11.7 - 15.4 g/dL    HCT 29.5 (L) 35.8 - 46.3 %    MCV 89.4 79.6 - 97.8 FL    MCH 30.3 26.1 - 32.9 PG    MCHC 33.9 31.4 - 35.0 g/dL    RDW 13.8 11.9 - 14.6 %    PLATELET 171 476 - 841 K/uL    MPV 9.6 (L) 10.8 - 14.1 FL   OCCULT BLOOD, STOOL    Collection Time: 05/08/18  4:30 PM   Result Value Ref Range    Occult blood, stool NEGATIVE  NEG     HGB & HCT    Collection Time: 05/09/18  5:40 AM   Result Value Ref Range    HGB 9.9 (L) 11.7 - 15.4 g/dL    HCT 29.7 (L) 35.8 - 46.3 %       Assessment:     Problem List as of 5/9/2018  Date Reviewed: 4/30/2018          Codes Class Noted - Resolved    * (Principal)Physical debility ICD-10-CM: R53.81  ICD-9-CM: 799.3  4/30/2018 - Present        Chronic systolic congestive heart failure (HCC) ICD-10-CM: I50.22  ICD-9-CM: 428.22, 428.0  4/26/2018 - Present        Femoral artery occlusion, left (HCC) ICD-10-CM: G57.632  ICD-9-CM: 444.22  4/25/2018 - Present        Type 2 diabetes mellitus with nephropathy ([facility] [address] ICD-10-CM: E11.21  ICD-9-CM: 250.40, 583.81  1/25/2018 - Present        S/P AV noris ablation ICD-10-CM: Z98.890  ICD-9-CM: V45.89  1/16/2018 - Present        Biventricular cardiac pacemaker in situ ICD-10-CM: Z95.0  ICD-9-CM: V45.01  1/16/2018 - Present        Diabetes mellitus, type 2 (HCC) (Chronic) ICD-10-CM: E11.9  ICD-9-CM: 250.00  8/12/2017 - Present        Hypertension (Chronic) ICD-10-CM: I10  ICD-9-CM: 401.9  8/12/2017 - Present    Overview Signed 9/12/2012  3:43 PM by Margarita Terry     Orthostatic hypotension due to autonomic neuropathy             CKD (chronic kidney disease), stage III (Chronic) ICD-10-CM: N18.3  ICD-9-CM: 585.3  8/12/2017 - Present        Dysphagia (Chronic) ICD-10-CM: R13.10  ICD-9-CM: 787.20  8/12/2017 - Present    Overview Signed 4/5/2016  5:24 PM by Adonna Rinne, MD     Due to esophageal spasm seen on modified barium swallow 2015             Atrial fibrillation ([facility] [address] (Chronic) ICD-10-CM: I48.91  ICD-9-CM: 427.31  8/12/2017 - Present        DNR (do not resuscitate) (Chronic) ICD-10-CM: J47  ICD-9-CM: V49.86  8/12/2017 - Present        Ischemic cardiomyopathy ICD-10-CM: I25.5  ICD-9-CM: 414.8  7/11/2017 - Present        Cardiomyopathy ([facility] [address] ICD-10-CM: I42.9  ICD-9-CM: 425.4  3/30/2017 - Present        Dyspnea ICD-10-CM: R06.00  ICD-9-CM: 786.09  2/25/2017 - Present        Atrial fibrillation with rapid ventricular response ([facility] [address] ICD-10-CM: I48.91  ICD-9-CM: 427.31  2/25/2017 - Present        SSS (sick sinus syndrome) ([facility]) ICD-10-CM: I49.5  ICD-9-CM: 427.81  5/5/2016 - Present        Orthostatic hypotension ICD-10-CM: I95.1  ICD-9-CM: 458.0  5/5/2016 - Present        CAD in native artery ICD-10-CM: I25.10  ICD-9-CM: 414.01  5/5/2016 - Present        Pacemaker ICD-10-CM: Z95.0  ICD-9-CM: V45.01  11/10/2015 - Present        Cervical radiculopathy ICD-10-CM: M54.12  ICD-9-CM: 723.4  5/23/2013 - Present    Overview Addendum 8/26/2013  4:59 PM by Lois Tabor MD     Chronic right shoulder pain S/P eval Dr Kristan Mclaughlin POC August 2013, previous cervical spinal fusion             Autonomic neuropathy ICD-10-CM: G90.9  ICD-9-CM: 337.9  1/13/2013 - Present    Overview Signed 1/13/2013  8:18 PM by Lois Tabor MD     Severe orthostatic hypotension             MCI (mild cognitive impairment) ICD-10-CM: G31.84  ICD-9-CM: 331.83  1/13/2013 - Present    Overview Addendum 12/16/2013  4:43 PM by Lois Tabor MD     MMSE 27/30 Nov 2009, 27/30 Dec 2013, remembers 3/3 objects 5 min later and draws normal clock             Chronic pain syndrome ICD-10-CM: G89.4  ICD-9-CM: 338.4  1/13/2013 - Present    Overview Addendum 11/10/2015  5:15 PM by Lois Tabor MD     Back, right shoulder, neck: Peripheral neuropathy, spinal stenosis C7-T1, foraminal narrowing C4-5             Acquired hypothyroidism (Chronic) ICD-10-CM: E03.9  ICD-9-CM: 244.9  9/12/2012 - Present        Mixed hyperlipidemia (Chronic) ICD-10-CM: T16.8  ICD-9-CM: 272.2  9/12/2012 - Present    Overview Addendum 9/12/2012  3:51 PM by Ousmane Guerra     With high HDL  Intolerant of pravastatin,lovastatin and lescol             GERD (gastroesophageal reflux disease) (Chronic) ICD-10-CM: K21.9  ICD-9-CM: 530.81  9/12/2012 - Present        IBS (irritable bowel syndrome) (Chronic) ICD-10-CM: K58.9  ICD-9-CM: 564.1  9/12/2012 - Present    Overview Signed 9/12/2012  3:52 PM by Pretty Batista     Chronic enema abuse  diverticulosis             Gait disorder (Chronic) ICD-10-CM: R26.9  ICD-9-CM: 781.2  9/12/2012 - Present    Overview Signed 9/12/2012  3:53 PM by Pretty Batista     Diabetic peripheral neuropathy              Atrial fibrillation with RVR ([facility] ICD-10-CM: I48.91  ICD-9-CM: 427.31  9/12/2012 - Present    Overview Addendum 10/14/2012  9:34 AM by Janice Real MD     Paroxysmal.  Coumadin contraindicated due to falls               RESOLVED: RUSS (acute kidney injury) ([facility] ICD-10-CM: N17.9  ICD-9-CM: 584.9  8/13/2017 - 10/19/2017        RESOLVED: Acute respiratory failure with hypoxemia (HCC) ICD-10-CM: J96.01  ICD-9-CM: 518.81  8/12/2017 - 9/15/2017        RESOLVED: CAP (community acquired pneumonia) ICD-10-CM: J18.9  ICD-9-CM: 486  8/12/2017 - 9/15/2017        RESOLVED: CHF (congestive heart failure) ([facility] ICD-10-CM: I50.9  ICD-9-CM: 428.0  3/18/2017 - 4/26/2018        RESOLVED: Altered mental status ICD-10-CM: R41.82  ICD-9-CM: 780.97  3/10/2017 - 10/19/2017        RESOLVED: Altered mental state ICD-10-CM: R41.82  ICD-9-CM: 780.97  3/9/2017 - 10/19/2017        RESOLVED: Chest pain, pleuritic ICD-10-CM: R07.81  ICD-9-CM: 786.52  3/9/2017 - 10/19/2017        RESOLVED: Weight loss ICD-10-CM: R63.4  ICD-9-CM: 783.21  9/2/2014 - 11/10/2015        RESOLVED: Anorexia ICD-10-CM: R63.0  ICD-9-CM: 783.0  9/2/2014 - 10/19/2017        RESOLVED: Iron deficiency anemia, unspecified ICD-10-CM: D50.9  ICD-9-CM: 280.9  9/12/2012 - 4/16/2013    Overview Addendum 1/13/2013  8:21 PM by Janice Real MD     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 Aspiration precautions      Dysphagia; consider MBS; will have ST eval. HOB > 30d  -5/2 ST eval yesterday, no overt signs of dysphagia/ aspiration      Post op anemia due to blood loss; gi prophylaxis, monitor wound; check stool. Check iron studies as well  -hgb 10.1  -5/2 drop in Hgb to 8.4; significant iron deficiency; start supplement, hemmoc stool; hold aspirin/eliquis until stool comes back ; no gi symptoms to suggest gastritis/ ulcer.  -5/4 staff did not send last stool; pending. hgb now 8.1; asymp. Denies melena, no hematoma at wound site. Started iron yesterday for severe iron deficiency ; gi consult if stool positive  -5/5 8.0 stool neg; no visible sign of bleeding; abd soft, femoral inc clean, no abd pain or back pain to suggest something internal; daily labs; consult if needed  -5/6 improved at 8.4  -5/7 hgb 7.9, known iron deficiency, likely multifactoria. Reports that she went to the [facility] in the past; reced Epogen; doesn't know why and cannot find records of such. Will transfuse one unit today. Has hx of a colon AVM but stools neg, no abd pain. Will consult hematology/ at her age, aggressive txs no appropriate  -5/8 hgb 10 s/p one unit; will watch trend; discussed with dtg over phone  -5/9 hgb 9.9 and second stool neg      DVT risk / DVT Prophylaxis- Will require daily physician exam to assess for signs and symptoms as patient is at increased risk for of thromboembolism. Mobilization as tolerated. Intermittent pneumatic compression devices when in bed Thigh-high or knee-high thromboembolic deterrent hose when out of bed. Eliquis/ASA; 5/7 hold for now due to anemia of unclear etiology  -resume eliquis and baby aspirin; needs due to severe PVD      Cervical stenosis with autonomic dysfxn; orthostatic hypotension; monitor with therapies.  Cont TEDs  -5/2 no overt symptoms; bp stable      Pain Control: stable, mild-to-moderate joint symptoms intermittently, reasonably well controlled by PRN meds. Will require regular pain assessment and comprenhensive pain management,   -pt with vascular pain and hypersensitivity to LLE; cont Neurontin, tolerating 600mg tid; does not wake her up at noc; cont ultram as well  -5/2 lethargic, multifactorial; dec neurontin   -5/3 much more alert. Has been on Neurontin for years; can inc back to home dose; lethargy likely due to UTI and poor sleep; monitor; cont ultram and add 10mg Elavil; having burning pain in Left foot/ankle; lacks DP pulse; will see if we can doppler; however, foot warm to touch, no discoloration with nl cap refill  -5/4 left DP pulse found easier today with dec in edema. No discoloration/ foot warm; 5/6 complains less of pain but still burns in left ankle/foot  -5/8 cont Neurontin; seems to complain less of left ankle paresthesias      Hx of DM; on amaryl in the past, currently nothing; bs 143, change to diab diet; bs improved; check qam only  -5/9 not checking ; bs fine      Hypocalcemia; cont Oscal and add Vit D. Will see when pt had last bone density scan. Improved 8.6 F/u with PCP  -5/2 calcium 8.1 from 8.6, check Vit d ; 5/3 inc supplement  -5/5 vit D levels nl at 41      Wound Care: Monitor wound status daily per staff and physician. At risk for failure. Will require 24/7 rehab nursing. Keep wound clean and dry, Reinforce dressing PRN and Ice to area for comfort      Hypertension - BP uncontrolled, fluctuating, managed medically. Cont Coreg, lasix, zestril   -poorly controlled 5/1 sbp 175-187; however dropped to 112; has hx of orthostatics; monitor with activity  -5/3 bp 170s, add norvasc with parameters; 5/4 150/75; slt improvement; inc Norvasc but watch for edema  -5/5 148/75; will not adjust meds at this time; 5/6 118/66; 5/9 126/68      Hx afib; NSR, rate controlled; cont coreg and eliquis  -NSR 5/9      Hyponatremia;  Na 131 5/3 and 5/4; can hold lasix, zestril and coreg contributing but bp not controlled without it, added norvasc; recheck in a.m. ; free water restrict; DC MACRODANTIN; known cause, raghu in elderly on diuretics  -5/5 Na unchanged at 131; monitor; 5/7 improved off macrodantin and holding lasix; 134; restarting lasix 10 mg; labs 5/10      RUSS ; monitor labs. Check UA due to [place]. Improving as of 4/28; no labs since that time; may need to consider adjusting Lasix and zestril  -5/1 slt bump in creat from 1.18- 1.28 ; recheck daily. Very little po today, may need IV fluids  -5/2 IVFs overnoc, creat 1.11, improved; 1.16 (1.28); had held lasix but will restart at 10mg; recheck bmp on Thurs 5/10      Hypothyroidism; cont synthroid; 5/4 check TSH; normal      Urinary retention/ neurogenic bladder - schedule voids q6-8 hrs. Check post-void residual as needed; In and Out catheterize if post-void residual is more than 400 cc.  -5/1 urin frq; check UA, check post void residual  -5/3 urine finally sent last pm. 4+ bact, nitrite positive, leuk neg and few bacteria; received one gram IM Rocephin and started on Macrobid; await cx. No dysuria + freq  -5/4 reorder cx; not done; +dysuria, but otherwise asymptomatic x frequency (may be chronic though); no fever, WBC nl; DC macrodantin due to hyponatremia; Rocephin 1g IV x 3d  -5/5 urine cx pending; NEG      bowel program - add prn bowel program  -5/1 impacted, did not sleep. Given suppos and disimpacted for mod amt of stool . No n/v. Limited po today. Labs stable. No gaurding or rebound on exam  -add anusol suppos due to rectal pain and suspected internal hemmor. With blood streaked stool.  -5/3 now just mucus, no occult blood. hbg has dropped; check hemoccult  -5/5 hgb continues to trend down. 8.4, now 8.0 from 8.1 . Stool neg for blood. Wound site of embolectomy clean, no swelling or evidence of hemorrhage; started oral iron due to iron deficiency   -5/9 continent      GERD - resume PPI.  At times may need additional antacids, Maalox prn.    -pt is DNR            Time spent was 25 minutes with over 1/2 in direct patient care/examination, consultation and coordination of care.      Signed By: Hunter Coe MD     May 9, 2018

## 2018-05-09 NOTE — PROGRESS NOTES
End Of Shift Functional Summary, Nursing      TOILETING:    Does patient need assist with adjusting pants up or down and/or pericare? no    Pt uses walker. Does the patient require extra time? yes  Does the patient require standby assistance? yes  Does the patient require contact guard assistance? yes  Does the patient require more than one staff member for assistance? no    TOILET TRANSFER:    Pt requires minimal assistance. Pt uses walker. Does the patient require extra time? yes  Does the patient require contact guard assistance? yes  Does the patient require more than one staff member for assistance? no    BLADDER:    Pt does not have a [place] catheter that staff manages. Pt does not take medication. If so, please indicate which medication:    Pt is continent. of bladder and voids in toilet  Pt has had 0 bladder accidents during this shift  (An accident is when the episode is not contained in a brief AND/OR the clothing/linen requires changing/cleaning up.)    BOWEL:  Pt does take medication. Pt is continent of bowel and uses toilet. Pt has had 0 bowel accidents during this shift requiring minimal assistance from staff to clean up. (An accident is when the episode is not contained in a brief AND/OR the clothing/linen requires changing/cleaning up.)    BED/CHAIR TRANSFER  Pt requires minimal assistance. Pt uses walker  Does the patient require extra time? yes  Does the patient require contact guard assistance? yes  Does the patient require more than one staff member for assistance? no            EATING  Pt requires no assistance. Pt does not wear dentures. TUBE FEEDINGS:  Pt does not  receive nutrition through tube feedings. Patient requires minimal assistance with feedings. Documentation reviewed and plan of care discussed/reviewed with   patient during the shift.

## 2018-05-09 NOTE — PROGRESS NOTES
Subjective \"I want to get better. \"   Activity Evaluation   Strength/Endurance Patient with no c/o tiredness or fatigue during the session. She commented that her \"left foot hurt\" when she touched it. Balance Sit<->stand:  S with rollator   Social Interaction Patient was friendly and pleasant during the session. Cognitive A&O X4   Comments Patient was left in her recliner chair with all needs within reach. Patient's Recreational Therapy evaluation completed under the [facility] navigation tool on 5/9/18. Please see for specifics regarding plan of care and goals. Patient prefers to be called \"Tiffany. \" Thank you for the referral.  Fay Soto, CTRS

## 2018-05-09 NOTE — PROGRESS NOTES
Problem: Falls - Risk of  Goal: *Absence of Falls  Document Jose Fall Risk and appropriate interventions in the flowsheet.    Outcome: Progressing Towards Goal  Fall Risk Interventions:  Mobility Interventions: Utilize walker, cane, or other assitive device    Mentation Interventions: Bed/chair exit alarm, Door open when patient unattended    Medication Interventions: Patient to call before getting OOB    Elimination Interventions: Bed/chair exit alarm, Call light in reach    History of Falls Interventions: Door open when patient unattended

## 2018-05-10 LAB
ANION GAP SERPL CALC-SCNC: 9 MMOL/L (ref 7–16)
BUN SERPL-MCNC: 15 MG/DL (ref 8–23)
CALCIUM SERPL-MCNC: 8.2 MG/DL (ref 8.3–10.4)
CHLORIDE SERPL-SCNC: 97 MMOL/L (ref 98–107)
CO2 SERPL-SCNC: 28 MMOL/L (ref 21–32)
CREAT SERPL-MCNC: 1.03 MG/DL (ref 0.6–1)
GLUCOSE SERPL-MCNC: 73 MG/DL (ref 65–100)
HCT VFR BLD AUTO: 29.4 % (ref 35.8–46.3)
HGB BLD-MCNC: 9.9 G/DL (ref 11.7–15.4)
POTASSIUM SERPL-SCNC: 4.1 MMOL/L (ref 3.5–5.1)
SODIUM SERPL-SCNC: 134 MMOL/L (ref 136–145)

## 2018-05-10 PROCEDURE — 80048 BASIC METABOLIC PNL TOTAL CA: CPT | Performed by: PHYSICAL MEDICINE & REHABILITATION

## 2018-05-10 PROCEDURE — 97110 THERAPEUTIC EXERCISES: CPT

## 2018-05-10 PROCEDURE — 97535 SELF CARE MNGMENT TRAINING: CPT

## 2018-05-10 PROCEDURE — 65310000000 HC RM PRIVATE REHAB

## 2018-05-10 PROCEDURE — 85018 HEMOGLOBIN: CPT | Performed by: PHYSICAL MEDICINE & REHABILITATION

## 2018-05-10 PROCEDURE — 74011250637 HC RX REV CODE- 250/637: Performed by: PHYSICAL MEDICINE & REHABILITATION

## 2018-05-10 PROCEDURE — 97530 THERAPEUTIC ACTIVITIES: CPT

## 2018-05-10 PROCEDURE — 97116 GAIT TRAINING THERAPY: CPT

## 2018-05-10 PROCEDURE — 36415 COLL VENOUS BLD VENIPUNCTURE: CPT | Performed by: PHYSICAL MEDICINE & REHABILITATION

## 2018-05-10 PROCEDURE — 99232 SBSQ HOSP IP/OBS MODERATE 35: CPT | Performed by: PHYSICAL MEDICINE & REHABILITATION

## 2018-05-10 RX ADMIN — AMLODIPINE BESYLATE 10 MG: 10 TABLET ORAL at 08:34

## 2018-05-10 RX ADMIN — TRAMADOL HYDROCHLORIDE 50 MG: 50 TABLET, FILM COATED ORAL at 22:16

## 2018-05-10 RX ADMIN — GABAPENTIN 600 MG: 300 CAPSULE ORAL at 15:36

## 2018-05-10 RX ADMIN — LISINOPRIL 20 MG: 20 TABLET ORAL at 08:31

## 2018-05-10 RX ADMIN — STANDARDIZED SENNA CONCENTRATE AND DOCUSATE SODIUM 1 TABLET: 8.6; 5 TABLET, FILM COATED ORAL at 22:15

## 2018-05-10 RX ADMIN — TRAMADOL HYDROCHLORIDE 50 MG: 50 TABLET, FILM COATED ORAL at 12:51

## 2018-05-10 RX ADMIN — GABAPENTIN 600 MG: 300 CAPSULE ORAL at 07:03

## 2018-05-10 RX ADMIN — FUROSEMIDE 10 MG: 20 TABLET ORAL at 08:33

## 2018-05-10 RX ADMIN — ASPIRIN 81 MG: 81 TABLET, COATED ORAL at 08:34

## 2018-05-10 RX ADMIN — Medication 500 MG: at 08:32

## 2018-05-10 RX ADMIN — Medication 1000 MG: at 08:33

## 2018-05-10 RX ADMIN — Medication 1000 MG: at 15:36

## 2018-05-10 RX ADMIN — CARVEDILOL 25 MG: 25 TABLET, FILM COATED ORAL at 08:34

## 2018-05-10 RX ADMIN — FERROUS SULFATE TAB 325 MG (65 MG ELEMENTAL FE) 325 MG: 325 (65 FE) TAB at 17:57

## 2018-05-10 RX ADMIN — AMITRIPTYLINE HYDROCHLORIDE 10 MG: 10 TABLET, FILM COATED ORAL at 22:16

## 2018-05-10 RX ADMIN — APIXABAN 2.5 MG: 2.5 TABLET, FILM COATED ORAL at 08:33

## 2018-05-10 RX ADMIN — LEVOTHYROXINE SODIUM 25 MCG: 50 TABLET ORAL at 07:04

## 2018-05-10 RX ADMIN — VITAMIN D, TAB 1000IU (100/BT) 2000 UNITS: 25 TAB at 08:32

## 2018-05-10 RX ADMIN — APIXABAN 2.5 MG: 2.5 TABLET, FILM COATED ORAL at 22:15

## 2018-05-10 RX ADMIN — GABAPENTIN 600 MG: 300 CAPSULE ORAL at 22:15

## 2018-05-10 RX ADMIN — CARVEDILOL 25 MG: 25 TABLET, FILM COATED ORAL at 17:58

## 2018-05-10 RX ADMIN — FERROUS SULFATE TAB 325 MG (65 MG ELEMENTAL FE) 325 MG: 325 (65 FE) TAB at 08:32

## 2018-05-10 RX ADMIN — PANTOPRAZOLE SODIUM 40 MG: 40 TABLET, DELAYED RELEASE ORAL at 07:03

## 2018-05-10 RX ADMIN — Medication 500 MG: at 17:57

## 2018-05-10 NOTE — PROGRESS NOTES
05/10/18 1027   Time Spent With Patient   Time In 0756   Time Out 0882   Patient Seen For: AM;ADLs   Feeding/Eating   Feeding/Eating Assistance I   Grooming   Grooming Assistance  CGA   Comments seated in recliner to brush hair; CGA when standing to brush teeth    Upper Body Bathing   Bathing Assistance, Upper S   Lower Body Bathing   Bathing Assistance, Lower  CGA   Position Performed Standing; Other (comment)  (seated on tub transfer bench )   Adaptive Equipment Tub bench   29 Maria Fernanda Jacobson (FIM Score) Min A   Upper Body Dressing    Dressing Assistance  S   Comments gathered clothing with CGA    Lower Body Dressing    Dressing Assistance  CGA   Leg Crossed Method Used No   Position Performed Seated edge of bed;Standing;Bending forward method   Comments gathered clothing with CGA; required CGA during clothing management up    Functional Transfers   Toilet Transfer  Elevated seat;Grab bars;Stand pivot transfer with walker   Amount of Assistance Required CGA   Tub or Shower Type Shower   Amount of Assistance Required CGA   Adaptive Equipment Tub transfer bench;Grab bars; Walker (comment)   S: \"This foot's not doing too good this morning. \"   O: Patient presented seated up in recliner. Agreeable to treatment. Patient completed ADL; see above for FIMs. Patient required constant CGA and experienced multiple LOB during functional mobility around room with RW.   A: Patient remains very unstable during mobility and remains a very high fall risk. Continue to recommend at least CGA during all mobility at discharge. Patient tolerated session well with complaint of discomfort in L foot. Elevated L foot in recliner for edema management and pain relief. P: Continue OT POC with focus on ADL/IADL skills, functional transfers, functional mobility, coordination, strength, static and dynamic balance, and activity tolerance to maximize safety and independence with ADLs and functional transfers.    Patient was left seated up in recliner with call bell/all other needs in reach. Chair alarm activated. Interdisciplinary communication: Collaborated with PT and confirmed that patient is on track to meet goals.      Louis Acosta MS, OTR/L  5/10/2018

## 2018-05-10 NOTE — PROGRESS NOTES
Subjective \"I am doing alright. My big toe is hurting. \"   Activity sorting activity and UE exercises with 1 lb wrist weights   Strength/Endurance Patient took appropriate rest breaks throughout the session. Balance Sit<->stand:  CGA with rollator   Social Interaction Patient was friendly and conversational during the session. She appeared to enjoy the conversation with this therapist.   Cognitive A&O X4   Comments Patient was assisted to her wheel chair and handed off to Alfie Sena PT at the end of the session.      Claudine Mendez, CTRS

## 2018-05-10 NOTE — PROGRESS NOTES
Problem: Falls - Risk of  Goal: *Absence of Falls  Document Jose Fall Risk and appropriate interventions in the flowsheet.    Outcome: Progressing Towards Goal  Fall Risk Interventions:  Mobility Interventions: Utilize walker, cane, or other assitive device    Mentation Interventions: Bed/chair exit alarm, Door open when patient unattended, Evaluate medications/consider consulting pharmacy, Increase mobility    Medication Interventions: Patient to call before getting OOB, Evaluate medications/consider consulting pharmacy, Bed/chair exit alarm    Elimination Interventions: Call light in reach, Patient to call for help with toileting needs, Bed/chair exit alarm    History of Falls Interventions: Bed/chair exit alarm, Consult care management for discharge planning, Door open when patient unattended, Evaluate medications/consider consulting pharmacy

## 2018-05-10 NOTE — PROGRESS NOTES
Patient resting up in bed. Alert and oriented. Lung sounds clear. S1S2, bowel sounds active. Bed alar/chair alarm in place. Reminded patient to use call light when needing any assistance. Repositioned up for breakfast tray. Denies any pain or discomfort. Admits to itching on lower extremities. Assessment completed. No other verbalized needs at present time. See doc flow sheet for further assessments.

## 2018-05-10 NOTE — PROGRESS NOTES
PHYSICAL THERAPY DAILY NOTE  Time In: 1115  Time Out: 6072  Patient Seen For: AM;Gait training; Therapeutic exercise;Transfer training    Subjective: \"I've walked a lot today. \"         Objective: Other (comment) (has pacemaker; fall risk )    TRANSFERS Daily Assessment   Toilet transfer w/ rollator CGA - pt. Needs cont. Cues for safe rollator management Transfer Type: SPT with walker  Transfer Assistance : 4 (Contact guard assistance)  Sit to Stand Assistance: Supervision       GAIT Daily Assessment   Worked on ambulation w/ rollator, min A for LOB w/ L LE toe drag. PT then applied ace wrap df assist & pt. Able to ambulate w/ rollator CGA w improve foot clearance. Amount of Assistance: 4 (Minimal assistance)  Distance (ft): 200 Feet (ft) (x1, 150'x2)  Assistive Device: Walker, rollator       STEPS or STAIRS Daily Assessment   Pt. Ascended/descended 20' ramp w/ rollator min A for LOB w/ toe drag & rollator management         BALANCE Daily Assessment    Sitting - Static: Good (unsupported)  Sitting - Dynamic: Good (unsupported)  Standing - Static: Fair  Standing - Dynamic : Impaired       LOWER EXTREMITY EXERCISES Daily Assessment    Pt. Performed NuStep @ resistance level 1 x15 minutes to increase strength & ROM B LEs     Vital Signs: /57  Pulse 80  Temp 98.1 °F (36.7 °C)  Resp 16  SpO2 92%    Pain level: no c/o pain, denies needing pain medication upon inquiry    Patient education: pt. Educated on purpose of ace wrap to assist w/ toe clearance    Interdisciplinary Communication: discussed dorsiflexion assist options w/ OT in regards to pt. tolerance & ability to don/doff    Pt. Left in recliner in NAD, call bell in reach, alarm activated. Assessment: Pt. demonstates poor carry over of compensatory techniques to clear L LE during gait, frequently demonstrating major LOB & requiring min-mod A to prevent fall. However, with use of ace wrap dorsiflexion assist, pt.  Able to ambulate w/ CGA & no toe drag appreciated. With skin sensitivity & edema issues, do not feel pt. Would be able to tolerate a plastic AFO or a toe up assist (pt. Unable to tolerate the compression from a pair of light socks). Therefore, pt. May benefit from a metal upright AFO to improve L foot clearance & decrease falls risk. Will consult w/ orthotist.         Plan of Care: Continue with POC and progress as tolerated.      Ysabel Duenas, PT  5/10/2018

## 2018-05-10 NOTE — PROGRESS NOTES
PHYSICAL THERAPY DAILY NOTE  Time In: 0916  Time Out: 1001  Patient Seen For: AM;Balance activities;Gait training; Therapeutic exercise;Transfer training    Subjective: Pt. Reports sleeping well last night. Expressing concern that L LE is more swollen today. Tells therapist \"my doctor took a look at it & is going to call my other doctor for me. \"         Objective: Other (comment) (has pacemaker; fall risk )  GROSS ASSESSMENT Daily Assessment   Increased edema L ankle noted this AM w/ skin red & shiny & mottled @ the shin         TRANSFERS Daily Assessment   Continuous VCs to lock/unlock brakes of rollator as well as for safety w/ hand placement Transfer Type: SPT with walker  Transfer Assistance : 4 (Contact guard assistance)  Sit to Stand Assistance: Supervision       GAIT Daily Assessment   Worked on repeated stepping over obstacles while using rollator, pt. Requiring CGA for safety w/ balance as well as max VCs for technique Amount of Assistance: 4 (Minimal assistance)  Distance (ft): 150 Feet (ft) (x3)  Assistive Device: Walker, rollator   Pt. Experienced 1 major LOB in which she became distracted ambulating in hallway & tripped over L LE, requiring mod A to correct & prevent a fall      BALANCE Daily Assessment   Pt. Performed standing reach & retrieval activity in mock grocery store, using rollator for balance. Pt. Able to reach out of GAEL  w/ CGA & max VCs for safe techiques. Sitting - Static: Good (unsupported)  Sitting - Dynamic: Good (unsupported)  Standing - Static: Fair  Standing - Dynamic : Impaired     Vital Signs: /57  Pulse 80  Temp 98.1 °F (36.7 °C)  Resp 16  SpO2 92%    Pain level: reports no change in pain L LE, able to fully participate w/ PT w/o rest breaks for pain, reports she did not take pain medication, but dues not think she needs any right now. Patient education: provided education on safe use of rollator, pt. Requires cont.  Education secondary to poor carry over    Interdisciplinary Communication: collaborated w/ OT regarding pt's progress    Pt. Left in recliner in NAD, call bell in reach. Assessment: Pt. demonstrated improved stepping L LE after stepping activity, but demonstrates decreased carry over w/ functional gait, especially when distracted. And, with LOB, pt. Requires mod A to prevent a fall secondary to poor postural & righting reactions. Pt. Cont. To benefit from PT to decrease falls risk, but will require 24/7 assistance upon d/c.         Plan of Care: Continue with POC and progress as tolerated.      Janelle Mckeon, PT  5/10/2018

## 2018-05-10 NOTE — PROGRESS NOTES
Ashleigh Mccullough MD,   Medical Director  [facility], [address] [facility]  Tel: [phone]       D PROGRESS NOTE    [place]  Admit Date: 4/30/2018  Admit Diagnosis: left feneseal embolictomy; Physical debility    Subjective     Patient seen and examined. Vss. No acute complaints. PT, OT well tolerated. Steady gains made. No new barrier to progress noted.  Still with left foot pain; burning, \"My big toe feels like you lit it with a match\"    Objective:     Current Facility-Administered Medications   Medication Dose Route Frequency    0.9% sodium chloride infusion 250 mL  250 mL IntraVENous PRN    furosemide (LASIX) tablet 10 mg  10 mg Oral DAILY    amLODIPine (NORVASC) tablet 10 mg  10 mg Oral DAILY    amitriptyline (ELAVIL) tablet 10 mg  10 mg Oral QHS    calcium carbonate (OS-CAN) tablet 1,000 mg [elemental]  1,000 mg Oral BID    cholecalciferol (VITAMIN D3) tablet 2,000 Units  2,000 Units Oral DAILY    gabapentin (NEURONTIN) capsule 600 mg  600 mg Oral TID    ferrous sulfate tablet 325 mg  1 Tab Oral BID WITH MEALS    loperamide (IMODIUM) capsule 4 mg  4 mg Oral Q6H PRN    Saccharomyces boulardii (FLORASTOR) capsule 500 mg  500 mg Oral BID    polyethylene glycol (MIRALAX) packet 17 g  17 g Oral DAILY    bisacodyl (DULCOLAX) suppository 10 mg  10 mg Rectal DAILY PRN    phenylephrine suppository 1 Suppository  1 Suppository Rectal TID PRN    acetaminophen (TYLENOL) tablet 500 mg  500 mg Oral Q4H PRN    diphenhydrAMINE (BENADRYL) capsule 25 mg  25 mg Oral Q4H PRN    apixaban (ELIQUIS) tablet 2.5 mg  2.5 mg Oral BID    aspirin delayed-release tablet 81 mg  81 mg Oral DAILY    carvedilol (COREG) tablet 25 mg  25 mg Oral BID WITH MEALS    levothyroxine (SYNTHROID) tablet 25 mcg  25 mcg Oral ACB    lisinopril (PRINIVIL, ZESTRIL) tablet 20 mg  20 mg Oral DAILY    nitroglycerin (NITROSTAT) tablet 0.4 mg  0.4 mg SubLINGual PRN    ondansetron (ZOFRAN ODT) tablet 4 mg  4 mg Oral Q6H PRN    pantoprazole (PROTONIX) tablet 40 mg  40 mg Oral ACB    senna-docusate (PERICOLACE) 8.6-50 mg per tablet 1 Tab  1 Tab Oral QHS    traMADol (ULTRAM) tablet 50 mg  50 mg Oral Q6H PRN     Review of Systems:Denies chest pain, shortness of breath, cough, headache, visual problems, abdominal pain, dysurea, calf pain. Pertinent positives are as noted in the medical records and unremarkable otherwise. Visit Vitals    /57    Pulse 80    Temp 98.1 °F (36.7 °C)    Resp 16    SpO2 92%        Physical Exam:   General: Alert and age appropriately oriented. No acute cardio respiratory distress. HEENT: Normocephalic,no scleral icterus  Oral mucosa moist without cyanosis   Lungs: Clear to auscultation  bilaterally. Respiration even and unlabored   Heart: Regular rate and rhythm, S1, S2   No  murmurs, clicks, rub or gallops   Abdomen: Soft, non-tender, nondistended. Bowel sounds present. No organomegaly. Genitourinary: Benign . Neuromuscular:      Grossly no focal motor deficits noted. Moves ankles;however, lacks dorsiflexion on the left 2, EHL 1    No sensory deficits distally. Exam limited by pain. Skin/extremity: No rashes, no erythema.  No calf tenderness BLE  Wound healing, DP pulse 1+  On the left, 2 on the right                                                                            Functional Assessment:  Gross Assessment  AROM: Generally decreased, functional (05/08/18 1200)  Strength: Generally decreased, functional (05/08/18 1200)  Coordination: Generally decreased, functional (05/08/18 1200)       Balance  Sitting - Static: Good (unsupported) (05/10/18 1200)  Sitting - Dynamic: Good (unsupported) (05/10/18 1200)  Standing - Static: Fair (05/10/18 1200)  Standing - Dynamic : Impaired (05/10/18 1200)           Toileting  Cues: Visual cues provided;Physical assistance for pants up;Physical assistance for pants down (05/05/18 1352)  Adaptive Equipment: Grab bars (05/08/18 0815)         Asiya Ramirez Fall Risk Assessment:  Arash Hooks Risk  Mobility: Ambulates or transfers with assist devices or assistance (05/10/18 0705)  Mobility Interventions: Utilize walker, cane, or other assitive device (05/10/18 0705)  Mentation: Periodic confusion (05/10/18 0705)  Mentation Interventions: Bed/chair exit alarm; Door open when patient unattended;Evaluate medications/consider consulting pharmacy; Increase mobility (05/10/18 0705)  Medication: Patient receiving anticonvulsants, sedatives(tranquilizers), psychotropics or hypnotics, hypoglycemics, narcotics, sleep aids, antihypertensives, laxatives, or diuretics (05/10/18 0705)  Medication Interventions: Patient to call before getting OOB; Evaluate medications/consider consulting pharmacy; Bed/chair exit alarm (05/10/18 4974)  Elimination: Needs assistance with toileting (05/10/18 0705)  Elimination Interventions: Call light in reach; Patient to call for help with toileting needs; Bed/chair exit alarm (05/10/18 0705)  Prior Fall History: Before admission in past 12 months _home or previous inpatient care) (05/10/18 4373)  History of Falls Interventions: Bed/chair exit alarm; Consult care management for discharge planning;Door open when patient unattended;Evaluate medications/consider consulting pharmacy (05/10/18 0067)  Total Score: 5 (05/10/18 0705)  Standard Fall Precautions: Yes (05/10/18 0705)  High Fall Risk: Yes (05/09/18 2018)     Speech Assessment:  Aspiration Signs/Symptoms: None (05/01/18 1305)      Ambulation:  Gait  Base of Support: Narrowed; Center of gravity altered (05/08/18 1200)  Speed/Chandni: Slow;Shuffled;Fluctuations (05/08/18 1200)  Step Length: Right shortened;Left shortened (05/08/18 1200)  Stance: Right increased; Left increased (05/08/18 1200)  Gait Abnormalities: Decreased step clearance;Shuffling gait;Trunk sway increased; Foot drop (foot drop L LE) (05/08/18 1200)  Distance (ft): 200 Feet (ft) (x1, 150'x2) (05/10/18 1200)  Assistive Device: Kim Moellers, rollator (05/10/18 1200)  Rail Use: Both (05/09/18 1400)     Labs/Studies:  Recent Results (from the past 72 hour(s))   URINALYSIS W/ RFLX MICROSCOPIC    Collection Time: 05/07/18  9:25 PM   Result Value Ref Range    Color YELLOW      Appearance CLEAR      Specific gravity 1.010 1.001 - 1.023      pH (UA) 6.0 5.0 - 9.0      Protein NEGATIVE  NEG mg/dL    Glucose NEGATIVE  NEG mg/dL    Ketone NEGATIVE  NEG mg/dL    Bilirubin NEGATIVE  NEG      Blood NEGATIVE  NEG      Urobilinogen 0.2 0.2 - 1.0 EU/dL    Nitrites NEGATIVE  NEG      Leukocyte Esterase NEGATIVE  NEG      Bacteria 0 0 /hpf   CBC W/O DIFF    Collection Time: 05/08/18  6:09 AM   Result Value Ref Range    WBC 5.4 4.3 - 11.1 K/uL    RBC 3.30 (L) 4.05 - 5.25 M/uL    HGB 10.0 (L) 11.7 - 15.4 g/dL    HCT 29.5 (L) 35.8 - 46.3 %    MCV 89.4 79.6 - 97.8 FL    MCH 30.3 26.1 - 32.9 PG    MCHC 33.9 31.4 - 35.0 g/dL    RDW 13.8 11.9 - 14.6 %    PLATELET 053 098 - 561 K/uL    MPV 9.6 (L) 10.8 - 14.1 FL   OCCULT BLOOD, STOOL    Collection Time: 05/08/18  4:30 PM   Result Value Ref Range    Occult blood, stool NEGATIVE  NEG     HGB & HCT    Collection Time: 05/09/18  5:40 AM   Result Value Ref Range    HGB 9.9 (L) 11.7 - 15.4 g/dL    HCT 29.7 (L) 35.8 - 46.3 %   HGB & HCT    Collection Time: 05/10/18  6:14 AM   Result Value Ref Range    HGB 9.9 (L) 11.7 - 15.4 g/dL    HCT 29.4 (L) 35.8 - 00.7 %   METABOLIC PANEL, BASIC    Collection Time: 05/10/18  6:14 AM   Result Value Ref Range    Sodium 134 (L) 136 - 145 mmol/L    Potassium 4.1 3.5 - 5.1 mmol/L    Chloride 97 (L) 98 - 107 mmol/L    CO2 28 21 - 32 mmol/L    Anion gap 9 7 - 16 mmol/L    Glucose 73 65 - 100 mg/dL    BUN 15 8 - 23 MG/DL    Creatinine 1.03 (H) 0.6 - 1.0 MG/DL    GFR est AA >60 >60 ml/min/1.73m2    GFR est non-AA 54 (L) >60 ml/min/1.73m2    Calcium 8.2 (L) 8.3 - 10.4 MG/DL       Assessment:     Problem List as of 5/10/2018  Date Reviewed: 4/30/2018          Codes Class Noted - Resolved    * (Principal)Physical debility ICD-10-CM: R53.81  ICD-9-CM: 799.3  4/30/2018 - Present        Chronic systolic congestive heart failure (HCC) ICD-10-CM: I50.22  ICD-9-CM: 428.22, 428.0  4/26/2018 - Present        Femoral artery occlusion, left (HCC) ICD-10-CM: D48.741  ICD-9-CM: 444.22  4/25/2018 - Present        Type 2 diabetes mellitus with nephropathy (HCC) ICD-10-CM: E11.21  ICD-9-CM: 250.40, 583.81  1/25/2018 - Present        S/P AV noris ablation ICD-10-CM: Z98.890  ICD-9-CM: V45.89  1/16/2018 - Present        Biventricular cardiac pacemaker in situ ICD-10-CM: Z95.0  ICD-9-CM: V45.01  1/16/2018 - Present        Diabetes mellitus, type 2 (HCC) (Chronic) ICD-10-CM: E11.9  ICD-9-CM: 250.00  8/12/2017 - Present        Hypertension (Chronic) ICD-10-CM: I10  ICD-9-CM: 401.9  8/12/2017 - Present    Overview Signed 9/12/2012  3:43 PM by Cathy Brood     Orthostatic hypotension due to autonomic neuropathy             CKD (chronic kidney disease), stage III (Chronic) ICD-10-CM: N18.3  ICD-9-CM: 585.3  8/12/2017 - Present        Dysphagia (Chronic) ICD-10-CM: R13.10  ICD-9-CM: 787.20  8/12/2017 - Present    Overview Signed 4/5/2016  5:24 PM by Nida Red MD     Due to esophageal spasm seen on modified barium swallow 2015             Atrial fibrillation ([facility] (Chronic) ICD-10-CM: I48.91  ICD-9-CM: 427.31  8/12/2017 - Present        DNR (do not resuscitate) (Chronic) ICD-10-CM: Z66  ICD-9-CM: V49.86  8/12/2017 - Present        Ischemic cardiomyopathy ICD-10-CM: I25.5  ICD-9-CM: 414.8  7/11/2017 - Present        Cardiomyopathy ([facility] ICD-10-CM: I42.9  ICD-9-CM: 425.4  3/30/2017 - Present        Dyspnea ICD-10-CM: R06.00  ICD-9-CM: 786.09  2/25/2017 - Present        Atrial fibrillation with rapid ventricular response ([facility] ICD-10-CM: I48.91  ICD-9-CM: 427.31  2/25/2017 - Present        SSS (sick sinus syndrome) ([facility] ICD-10-CM: I49.5  ICD-9-CM: 427.81  5/5/2016 - Present        Orthostatic hypotension ICD-10-CM: I95.1  ICD-9-CM: 458.0  5/5/2016 - Present        CAD in native artery ICD-10-CM: I25.10  ICD-9-CM: 414.01  5/5/2016 - Present        Pacemaker ICD-10-CM: Z95.0  ICD-9-CM: V45.01  11/10/2015 - Present        Cervical radiculopathy ICD-10-CM: M54.12  ICD-9-CM: 723.4  5/23/2013 - Present    Overview Addendum 8/26/2013  4:59 PM by Hunter Montgomery MD     Chronic right shoulder pain S/P eval Dr Amber Rios POC August 2013, previous cervical spinal fusion             Autonomic neuropathy ICD-10-CM: G90.9  ICD-9-CM: 337.9  1/13/2013 - Present    Overview Signed 1/13/2013  8:18 PM by Hunter Montgomery MD     Severe orthostatic hypotension             MCI (mild cognitive impairment) ICD-10-CM: G31.84  ICD-9-CM: 331.83  1/13/2013 - Present    Overview Addendum 12/16/2013  4:43 PM by Hunter Montgomery MD     MMSE 27/30 Nov 2009, 27/30 Dec 2013, remembers 3/3 objects 5 min later and draws normal clock             Chronic pain syndrome ICD-10-CM: G89.4  ICD-9-CM: 338.4  1/13/2013 - Present    Overview Addendum 11/10/2015  5:15 PM by Hunter Montgomery MD     Back, right shoulder, neck: Peripheral neuropathy, spinal stenosis C7-T1, foraminal narrowing C4-5             Acquired hypothyroidism (Chronic) ICD-10-CM: E03.9  ICD-9-CM: 244.9  9/12/2012 - Present        Mixed hyperlipidemia (Chronic) ICD-10-CM: E78.2  ICD-9-CM: 272.2  9/12/2012 - Present    Overview Addendum 9/12/2012  3:51 PM by Nicolasa Nolasco     With high HDL  Intolerant of pravastatin,lovastatin and lescol             GERD (gastroesophageal reflux disease) (Chronic) ICD-10-CM: K21.9  ICD-9-CM: 530.81  9/12/2012 - Present        IBS (irritable bowel syndrome) (Chronic) ICD-10-CM: K58.9  ICD-9-CM: 564.1  9/12/2012 - Present    Overview Signed 9/12/2012  3:52 PM by Nicolasa Nolasco     Chronic enema abuse  diverticulosis             Gait disorder (Chronic) ICD-10-CM: R26.9  ICD-9-CM: 781.2  9/12/2012 - Present    Overview Signed 9/12/2012  3:53 PM by Nicolasa Nolasco Diabetic peripheral neuropathy              Atrial fibrillation with RVR (HCC) ICD-10-CM: I48.91  ICD-9-CM: 427.31  9/12/2012 - Present    Overview Addendum 10/14/2012  9:34 AM by Janice Real MD     Paroxysmal.  Coumadin contraindicated due to falls               RESOLVED: RUSS (acute kidney injury) ([facility] ICD-10-CM: N17.9  ICD-9-CM: 584.9  8/13/2017 - 10/19/2017        RESOLVED: Acute respiratory failure with hypoxemia (HCC) ICD-10-CM: J96.01  ICD-9-CM: 518.81  8/12/2017 - 9/15/2017        RESOLVED: CAP (community acquired pneumonia) ICD-10-CM: J18.9  ICD-9-CM: 486  8/12/2017 - 9/15/2017        RESOLVED: CHF (congestive heart failure) ([facility] ICD-10-CM: I50.9  ICD-9-CM: 428.0  3/18/2017 - 4/26/2018        RESOLVED: Altered mental status ICD-10-CM: R41.82  ICD-9-CM: 780.97  3/10/2017 - 10/19/2017        RESOLVED: Altered mental state ICD-10-CM: R41.82  ICD-9-CM: 780.97  3/9/2017 - 10/19/2017        RESOLVED: Chest pain, pleuritic ICD-10-CM: R07.81  ICD-9-CM: 786.52  3/9/2017 - 10/19/2017        RESOLVED: Weight loss ICD-10-CM: R63.4  ICD-9-CM: 783.21  9/2/2014 - 11/10/2015        RESOLVED: Anorexia ICD-10-CM: R63.0  ICD-9-CM: 783.0  9/2/2014 - 10/19/2017        RESOLVED: Iron deficiency anemia, unspecified ICD-10-CM: D50.9  ICD-9-CM: 280.9  9/12/2012 - 4/16/2013    Overview Addendum 1/13/2013  8:21 PM by Janice Real MD     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 Aspiration precautions      Dysphagia; consider MBS; will have ST eval. HOB > 30d  -5/2 ST eval yesterday, no overt signs of dysphagia/ aspiration      Post op anemia due to blood loss; gi prophylaxis, monitor wound; check stool.  Check iron studies as well  -hgb 10.1  -5/2 drop in Hgb to 8.4; significant iron deficiency; start supplement, hemmoc stool; hold aspirin/eliquis until stool comes back ; no gi symptoms to suggest gastritis/ ulcer.  -5/4 staff did not send last stool; pending. hgb now 8.1; asymp. Denies melena, no hematoma at wound site. Started iron yesterday for severe iron deficiency ; gi consult if stool positive  -5/5 8.0 stool neg; no visible sign of bleeding; abd soft, femoral inc clean, no abd pain or back pain to suggest something internal; daily labs; consult if needed  -5/6 improved at 8.4  -5/7 hgb 7.9, known iron deficiency, likely multifactoria. Reports that she went to the [facility] in the [facility]; reced Epogen; doesn't know why and cannot find records of such. Will transfuse one unit today. Has hx of a colon AVM but stools neg, no abd pain. Will consult hematology/ at her age, aggressive txs no appropriate  -5/8 hgb 10 s/p one unit; will watch trend; discussed with dtg over phone  -5/9 hgb 9.9 and second stool neg; 5/10 9.9      DVT risk / DVT Prophylaxis- Will require daily physician exam to assess for signs and symptoms as patient is at increased risk for of thromboembolism. Mobilization as tolerated. Intermittent pneumatic compression devices when in bed Thigh-high or knee-high thromboembolic deterrent hose when out of bed. Eliquis/ASA; 5/7 hold for now due to anemia of unclear etiology  -resume eliquis and baby aspirin; needs due to severe PVD      Cervical stenosis with autonomic dysfxn; orthostatic hypotension; monitor with therapies. Cont TEDs  -5/2 no overt symptoms; bp stable      Pain Control: stable, mild-to-moderate joint symptoms intermittently, reasonably well controlled by PRN meds. Will require regular pain assessment and comprenhensive pain management,   -pt with vascular pain and hypersensitivity to LLE; cont Neurontin, tolerating 600mg tid; does not wake her up at noc; cont ultram as well  -5/2 lethargic, multifactorial; dec neurontin   -5/3 much more alert.  Has been on Neurontin for years; can inc back to home dose; lethargy likely due to UTI and poor sleep; monitor; cont ultram and add 10mg Elavil; having burning pain in Left foot/ankle; lacks DP pulse; will see if we can doppler; however, foot warm to touch, no discoloration with nl cap refill  -5/4 left DP pulse found easier today with dec in edema. No discoloration/ foot warm; 5/6 complains less of pain but still burns in left ankle/foot  -5/8 cont Neurontin; seems to complain less of left ankle paresthesias  -5/10 d/w Dr Chase Khan today. Painful vasculopathic pain common in delayed embolectomies      Hx of DM; on amaryl in the past, currently nothing; bs 143, change to diab diet; bs improved; check qam only  -5/9 not checking ; bs fine      Hypocalcemia; cont Oscal and add Vit D. Will see when pt had last bone density scan. Improved 8.6 F/u with PCP  -5/2 calcium 8.1 from 8.6, check Vit d ; 5/3 inc supplement  -5/5 vit D levels nl at 41      Wound Care: Monitor wound status daily per staff and physician. At risk for failure. Will require 24/7 rehab nursing. Keep wound clean and dry, Reinforce dressing PRN and Ice to area for comfort      Hypertension - BP uncontrolled, fluctuating, managed medically. Cont Coreg, lasix, zestril   -poorly controlled 5/1 sbp 175-187; however dropped to 112; has hx of orthostatics; monitor with activity  -5/3 bp 170s, add norvasc with parameters; 5/4 150/75; slt improvement; inc Norvasc but watch for edema  -5/5 148/75; will not adjust meds at this time; 5/6 118/66; 5/9 126/68      Hx afib; NSR, rate controlled; cont coreg and eliquis  -NSR 5/9      Hyponatremia; Na 131 5/3 and 5/4; can hold lasix, zestril and coreg contributing but bp not controlled without it, added norvasc; recheck in a.m. ; free water restrict; DC MACRODANTIN; known cause, raghu in elderly on diuretics  -5/5 Na unchanged at 131; monitor; 5/7 improved off macrodantin and holding lasix; 134; restarting lasix 10 mg; labs 5/10; Na 134, stable      RUSS ; monitor labs.  Check UA due to [place]. Improving as of 4/28; no labs since that time; may need to consider adjusting Lasix and zestril  -5/1 slt bump in creat from 1.18- 1.28 ; recheck daily. Very little po today, may need IV fluids  -5/2 IVFs overnoc, creat 1.11, improved; 1.16 (1.28); had held lasix but will restart at 10mg; recheck bmp on Thurs 5/10 excellent; creat 1.03      Hypothyroidism; cont synthroid; 5/4 check TSH; normal    Left foot drop; this makes her unsafe. PT has decided on AFO if she will wear. Off balance and unsafe. Will need supervision at home. Trying to get a feel for when this foot drop started, pt insists it was post op. No other issue; may be due to known neuropathy?      Urinary retention/ neurogenic bladder - schedule voids q6-8 hrs. Check post-void residual as needed; In and Out catheterize if post-void residual is more than 400 cc.  -5/1 urin frq; check UA, check post void residual  -5/3 urine finally sent last pm. 4+ bact, nitrite positive, leuk neg and few bacteria; received one gram IM Rocephin and started on Macrobid; await cx. No dysuria + freq  -5/4 reorder cx; not done; +dysuria, but otherwise asymptomatic x frequency (may be chronic though); no fever, WBC nl; DC macrodantin due to hyponatremia; Rocephin 1g IV x 3d  -5/5 urine cx pending; NEG      bowel program - add prn bowel program  -5/1 impacted, did not sleep. Given suppos and disimpacted for mod amt of stool . No n/v. Limited po today. Labs stable. No gaurding or rebound on exam  -add anusol suppos due to rectal pain and suspected internal hemmor. With blood streaked stool.  -5/3 now just mucus, no occult blood. hbg has dropped; check hemoccult  -5/5 hgb continues to trend down. 8.4, now 8.0 from 8.1 . Stool neg for blood. Wound site of embolectomy clean, no swelling or evidence of hemorrhage; started oral iron due to iron deficiency   -5/9 continent      GERD - resume PPI.  At times may need additional antacids, Maalox prn.     -pt is DNR               Time spent was 25 minutes with over 1/2 in direct patient care/examination, consultation and coordination of care.      Signed By: Galina Barron MD     May 10, 2018

## 2018-05-10 NOTE — PROGRESS NOTES
OT Daily Note  Time In 1030   Time Out 1115     Subjective: \"Well, that one was a lot harder than the first one. \"   Pain: none reported  Education: benefits of rehab   Interdisciplinary Communication: handoff from rehab tech   Precautions:  (pacemaker, fall risk)  Location on arrival: up at OT table by rehab tech     Activity Tolerance Daily Assessment   Patient completed UBE x 10 minutes x light/moderate resistance, going in 2 direction(s) with 1 rest break(s), working on BUE strengthening and functional activity tolerance. Would benefit from further activity tolerance tasks. Visual-Perceptual Daily Assessment   Patient completed visual perceptual reasoning task replicating two simple patterns using rubberbands on elevated board with no errors and no assist on first pattern and multiple errors and mod A to complete second pattern. Decreased ability to identify and solve errors. Patient was left seated up in [facility] in gym for PT. Assessment: Remains with decreased problem solving and decreased visual perceptual skills. Plan: Continue OT POC with focus on ADL/IADL skills, functional transfers, functional mobility, coordination, strength, static and dynamic balance, and activity tolerance to maximize safety and independence with ADLs and functional transfers.      Jose Rice MS, OTR/L  5/10/2018

## 2018-05-10 NOTE — PROGRESS NOTES
Team conference. Discharge scheduled 5/15 to home with family. HH PT and OT recommended. Will need 24/7 supervision. Discussed with pt and daughter. Discussed safety concerns, memory deficits and need for supervision. Daughter indicated understanding and states family will provide needed assistance. Family training scheduled for Monday at 8:30. Request [facility]. Continue to follow.

## 2018-05-11 LAB
HCT VFR BLD AUTO: 28.9 % (ref 35.8–46.3)
HGB BLD-MCNC: 9.7 G/DL (ref 11.7–15.4)

## 2018-05-11 PROCEDURE — 97530 THERAPEUTIC ACTIVITIES: CPT

## 2018-05-11 PROCEDURE — 65310000000 HC RM PRIVATE REHAB

## 2018-05-11 PROCEDURE — 36415 COLL VENOUS BLD VENIPUNCTURE: CPT | Performed by: PHYSICAL MEDICINE & REHABILITATION

## 2018-05-11 PROCEDURE — 97116 GAIT TRAINING THERAPY: CPT

## 2018-05-11 PROCEDURE — 77030027688 HC DRSG MEPILEX 16-48IN NO BORD MOLN -A

## 2018-05-11 PROCEDURE — 97535 SELF CARE MNGMENT TRAINING: CPT

## 2018-05-11 PROCEDURE — 77030011256 HC DRSG MEPILEX <16IN NO BORD MOLN -A

## 2018-05-11 PROCEDURE — 97110 THERAPEUTIC EXERCISES: CPT

## 2018-05-11 PROCEDURE — 74011250637 HC RX REV CODE- 250/637: Performed by: PHYSICAL MEDICINE & REHABILITATION

## 2018-05-11 PROCEDURE — 85014 HEMATOCRIT: CPT | Performed by: PHYSICAL MEDICINE & REHABILITATION

## 2018-05-11 PROCEDURE — 99232 SBSQ HOSP IP/OBS MODERATE 35: CPT | Performed by: PHYSICAL MEDICINE & REHABILITATION

## 2018-05-11 RX ADMIN — GABAPENTIN 600 MG: 300 CAPSULE ORAL at 17:13

## 2018-05-11 RX ADMIN — STANDARDIZED SENNA CONCENTRATE AND DOCUSATE SODIUM 1 TABLET: 8.6; 5 TABLET, FILM COATED ORAL at 22:00

## 2018-05-11 RX ADMIN — FUROSEMIDE 10 MG: 20 TABLET ORAL at 08:17

## 2018-05-11 RX ADMIN — ASPIRIN 81 MG: 81 TABLET, COATED ORAL at 08:20

## 2018-05-11 RX ADMIN — Medication 500 MG: at 17:12

## 2018-05-11 RX ADMIN — CARVEDILOL 25 MG: 25 TABLET, FILM COATED ORAL at 08:15

## 2018-05-11 RX ADMIN — FERROUS SULFATE TAB 325 MG (65 MG ELEMENTAL FE) 325 MG: 325 (65 FE) TAB at 17:14

## 2018-05-11 RX ADMIN — CARVEDILOL 25 MG: 25 TABLET, FILM COATED ORAL at 17:15

## 2018-05-11 RX ADMIN — LISINOPRIL 20 MG: 20 TABLET ORAL at 08:15

## 2018-05-11 RX ADMIN — APIXABAN 2.5 MG: 2.5 TABLET, FILM COATED ORAL at 22:00

## 2018-05-11 RX ADMIN — VITAMIN D, TAB 1000IU (100/BT) 2000 UNITS: 25 TAB at 08:19

## 2018-05-11 RX ADMIN — AMITRIPTYLINE HYDROCHLORIDE 10 MG: 10 TABLET, FILM COATED ORAL at 22:00

## 2018-05-11 RX ADMIN — LEVOTHYROXINE SODIUM 25 MCG: 50 TABLET ORAL at 06:25

## 2018-05-11 RX ADMIN — LEVOTHYROXINE SODIUM 25 MCG: 50 TABLET ORAL at 08:18

## 2018-05-11 RX ADMIN — Medication 1000 MG: at 08:19

## 2018-05-11 RX ADMIN — Medication 500 MG: at 08:15

## 2018-05-11 RX ADMIN — GABAPENTIN 600 MG: 300 CAPSULE ORAL at 22:00

## 2018-05-11 RX ADMIN — Medication 1000 MG: at 17:14

## 2018-05-11 RX ADMIN — AMLODIPINE BESYLATE 10 MG: 10 TABLET ORAL at 08:18

## 2018-05-11 RX ADMIN — PANTOPRAZOLE SODIUM 40 MG: 40 TABLET, DELAYED RELEASE ORAL at 08:19

## 2018-05-11 RX ADMIN — GABAPENTIN 600 MG: 300 CAPSULE ORAL at 06:28

## 2018-05-11 RX ADMIN — FERROUS SULFATE TAB 325 MG (65 MG ELEMENTAL FE) 325 MG: 325 (65 FE) TAB at 08:16

## 2018-05-11 RX ADMIN — APIXABAN 2.5 MG: 2.5 TABLET, FILM COATED ORAL at 08:16

## 2018-05-11 RX ADMIN — PANTOPRAZOLE SODIUM 40 MG: 40 TABLET, DELAYED RELEASE ORAL at 06:25

## 2018-05-11 NOTE — PROGRESS NOTES
OT Daily Note  Time In 1303   Time Out 1350     Pain: Pt stated her elbow was a little sore. Steri strips on wound on elbow were pulling off with some drainage on shirt sleeve. Replaced bandage and communicated with SALVATORE Harris and PT Fatou Allen. Functional Mobility   Pt sit<>stand with CGA and RW with impulsivity to get up before lowering recliner foot rest with moderate verbal cues to put it down first in order to be safe. Pt walked with RW and CGA around room and in therapy gym. Self-Care   Pt toileted with CGA. Pt had 1 slight LOB secondary to turning too quickly to go in to the bathroom which she acknowledged but required CGA to correct. Balance   Pt walked with RW and CGA around therapy gym to gather 6 laundry items off lower surfaces. Pt had no LOB but required max verbal cues to not walk away from the RW and to reach back before sitting down. Pt folded 8 laundry items in sitting. Pt demonstrated increased fatigue following laundry activity and required a rest break following activity. Cognition   Pt engaged in 1 visual-perceptual pattern activity for integration into novel cognitive activities. Pt required maximal cues for orientation of 75% of the blocks with minimal improvements in self-correction throughout task. Pt would benefit from further cognitive activities for integration into IADL. Education   Transfer and RW safety     Interdisciplinary Communication: SALVATORE Harris and PT Fatou Allen on wound dressing    Plan: Continue with POC. Pt was left with PT Kristine Leach Arms OTS  5/11/2018

## 2018-05-11 NOTE — PROGRESS NOTES
PHYSICAL THERAPY DAILY NOTE  Time In: 0919  Time Out: 1000  Patient Seen For: AM;Gait training;Balance activities;Transfer training    Subjective: \"I fell last night. I didn't sleep well. \"         Objective: Other (comment) (has pacemaker; fall risk )    TRANSFERS Daily Assessment   Toilet transfer CGA using grab bar Transfer Type: SPT with walker  Transfer Assistance : 4 (Contact guard assistance)  Sit to Stand Assistance: Supervision       GAIT Daily Assessment   Flexed posture, poor safety awareness w/ constant cues to lock/unlock brakes, cues to slow anahi down as rollator tends to get too far in front Amount of Assistance: 4 (Contact guard assistance)  Distance (ft): 200 Feet (ft) (x1, 150'x2)  Assistive Device: Walker, rollator; Other (comment) (ace wrap df assist)       STEPS or STAIRS Daily Assessment   Step to gait pattern Steps/Stairs Ambulated (#): 4  Level of Assist : 4 (Contact guard assistance)  Rail Use: Both       BALANCE Daily Assessment   Pt. Performed dynamic standing balance activity to work in reaching out of GAEL. Pt. W/ posterior LOB & absent stepping response, requiring min A to correct LOB. Standing balance during hand hygiene & lower body dressing CGA for posterior cueing Sitting - Static: Good (unsupported)  Sitting - Dynamic: Good (unsupported)  Standing - Static: Fair  Standing - Dynamic : Impaired     Vital Signs: /77 (BP Patient Position: At rest)  Pulse 73  Temp 98 °F (36.7 °C)  Resp 16  SpO2 95%    Pain level: pt. C/o L LE pain, but unable to rate, declines wanting pain medication    Patient education: pt. Educated on safety w/ rollator - poor carry over, requiring constant education    Interdisciplinary Communication: collaborated w/ OT regarding pt's progress    Pt. Left up in recliner in NAD, call bell in reach, chair alarm activated. Assessment: Pt. Gradually is building up endurance, tolerating greater distances using rollator.   Dorsiflexion assist on L LE has greatly improved gait quality & safety w/ no tripping over L foot noted this visit. However, pt. Continues to require CGA for all mobility due to poor safety awareness & impulsivity, so cont. To recommend 24/7 assist @ home upon d/c.         Plan of Care: Continue with POC and progress as tolerated.      Blanca Sharpe, PT  5/11/2018

## 2018-05-11 NOTE — PROGRESS NOTES
Problem: Falls - Risk of  Goal: *Absence of Falls  Document Jose Fall Risk and appropriate interventions in the flowsheet.    Outcome: Progressing Towards Goal  Fall Risk Interventions:  Mobility Interventions: Utilize walker, cane, or other assitive device, PT Consult for mobility concerns, Bed/chair exit alarm    Mentation Interventions: Bed/chair exit alarm, Door open when patient unattended, Evaluate medications/consider consulting pharmacy    Medication Interventions: Patient to call before getting OOB    Elimination Interventions: Call light in reach    History of Falls Interventions: Bed/chair exit alarm, Consult care management for discharge planning, Door open when patient unattended

## 2018-05-11 NOTE — PROGRESS NOTES
Scott Garner MD,   Medical Director  [facility], [address]  Tel: [phone]       D PROGRESS NOTE    Lorie Machado  Admit Date: 4/30/2018  Admit Diagnosis: left feneseal embolictomy; Physical debility    Subjective     Doing fine per pt. Petarzenaida Carl last noc getting out of bed unattended. Has poor insight of her balance deficits. \" I dont want to talk about it. \" denies cp, sob, n/v. Slept fine.  Appetite good    Objective:     Current Facility-Administered Medications   Medication Dose Route Frequency    0.9% sodium chloride infusion 250 mL  250 mL IntraVENous PRN    furosemide (LASIX) tablet 10 mg  10 mg Oral DAILY    amLODIPine (NORVASC) tablet 10 mg  10 mg Oral DAILY    amitriptyline (ELAVIL) tablet 10 mg  10 mg Oral QHS    calcium carbonate (OS-CAN) tablet 1,000 mg [elemental]  1,000 mg Oral BID    cholecalciferol (VITAMIN D3) tablet 2,000 Units  2,000 Units Oral DAILY    gabapentin (NEURONTIN) capsule 600 mg  600 mg Oral TID    ferrous sulfate tablet 325 mg  1 Tab Oral BID WITH MEALS    loperamide (IMODIUM) capsule 4 mg  4 mg Oral Q6H PRN    Saccharomyces boulardii (FLORASTOR) capsule 500 mg  500 mg Oral BID    polyethylene glycol (MIRALAX) packet 17 g  17 g Oral DAILY    bisacodyl (DULCOLAX) suppository 10 mg  10 mg Rectal DAILY PRN    phenylephrine suppository 1 Suppository  1 Suppository Rectal TID PRN    acetaminophen (TYLENOL) tablet 500 mg  500 mg Oral Q4H PRN    diphenhydrAMINE (BENADRYL) capsule 25 mg  25 mg Oral Q4H PRN    apixaban (ELIQUIS) tablet 2.5 mg  2.5 mg Oral BID    aspirin delayed-release tablet 81 mg  81 mg Oral DAILY    carvedilol (COREG) tablet 25 mg  25 mg Oral BID WITH MEALS    levothyroxine (SYNTHROID) tablet 25 mcg  25 mcg Oral ACB    lisinopril (PRINIVIL, ZESTRIL) tablet 20 mg  20 mg Oral DAILY    nitroglycerin (NITROSTAT) tablet 0.4 mg  0.4 mg SubLINGual PRN    ondansetron (ZOFRAN ODT) tablet 4 mg  4 mg Oral Q6H PRN    pantoprazole (PROTONIX) tablet 40 mg  40 mg Oral ACB    senna-docusate (PERICOLACE) 8.6-50 mg per tablet 1 Tab  1 Tab Oral QHS    traMADol (ULTRAM) tablet 50 mg  50 mg Oral Q6H PRN     Review of Systems:Denies chest pain, shortness of breath, cough, headache, visual problems, abdominal pain, dysurea, calf pain. Pertinent positives are as noted in the medical records and unremarkable otherwise. Visit Vitals    /77 (BP Patient Position: At rest)    Pulse 73    Temp 98 °F (36.7 °C)    Resp 16    SpO2 95%        Physical Exam:   General: Alert and age appropriately oriented. No acute cardio respiratory distress. HEENT: Normocephalic,no scleral icterus  Oral mucosa moist without cyanosis   Lungs: Clear to auscultation  bilaterally. Respiration even and unlabored   Heart: Regular rate and rhythm, S1, S2   No  murmurs, clicks, rub or gallops   Abdomen: Soft, non-tender, nondistended. Bowel sounds present. No organomegaly. Genitourinary: defer   Neuromuscular:      No new deficits; left foot drop unchanged  Poor insight     Skin/extremity: No rashes, no erythema. No calf tenderness BLE  Wound left elbow and dorsum left hand; no edema, no bony deformity.                                                                             Functional Assessment:  Gross Assessment  AROM: Generally decreased, functional (05/08/18 1200)  Strength: Generally decreased, functional (05/08/18 1200)  Coordination: Generally decreased, functional (05/08/18 1200)       Balance  Sitting - Static: Good (unsupported) (05/10/18 1200)  Sitting - Dynamic: Good (unsupported) (05/10/18 1200)  Standing - Static: Fair (05/10/18 1200)  Standing - Dynamic : Impaired (05/10/18 1200)           Toileting  Cues: Visual cues provided;Physical assistance for pants up;Physical assistance for pants down (05/05/18 5344)  Adaptive Equipment: Grab bars (05/08/18 0199)         June Calvert Fall Risk Assessment:  Edil Garcia Risk  Mobility: Ambulates or transfers with assist devices or assistance (05/11/18 0705)  Mobility Interventions: Utilize walker, cane, or other assitive device;PT Consult for mobility concerns;Bed/chair exit alarm (05/11/18 0705)  Mentation: Periodic confusion (05/11/18 0705)  Mentation Interventions: Bed/chair exit alarm; Door open when patient unattended;Evaluate medications/consider consulting pharmacy (05/11/18 6010)  Medication: Patient receiving anticonvulsants, sedatives(tranquilizers), psychotropics or hypnotics, hypoglycemics, narcotics, sleep aids, antihypertensives, laxatives, or diuretics (05/11/18 0705)  Medication Interventions: Patient to call before getting OOB (05/11/18 2748)  Elimination: Needs assistance with toileting (05/11/18 0705)  Elimination Interventions: Call light in reach (05/11/18 0705)  Prior Fall History: Before admission in past 12 months _home or previous inpatient care) (05/11/18 3459)  History of Falls Interventions: Bed/chair exit alarm; Consult care management for discharge planning;Door open when patient unattended (05/11/18 0705)  Total Score: 5 (05/11/18 0705)  Standard Fall Precautions: Yes (05/11/18 0705)  High Fall Risk: Yes (05/09/18 2018)     Speech Assessment:  Aspiration Signs/Symptoms: None (05/01/18 1305)      Ambulation:  Gait  Base of Support: Narrowed; Center of gravity altered (05/08/18 1200)  Speed/Chandni: Slow;Shuffled;Fluctuations (05/08/18 1200)  Step Length: Right shortened;Left shortened (05/08/18 1200)  Stance: Right increased; Left increased (05/08/18 1200)  Gait Abnormalities: Decreased step clearance;Shuffling gait;Trunk sway increased; Foot drop (foot drop L LE) (05/08/18 1200)  Distance (ft): 200 Feet (ft) (x1, 150'x2) (05/10/18 1200)  Assistive Device: Walker, rollator (05/10/18 1200)  Rail Use: Both (05/09/18 1400)     Labs/Studies:  Recent Results (from the past 72 hour(s))   OCCULT BLOOD, STOOL    Collection Time: 05/08/18  4:30 PM   Result Value Ref Range    Occult blood, stool NEGATIVE  NEG     HGB & HCT    Collection Time: 05/09/18  5:40 AM   Result Value Ref Range    HGB 9.9 (L) 11.7 - 15.4 g/dL    HCT 29.7 (L) 35.8 - 46.3 %   HGB & HCT    Collection Time: 05/10/18  6:14 AM   Result Value Ref Range    HGB 9.9 (L) 11.7 - 15.4 g/dL    HCT 29.4 (L) 35.8 - 57.5 %   METABOLIC PANEL, BASIC    Collection Time: 05/10/18  6:14 AM   Result Value Ref Range    Sodium 134 (L) 136 - 145 mmol/L    Potassium 4.1 3.5 - 5.1 mmol/L    Chloride 97 (L) 98 - 107 mmol/L    CO2 28 21 - 32 mmol/L    Anion gap 9 7 - 16 mmol/L    Glucose 73 65 - 100 mg/dL    BUN 15 8 - 23 MG/DL    Creatinine 1.03 (H) 0.6 - 1.0 MG/DL    GFR est AA >60 >60 ml/min/1.73m2    GFR est non-AA 54 (L) >60 ml/min/1.73m2    Calcium 8.2 (L) 8.3 - 10.4 MG/DL   HGB & HCT    Collection Time: 05/11/18  5:25 AM   Result Value Ref Range    HGB 9.7 (L) 11.7 - 15.4 g/dL    HCT 28.9 (L) 35.8 - 46.3 %       Assessment:     Problem List as of 5/11/2018  Date Reviewed: 4/30/2018          Codes Class Noted - Resolved    * (Principal)Physical debility ICD-10-CM: R53.81  ICD-9-CM: 799.3  4/30/2018 - Present        Chronic systolic congestive heart failure (HCC) ICD-10-CM: I50.22  ICD-9-CM: 428.22, 428.0  4/26/2018 - Present        Femoral artery occlusion, left (HCC) ICD-10-CM: Z16.196  ICD-9-CM: 444.22  4/25/2018 - Present        Type 2 diabetes mellitus with nephropathy (HCC) ICD-10-CM: E11.21  ICD-9-CM: 250.40, 583.81  1/25/2018 - Present        S/P AV noris ablation ICD-10-CM: Z98.890  ICD-9-CM: V45.89  1/16/2018 - Present        Biventricular cardiac pacemaker in situ ICD-10-CM: Z95.0  ICD-9-CM: V45.01  1/16/2018 - Present        Diabetes mellitus, type 2 (HCC) (Chronic) ICD-10-CM: E11.9  ICD-9-CM: 250.00  8/12/2017 - Present        Hypertension (Chronic) ICD-10-CM: I10  ICD-9-CM: 401.9  8/12/2017 - Present    Overview Signed 9/12/2012  3:43 PM by Anjel Davalos     Orthostatic hypotension due to autonomic neuropathy CKD (chronic kidney disease), stage III (Chronic) ICD-10-CM: N18.3  ICD-9-CM: 585.3  8/12/2017 - Present        Dysphagia (Chronic) ICD-10-CM: R13.10  ICD-9-CM: 787.20  8/12/2017 - Present    Overview Signed 4/5/2016  5:24 PM by Daria Turcios MD     Due to esophageal spasm seen on modified barium swallow 2015             Atrial fibrillation ([facility] (Chronic) ICD-10-CM: I48.91  ICD-9-CM: 427.31  8/12/2017 - Present        DNR (do not resuscitate) (Chronic) ICD-10-CM: Z66  ICD-9-CM: V49.86  8/12/2017 - Present        Ischemic cardiomyopathy ICD-10-CM: I25.5  ICD-9-CM: 414.8  7/11/2017 - Present        Cardiomyopathy ([facility] ICD-10-CM: I42.9  ICD-9-CM: 425.4  3/30/2017 - Present        Dyspnea ICD-10-CM: R06.00  ICD-9-CM: 786.09  2/25/2017 - Present        Atrial fibrillation with rapid ventricular response ([facility] ICD-10-CM: I48.91  ICD-9-CM: 427.31  2/25/2017 - Present        SSS (sick sinus syndrome) ([facility] ICD-10-CM: I49.5  ICD-9-CM: 427.81  5/5/2016 - Present        Orthostatic hypotension ICD-10-CM: I95.1  ICD-9-CM: 458.0  5/5/2016 - Present        CAD in native artery ICD-10-CM: I25.10  ICD-9-CM: 414.01  5/5/2016 - Present        Pacemaker ICD-10-CM: Z95.0  ICD-9-CM: V45.01  11/10/2015 - Present        Cervical radiculopathy ICD-10-CM: M54.12  ICD-9-CM: 723.4  5/23/2013 - Present    Overview Addendum 8/26/2013  4:59 PM by Daria Turcios MD     Chronic right shoulder pain S/P eval Dr Carlotta Gowers POC August 2013, previous cervical spinal fusion             Autonomic neuropathy ICD-10-CM: G90.9  ICD-9-CM: 337.9  1/13/2013 - Present    Overview Signed 1/13/2013  8:18 PM by Daria Turcios MD     Severe orthostatic hypotension             MCI (mild cognitive impairment) ICD-10-CM: G31.84  ICD-9-CM: 331.83  1/13/2013 - Present    Overview Addendum 12/16/2013  4:43 PM by Daria Turcios MD     MMSE 27/30 Nov 2009, 27/30 Dec 2013, remembers 3/3 objects 5 min later and draws normal clock             Chronic pain syndrome ICD-10-CM: G89.4  ICD-9-CM: 338.4  1/13/2013 - Present    Overview Addendum 11/10/2015  5:15 PM by Marshall Scott MD     Back, right shoulder, neck: Peripheral neuropathy, spinal stenosis C7-T1, foraminal narrowing C4-5             Acquired hypothyroidism (Chronic) ICD-10-CM: E03.9  ICD-9-CM: 244.9  9/12/2012 - Present        Mixed hyperlipidemia (Chronic) ICD-10-CM: E78.2  ICD-9-CM: 272.2  9/12/2012 - Present    Overview Addendum 9/12/2012  3:51 PM by Jose Victoria     With high HDL  Intolerant of pravastatin,lovastatin and lescol             GERD (gastroesophageal reflux disease) (Chronic) ICD-10-CM: K21.9  ICD-9-CM: 530.81  9/12/2012 - Present        IBS (irritable bowel syndrome) (Chronic) ICD-10-CM: K58.9  ICD-9-CM: 564.1  9/12/2012 - Present    Overview Signed 9/12/2012  3:52 PM by Jose Victoria     Chronic enema abuse  diverticulosis             Gait disorder (Chronic) ICD-10-CM: R26.9  ICD-9-CM: 781.2  9/12/2012 - Present    Overview Signed 9/12/2012  3:53 PM by Jose Victoria     Diabetic peripheral neuropathy              Atrial fibrillation with RVR ([facility] ICD-10-CM: I48.91  ICD-9-CM: 427.31  9/12/2012 - Present    Overview Addendum 10/14/2012  9:34 AM by Marshall Scott MD     Paroxysmal.  Coumadin contraindicated due to falls               RESOLVED: RUSS (acute kidney injury) ([facility] ICD-10-CM: N17.9  ICD-9-CM: 584.9  8/13/2017 - 10/19/2017        RESOLVED: Acute respiratory failure with hypoxemia ([facility] ICD-10-CM: J96.01  ICD-9-CM: 518.81  8/12/2017 - 9/15/2017        RESOLVED: CAP (community acquired pneumonia) ICD-10-CM: J18.9  ICD-9-CM: 660  8/12/2017 - 9/15/2017        RESOLVED: CHF (congestive heart failure) ([facility] ICD-10-CM: I50.9  ICD-9-CM: 428.0  3/18/2017 - 4/26/2018        RESOLVED: Altered mental status ICD-10-CM: R41.82  ICD-9-CM: 780.97  3/10/2017 - 10/19/2017        RESOLVED: Altered mental state ICD-10-CM: R41.82  ICD-9-CM: 780.97  3/9/2017 - 10/19/2017        RESOLVED: Chest pain, pleuritic ICD-10-CM: R07.81  ICD-9-CM: 786.52  3/9/2017 - 10/19/2017        RESOLVED: Weight loss ICD-10-CM: R63.4  ICD-9-CM: 783.21  9/2/2014 - 11/10/2015        RESOLVED: Anorexia ICD-10-CM: R63.0  ICD-9-CM: 783.0  9/2/2014 - 10/19/2017        RESOLVED: Iron deficiency anemia, unspecified ICD-10-CM: D50.9  ICD-9-CM: 280.9  9/12/2012 - 4/16/2013    Overview Addendum 1/13/2013  8:21 PM by Alcira Paniagua MD     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 Aspiration precautions      Dysphagia; consider MBS; will have ST eval. HOB > 30d  -5/2 ST eval yesterday, no overt signs of dysphagia/ aspiration      Post op anemia due to blood loss/chronic appearing issue however; gi prophylaxis, monitor wound; check stool. Check iron studies as well  -hgb 10.1  -5/2 drop in Hgb to 8.4; significant iron deficiency; start supplement, hemmoc stool; hold aspirin/eliquis until stool comes back ; no gi symptoms to suggest gastritis/ ulcer.  -5/4 staff did not send last stool; pending. hgb now 8.1; asymp. Denies melena, no hematoma at wound site. Started iron yesterday for severe iron deficiency ; gi consult if stool positive  -5/5 8.0 stool neg; no visible sign of bleeding; abd soft, femoral inc clean, no abd pain or back pain to suggest something internal; daily labs; consult if needed  -5/6 improved at 8.4  -5/7 hgb 7.9, known iron deficiency, likely multifactoria. Reports that she went to the [facility] in the past; reced Epogen; doesn't know why and cannot find records of such. Will transfuse one unit today. Has hx of a colon AVM but stools neg, no abd pain.  Will consult hematology/ at her age, aggressive txs no appropriate  -5/8 hgb 10 s/p one unit; will watch trend; discussed with dtg over phone  -5/9 hgb 9.9 and second stool neg; 5/10 9.9; 5/11 9.7; recheck monday      DVT risk / DVT Prophylaxis- Will require daily physician exam to assess for signs and symptoms as patient is at increased risk for of thromboembolism. Mobilization as tolerated. Intermittent pneumatic compression devices when in bed Thigh-high or knee-high thromboembolic deterrent hose when out of bed. Eliquis/ASA; 5/7 hold for now due to anemia of unclear etiology  -resume eliquis and baby aspirin; needs due to severe PVD      Cervical stenosis with autonomic dysfxn; orthostatic hypotension; monitor with therapies. Cont TEDs  -5/2 no overt symptoms; bp stable      Pain Control: stable, mild-to-moderate joint symptoms intermittently, reasonably well controlled by PRN meds. Will require regular pain assessment and comprenhensive pain management,   -pt with vascular pain and hypersensitivity to LLE; cont Neurontin, tolerating 600mg tid; does not wake her up at noc; cont ultram as well  -5/2 lethargic, multifactorial; dec neurontin   -5/3 much more alert. Has been on Neurontin for years; can inc back to home dose; lethargy likely due to UTI and poor sleep; monitor; cont ultram and add 10mg Elavil; having burning pain in Left foot/ankle; lacks DP pulse; will see if we can doppler; however, foot warm to touch, no discoloration with nl cap refill  -5/4 left DP pulse found easier today with dec in edema. No discoloration/ foot warm; 5/6 complains less of pain but still burns in left ankle/foot  -5/8 cont Neurontin; seems to complain less of left ankle paresthesias  -5/10 d/w Dr Berto Sutton today. Painful vasculopathic pain common in delayed embolectomies      Hx of DM; on amaryl in the past, currently nothing; bs 143, change to diab diet; bs improved; check qam only  -5/9 not checking ; bs fine      Hypocalcemia; cont Oscal and add Vit D. Will see when pt had last bone density scan.  Improved 8.6 F/u with PCP  -5/2 calcium 8.1 from 8.6, check Vit d ; 5/3 inc supplement  -5/5 vit D levels nl at 41      Wound Care: Monitor wound status daily per staff and physician. At risk for failure. Will require 24/7 rehab nursing. Keep wound clean and dry, Reinforce dressing PRN and Ice to area for comfort      Hypertension - BP uncontrolled, fluctuating, managed medically. Cont Coreg, lasix, zestril   -poorly controlled 5/1 sbp 175-187; however dropped to 112; has hx of orthostatics; monitor with activity  -5/3 bp 170s, add norvasc with parameters; 5/4 150/75; slt improvement; inc Norvasc but watch for edema  -5/5 148/75; will not adjust meds at this time; 5/6 118/66; 5/9 126/68      Hx afib; NSR, rate controlled; cont coreg and eliquis  -NSR 5/9      Hyponatremia; Na 131 5/3 and 5/4; can hold lasix, zestril and coreg contributing but bp not controlled without it, added norvasc; recheck in a.m. ; free water restrict; DC MACRODANTIN; known cause, raghu in elderly on diuretics  -5/5 Na unchanged at 131; monitor; 5/7 improved off macrodantin and holding lasix; 134; restarting lasix 10 mg; labs 5/10; Na 134, stable      RUSS ; monitor labs. Check UA due to [place]. Improving as of 4/28; no labs since that time; may need to consider adjusting Lasix and zestril  -5/1 slt bump in creat from 1.18- 1.28 ; recheck daily. Very little po today, may need IV fluids  -5/2 IVFs overnoc, creat 1.11, improved; 1.16 (1.28); had held lasix but will restart at 10mg; recheck bmp on Thurs 5/10 excellent; creat 1.03      Hypothyroidism; cont synthroid; 5/4 check TSH; normal     Left foot drop; this makes her unsafe. PT has decided on AFO if she will wear. Off balance and unsafe. Will need supervision at home. Trying to get a feel for when this foot drop started, pt insists it was post op. No other issue; may be due to known neuropathy?      Urinary retention/ neurogenic bladder - schedule voids q6-8 hrs. Check post-void residual as needed;  In and Out catheterize if post-void residual is more than 400 cc.  -5/1 urin frq; check UA, check post void residual  -5/3 urine finally sent last pm. 4+ bact, nitrite positive, leuk neg and few bacteria; received one gram IM Rocephin and started on Macrobid; await cx. No dysuria + freq  -5/4 reorder cx; not done; +dysuria, but otherwise asymptomatic x frequency (may be chronic though); no fever, WBC nl; DC macrodantin due to hyponatremia; Rocephin 1g IV x 3d  -5/5 urine cx pending; NEG      bowel program - add prn bowel program  -5/1 impacted, did not sleep. Given suppos and disimpacted for mod amt of stool . No n/v. Limited po today. Labs stable. No gaurding or rebound on exam  -add anusol suppos due to rectal pain and suspected internal hemmor. With blood streaked stool.  -5/3 now just mucus, no occult blood. hbg has dropped; check hemoccult  -5/5 hgb continues to trend down. 8.4, now 8.0 from 8.1 . Stool neg for blood. Wound site of embolectomy clean, no swelling or evidence of hemorrhage; started oral iron due to iron deficiency   -5/9 continent      GERD - resume PPI. At times may need additional antacids, Maalox prn.      -pt is DNR    5/11 sp fall; reiterate to family that she will need 24/7 supervision at [place] and not intermittent as they had planned          Time spent was 25 minutes with over 1/2 in direct patient care/examination, consultation and coordination of care.      Signed By: Myrtle Sharpe MD     May 11, 2018

## 2018-05-11 NOTE — PROGRESS NOTES
Chair Alarm went off, Pt found in bathroom on floor. States she fell. Denies hitting her head. States she hit her left elbow and left hand on walker. 1 inch skin tear on left elbow. No deformity or compromise in circulation assessed. Cap refill <3 seconds. +ROM in left arm. Dressed left elbow with steri strips, dereck and Tegaderm over dressed area. Left hand assessed. 2 cm abrasion on posterior part of hand, no drainage or bleeding. Able to move all 5 fingers without any pain or discomfort. Cap refill < 3 seconds. Quarter size bruise next to abrasion.

## 2018-05-11 NOTE — PROGRESS NOTES
OT Daily Note  Time In 1029   Time Out 1116     Pain: Pt stated her L elbow was sore secondary to fall overnight but they had bandaged it up for her. Functional Mobility   Pt was CGA for sit<>stand and walking with RW with moderate verbal cues for transfer safety. Activity Tolerance   Pt completed 2 sets of 15 horizontal abductions for strengthening for integration into ADL. Pt required min verbal cues for sequencing steps of task. Pt required 1 rest break between sets and stated the activity was challenging. Balance   Pt engaged in bean bag activity in standing for balance and functional reach for integration into functional mobility. Pt completed 2 sets of 14 functional reaches and bean bag throws in standing with CGA and RW. Pt picked up 7 each side off of 2 foot surface during first set with no LOB bending to  item with CGA and RW. Pt picked up 7 each side off of 1 foot surface during first set with no LOB bending to  item with CGA and RW. Pt took 1 rest break between sets and had good tolerance of task. Activity tolerance   Pt completed prolonged shoulder stabilization task to promote UB strength and activity tolerance in standing for integration into ADL. Pt completed task with 1/2lb weights on distal wrist for strengthening UB. Pt completed 4 sets with her LUE with 1 sitting rest break between set 3 and 4 due to decreased activity tolerance. Pt completed 3 sets with her RUE with 2 sitting rest breaks due to decreased activity tolerance. Pt had no LOB during activity with CGA and one hand support on standing table. Education   Transfer and walking safety - hand placement on chair before sitting down     Interdisciplinary Communication: PT Fatou Sprang of pt's performance    Plan: Continue with POC. Pt was left in recliner with chair alarm with all needs within reach.      Haylee Suárez OTS  5/11/2018

## 2018-05-11 NOTE — PROGRESS NOTES
Patient resting asleep in bed. Alert and oriented, but drowsy. Instructed to use call light when needing any assistance. Lung sounds clear. S1S2, bowel sounds active. Small amount of edema noted to lower extremities. Positive pedal pulses. No complaint of pain or discomfort at present time. Bed alarm/posey pad in place. Door left open for closer observation. No other verbalized needs made known at present time. Assessment completed. See doc flow sheet for further assessments.

## 2018-05-11 NOTE — PROGRESS NOTES
Problem: Nutrition Deficit  Goal: *Optimize nutritional status  Nutrition:  Assessment based on early LOS. Assessment:  Anthropometrics:   Ht - 5'4\", wgt - 45.4 kg ([facility] bed 4/25/18), BMI 17.2 c/w underweight in a person >72years of age, edema - none. Macronutrient Needs:  Estimated calorie needs - 6063-0567 rylee/day (25-30 rylee/kg/day)   Estimated protein needs - 45-54 gm pro/day (1-1.2 gm pro/kgIBW/day) (GFR 54 ml/min)  Intake/Comparative Standards: [facility] the [facility]15 meals: 54%. This potentially meets 85% of calorie and 100% of protein goals. No record of her supplement intake. Diet:   CCHO with Glucerna Shake with all trays. Pertinent Labs:   BUN 15, creatinine 1.03. Pertinent Medications:   Ferrous sulfate, Neurontin, Miralax, Florastor, Ultram.  Food/Nutrition History:   The patient presents with no acute nutrition risk factors based on the nursing admission malnutrition screen. Last bowel movement was today. Diagnosis (Nutrition):  No nutrition diagnosis at this time. Intervention:  Meals and Snacks: Continue current diet. A copy of the menu was handed to the patient and explained to her. She was encouraged to review the menu before the  visits her for her meal selections so that she can obtain the foods that she would most likely enjoy and consume. Medical Food Supplement Therapy: Commercial Beverage: Continue Glucerna Shake with all trays (220 calories, 10 gm protein per carton). Nutrition Discharge Plan: Too soon to determine. Berny Buckner.  [facility]  [phone]

## 2018-05-11 NOTE — PROGRESS NOTES
PHYSICAL THERAPY DAILY NOTE  Time In: 1356  Time Out: 1430  Patient Seen For: PM;Gait training; Therapeutic exercise;Transfer training    Subjective: \"I'm tired to my bones. \"         Objective: Other (comment) (has pacemaker; fall risk )    BED/MAT MOBILITY Daily Assessment    Sit to Supine : 6 (Modified independent)       TRANSFERS Daily Assessment   Toilet transfer w/ grab bar CGA for safety w/ balance Transfer Type: SPT with walker  Transfer Assistance : 4 (Contact guard assistance)  Sit to Stand Assistance: Contact guard assistance       GAIT Daily Assessment   Flexed posture, cues for recall of safely utilizing brakes Amount of Assistance: 4 (Contact guard assistance)  Distance (ft): 300 Feet (ft)  Assistive Device: Walker, rollator; Other (comment) (ace wrap df assist)       BALANCE Daily Assessment   Standing balance during lower body dressing min A to correct posterior LOB Sitting - Static: Good (unsupported)  Sitting - Dynamic: Good (unsupported)  Standing - Static: Fair  Standing - Dynamic : Impaired       LOWER EXTREMITY EXERCISES Daily Assessment    Pt. Performed NuStep @ resistance level 1 x15 minutes to increase strength & ROM B LEs     Vital Signs: /77 (BP Patient Position: At rest)  Pulse 73  Temp 98 °F (36.7 °C)  Resp 16  SpO2 95%    Pain level: pt. States L LE is \"sore\"; Declines wanting any intervention    Patient education: reviewed safety w/ rollator - poor carry over    Interdisciplinary Communication: discussed acewrap dorsiflexion assist w/ OT    Pt. Left supine in NAD, call bell in reach, bed alarm activated           Assessment: Pt. Much more fatigued this PM, unable to tolerate therapeutic activities @ this time. Pt. Cont. To require CGA for all mobility due to safety w/ impulsivity & decreased balance. Cont. To recommend 24/7 assist @ home. Plan of Care: Continue with POC and progress as tolerated.      Orville Huang, PT  5/11/2018

## 2018-05-11 NOTE — PROGRESS NOTES
Problem: Falls - Risk of  Goal: *Absence of Falls  Document Jose Fall Risk and appropriate interventions in the flowsheet.    Outcome: Not Progressing Towards Goal  Fall Risk Interventions:  Mobility Interventions: Utilize walker, cane, or other assitive device    Mentation Interventions: Bed/chair exit alarm    Medication Interventions: Patient to call before getting OOB    Elimination Interventions: Call light in reach    History of Falls Interventions: Bed/chair exit alarm

## 2018-05-12 LAB
APPEARANCE UR: CLEAR
BILIRUB UR QL: NEGATIVE
COLOR UR: YELLOW
GLUCOSE UR STRIP.AUTO-MCNC: NEGATIVE MG/DL
HGB UR QL STRIP: NEGATIVE
KETONES UR QL STRIP.AUTO: NEGATIVE MG/DL
LEUKOCYTE ESTERASE UR QL STRIP.AUTO: NEGATIVE
NITRITE UR QL STRIP.AUTO: NEGATIVE
PH UR STRIP: 7 [PH] (ref 5–9)
PROT UR STRIP-MCNC: NEGATIVE MG/DL
SP GR UR REFRACTOMETRY: 1.01 (ref 1–1.02)
UROBILINOGEN UR QL STRIP.AUTO: 0.2 EU/DL (ref 0.2–1)

## 2018-05-12 PROCEDURE — 81003 URINALYSIS AUTO W/O SCOPE: CPT | Performed by: PHYSICAL MEDICINE & REHABILITATION

## 2018-05-12 PROCEDURE — 97110 THERAPEUTIC EXERCISES: CPT

## 2018-05-12 PROCEDURE — 97535 SELF CARE MNGMENT TRAINING: CPT

## 2018-05-12 PROCEDURE — 97530 THERAPEUTIC ACTIVITIES: CPT

## 2018-05-12 PROCEDURE — 74011250637 HC RX REV CODE- 250/637: Performed by: PHYSICAL MEDICINE & REHABILITATION

## 2018-05-12 PROCEDURE — 97116 GAIT TRAINING THERAPY: CPT

## 2018-05-12 PROCEDURE — 65310000000 HC RM PRIVATE REHAB

## 2018-05-12 RX ADMIN — CARVEDILOL 25 MG: 25 TABLET, FILM COATED ORAL at 08:24

## 2018-05-12 RX ADMIN — APIXABAN 2.5 MG: 2.5 TABLET, FILM COATED ORAL at 08:25

## 2018-05-12 RX ADMIN — CARVEDILOL 25 MG: 25 TABLET, FILM COATED ORAL at 16:27

## 2018-05-12 RX ADMIN — FUROSEMIDE 10 MG: 20 TABLET ORAL at 08:24

## 2018-05-12 RX ADMIN — GABAPENTIN 600 MG: 300 CAPSULE ORAL at 06:32

## 2018-05-12 RX ADMIN — VITAMIN D, TAB 1000IU (100/BT) 2000 UNITS: 25 TAB at 08:25

## 2018-05-12 RX ADMIN — ASPIRIN 81 MG: 81 TABLET, COATED ORAL at 08:25

## 2018-05-12 RX ADMIN — GABAPENTIN 600 MG: 300 CAPSULE ORAL at 14:18

## 2018-05-12 RX ADMIN — FERROUS SULFATE TAB 325 MG (65 MG ELEMENTAL FE) 325 MG: 325 (65 FE) TAB at 08:24

## 2018-05-12 RX ADMIN — Medication 1000 MG: at 08:24

## 2018-05-12 RX ADMIN — POLYETHYLENE GLYCOL (3350) 17 G: 17 POWDER, FOR SOLUTION ORAL at 08:25

## 2018-05-12 RX ADMIN — AMLODIPINE BESYLATE 10 MG: 10 TABLET ORAL at 08:24

## 2018-05-12 RX ADMIN — LEVOTHYROXINE SODIUM 25 MCG: 50 TABLET ORAL at 06:32

## 2018-05-12 RX ADMIN — PANTOPRAZOLE SODIUM 40 MG: 40 TABLET, DELAYED RELEASE ORAL at 06:32

## 2018-05-12 RX ADMIN — Medication 500 MG: at 08:25

## 2018-05-12 RX ADMIN — Medication 1000 MG: at 16:27

## 2018-05-12 RX ADMIN — LISINOPRIL 20 MG: 20 TABLET ORAL at 08:24

## 2018-05-12 RX ADMIN — Medication 500 MG: at 17:00

## 2018-05-12 RX ADMIN — FERROUS SULFATE TAB 325 MG (65 MG ELEMENTAL FE) 325 MG: 325 (65 FE) TAB at 16:27

## 2018-05-12 NOTE — PROGRESS NOTES
End Of Shift Functional Summary, Nursing      TOILETING:    Does patient need assist with adjusting pants up or down and/or pericare? no  If yes, please indicate what the patient needs help with:  no  (i.e. pants up, pants down, pericare)  Pt uses walker. Does the patient require extra time? yes  Does the patient require standby assistance? yes  Does the patient require contact guard assistance? yes  Does the patient require more than one staff member for assistance? no    TOILET TRANSFER:    Pt requires minimal assistance. Pt uses walker. Does the patient require extra time? yes  Does the patient require contact guard assistance? yes  Does the patient require more than one staff member for assistance? no    BLADDER:    Pt does not have a [place] catheter that staff manages. Pt does not take medication. If so, please indicate which medication: none   Pt is continent of bladder and voids in toilet  Pt requires staff to empty device. Pt has had 1 bladder accidents during this shift. BOWEL:  Pt does take medication. Pt is continent of bowel and uses toilet. Pt requires staff to empty device. Pt has had 0 bowel accidents during this shift. BED/CHAIR TRANSFER  Pt requires minimal assistance. Pt uses walker  Does the patient require extra time? yes  Does the patient require contact guard assistance? yes  Does the patient require more than one staff member for assistance? no    EATING  Pt requires setup. Pt does not wear dentures. Documentation reviewed and plan of care discussed/reviewed with   oncoming nurse and patient assistant during the shift.

## 2018-05-12 NOTE — PROGRESS NOTES
Rolando Baker MD,   Medical Director  [address] [facility], [address] [facility]  Tel: [phone]       SFD PROGRESS NOTE    Aakash Luo  Admit Date: 4/30/2018  Admit Diagnosis: left feneseal embolictomy  Physical debility    Subjective     Doing fine per pt. Emmett Najera last noc getting out of bed unattended. Has poor insight of her balance deficits. \" I dont want to talk about it. \" denies cp, sob, n/v. Slept fine. Appetite good. Patient seen and examined. Reports lightheadedness when standing. No pain complaints. BM this am. Denies palpations, SOB or nausea. Participating in therapy.     Objective:     Current Facility-Administered Medications   Medication Dose Route Frequency    0.9% sodium chloride infusion 250 mL  250 mL IntraVENous PRN    furosemide (LASIX) tablet 10 mg  10 mg Oral DAILY    amLODIPine (NORVASC) tablet 10 mg  10 mg Oral DAILY    amitriptyline (ELAVIL) tablet 10 mg  10 mg Oral QHS    calcium carbonate (OS-CAN) tablet 1,000 mg [elemental]  1,000 mg Oral BID    cholecalciferol (VITAMIN D3) tablet 2,000 Units  2,000 Units Oral DAILY    gabapentin (NEURONTIN) capsule 600 mg  600 mg Oral TID    ferrous sulfate tablet 325 mg  1 Tab Oral BID WITH MEALS    loperamide (IMODIUM) capsule 4 mg  4 mg Oral Q6H PRN    Saccharomyces boulardii (FLORASTOR) capsule 500 mg  500 mg Oral BID    polyethylene glycol (MIRALAX) packet 17 g  17 g Oral DAILY    bisacodyl (DULCOLAX) suppository 10 mg  10 mg Rectal DAILY PRN    phenylephrine suppository 1 Suppository  1 Suppository Rectal TID PRN    acetaminophen (TYLENOL) tablet 500 mg  500 mg Oral Q4H PRN    diphenhydrAMINE (BENADRYL) capsule 25 mg  25 mg Oral Q4H PRN    apixaban (ELIQUIS) tablet 2.5 mg  2.5 mg Oral BID    aspirin delayed-release tablet 81 mg  81 mg Oral DAILY    carvedilol (COREG) tablet 25 mg  25 mg Oral BID WITH MEALS    levothyroxine (SYNTHROID) tablet 25 mcg  25 mcg Oral ACB    lisinopril (PRINIVIL, ZESTRIL) tablet 20 mg  20 mg Oral DAILY    nitroglycerin (NITROSTAT) tablet 0.4 mg  0.4 mg SubLINGual PRN    ondansetron (ZOFRAN ODT) tablet 4 mg  4 mg Oral Q6H PRN    pantoprazole (PROTONIX) tablet 40 mg  40 mg Oral ACB    senna-docusate (PERICOLACE) 8.6-50 mg per tablet 1 Tab  1 Tab Oral QHS    traMADol (ULTRAM) tablet 50 mg  50 mg Oral Q6H PRN     Review of Systems:Denies chest pain, shortness of breath, cough, headache, visual problems, abdominal pain, dysurea, calf pain. Pertinent positives are as noted in the medical records and unremarkable otherwise. Visit Vitals    /76 (BP 1 Location: Right arm, BP Patient Position: At rest;Post activity)    Pulse 76    Temp 98.3 °F (36.8 °C)    Resp 18    SpO2 95%        Physical Exam:   General: Alert and age appropriately oriented. No acute cardio respiratory distress. HEENT: Normocephalic,no scleral icterus  Oral mucosa moist without cyanosis   Lungs: Clear to auscultation  bilaterally. Respiration even and unlabored   Heart: Regular rate and rhythm, S1, S2   No  murmurs, clicks, rub or gallops   Abdomen: Soft, non-tender, nondistended. Bowel sounds present. No organomegaly. Genitourinary: defer   Neuromuscular:      No new deficits; left foot drop unchanged  Poor insight     Skin/extremity: No rashes, no erythema. No calf tenderness BLE, trace L foot pedal edema , Wound left elbow and dorsum left hand; no edema, no bony deformity.                                                                             Functional Assessment:  Gross Assessment  AROM: Generally decreased, functional (05/08/18 1200)  Strength: Generally decreased, functional (05/08/18 1200)  Coordination: Generally decreased, functional (05/08/18 1200)       Balance  Sitting - Static: Good (unsupported) (05/11/18 1500)  Sitting - Dynamic: Good (unsupported) (05/11/18 1500)  Standing - Static: Fair (05/11/18 1500)  Standing - Dynamic : Impaired (05/11/18 1500)           Toileting  Cues: Visual cues provided;Physical assistance for pants up;Physical assistance for pants down (05/05/18 1352)  Adaptive Equipment: Grab bars (05/08/18 0828)         Muriel Hsu Fall Risk Assessment:  Brant Mata Risk  Mobility: Ambulates or transfers with assist devices or assistance (05/12/18 8193)  Mobility Interventions: Patient to call before getting OOB;Utilize walker, cane, or other assitive device (05/12/18 0728)  Mentation: Periodic confusion (05/12/18 0728)  Mentation Interventions: Bed/chair exit alarm; More frequent rounding; Toileting rounds (05/12/18 0736)  Medication: Patient receiving anticonvulsants, sedatives(tranquilizers), psychotropics or hypnotics, hypoglycemics, narcotics, sleep aids, antihypertensives, laxatives, or diuretics (05/12/18 0728)  Medication Interventions: Bed/chair exit alarm; Patient to call before getting OOB (05/12/18 0097)  Elimination: Needs assistance with toileting (05/12/18 3519)  Elimination Interventions: Call light in reach; Patient to call for help with toileting needs; Toileting schedule/hourly rounds (05/12/18 6058)  Prior Fall History: Before admission in past 12 months _home or previous inpatient care) (05/12/18 2000)  History of Falls Interventions: Bed/chair exit alarm; Consult care management for discharge planning;Door open when patient unattended;Evaluate medications/consider consulting pharmacy; Investigate reason for fall (05/11/18 2348)  Total Score: 5 (05/12/18 0728)  Standard Fall Precautions: Yes (05/11/18 0705)  High Fall Risk: Yes (05/12/18 0728)     Speech Assessment:  Aspiration Signs/Symptoms: None (05/01/18 1305)      Ambulation:  Gait  Base of Support: Narrowed; Center of gravity altered (05/08/18 1200)  Speed/Chandni: Slow;Shuffled;Fluctuations (05/08/18 1200)  Step Length: Right shortened;Left shortened (05/08/18 1200)  Stance: Right increased; Left increased (05/08/18 1200)  Gait Abnormalities: Decreased step clearance;Shuffling gait;Trunk sway increased; Foot drop (foot drop L LE) (05/08/18 1200)  Distance (ft): 300 Feet (ft) (05/11/18 1500)  Assistive Device: Kim Garvey, rollator; Other (comment) (ace wrap df assist) (05/11/18 1500)  Rail Use: Both (05/11/18 1400)     Labs/Studies:  Recent Results (from the past 72 hour(s))   HGB & HCT    Collection Time: 05/10/18  6:14 AM   Result Value Ref Range    HGB 9.9 (L) 11.7 - 15.4 g/dL    HCT 29.4 (L) 35.8 - 74.1 %   METABOLIC PANEL, BASIC    Collection Time: 05/10/18  6:14 AM   Result Value Ref Range    Sodium 134 (L) 136 - 145 mmol/L    Potassium 4.1 3.5 - 5.1 mmol/L    Chloride 97 (L) 98 - 107 mmol/L    CO2 28 21 - 32 mmol/L    Anion gap 9 7 - 16 mmol/L    Glucose 73 65 - 100 mg/dL    BUN 15 8 - 23 MG/DL    Creatinine 1.03 (H) 0.6 - 1.0 MG/DL    GFR est AA >60 >60 ml/min/1.73m2    GFR est non-AA 54 (L) >60 ml/min/1.73m2    Calcium 8.2 (L) 8.3 - 10.4 MG/DL   HGB & HCT    Collection Time: 05/11/18  5:25 AM   Result Value Ref Range    HGB 9.7 (L) 11.7 - 15.4 g/dL    HCT 28.9 (L) 35.8 - 46.3 %       Assessment:     Problem List as of 5/12/2018  Date Reviewed: 4/30/2018          Codes Class Noted - Resolved    * (Principal)Physical debility ICD-10-CM: R53.81  ICD-9-CM: 799.3  4/30/2018 - Present        Chronic systolic congestive heart failure (HCC) ICD-10-CM: I50.22  ICD-9-CM: 428.22, 428.0  4/26/2018 - Present        Femoral artery occlusion, left (HCC) ICD-10-CM: C92.569  ICD-9-CM: 444.22  4/25/2018 - Present        Type 2 diabetes mellitus with nephropathy (HCC) ICD-10-CM: E11.21  ICD-9-CM: 250.40, 583.81  1/25/2018 - Present        S/P AV noris ablation ICD-10-CM: X36.095  ICD-9-CM: V45.89  1/16/2018 - Present        Biventricular cardiac pacemaker in situ ICD-10-CM: Z95.0  ICD-9-CM: V45.01  1/16/2018 - Present        Diabetes mellitus, type 2 (HCC) (Chronic) ICD-10-CM: E11.9  ICD-9-CM: 250.00  8/12/2017 - Present        Hypertension (Chronic) ICD-10-CM: I10  ICD-9-CM: 401.9  8/12/2017 - Present Overview Signed 9/12/2012  3:43 PM by Kailyn Guzman     Orthostatic hypotension due to autonomic neuropathy             CKD (chronic kidney disease), stage III (Chronic) ICD-10-CM: N18.3  ICD-9-CM: 585.3  8/12/2017 - Present        Dysphagia (Chronic) ICD-10-CM: R13.10  ICD-9-CM: 787.20  8/12/2017 - Present    Overview Signed 4/5/2016  5:24 PM by Anup Chavis MD     Due to esophageal spasm seen on modified barium swallow 2015             Atrial fibrillation ([facility] (Chronic) ICD-10-CM: I48.91  ICD-9-CM: 427.31  8/12/2017 - Present        DNR (do not resuscitate) (Chronic) ICD-10-CM: Z66  ICD-9-CM: V49.86  8/12/2017 - Present        Ischemic cardiomyopathy ICD-10-CM: I25.5  ICD-9-CM: 414.8  7/11/2017 - Present        Cardiomyopathy ([facility] ICD-10-CM: I42.9  ICD-9-CM: 425.4  3/30/2017 - Present        Dyspnea ICD-10-CM: R06.00  ICD-9-CM: 786.09  2/25/2017 - Present        Atrial fibrillation with rapid ventricular response ([facility] ICD-10-CM: I48.91  ICD-9-CM: 427.31  2/25/2017 - Present        SSS (sick sinus syndrome) ([facility] ICD-10-CM: I49.5  ICD-9-CM: 427.81  5/5/2016 - Present        Orthostatic hypotension ICD-10-CM: I95.1  ICD-9-CM: 458.0  5/5/2016 - Present        CAD in native artery ICD-10-CM: I25.10  ICD-9-CM: 414.01  5/5/2016 - Present        Pacemaker ICD-10-CM: Z95.0  ICD-9-CM: V45.01  11/10/2015 - Present        Cervical radiculopathy ICD-10-CM: M54.12  ICD-9-CM: 723.4  5/23/2013 - Present    Overview Addendum 8/26/2013  4:59 PM by Anup Chavis MD     Chronic right shoulder pain S/P eval Dr Rosas Vasquez POC August 2013, previous cervical spinal fusion             Autonomic neuropathy ICD-10-CM: G90.9  ICD-9-CM: 337.9  1/13/2013 - Present    Overview Signed 1/13/2013  8:18 PM by Anup Chavis MD     Severe orthostatic hypotension             MCI (mild cognitive impairment) ICD-10-CM: G31.84  ICD-9-CM: 331.83  1/13/2013 - Present    Overview Addendum 12/16/2013  4:43 PM by Anup Chavis MD     MMSE 27/30 Nov 2009, 27/30 Dec 2013, remembers 3/3 objects 5 min later and draws normal clock             Chronic pain syndrome ICD-10-CM: G89.4  ICD-9-CM: 338.4  1/13/2013 - Present    Overview Addendum 11/10/2015  5:15 PM by Marielena Mendoza MD     Back, right shoulder, neck: Peripheral neuropathy, spinal stenosis C7-T1, foraminal narrowing C4-5             Acquired hypothyroidism (Chronic) ICD-10-CM: E03.9  ICD-9-CM: 244.9  9/12/2012 - Present        Mixed hyperlipidemia (Chronic) ICD-10-CM: E78.2  ICD-9-CM: 272.2  9/12/2012 - Present    Overview Addendum 9/12/2012  3:51 PM by Violeta Berkowitz     With high HDL  Intolerant of pravastatin,lovastatin and lescol             GERD (gastroesophageal reflux disease) (Chronic) ICD-10-CM: K21.9  ICD-9-CM: 530.81  9/12/2012 - Present        IBS (irritable bowel syndrome) (Chronic) ICD-10-CM: K58.9  ICD-9-CM: 564.1  9/12/2012 - Present    Overview Signed 9/12/2012  3:52 PM by Violeta Berkowitz     Chronic enema abuse  diverticulosis             Gait disorder (Chronic) ICD-10-CM: R26.9  ICD-9-CM: 781.2  9/12/2012 - Present    Overview Signed 9/12/2012  3:53 PM by Violeta Berkowitz     Diabetic peripheral neuropathy              Atrial fibrillation with RVR ([facility] ICD-10-CM: I48.91  ICD-9-CM: 427.31  9/12/2012 - Present    Overview Addendum 10/14/2012  9:34 AM by Marielena Mendoza MD     Paroxysmal.  Coumadin contraindicated due to falls               RESOLVED: RUSS (acute kidney injury) ([email or web address] ICD-10-CM: N17.9  ICD-9-CM: 584.9  8/13/2017 - 10/19/2017        RESOLVED: Acute respiratory failure with hypoxemia ([facility] ICD-10-CM: J96.01  ICD-9-CM: 518.81  8/12/2017 - 9/15/2017        RESOLVED: CAP (community acquired pneumonia) ICD-10-CM: J18.9  ICD-9-CM: 486  8/12/2017 - 9/15/2017        RESOLVED: CHF (congestive heart failure) ([facility] ICD-10-CM: I50.9  ICD-9-CM: 428.0  3/18/2017 - 4/26/2018        RESOLVED: Altered mental status ICD-10-CM: R41.82  ICD-9-CM: 780.97  3/10/2017 - 10/19/2017        RESOLVED: Altered mental state ICD-10-CM: R41.82  ICD-9-CM: 780.97  3/9/2017 - 10/19/2017        RESOLVED: Chest pain, pleuritic ICD-10-CM: R07.81  ICD-9-CM: 786.52  3/9/2017 - 10/19/2017        RESOLVED: Weight loss ICD-10-CM: R63.4  ICD-9-CM: 783.21  9/2/2014 - 11/10/2015        RESOLVED: Anorexia ICD-10-CM: R63.0  ICD-9-CM: 783.0  9/2/2014 - 10/19/2017        RESOLVED: Iron deficiency anemia, unspecified ICD-10-CM: D50.9  ICD-9-CM: 280.9  9/12/2012 - 4/16/2013    Overview Addendum 1/13/2013  8:21 PM by MD RUBIA Zarco in the proximal colon                 Assessment: Physical Debility s/p ischemic LLE s/p Left femoral artery embolectomy      Continue daily physician medical management:  Pneumonia prophylaxis- Incentive spirometer every hour while awake. Aspiration precautions      Dysphagia; consider MBS; will have ST eval. HOB > 30d  -5/2 ST eval yesterday, no overt signs of dysphagia/ aspiration      Post op anemia due to blood loss/chronic appearing issue however; gi prophylaxis, monitor wound; check stool. Check iron studies as well  -hgb 10.1  -5/2 drop in Hgb to 8.4; significant iron deficiency; start supplement, hemmoc stool; hold aspirin/eliquis until stool comes back ; no gi symptoms to suggest gastritis/ ulcer.  -5/4 staff did not send last stool; pending. hgb now 8.1; asymp. Denies melena, no hematoma at wound site. Started iron yesterday for severe iron deficiency ; gi consult if stool positive  -5/5 8.0 stool neg; no visible sign of bleeding; abd soft, femoral inc clean, no abd pain or back pain to suggest something internal; daily labs; consult if needed  -5/6 improved at 8.4  -5/7 hgb 7.9, known iron deficiency, likely multifactoria. Reports that she [facility] to [facility] in [facility]; reced Epogen; doesn't know why and cannot find records of such. Will transfuse one unit today. Has hx of a colon AVM but stools neg, no abd pain.  Will consult hematology/ at her age, aggressive txs no appropriate  -5/8 hgb 10 s/p one unit; will watch trend; discussed with dtg over phone  -5/9 hgb 9.9 and second stool neg; 5/10 9.9; 5/11 9.7; recheck monday      DVT risk / DVT Prophylaxis- Will require daily physician exam to assess for signs and symptoms as patient is at increased risk for of thromboembolism. Mobilization as tolerated. Intermittent pneumatic compression devices when in bed Thigh-high or knee-high thromboembolic deterrent hose when out of bed. Eliquis/ASA; 5/7 hold for now due to anemia of unclear etiology  -resume eliquis and baby aspirin; needs due to severe PVD      Cervical stenosis with autonomic dysfxn; orthostatic hypotension; monitor with therapies. Cont TEDs  -5/2 no overt symptoms; bp stable      Pain Control: stable, mild-to-moderate joint symptoms intermittently, reasonably well controlled by PRN meds. Will require regular pain assessment and comprenhensive pain management,   -pt with vascular pain and hypersensitivity to LLE; cont Neurontin, tolerating 600mg tid; does not wake her up at noc; cont ultram as well  -5/2 lethargic, multifactorial; dec neurontin   -5/3 much more alert. Has been on Neurontin for years; can inc back to home dose; lethargy likely due to UTI and poor sleep; monitor; cont ultram and add 10mg Elavil; having burning pain in Left foot/ankle; lacks DP pulse; will see if we can doppler; however, foot warm to touch, no discoloration with nl cap refill  -5/4 left DP pulse found easier today with dec in edema. No discoloration/ foot warm; 5/6 complains less of pain but still burns in left ankle/foot  -5/8 cont Neurontin; seems to complain less of left ankle paresthesias  -5/10 d/w Dr Gerda Jimenez today. Painful vasculopathic pain common in delayed embolectomies      Hx of DM; on amaryl in the past, currently nothing; bs 143, change to diab diet; bs improved; check qam only  -5/9 not checking ; bs fine      Hypocalcemia; cont Oscal and add Vit D. Will see when pt had last bone density scan. Improved 8.6 F/u with PCP  -5/2 calcium 8.1 from 8.6, check Vit d ; 5/3 inc supplement  -5/5 vit D levels nl at 41      Wound Care: Monitor wound status daily per staff and physician. At risk for failure. Will require 24/7 rehab nursing. Keep wound clean and dry, Reinforce dressing PRN and Ice to area for comfort      Hypertension - BP uncontrolled, fluctuating, managed medically. Cont Coreg, lasix, zestril   -poorly controlled 5/1 sbp 175-187; however dropped to 112; has hx of orthostatics; monitor with activity  -5/3 bp 170s, add norvasc with parameters; 5/4 150/75; slt improvement; inc Norvasc but watch for edema  -5/5 148/75; will not adjust meds at this time; 5/6 118/66; 5/9 126/68  - 5/12 HR wnml, SBP - 146 acceptable   Will check orthostatic BPs.     Hx afib; NSR, rate controlled; cont coreg and eliquis  -NSR 5/9      Hyponatremia; Na 131 5/3 and 5/4; can hold lasix, zestril and coreg contributing but bp not controlled without it, added norvasc; recheck in a.m. ; free water restrict; DC MACRODANTIN; known cause, raghu in elderly on diuretics  -5/5 Na unchanged at 131; monitor; 5/7 improved off macrodantin and holding lasix; 134; restarting lasix 10 mg; labs 5/10; Na 134, stable      RUSS ; monitor labs. Check UA due to [place]. Improving as of 4/28; no labs since that time; may need to consider adjusting Lasix and zestril  -5/1 slt bump in creat from 1.18- 1.28 ; recheck daily. Very little po today, may need IV fluids  -5/2 IVFs overnoc, creat 1.11, improved; 1.16 (1.28); had held lasix but will restart at 10mg; recheck bmp on Thurs 5/10 excellent; creat 1.03      Hypothyroidism; cont synthroid; 5/4 check TSH; normal     Left foot drop; this makes her unsafe. PT has decided on AFO if she will wear. Off balance and unsafe. Will need supervision at home. Trying to get a feel for when this foot drop started, pt insists it was post op.  No other issue; may be due to known neuropathy?      Urinary retention/ neurogenic bladder - schedule voids q6-8 hrs. Check post-void residual as needed; In and Out catheterize if post-void residual is more than 400 cc.  -5/1 urin frq; check UA, check post void residual  -5/3 urine finally sent last pm. 4+ bact, nitrite positive, leuk neg and few bacteria; received one gram IM Rocephin and started on Macrobid; await cx. No dysuria + freq  -5/4 reorder cx; not done; +dysuria, but otherwise asymptomatic x frequency (may be chronic though); no fever, WBC nl; DC macrodantin due to hyponatremia; Rocephin 1g IV x 3d  -5/5 urine cx pending; NEG      bowel program - add prn bowel program  -5/1 impacted, did not sleep. Given suppos and disimpacted for mod amt of stool . No n/v. Limited po today. Labs stable. No gaurding or rebound on exam  -add anusol suppos due to rectal pain and suspected internal hemmor. With blood streaked stool.  -5/3 now just mucus, no occult blood. hbg has dropped; check hemoccult  -5/5 hgb continues to trend down. 8.4, now 8.0 from 8.1 . Stool neg for blood. Wound site of embolectomy clean, no swelling or evidence of hemorrhage; started oral iron due to iron deficiency   -5/9 continent      GERD - resume PPI. At times may need additional antacids, Maalox prn.      -pt is DNR    5/11 sp fall; reiterate to family that she will need 24/7 supervision at [place] and not intermittent as they had planned     Time spent was 15 minutes with over 1/2 in direct patient care/examination, consultation and coordination of care.      Signed By: Bambi Garcia MD     May 12, 2018

## 2018-05-12 NOTE — PROGRESS NOTES
Problem: Falls - Risk of  Goal: *Absence of Falls  Document Jose Fall Risk and appropriate interventions in the flowsheet.    Outcome: Progressing Towards Goal  Fall Risk Interventions:  Mobility Interventions: Utilize walker, cane, or other assitive device    Mentation Interventions: Bed/chair exit alarm    Medication Interventions: Bed/chair exit alarm, Patient to call before getting OOB    Elimination Interventions: Call light in reach    History of Falls Interventions: Bed/chair exit alarm, Consult care management for discharge planning, Door open when patient unattended, Evaluate medications/consider consulting pharmacy, Investigate reason for fall

## 2018-05-12 NOTE — PROGRESS NOTES
PHYSICAL THERAPY DAILY NOTE  Time In: 1116  Time Out: 1103  Patient Seen For: AM;Balance activities;Gait training;Patient education; Therapeutic exercise;Transfer training    Subjective:  Patient needed extra time to answer and perfrm commands. Does not present with pain. Objective:  Patient up sitting in recliner. SPT from sitting to standing with RW and CGA for safety. Orientation Assessment :   Alert and age appropriately oriented. Affect/Behavior:   Appropriate and Cooperative. Basic Command Following:   impaired. Safety/Judgment:   impaired. Pain Present:   No.    Other (comment) (has pacemaker; fall risk )  GROSS ASSESSMENT Daily Assessment            BED/MAT MOBILITY Daily Assessment   Impulsive Rolling Right : 6 (Modified independent) (Impolsive movements)  Supine to Sit : 6 (Modified independent)  Sit to Supine : 6 (Modified independent)       TRANSFERS Daily Assessment   Numerous verbal cues needed. Transfer Type: SPT with walker  Transfer Assistance : 4 (Contact guard assistance)  Sit to Stand Assistance: Contact guard assistance  Car Transfers: Not tested       GAIT Daily Assessment   Numerous verbal cues needed. Amount of Assistance: 4 (Contact guard assistance)  Distance (ft): 300 Feet (ft)  Assistive Device: Walker, rollator       STEPS or STAIRS Daily Assessment   Did not occur. Level of Assist : 0 (Not tested)       BALANCE Daily Assessment    Sitting - Static: Good (unsupported)  Sitting - Dynamic: Good (unsupported)  Standing - Static: Fair  Standing - Dynamic : Impaired       WHEELCHAIR MOBILITY Daily Assessment   Did not occur         LOWER EXTREMITY EXERCISES Daily Assessment   Numerous verbal cues needed. Extremity: Both  Exercise Type #1: Supine lower extremity strengthening  Sets Performed: 1  Reps Performed: 15  Level of Assist: Stand-by assistance          Assessment: Education:  Teaching Method:   Demonstration, Explanation.   PT Impairments or Limitations:   Ambulation deficits, Balance deficits, Endurance deficits, Strength deficits, Transfer deficits. Rehab Potential Physical Therapy:   Good. Patient performed all given exercises listed. Patient was taken back to room. Left in sitting position in recliner, call bell and necessities in reach. Nurse notified of completion of treatment. Plan of Care: The patient has shown the ability to tolerate and benefit from physical therapy daily in a comprehensive inpatient rehabilitation program.  Continue intensive Physical Therapy  to address bed mobility, transfers, ambulation, strengthening, balance, and endurance. Continue with plan of care and focus on goals. Increase awareness with transfers next treatment.       Jud Rinne, PTA  5/12/2018

## 2018-05-12 NOTE — PROGRESS NOTES
05/12/18 1305   Time Spent With Patient   Time In 0916   Time Out 1012   Patient Seen For: AM;ADLs   Grooming   Grooming Assistance  CGA   Comments hair care in reclliner with set up. Upper Body Bathing   Bathing Assistance, Upper S   Comments set  up    Lower Body Bathing   Bathing Assistance, Lower  Min A   Position Performed Bending forward method;Standing   Comments min A due to decreased safety awareness and balance    Toileting   Toileting Assistance (FIM Score) Mod A   Cues Physical assistance for pants up;Physical assistance for pants down;Visual/perceptual training/retraining   Adaptive Equipment Grab bars   Upper Body Dressing    Dressing Assistance  S   Comments set up    [address] [place] [place] Performed Bending forward method;Seated edge of bed   Functional Transfers   Toilet Transfer  Grab bars   Patient difficult to wake up but agreeable to therapy. Extremely unsafe and impulsive throughout session. Standing without walker, moving walker aside and walking, standing in shower without supervision or grab bars. Losing balance frequently and verbal safety cues given throughout session. Patient is able to complete most of the ADLs as above, but unsafe to complete without constant supervision. [place] alarm on with all essentials within reach. Continue POC.      Mane Archer  5/12/2018

## 2018-05-12 NOTE — PROGRESS NOTES
Problem: Falls - Risk of  Goal: *Absence of Falls  Document Jose Fall Risk and appropriate interventions in the flowsheet.    Outcome: Progressing Towards Goal  Fall Risk Interventions:  Mobility Interventions: Patient to call before getting OOB, Utilize walker, cane, or other assitive device    Mentation Interventions: Bed/chair exit alarm, More frequent rounding, Toileting rounds    Medication Interventions: Bed/chair exit alarm, Patient to call before getting OOB    Elimination Interventions: Call light in reach, Patient to call for help with toileting needs, Toileting schedule/hourly rounds    History of Falls Interventions: Bed/chair exit alarm, Consult care management for discharge planning, Door open when patient unattended, Evaluate medications/consider consulting pharmacy, Investigate reason for fall

## 2018-05-13 PROCEDURE — 65310000000 HC RM PRIVATE REHAB

## 2018-05-13 PROCEDURE — 74011250637 HC RX REV CODE- 250/637: Performed by: PHYSICAL MEDICINE & REHABILITATION

## 2018-05-13 RX ORDER — AMLODIPINE BESYLATE 5 MG/1
5 TABLET ORAL DAILY
Status: CANCELLED | OUTPATIENT
Start: 2018-05-14

## 2018-05-13 RX ADMIN — CARVEDILOL 25 MG: 25 TABLET, FILM COATED ORAL at 17:14

## 2018-05-13 RX ADMIN — GABAPENTIN 600 MG: 300 CAPSULE ORAL at 14:04

## 2018-05-13 RX ADMIN — STANDARDIZED SENNA CONCENTRATE AND DOCUSATE SODIUM 1 TABLET: 8.6; 5 TABLET, FILM COATED ORAL at 01:42

## 2018-05-13 RX ADMIN — AMITRIPTYLINE HYDROCHLORIDE 10 MG: 10 TABLET, FILM COATED ORAL at 01:42

## 2018-05-13 RX ADMIN — Medication 500 MG: at 09:34

## 2018-05-13 RX ADMIN — GABAPENTIN 600 MG: 300 CAPSULE ORAL at 21:08

## 2018-05-13 RX ADMIN — Medication 1000 MG: at 17:13

## 2018-05-13 RX ADMIN — FERROUS SULFATE TAB 325 MG (65 MG ELEMENTAL FE) 325 MG: 325 (65 FE) TAB at 09:25

## 2018-05-13 RX ADMIN — FUROSEMIDE 10 MG: 20 TABLET ORAL at 09:25

## 2018-05-13 RX ADMIN — APIXABAN 2.5 MG: 2.5 TABLET, FILM COATED ORAL at 09:24

## 2018-05-13 RX ADMIN — CARVEDILOL 25 MG: 25 TABLET, FILM COATED ORAL at 09:25

## 2018-05-13 RX ADMIN — APIXABAN 2.5 MG: 2.5 TABLET, FILM COATED ORAL at 21:08

## 2018-05-13 RX ADMIN — TRAMADOL HYDROCHLORIDE 50 MG: 50 TABLET, FILM COATED ORAL at 19:39

## 2018-05-13 RX ADMIN — LISINOPRIL 20 MG: 20 TABLET ORAL at 09:24

## 2018-05-13 RX ADMIN — ASPIRIN 81 MG: 81 TABLET, COATED ORAL at 09:24

## 2018-05-13 RX ADMIN — GABAPENTIN 600 MG: 300 CAPSULE ORAL at 05:44

## 2018-05-13 RX ADMIN — Medication 500 MG: at 17:14

## 2018-05-13 RX ADMIN — GABAPENTIN 600 MG: 300 CAPSULE ORAL at 01:42

## 2018-05-13 RX ADMIN — POLYETHYLENE GLYCOL (3350) 17 G: 17 POWDER, FOR SOLUTION ORAL at 09:23

## 2018-05-13 RX ADMIN — AMITRIPTYLINE HYDROCHLORIDE 10 MG: 10 TABLET, FILM COATED ORAL at 21:08

## 2018-05-13 RX ADMIN — APIXABAN 2.5 MG: 2.5 TABLET, FILM COATED ORAL at 01:42

## 2018-05-13 RX ADMIN — Medication 1000 MG: at 09:25

## 2018-05-13 RX ADMIN — STANDARDIZED SENNA CONCENTRATE AND DOCUSATE SODIUM 1 TABLET: 8.6; 5 TABLET, FILM COATED ORAL at 21:08

## 2018-05-13 RX ADMIN — AMLODIPINE BESYLATE 10 MG: 10 TABLET ORAL at 09:25

## 2018-05-13 RX ADMIN — FERROUS SULFATE TAB 325 MG (65 MG ELEMENTAL FE) 325 MG: 325 (65 FE) TAB at 17:14

## 2018-05-13 RX ADMIN — VITAMIN D, TAB 1000IU (100/BT) 2000 UNITS: 25 TAB at 09:24

## 2018-05-13 NOTE — PROGRESS NOTES
Problem: Falls - Risk of  Goal: *Absence of Falls  Document Jose Fall Risk and appropriate interventions in the flowsheet.    Outcome: Progressing Towards Goal  Fall Risk Interventions:  Mobility Interventions: Bed/chair exit alarm, Patient to call before getting OOB, Utilize walker, cane, or other assitive device    Mentation Interventions: Adequate sleep, hydration, pain control, Bed/chair exit alarm, Door open when patient unattended    Medication Interventions: Bed/chair exit alarm, Patient to call before getting OOB    Elimination Interventions: Bed/chair exit alarm, Call light in reach, Patient to call for help with toileting needs    History of Falls Interventions: Bed/chair exit alarm, Consult care management for discharge planning

## 2018-05-13 NOTE — PROGRESS NOTES
Hourly rounding completed throughout shift. End Of Shift Functional Summary, Nursing        TOILETING:    Does patient need assist with adjusting pants up or down and/or pericare? no  If yes, please indicate what the patient needs help with:  no  (i.e. pants up, pants down, pericare)  Pt uses walker. Does the patient require extra time? yes  Does the patient require standby assistance? yes  Does the patient require contact guard assistance? yes  Does the patient require more than one staff member for assistance? no     TOILET TRANSFER:    Pt requires minimal assistance. Pt uses walker. Does the patient require extra time? yes  Does the patient require contact guard assistance? yes  Does the patient require more than one staff member for assistance? no     BLADDER:    Pt does not have a [place] catheter that staff manages. Pt does not take medication. If so, please indicate which medication: none   Pt is continent of bladder and voids in toilet  Pt requires staff to empty device. Pt has had 1 bladder accidents during this shift.     BOWEL:  Pt does take medication. Pt is continent of bowel and uses toilet. Pt requires staff to empty device. Pt has had 0 bowel accidents during this shift.      BED/CHAIR TRANSFER  Pt requires minimal assistance. Pt uses walker  Does the patient require extra time? yes  Does the patient require contact guard assistance? yes  Does the patient require more than one staff member for assistance? no     EATING  Pt requires setup.   Pt does not wear dentures.       Documentation reviewed and plan of care discussed/reviewed with   oncoming nurse and patient assistant during the shift.

## 2018-05-13 NOTE — PROGRESS NOTES
End Of Shift Functional Summary, Nursing      TOILETING:    Does patient need assist with adjusting pants up or down and/or pericare? yes  If yes, please indicate what the patient needs help with:  Pants up/down   Pt uses walker. Does the patient require extra time? yes  Does the patient require standby assistance? yes  Does the patient require contact guard assistance? yes  Does the patient require more than one staff member for assistance? no    TOILET TRANSFER:    Pt requires moderate assistance. Pt uses walker. Does the patient require extra time? yes  Does the patient require contact guard assistance? yes  Does the patient require more than one staff member for assistance? no    BLADDER:    Pt does not have a [place] catheter that staff manages. Pt does not take medication. If so, please indicate which medication. Pt is continent. of bladder and voids in toilet. Pt has had 0 bladder accidents during this shift. BOWEL:  Pt does take medication. Pt is continent of bowel and uses toilet. BED/CHAIR TRANSFER  Pt requires moderate assistance. Pt uses walker  Does the patient require extra time? yes  Does the patient require contact guard assistance? yes  Does the patient require more than one staff member for assistance? no      EATING  Pt requires no assistance. Pt does not wear dentures. TUBE FEEDINGS:  Pt does not  receive nutrition through tube feedings. Documentation reviewed and plan of care discussed/reviewed with   patient, physician, therapists, oncoming nurse and patient assistant during the shift.

## 2018-05-13 NOTE — PROGRESS NOTES
Zachary Jain MD,   Medical Director  [address] [facility], [address] [facility]  Tel: [phone]       [facility] PROGRESS NOTE    Charlette Skiff  Admit Date: 4/30/2018  Admit Diagnosis: left feneseal embolictomy  Physical debility    Subjective     Doing fine per pt. Annabella Bhatti last noc getting out of bed unattended. Has poor insight of her balance deficits. \" I dont want to talk about it. \" denies cp, sob, n/v. Slept fine. Appetite good. Patient seen and examined. Family reported patient with increased drowsiness yesterday evening with UA sent and was negative. Reports L distal foot pain with foot warm to the touch. Denies trauma, lightheadedness, palpations, SOB or nausea. Participating in therapy.     Objective:     Current Facility-Administered Medications   Medication Dose Route Frequency    0.9% sodium chloride infusion 250 mL  250 mL IntraVENous PRN    furosemide (LASIX) tablet 10 mg  10 mg Oral DAILY    amLODIPine (NORVASC) tablet 10 mg  10 mg Oral DAILY    amitriptyline (ELAVIL) tablet 10 mg  10 mg Oral QHS    calcium carbonate (OS-CAN) tablet 1,000 mg [elemental]  1,000 mg Oral BID    cholecalciferol (VITAMIN D3) tablet 2,000 Units  2,000 Units Oral DAILY    gabapentin (NEURONTIN) capsule 600 mg  600 mg Oral TID    ferrous sulfate tablet 325 mg  1 Tab Oral BID WITH MEALS    loperamide (IMODIUM) capsule 4 mg  4 mg Oral Q6H PRN    Saccharomyces boulardii (FLORASTOR) capsule 500 mg  500 mg Oral BID    polyethylene glycol (MIRALAX) packet 17 g  17 g Oral DAILY    bisacodyl (DULCOLAX) suppository 10 mg  10 mg Rectal DAILY PRN    phenylephrine suppository 1 Suppository  1 Suppository Rectal TID PRN    acetaminophen (TYLENOL) tablet 500 mg  500 mg Oral Q4H PRN    diphenhydrAMINE (BENADRYL) capsule 25 mg  25 mg Oral Q4H PRN    apixaban (ELIQUIS) tablet 2.5 mg  2.5 mg Oral BID    aspirin delayed-release tablet 81 mg  81 mg Oral DAILY    carvedilol (COREG) tablet 25 mg  25 mg Oral BID WITH MEALS    levothyroxine (SYNTHROID) tablet 25 mcg  25 mcg Oral ACB    lisinopril (PRINIVIL, ZESTRIL) tablet 20 mg  20 mg Oral DAILY    nitroglycerin (NITROSTAT) tablet 0.4 mg  0.4 mg SubLINGual PRN    ondansetron (ZOFRAN ODT) tablet 4 mg  4 mg Oral Q6H PRN    pantoprazole (PROTONIX) tablet 40 mg  40 mg Oral ACB    senna-docusate (PERICOLACE) 8.6-50 mg per tablet 1 Tab  1 Tab Oral QHS    traMADol (ULTRAM) tablet 50 mg  50 mg Oral Q6H PRN     Review of Systems:Denies chest pain, shortness of breath, cough, headache, visual problems, abdominal pain, dysurea, calf pain. Pertinent positives are as noted in the medical records and unremarkable otherwise. Visit Vitals    /60 (BP 1 Location: Right arm, BP Patient Position: At rest)    Pulse 74    Temp 98.6 °F (37 °C)    Resp 15    SpO2 95%        Physical Exam:   General: Alert and age appropriately oriented. No acute cardio respiratory distress. HEENT: Normocephalic,no scleral icterus  Oral mucosa moist without cyanosis   Lungs: Clear to auscultation  bilaterally. Respiration even and unlabored   Heart: Regular rate and rhythm, S1, S2   No  murmurs, clicks, rub or gallops   Abdomen: Soft, non-tender, nondistended. Bowel sounds present. No organomegaly. Genitourinary: defer   Neuromuscular:      No new deficits; left foot drop unchanged  Poor insight     Skin/extremity: No rashes, no erythema. No calf tenderness BLE, trace L foot pedal edema , Wound left elbow and dorsum left hand; no edema, no bony deformity.                                                                             Functional Assessment:  Gross Assessment  AROM: Generally decreased, functional (05/08/18 1200)  Strength: Generally decreased, functional (05/08/18 1200)  Coordination: Generally decreased, functional (05/08/18 1200)       Balance  Sitting - Static: Good (unsupported) (05/12/18 1300)  Sitting - Dynamic: Good (unsupported) (05/12/18 1300)  Standing - Static: Fair (05/12/18 1300)  Standing - Dynamic : Impaired (05/12/18 1300)           Toileting  Cues: Physical assistance for pants up;Physical assistance for pants down;Visual/perceptual training/retraining (05/12/18 1305)  Adaptive Equipment: Grab bars (05/12/18 1305)         Cyn Champagne Fall Risk Assessment:  Cyn Champagne Fall Risk  Mobility: Ambulates or transfers with assist devices or assistance (05/13/18 0107)  Mobility Interventions: Patient to call before getting OOB; Bed/chair exit alarm;Communicate number of staff needed for ambulation/transfer;OT consult for ADLs;PT Consult for mobility concerns;PT Consult for assist device competence;Strengthening exercises (ROM-active/passive) (05/13/18 0107)  Mentation: Periodic confusion (05/13/18 0107)  Mentation Interventions: Bed/chair exit alarm; Door open when patient unattended;Eyeglasses and hearing aids; More frequent rounding (05/13/18 0107)  Medication: Patient receiving anticonvulsants, sedatives(tranquilizers), psychotropics or hypnotics, hypoglycemics, narcotics, sleep aids, antihypertensives, laxatives, or diuretics (05/13/18 0107)  Medication Interventions: Bed/chair exit alarm; Patient to call before getting OOB; Teach patient to arise slowly;Utilize gait belt for transfers/ambulation (05/13/18 0107)  Elimination: Needs assistance with toileting (05/13/18 0107)  Elimination Interventions: Call light in reach (05/13/18 0107)  Prior Fall History: BEFORE admission in past 12 months AND DURING admission (05/13/18 0107)  History of Falls Interventions: Bed/chair exit alarm (05/13/18 0107)  Total Score: 7 (05/13/18 0107)  Standard Fall Precautions: Yes (05/11/18 0705)  High Fall Risk: Yes (05/13/18 0107)     Speech Assessment:  Aspiration Signs/Symptoms: None (05/01/18 1305)      Ambulation:  Gait  Base of Support: Narrowed; Center of gravity altered (05/08/18 1200)  Speed/Chandni: Slow;Shuffled;Fluctuations (05/08/18 1200)  Step Length: Right shortened;Left shortened (05/08/18 1200)  Stance: Right increased; Left increased (05/08/18 1200)  Gait Abnormalities: Decreased step clearance;Shuffling gait;Trunk sway increased; Foot drop (foot drop L LE) (05/08/18 1200)  Distance (ft): 300 Feet (ft) (05/12/18 1300)  Assistive Device: Walker, rollator (05/12/18 1300)  Rail Use: Both (05/11/18 1400)     Labs/Studies:  Recent Results (from the past 72 hour(s))   HGB & HCT    Collection Time: 05/11/18  5:25 AM   Result Value Ref Range    HGB 9.7 (L) 11.7 - 15.4 g/dL    HCT 28.9 (L) 35.8 - 46.3 %   URINALYSIS W/ RFLX MICROSCOPIC    Collection Time: 05/12/18  9:40 PM   Result Value Ref Range    Color YELLOW      Appearance CLEAR      Specific gravity 1.007 1.001 - 1.023      pH (UA) 7.0 5.0 - 9.0      Protein NEGATIVE  NEG mg/dL    Glucose NEGATIVE  mg/dL    Ketone NEGATIVE  NEG mg/dL    Bilirubin NEGATIVE  NEG      Blood NEGATIVE  NEG      Urobilinogen 0.2 0.2 - 1.0 EU/dL    Nitrites NEGATIVE  NEG      Leukocyte Esterase NEGATIVE  NEG         Assessment:     Problem List as of 5/13/2018  Date Reviewed: 4/30/2018          Codes Class Noted - Resolved    * (Principal)Physical debility ICD-10-CM: R53.81  ICD-9-CM: 799.3  4/30/2018 - Present        Chronic systolic congestive heart failure (HCC) ICD-10-CM: I50.22  ICD-9-CM: 428.22, 428.0  4/26/2018 - Present        Femoral artery occlusion, left (HCC) ICD-10-CM: X54.191  ICD-9-CM: 444.22  4/25/2018 - Present        Type 2 diabetes mellitus with nephropathy (HCC) ICD-10-CM: E11.21  ICD-9-CM: 250.40, 583.81  1/25/2018 - Present        S/P AV noris ablation ICD-10-CM: Z98.890  ICD-9-CM: V45.89  1/16/2018 - Present        Biventricular cardiac pacemaker in situ ICD-10-CM: Z95.0  ICD-9-CM: V45.01  1/16/2018 - Present        Diabetes mellitus, type 2 (HCC) (Chronic) ICD-10-CM: E11.9  ICD-9-CM: 250.00  8/12/2017 - Present        Hypertension (Chronic) ICD-10-CM: I10  ICD-9-CM: 401.9  8/12/2017 - Present    Overview Signed 9/12/2012  3:43 PM by Sujata Powell     Orthostatic hypotension due to autonomic neuropathy             CKD (chronic kidney disease), stage III (Chronic) ICD-10-CM: N18.3  ICD-9-CM: 585.3  8/12/2017 - Present        Dysphagia (Chronic) ICD-10-CM: R13.10  ICD-9-CM: 787.20  8/12/2017 - Present    Overview Signed 4/5/2016  5:24 PM by Oralia Akins MD     Due to esophageal spasm seen on modified barium swallow 2015             Atrial fibrillation ([facility] [address] (Chronic) ICD-10-CM: I48.91  ICD-9-CM: 427.31  8/12/2017 - Present        DNR (do not resuscitate) (Chronic) ICD-10-CM: Z66  ICD-9-CM: V49.86  8/12/2017 - Present        Ischemic cardiomyopathy ICD-10-CM: I25.5  ICD-9-CM: 414.8  7/11/2017 - Present        Cardiomyopathy ([address] ICD-10-CM: I42.9  ICD-9-CM: 425.4  3/30/2017 - Present        Dyspnea ICD-10-CM: R06.00  ICD-9-CM: 786.09  2/25/2017 - Present        Atrial fibrillation with rapid ventricular response ([address] ICD-10-CM: I48.91  ICD-9-CM: 427.31  2/25/2017 - Present        SSS (sick sinus syndrome) ([address] ICD-10-CM: I49.5  ICD-9-CM: 427.81  5/5/2016 - Present        Orthostatic hypotension ICD-10-CM: I95.1  ICD-9-CM: 458.0  5/5/2016 - Present        CAD in native artery ICD-10-CM: I25.10  ICD-9-CM: 414.01  5/5/2016 - Present        Pacemaker ICD-10-CM: Z95.0  ICD-9-CM: V45.01  11/10/2015 - Present        Cervical radiculopathy ICD-10-CM: M54.12  ICD-9-CM: 723.4  5/23/2013 - Present    Overview Addendum 8/26/2013  4:59 PM by Oralia Akins MD     Chronic right shoulder pain S/P eval Dr Kaushik Potter POC August 2013, previous cervical spinal fusion             Autonomic neuropathy ICD-10-CM: G90.9  ICD-9-CM: 337.9  1/13/2013 - Present    Overview Signed 1/13/2013  8:18 PM by Oralia Akins MD     Severe orthostatic hypotension             MCI (mild cognitive impairment) ICD-10-CM: G31.84  ICD-9-CM: 331.83  1/13/2013 - Present    Overview Addendum 12/16/2013  4:43 PM by Oralia Akins MD     MMSE 27/30 Nov 2009, 27/30 Dec 2013, remembers 3/3 objects 5 min later and draws normal clock             Chronic pain syndrome ICD-10-CM: G89.4  ICD-9-CM: 338.4  1/13/2013 - Present    Overview Addendum 11/10/2015  5:15 PM by James Domignuez MD     Back, right shoulder, neck: Peripheral neuropathy, spinal stenosis C7-T1, foraminal narrowing C4-5             Acquired hypothyroidism (Chronic) ICD-10-CM: E03.9  ICD-9-CM: 244.9  9/12/2012 - Present        Mixed hyperlipidemia (Chronic) ICD-10-CM: E78.2  ICD-9-CM: 272.2  9/12/2012 - Present    Overview Addendum 9/12/2012  3:51 PM by Clayton Ramírez     With high HDL  Intolerant of pravastatin,lovastatin and lescol             GERD (gastroesophageal reflux disease) (Chronic) ICD-10-CM: K21.9  ICD-9-CM: 530.81  9/12/2012 - Present        IBS (irritable bowel syndrome) (Chronic) ICD-10-CM: K58.9  ICD-9-CM: 564.1  9/12/2012 - Present    Overview Signed 9/12/2012  3:52 PM by Clayton Ramírez     Chronic enema abuse  diverticulosis             Gait disorder (Chronic) ICD-10-CM: R26.9  ICD-9-CM: 781.2  9/12/2012 - Present    Overview Signed 9/12/2012  3:53 PM by Clayton Ramírez     Diabetic peripheral neuropathy              Atrial fibrillation with RVR ([facility] ICD-10-CM: I48.91  ICD-9-CM: 427.31  9/12/2012 - Present    Overview Addendum 10/14/2012  9:34 AM by James Dominguez MD     Paroxysmal.  Coumadin contraindicated due to falls               RESOLVED: RUSS (acute kidney injury) ([facility] [facility] ICD-10-CM: N17.9  ICD-9-CM: 584.9  8/13/2017 - 10/19/2017        RESOLVED: Acute respiratory failure with hypoxemia ([facility] ICD-10-CM: J96.01  ICD-9-CM: 518.81  8/12/2017 - 9/15/2017        RESOLVED: CAP (community acquired pneumonia) ICD-10-CM: J18.9  ICD-9-CM: 486  8/12/2017 - 9/15/2017        RESOLVED: CHF (congestive heart failure) ([facility] ICD-10-CM: I50.9  ICD-9-CM: 428.0  3/18/2017 - 4/26/2018        RESOLVED: Altered mental status ICD-10-CM: B21.30  ICD-9-CM: 780.97  3/10/2017 - 10/19/2017        RESOLVED: Altered mental state ICD-10-CM: R41.82  ICD-9-CM: 780.97  3/9/2017 - 10/19/2017        RESOLVED: Chest pain, pleuritic ICD-10-CM: R07.81  ICD-9-CM: 786.52  3/9/2017 - 10/19/2017        RESOLVED: Weight loss ICD-10-CM: R63.4  ICD-9-CM: 783.21  9/2/2014 - 11/10/2015        RESOLVED: Anorexia ICD-10-CM: R63.0  ICD-9-CM: 783.0  9/2/2014 - 10/19/2017        RESOLVED: Iron deficiency anemia, unspecified ICD-10-CM: D50.9  ICD-9-CM: 280.9  9/12/2012 - 4/16/2013    Overview Addendum 1/13/2013  8:21 PM by Oralia Akins MD     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 Aspiration precautions      Dysphagia; consider MBS; will have ST eval. HOB > 30d  -5/2 ST eval yesterday, no overt signs of dysphagia/ aspiration      Post op anemia due to blood loss/chronic appearing issue however; gi prophylaxis, monitor wound; check stool. Check iron studies as well  -hgb 10.1  -5/2 drop in Hgb to 8.4; significant iron deficiency; start supplement, hemmoc stool; hold aspirin/eliquis until stool comes back ; no gi symptoms to suggest gastritis/ ulcer.  -5/4 staff did not send last stool; pending. hgb now 8.1; asymp. Denies melena, no hematoma at wound site. Started iron yesterday for severe iron deficiency ; gi consult if stool positive  -5/5 8.0 stool neg; no visible sign of bleeding; abd soft, femoral inc clean, no abd pain or back pain to suggest something internal; daily labs; consult if needed  -5/6 improved at 8.4  -5/7 hgb 7.9, known iron deficiency, likely multifactoria. Reports that she went to the [facility] in [facility]; reced Epogen; doesn't know why and cannot find records of such. Will transfuse one unit today. Has hx of a colon AVM but stools neg, no abd pain.  Will consult hematology/ at her age, aggressive txs no appropriate  -5/8 hgb 10 s/p one unit; will watch trend; discussed with dtg over phone  -5/9 hgb 9.9 and second stool neg; 5/10 9.9; 5/11 9.7; recheck monday      DVT risk / DVT Prophylaxis- Will require daily physician exam to assess for signs and symptoms as patient is at increased risk for of thromboembolism. Mobilization as tolerated. Intermittent pneumatic compression devices when in bed Thigh-high or knee-high thromboembolic deterrent hose when out of bed. Eliquis/ASA; 5/7 hold for now due to anemia of unclear etiology  -resume eliquis and baby aspirin; needs due to severe PVD      Cervical stenosis with autonomic dysfxn; orthostatic hypotension; monitor with therapies. Cont TEDs  -5/2 no overt symptoms; bp stable      Pain Control: stable, mild-to-moderate joint symptoms intermittently, reasonably well controlled by PRN meds. Will require regular pain assessment and comprenhensive pain management,   -pt with vascular pain and hypersensitivity to LLE; cont Neurontin, tolerating 600mg tid; does not wake her up at noc; cont ultram as well  -5/2 lethargic, multifactorial; dec neurontin   -5/3 much more alert. Has been on Neurontin for years; can inc back to home dose; lethargy likely due to UTI and poor sleep; monitor; cont ultram and add 10mg Elavil; having burning pain in Left foot/ankle; lacks DP pulse; will see if we can doppler; however, foot warm to touch, no discoloration with nl cap refill  -5/4 left DP pulse found easier today with dec in edema. No discoloration/ foot warm; 5/6 complains less of pain but still burns in left ankle/foot  -5/8 cont Neurontin; seems to complain less of left ankle paresthesias  -5/10 d/w Dr Lois Chi today. Painful vasculopathic pain common in delayed embolectomies  - 5/13 warm compresses to L foot as needed      Hx of DM; on amaryl in the past, currently nothing; bs 143, change to diab diet; bs improved; check qam only  -5/9 not checking ; bs fine      Hypocalcemia; cont Oscal and add Vit D.  Will see when pt had last bone density scan. Improved 8.6 F/u with PCP  -5/2 calcium 8.1 from 8.6, check Vit d ; 5/3 inc supplement  -5/5 vit D levels nl at 41      Wound Care: Monitor wound status daily per staff and physician. At risk for failure. Will require 24/7 rehab nursing. Keep wound clean and dry, Reinforce dressing PRN and Ice to area for comfort      Hypertension - BP uncontrolled, fluctuating, managed medically. Cont Coreg, lasix, zestril   -poorly controlled 5/1 sbp 175-187; however dropped to 112; has hx of orthostatics; monitor with activity  -5/3 bp 170s, add norvasc with parameters; 5/4 150/75; slt improvement; inc Norvasc but watch for edema  -5/5 148/75; will not adjust meds at this time; 5/6 118/66; 5/9 126/68  - 5/12 HR wnml, SBP - 146 acceptable   Orthostatic BP yesterday. Will taper norvasc to 5mg qd, beginning tomorrow      Hx afib; NSR, rate controlled; cont coreg and eliquis  -NSR 5/9      Hyponatremia; Na 131 5/3 and 5/4; can hold lasix, zestril and coreg contributing but bp not controlled without it, added norvasc; recheck in a.m. ; free water restrict; DC MACRODANTIN; known cause, raghu in elderly on diuretics  -5/5 Na unchanged at 131; monitor; 5/7 improved off macrodantin and holding lasix; 134; restarting lasix 10 mg; labs 5/10; Na 134, stable      RUSS ; monitor labs. Check UA due to [place]. Improving as of 4/28; no labs since that time; may need to consider adjusting Lasix and zestril  -5/1 slt bump in creat from 1.18- 1.28 ; recheck daily. Very little po today, may need IV fluids  -5/2 IVFs overnoc, creat 1.11, improved; 1.16 (1.28); had held lasix but will restart at 10mg; recheck bmp on Thurs 5/10 excellent; creat 1.03      Hypothyroidism; cont synthroid; 5/4 check TSH; normal     Left foot drop; this makes her unsafe. PT has decided on AFO if she will wear. Off balance and unsafe. Will need supervision at home. Trying to get a feel for when this foot drop started, pt insists it was post op.  No other issue; may be due to known neuropathy?      Urinary retention/ neurogenic bladder - schedule voids q6-8 hrs. Check post-void residual as needed; In and Out catheterize if post-void residual is more than 400 cc.  -5/1 urin frq; check UA, check post void residual  -5/3 urine finally sent last pm. 4+ bact, nitrite positive, leuk neg and few bacteria; received one gram IM Rocephin and started on Macrobid; await cx. No dysuria + freq  -5/4 reorder cx; not done; +dysuria, but otherwise asymptomatic x frequency (may be chronic though); no fever, WBC nl; DC macrodantin due to hyponatremia; Rocephin 1g IV x 3d  -5/5 urine cx pending; NEG      bowel program - add prn bowel program  -5/1 impacted, did not sleep. Given suppos and disimpacted for mod amt of stool . No n/v. Limited po today. Labs stable. No gaurding or rebound on exam  -add anusol suppos due to rectal pain and suspected internal hemmor. With blood streaked stool.  -5/3 now just mucus, no occult blood. hbg has dropped; check hemoccult  -5/5 hgb continues to trend down. 8.4, now 8.0 from 8.1 . Stool neg for blood. Wound site of embolectomy clean, no swelling or evidence of hemorrhage; started oral iron due to iron deficiency   -5/9 continent  - 5/13 UA negative      GERD - resume PPI. At times may need additional antacids, Maalox prn.      -pt is DNR    5/11 sp fall; reiterate to family that she will need 24/7 supervision at [place] and not intermittent as they had planned     Time spent was 15 minutes with over 1/2 in direct patient care/examination, consultation and coordination of care.      Signed By: Bambi Garcia MD     May 13, 2018

## 2018-05-14 VITALS
SYSTOLIC BLOOD PRESSURE: 124 MMHG | OXYGEN SATURATION: 94 % | HEART RATE: 76 BPM | RESPIRATION RATE: 18 BRPM | TEMPERATURE: 97.3 F | DIASTOLIC BLOOD PRESSURE: 73 MMHG

## 2018-05-14 LAB
ANION GAP SERPL CALC-SCNC: 8 MMOL/L (ref 7–16)
BASOPHILS # BLD: 0 K/UL (ref 0–0.2)
BASOPHILS NFR BLD: 0 % (ref 0–2)
BUN SERPL-MCNC: 13 MG/DL (ref 8–23)
CALCIUM SERPL-MCNC: 8.2 MG/DL (ref 8.3–10.4)
CHLORIDE SERPL-SCNC: 95 MMOL/L (ref 98–107)
CO2 SERPL-SCNC: 29 MMOL/L (ref 21–32)
CREAT SERPL-MCNC: 1.01 MG/DL (ref 0.6–1)
DIFFERENTIAL METHOD BLD: ABNORMAL
EOSINOPHIL # BLD: 0.1 K/UL (ref 0–0.8)
EOSINOPHIL NFR BLD: 2 % (ref 0.5–7.8)
ERYTHROCYTE [DISTWIDTH] IN BLOOD BY AUTOMATED COUNT: 13.7 % (ref 11.9–14.6)
FOLATE SERPL-MCNC: 13.2 NG/ML (ref 3.1–17.5)
GLUCOSE SERPL-MCNC: 98 MG/DL (ref 65–100)
HCT VFR BLD AUTO: 27.3 % (ref 35.8–46.3)
HGB BLD-MCNC: 9.1 G/DL (ref 11.7–15.4)
IMM GRANULOCYTES # BLD: 0 K/UL (ref 0–0.5)
IMM GRANULOCYTES NFR BLD AUTO: 0 % (ref 0–5)
LYMPHOCYTES # BLD: 1.2 K/UL (ref 0.5–4.6)
LYMPHOCYTES NFR BLD: 20 % (ref 13–44)
MCH RBC QN AUTO: 30 PG (ref 26.1–32.9)
MCHC RBC AUTO-ENTMCNC: 33.3 G/DL (ref 31.4–35)
MCV RBC AUTO: 90.1 FL (ref 79.6–97.8)
MONOCYTES # BLD: 0.8 K/UL (ref 0.1–1.3)
MONOCYTES NFR BLD: 13 % (ref 4–12)
NEUTS SEG # BLD: 3.8 K/UL (ref 1.7–8.2)
NEUTS SEG NFR BLD: 65 % (ref 43–78)
PLATELET # BLD AUTO: 272 K/UL (ref 150–450)
PMV BLD AUTO: 9.6 FL (ref 10.8–14.1)
POTASSIUM SERPL-SCNC: 3.8 MMOL/L (ref 3.5–5.1)
RBC # BLD AUTO: 3.03 M/UL (ref 4.05–5.25)
SODIUM SERPL-SCNC: 132 MMOL/L (ref 136–145)
VIT B12 SERPL-MCNC: 349 PG/ML (ref 193–986)
WBC # BLD AUTO: 6 K/UL (ref 4.3–11.1)

## 2018-05-14 PROCEDURE — 97110 THERAPEUTIC EXERCISES: CPT

## 2018-05-14 PROCEDURE — 85025 COMPLETE CBC W/AUTO DIFF WBC: CPT | Performed by: PHYSICAL MEDICINE & REHABILITATION

## 2018-05-14 PROCEDURE — 80048 BASIC METABOLIC PNL TOTAL CA: CPT | Performed by: PHYSICAL MEDICINE & REHABILITATION

## 2018-05-14 PROCEDURE — 74011250637 HC RX REV CODE- 250/637: Performed by: PHYSICAL MEDICINE & REHABILITATION

## 2018-05-14 PROCEDURE — 97530 THERAPEUTIC ACTIVITIES: CPT

## 2018-05-14 PROCEDURE — 36415 COLL VENOUS BLD VENIPUNCTURE: CPT | Performed by: PHYSICAL MEDICINE & REHABILITATION

## 2018-05-14 PROCEDURE — 65310000000 HC RM PRIVATE REHAB

## 2018-05-14 PROCEDURE — 97116 GAIT TRAINING THERAPY: CPT

## 2018-05-14 PROCEDURE — 99232 SBSQ HOSP IP/OBS MODERATE 35: CPT | Performed by: PHYSICAL MEDICINE & REHABILITATION

## 2018-05-14 PROCEDURE — 84238 ASSAY NONENDOCRINE RECEPTOR: CPT | Performed by: NURSE PRACTITIONER

## 2018-05-14 PROCEDURE — 99223 1ST HOSP IP/OBS HIGH 75: CPT | Performed by: INTERNAL MEDICINE

## 2018-05-14 PROCEDURE — 82746 ASSAY OF FOLIC ACID SERUM: CPT | Performed by: PHYSICAL MEDICINE & REHABILITATION

## 2018-05-14 PROCEDURE — 82607 VITAMIN B-12: CPT | Performed by: PHYSICAL MEDICINE & REHABILITATION

## 2018-05-14 PROCEDURE — 97535 SELF CARE MNGMENT TRAINING: CPT

## 2018-05-14 RX ORDER — FUROSEMIDE 20 MG/1
TABLET ORAL
Qty: 60 TAB | Refills: 3 | Status: SHIPPED | OUTPATIENT
Start: 2018-05-15 | End: 2019-01-01 | Stop reason: SDUPTHER

## 2018-05-14 RX ORDER — LISINOPRIL 20 MG/1
20 TABLET ORAL DAILY
Qty: 30 TAB | Refills: 2 | Status: ON HOLD | OUTPATIENT
Start: 2018-05-15 | End: 2018-07-18

## 2018-05-14 RX ORDER — TRAMADOL HYDROCHLORIDE 50 MG/1
50 TABLET ORAL
Qty: 90 TAB | Refills: 0 | Status: SHIPPED | OUTPATIENT
Start: 2018-05-14 | End: 2018-05-23 | Stop reason: SDUPTHER

## 2018-05-14 RX ORDER — SAME BUTANEDISULFONATE/BETAINE 400-600 MG
500 POWDER IN PACKET (EA) ORAL 2 TIMES DAILY
Qty: 28 CAP | Refills: 0 | Status: SHIPPED | OUTPATIENT
Start: 2018-05-14 | End: 2018-05-21

## 2018-05-14 RX ORDER — LANOLIN ALCOHOL/MO/W.PET/CERES
325 CREAM (GRAM) TOPICAL 2 TIMES DAILY WITH MEALS
Qty: 60 TAB | Refills: 2 | Status: SHIPPED | OUTPATIENT
Start: 2018-05-14 | End: 2018-05-23 | Stop reason: SDUPTHER

## 2018-05-14 RX ORDER — AMLODIPINE BESYLATE 5 MG/1
5 TABLET ORAL DAILY
Status: DISCONTINUED | OUTPATIENT
Start: 2018-05-15 | End: 2018-05-14

## 2018-05-14 RX ORDER — AMITRIPTYLINE HYDROCHLORIDE 10 MG/1
10 TABLET, FILM COATED ORAL
Qty: 30 TAB | Refills: 2 | Status: SHIPPED | OUTPATIENT
Start: 2018-05-14 | End: 2018-05-23 | Stop reason: SDUPTHER

## 2018-05-14 RX ORDER — CARVEDILOL 25 MG/1
25 TABLET ORAL 2 TIMES DAILY WITH MEALS
Qty: 60 TAB | Refills: 3 | Status: SHIPPED | OUTPATIENT
Start: 2018-05-14 | End: 2018-06-29 | Stop reason: SDUPTHER

## 2018-05-14 RX ADMIN — GABAPENTIN 600 MG: 300 CAPSULE ORAL at 21:27

## 2018-05-14 RX ADMIN — STANDARDIZED SENNA CONCENTRATE AND DOCUSATE SODIUM 1 TABLET: 8.6; 5 TABLET, FILM COATED ORAL at 21:27

## 2018-05-14 RX ADMIN — Medication 500 MG: at 17:18

## 2018-05-14 RX ADMIN — CARVEDILOL 25 MG: 25 TABLET, FILM COATED ORAL at 17:18

## 2018-05-14 RX ADMIN — PANTOPRAZOLE SODIUM 40 MG: 40 TABLET, DELAYED RELEASE ORAL at 05:29

## 2018-05-14 RX ADMIN — FERROUS SULFATE TAB 325 MG (65 MG ELEMENTAL FE) 325 MG: 325 (65 FE) TAB at 17:18

## 2018-05-14 RX ADMIN — APIXABAN 2.5 MG: 2.5 TABLET, FILM COATED ORAL at 21:30

## 2018-05-14 RX ADMIN — FUROSEMIDE 10 MG: 20 TABLET ORAL at 08:09

## 2018-05-14 RX ADMIN — TRAMADOL HYDROCHLORIDE 50 MG: 50 TABLET, FILM COATED ORAL at 01:25

## 2018-05-14 RX ADMIN — Medication 500 MG: at 08:08

## 2018-05-14 RX ADMIN — LISINOPRIL 20 MG: 20 TABLET ORAL at 08:09

## 2018-05-14 RX ADMIN — GABAPENTIN 600 MG: 300 CAPSULE ORAL at 14:21

## 2018-05-14 RX ADMIN — POLYETHYLENE GLYCOL (3350) 17 G: 17 POWDER, FOR SOLUTION ORAL at 08:09

## 2018-05-14 RX ADMIN — VITAMIN D, TAB 1000IU (100/BT) 2000 UNITS: 25 TAB at 08:08

## 2018-05-14 RX ADMIN — LEVOTHYROXINE SODIUM 25 MCG: 50 TABLET ORAL at 05:29

## 2018-05-14 RX ADMIN — FERROUS SULFATE TAB 325 MG (65 MG ELEMENTAL FE) 325 MG: 325 (65 FE) TAB at 08:09

## 2018-05-14 RX ADMIN — ASPIRIN 81 MG: 81 TABLET, COATED ORAL at 08:09

## 2018-05-14 RX ADMIN — GABAPENTIN 600 MG: 300 CAPSULE ORAL at 05:29

## 2018-05-14 RX ADMIN — Medication 1000 MG: at 08:08

## 2018-05-14 RX ADMIN — ACETAMINOPHEN 500 MG: 500 TABLET, FILM COATED ORAL at 14:27

## 2018-05-14 RX ADMIN — CARVEDILOL 25 MG: 25 TABLET, FILM COATED ORAL at 08:08

## 2018-05-14 RX ADMIN — APIXABAN 2.5 MG: 2.5 TABLET, FILM COATED ORAL at 08:08

## 2018-05-14 RX ADMIN — ACETAMINOPHEN 500 MG: 500 TABLET, FILM COATED ORAL at 03:11

## 2018-05-14 RX ADMIN — AMITRIPTYLINE HYDROCHLORIDE 10 MG: 10 TABLET, FILM COATED ORAL at 21:28

## 2018-05-14 RX ADMIN — Medication 1000 MG: at 17:19

## 2018-05-14 RX ADMIN — AMLODIPINE BESYLATE 10 MG: 10 TABLET ORAL at 08:08

## 2018-05-14 NOTE — PROGRESS NOTES
Problem: Falls - Risk of  Goal: *Absence of Falls  Document Jose Fall Risk and appropriate interventions in the flowsheet.    Outcome: Progressing Towards Goal  Fall Risk Interventions:  Mobility Interventions: Bed/chair exit alarm    Mentation Interventions: Adequate sleep, hydration, pain control    Medication Interventions: Bed/chair exit alarm    Elimination Interventions: Bed/chair exit alarm    History of Falls Interventions: Bed/chair exit alarm

## 2018-05-14 NOTE — PROGRESS NOTES
PT WEEKLY PROGRESS NOTE   Time In: 1147   Time Out: 1013    Subjective: Pt. Reports she didn't sleep well last night. Daughter, Katelyn Casillas, present for family training. Objective: Other (comment) (has pacemaker; fall risk )    Outcome Measures: Vital Signs: /68  Pulse 75  Temp 97.8 °F (36.6 °C)  Resp 14  SpO2 90%    Pain level: pt. C/o pain L foot, but denies needing intervention    Patient education: provided family training to pt's daughter, including:  AFO, gait w/ rollator, stairs, curb management, HEP, & recommendation for 24/7 assist for safety    Interdisciplinary Communication: discussed family training w/ OT       AROM: limited by postural changes, functional for pt's needs    FIM SCORES Initial Assessment Weekly Progress Assessment 5/14/2018   Bed/Chair/Wheelchair Transfers 4 4   Wheelchair Mobility 0 (Secondary to patient fatigue) NT   Walking [place]   5   Steps/Stairs 0 (Secondary to patient fatigue) 5   Please see [facility] Interdisciplinary Eval: Coordination/Balance Section for details regarding FIM score description.     BED/CHAIR/WHEELCHAIR TRANSFERS Initial Assessment Weekly Progress Assessment 5/14/2018   Rolling Right 6 (Modified independent) NT   Rolling Left 6 (Modified independent) NT   Supine to Sit 4 (Contact guard assistance) NT   Sit to Stand Minimal assistance Supervision   Sit to Supine 4 (Minimal assistance) NT   Transfer Type SPT without device SPT with walker   Comments Increased time and effort, slow shuffled step CGA with increased cues for safety due to impulsivity   Car Transfer Not tested Supervision   Car Type rehab car rehab car     GROSS ASSESSMENT Weekly Progress Assessment 5/14/2018   AROM Generally decreased, functional   Strength Generally decreased, functional   Coordination Generally decreased, functional   Tone normal   Sensation NT   PROM Tight LE flexots     POSTURE Weekly Progress Assessment 5/14/2018   Posture (WDL) Exceptions to WDL   Posture Assessment Forward head;Trunk flexion;Rounded shoulders     WHEELCHAIR MOBILITY/MANAGEMENT Initial Assessment Weekly Progress Assessment 5/14/2018   Able to Propel 0 feet NT   Curbs/ramps assistance required 0 (Not tested) NT   Wheelchair set up assistance required 0 (Not tested) NT   Wheelchair management   NT     [facility] [place] INDEPENDENCE Initial Assessment Weekly Progress Assessment 5/14/2018   Assistive device Radha An, rollator, Gait belt Radha An, rollator; Other (comment) (ace wrap dorsiflexion assist)   Ambulation assistance - level surface 4 (Minimal assistance) 5 (supervision)   Distance 40 Feet (ft) 150 Feet (ft) (x3)   Comments Decreased step length B LE, decreased step clearance L LE, foot flat at IC of L LE, partial step through gait pattern Flexed posture, narrow GAEL, decreased trunk rotation, cues to keep rollator closer to her for safeety     GAIT Weekly Progress Assessment 5/14/2018   Gait Description (WDL) Exceptions to WDL   Gait Abnormalities Decreased step clearance;Shuffling gait;Trunk sway increased; Foot drop     STEPS/STAIRS Initial Assessment Weekly Progress Assessment 5/14/2018   Steps/Stairs ambulated 0 4   Rail Use Both Both   Comments   Step to gait patteern, cues for safety   Curbs/Ramps   Pt. Ascended/descended 6\" curb step w/ rollator min A     Pt. Left supine in NAD, call bell in reach, daughter present. Assessment: Pt. Able to increase gait distance w/ decreased assistance this visit, although performance fluctuates based on fatigue. Pt's daughter verbalizes understanding of all training informaiton & reports being able to assist pt. @ CGA-S level @ home. Pt. Has met 2/4 goals @ this time, remaining goals appropriate & pt. Is on track for d/c tomorrow. Plan of Care: Please see Care Plan for updated LTGs.     Family Training: Has taken place    Carmel Angeol, PT  5/14/2018

## 2018-05-14 NOTE — PROGRESS NOTES
OT Daily Note  Time In 1031   Time Out 1116     Pain: Pt stated her back was hurting a little but it felt better with resting in her w/c. Functional Mobility   Pt managed w/c brakes with moderate verbal reminders and min A to push hard enough to lock. Pt required verbal cuing 100% of the time for reaching back before sitting down and pushing up from the chair. Balance   Pt engaged in grocery store simulation activity for cognition, balance, and functional mobility for integration into self-care. Pt gathered approximately 10 items off of a grocery list with increased time to find items and minimal verbal cuing to check the list and to find the correct item. Pt maneuvered around small store area with rollator with CGA with 1 slight LOB when reaching for an item on the top shelf secondary to not keeping 1 hand on the rollator. Pt required verbal cuing 90% of the time to lock and unlock rollator brakes with minimal carryover to the next time the brakes needed to be used. Pt required 1 moderate length rest break before putting items back on the shelf and stated she was worn out. Pt had fair recall of where on the shelf the items were from but required minimal verbal cuing for memory of approximately 2 items' locations. Education   Functional mobility safety - Rollator safety (brakes)     Interdisciplinary Communication: PT Leo Kellyton on pt's performance    Plan: Continue with POC. Pt was left with PT Kristine Hayden OTS  5/14/2018

## 2018-05-14 NOTE — PROGRESS NOTES
End Of Shift Functional Summary, Nursing      TOILETING:    Does patient need assist with adjusting pants up or down and/or pericare? no    Pt uses walker. Does the patient require extra time? yes  Does the patient require standby assistance? no  Does the patient require contact guard assistance? yes  Does the patient require more than one staff member for assistance? yes    TOILET TRANSFER:    Pt requires minimal assistance. Pt uses walker. Does the patient require extra time? yes  Does the patient require contact guard assistance? yes  Does the patient require more than one staff member for assistance? no    BLADDER:    Pt does not have a [place] catheter that staff manages. Pt does not take medication. If so, please indicate which medication:    Pt is continent. of bladder and voids in toilet   Pt has had 0 bladder accidents during this shift    (An accident is when the episode is not contained in a brief AND/OR the clothing/linen requires changing/cleaning up.)    BOWEL:  Pt does take medication. Pt is continent of bowel and uses toilet. Pt has had 0 bowel accidents during this shift . (An accident is when the episode is not contained in a brief AND/OR the clothing/linen requires changing/cleaning up.)        EATING  Pt requires no assistance. Pt does not wear dentures. TUBE FEEDINGS:  Pt does not  receive nutrition through tube feedings. Patient requires no assistance with feedings. Documentation reviewed and plan of care discussed/reviewed with   patient during the shift.

## 2018-05-14 NOTE — PROGRESS NOTES
Assessment completed. Pt confused, oriented x 2, disoriented to time and situation, pt reoriented. Pt alarm to bed and chair. Pt getting up and walking by herself. Pt ask not to get up with out calling nurse. She c/o pain to left foot.  Pt given Tramadol 50 mg po

## 2018-05-14 NOTE — PROGRESS NOTES
PHYSICAL THERAPY DAILY NOTE  Time In: 1115  Time Out: 2345  Patient Seen For: AM;Gait training; Therapeutic exercise;Transfer training    Subjective: \"I didn't sleep well last night. \"  Pt. Falling asleep during this session. Objective: Other (comment) (has pacemaker; fall risk )  GROSS ASSESSMENT Daily Assessment    AROM: Generally decreased, functional  Strength: Generally decreased, functional  Coordination: Generally decreased, functional       TRANSFERS Daily Assessment    Transfer Type: SPT with walker  Transfer Assistance : 4 (Contact guard assistance)  Sit to Stand Assistance: Supervision       GAIT Daily Assessment   Provided gait training w/o df assist ace wrap w/ toe drag occurring 2 instances, requiring min A for balance Amount of Assistance: 4 (Minimal assistance)  Distance (ft): 150 Feet (ft)  Assistive Device: Walker, rollator       BALANCE Daily Assessment    Sitting - Static: Good (unsupported)  Sitting - Dynamic: Good (unsupported)  Standing - Static: Fair  Standing - Dynamic : Impaired       LOWER EXTREMITY EXERCISES Daily Assessment   Pt. Performed NuStep @ resistance level 1 x15 minutes to increase strength & ROM B LEs Extremity: Both  Exercise Type #1: Seated lower extremity strengthening  Sets Performed: 1  Reps Performed: 15  Level of Assist: Supervision   Pt.  Instructed in seated exercises per family request for HEP     SEATED EXERCISES Sets Reps Comments   Ankle Pumps 1 15    Hip Flexion 1 15    Long Arc Quads 1 15    Hip Adduction/Ball Squeeze 1 15    Hip Abduction with red Theraband 1 15    Hamstring Curls with red Theraband 1 15    Hip Extension with red Theraband 1 15      Vital Signs:/68  Pulse 75  Temp 97.8 °F (36.6 °C)  Resp 14  SpO2 90%    Pain level: no c/o pain this visit    Patient education: pt. Educated on seated HEP, poor carry over noted w/ constant cueing required to continue w/ exercises, daughter given handout this AM    Interdisciplinary Communication: collaborated w/ OT regarding pt's readiness for d/c tomorrow    Pt. Left up in recliner in NAD, call bell in reach, daughter present, chair alarm activated             Assessment: Attempted gait training w/ ace wrap df assist this session, with pt. Requiring increased assistance for ambulation. Poor df also noted during seated exercises. Continue to feel pt's safest option for mobility is to use a AFO to prevent foot drop. Pt. Also had a hard time maintaining alertness during exercises, but pt's daughter was requesting a HEP which she will attempt to do @ home. Pt. Will require 24/7 assistance @ home due to safety issues w/ poor recall & insight. Plan of Care: Continue with POC and progress as tolerated.      Dominique Stuart, PT  5/14/2018

## 2018-05-14 NOTE — PROGRESS NOTES
End Of Shift Functional Summary, Nursing      TOILETING:    Does patient need assist with adjusting pants up or down and/or pericare? yes  If yes, please indicate what the patient needs help with:  Pants up and down(i.e. pants up, pants down, pericare)  Pt uses walker. Does the patient require extra time? yes  Does the patient require standby assistance? yes  Does the patient require contact guard assistance? yes  Does the patient require more than one staff member for assistance? no    TOILET TRANSFER:    Pt requires moderate assistance. Pt uses walker. Does the patient require extra time? yes  Does the patient require contact guard assistance? yes  Does the patient require more than one staff member for assistance? no    BLADDER:    Pt does not have a [place] catheter that staff manages. Pt does not take medication. If so, please indicate which medication:    Pt is continent. of bladder and voids in toilet     BOWEL:  Pt does not take medication. Pt is continent of bowel and uses toilet. BED/CHAIR TRANSFER  Pt requires moderate assistance. Pt uses walker  Does the patient require extra time? yes  Does the patient require contact guard assistance? yes  Does the patient require more than one staff member for assistance? no    Documentation reviewed and plan of care discussed/reviewed with   patient during the shift.

## 2018-05-14 NOTE — PROGRESS NOTES
OT WEEKLY PROGRESS NOTE    Time In: 0831  Time Out: 0916    COMPREHENSION MODE Initial Assessment Weekly Progress Assessment 5/14/2018   Score 6 ([place]) 4     EXPRESSION Initial Assessment Weekly Progress Assessment 5/14/2018   Primary Mode of Expression Verbal Verbal   Score 7 6   Comments low volume; head forward flexed for majority of session; limited participation  Expresses complex, abstract ideas with extra time, mild difficulty or assistive device     SOCIAL INTERACTION/ PRAGMATICS Initial Assessment Weekly Progress Assessment 5/14/2018   Score 7 5   Comments pleasant and cooperative  impulsive     PROBLEM SOLVING Initial Assessment Weekly Progress Assessment 5/14/2018   Score 5 3   Comments solves routine problems 91-99% of the time  Solves basic problems 50-74% of the time. Limited by impulsivity     MEMORY Initial Assessment Weekly Progress Assessment 5/14/2018   Score 5 4   Comments executes 3/3 steps; recalled 2/3 words  Recognizes, recalls, or executes 75-89% of the time 2 steps of 3 step request.     GROOMING Initial Assessment Weekly Progress Assessment 5/14/2018   Functional Level 4 5   Tasks completed by patient Brushed hair, Washed face, Washed hands, Brushed teeth Brushed hair;Brushed teeth   Comments CGA in standing; assist with hair  S due to impulsivity -may not remain seated     BATHING Initial Assessment Weekly Progress Assessment 5/14/2018   Functional Level 4 4   Body parts patient bathed Abdomen, Arm, left, Arm, right, Buttocks, Chest, Lower leg and foot, left, Lower leg and foot, right, [place] area, Thigh, left, Thigh, right Abdomen;Arm, left;Arm, right;Buttocks; Chest;Lower leg and foot, left; Lower leg and foot, right;[place] area; Thigh, left; Thigh, right   Comments min A for balance in standing  CGA for balance and safety in standing     TUB/SHOWER TRANSFER INDEPENDENCE Initial Assessment Weekly Progress Assessment 5/14/2018   Score 4 4   Comments min A for safety ; HHA  CGA for balance with RW and grab bars     UPPER BODY DRESSING/UNDRESSING Initial Assessment Weekly Progress Assessment 5/14/2018   Functional Level 5 5   Items applied/Steps completed Pullover (4 steps) Pullover (4 steps)   Comments setup assist  S/U     LOWER BODY DRESSING/UNDRESSING Initial Assessment Weekly Progress Assessment 5/14/2018   Functional Level 3 4   Items applied/Steps completed Elastic waist pants (3 steps), Sock, left (1 step), Sock, right (1 step), Underpants (3 steps), Fasten shoe (1 step), Shoe, left (1 step), Shoe, right (1 step) Elastic waist pants (3 steps); Shoe, left (1 step); Shoe, right (1 step); Sock, left (1 step); Sock, right (1 step); Underpants (3 steps)   Comments assist with B socks and shoes; CGA for balance during clothing management up  CGA in standing     TOILETING Initial Assessment Weekly Progress Assessment 5/14/2018   Functional Level 4 4   Comments CGA during clothing management  CGA in standing     TOILET TRANSFER INDEPENDENCE Initial Assessment Weekly Progress Assessment 5/14/2018   Transfer score 3 4   Comments lifting assist off toilet  CGA with RW and grab bars     Plan of Care: Please see Care Plan for updated LTGs. Family Training:  Completed ADL training with pt's daughter today    Summary of Progress: Pt is demonstrating improvements in activity tolerance, balance, and strength which led to improvements in grooming, LB dressing, and toilet transfers. Pt is limited by impulsivity, decreased memory of safe transfer and functional mobility techniques, and decreased balance and would benefit from continued OT in order to go home safely with CGA/close S. Summary of Session: Pt was EOB and agreeable to tx. Pt continues to require CGA throughout session secondary to impulsivity and decreased awareness of LOB. Pt's daughter educated on CGA/close S at all times secondary to impulsivity and LOB occasionally.  Pt's daughter had good questions and understood that she is impulsive and needs close S. Pt's daughter stated she was impulsive at home as well and they took turns staying with her at her house. Pt completed 3 minutes on the ergometer frontwards and backwards with moderate resistance to increase UB strength and activity tolerance for integration into ADL. Pt was left with PT Kristine Strauss OTS  5/14/2018

## 2018-05-14 NOTE — PROGRESS NOTES
Patient sitting up in bed eating dinner meal. Pt encourage to call for any needs.  Call light with in reach

## 2018-05-14 NOTE — PROGRESS NOTES
Pt encourage to drink plenty of fluids. Pt states she is drinking she can.  She is drinking water and er Deborah drunks

## 2018-05-14 NOTE — PROGRESS NOTES
Sanjana Olea MD,   Medical Director  [address] [facility], [address] [facility]  Tel: [phone]       [facility] PROGRESS NOTE    [place]  Admit Date: 4/30/2018  Admit Diagnosis: left feneseal embolictomy; Physical debility    Subjective     Doing well, no cp, sob,n. Still with left foot pain. Didn't sleep well bc of it. Took ultram, elavil, neurontin; smiling, appears comfortable.  Getting up unattended; poor insight into her balance deficits    Objective:     Current Facility-Administered Medications   Medication Dose Route Frequency    0.9% sodium chloride infusion 250 mL  250 mL IntraVENous PRN    furosemide (LASIX) tablet 10 mg  10 mg Oral DAILY    amLODIPine (NORVASC) tablet 10 mg  10 mg Oral DAILY    amitriptyline (ELAVIL) tablet 10 mg  10 mg Oral QHS    calcium carbonate (OS-CAN) tablet 1,000 mg [elemental]  1,000 mg Oral BID    cholecalciferol (VITAMIN D3) tablet 2,000 Units  2,000 Units Oral DAILY    gabapentin (NEURONTIN) capsule 600 mg  600 mg Oral TID    ferrous sulfate tablet 325 mg  1 Tab Oral BID WITH MEALS    loperamide (IMODIUM) capsule 4 mg  4 mg Oral Q6H PRN    Saccharomyces boulardii (FLORASTOR) capsule 500 mg  500 mg Oral BID    polyethylene glycol (MIRALAX) packet 17 g  17 g Oral DAILY    bisacodyl (DULCOLAX) suppository 10 mg  10 mg Rectal DAILY PRN    phenylephrine suppository 1 Suppository  1 Suppository Rectal TID PRN    acetaminophen (TYLENOL) tablet 500 mg  500 mg Oral Q4H PRN    diphenhydrAMINE (BENADRYL) capsule 25 mg  25 mg Oral Q4H PRN    apixaban (ELIQUIS) tablet 2.5 mg  2.5 mg Oral BID    aspirin delayed-release tablet 81 mg  81 mg Oral DAILY    carvedilol (COREG) tablet 25 mg  25 mg Oral BID WITH MEALS    levothyroxine (SYNTHROID) tablet 25 mcg  25 mcg Oral ACB    lisinopril (PRINIVIL, ZESTRIL) tablet 20 mg  20 mg Oral DAILY    nitroglycerin (NITROSTAT) tablet 0.4 mg  0.4 mg SubLINGual PRN    ondansetron (ZOFRAN ODT) tablet 4 mg  4 mg Oral Q6H PRN    pantoprazole (PROTONIX) tablet 40 mg  40 mg Oral ACB    senna-docusate (PERICOLACE) 8.6-50 mg per tablet 1 Tab  1 Tab Oral QHS    traMADol (ULTRAM) tablet 50 mg  50 mg Oral Q6H PRN     Review of Systems:Denies chest pain, shortness of breath, cough, headache, visual problems, abdominal pain, dysurea, calf pain. Pertinent positives are as noted in the medical records and unremarkable otherwise. Visit Vitals    /68    Pulse 75    Temp 97.8 °F (36.6 °C)    Resp 14    SpO2 90%        Physical Exam:   General: Alert and age appropriately oriented. No acute cardio respiratory distress. HEENT: Normocephalic,no scleral icterus  Oral mucosa moist without cyanosis   Lungs: Clear to auscultation  bilaterally. Respiration even and unlabored   Heart: Regular rate and rhythm, S1, S2   No  murmurs, clicks, rub or gallops   Abdomen: Soft, non-tender, nondistended. Bowel sounds present. No organomegaly. Genitourinary: defer   Neuromuscular:      Non focal x left DF weakness 3/5  No sensory deficits distally. Exam limited by pain. Skin/extremity: No rashes, no erythema.  No calf tenderness BLE  Wound healed left groin, DPpulse 1+; skin peeling                                                                            Functional Assessment:  Gross Assessment  AROM: Generally decreased, functional (05/08/18 1200)  Strength: Generally decreased, functional (05/08/18 1200)  Coordination: Generally decreased, functional (05/08/18 1200)       Balance  Sitting - Static: Good (unsupported) (05/12/18 1300)  Sitting - Dynamic: Good (unsupported) (05/12/18 1300)  Standing - Static: Fair (05/12/18 1300)  Standing - Dynamic : Impaired (05/12/18 1300)           Toileting  Cues: Physical assistance for pants up;Physical assistance for pants down;Visual/perceptual training/retraining (05/12/18 1305)  Adaptive Equipment: Grab bars (05/12/18 1305)         [facility] Fall Risk Assessment:  Candy Guerrero Fall Risk  Mobility: Ambulates or transfers with assist devices or assistance (18)  Mobility Interventions: Bed/chair exit alarm (18)  Mentation: Periodic confusion (18)  Mentation Interventions: Adequate sleep, hydration, pain control (18)  Medication: Patient receiving anticonvulsants, sedatives(tranquilizers), psychotropics or hypnotics, hypoglycemics, narcotics, sleep aids, antihypertensives, laxatives, or diuretics (18)  Medication Interventions: Bed/chair exit alarm (18)  Elimination: Needs assistance with toileting (18)  Elimination Interventions: Bed/chair exit alarm (18)  Prior Fall History: BEFORE admission in past 12 months AND DURING admission (18)  History of Falls Interventions: Bed/chair exit alarm (18)  Total Score: 7 (18)  Standard Fall Precautions: Yes (18 0705)  High Fall Risk: Yes (18)     Speech Assessment:  Aspiration Signs/Symptoms: None (18 1305)      Ambulation:  Gait  Base of Support: Narrowed; Center of gravity altered (18 1200)  Speed/Chandni: Slow;Shuffled;Fluctuations (18 1200)  Step Length: Right shortened;Left shortened (18 1200)  Stance: Right increased; Left increased (18 1200)  Gait Abnormalities: Decreased step clearance;Shuffling gait;Trunk sway increased; Foot drop (foot drop L LE) (18 1200)  Distance (ft): 300 Feet (ft) (18 1300)  Assistive Device: Sandra [place], rollator (18 1300)  Rail Use: Both (18 1400)     Labs/Studies:  Recent Results (from the past 72 hour(s))   URINALYSIS W/ RFLX MICROSCOPIC    Collection Time: 18  9:40 PM   Result Value Ref Range    Color YELLOW      Appearance CLEAR      Specific gravity 1.007 1.001 - 1.023      pH (UA) 7.0 5.0 - 9.0      Protein NEGATIVE  NEG mg/dL    Glucose NEGATIVE  mg/dL    Ketone NEGATIVE  NEG mg/dL    Bilirubin NEGATIVE NEG      Blood NEGATIVE  NEG      Urobilinogen 0.2 0.2 - 1.0 EU/dL    Nitrites NEGATIVE  NEG      Leukocyte Esterase NEGATIVE  NEG     CBC WITH AUTOMATED DIFF    Collection Time: 05/14/18  6:32 AM   Result Value Ref Range    WBC 6.0 4.3 - 11.1 K/uL    RBC 3.03 (L) 4.05 - 5.25 M/uL    HGB 9.1 (L) 11.7 - 15.4 g/dL    HCT 27.3 (L) 35.8 - 46.3 %    MCV 90.1 79.6 - 97.8 FL    MCH 30.0 26.1 - 32.9 PG    MCHC 33.3 31.4 - 35.0 g/dL    RDW 13.7 11.9 - 14.6 %    PLATELET 534 256 - 517 K/uL    MPV 9.6 (L) 10.8 - 14.1 FL    DF AUTOMATED      NEUTROPHILS 65 43 - 78 %    LYMPHOCYTES 20 13 - 44 %    MONOCYTES 13 (H) 4.0 - 12.0 %    EOSINOPHILS 2 0.5 - 7.8 %    BASOPHILS 0 0.0 - 2.0 %    IMMATURE GRANULOCYTES 0 0.0 - 5.0 %    ABS. NEUTROPHILS 3.8 1.7 - 8.2 K/UL    ABS. LYMPHOCYTES 1.2 0.5 - 4.6 K/UL    ABS. MONOCYTES 0.8 0.1 - 1.3 K/UL    ABS. EOSINOPHILS 0.1 0.0 - 0.8 K/UL    ABS. BASOPHILS 0.0 0.0 - 0.2 K/UL    ABS. IMM.  GRANS. 0.0 0.0 - 0.5 K/UL   METABOLIC PANEL, BASIC    Collection Time: 05/14/18  6:32 AM   Result Value Ref Range    Sodium 132 (L) 136 - 145 mmol/L    Potassium 3.8 3.5 - 5.1 mmol/L    Chloride 95 (L) 98 - 107 mmol/L    CO2 29 21 - 32 mmol/L    Anion gap 8 7 - 16 mmol/L    Glucose 98 65 - 100 mg/dL    BUN 13 8 - 23 MG/DL    Creatinine 1.01 (H) 0.6 - 1.0 MG/DL    GFR est AA >60 >60 ml/min/1.73m2    GFR est non-AA 55 (L) >60 ml/min/1.73m2    Calcium 8.2 (L) 8.3 - 10.4 MG/DL       Assessment:     Problem List as of 5/14/2018  Date Reviewed: 4/30/2018          Codes Class Noted - Resolved    * (Principal)Physical debility ICD-10-CM: R53.81  ICD-9-CM: 799.3  4/30/2018 - Present        Chronic systolic congestive heart failure (HCC) ICD-10-CM: I50.22  ICD-9-CM: 428.22, 428.0  4/26/2018 - Present        Femoral artery occlusion, left (HCC) ICD-10-CM: N75.976  ICD-9-CM: 444.22  4/25/2018 - Present        Type 2 diabetes mellitus with nephropathy (HCC) ICD-10-CM: E11.21  ICD-9-CM: 250.40, 583.81  1/25/2018 - Present S/P AV noris ablation ICD-10-CM: Z98.890  ICD-9-CM: V45.89  1/16/2018 - Present        Biventricular cardiac pacemaker in situ ICD-10-CM: Z95.0  ICD-9-CM: V45.01  1/16/2018 - Present        Diabetes mellitus, type 2 (HCC) (Chronic) ICD-10-CM: E11.9  ICD-9-CM: 250.00  8/12/2017 - Present        Hypertension (Chronic) ICD-10-CM: I10  ICD-9-CM: 401.9  8/12/2017 - Present    Overview Signed 9/12/2012  3:43 PM by Sujata Powell     Orthostatic hypotension due to autonomic neuropathy             CKD (chronic kidney disease), stage III (Chronic) ICD-10-CM: N18.3  ICD-9-CM: 585.3  8/12/2017 - Present        Dysphagia (Chronic) ICD-10-CM: R13.10  ICD-9-CM: 787.20  8/12/2017 - Present    Overview Signed 4/5/2016  5:24 PM by Oralia Akins MD     Due to esophageal spasm seen on modified barium swallow 2015             Atrial fibrillation ([facility] (Chronic) ICD-10-CM: I48.91  ICD-9-CM: 427.31  8/12/2017 - Present        DNR (do not resuscitate) (Chronic) ICD-10-CM: Z66  ICD-9-CM: V49.86  8/12/2017 - Present        Ischemic cardiomyopathy ICD-10-CM: I25.5  ICD-9-CM: 414.8  7/11/2017 - Present        Cardiomyopathy ([facility] ICD-10-CM: I42.9  ICD-9-CM: 425.4  3/30/2017 - Present        Dyspnea ICD-10-CM: R06.00  ICD-9-CM: 786.09  2/25/2017 - Present        Atrial fibrillation with rapid ventricular response ([facility] ICD-10-CM: I48.91  ICD-9-CM: 427.31  2/25/2017 - Present        SSS (sick sinus syndrome) ([facility] ICD-10-CM: I49.5  ICD-9-CM: 427.81  5/5/2016 - Present        Orthostatic hypotension ICD-10-CM: I95.1  ICD-9-CM: 458.0  5/5/2016 - Present        CAD in native artery ICD-10-CM: I25.10  ICD-9-CM: 414.01  5/5/2016 - Present        Pacemaker ICD-10-CM: Z95.0  ICD-9-CM: V45.01  11/10/2015 - Present        Cervical radiculopathy ICD-10-CM: M54.12  ICD-9-CM: 723.4  5/23/2013 - Present    Overview Addendum 8/26/2013  4:59 PM by Oralia Akins MD     Chronic right shoulder pain S/P eval Dr Kaushik Potter POC August 2013, previous cervical spinal fusion             Autonomic neuropathy ICD-10-CM: G90.9  ICD-9-CM: 337.9  1/13/2013 - Present    Overview Signed 1/13/2013  8:18 PM by Yisel Louie MD     Severe orthostatic hypotension             MCI (mild cognitive impairment) ICD-10-CM: G31.84  ICD-9-CM: 331.83  1/13/2013 - Present    Overview Addendum 12/16/2013  4:43 PM by Yisel Louie MD     MMSE 27/30 Nov 2009, 27/30 Dec 2013, remembers 3/3 objects 5 min later and draws normal clock             Chronic pain syndrome ICD-10-CM: G89.4  ICD-9-CM: 338.4  1/13/2013 - Present    Overview Addendum 11/10/2015  5:15 PM by Yisel Louie MD     Back, right shoulder, neck: Peripheral neuropathy, spinal stenosis C7-T1, foraminal narrowing C4-5             Acquired hypothyroidism (Chronic) ICD-10-CM: E03.9  ICD-9-CM: 244.9  9/12/2012 - Present        Mixed hyperlipidemia (Chronic) ICD-10-CM: E78.2  ICD-9-CM: 272.2  9/12/2012 - Present    Overview Addendum 9/12/2012  3:51 PM by Jameson Barkley     With high HDL  Intolerant of pravastatin,lovastatin and lescol             GERD (gastroesophageal reflux disease) (Chronic) ICD-10-CM: K21.9  ICD-9-CM: 530.81  9/12/2012 - Present        IBS (irritable bowel syndrome) (Chronic) ICD-10-CM: K58.9  ICD-9-CM: 564.1  9/12/2012 - Present    Overview Signed 9/12/2012  3:52 PM by Jameson Barkley     Chronic enema abuse  diverticulosis             Gait disorder (Chronic) ICD-10-CM: R26.9  ICD-9-CM: 781.2  9/12/2012 - Present    Overview Signed 9/12/2012  3:53 PM by Jameson Barkley     Diabetic peripheral neuropathy              Atrial fibrillation with RVR ([facility] ICD-10-CM: I48.91  ICD-9-CM: 427.31  9/12/2012 - Present    Overview Addendum 10/14/2012  9:34 AM by Yisel Louie MD     Paroxysmal.  Coumadin contraindicated due to falls               RESOLVED: RUSS (acute kidney injury) ([facility] ICD-10-CM: N17.9  ICD-9-CM: 584.9  8/13/2017 - 10/19/2017        RESOLVED: Acute respiratory failure with hypoxemia ([facility] ICD-10-CM: J96.01  ICD-9-CM: 518.81  8/12/2017 - 9/15/2017        RESOLVED: CAP (community acquired pneumonia) ICD-10-CM: J18.9  ICD-9-CM: 486  8/12/2017 - 9/15/2017        RESOLVED: CHF (congestive heart failure) ([facility] ICD-10-CM: I50.9  ICD-9-CM: 428.0  3/18/2017 - 4/26/2018        RESOLVED: Altered mental status ICD-10-CM: R41.82  ICD-9-CM: 780.97  3/10/2017 - 10/19/2017        RESOLVED: Altered mental state ICD-10-CM: R41.82  ICD-9-CM: 780.97  3/9/2017 - 10/19/2017        RESOLVED: Chest pain, pleuritic ICD-10-CM: R07.81  ICD-9-CM: 786.52  3/9/2017 - 10/19/2017        RESOLVED: Weight loss ICD-10-CM: R63.4  ICD-9-CM: 783.21  9/2/2014 - 11/10/2015        RESOLVED: Anorexia ICD-10-CM: R63.0  ICD-9-CM: 783.0  9/2/2014 - 10/19/2017        RESOLVED: Iron deficiency anemia, unspecified ICD-10-CM: D50.9  ICD-9-CM: 280.9  9/12/2012 - 4/16/2013    Overview Addendum 1/13/2013  8:21 PM by Anup Chavis MD     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 Aspiration precautions      Dysphagia; consider MBS; will have ST eval. HOB > 30d  -5/2 ST eval yesterday, no overt signs of dysphagia/ aspiration      Post op anemia due to blood loss/chronic appearing issue however; gi prophylaxis, monitor wound; check stool. Check iron studies as well  -hgb 10.1  -5/2 drop in Hgb to 8.4; significant iron deficiency; start supplement, hemmoc stool; hold aspirin/eliquis until stool comes back ; no gi symptoms to suggest gastritis/ ulcer.  -5/4 staff did not send last stool; pending. hgb now 8.1; asymp. Denies melena, no hematoma at wound site.  Started iron yesterday for severe iron deficiency ; gi consult if stool positive  -5/5 8.0 stool neg; no visible sign of bleeding; abd soft, femoral inc clean, no abd pain or back pain to suggest something internal; daily labs; consult if needed  -5/6 improved at 8.4  -5/7 hgb 7.9, known iron deficiency, likely multifactoria. Reports that she went to the [facility] in the past; reced Epogen; doesn't know why and cannot find records of such. Will transfuse one unit today. Has hx of a colon AVM but stools neg, no abd pain. Will consult hematology/ at her age, aggressive txs no appropriate  -5/8 hgb 10 s/p one unit; will watch trend; discussed with dtg over phone  -5/9 hgb 9.9 and second stool neg; 5/10 9.9; 5/11 9.7; recheck Monday; 5/14 hgb 9.1; Hematology consult per family request      DVT risk / DVT Prophylaxis- Will require daily physician exam to assess for signs and symptoms as patient is at increased risk for of thromboembolism. Mobilization as tolerated. Intermittent pneumatic compression devices when in bed Thigh-high or knee-high thromboembolic deterrent hose when out of bed. Eliquis/ASA; 5/7 hold for now due to anemia of unclear etiology  -resume eliquis and baby aspirin; needs due to severe PVD      Cervical stenosis with autonomic dysfxn; orthostatic hypotension; monitor with therapies. Cont TEDs  -5/2 no overt symptoms; bp stable      Pain Control: stable, mild-to-moderate joint symptoms intermittently, reasonably well controlled by PRN meds. Will require regular pain assessment and comprenhensive pain management,   -pt with vascular pain and hypersensitivity to LLE; cont Neurontin, tolerating 600mg tid; does not wake her up at noc; cont ultram as well  -5/2 lethargic, multifactorial; dec neurontin   -5/3 much more alert. Has been on Neurontin for years; can inc back to home dose; lethargy likely due to UTI and poor sleep; monitor; cont ultram and add 10mg Elavil; having burning pain in Left foot/ankle; lacks DP pulse; will see if we can doppler; however, foot warm to touch, no discoloration with nl cap refill  -5/4 left DP pulse found easier today with dec in edema.  No discoloration/ foot warm; 5/6 complains less of pain but still burns in left ankle/foot  -5/8 cont Neurontin; seems to complain less of left ankle paresthesias  -5/10 d/w Dr James Garcia today. Painful vasculopathic pain common in delayed embolectomies  - 5/13 warm compresses to L foot as needed      Hx of DM; on amaryl in the past, currently nothing; bs 143, change to diab diet; bs improved; check qam only  -5/9 not checking ; bs fine      Hypocalcemia; cont Oscal and add Vit D. Will see when pt had last bone density scan. Improved 8.6 F/u with PCP  -5/2 calcium 8.1 from 8.6, check Vit d ; 5/3 inc supplement  -5/5 vit D levels nl at 41      Wound Care: Monitor wound status daily per staff and physician. At risk for failure. Will require 24/7 rehab nursing. Keep wound clean and dry, Reinforce dressing PRN and Ice to area for comfort      Hypertension - BP uncontrolled, fluctuating, managed medically. Cont Coreg, lasix, zestril   -poorly controlled 5/1 sbp 175-187; however dropped to 112; has hx of orthostatics; monitor with activity  -5/3 bp 170s, add norvasc with parameters; 5/4 150/75; slt improvement; inc Norvasc but watch for edema  -5/5 148/75; will not adjust meds at this time; 5/6 118/66; 5/9 126/68  - 5/12 HR wnml, SBP - 146 acceptable   Orthostatic BP yesterday. Will taper norvasc to 5mg qd, beginning tomorrow  -5/14 dec norvasc to 5mg      Hx afib; NSR, rate controlled; cont coreg and eliquis  -NSR 5/9      Hyponatremia; Na 131 5/3 and 5/4; can hold lasix, zestril and coreg contributing but bp not controlled without it, added norvasc; recheck in a.m. ; free water restrict; DC MACRODANTIN; known cause, raghu in elderly on diuretics  -5/5 Na unchanged at 131; monitor; 5/7 improved off macrodantin and holding lasix; 134; restarting lasix 10 mg; labs 5/10; Na 134, stable; 5/14 132      RUSS ; monitor labs. Check UA due to [place].  Improving as of 4/28; no labs since that time; may need to consider adjusting Lasix and zestril  -5/1 slt bump in creat from 1.18- 1.28 ; recheck daily. Very little po today, may need IV fluids  -5/2 IVFs overnoc, creat 1.11, improved; 1.16 (1.28); had held lasix but will restart at 10mg; recheck bmp on Thurs 5/10 excellent; creat 1.03; 5/14 1.01      Hypothyroidism; cont synthroid; 5/4 check TSH; normal      Left foot drop; this makes her unsafe. PT has decided on AFO if she will wear. Off balance and unsafe. Will need supervision at home. Trying to get a feel for when this foot drop started, pt insists it was post op. No other issue; may be due to known neuropathy?      Urinary retention/ neurogenic bladder - schedule voids q6-8 hrs. Check post-void residual as needed; In and Out catheterize if post-void residual is more than 400 cc.  -5/1 urin frq; check UA, check post void residual  -5/3 urine finally sent last pm. 4+ bact, nitrite positive, leuk neg and few bacteria; received one gram IM Rocephin and started on Macrobid; await cx. No dysuria + freq  -5/4 reorder cx; not done; +dysuria, but otherwise asymptomatic x frequency (may be chronic though); no fever, WBC nl; DC macrodantin due to hyponatremia; Rocephin 1g IV x 3d  -5/5 urine cx pending; NEG      bowel program - add prn bowel program  -5/1 impacted, did not sleep. Given suppos and disimpacted for mod amt of stool . No n/v. Limited po today. Labs stable. No gaurding or rebound on exam  -add anusol suppos due to rectal pain and suspected internal hemmor. With blood streaked stool.  -5/3 now just mucus, no occult blood. hbg has dropped; check hemoccult  -5/5 hgb continues to trend down. 8.4, now 8.0 from 8.1 . Stool neg for blood. Wound site of embolectomy clean, no swelling or evidence of hemorrhage; started oral iron due to iron deficiency   -5/9 continent  - 5/13 UA negative      GERD - resume PPI.  At times may need additional antacids, Maalox prn.      -pt is DNR      Time spent was 25 minutes with over 1/2 in direct patient care/examination, consultation and coordination of care.     Signed By: Myrtle Sharpe MD     May 14, 2018

## 2018-05-14 NOTE — CONSULTS
[facility]        Inpatient Hematology / Oncology Consult Note    Reason for Consult:  left feneseal embolictomy  Physical debility  Referring Physician:  Carlos Del Valle*    History of Present Illness:  Ms. Saulo Ramos is a 7[address] y.o. female admitted on 4/30/2018 with a primary diagnosis of The primary encounter diagnosis was Physical debility. Diagnoses of Chronic atrial fibrillation (HCC), Femoral artery occlusion, left (HCC), Chronic systolic congestive heart failure (HCC), Autonomic neuropathy, Gait disorder, Congestive heart failure, unspecified HF chronicity, unspecified heart failure type ([address] Type 2 diabetes mellitus with microalbuminuria, without long-term current use of insulin ([address] Essential hypertension, Acquired hypothyroidism, Mixed hyperlipidemia, CKD (chronic kidney disease), stage III, Gastroesophageal reflux disease without esophagitis, Irritable bowel syndrome without diarrhea, Atrial fibrillation with RVR ([address] MCI (mild cognitive impairment), Chronic pain syndrome, Cervical radiculopathy, Other dysphagia, Pacemaker, SSS (sick sinus syndrome) ([address] Orthostatic hypotension, CAD in native artery, Dyspnea, unspecified type, Atrial fibrillation with rapid ventricular response ([address] Cardiomyopathy, unspecified type ([address] Ischemic cardiomyopathy, DNR (do not resuscitate), S/P AV noris ablation, Biventricular cardiac pacemaker in situ, Type 2 diabetes mellitus with nephropathy ([address] Osteopenia, unspecified location, Iron deficiency anemia, unspecified iron deficiency anemia type, and Acute cystitis without hematuria were also pertinent to this visit. .       Ms. Saulo Ramos is a 81yo female with significant comorbidities including a fib, cervical myelopathy, DM, CAD, neuropathy, Afib, SSS s/p pacer, CHF, CKD and dysphagia/wt loss who had presented to the ED in the beginning of April with LLE pain. She was diagnosed with sciatica and placed on a steroid taper.  She f/u with her PCP and was on neurontin for neuropathic pain due to sciatica. All with no relief. She could not ambulate and had a few falls. She presented to the ED once again on 4/24 from an Urgent Care facility and was found to have an occlusion of the left femoral artery on US. She was taken to the operating room on 4/25 and had left femoral artery embolectomy. She is currently on 9th floor for rehab. We have been consulted for persistent anemia. Review of Systems:  Constitutional Denies fever, chills. Positive fatigue   HEENT Denies trauma, blurry vision, hearing loss, ear pain, nosebleeds, sore throat, neck pain   Skin Denies lesions or rashes. Lungs Denies dyspnea, cough, sputum production or hemoptysis. Cardiovascular Denies chest pain, palpitations, or lower extremity edema. Neuro Denies headaches, visual changes or ataxia. MSK Denies back pain, arthralgias, myalgias      Psychiatric/Behavioral The patient is not nervous/anxious.          Allergies   Allergen Reactions    Tylenol [Acetaminophen] Unknown (comments)     insomnia     Past Medical History:   Diagnosis Date    A fib     Abnormal loss of weight     Acquired hypothyroidism 9/12/2012    Acute respiratory failure with hypoxemia ([address] 8/12/2017    Anemia     Anorexia 9/2/2014    Arthritis associated with another disorder     Atrial fibrillation ([address] 9/12/2012    Paroxysmal.  Coumadin contraindicated due to falls      Autonomic neuropathy 1/13/2013    CAD (coronary artery disease)     CAD in native artery 5/5/2016    CAP (community acquired pneumonia) 8/12/2017    Cervical radiculopathy 5/23/2013    Chest pain     Chronic kidney disease     Chronic pain syndrome 1/13/2013    Back, right shoulder, neck: Peripheral neuropathy, spinal stenosis C7-T1, foraminal narrowing C4-5     CKD (chronic kidney disease), stage III 9/12/2012    Diabetes ([address]     about 30 years    Diabetes mellitus     does not check sugar     Diabetes mellitus, type 2 ([facility] 9/12/2012    Dysphagia 9/2/2014    Due to esophageal spasm seen on modified barium swallow 2015     Gait disorder 9/12/2012    GERD (gastroesophageal reflux disease)     Heart failure ([facility]     HTN     Hypertension 9/12/2012    Orthostatic hypotension due to autonomic neuropathy     Hypothyroid     IBS (irritable bowel syndrome) 9/12/2012    Iron deficiency 9/12/2012    Lumbar disc disease     MCI (mild cognitive impairment) 1/13/2013    Mixed hyperlipidemia 9/12/2012    Orthostatic hypotension 5/5/2016    Pacemaker     SSS (sick sinus syndrome) ([facility] 5/5/2016     Past Surgical History:   Procedure Laterality Date    HX APPENDECTOMY      HX CARPAL TUNNEL RELEASE      HX CERVICAL FUSION      HX COLONOSCOPY  Nov 2005    HX PACEMAKER  6/08    HX PACEMAKER  2017    Replacement      No family history on file. Social History     Social History    Marital status:      Spouse name: N/A    Number of children: N/A    Years of education: N/A     Occupational History    Not on file.      Social History Main Topics    Smoking status: Never Smoker    Smokeless tobacco: Never Used    Alcohol use No    Drug use: No    Sexual activity: Not on file     Other Topics Concern    Not on file     Social History Narrative    Lives with  who has mild vascular dementia, patient is primary caretaker but daughters come over to house 4-5 times a week, Cesar Kermitzenaida has HCPOA     Current Facility-Administered Medications   Medication Dose Route Frequency Provider Last Rate Last Dose    [START ON 5/15/2018] amLODIPine (NORVASC) tablet 5 mg  5 mg Oral DAILY Tessy Jiang MD        0.9% sodium chloride infusion 250 mL  250 mL IntraVENous PRN Tessy Wheeler MD        furosemide (LASIX) tablet 10 mg  10 mg Oral DAILY Tessy Jiang MD   10 mg at 05/14/18 0809    amitriptyline (ELAVIL) tablet 10 mg  10 mg Oral QHS Tessy Jiang MD 10 mg at 05/13/18 2108    calcium carbonate (OS-CAN) tablet 1,000 mg [elemental]  1,000 mg Oral BID Tessy Velazquez MD   1,000 mg at 05/14/18 3729    cholecalciferol (VITAMIN D3) tablet 2,000 Units  2,000 Units Oral DAILY Tessy Velazquez MD   2,000 Units at 05/14/18 6390    gabapentin (NEURONTIN) capsule 600 mg  600 mg Oral TID Dante Dunlap MD   600 mg at 05/14/18 8907    ferrous sulfate tablet 325 mg  1 Tab Oral BID WITH MEALS Tessy Velazquez MD   325 mg at 05/14/18 0809    loperamide (IMODIUM) capsule 4 mg  4 mg Oral Q6H PRN Tessy Garcia MD        Saccharomyces [facility]) capsule 500 mg  500 mg Oral BID Tessy Velazquez MD   500 mg at 05/14/18 8379    polyethylene glycol (MIRALAX) packet 17 g  17 g Oral DAILY Tessy Velazquez MD   17 g at 05/14/18 0809    bisacodyl (DULCOLAX) suppository 10 mg  10 mg Rectal DAILY PRN Tessy Garcia MD        phenylephrine suppository 1 Suppository  1 Suppository Rectal TID PRN Tessy Garcia MD        acetaminophen (TYLENOL) tablet 500 mg  500 mg Oral Q4H PRN Tessy Velazquez MD   500 mg at 05/14/18 9903    diphenhydrAMINE (BENADRYL) capsule 25 mg  25 mg Oral Q4H PRN Tessy Velazquez MD        apiban [facility]) tablet 2.5 mg  2.5 mg Oral BID Tessy Velazquez MD   2.5 mg at 05/14/18 3948    aspirin delayed-release tablet 81 mg  81 mg Oral DAILY Tessy Velazquez MD   81 mg at 05/14/18 0809    carvedilol (COREG) tablet 25 mg  25 mg Oral BID WITH MEALS Tessy Velazquez MD   25 mg at 05/14/18 9434    levothyroxine (SYNTHROID) tablet 25 mcg  25 mcg Oral ACB Tessy Velazquez MD   25 mcg at 05/14/18 0529    lisinopril (PRINIVIL, ZESTRIL) tablet 20 mg  20 mg Oral DAILY Tessy Velazquez MD   20 mg at 05/14/18 0809    nitroglycerin (NITROSTAT) tablet 0.4 mg  0.4 mg SubLINGual PRN Tessy Velazquez, MD        ondansetron (ZOFRAN ODT) tablet 4 mg  4 mg Oral Q6H PRN Tessy Still MD        pantoprazole (PROTONIX) tablet 40 mg  40 mg Oral ACB Tessy Still MD   40 mg at 18 0529    senna-docusate (PERICOLACE) 8.6-50 mg per tablet 1 Tab  1 Tab Oral QHS Tessy Still MD   1 Tab at 18 2108    traMADol (ULTRAM) tablet 50 mg  50 mg Oral Q6H PRN Tessy Still MD   50 mg at 18 0125       OBJECTIVE:  Patient Vitals for the past 8 hrs:   BP Temp Pulse Resp SpO2   18 0733 109/68 97.8 °F (36.6 °C) 75 14 90 %     Temp (24hrs), Av.7 °F (36.5 °C), Min:97.6 °F (36.4 °C), Max:97.8 °F (36.6 °C)     07 -  1900  In: 480 [P.O.:480]  Out: -     Physical Exam:  Constitutional: Well developed, well nourished female in no acute distress, sitting in therapy on 9th floor   HEENT: Normocephalic and atraumatic. Oropharynx is clear, mucous membranes are moist.  Pupils are equal, round, and reactive to light. Extraocular muscles are intact. Sclerae anicteric   Skin Warm and dry. No bruising and no rash noted. No erythema. No pallor. Respiratory Lungs are clear to auscultation bilaterally without wheezes, rales or rhonchi, normal air exchange without accessory muscle use. CVS Normal rate, regular rhythm and normal S1 and S2. No murmurs, gallops, or rubs. Abdomen Soft, nontender and nondistended, normoactive bowel sounds. Neuro Grossly nonfocal with no obvious sensory or motor deficits. MSK Normal range of motion in general.  No edema and no tenderness. Psych Appropriate mood and affect.         Labs:    Recent Results (from the past 24 hour(s))   CBC WITH AUTOMATED DIFF    Collection Time: 18  6:32 AM   Result Value Ref Range    WBC 6.0 4.3 - 11.1 K/uL    RBC 3.03 (L) 4.05 - 5.25 M/uL    HGB 9.1 (L) 11.7 - 15.4 g/dL    HCT 27.3 (L) 35.8 - 46.3 %    MCV 90.1 79.6 - 97.8 FL    MCH 30.0 26.1 - 32.9 PG    MCHC 33.3 31.4 - 35.0 g/dL RDW 13.7 11.9 - 14.6 %    PLATELET 592 575 - 502 K/uL    MPV 9.6 (L) 10.8 - 14.1 FL    DF AUTOMATED      NEUTROPHILS 65 43 - 78 %    LYMPHOCYTES 20 13 - 44 %    MONOCYTES 13 (H) 4.0 - 12.0 %    EOSINOPHILS 2 0.5 - 7.8 %    BASOPHILS 0 0.0 - 2.0 %    IMMATURE GRANULOCYTES 0 0.0 - 5.0 %    ABS. NEUTROPHILS 3.8 1.7 - 8.2 K/UL    ABS. LYMPHOCYTES 1.2 0.5 - 4.6 K/UL    ABS. MONOCYTES 0.8 0.1 - 1.3 K/UL    ABS. EOSINOPHILS 0.1 0.0 - 0.8 K/UL    ABS. BASOPHILS 0.0 0.0 - 0.2 K/UL    ABS. IMM.  GRANS. 0.0 0.0 - 0.5 K/UL   METABOLIC PANEL, BASIC    Collection Time: 05/14/18  6:32 AM   Result Value Ref Range    Sodium 132 (L) 136 - 145 mmol/L    Potassium 3.8 3.5 - 5.1 mmol/L    Chloride 95 (L) 98 - 107 mmol/L    CO2 29 21 - 32 mmol/L    Anion gap 8 7 - 16 mmol/L    Glucose 98 65 - 100 mg/dL    BUN 13 8 - 23 MG/DL    Creatinine 1.01 (H) 0.6 - 1.0 MG/DL    GFR est AA >60 >60 ml/min/1.73m2    GFR est non-AA 55 (L) >60 ml/min/1.73m2    Calcium 8.2 (L) 8.3 - 10.4 MG/DL       Imaging:  [unfilled]    ASSESSMENT:  Problem List  Date Reviewed: 4/30/2018          Codes Class Noted    * (Principal)Physical debility ICD-10-CM: R53.81  ICD-9-CM: 799.3  4/30/2018        Chronic systolic congestive heart failure (HCC) ICD-10-CM: I50.22  ICD-9-CM: 428.22, 428.0  4/26/2018        Femoral artery occlusion, left (HCC) ICD-10-CM: Q82.034  ICD-9-CM: 444.22  4/25/2018        Type 2 diabetes mellitus with nephropathy (HCC) ICD-10-CM: E11.21  ICD-9-CM: 250.40, 583.81  1/25/2018        S/P AV noris ablation ICD-10-CM: X38.953  ICD-9-CM: V45.89  1/16/2018        Biventricular cardiac pacemaker in situ ICD-10-CM: Z95.0  ICD-9-CM: V45.01  1/16/2018        Diabetes mellitus, type 2 (HCC) (Chronic) ICD-10-CM: E11.9  ICD-9-CM: 250.00  8/12/2017        Hypertension (Chronic) ICD-10-CM: I10  ICD-9-CM: 401.9  8/12/2017    Overview Signed 9/12/2012  3:43 PM by Nicolasa Nolasco     Orthostatic hypotension due to autonomic neuropathy             CKD (chronic kidney disease), stage III (Chronic) ICD-10-CM: N18.3  ICD-9-CM: 585.3  8/12/2017        Dysphagia (Chronic) ICD-10-CM: R13.10  ICD-9-CM: 787.20  8/12/2017    Overview Signed 4/5/2016  5:24 PM by Lois Tabor MD     Due to esophageal spasm seen on modified barium swallow 2015             Atrial fibrillation ([facility] (Chronic) ICD-10-CM: I48.91  ICD-9-CM: 427.31  8/12/2017        DNR (do not resuscitate) (Chronic) ICD-10-CM: Z66  ICD-9-CM: V49.86  8/12/2017        Ischemic cardiomyopathy ICD-10-CM: I25.5  ICD-9-CM: 414.8  7/11/2017        Cardiomyopathy ([facility] ICD-10-CM: I42.9  ICD-9-CM: 425.4  3/30/2017        Dyspnea ICD-10-CM: R06.00  ICD-9-CM: 786.09  2/25/2017        Atrial fibrillation with rapid ventricular response ([facility] ICD-10-CM: I48.91  ICD-9-CM: 427.31  2/25/2017        SSS (sick sinus syndrome) ([facility] ICD-10-CM: I49.5  ICD-9-CM: 427.81  5/5/2016        Orthostatic hypotension ICD-10-CM: I95.1  ICD-9-CM: 458.0  5/5/2016        CAD in native artery ICD-10-CM: I25.10  ICD-9-CM: 414.01  5/5/2016        Pacemaker ICD-10-CM: Z95.0  ICD-9-CM: V45.01  11/10/2015        Cervical radiculopathy ICD-10-CM: M54.12  ICD-9-CM: 723.4  5/23/2013    Overview Addendum 8/26/2013  4:59 PM by Lois Tabor MD     Chronic right shoulder pain S/P eval Dr Kristan Mclaughlin POC August 2013, previous cervical spinal fusion             Autonomic neuropathy ICD-10-CM: G90.9  ICD-9-CM: 337.9  1/13/2013    Overview Signed 1/13/2013  8:18 PM by Lois Tabor MD     Severe orthostatic hypotension             MCI (mild cognitive impairment) ICD-10-CM: G31.84  ICD-9-CM: 331.83  1/13/2013    Overview Addendum 12/16/2013  4:43 PM by Lois Tabor MD     MMSE 27/30 Nov 2009, 27/30 Dec 2013, remembers 3/3 objects 5 min later and draws normal clock             Chronic pain syndrome ICD-10-CM: G89.4  ICD-9-CM: 338.4  1/13/2013    Overview Addendum 11/10/2015  5:15 PM by Lois Tabor MD     Back, right shoulder, neck: Peripheral neuropathy, spinal stenosis C7-T1, foraminal narrowing C4-5             Acquired hypothyroidism (Chronic) ICD-10-CM: E03.9  ICD-9-CM: 244.9  9/12/2012        Mixed hyperlipidemia (Chronic) ICD-10-CM: J50.9  ICD-9-CM: 272.2  9/12/2012    Overview Addendum 9/12/2012  3:51 PM by Serenity Aguilar     With high HDL  Intolerant of pravastatin,lovastatin and lescol             GERD (gastroesophageal reflux disease) (Chronic) ICD-10-CM: K21.9  ICD-9-CM: 530.81  9/12/2012        IBS (irritable bowel syndrome) (Chronic) ICD-10-CM: K58.9  ICD-9-CM: 564.1  9/12/2012    Overview Signed 9/12/2012  3:52 PM by Serenity Aguilar     Chronic enema abuse  diverticulosis             Gait disorder (Chronic) ICD-10-CM: R26.9  ICD-9-CM: 781.2  9/12/2012    Overview Signed 9/12/2012  3:53 PM by Serenity Aguilar     Diabetic peripheral neuropathy              Atrial fibrillation with RVR ([facility] ICD-10-CM: I48.91  ICD-9-CM: 427.31  9/12/2012    Overview Addendum 10/14/2012  9:34 AM by Monica Chris MD     Paroxysmal.  Coumadin contraindicated due to falls                   use 10 mL of ferrous sulfate oral elixir mixed in one-fifth of a glass of orange juice (to enhance absorption) and taken 30 minutes before breakfast, every other day. RECOMMENDATIONS:    Iron deficiency anemia  5/14 There was talk of history of KARRIE at [facility] although unable to find any record to verify. She has received one unit prbc while here for hgb 7.9. Today her hgb is 9.1 and has been remaining stable. Stool was negative for blood. Iron 18, TIBC 186, Tsat 10. Continue oral iron supplement. Thrombus in CFA, PFA, SFA started on Eliquis BID per cardiology also following for permanent afib previously not on AC due to fall risks    Lab studies and imaging studies  were personally reviewed. Pertinent old records were reviewed. Thank you for allowing us to participate in the care of Ms. Can Salter. We will arrange follow up visit with  after discharge.           Kirit Sheriff NP   [employer] [facility]  [id number] [id number] [facility]  Office : [phone]  Fax : [phone]

## 2018-05-14 NOTE — DISCHARGE SUMMARY
REHABILITATION DISCHARGE SUMMARY     Date of admission to [facility]: 4/30/2018  Discharge Date: 5/15/2018   Vascular Surgeon; Dr Bhanu Infante  Primary Care Physician: Dr Sonia Hays    Admission Condition: good  Discharged Condition: good    Hospital Course: The patient was admitted to [facility] at [facility] on 4/30/2018 s/p Left femoral embolectomy and physical debility    HPI; Ms. Allan Mcallister is a pleasant, functionally mod I, 79yo female with significant comorbidities including a fib, cervical myelopathy, DM, CAD, neuropathy, SSS s/p pacer, CHF, CKD and dysphagia/wt loss who had presented to the ED in the beginning of April with LLE pain. She was diagnosed with sciatica and placed on a steroid taper. She f/u with her PCP and was on neurontin for neuropathic pain due to sciatica. All with no relief. In fact, she only worsened. She could not ambulate and had a few falls. She presented to the ED once again on 4/24 from an Urgent Care facility and was found to have an occlusion of the left femoral artery on US. She was taken to the operating room on 4/25 and had left femoral artery embolectomy. The organized thrombus was extracted from the left CFA/PFA/SFA with excellent inflow and back bleeding after the embolectomy. Postop she has been very lethargic and minimally responsive until this a.m. It was felt to be due to pain meds and anesthesia. The dietician has evaluated the pt due to wt loss, was 100lb a year ago and 88lbs now. She lost her  of 74years last month. This may have played a role as well as recent pain and inactivity due to that. She is hypertensive and hypoxic without 3-5L O2 supplement at this time. Her hbg dropped a gram after surgery and is being monitored. She also has noted Hyponatremia with a Na of 133-134. Creat was 1.40 at time of my consult but its currently 1.18. , A lipid profile was performed that shows a triglyceride of 168, chol 170, HDL 62 and LDL of 74.  The pt was seen by Cardiology due to chronic a fib. She has not been on anticoagulation due to fall risk. It was decided to start her on Eliquis 2.5 bid. Her Na has improved to 135, K 3.9, hbg is low at 9.0 from 11.7      Rehabilitation Course: Ms Harsha Leon has had persistent anemia throughout her stay. Hemoccult of the stool was neg. She has no prior hx of gib, but family reports a hx of anemia of unclear etiology. She did require one unit of PRBC on 5/7 due to hgb of 7.9 . She has a prior hx of a colon AVM, but again,stool negative and non symptomatic. She was on gi ulcer prophylaxis. She was found to be iron deficient and placed on iron supplementation. Post transfusion hgb was 10 but has trended to 9.1. She was seen by Dr Colin Figueroa and it appears she has had tx with epogen in the past; she will f/u as an outpt with them. Pain mgt has been an issue for [employer] She has a hx of diabetic peripheral neuropathy with left foot drop noted. Pt has had falls at home but didn't note this. She has had vascular pain which, per Dr Katherine Boyle, is common with delayed embolectomy for arterial occlusion. She is on Neurontin 600mg tid, home dose. the dose was decreased for a short time due to sleepiness earlier on but this was related to a UTI which was treated with abx. She is on prn ultram and low dose elavil at noc. The addition of elavil has helped her sleep better since she was having neuropathic pain at noc. Not my favorite medication to use in the elderly; however, it has been effective and she doesn't get confused during the noc or sedated in the a.m. She is on low dose Eliquis and ASA due to PVD as well as afib. She has a hx of cervical spine stenosis with autonomic dysfxn and chronic orthostatics. We did not encounter this except on one occasion, othewise her sbp was in the 120s to 140s except when hgb dropped below 8. With regards to bp mgt; Her norvasc has been decreased to 5mg, she continues on coreg 25mg bid, lasix 10mg and zestril 20.  HR controlled with coreg at 70s-80s. I am going to dc the Norvasc due to propensity to drop on occasion and do not want to add to her fall risk. Norvasc was the last to be added during inpt stay. She has a well healed left groin wound, 1+ DP pulse, foot is warm, cap refill <3sec. Functional Level On Admission:  min assist for transfers, CGA with bed mobility, amb 70ft RW CGA to min A . She is min A with ADLS    Functional Level At Discharge:    COMPREHENSION MODE Initial Assessment Weekly Progress Assessment 5/14/2018   Score 6 ([place]) 4      EXPRESSION Initial Assessment Weekly Progress Assessment 5/14/2018   Primary Mode of Expression Verbal Verbal   Score 7 6   Comments low volume; head forward flexed for majority of session; limited participation  Expresses complex, abstract ideas with extra time, mild difficulty or assistive device      SOCIAL INTERACTION/ PRAGMATICS Initial Assessment Weekly Progress Assessment 5/14/2018   Score 7 5   Comments pleasant and cooperative  impulsive      PROBLEM SOLVING Initial Assessment Weekly Progress Assessment 5/14/2018   Score 5 3   Comments solves routine problems 91-99% of the time  Solves basic problems 50-74% of the time.  Limited by impulsivity      MEMORY Initial Assessment Weekly Progress Assessment 5/14/2018   Score 5 4   Comments executes 3/3 steps; recalled 2/3 words  Recognizes, recalls, or executes 75-89% of the time 2 steps of 3 step request.      GROOMING Initial Assessment Weekly Progress Assessment 5/14/2018   Functional Level 4 5   Tasks completed by patient Brushed hair, Washed face, Washed hands, Brushed teeth Brushed hair;Brushed teeth   Comments CGA in standing; assist with hair  S due to impulsivity -may not remain seated      BATHING Initial Assessment Weekly Progress Assessment 5/14/2018   Functional Level 4 4   Body parts patient bathed Abdomen, Arm, left, Arm, right, Buttocks, Chest, Lower leg and foot, left, Lower leg and foot, right, [place] area, Thigh, left, Thigh, right Abdomen;Arm, left;Arm, right;Buttocks; Chest;Lower leg and foot, left; Lower leg and foot, right;[place] area; Thigh, left; Thigh, right   Comments min A for balance in standing  CGA for balance and safety in standing      TUB/SHOWER TRANSFER INDEPENDENCE Initial Assessment Weekly Progress Assessment 5/14/2018   Score 4 4   Comments min A for safety ; HHA  CGA for balance with RW and grab bars      UPPER BODY DRESSING/UNDRESSING Initial Assessment Weekly Progress Assessment 5/14/2018   Functional Level 5 5   Items applied/Steps completed Pullover (4 steps) Pullover (4 steps)   Comments setup assist  S/U      LOWER BODY DRESSING/UNDRESSING Initial Assessment Weekly Progress Assessment 5/14/2018   Functional Level 3 4   Items applied/Steps completed Elastic waist pants (3 steps), Sock, left (1 step), Sock, right (1 step), Underpants (3 steps), Fasten shoe (1 step), Shoe, left (1 step), Shoe, right (1 step) Elastic waist pants (3 steps); Shoe, left (1 step); Shoe, right (1 step); Sock, left (1 step); Sock, right (1 step); Underpants (3 steps)   Comments assist with B socks and shoes; CGA for balance during clothing management up  CGA in standing      TOILETING Initial Assessment Weekly Progress Assessment 5/14/2018   Functional Level 4 4   Comments CGA during clothing management  CGA in standing      TOILET TRANSFER INDEPENDENCE Initial Assessment Weekly Progress Assessment 5/14/2018   Transfer score 3 4   Comments lifting assist off toilet  CGA with RW and grab bars      Objective:   Other (comment) (has pacemaker; fall risk )  GROSS ASSESSMENT Daily Assessment     AROM: Generally decreased, functional  Strength: Generally decreased, functional  Coordination: Generally decreased, functional         TRANSFERS Daily Assessment     Transfer Type: SPT with walker  Transfer Assistance : 4 (Contact guard assistance)  Sit to Stand Assistance: Supervision         GAIT Daily Assessment   Provided gait training w/o df assist ace wrap w/ toe drag occurring 2 instances, requiring min A for balance Amount of Assistance: 4 (Minimal assistance)  Distance (ft): 150 Feet (ft)  Assistive Device: Walker, rollator         BALANCE Daily Assessment     Sitting - Static: Good (unsupported)  Sitting - Dynamic: Good (unsupported)  Standing - Static: Fair  Standing - Dynamic : Impaired         LOWER EXTREMITY EXERCISES Daily Assessment   Pt. Performed NuStep @ resistance level 1 x15 minutes to increase strength & ROM B LEs Extremity: Both  Exercise Type #1: Seated lower extremity strengthening  Sets Performed: 1  Reps Performed: 15  Level of Assist: Supervision   Pt.  Instructed in seated exercises per family request for HEP            Past Medical History:   Diagnosis Date    A fib     Abnormal loss of weight     Acquired hypothyroidism 9/12/2012    Acute respiratory failure with hypoxemia ([address] 8/12/2017    Anemia     Anorexia 9/2/2014    Arthritis associated with another disorder     Atrial fibrillation ([address] 9/12/2012    Paroxysmal.  Coumadin contraindicated due to falls      Autonomic neuropathy 1/13/2013    CAD (coronary artery disease)     CAD in native artery 5/5/2016    CAP (community acquired pneumonia) 8/12/2017    Cervical radiculopathy 5/23/2013    Chest pain     Chronic kidney disease     Chronic pain syndrome 1/13/2013    Back, right shoulder, neck: Peripheral neuropathy, spinal stenosis C7-T1, foraminal narrowing C4-5     CKD (chronic kidney disease), stage III 9/12/2012    Diabetes ([address]     about 30 years    Diabetes mellitus     does not check sugar     Diabetes mellitus, type 2 ([address] 9/12/2012    Dysphagia 9/2/2014    Due to esophageal spasm seen on modified barium swallow 2015     Gait disorder 9/12/2012    GERD (gastroesophageal reflux disease)     Heart failure ([address]     HTN     Hypertension 9/12/2012    Orthostatic hypotension due to autonomic neuropathy     Hypothyroid     IBS (irritable bowel syndrome) 9/12/2012    Iron deficiency 9/12/2012    Lumbar disc disease     MCI (mild cognitive impairment) 1/13/2013    Mixed hyperlipidemia 9/12/2012    Orthostatic hypotension 5/5/2016    Pacemaker     SSS (sick sinus syndrome) ([facility] 5/5/2016      Past Surgical History:   Procedure Laterality Date    HX APPENDECTOMY      HX CARPAL TUNNEL RELEASE      HX CERVICAL FUSION      HX COLONOSCOPY  Nov 2005    HX PACEMAKER  6/08    HX PACEMAKER  2017    Replacement       No family history on file. Social History   Substance Use Topics    Smoking status: Never Smoker    Smokeless tobacco: Never Used    Alcohol use No     Prior to Admission medications    Medication Sig Start Date End Date Taking? Authorizing Provider   carvedilol (COREG) 25 mg tablet Take 1 Tab by mouth two (2) times daily (with meals). 4/30/18   ALBERTO De La Torre   lisinopril (PRINIVIL, ZESTRIL) 20 mg tablet Take 1 Tab by mouth daily. 4/30/18   ALBERTO De La Torre   apixaban (ELIQUIS) 2.5 mg tablet Take 1 Tab by mouth two (2) times a day. 4/30/18   ALBERTO De La Torre   traMADol (ULTRAM) 50 mg tablet Take 1 Tab by mouth every six (6) hours as needed. Max Daily Amount: 200 mg. 4/30/18   ALBERTO De La Torre   gabapentin (NEURONTIN) 600 mg tablet Take 1 Tab by mouth three (3) times daily. 4/12/18 April Gladys Watkins MD   traZODone (DESYREL) 50 mg tablet Take 1 Tab by mouth nightly. Patient taking differently: Take 50 mg by mouth as needed. 4/12/18 April Gladys Watkins MD   furosemide (LASIX) 20 mg tablet Take 1 Tab by mouth daily. 1/25/18 April JEMAL Fields MD   levothyroxine (SYNTHROID) 25 mcg tablet Take 1 Tab by mouth Daily (before breakfast). 1/25/18 April Gladys Watkins MD   omeprazole (PRILOSEC) 20 mg capsule Take 1 Cap by mouth two (2) times a day. Patient taking differently: Take 20 mg by mouth daily. 1/25/18 April Gldays Watkins MD   senna-docusate (PERICOLACE) 8.6-50 mg per tablet Take 1 Tab by mouth nightly.  5/17/17 April JEMAL Fields MD   aspirin delayed-release 81 mg tablet Take  by mouth daily. Historical Provider   nitroglycerin (NITROSTAT) 0.4 mg SL tablet 1 Tab by SubLINGual route every five (5) minutes as needed. Indications: ANGINA 2/25/14   Santy Kelsey MD   calcium carbonate (OS-) 500 mg (1,250 mg) tablet take 1 Tab by mouth two (2) times a day. Tatum Back MD     Allergies   Allergen Reactions    Tylenol [Acetaminophen] Unknown (comments)     insomnia        Lab Review:   Recent Results (from the past 72 hour(s))   URINALYSIS W/ RFLX MICROSCOPIC    Collection Time: 05/12/18  9:40 PM   Result Value Ref Range    Color YELLOW      Appearance CLEAR      Specific gravity 1.007 1.001 - 1.023      pH (UA) 7.0 5.0 - 9.0      Protein NEGATIVE  NEG mg/dL    Glucose NEGATIVE  mg/dL    Ketone NEGATIVE  NEG mg/dL    Bilirubin NEGATIVE  NEG      Blood NEGATIVE  NEG      Urobilinogen 0.2 0.2 - 1.0 EU/dL    Nitrites NEGATIVE  NEG      Leukocyte Esterase NEGATIVE  NEG     CBC WITH AUTOMATED DIFF    Collection Time: 05/14/18  6:32 AM   Result Value Ref Range    WBC 6.0 4.3 - 11.1 K/uL    RBC 3.03 (L) 4.05 - 5.25 M/uL    HGB 9.1 (L) 11.7 - 15.4 g/dL    HCT 27.3 (L) 35.8 - 46.3 %    MCV 90.1 79.6 - 97.8 FL    MCH 30.0 26.1 - 32.9 PG    MCHC 33.3 31.4 - 35.0 g/dL    RDW 13.7 11.9 - 14.6 %    PLATELET 932 112 - 279 K/uL    MPV 9.6 (L) 10.8 - 14.1 FL    DF AUTOMATED      NEUTROPHILS 65 43 - 78 %    LYMPHOCYTES 20 13 - 44 %    MONOCYTES 13 (H) 4.0 - 12.0 %    EOSINOPHILS 2 0.5 - 7.8 %    BASOPHILS 0 0.0 - 2.0 %    IMMATURE GRANULOCYTES 0 0.0 - 5.0 %    ABS. NEUTROPHILS 3.8 1.7 - 8.2 K/UL    ABS. LYMPHOCYTES 1.2 0.5 - 4.6 K/UL    ABS. MONOCYTES 0.8 0.1 - 1.3 K/UL    ABS. EOSINOPHILS 0.1 0.0 - 0.8 K/UL    ABS. BASOPHILS 0.0 0.0 - 0.2 K/UL    ABS. IMM.  GRANS. 0.0 0.0 - 0.5 K/UL   METABOLIC PANEL, BASIC    Collection Time: 05/14/18  6:32 AM   Result Value Ref Range    Sodium 132 (L) 136 - 145 mmol/L    Potassium 3.8 3.5 - 5.1 mmol/L    Chloride 95 (L) 98 - 107 mmol/L    CO2 29 21 - 32 mmol/L    Anion gap 8 7 - 16 mmol/L    Glucose 98 65 - 100 mg/dL    BUN 13 8 - 23 MG/DL    Creatinine 1.01 (H) 0.6 - 1.0 MG/DL    GFR est AA >60 >60 ml/min/1.73m2    GFR est non-AA 55 (L) >60 ml/min/1.73m2    Calcium 8.2 (L) 8.3 - 10.4 MG/DL       Functional Assessment:  Gross Assessment  AROM: Generally decreased, functional (05/14/18 1200)  Strength: Generally decreased, functional (05/14/18 1200)  Coordination: Generally decreased, functional (05/14/18 1200)       Balance  Sitting - Static: Good (unsupported) (05/14/18 1200)  Sitting - Dynamic: Good (unsupported) (05/14/18 1200)  Standing - Static: Fair (05/14/18 1200)  Standing - Dynamic : Impaired (05/14/18 1200)           Toileting  Cues: Physical assistance for pants up;Physical assistance for pants down;Visual/perceptual training/retraining (05/12/18 1305)  Adaptive Equipment: Grab bars (05/12/18 1305)         Yamilet Valencia Fall Risk Assessment:  Yamilet Valencia Fall Risk  Mobility: Ambulates or transfers with assist devices or assistance (05/14/18 0733)  Mobility Interventions: Bed/chair exit alarm;Communicate number of staff needed for ambulation/transfer;Patient to call before getting OOB;Utilize gait belt for transfers/ambulation;Utilize walker, cane, or other assitive device (05/14/18 0733)  Mentation: Periodic confusion (05/14/18 0733)  Mentation Interventions: Adequate sleep, hydration, pain control;Door open when patient unattended;Update white board; Reorient patient;Eyeglasses and hearing aids; Toileting rounds;Room close to nurse's station; Increase mobility (05/14/18 3133)  Medication: Patient receiving anticonvulsants, sedatives(tranquilizers), psychotropics or hypnotics, hypoglycemics, narcotics, sleep aids, antihypertensives, laxatives, or diuretics (05/14/18 0733)  Medication Interventions: Teach patient to arise slowly;Utilize gait belt for transfers/ambulation; Evaluate medications/consider consulting pharmacy; Bed/chair exit alarm (05/14/18 6591)  Elimination: Needs assistance with toileting (05/14/18 0733)  Elimination Interventions: Elevated toilet seat;Call light in reach; Patient to call for help with toileting needs; Toileting schedule/hourly rounds (05/14/18 0733)  Prior Fall History: BEFORE admission in past 12 months AND DURING admission (05/14/18 0733)  History of Falls Interventions: Bed/chair exit alarm (05/13/18 7287)  Total Score: 7 (05/14/18 0733)  Standard Fall Precautions: Yes (05/11/18 0705)  High Fall Risk: Yes (05/14/18 0733)     Speech Assessment:  Aspiration Signs/Symptoms: None (05/01/18 1305)      Ambulation:  Gait  Base of Support: Narrowed; Center of gravity altered (05/14/18 1200)  Speed/Chandni: Slow;Shuffled (05/14/18 1200)  Step Length: Right shortened;Left shortened (05/14/18 1200)  Stance: Right increased; Left increased (05/14/18 1200)  Gait Abnormalities: Decreased step clearance;Shuffling gait;Trunk sway increased; Foot drop (05/14/18 1200)  Distance (ft): 150 Feet (ft) (05/14/18 1200)  Assistive Device: Walker, rollator (05/14/18 1200)  Rail Use: Both (05/11/18 1400)     Visit Vitals    /68    Pulse 75    Temp 97.8 °F (36.6 °C)    Resp 14    SpO2 90%      Intake and Output:         Discharge Exam:  General: Alert and age appropriately oriented. No acute cardio respiratory distress. HEENT: Normocephalic,no scleral icterus  Oral mucosa moist without cyanosis   Lungs: Clear to auscultation  bilaterally. Respiration even and unlabored   Heart: Regular rate and rhythm, S1, S2   No  murmurs, clicks, rub or gallops   Abdomen: Soft, non-tender, nondistended. Bowel sounds present. No organomegaly. Genitourinary: defer   Neuromuscular:     Non focal x left DF weakness 3/5  No sensory deficits distally. Exam limited by pain. Skin/extremity: No rashes, no erythema.  No calf tenderness BLE  Wound healed left groin, DPpulse 1+; skin peeling         Problem List as of 5/14/2018  Date Reviewed: 4/30/2018          Codes Class Noted - Resolved    * (Principal)Physical debility ICD-10-CM: R53.81  ICD-9-CM: 799.3  4/30/2018 - Present        Chronic systolic congestive heart failure (HCC) ICD-10-CM: I50.22  ICD-9-CM: 428.22, 428.0  4/26/2018 - Present        Femoral artery occlusion, left (HCC) ICD-10-CM: Z39.207  ICD-9-CM: 444.22  4/25/2018 - Present        Type 2 diabetes mellitus with nephropathy ([facility] ICD-10-CM: E11.21  ICD-9-CM: 250.40, 583.81  1/25/2018 - Present        S/P AV noris ablation ICD-10-CM: Z98.890  ICD-9-CM: V45.89  1/16/2018 - Present        Biventricular cardiac pacemaker in situ ICD-10-CM: Z95.0  ICD-9-CM: V45.01  1/16/2018 - Present        Diabetes mellitus, type 2 (HCC) (Chronic) ICD-10-CM: E11.9  ICD-9-CM: 250.00  8/12/2017 - Present        Hypertension (Chronic) ICD-10-CM: I10  ICD-9-CM: 401.9  8/12/2017 - Present    Overview Signed 9/12/2012  3:43 PM by Monica Arnold     Orthostatic hypotension due to autonomic neuropathy             CKD (chronic kidney disease), stage III (Chronic) ICD-10-CM: N18.3  ICD-9-CM: 585.3  8/12/2017 - Present        Dysphagia (Chronic) ICD-10-CM: R13.10  ICD-9-CM: 787.20  8/12/2017 - Present    Overview Signed 4/5/2016  5:24 PM by Chan Gotti MD     Due to esophageal spasm seen on modified barium swallow 2015             Atrial fibrillation ([facility] (Chronic) ICD-10-CM: I48.91  ICD-9-CM: 427.31  8/12/2017 - Present        DNR (do not resuscitate) (Chronic) ICD-10-CM: Z66  ICD-9-CM: V49.86  8/12/2017 - Present        Ischemic cardiomyopathy ICD-10-CM: I25.5  ICD-9-CM: 414.8  7/11/2017 - Present        Cardiomyopathy ([facility] ICD-10-CM: I42.9  ICD-9-CM: 425.4  3/30/2017 - Present        Dyspnea ICD-10-CM: R06.00  ICD-9-CM: 786.09  2/25/2017 - Present        Atrial fibrillation with rapid ventricular response ([facility] ICD-10-CM: I48.91  ICD-9-CM: 427.31  2/25/2017 - Present        SSS (sick sinus syndrome) ([facility] ICD-10-CM: I49.5  ICD-9-CM: 427.81  5/5/2016 - Present Orthostatic hypotension ICD-10-CM: I95.1  ICD-9-CM: 458.0  5/5/2016 - Present        CAD in native artery ICD-10-CM: I25.10  ICD-9-CM: 414.01  5/5/2016 - Present        Pacemaker ICD-10-CM: Z95.0  ICD-9-CM: V45.01  11/10/2015 - Present        Cervical radiculopathy ICD-10-CM: M54.12  ICD-9-CM: 723.4  5/23/2013 - Present    Overview Addendum 8/26/2013  4:59 PM by Silvia Gilbert MD     Chronic right shoulder pain S/P eval Dr Tigre Nevarez POC August 2013, previous cervical spinal fusion             Autonomic neuropathy ICD-10-CM: G90.9  ICD-9-CM: 337.9  1/13/2013 - Present    Overview Signed 1/13/2013  8:18 PM by Silvia Gilbert MD     Severe orthostatic hypotension             MCI (mild cognitive impairment) ICD-10-CM: G31.84  ICD-9-CM: 331.83  1/13/2013 - Present    Overview Addendum 12/16/2013  4:43 PM by Silvia Gilbert MD     MMSE 27/30 Nov 2009, 27/30 Dec 2013, remembers 3/3 objects 5 min later and draws normal clock             Chronic pain syndrome ICD-10-CM: G89.4  ICD-9-CM: 338.4  1/13/2013 - Present    Overview Addendum 11/10/2015  5:15 PM by Silvia Gilbert MD     Back, right shoulder, neck: Peripheral neuropathy, spinal stenosis C7-T1, foraminal narrowing C4-5             Acquired hypothyroidism (Chronic) ICD-10-CM: E03.9  ICD-9-CM: 244.9  9/12/2012 - Present        Mixed hyperlipidemia (Chronic) ICD-10-CM: E78.2  ICD-9-CM: 272.2  9/12/2012 - Present    Overview Addendum 9/12/2012  3:51 PM by Venice Jacob     With high HDL  Intolerant of pravastatin,lovastatin and lescol             GERD (gastroesophageal reflux disease) (Chronic) ICD-10-CM: K21.9  ICD-9-CM: 530.81  9/12/2012 - Present        IBS (irritable bowel syndrome) (Chronic) ICD-10-CM: K58.9  ICD-9-CM: 564.1  9/12/2012 - Present    Overview Signed 9/12/2012  3:52 PM by Venice Jacob     Chronic enema abuse  diverticulosis             Gait disorder (Chronic) ICD-10-CM: R26.9  ICD-9-CM: 781.2  9/12/2012 - Present    Overview Signed 9/12/2012  3:53 PM by Jose Victoria     Diabetic peripheral neuropathy              Atrial fibrillation with RVR [facility]) ICD-10-CM: I48.91  ICD-9-CM: 427.31  9/12/2012 - Present    Overview Addendum 10/14/2012  9:34 AM by Marshall Scott MD     Paroxysmal.  Coumadin contraindicated due to falls               RESOLVED: RUSS (acute kidney injury) ([facility] ICD-10-CM: N17.9  ICD-9-CM: 584.9  8/13/2017 - 10/19/2017        RESOLVED: Acute respiratory failure with hypoxemia ([facility] ICD-10-CM: J96.01  ICD-9-CM: 518.81  8/12/2017 - 9/15/2017        RESOLVED: CAP (community acquired pneumonia) ICD-10-CM: J18.9  ICD-9-CM: 486  8/12/2017 - 9/15/2017        RESOLVED: CHF (congestive heart failure) ([facility] ICD-10-CM: I50.9  ICD-9-CM: 428.0  3/18/2017 - 4/26/2018        RESOLVED: Altered mental status ICD-10-CM: R41.82  ICD-9-CM: 780.97  3/10/2017 - 10/19/2017        RESOLVED: Altered mental state ICD-10-CM: R41.82  ICD-9-CM: 780.97  3/9/2017 - 10/19/2017        RESOLVED: Chest pain, pleuritic ICD-10-CM: R07.81  ICD-9-CM: 786.52  3/9/2017 - 10/19/2017        RESOLVED: Weight loss ICD-10-CM: R63.4  ICD-9-CM: 783.21  9/2/2014 - 11/10/2015        RESOLVED: Anorexia ICD-10-CM: R63.0  ICD-9-CM: 783.0  9/2/2014 - 10/19/2017        RESOLVED: Iron deficiency anemia, unspecified ICD-10-CM: D50.9  ICD-9-CM: 280.9  9/12/2012 - 4/16/2013    Overview Addendum 1/13/2013  8:21 PM by Marshall Scott MD     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 Diet: Diabetic Diet  2. Activity: PT/OT per Home Health; no driving, no lifting/pushing/or pulling >5lbs. Needs supervision due to hi fall risk due to balance deficits and impulsivity  3. Wound Care: Keep wound clean and dry  Current Discharge Medication List      CONTINUE these medications which have NOT CHANGED    Details   carvedilol (COREG) 25 mg tablet Take 1 Tab by mouth two (2) times daily (with meals).   Qty: 1 Tab, Refills: 0      lisinopril (PRINIVIL, ZESTRIL) 20 mg tablet Take 1 Tab by mouth daily. Qty: 1 Tab, Refills: 0      apixaban (ELIQUIS) 2.5 mg tablet Take 1 Tab by mouth two (2) times a day. Qty: 1 Tab, Refills: 0    Associated Diagnoses: Femoral artery occlusion, left (HCC)      traMADol (ULTRAM) 50 mg tablet Take 1 Tab by mouth every six (6) hours as needed. Max Daily Amount: 200 mg. Qty: 20 Tab, Refills: 0    Associated Diagnoses: Femoral artery occlusion, left (HCC)      gabapentin (NEURONTIN) 600 mg tablet Take 1 Tab by mouth three (3) times daily. Qty: 180 Tab, Refills: 1      traZODone (DESYREL) 50 mg tablet Take 1 Tab by mouth nightly. Qty: 30 Tab, Refills: 5      furosemide (LASIX) 20 mg tablet Take 1 Tab by mouth daily. Qty: 90 Tab, Refills: 3      levothyroxine (SYNTHROID) 25 mcg tablet Take 1 Tab by mouth Daily (before breakfast). Qty: 90 Tab, Refills: 3      omeprazole (PRILOSEC) 20 mg capsule Take 1 Cap by mouth two (2) times a day. Qty: 180 Cap, Refills: 3    Associated Diagnoses: Rib pain on left side      senna-docusate (PERICOLACE) 8.6-50 mg per tablet Take 1 Tab by mouth nightly. Qty: 90 Tab, Refills: 3    Associated Diagnoses: Rib pain on left side      aspirin delayed-release 81 mg tablet Take  by mouth daily. nitroglycerin (NITROSTAT) 0.4 mg SL tablet 1 Tab by SubLINGual route every five (5) minutes as needed. Indications: ANGINA  Qty: 1 Bottle, Refills: 3      calcium carbonate (OS-) 500 mg (1,250 mg) tablet take 1 Tab by mouth two (2) times a day. Rehabilitation Management:   1. Devices: has rollator walker  2.  Consult: Rehab team including PT, OT, recreational therapy, and  and Speech therapy    Disposition: home with 24hr supervision    Follow-up with Dr. Arsen Mena, [facility]/ Hematology  >30min  Signed By: Neisha Aiken MD     May 15, 2018

## 2018-05-15 ENCOUNTER — HOME HEALTH ADMISSION (OUTPATIENT)
Dept: HOME HEALTH SERVICES | Facility: HOME HEALTH | Age: 83
End: 2018-05-15
Payer: MEDICARE

## 2018-05-15 LAB
BASOPHILS # BLD: 0 K/UL (ref 0–0.2)
BASOPHILS NFR BLD: 1 % (ref 0–2)
DIFFERENTIAL METHOD BLD: ABNORMAL
EOSINOPHIL # BLD: 0.1 K/UL (ref 0–0.8)
EOSINOPHIL NFR BLD: 3 % (ref 0.5–7.8)
ERYTHROCYTE [DISTWIDTH] IN BLOOD BY AUTOMATED COUNT: 13.3 % (ref 11.9–14.6)
HCT VFR BLD AUTO: 29.6 % (ref 35.8–46.3)
HGB BLD-MCNC: 10.1 G/DL (ref 11.7–15.4)
HGB RETIC QN AUTO: 32 PG (ref 29–35)
IMM GRANULOCYTES # BLD: 0 K/UL (ref 0–0.5)
IMM GRANULOCYTES NFR BLD AUTO: 0 % (ref 0–5)
IMM RETICS NFR: 6.8 % (ref 3–15.9)
LDH SERPL L TO P-CCNC: 211 U/L (ref 110–210)
LYMPHOCYTES # BLD: 1.2 K/UL (ref 0.5–4.6)
LYMPHOCYTES NFR BLD: 28 % (ref 13–44)
MCH RBC QN AUTO: 30.5 PG (ref 26.1–32.9)
MCHC RBC AUTO-ENTMCNC: 34.1 G/DL (ref 31.4–35)
MCV RBC AUTO: 89.4 FL (ref 79.6–97.8)
MONOCYTES # BLD: 0.5 K/UL (ref 0.1–1.3)
MONOCYTES NFR BLD: 12 % (ref 4–12)
NEUTS SEG # BLD: 2.5 K/UL (ref 1.7–8.2)
NEUTS SEG NFR BLD: 56 % (ref 43–78)
PLATELET # BLD AUTO: 277 K/UL (ref 150–450)
PMV BLD AUTO: 9.8 FL (ref 10.8–14.1)
RBC # BLD AUTO: 3.31 M/UL (ref 4.05–5.25)
RETICS # AUTO: 0.04 M/UL (ref 0.02–0.08)
RETICS/RBC NFR AUTO: 1.3 % (ref 0.3–2)
TRANSFERRIN SERPL-SCNC: 13.9 NMOL/L (ref 12.2–27.3)
WBC # BLD AUTO: 4.3 K/UL (ref 4.3–11.1)

## 2018-05-15 PROCEDURE — 86334 IMMUNOFIX E-PHORESIS SERUM: CPT | Performed by: INTERNAL MEDICINE

## 2018-05-15 PROCEDURE — 85025 COMPLETE CBC W/AUTO DIFF WBC: CPT | Performed by: INTERNAL MEDICINE

## 2018-05-15 PROCEDURE — 97535 SELF CARE MNGMENT TRAINING: CPT

## 2018-05-15 PROCEDURE — 82784 ASSAY IGA/IGD/IGG/IGM EACH: CPT | Performed by: INTERNAL MEDICINE

## 2018-05-15 PROCEDURE — 99239 HOSP IP/OBS DSCHRG MGMT >30: CPT | Performed by: PHYSICAL MEDICINE & REHABILITATION

## 2018-05-15 PROCEDURE — 74011250637 HC RX REV CODE- 250/637: Performed by: PHYSICAL MEDICINE & REHABILITATION

## 2018-05-15 PROCEDURE — 97116 GAIT TRAINING THERAPY: CPT

## 2018-05-15 PROCEDURE — 36415 COLL VENOUS BLD VENIPUNCTURE: CPT | Performed by: INTERNAL MEDICINE

## 2018-05-15 PROCEDURE — 97530 THERAPEUTIC ACTIVITIES: CPT

## 2018-05-15 PROCEDURE — 85046 RETICYTE/HGB CONCENTRATE: CPT | Performed by: INTERNAL MEDICINE

## 2018-05-15 PROCEDURE — 83615 LACTATE (LD) (LDH) ENZYME: CPT | Performed by: INTERNAL MEDICINE

## 2018-05-15 RX ADMIN — VITAMIN D, TAB 1000IU (100/BT) 2000 UNITS: 25 TAB at 08:32

## 2018-05-15 RX ADMIN — APIXABAN 2.5 MG: 2.5 TABLET, FILM COATED ORAL at 08:32

## 2018-05-15 RX ADMIN — LEVOTHYROXINE SODIUM 25 MCG: 50 TABLET ORAL at 05:48

## 2018-05-15 RX ADMIN — POLYETHYLENE GLYCOL (3350) 17 G: 17 POWDER, FOR SOLUTION ORAL at 08:33

## 2018-05-15 RX ADMIN — FUROSEMIDE 10 MG: 20 TABLET ORAL at 08:33

## 2018-05-15 RX ADMIN — Medication 1000 MG: at 08:32

## 2018-05-15 RX ADMIN — LISINOPRIL 20 MG: 20 TABLET ORAL at 08:32

## 2018-05-15 RX ADMIN — GABAPENTIN 600 MG: 300 CAPSULE ORAL at 05:48

## 2018-05-15 RX ADMIN — CARVEDILOL 25 MG: 25 TABLET, FILM COATED ORAL at 08:33

## 2018-05-15 RX ADMIN — Medication 500 MG: at 08:32

## 2018-05-15 RX ADMIN — ASPIRIN 81 MG: 81 TABLET, COATED ORAL at 08:33

## 2018-05-15 RX ADMIN — FERROUS SULFATE TAB 325 MG (65 MG ELEMENTAL FE) 325 MG: 325 (65 FE) TAB at 08:33

## 2018-05-15 RX ADMIN — PANTOPRAZOLE SODIUM 40 MG: 40 TABLET, DELAYED RELEASE ORAL at 05:48

## 2018-05-15 NOTE — PROGRESS NOTES
End Of Shift Functional Summary, Nursing        TOILETING:    Does patient need assist with adjusting pants up or down and/or pericare? no  Pt uses walker. Does the patient require extra time? no  Does the patient require standby assistance? no  Does the patient require contact guard assistance? yes  Does the patient require more than one staff member for assistance? no     TOILET TRANSFER:    Pt requires minimal assistance. Pt uses walker. Does the patient require extra time? no  Does the patient require contact guard assistance? yes  Does the patient require more than one staff member for assistance? no     BLADDER:    Pt does not have a templeton catheter that staff manages. Pt does not take medication. Pt is continent. of bladder and voids in toilet  Pt requires staff to position device Pt has had 0 bladder accidents during this shift requiring minimal assistance to clean up.   Pt has not had a bm this shift but does take medication      Plan of care reviewed with the nurse on the oncoming shift.

## 2018-05-15 NOTE — PROGRESS NOTES
Care Management Interventions  PCP Verified by CM: Yes  Mode of Transport at Discharge: Other (see comment) (family car)  Transition of Care Consult (CM Consult): Home Health (PT and OT)  [address]: Yes  Discharge Durable Medical Equipment: No (has needed DME at home)  Current Support Network: Own Home, Family Lives Nearby (family provides 24 hour care)  Confirm Follow Up Transport: Family  Plan discussed with Pt/Family/Caregiver: Yes  Freedom of Choice Offered: Yes  Discharge Location  Discharge Placement: Home with home health [facility])    Pt discharged to home with family and HH PT and OT. Referral sent to [facility]. Family training completed. Family available to provide 24 hr care.

## 2018-05-15 NOTE — PROGRESS NOTES
Pt more awake now, pt to be discharged home today All discharge orders, RX and appointments given to pt's son. Discharged orders discussed and son voiced and signed understanding of discharge orders.

## 2018-05-15 NOTE — PROGRESS NOTES
Pt D/C summary completed on 5/15/18. Please see for specifics in [address] Patient expected to be d/c'd to home today.   Karie Mckinley, CTRS

## 2018-05-15 NOTE — PROGRESS NOTES
PHYSICAL THERAPY DISCHARGE SUMMARY  [id number]     Precautions at discharge: Other (comment) (high risk of falls)    Problem List:    Decreased strength B LE  [x]     Decreased strength trunk/core  [x]     Decreased AROM   []     Decreased PROM  []     Decreased balance sitting  []     Decreased balance standing  [x]     Decreased endurance  []     Pain  [x]       Functional Limitations:   Decreased independence with bed mobility  []     Decreased independence with functional transfers  [x]     Decreased independence with ambulation  [x]     Decreased independence with stair negotiation  [x]            Outcome Measures: Vital Signs: /73 (BP 1 Location: Right arm, BP Patient Position: At rest)  Pulse 76  Temp 97.3 °F (36.3 °C)  Resp 18  SpO2 94%    Pain level: no c/o pain this visit    Patient education: reviewed managing single step w/ rollator - pt. W/ poor carry over so will require cont.  Education & supervision for safety;  Pt's son educated on metal upright AFO    Interdisciplinary Communication: collaborated w/ OT regarding pt's increased confusion this AM       MMT Initial Asssessment   Right Lower Extremity Left Lower Extremity   Hip Flexion 4 4   Knee Extension 4 4   Knee Flexion 4 4   Ankle Dorsiflexion 4- 4-      MMT Discharge Assessment   Right Lower Extremity Left Lower Extremity   Hip Flexion 3+ 2+   Knee Extension 4 3+   Knee Flexion 4 3+   Ankle Dorsiflexion 4+ 2-   0/5 No palpable muscle contraction  1/5 Palpable muscle contraction, no joint movement  2-/5 Less than full range of motion in gravity eliminated position  2/5 Able to complete full range of motion in gravity eliminated position  2+/5 Able to initiate movement against gravity  3-/5 More than half but not full range of motion against gravity  3/5 Able to complete full range of motion against gravity  3+/5 Completes full range of motion against gravity with minimal resistance  4-/5 Completes full range of motion against gravity with minimal-moderate resistance  4/5 Completes full range of motion against gravity with moderate resistance  4+/5 Completes full range of motion against gravity with moderate-maximum resistance  5/5 Completes full range of motion against gravity with maximum resistance     AROM: WFL    FIM SCORES Initial Assessment Discharge Assessment   Bed/Chair/Wheelchair Transfers 4 5   Wheelchair Mobility 0 (Secondary to patient fatigue) NT   Walking [place] 4 4   Steps/Stairs 0 (Secondary to patient fatigue) 5   PRIMARY MODE OF LOCOMOTION: ambultion  Please see IRC Interdisciplinary Eval: Coordination/Balance Section for details regarding FIM score description. BED/CHAIR/WHEELCHAIR TRANSFERS Initial Assessment Discharge Assessment   Rolling Right 6 (Modified independent) NT   Rolling Left 6 (Modified independent) NT   Supine to Sit 4 (Contact guard assistance) 6 (Modified independent)   Sit to Stand Minimal assistance Supervision   Sit to Supine 4 (Minimal assistance) 6 (Modified independent)   Transfer Assist Score 4 5   Transfer Type SPT without device SPT without device   Comments Increased time and effort, slow shuffled step safety for constant VCs for hand placement & to make sure brakes are secured   Car Transfer Not tested Not tested   Car Type           WHEELCHAIR MOBILITY/MANAGEMENT Initial Assessment Discharge Assessment   Able to Propel 0 feet 0 feet   Functional Level 0 (Secondary to patient fatigue) NT   Curbs/ramps assistance required 0 (Not tested) 0 (Not tested)   Wheelchair set up assistance required 0 (Not tested) 0 (Not tested)   Wheelchair management   NT   NT secondary to ambulation to be primary mode of locomotion    WALKING INDEPENDENCE Initial Assessment Discharge Assessment   Assistive device Walker, rollator, Gait belt fortunato Leeator; Other (comment) (single upright AFO L LE)   Ambulation assistance - level surface 4 (Minimal assistance) 4 (Contact guard assistance)   Distance 40 Feet (ft) 200 Feet (ft) (x1, 150'x2)   Functional Level 4 4   Comments Decreased step length B LE, decreased step clearance L LE, foot flat at IC of L LE, partial step through gait pattern flexed posture, narrow GAEL, fluctuating anahi, CGA for safety negiotiating turns secondary to poor safety awareness   Ambulation assistance - unlevel surface 0 (Not tested) 4 (Contact guard assistance) (cues to pay attention to changing surfaces)   Dons AFO w/ max A    STEPS/STAIRS Initial Assessment Discharge Assessment   Steps/Stairs ambulated 0 4   Rail Use Both Both   Functional Level 0 (Secondary to patient fatigue) 5   Comments step to gait pattern step to gait pattern   Curbs/Ramps 4 (Minimal assistance) (using rollator) 4 (Minimal assistance) (using rollator)       QUALITY INDICATOR ASSIST COMMENTS   Walk 10 feet CGA W/ rollator   Walk 50 feet with 2 turns CGA W/ rollator   Walk 150 feet CGA W/ rollator   Walk 10 feet on uneven  CGA W/ rollator   1 step/curb Minimum assistance Using rollator   4 steps supervision B rails   12 steps NT NT secondary to pt. Will not have to manage this number of stairs upon d/c    object Minimum assistance Using reacher & rollator   Wheel 48' w/2 turns NT NT secondary to ambulation to be primary mode of locomotion   Wheel 150' NT NT secondary to ambulation to be primary mode of locomotion     Pt. Left in w/c in NAD, call bell in reach, chair alarm activated, son present. PHYSICAL THERAPY PLAN OF CARE    LTGs: Pt. Met 2/4 LTGs due to fluctuating level of assist & safety concerns    Pt would benefit from continued skilled physical therapy in order to improve independent functional mobility within the home with use of least restrictive device.  Interventions may include range of motion (AROM, PROM B LE/trunk), motor function (B LE/trunk strengthening/coordination), activity tolerance (vitals, oxygen saturation levels), bed mobility training, balance activities, gait training (progressive ambulation program), and functional transfer training. HEP handout:  Issued 5/14/18    Pt to be discharged home with 24/7 assist provided by family. Therapy Recommendations upon discharge: Surekha Carter needs at discharge: [facility] AFO    Please see IRC; Interdisciplinary Eval, Care Plan, and Patient Education for further information regarding physical therapy discharge summary and plan of care.      Deandra Cheng, PT  5/15/2018

## 2018-05-15 NOTE — PROGRESS NOTES
Problem: Mobility Impaired (Adult and Pediatric)  Goal: *Therapy Goal (Edit Goal, Insert Text)  LTG 3. Patient ambulate 150 feet with LRAD and supervision in 10 days (5/8/18 - Not met, pt. Making progress, cont. W/ current goal) (5/14/18 - Not meeting consistently, cont. W/ current goal)       Outcome: Not Met  Variance: Patient slowly responding  Comments: Pt. Required CGA secondary to impulsivity & poor safety awareness  Goal: *Therapy Goal (Edit Goal, Insert Text)  LTG 4. Patient ambulate up/down 1 step without railings and supervision in 10 days (5/8/18, 5/14/18 - Not met, pt. Making progress, goal remains appropriate)       Outcome: Not Met  Variance: Patient slowly responding  Comments: Pt.  Required min A for safety & balance assist

## 2018-05-15 NOTE — PROGRESS NOTES
Pt very drowsy RN assist pt with taking medication. Pt sitting up in recliner. Call light with in reach Posey pad to chair for safety.

## 2018-05-15 NOTE — PROGRESS NOTES
Mrs Kana Estrella is A/o x 2-3. She has periodic confusion. Pt has fallen once going to bath by herself. Alarm to bed and chair. Pt informed not to get up with out assist. Pf voiced understanding but still get up by herself. Pt c/o left foot pain. She was medicated this morning by 3rd shift nursing. Py appetite is fair and she us going home today.

## 2018-05-15 NOTE — PROGRESS NOTES
OT DISCHARGE REPORT    Time In: 0700  Time Out: 0740    COMPREHENSION MODE Initial Assessment Discharge Assessment 5/15/2018   Score 6 ([place]) 3     EXPRESSION Initial Assessment Discharge Assessment 5/15/2018   Primary Mode of Expression Verbal Verbal   Score 7 5   Comments low volume; head forward flexed for majority of session; limited participation  Needs cues to express basic needs     SOCIAL INTERACTION/ PRAGMATICS Initial Assessment Discharge Assessment 5/15/2018   Score 7 5   Comments pleasant and cooperative  Impulsive     PROBLEM SOLVING Initial Assessment Discharge Assessment 5/15/2018   Score 5 3   Comments solves routine problems 91-99% of the time  Solves basic problems 50-74% of the time     MEMORY Initial Assessment Discharge Assessment 5/15/2018   Score 5 3   Comments executes 3/3 steps; recalled 2/3 words  Recognizes, recalls, or executes 50-74% of the time 2 steps of 2 step request.     GROOMING Initial Assessment Discharge Assessment 5/15/2018   Functional Level 4 5   Tasks completed by patient Brushed hair, Washed face, Washed hands, Brushed teeth Brushed hair   Comments CGA in standing; assist with hair  S due to impulsivity -may not remain seated     BATHING Initial Assessment Discharge Assessment 5/15/2018   Functional Level 4 4   Body parts patient bathed Abdomen, Arm, left, Arm, right, Buttocks, Chest, Lower leg and foot, left, Lower leg and foot, right, [place] area, Thigh, left, Thigh, right Abdomen;Arm, left;Arm, right;Buttocks; Chest;Lower leg and foot, left; Lower leg and foot, right;[place] area; Thigh, left; Thigh, right   Comments min A for balance in standing  CGA for balance     TUB/SHOWER TRANSFER INDEPENDENCE Initial Assessment Discharge Assessment 5/15/2018   Score 4 4  Type of Shower: Shower  Adaptive  Equipment:Tub transfer bench, Grab bars and Walker   Comments min A for safety ; HHA  CGA for balance     UPPER BODY DRESSING/UNDRESSING Initial Assessment Discharge Assessment 5/15/2018 Functional Level 5 4   Items applied/Steps completed Pullover (4 steps) Pullover (4 steps)   Comments setup assist  CGA in standing     LOWER BODY DRESSING/UNDRESSING Initial Assessment Discharge Assessment 5/15/2018   Functional Level 3 4   Items applied/Steps completed Elastic waist pants (3 steps), Sock, left (1 step), Sock, right (1 step), Underpants (3 steps), Fasten shoe (1 step), Shoe, left (1 step), Shoe, right (1 step) Elastic waist pants (3 steps); Sock, left (1 step); Sock, right (1 step); Underpants (3 steps)   Comments assist with B socks and shoes; CGA for balance during clothing management up  CGA in standing     TOILETING Initial Assessment Discharge Assessment 5/15/2018   Functional Level 4 4   Comments CGA during clothing management  CGA in standing     TOILET TRANSFER INDEPENDENCE Initial Assessment Discharge Assessment 5/15/2018   Transfer score 3 4   Comments lifting assist off toilet  CGA with RW and grab bars     Plan of Care: Please see Care Plan for progress towards goals during stay  Precautions at Discharge: Falls, Poor Safety Awareness, Confused and Impulsive  Discharge Location: Home with family taking turns staying with her  Family Training: Completed with daughter. Daughter demonstrated good understanding of safety concerns and ADL techniques. Recommendations/Functional Level:     24/7 S. CGA/close supervision with basic self-care. Recommended Continuing Therapy:  HH OT  Residual Deficits:  Activity tolerance, balance, cognition, strength, safety awareness, LLE pain    Progress over LOS: Pt demonstrated improvements in activity tolerance, balance, and strength which led to improvements in grooming, LB dressing, and toilet transfers. Pt is limited by impulsivity, decreased memory of safe transfer and functional mobility techniques, and decreased balance. Summary of Session: Pt was in bed and struggled to wake up today.  Pt required increased cuing and assistance to sit EOB and to go into the bathroom. After toileting, pt wanted to sit back on her bed because she was cold and her stomach hurt. Pt drank some Dr. Clair Scott and rested EOB, and she was encouraged to go ahead and take her shower and get dressed. Pt was resistant at first but complied with some encouragement and said she felt better after. Pt required increased cuing for safety and completion of ADL today secondary to cognition and still being drowsy. Pt was left EOB with bed alarm with all needs within reach.      KAT Zuñiga  5/15/2018

## 2018-05-15 NOTE — PROGRESS NOTES
[address]  Face to Face Encounter    Patients Name: Keara Brunner    YOB: 1927    Ordering Physician: Elizabeth Canela MD    Primary Diagnosis: left feneseal embolictomy  Physical debility    Date of Face to Face:   5/15/2018                                  Face to Face Encounter findings are related to primary reason for home care:   YES.     1. I certify that the patient needs intermittent care as follows: physical therapy: strengthening, gait/stair training, balance training and pt/caregiver education  occupational therapy:  ADL safety (ie. cooking, bathing, dressing) and pt/caregiver education    2. I certify that this patient is homebound, that is: 1) patient requires the use of a Rollator device, special transportation, or assistance of another to leave the home; or 2) patient's condition makes leaving the home medically contraindicated; and 3) patient has a normal inability to leave the home and leaving the home requires considerable and taxing effort. Patient may leave the home for infrequent and short duration for medical reasons, and occasional absences for non-medical reasons. Homebound status is due to the following functional limitations: Patient's ambulation limited secondary to severe pain and requires the use of an assistive device and the assistance of a caregiver for safe completion. Patient with strength and ROM deficits limiting ambulation endurance requiring the use of an assistive device and the assistance of a caregiver. Patient deemed temporarily homebound secondary to increased risk for infection when leaving home and going out into the community. Patient with strength deficits limiting the performance of all ADL's without caregiver assistance or the use of an assistive device.     3. I certify that this patient is under my care and that I, or a nurse practitioner or  [id number], or clinical nurse specialist, or certified nurse midwife, working with me, had a Face-to-Face Encounter that meets the physician Face-to-Face Encounter requirements. The following are the clinical findings from the [facility] encounter that support the need for skilled services and is a summary of the encounter: see medical record    See attached progess note      Sonya Nava, MSW  5/15/2018      THE FOLLOWING TO BE COMPLETED BY THE COMMUNITY PHYSICIAN:    I concur with the findings described above from the F2F encounter that this patient is homebound and in need of a skilled service.     Certifying Physician: _____________________________________      Printed Certifying Physician Name: _____________________________________    Date: _________________

## 2018-05-15 NOTE — PROGRESS NOTES
End Of Shift Functional Summary, Nursing      TOILETING:    Does patient need assist with adjusting pants up or down and/or pericare? no  If yes, please indicate what the patient needs help with:    Pt uses walker. Does the patient require extra time? yes  Does the patient require standby assistance?no  Does the patient require contact guard assistance? yes  Does the patient require more than one staff member for assistance? no    TOILET TRANSFER:    Pt requires minimal assistance. Pt uses walker. Does the patient require extra time? yes  Does the patient require contact guard assistance? yes  Does the patient require more than one staff member for assistance? no    BLADDER:    Pt does not have a temple[place] catheter that staff manages. Pt does not take medication. If so, please indicate which medication:   Pt is continent. of bladder and voids in toilet   Pt has had 0 bladder accidents   (An accident is when the episode is not contained in a brief AND/OR the clothing/linen requires changing/cleaning up.)    BOWEL:  Pt does take medication. Pt is continent of bowel and uses toilet. Pt has had 0 bowel accidents during this shift . (An accident is when the episode is not contained in a brief AND/OR the clothing/linen requires changing/cleaning up.)    BED/CHAIR TRANSFER  Pt requires minimal assistance. Pt uses walker  Does the patient require extra time? yes  Does the patient require contact guard assistance? yes  Does the patient require more than one staff member for assistance? no        EATING  Pt requires no assistance. Pt does not wear dentures. TUBE FEEDINGS:  Pt does not  receive nutrition through tube feedings. Patient requires no assistance with feedings. Documentation reviewed and plan of care discussed/reviewed with   patient during the shift.

## 2018-05-16 ENCOUNTER — HOME CARE VISIT (OUTPATIENT)
Dept: SCHEDULING | Facility: HOME HEALTH | Age: 83
End: 2018-05-16
Payer: MEDICARE

## 2018-05-16 ENCOUNTER — PATIENT OUTREACH (OUTPATIENT)
Dept: CASE MANAGEMENT | Age: 83
End: 2018-05-16

## 2018-05-16 LAB
ALBUMIN SERPL ELPH-MCNC: 3.08 G/DL (ref 3.2–5.6)
ALBUMIN/GLOB SERPL: 1.3 {RATIO}
ALPHA1 GLOB SERPL ELPH-MCNC: 0.34 G/DL (ref 0.1–0.4)
ALPHA2 GLOB SERPL ELPH-MCNC: 0.6 G/DL (ref 0.4–1.2)
B-GLOBULIN SERPL QL ELPH: 0.7 G/DL (ref 0.6–1.3)
GAMMA GLOB MFR SERPL ELPH: 0.78 G/DL (ref 0.5–1.6)
IGA SERPL-MCNC: 228 MG/DL (ref 85–499)
IGG SERPL-MCNC: 745 MG/DL (ref 610–1616)
IGM SERPL-MCNC: 36 MG/DL (ref 35–242)
M PROTEIN SERPL ELPH-MCNC: ABNORMAL G/DL
PROT PATTERN SERPL ELPH-IMP: ABNORMAL
PROT PATTERN SPEC IFE-IMP: ABNORMAL
PROT SERPL-MCNC: 5.5 G/DL (ref 6.3–8.2)

## 2018-05-16 PROCEDURE — 3331090002 HH PPS REVENUE DEBIT

## 2018-05-16 PROCEDURE — G0151 HHCP-SERV OF PT,EA 15 MIN: HCPCS

## 2018-05-16 PROCEDURE — 3331090001 HH PPS REVENUE CREDIT

## 2018-05-16 PROCEDURE — 400013 HH SOC

## 2018-05-16 NOTE — Clinical Note
Patient discharged from [facility] on 5/15/18. Admitted to [facility] 4/24/18 to 4/30/18. 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 Needs CCM.  Thanks

## 2018-05-16 NOTE — PROGRESS NOTES
This note will not be viewable in [address] Date/Time of Call:   05/16/2018 12:05pm   What was the patient hospitalized for? Femoral Artery OcclusionS/P L femoral artery embolectomy on 4/25/18    Hx of: PVD/AFib/DM/Neuropathy with foot drop/CAD/SSSS/P PPM/CHF/CKD   Does the patient understand his/her diagnosis and/or treatment and what happened during the hospitalization? Spoke with patient, who verbalized understanding of Dx and Tx plan, but states her children know more about than she does. Reports she is doing ok, only complaint was trying to get used to walking with leg brace. Did the patient receive discharge instructions? yes   CM Assessed Risk for Readmission:       Patient stated Risk for Readmission:      Moderate to High risk for readmission due to complications from surgery, multiple comorbidities and advanced age. Per patient, problems with my leg. Review any discharge instructions (see discharge instructions/AVS in [facility]). Ask patient if they understand these. Do they have any questions? Discharge instructions reviewed with patient, who verbalized understanding, but again states my children understand what to do. Focused on education, follow up appointments and med compliance. Were home services ordered (nursing, PT, OT, ST, etc.)? Yes, [facility]    If so, has the first visit occurred? If not, why? (Assist with coordination of services if necessary.)   Scheduled for [facility] today @ 2pm   Was any DME ordered? No   If so, has it been received? If not, why?  (Assist patient in obtaining DME orders &/or equipment if necessary.) N/A   Complete a review of all medications (new, continued and discontinued meds per the D/C instructions and medication tab in [facility]). Unable to review medications with patient due to patient declined. States my children take of my medicines. Were all new prescriptions filled?   If not, why?  (Assist patient in obtaining medications if necessary  escalate for CCM &/or SW if ongoing issues are verbalized by pt or anticipated)   Patient reports son went to pharmacy to get medications. Denies any problems with obtaining medications. Does the patient understand the purpose and dosing instructions for all medications? (If patient has questions, provide explanation and education.)   Patient able to tell me she takes med for BP and DM and heart but states her children understand and keep up with them. Does the patient have any problems in performing ADLs? (If patient is unable to perform ADLs  what is the limiting factor(s)? Do they have a support system that can assist? If no support system is present, discuss possible assistance that they may be able to obtain. Escalate for CCM/SW if ongoing issues are verbalized by pt or anticipated)   Patient requires some assistance with ADLs. Patients niece and other family members are providing 24 hour assistance. Does the patient have all follow-up appointments scheduled? 7 day f/up with PCP?   (f/up with PCP may be w/in 14 days if patient has a f/up with their specialist w/in 7 days)    7-14 day f/up with specialist?   (or per discharge instructions)    If f/up has not been made  what actions has the care coordinator made to accomplish this? Has transportation been arranged? PCP , Dr Mira Lugo follow up appointment on 5/23/18 @11:00am    Patient has follow up appointment with Dr. Sophia Pérez on 5/23/18 @9:45am.     Also has follow up scheduled with Dr. Katerina Denny on 5/29/18. Patient denies any problems with transportation to visits. Any other questions or concerns expressed by the patient? No other questions or concerns verbalized. Discussed referral to CCM for continued monitoring and education. Patient agreeable with referral.   Schedule next appointment with ZAKIA CHARLES Coordinator or refer to RN Case Manager/ per the workflow guidelines.       When is care coordinators next follow-up call scheduled? If referred for CCM  what RN care manager was the referral assigned? Patient will be referred to Evan Pereira RN, CCM for follow up. Patient has Care Coordinator contact information and was advised to call for any new concerns or needs.    ABRAN Call Completed By: Rio Elizabeth LPN, [facility] Coordinator

## 2018-05-17 VITALS
HEART RATE: 68 BPM | OXYGEN SATURATION: 97 % | RESPIRATION RATE: 17 BRPM | DIASTOLIC BLOOD PRESSURE: 76 MMHG | SYSTOLIC BLOOD PRESSURE: 112 MMHG

## 2018-05-17 PROCEDURE — 3331090002 HH PPS REVENUE DEBIT

## 2018-05-17 PROCEDURE — 3331090001 HH PPS REVENUE CREDIT

## 2018-05-18 ENCOUNTER — PATIENT OUTREACH (OUTPATIENT)
Dept: CASE MANAGEMENT | Age: 83
End: 2018-05-18

## 2018-05-18 ENCOUNTER — HOME CARE VISIT (OUTPATIENT)
Dept: SCHEDULING | Facility: HOME HEALTH | Age: 83
End: 2018-05-18
Payer: MEDICARE

## 2018-05-18 VITALS
DIASTOLIC BLOOD PRESSURE: 60 MMHG | RESPIRATION RATE: 16 BRPM | SYSTOLIC BLOOD PRESSURE: 105 MMHG | TEMPERATURE: 97.8 F | HEART RATE: 76 BPM

## 2018-05-18 PROCEDURE — 3331090001 HH PPS REVENUE CREDIT

## 2018-05-18 PROCEDURE — G0152 HHCP-SERV OF OT,EA 15 MIN: HCPCS

## 2018-05-18 PROCEDURE — 3331090002 HH PPS REVENUE DEBIT

## 2018-05-18 NOTE — PROGRESS NOTES
CCM outreach per referral from Marc Smith. Ms. Pete John states she is doing well since D/C but is still experiencing pain in her leg. She denies swelling or heat in the leg. She is taking Tramadol when her \"children give it to me. \" She is encouraged to ask for pain med when she is experiencing pain. Ms. Pete John denies falls since D/C and reports all needs are met. She has transportation, through her children, to her appointments. RNCM will f/u in one week.

## 2018-05-19 PROCEDURE — 3331090001 HH PPS REVENUE CREDIT

## 2018-05-19 PROCEDURE — 3331090002 HH PPS REVENUE DEBIT

## 2018-05-20 PROCEDURE — 3331090001 HH PPS REVENUE CREDIT

## 2018-05-20 PROCEDURE — 3331090002 HH PPS REVENUE DEBIT

## 2018-05-21 PROCEDURE — 3331090001 HH PPS REVENUE CREDIT

## 2018-05-21 PROCEDURE — 3331090002 HH PPS REVENUE DEBIT

## 2018-05-22 ENCOUNTER — HOME CARE VISIT (OUTPATIENT)
Dept: SCHEDULING | Facility: HOME HEALTH | Age: 83
End: 2018-05-22
Payer: MEDICARE

## 2018-05-22 VITALS
DIASTOLIC BLOOD PRESSURE: 70 MMHG | OXYGEN SATURATION: 98 % | HEART RATE: 78 BPM | RESPIRATION RATE: 16 BRPM | SYSTOLIC BLOOD PRESSURE: 120 MMHG

## 2018-05-22 PROCEDURE — G0151 HHCP-SERV OF PT,EA 15 MIN: HCPCS

## 2018-05-22 PROCEDURE — 3331090001 HH PPS REVENUE CREDIT

## 2018-05-22 PROCEDURE — 3331090002 HH PPS REVENUE DEBIT

## 2018-05-23 ENCOUNTER — HOME CARE VISIT (OUTPATIENT)
Dept: SCHEDULING | Facility: HOME HEALTH | Age: 83
End: 2018-05-23
Payer: MEDICARE

## 2018-05-23 PROBLEM — R06.00 DYSPNEA: Status: RESOLVED | Noted: 2017-02-25 | Resolved: 2018-05-23

## 2018-05-23 PROBLEM — Z86.718 HISTORY OF THROMBOSIS: Status: ACTIVE | Noted: 2018-04-25

## 2018-05-23 PROBLEM — I48.91 ATRIAL FIBRILLATION (HCC): Chronic | Status: RESOLVED | Noted: 2017-08-12 | Resolved: 2018-05-23

## 2018-05-23 PROBLEM — I42.9 CARDIOMYOPATHY (HCC): Status: RESOLVED | Noted: 2017-03-30 | Resolved: 2018-05-23

## 2018-05-23 PROCEDURE — G0158 HHC OT ASSISTANT EA 15: HCPCS

## 2018-05-23 PROCEDURE — 3331090001 HH PPS REVENUE CREDIT

## 2018-05-23 PROCEDURE — 3331090002 HH PPS REVENUE DEBIT

## 2018-05-24 ENCOUNTER — HOME CARE VISIT (OUTPATIENT)
Dept: SCHEDULING | Facility: HOME HEALTH | Age: 83
End: 2018-05-24
Payer: MEDICARE

## 2018-05-24 VITALS
HEART RATE: 85 BPM | SYSTOLIC BLOOD PRESSURE: 136 MMHG | DIASTOLIC BLOOD PRESSURE: 82 MMHG | OXYGEN SATURATION: 96 % | RESPIRATION RATE: 17 BRPM | TEMPERATURE: 98 F

## 2018-05-24 PROCEDURE — 3331090002 HH PPS REVENUE DEBIT

## 2018-05-24 PROCEDURE — G0157 HHC PT ASSISTANT EA 15: HCPCS

## 2018-05-24 PROCEDURE — 3331090001 HH PPS REVENUE CREDIT

## 2018-05-25 ENCOUNTER — HOME CARE VISIT (OUTPATIENT)
Dept: SCHEDULING | Facility: HOME HEALTH | Age: 83
End: 2018-05-25
Payer: MEDICARE

## 2018-05-25 ENCOUNTER — PATIENT OUTREACH (OUTPATIENT)
Dept: CASE MANAGEMENT | Age: 83
End: 2018-05-25

## 2018-05-25 PROCEDURE — G0152 HHCP-SERV OF OT,EA 15 MIN: HCPCS

## 2018-05-25 PROCEDURE — 3331090001 HH PPS REVENUE CREDIT

## 2018-05-25 PROCEDURE — 3331090002 HH PPS REVENUE DEBIT

## 2018-05-25 NOTE — PROGRESS NOTES
ABRAN outreach to Mrs. Doris De Anda. She has attended appointments with vascular and PCP. DIL is staying in the home to assist. Mrs. Doris De Anda reports she is checking B/P daily and reports no low readings (doesn't remember the exact readings). No further Transitions outreach planned.

## 2018-05-26 PROCEDURE — 3331090001 HH PPS REVENUE CREDIT

## 2018-05-26 PROCEDURE — 3331090002 HH PPS REVENUE DEBIT

## 2018-05-27 VITALS — TEMPERATURE: 98.2 F | SYSTOLIC BLOOD PRESSURE: 110 MMHG | HEART RATE: 76 BPM | DIASTOLIC BLOOD PRESSURE: 60 MMHG

## 2018-05-27 PROCEDURE — 3331090002 HH PPS REVENUE DEBIT

## 2018-05-27 PROCEDURE — 3331090001 HH PPS REVENUE CREDIT

## 2018-05-28 ENCOUNTER — HOME CARE VISIT (OUTPATIENT)
Dept: SCHEDULING | Facility: HOME HEALTH | Age: 83
End: 2018-05-28
Payer: MEDICARE

## 2018-05-28 PROCEDURE — 3331090002 HH PPS REVENUE DEBIT

## 2018-05-28 PROCEDURE — 3331090001 HH PPS REVENUE CREDIT

## 2018-05-29 ENCOUNTER — HOME CARE VISIT (OUTPATIENT)
Dept: SCHEDULING | Facility: HOME HEALTH | Age: 83
End: 2018-05-29
Payer: MEDICARE

## 2018-05-29 VITALS
HEART RATE: 78 BPM | RESPIRATION RATE: 20 BRPM | OXYGEN SATURATION: 94 % | TEMPERATURE: 98 F | DIASTOLIC BLOOD PRESSURE: 62 MMHG | SYSTOLIC BLOOD PRESSURE: 104 MMHG

## 2018-05-29 PROCEDURE — 3331090001 HH PPS REVENUE CREDIT

## 2018-05-29 PROCEDURE — G0158 HHC OT ASSISTANT EA 15: HCPCS

## 2018-05-29 PROCEDURE — 3331090002 HH PPS REVENUE DEBIT

## 2018-05-30 PROCEDURE — 3331090002 HH PPS REVENUE DEBIT

## 2018-05-30 PROCEDURE — 3331090001 HH PPS REVENUE CREDIT

## 2018-05-31 ENCOUNTER — HOME CARE VISIT (OUTPATIENT)
Dept: SCHEDULING | Facility: HOME HEALTH | Age: 83
End: 2018-05-31
Payer: MEDICARE

## 2018-05-31 PROCEDURE — 3331090002 HH PPS REVENUE DEBIT

## 2018-05-31 PROCEDURE — 3331090001 HH PPS REVENUE CREDIT

## 2018-05-31 PROCEDURE — G0157 HHC PT ASSISTANT EA 15: HCPCS

## 2018-06-01 ENCOUNTER — HOME CARE VISIT (OUTPATIENT)
Dept: SCHEDULING | Facility: HOME HEALTH | Age: 83
End: 2018-06-01
Payer: MEDICARE

## 2018-06-01 PROCEDURE — 3331090002 HH PPS REVENUE DEBIT

## 2018-06-01 PROCEDURE — 3331090001 HH PPS REVENUE CREDIT

## 2018-06-01 PROCEDURE — G0152 HHCP-SERV OF OT,EA 15 MIN: HCPCS

## 2018-06-02 PROCEDURE — 3331090002 HH PPS REVENUE DEBIT

## 2018-06-02 PROCEDURE — 3331090001 HH PPS REVENUE CREDIT

## 2018-06-03 VITALS
HEART RATE: 73 BPM | SYSTOLIC BLOOD PRESSURE: 118 MMHG | DIASTOLIC BLOOD PRESSURE: 58 MMHG | TEMPERATURE: 98.1 F | RESPIRATION RATE: 16 BRPM

## 2018-06-03 VITALS
HEART RATE: 74 BPM | RESPIRATION RATE: 16 BRPM | TEMPERATURE: 97.2 F | SYSTOLIC BLOOD PRESSURE: 123 MMHG | DIASTOLIC BLOOD PRESSURE: 68 MMHG

## 2018-06-03 PROCEDURE — 3331090001 HH PPS REVENUE CREDIT

## 2018-06-03 PROCEDURE — 3331090002 HH PPS REVENUE DEBIT

## 2018-06-04 ENCOUNTER — HOME CARE VISIT (OUTPATIENT)
Dept: SCHEDULING | Facility: HOME HEALTH | Age: 83
End: 2018-06-04
Payer: MEDICARE

## 2018-06-04 PROCEDURE — G0157 HHC PT ASSISTANT EA 15: HCPCS

## 2018-06-04 PROCEDURE — 3331090001 HH PPS REVENUE CREDIT

## 2018-06-04 PROCEDURE — 3331090002 HH PPS REVENUE DEBIT

## 2018-06-05 PROCEDURE — 3331090001 HH PPS REVENUE CREDIT

## 2018-06-05 PROCEDURE — 3331090002 HH PPS REVENUE DEBIT

## 2018-06-06 VITALS
SYSTOLIC BLOOD PRESSURE: 138 MMHG | RESPIRATION RATE: 20 BRPM | DIASTOLIC BLOOD PRESSURE: 82 MMHG | TEMPERATURE: 97.2 F | HEART RATE: 82 BPM

## 2018-06-06 PROCEDURE — 3331090002 HH PPS REVENUE DEBIT

## 2018-06-06 PROCEDURE — 3331090001 HH PPS REVENUE CREDIT

## 2018-06-07 ENCOUNTER — HOME CARE VISIT (OUTPATIENT)
Dept: SCHEDULING | Facility: HOME HEALTH | Age: 83
End: 2018-06-07
Payer: MEDICARE

## 2018-06-07 VITALS
RESPIRATION RATE: 17 BRPM | HEART RATE: 68 BPM | DIASTOLIC BLOOD PRESSURE: 70 MMHG | SYSTOLIC BLOOD PRESSURE: 110 MMHG | OXYGEN SATURATION: 97 %

## 2018-06-07 PROCEDURE — G0151 HHCP-SERV OF PT,EA 15 MIN: HCPCS

## 2018-06-07 PROCEDURE — 3331090001 HH PPS REVENUE CREDIT

## 2018-06-07 PROCEDURE — 3331090002 HH PPS REVENUE DEBIT

## 2018-06-08 PROCEDURE — 3331090001 HH PPS REVENUE CREDIT

## 2018-06-08 PROCEDURE — 3331090002 HH PPS REVENUE DEBIT

## 2018-06-09 PROCEDURE — 3331090002 HH PPS REVENUE DEBIT

## 2018-06-09 PROCEDURE — 3331090001 HH PPS REVENUE CREDIT

## 2018-06-10 PROCEDURE — 3331090001 HH PPS REVENUE CREDIT

## 2018-06-10 PROCEDURE — 3331090002 HH PPS REVENUE DEBIT

## 2018-06-11 PROCEDURE — 3331090002 HH PPS REVENUE DEBIT

## 2018-06-11 PROCEDURE — 3331090001 HH PPS REVENUE CREDIT

## 2018-06-12 ENCOUNTER — HOME CARE VISIT (OUTPATIENT)
Dept: SCHEDULING | Facility: HOME HEALTH | Age: 83
End: 2018-06-12
Payer: MEDICARE

## 2018-06-12 PROCEDURE — 3331090002 HH PPS REVENUE DEBIT

## 2018-06-12 PROCEDURE — G0157 HHC PT ASSISTANT EA 15: HCPCS

## 2018-06-12 PROCEDURE — 3331090001 HH PPS REVENUE CREDIT

## 2018-06-13 PROCEDURE — 3331090002 HH PPS REVENUE DEBIT

## 2018-06-13 PROCEDURE — 3331090001 HH PPS REVENUE CREDIT

## 2018-06-14 ENCOUNTER — HOME CARE VISIT (OUTPATIENT)
Dept: SCHEDULING | Facility: HOME HEALTH | Age: 83
End: 2018-06-14
Payer: MEDICARE

## 2018-06-14 VITALS
RESPIRATION RATE: 18 BRPM | HEART RATE: 82 BPM | DIASTOLIC BLOOD PRESSURE: 78 MMHG | TEMPERATURE: 97.2 F | SYSTOLIC BLOOD PRESSURE: 132 MMHG

## 2018-06-14 PROCEDURE — G0157 HHC PT ASSISTANT EA 15: HCPCS

## 2018-06-14 PROCEDURE — 3331090001 HH PPS REVENUE CREDIT

## 2018-06-14 PROCEDURE — 3331090002 HH PPS REVENUE DEBIT

## 2018-06-15 PROCEDURE — 3331090001 HH PPS REVENUE CREDIT

## 2018-06-15 PROCEDURE — 3331090002 HH PPS REVENUE DEBIT

## 2018-06-16 PROCEDURE — 3331090001 HH PPS REVENUE CREDIT

## 2018-06-16 PROCEDURE — 3331090002 HH PPS REVENUE DEBIT

## 2018-06-17 VITALS
DIASTOLIC BLOOD PRESSURE: 78 MMHG | SYSTOLIC BLOOD PRESSURE: 132 MMHG | RESPIRATION RATE: 18 BRPM | HEART RATE: 72 BPM | TEMPERATURE: 97.2 F

## 2018-06-17 PROCEDURE — 3331090001 HH PPS REVENUE CREDIT

## 2018-06-17 PROCEDURE — 3331090002 HH PPS REVENUE DEBIT

## 2018-06-18 PROCEDURE — 3331090002 HH PPS REVENUE DEBIT

## 2018-06-18 PROCEDURE — 3331090001 HH PPS REVENUE CREDIT

## 2018-06-19 ENCOUNTER — HOME CARE VISIT (OUTPATIENT)
Dept: SCHEDULING | Facility: HOME HEALTH | Age: 83
End: 2018-06-19
Payer: MEDICARE

## 2018-06-19 PROCEDURE — 3331090001 HH PPS REVENUE CREDIT

## 2018-06-19 PROCEDURE — G0157 HHC PT ASSISTANT EA 15: HCPCS

## 2018-06-19 PROCEDURE — 3331090002 HH PPS REVENUE DEBIT

## 2018-06-20 PROCEDURE — 3331090002 HH PPS REVENUE DEBIT

## 2018-06-20 PROCEDURE — 3331090001 HH PPS REVENUE CREDIT

## 2018-06-21 ENCOUNTER — HOME CARE VISIT (OUTPATIENT)
Dept: SCHEDULING | Facility: HOME HEALTH | Age: 83
End: 2018-06-21
Payer: MEDICARE

## 2018-06-21 VITALS
DIASTOLIC BLOOD PRESSURE: 78 MMHG | HEART RATE: 82 BPM | TEMPERATURE: 97.2 F | RESPIRATION RATE: 18 BRPM | SYSTOLIC BLOOD PRESSURE: 132 MMHG

## 2018-06-21 PROCEDURE — 3331090002 HH PPS REVENUE DEBIT

## 2018-06-21 PROCEDURE — 3331090001 HH PPS REVENUE CREDIT

## 2018-06-21 PROCEDURE — G0151 HHCP-SERV OF PT,EA 15 MIN: HCPCS

## 2018-06-21 PROCEDURE — 3331090003 HH PPS REVENUE ADJ

## 2018-06-22 VITALS
SYSTOLIC BLOOD PRESSURE: 120 MMHG | HEART RATE: 66 BPM | RESPIRATION RATE: 17 BRPM | DIASTOLIC BLOOD PRESSURE: 66 MMHG | OXYGEN SATURATION: 99 %

## 2018-07-14 ENCOUNTER — APPOINTMENT (OUTPATIENT)
Dept: GENERAL RADIOLOGY | Age: 83
DRG: 086 | End: 2018-07-14
Attending: EMERGENCY MEDICINE
Payer: MEDICARE

## 2018-07-14 ENCOUNTER — APPOINTMENT (OUTPATIENT)
Dept: CT IMAGING | Age: 83
DRG: 086 | End: 2018-07-14
Attending: EMERGENCY MEDICINE
Payer: MEDICARE

## 2018-07-14 ENCOUNTER — HOSPITAL ENCOUNTER (INPATIENT)
Age: 83
LOS: 4 days | Discharge: NURSING, REHAB OR LONG-TERM CARE | DRG: 086 | End: 2018-07-18
Attending: EMERGENCY MEDICINE | Admitting: INTERNAL MEDICINE
Payer: MEDICARE

## 2018-07-14 DIAGNOSIS — G89.4 CHRONIC PAIN SYNDROME: ICD-10-CM

## 2018-07-14 DIAGNOSIS — Z95.0 PACEMAKER: ICD-10-CM

## 2018-07-14 DIAGNOSIS — I48.91 ATRIAL FIBRILLATION WITH RVR (HCC): ICD-10-CM

## 2018-07-14 DIAGNOSIS — R80.9 TYPE 2 DIABETES MELLITUS WITH MICROALBUMINURIA, WITHOUT LONG-TERM CURRENT USE OF INSULIN (HCC): Chronic | ICD-10-CM

## 2018-07-14 DIAGNOSIS — N18.30 CKD (CHRONIC KIDNEY DISEASE), STAGE III (HCC): Chronic | ICD-10-CM

## 2018-07-14 DIAGNOSIS — S06.5XAA SUBDURAL HEMATOMA: Primary | ICD-10-CM

## 2018-07-14 DIAGNOSIS — E03.9 ACQUIRED HYPOTHYROIDISM: Chronic | ICD-10-CM

## 2018-07-14 DIAGNOSIS — I95.1 ORTHOSTATIC HYPOTENSION: ICD-10-CM

## 2018-07-14 DIAGNOSIS — R06.00 DYSPNEA, UNSPECIFIED TYPE: ICD-10-CM

## 2018-07-14 DIAGNOSIS — I25.10 CAD IN NATIVE ARTERY: ICD-10-CM

## 2018-07-14 DIAGNOSIS — I25.5 ISCHEMIC CARDIOMYOPATHY: ICD-10-CM

## 2018-07-14 DIAGNOSIS — E78.2 MIXED HYPERLIPIDEMIA: Chronic | ICD-10-CM

## 2018-07-14 DIAGNOSIS — K21.9 GASTROESOPHAGEAL REFLUX DISEASE WITHOUT ESOPHAGITIS: Chronic | ICD-10-CM

## 2018-07-14 DIAGNOSIS — I50.9 CONGESTIVE HEART FAILURE, UNSPECIFIED HF CHRONICITY, UNSPECIFIED HEART FAILURE TYPE (HCC): ICD-10-CM

## 2018-07-14 DIAGNOSIS — Z66 DNR (DO NOT RESUSCITATE): Chronic | ICD-10-CM

## 2018-07-14 DIAGNOSIS — R13.19 OTHER DYSPHAGIA: Chronic | ICD-10-CM

## 2018-07-14 DIAGNOSIS — M54.12 CERVICAL RADICULOPATHY: ICD-10-CM

## 2018-07-14 DIAGNOSIS — Z95.0 BIVENTRICULAR CARDIAC PACEMAKER IN SITU: ICD-10-CM

## 2018-07-14 DIAGNOSIS — E11.29 TYPE 2 DIABETES MELLITUS WITH MICROALBUMINURIA, WITHOUT LONG-TERM CURRENT USE OF INSULIN (HCC): Chronic | ICD-10-CM

## 2018-07-14 DIAGNOSIS — G90.9 AUTONOMIC NEUROPATHY: ICD-10-CM

## 2018-07-14 DIAGNOSIS — I42.9 CARDIOMYOPATHY, UNSPECIFIED TYPE (HCC): ICD-10-CM

## 2018-07-14 DIAGNOSIS — I48.91 ATRIAL FIBRILLATION WITH RAPID VENTRICULAR RESPONSE (HCC): ICD-10-CM

## 2018-07-14 DIAGNOSIS — R26.9 GAIT DISORDER: Chronic | ICD-10-CM

## 2018-07-14 DIAGNOSIS — I49.5 SSS (SICK SINUS SYNDROME) (HCC): ICD-10-CM

## 2018-07-14 DIAGNOSIS — I48.20 CHRONIC ATRIAL FIBRILLATION (HCC): ICD-10-CM

## 2018-07-14 DIAGNOSIS — K58.9 IRRITABLE BOWEL SYNDROME WITHOUT DIARRHEA: Chronic | ICD-10-CM

## 2018-07-14 DIAGNOSIS — G31.84 MCI (MILD COGNITIVE IMPAIRMENT): ICD-10-CM

## 2018-07-14 DIAGNOSIS — Z98.890 S/P AV NODAL ABLATION: ICD-10-CM

## 2018-07-14 DIAGNOSIS — I70.202 FEMORAL ARTERY OCCLUSION, LEFT (HCC): ICD-10-CM

## 2018-07-14 DIAGNOSIS — E11.21 TYPE 2 DIABETES MELLITUS WITH NEPHROPATHY (HCC): ICD-10-CM

## 2018-07-14 DIAGNOSIS — I10 ESSENTIAL HYPERTENSION: Chronic | ICD-10-CM

## 2018-07-14 DIAGNOSIS — I50.22 CHRONIC SYSTOLIC CONGESTIVE HEART FAILURE (HCC): ICD-10-CM

## 2018-07-14 LAB
ALBUMIN SERPL-MCNC: 3.4 G/DL (ref 3.2–4.6)
ALBUMIN/GLOB SERPL: 0.9 {RATIO} (ref 1.2–3.5)
ALP SERPL-CCNC: 148 U/L (ref 50–136)
ALT SERPL-CCNC: 12 U/L (ref 12–65)
AMMONIA PLAS-SCNC: <10 UMOL/L (ref 11–32)
ANION GAP SERPL CALC-SCNC: 11 MMOL/L (ref 7–16)
AST SERPL-CCNC: 16 U/L (ref 15–37)
BASOPHILS # BLD: 0 K/UL (ref 0–0.2)
BASOPHILS NFR BLD: 0 % (ref 0–2)
BILIRUB SERPL-MCNC: 1.3 MG/DL (ref 0.2–1.1)
BUN SERPL-MCNC: 21 MG/DL (ref 8–23)
CALCIUM SERPL-MCNC: 8.9 MG/DL (ref 8.3–10.4)
CHLORIDE SERPL-SCNC: 96 MMOL/L (ref 98–107)
CK MB CFR SERPL CALC: 1.3 %
CK MB SERPL-MCNC: 0.9 NG/ML (ref 0.5–3.6)
CK SERPL-CCNC: 69 U/L (ref 21–215)
CO2 SERPL-SCNC: 29 MMOL/L (ref 21–32)
CREAT SERPL-MCNC: 1.5 MG/DL (ref 0.6–1)
DIFFERENTIAL METHOD BLD: ABNORMAL
EOSINOPHIL # BLD: 0.1 K/UL (ref 0–0.8)
EOSINOPHIL NFR BLD: 1 % (ref 0.5–7.8)
ERYTHROCYTE [DISTWIDTH] IN BLOOD BY AUTOMATED COUNT: 13.4 % (ref 11.9–14.6)
GLOBULIN SER CALC-MCNC: 3.8 G/DL (ref 2.3–3.5)
GLUCOSE SERPL-MCNC: 147 MG/DL (ref 65–100)
HCT VFR BLD AUTO: 33.9 % (ref 35.8–46.3)
HGB BLD-MCNC: 11.8 G/DL (ref 11.7–15.4)
IMM GRANULOCYTES # BLD: 0 K/UL (ref 0–0.5)
IMM GRANULOCYTES NFR BLD AUTO: 0 % (ref 0–5)
INR PPP: 1.5
LYMPHOCYTES # BLD: 1.2 K/UL (ref 0.5–4.6)
LYMPHOCYTES NFR BLD: 16 % (ref 13–44)
MAGNESIUM SERPL-MCNC: 2 MG/DL (ref 1.8–2.4)
MCH RBC QN AUTO: 31.6 PG (ref 26.1–32.9)
MCHC RBC AUTO-ENTMCNC: 34.8 G/DL (ref 31.4–35)
MCV RBC AUTO: 90.6 FL (ref 79.6–97.8)
MONOCYTES # BLD: 1.2 K/UL (ref 0.1–1.3)
MONOCYTES NFR BLD: 15 % (ref 4–12)
NEUTS SEG # BLD: 5.3 K/UL (ref 1.7–8.2)
NEUTS SEG NFR BLD: 68 % (ref 43–78)
PLATELET # BLD AUTO: 243 K/UL (ref 150–450)
PMV BLD AUTO: 10.4 FL (ref 10.8–14.1)
POTASSIUM SERPL-SCNC: 3.4 MMOL/L (ref 3.5–5.1)
PROT SERPL-MCNC: 7.2 G/DL (ref 6.3–8.2)
PROTHROMBIN TIME: 17.6 SEC (ref 11.5–14.5)
RBC # BLD AUTO: 3.74 M/UL (ref 4.05–5.25)
SODIUM SERPL-SCNC: 136 MMOL/L (ref 136–145)
TROPONIN I SERPL-MCNC: <0.02 NG/ML (ref 0.02–0.05)
WBC # BLD AUTO: 7.8 K/UL (ref 4.3–11.1)

## 2018-07-14 PROCEDURE — 82140 ASSAY OF AMMONIA: CPT | Performed by: EMERGENCY MEDICINE

## 2018-07-14 PROCEDURE — 65610000001 HC ROOM ICU GENERAL

## 2018-07-14 PROCEDURE — 74011000250 HC RX REV CODE- 250: Performed by: INTERNAL MEDICINE

## 2018-07-14 PROCEDURE — 77030032490 HC SLV COMPR SCD KNE COVD -B

## 2018-07-14 PROCEDURE — 74011250637 HC RX REV CODE- 250/637: Performed by: INTERNAL MEDICINE

## 2018-07-14 PROCEDURE — 85610 PROTHROMBIN TIME: CPT | Performed by: EMERGENCY MEDICINE

## 2018-07-14 PROCEDURE — 73110 X-RAY EXAM OF WRIST: CPT

## 2018-07-14 PROCEDURE — 70450 CT HEAD/BRAIN W/O DYE: CPT

## 2018-07-14 PROCEDURE — 99285 EMERGENCY DEPT VISIT HI MDM: CPT | Performed by: EMERGENCY MEDICINE

## 2018-07-14 PROCEDURE — 84484 ASSAY OF TROPONIN QUANT: CPT | Performed by: EMERGENCY MEDICINE

## 2018-07-14 PROCEDURE — 81003 URINALYSIS AUTO W/O SCOPE: CPT | Performed by: EMERGENCY MEDICINE

## 2018-07-14 PROCEDURE — 80053 COMPREHEN METABOLIC PANEL: CPT | Performed by: EMERGENCY MEDICINE

## 2018-07-14 PROCEDURE — 83735 ASSAY OF MAGNESIUM: CPT | Performed by: EMERGENCY MEDICINE

## 2018-07-14 PROCEDURE — 74011250636 HC RX REV CODE- 250/636: Performed by: EMERGENCY MEDICINE

## 2018-07-14 PROCEDURE — 82550 ASSAY OF CK (CPK): CPT | Performed by: EMERGENCY MEDICINE

## 2018-07-14 PROCEDURE — 74011250636 HC RX REV CODE- 250/636: Performed by: INTERNAL MEDICINE

## 2018-07-14 PROCEDURE — 93005 ELECTROCARDIOGRAM TRACING: CPT | Performed by: EMERGENCY MEDICINE

## 2018-07-14 PROCEDURE — 85025 COMPLETE CBC W/AUTO DIFF WBC: CPT | Performed by: EMERGENCY MEDICINE

## 2018-07-14 PROCEDURE — 74011000258 HC RX REV CODE- 258: Performed by: INTERNAL MEDICINE

## 2018-07-14 PROCEDURE — 77030019605

## 2018-07-14 RX ORDER — CARVEDILOL 25 MG/1
25 TABLET ORAL 2 TIMES DAILY WITH MEALS
Status: DISCONTINUED | OUTPATIENT
Start: 2018-07-14 | End: 2018-07-14

## 2018-07-14 RX ORDER — LEVOTHYROXINE SODIUM 50 UG/1
25 TABLET ORAL
Status: DISCONTINUED | OUTPATIENT
Start: 2018-07-15 | End: 2018-07-18 | Stop reason: HOSPADM

## 2018-07-14 RX ORDER — TRAMADOL HYDROCHLORIDE 50 MG/1
50 TABLET ORAL
Status: DISCONTINUED | OUTPATIENT
Start: 2018-07-14 | End: 2018-07-18 | Stop reason: HOSPADM

## 2018-07-14 RX ORDER — SODIUM CHLORIDE 0.9 % (FLUSH) 0.9 %
5-10 SYRINGE (ML) INJECTION EVERY 8 HOURS
Status: DISCONTINUED | OUTPATIENT
Start: 2018-07-14 | End: 2018-07-18 | Stop reason: HOSPADM

## 2018-07-14 RX ORDER — PANTOPRAZOLE SODIUM 40 MG/1
40 TABLET, DELAYED RELEASE ORAL
Status: DISCONTINUED | OUTPATIENT
Start: 2018-07-15 | End: 2018-07-18 | Stop reason: HOSPADM

## 2018-07-14 RX ORDER — SODIUM CHLORIDE 0.9 % (FLUSH) 0.9 %
5-10 SYRINGE (ML) INJECTION AS NEEDED
Status: DISCONTINUED | OUTPATIENT
Start: 2018-07-14 | End: 2018-07-18 | Stop reason: HOSPADM

## 2018-07-14 RX ORDER — SODIUM CHLORIDE 0.9 % (FLUSH) 0.9 %
5-10 SYRINGE (ML) INJECTION AS NEEDED
Status: DISCONTINUED | OUTPATIENT
Start: 2018-07-14 | End: 2018-07-16 | Stop reason: SDUPTHER

## 2018-07-14 RX ORDER — LABETALOL HYDROCHLORIDE 5 MG/ML
20 INJECTION, SOLUTION INTRAVENOUS ONCE
Status: COMPLETED | OUTPATIENT
Start: 2018-07-14 | End: 2018-07-14

## 2018-07-14 RX ORDER — GABAPENTIN 300 MG/1
300 CAPSULE ORAL
COMMUNITY
End: 2018-07-18

## 2018-07-14 RX ORDER — CARVEDILOL 25 MG/1
25 TABLET ORAL 2 TIMES DAILY WITH MEALS
Status: DISCONTINUED | OUTPATIENT
Start: 2018-07-14 | End: 2018-07-18 | Stop reason: HOSPADM

## 2018-07-14 RX ORDER — LISINOPRIL 20 MG/1
20 TABLET ORAL DAILY
Status: DISCONTINUED | OUTPATIENT
Start: 2018-07-15 | End: 2018-07-15

## 2018-07-14 RX ORDER — SODIUM CHLORIDE 0.9 % (FLUSH) 0.9 %
5-10 SYRINGE (ML) INJECTION EVERY 8 HOURS
Status: DISCONTINUED | OUTPATIENT
Start: 2018-07-14 | End: 2018-07-16 | Stop reason: SDUPTHER

## 2018-07-14 RX ORDER — LABETALOL HYDROCHLORIDE 5 MG/ML
10 INJECTION, SOLUTION INTRAVENOUS
Status: DISCONTINUED | OUTPATIENT
Start: 2018-07-14 | End: 2018-07-18 | Stop reason: HOSPADM

## 2018-07-14 RX ADMIN — LABETALOL HYDROCHLORIDE 20 MG: 5 INJECTION, SOLUTION INTRAVENOUS at 15:59

## 2018-07-14 RX ADMIN — CARVEDILOL 25 MG: 25 TABLET, FILM COATED ORAL at 17:44

## 2018-07-14 RX ADMIN — SODIUM CHLORIDE 500 MG: 900 INJECTION, SOLUTION INTRAVENOUS at 15:46

## 2018-07-14 RX ADMIN — LABETALOL HYDROCHLORIDE 10 MG: 5 INJECTION, SOLUTION INTRAVENOUS at 14:57

## 2018-07-14 RX ADMIN — Medication 10 ML: at 22:14

## 2018-07-14 RX ADMIN — Medication 10 ML: at 14:57

## 2018-07-14 RX ADMIN — NICARDIPINE HYDROCHLORIDE 5 MG/HR: 25 INJECTION INTRAVENOUS at 18:34

## 2018-07-14 RX ADMIN — TRAMADOL HYDROCHLORIDE 50 MG: 50 TABLET, FILM COATED ORAL at 14:53

## 2018-07-14 RX ADMIN — SODIUM CHLORIDE 1000 ML: 900 INJECTION, SOLUTION INTRAVENOUS at 12:40

## 2018-07-14 NOTE — ED PROVIDER NOTES
HPI Comments: Presents to the ER with family for worsening weakness and lethargy. Apparently patient has had multiple falls over the past couple of days. Initially evaluated by her primary care physician for left wrist pain and swelling. Apparently she had a negative x-ray. However over the past couple days spell reports patient has been more weak and lethargic. She has spells where she seems somewhat more confused. Today required significant assistance getting out of the bed. They deny any fevers or vomiting. Family does report some head trauma associated with fall 2 days ago. Patient does take eliquis. Patient is a 80 y.o. female presenting with altered mental status. The history is provided by the patient. Altered mental status This is a new problem. The current episode started more than 2 days ago. The problem has been gradually worsening. Associated symptoms include confusion and weakness. Pertinent negatives include no unresponsiveness, no self-injury, no violence, no tingling and no numbness. Her past medical history does not include seizures or CVA. Past Medical History:  
Diagnosis Date  A fib  Abnormal loss of weight  Acquired hypothyroidism 9/12/2012  Acute respiratory failure with hypoxemia ([address] 8/12/2017  Anemia  Anorexia 9/2/2014  Arthritis associated with another disorder  Atrial fibrillation ([address] 9/12/2012 Paroxysmal.  Coumadin contraindicated due to falls    
 Autonomic neuropathy 1/13/2013  CAD (coronary artery disease)  CAD in native artery 5/5/2016  CAP (community acquired pneumonia) 8/12/2017  Cervical radiculopathy 5/23/2013  Chest pain  Chronic kidney disease  Chronic pain syndrome 1/13/2013 Back, right shoulder, neck: Peripheral neuropathy, spinal stenosis C7-T1, foraminal narrowing C4-5  CKD (chronic kidney disease), stage III 9/12/2012  Diabetes ([address]   
 about 30 years  Diabetes mellitus does not check sugar  Diabetes mellitus, type 2 ([address] 9/12/2012  Dysphagia 9/2/2014 Due to esophageal spasm seen on modified barium swallow 2015  Gait disorder 9/12/2012  GERD (gastroesophageal reflux disease)  Heart failure ([address]  HTN   
 Hypertension 9/12/2012 Orthostatic hypotension due to autonomic neuropathy  Hypothyroid  IBS (irritable bowel syndrome) 9/12/2012  Iron deficiency 9/12/2012  Lumbar disc disease  MCI (mild cognitive impairment) 1/13/2013  Mixed hyperlipidemia 9/12/2012  Orthostatic hypotension 5/5/2016  Pacemaker  SSS (sick sinus syndrome) ([address] 5/5/2016 Past Surgical History:  
Procedure Laterality Date  HX APPENDECTOMY  HX CARPAL TUNNEL RELEASE  HX CERVICAL FUSION    
 HX COLONOSCOPY  Nov 2005  HX PACEMAKER  6/08  HX PACEMAKER  2017 Replacement  VASCULAR SURGERY PROCEDURE UNLIST Left 04/25/2018  
 fem embolectomy History reviewed. No pertinent family history. Social History Social History  Marital status:  Spouse name: N/A  
 Number of children: N/A  
 Years of education: N/A Occupational History  Not on file. Social History Main Topics  Smoking status: Never Smoker  Smokeless tobacco: Never Used  Alcohol use No  
 Drug use: No  
 Sexual activity: Not on file Other Topics Concern  Not on file Social History Narrative Lives with  who has mild vascular dementia, patient is primary caretaker but daughters come over to house 4-5 times a week, Gilliam has [employer] ALLERGIES: Tylenol [acetaminophen] Review of Systems Constitutional: Negative for diaphoresis, fatigue and fever. HENT: Negative for congestion and dental problem. Eyes: Negative for photophobia, redness and visual disturbance. Respiratory: Negative for chest tightness and shortness of breath. Cardiovascular: Negative for palpitations and leg swelling. Gastrointestinal: Negative for nausea and vomiting. Endocrine: Negative for polydipsia and polyphagia. Genitourinary: Negative for flank pain and urgency. Musculoskeletal: Negative for back pain and gait problem. Skin: Positive for color change. Negative for pallor. Allergic/Immunologic: Negative for food allergies and immunocompromised state. Neurological: Positive for weakness. Negative for tingling, light-headedness and numbness. Hematological: Negative for adenopathy. Does not bruise/bleed easily. Psychiatric/Behavioral: Positive for confusion. Negative for self-injury. All other systems reviewed and are negative. Vitals:  
 07/14/18 6571 BP: 147/63 Pulse: 75 Resp: 16 Temp: 97.5 °F (36.4 °C) Weight: 42.6 kg (94 lb) Height: 5' 4\" (1.626 m) Physical Exam  
Constitutional: She is oriented to person, place, and time. She appears well-developed. HENT:  
Head: Normocephalic. Head is with contusion and with laceration. Mouth/Throat: Oropharynx is clear and moist.  
Superficial abrasion/laceration noted over right eyebrow as well as left parietal region Eyes: Conjunctivae and EOM are normal. Pupils are equal, round, and reactive to light. Neck: Normal range of motion. Neck supple. No tracheal deviation present. Cardiovascular: Normal rate, regular rhythm and intact distal pulses. No murmur heard. Pulmonary/Chest: Effort normal and breath sounds normal. No respiratory distress. She has no wheezes. Abdominal: Soft. Bowel sounds are normal. She exhibits no distension. Musculoskeletal:  
     Arms: 
Neurological: She is alert and oriented to person, place, and time. She has normal reflexes. No cranial nerve deficit. Nursing note and vitals reviewed. MDM Number of Diagnoses or Management Options Subdural hematoma [facility]):  
Diagnosis management comments: Differential diagnoses broad to include possible subdural hematoma, sepsis, dehydration 12:29 PM 
CT scan of the head does show small right-sided subdural hematoma. Case has been discussed with neurosurgery, they are reviewed CT imaging. Patient remains awake and alert without any gross focal neurological deficits. 12:43 PM 
CT images have been reviewed by neurosurgery Dr. Derrick Gotti, he recommends medical management. Also recommends repeat CT scan within 6 hours. Will discuss case with hospitalist for admission Amount and/or Complexity of Data Reviewed Clinical lab tests: ordered and reviewed Tests in the radiology section of CPT®: ordered and reviewed Risk of Complications, Morbidity, and/or Mortality Presenting problems: high Diagnostic procedures: moderate Management options: high Critical Care Total time providing critical care: 30-74 minutes (Critical Care Time 60 Minutes: Excluding all billable procedures, time spent at the bedside directing patients resuscitation, updating family, and coordinating care with consultants 
) Patient Progress Patient progress: stable ED Course Procedures Results Include: 
 
Recent Results (from the past 24 hour(s)) CBC WITH AUTOMATED DIFF Collection Time: 07/14/18 10:06 AM  
Result Value Ref Range WBC 7.8 4.3 - 11.1 K/uL  
 RBC 3.74 (L) 4.05 - 5.25 M/uL  
 HGB 11.8 11.7 - 15.4 g/dL HCT 33.9 (L) 35.8 - 46.3 % MCV 90.6 79.6 - 97.8 FL  
 MCH 31.6 26.1 - 32.9 PG  
 MCHC 34.8 31.4 - 35.0 g/dL  
 RDW 13.4 11.9 - 14.6 % PLATELET 820 969 - 540 K/uL MPV 10.4 (L) 10.8 - 14.1 FL  
 DF AUTOMATED NEUTROPHILS 68 43 - 78 % LYMPHOCYTES 16 13 - 44 % MONOCYTES 15 (H) 4.0 - 12.0 % EOSINOPHILS 1 0.5 - 7.8 % BASOPHILS 0 0.0 - 2.0 % IMMATURE GRANULOCYTES 0 0.0 - 5.0 %  
 ABS. NEUTROPHILS 5.3 1.7 - 8.2 K/UL  
 ABS. LYMPHOCYTES 1.2 0.5 - 4.6 K/UL  
 ABS. MONOCYTES 1.2 0.1 - 1.3 K/UL  
 ABS. EOSINOPHILS 0.1 0.0 - 0.8 K/UL  
 ABS. BASOPHILS 0.0 0.0 - 0.2 K/UL  
 ABS. IMM.  GRANS. 0.0 0.0 - 0.5 K/UL  
METABOLIC PANEL, COMPREHENSIVE Collection Time: 07/14/18 10:06 AM  
Result Value Ref Range Sodium 136 136 - 145 mmol/L Potassium 3.4 (L) 3.5 - 5.1 mmol/L Chloride 96 (L) 98 - 107 mmol/L  
 CO2 29 21 - 32 mmol/L Anion gap 11 7 - 16 mmol/L Glucose 147 (H) 65 - 100 mg/dL BUN 21 8 - 23 MG/DL Creatinine 1.50 (H) 0.6 - 1.0 MG/DL  
 GFR est AA 42 (L) >60 ml/min/1.73m2 GFR est non-AA 35 (L) >60 ml/min/1.73m2 Calcium 8.9 8.3 - 10.4 MG/DL Bilirubin, total 1.3 (H) 0.2 - 1.1 MG/DL  
 ALT (SGPT) 12 12 - 65 U/L  
 AST (SGOT) 16 15 - 37 U/L Alk. phosphatase 148 (H) 50 - 136 U/L Protein, total 7.2 6.3 - 8.2 g/dL Albumin 3.4 3.2 - 4.6 g/dL Globulin 3.8 (H) 2.3 - 3.5 g/dL A-G Ratio 0.9 (L) 1.2 - 3.5 CK WITH MB  
 Collection Time: 07/14/18 10:06 AM  
Result Value Ref Range CK 69 21 - 215 U/L  
 CK - MB 0.9 0.5 - 3.6 ng/ml CK-MB Index 1.3 <2.5 % PROTHROMBIN TIME + INR Collection Time: 07/14/18 10:06 AM  
Result Value Ref Range Prothrombin time 17.6 (H) 11.5 - 14.5 sec INR 1.5 MAGNESIUM Collection Time: 07/14/18 10:06 AM  
Result Value Ref Range Magnesium 2.0 1.8 - 2.4 mg/dL TROPONIN I Collection Time: 07/14/18 10:06 AM  
Result Value Ref Range Troponin-I, Qt. <0.02 (L) 0.02 - 0.05 NG/ML  
AMMONIA Collection Time: 07/14/18 11:29 AM  
Result Value Ref Range  Ammonia <10 (L) 11 - 32 UMOL/L

## 2018-07-14 NOTE — CONSULTS
Consult- [facility] 
HPI Comments:89 yo female presents with family for worsening weakness and lethargy. Apparently patient has had multiple falls over the past couple of days. Initially evaluated by her primary care physician for left wrist pain and swelling. Apparently she had a negative x-ray. However over the past couple days spell reports patient has been more weak and lethargic. She has spells where she seems somewhat more confused. Today required significant assistance getting out of the bed. They deny any fevers or vomiting. Family does report some head trauma associated with fall 2 days ago. Patient does take eliquis. 
  
Patient is a 80 y.o. female presenting with altered mental status. The history is provided by the patient Katy Ansari Altered mental status This is a new problem. The current episode started more than 2 days ago. The problem has been gradually worsening. Associated symptoms include confusion and weakness. Pertinent negatives include no unresponsiveness, no self-injury, no violence, no tingling and no numbness. Her past medical history does not include seizures or CVA.  
  
  
 Past Medical History Past Medical History:  
Diagnosis Date  A fib    
 Abnormal loss of weight    
 Acquired hypothyroidism 9/12/2012  Acute respiratory failure with hypoxemia ([address] 8/12/2017  Anemia    
 Anorexia 9/2/2014  Arthritis associated with another disorder    
 Atrial fibrillation ([address] 9/12/2012  
  Paroxysmal.  Coumadin contraindicated due to falls    
 Autonomic neuropathy 1/13/2013  CAD (coronary artery disease)    
 CAD in native artery 5/5/2016  CAP (community acquired pneumonia) 8/12/2017  Cervical radiculopathy 5/23/2013  Chest pain    
 Chronic kidney disease    
 Chronic pain syndrome 1/13/2013  
  Back, right shoulder, neck: Peripheral neuropathy, spinal stenosis C7-T1, foraminal narrowing C4-5  CKD (chronic kidney disease), stage III 9/12/2012  Diabetes ([facility]    
  about 30 years  Diabetes mellitus    
  does not check sugar  Diabetes mellitus, type 2 ([facility] 9/12/2012  Dysphagia 9/2/2014  
  Due to esophageal spasm seen on modified barium swallow 2015  Gait disorder 9/12/2012  GERD (gastroesophageal reflux disease)    
 Heart failure (HCC)    
 HTN    
 Hypertension 9/12/2012  
  Orthostatic hypotension due to autonomic neuropathy  Hypothyroid    
 IBS (irritable bowel syndrome) 9/12/2012  Iron deficiency 9/12/2012  Lumbar disc disease    
 MCI (mild cognitive impairment) 1/13/2013  Mixed hyperlipidemia 9/12/2012  Orthostatic hypotension 5/5/2016  Pacemaker    
 SSS (sick sinus syndrome) ([facility] 5/5/2016  
  
  
  
 Past Surgical History Past Surgical History:  
Procedure Laterality Date  HX APPENDECTOMY      
 HX CARPAL TUNNEL RELEASE      
 HX CERVICAL FUSION      
 HX COLONOSCOPY   Nov 2005  HX PACEMAKER   6/08 Jean Pierre Lout PACEMAKER   2017  
  Replacement  VASCULAR SURGERY PROCEDURE UNLIST Left 04/25/2018  
  fem embolectomy  
  
  
   
History reviewed. No pertinent family history. 
  
Social History  
  
     
Social History  Marital status:   
    Spouse name: N/A  
 Number of children: N/A  
 Years of education: N/A  
  
   
Occupational History  Not on file.  
  
    
Social History Main Topics  Smoking status: Never Smoker  Smokeless tobacco: Never Used  Alcohol use No  
 Drug use: No  
 Sexual activity: Not on file  
  
    
Other Topics Concern  Not on file  
  
   
Social History Narrative  
  Lives with  who has mild vascular dementia, patient is primary caretaker but daughters come over to house 4-5 times a week, Vani Sierra has HCPOA  
  
  
  
ALLERGIES: Tylenol [acetaminophen] 
  Review of Systems Constitutional: Negative for diaphoresis, fatigue and fever. HENT: Negative for congestion and dental problem.    
Eyes: Negative for photophobia, redness and visual disturbance. Neuro- denies HA , n, v Sensory grossly intact Psychiatric/Behavioral: Positive for confusion. Negative for self-injury. All other systems reviewed and are negative. 
  
  
   
Vitals:  
  07/14/18 0954 BP: 147/63 Pulse: 75 Resp: 16 Temp: 97.5 °F (36.4 °C) Weight: 42.6 kg (94 lb) Height: 5' 4\" (1.626 m)  
      
  
Physical Exam  
Constitutional: She is oriented to person, place, and time. She appears well-developed. HENT:  
Head: Normocephalic. Head is with contusion and with laceration. Neurological: Neurological: AAOx person place and situation but not time . Pupils 5 to 3mm brisk. CNII-XII intact. no pronator drift. No pathological signs I.e hoffmans or clonus. Motor- 5/5 B/L UE/ LE Reflexes 1/4 all stations 
  
 
CT scan of the head does show small right-sided subdural hematoma. Case has been discussed with neurosurgery, they are reviewed CT imaging. Patient remains awake and alert without any gross focal neurological deficits. 
  
A/P 
 
R ASDH with local mass affect and minimal midline shift 1. Admit to ICU 
 [id number] checks 2. Repeat CT without contrast of head 6 hours from first  
3.keppra 500mg BID 4. SBP <140

## 2018-07-14 NOTE — PROGRESS NOTES
MD notified of pt's hypertension 1 hr after PRN 10 mg IV labetalol. SBP 170s-180s (goal <140/90). Orders received for 20 mg IV labetalol one time dose. Also clarified pt's DNR order. Daughter at bedside with copy of advanced directive. MD to place orders.

## 2018-07-14 NOTE — PROGRESS NOTES
Hemorrhagic Stroke/Intracerebral Hemorrhage (excludes Subdural/Epidural Hematoma)    VTE Prophylaxis: No (MD order)    Antiplatelet: No    Statin if LDL Greater Than or Equal to100: No    BP Parameters: Less Than 140/90    Controlled With: PRN - IV    Dysphagia Screen Completed: Yes: Pass    Patient has PEG, NG Tube, Feeding Tube: No    Medication orders per above route: Yes    Nutrition Status: No Orders - If greater than 72 hours, call MD.    NIH Stroke Scale Complete: Yes: 1    Frequency of Vital Signs: Every hour for 24 hours, ending at 1400    Frequency of Neuro Checks: Every hour for 24 hours, ending at 1400    Daily Education/Care Plan Updated: Yes    Guru Go

## 2018-07-14 NOTE — PROGRESS NOTES
Called with HTN persistent despite IV labetalol, restarted home meds and IV cardene for goal <140/90 in light of acute SDH  Alvy Frankel, MD

## 2018-07-14 NOTE — H&P
Hospitalist H&P Note Admit Date:  2018  9:43 AM  
Name:  Jennifer Briceno Age:  80 y.o. 
:  8/10/1927 MRN:  [id number] PCP:  Sonia Berry MD 
Treatment Team: Attending Provider: David Hernandez MD; Primary Nurse: Sabra Alvarenga RN 
 
HPI:  
 
 is a 81 yo female who presented with confusion on a background of HTN, DM, sick sinus syndrome, HLD, systolic CHF, orthostatic hypotension, atrial fibrillation (on Eliquis) and ischemic cardiomyopathy (biventricular pacemaker in place). Her daughter reported that the patient fell at 5am on Thursday. She hit her head but did not lose consciousness. She was lucid until Friday when she was noted to be more lethargic. She was seen by her PCP and gabapentin dose was decreased. She was also noted to have left wrist swelling an pain. Her mentation changes and lethargy prompted the ER admission. X ray showed no signs of fracture (+low bone density). CT head showed small acute right subdural hematoma with minimal midline shift. Neursurgery was consulted who recommended Keppra 500 BID and Q1H Neurochecks with repeat C T head 6 hours after the 1st CT scan. She is oriented to name and place but not year. Most of the history was obtained by speaking to the patient's daughter at bedside. Her daughter notes that her mother has about 10 falls over the past year due to dizziness on standing. The patient denies any paresthesias down the right hand. 10 systems reviewed and negative except as noted in HPI. Past Medical History:  
Diagnosis Date  A fib  Abnormal loss of weight  Acquired hypothyroidism 2012  Acute respiratory failure with hypoxemia ([address] 2017  Anemia  Anorexia 2014  Arthritis associated with another disorder  Atrial fibrillation ([address] 2012 Paroxysmal.  Coumadin contraindicated due to falls    
 Autonomic neuropathy 2013  CAD (coronary artery disease)  CAD in native artery 5/5/2016  CAP (community acquired pneumonia) 8/12/2017  Cervical radiculopathy 5/23/2013  Chest pain  Chronic kidney disease  Chronic pain syndrome 1/13/2013 Back, right shoulder, neck: Peripheral neuropathy, spinal stenosis C7-T1, foraminal narrowing C4-5  CKD (chronic kidney disease), stage III 9/12/2012  Diabetes ([address]   
 about 30 years  Diabetes mellitus   
 does not check sugar  Diabetes mellitus, type 2 ([address] 9/12/2012  Dysphagia 9/2/2014 Due to esophageal spasm seen on modified barium swallow 2015  Gait disorder 9/12/2012  GERD (gastroesophageal reflux disease)  Heart failure ([address]  HTN   
 Hypertension 9/12/2012 Orthostatic hypotension due to autonomic neuropathy  Hypothyroid  IBS (irritable bowel syndrome) 9/12/2012  Iron deficiency 9/12/2012  Lumbar disc disease  MCI (mild cognitive impairment) 1/13/2013  Mixed hyperlipidemia 9/12/2012  Orthostatic hypotension 5/5/2016  Pacemaker  SSS (sick sinus syndrome) ([address] 5/5/2016 Past Surgical History:  
Procedure Laterality Date  HX APPENDECTOMY  HX CARPAL TUNNEL RELEASE  HX CERVICAL FUSION    
 HX COLONOSCOPY  Nov 2005  HX PACEMAKER  6/08  HX PACEMAKER  2017 Replacement  VASCULAR SURGERY PROCEDURE UNLIST Left 04/25/2018  
 fem embolectomy Allergies Allergen Reactions  Tylenol [Acetaminophen] Unknown (comments)  
  insomnia Social History Substance Use Topics  Smoking status: Never Smoker  Smokeless tobacco: Never Used  Alcohol use No  
  
History reviewed. No pertinent family history. Immunization History Administered Date(s) Administered  Influenza High Dose Vaccine PF 10/08/2015, 09/20/2016, 10/19/2017  Influenza Vaccine 10/10/2013, 09/02/2014  Influenza Vaccine Split 10/15/2012  Pneumococcal Conjugate (PCV-13) 11/10/2015  Pneumococcal Polysaccharide (PPSV-23) 04/16/2013  TB Skin Test (PPD) Intradermal 2017, 2018  TDAP Vaccine 10/15/2012 PTA Medications: 
Prior to Admission Medications Prescriptions Last Dose Informant Patient Reported? Taking? B.infantis-B.ani-B.long-B.bifi (PROBIOTIC 4X) 10-15 mg TbEC   Yes No  
Sig: Take  by mouth. apixaban (ELIQUIS) 2.5 mg tablet   No No  
Sig: Take 1 Tab by mouth two (2) times a day. carvedilol (COREG) 25 mg tablet   No No  
Sig: Take 1 Tab by mouth two (2) times daily (with meals). ferrous sulfate 325 mg (65 mg iron) tablet   No No  
Sig: Take 1 Tab by mouth two (2) times daily (with meals). Indications: Iron Deficiency Anemia  
furosemide (LASIX) 20 mg tablet   No No  
Si/2 tab po daily  
gabapentin (NEURONTIN) 600 mg tablet   No No  
Sig: Take 1 Tab by mouth three (3) times daily. levothyroxine (SYNTHROID) 25 mcg tablet   No No  
Sig: Take 1 Tab by mouth Daily (before breakfast). lisinopril (PRINIVIL, ZESTRIL) 20 mg tablet   No No  
Sig: Take 1 Tab by mouth daily. nitroglycerin (NITROSTAT) 0.4 mg SL tablet   No No  
Si Tab by SubLINGual route every five (5) minutes as needed. Indications: ANGINA  
omeprazole (PRILOSEC) 20 mg capsule   No No  
Sig: Take 1 Cap by mouth two (2) times a day. Patient taking differently: Take 20 mg by mouth daily. senna-docusate (PERICOLACE) 8.6-50 mg per tablet   No No  
Sig: Take 1 Tab by mouth nightly. traMADol (ULTRAM) 50 mg tablet   No No  
Sig: Take 1 Tab by mouth every six (6) hours as needed. Max Daily Amount: 200 mg.  
traZODone (DESYREL) 50 mg tablet   No No  
Sig: Take 1 Tab by mouth nightly. Patient taking differently: Take 50 mg by mouth as needed. Facility-Administered Medications: None Review of Systems: 
Unable to obtain due to mentation Objective:  
Patient Vitals for the past 24 hrs: 
 Temp Pulse Resp BP  
18 0954 97.5 °F (36.4 °C) 75 16 147/63 No intake or output data in the 24 hours ending 18 9110 Physical Exam: 
General: Cachectic Eyes:   Normal sclera. Extraocular movements intact. ENT:  Normocephalic, atraumatic. Moist mucous membranes CV:   RRR. No murmur, rub, or gallop. Lungs:  CTAB. No wheezing, rhonchi, or rales. Abdomen: Soft, nontender, nondistended. Bowel sounds normal.  
Extremities: Warm and dry. No cyanosis, right hand swelling Neurologic: CN II-XII grossly intact. 4/5 strength in bilateral UL Skin:     No rashes or jaundice. Psych:  Normal mood and affect. I reviewed the labs, imaging, EKGs, telemetry, and other studies done this admission. Data Review:  
Recent Results (from the past 24 hour(s)) CBC WITH AUTOMATED DIFF Collection Time: 07/14/18 10:06 AM  
Result Value Ref Range WBC 7.8 4.3 - 11.1 K/uL  
 RBC 3.74 (L) 4.05 - 5.25 M/uL  
 HGB 11.8 11.7 - 15.4 g/dL HCT 33.9 (L) 35.8 - 46.3 % MCV 90.6 79.6 - 97.8 FL  
 MCH 31.6 26.1 - 32.9 PG  
 MCHC 34.8 31.4 - 35.0 g/dL  
 RDW 13.4 11.9 - 14.6 % PLATELET 422 877 - 329 K/uL MPV 10.4 (L) 10.8 - 14.1 FL  
 DF AUTOMATED NEUTROPHILS 68 43 - 78 % LYMPHOCYTES 16 13 - 44 % MONOCYTES 15 (H) 4.0 - 12.0 % EOSINOPHILS 1 0.5 - 7.8 % BASOPHILS 0 0.0 - 2.0 % IMMATURE GRANULOCYTES 0 0.0 - 5.0 %  
 ABS. NEUTROPHILS 5.3 1.7 - 8.2 K/UL  
 ABS. LYMPHOCYTES 1.2 0.5 - 4.6 K/UL  
 ABS. MONOCYTES 1.2 0.1 - 1.3 K/UL  
 ABS. EOSINOPHILS 0.1 0.0 - 0.8 K/UL  
 ABS. BASOPHILS 0.0 0.0 - 0.2 K/UL  
 ABS. IMM. GRANS. 0.0 0.0 - 0.5 K/UL METABOLIC PANEL, COMPREHENSIVE Collection Time: 07/14/18 10:06 AM  
Result Value Ref Range Sodium 136 136 - 145 mmol/L Potassium 3.4 (L) 3.5 - 5.1 mmol/L Chloride 96 (L) 98 - 107 mmol/L  
 CO2 29 21 - 32 mmol/L Anion gap 11 7 - 16 mmol/L Glucose 147 (H) 65 - 100 mg/dL BUN 21 8 - 23 MG/DL Creatinine 1.50 (H) 0.6 - 1.0 MG/DL  
 GFR est AA 42 (L) >60 ml/min/1.73m2 GFR est non-AA 35 (L) >60 ml/min/1.73m2 Calcium 8.9 8.3 - 10.4 MG/DL  Bilirubin, total 1.3 (H) 0.2 - 1.1 MG/DL  
 ALT (SGPT) 12 12 - 65 U/L  
 AST (SGOT) 16 15 - 37 U/L Alk. phosphatase 148 (H) 50 - 136 U/L Protein, total 7.2 6.3 - 8.2 g/dL Albumin 3.4 3.2 - 4.6 g/dL Globulin 3.8 (H) 2.3 - 3.5 g/dL A-G Ratio 0.9 (L) 1.2 - 3.5 CK WITH MB  
 Collection Time: 07/14/18 10:06 AM  
Result Value Ref Range CK 69 21 - 215 U/L  
 CK - MB 0.9 0.5 - 3.6 ng/ml CK-MB Index 1.3 <2.5 % PROTHROMBIN TIME + INR Collection Time: 07/14/18 10:06 AM  
Result Value Ref Range Prothrombin time 17.6 (H) 11.5 - 14.5 sec INR 1.5 MAGNESIUM Collection Time: 07/14/18 10:06 AM  
Result Value Ref Range Magnesium 2.0 1.8 - 2.4 mg/dL TROPONIN I Collection Time: 07/14/18 10:06 AM  
Result Value Ref Range Troponin-I, Qt. <0.02 (L) 0.02 - 0.05 NG/ML  
AMMONIA Collection Time: 07/14/18 11:29 AM  
Result Value Ref Range Ammonia <10 (L) 11 - 32 UMOL/L  
EKG, 12 LEAD, INITIAL Collection Time: 07/14/18 12:45 PM  
Result Value Ref Range Ventricular Rate 75 BPM  
 Atrial Rate 300 BPM  
 QRS Duration 148 ms Q-T Interval 460 ms QTC Calculation (Bezet) 513 ms Calculated P Axis 75 degrees Calculated R Axis -171 degrees Calculated T Axis 79 degrees Diagnosis    
  !! AGE AND GENDER SPECIFIC ECG ANALYSIS !! Atrial fibrillation with a competing junctional pacemaker Right superior axis deviation Non-specific intra-ventricular conduction block Right ventricular hypertrophy Cannot rule out Anterior infarct (cited on or before 14-JUL-2018) Abnormal ECG When compared with ECG of 12-AUG-2017 06:01, 
Previous ECG has undetermined rhythm, needs review Non-specific intra-ventricular conduction block has replaced (RBBB and left  
posterior fascicular block) Questionable change in initial forces of Anterior leads All Micro Results None Other Studies: 
Xr Wrist Lt Ap/lat/obl Min 3v Result Date: 7/14/2018 Left wrist Clinical indication: Acute pain and swelling after fall injury Comparison: none Technique: 3 views Findings: There is no evidence of fracture, dislocation, or erosion. The bone density is diffusely low which can limit the sensitivity for subtle osseous lesions. There are mild chronic appearing degenerative changes not unusual for age. Soft tissue swelling is noted with scattered benign vascular calcifications. IMPRESSION: No acute osseous abnormality. Low bone density. If there is persistent clinical concern for an occult nondisplaced fracture consider followup radiographs in 7-10 days. Ct Head Wo Cont Result Date: 7/14/2018 Noncontrast head CT Clinical Indication: Acute altered mental status with fall and possible head injury, disorientation. Patient taking blood thinning medication. Technique: Noncontrast axial images were obtained through the brain. All CT scans at this location are performed using dose modulation techniques as appropriate including the following: Automated exposure control, adjustment of the MA and/or kV according to patient's size, or use of iterative reconstruction technique. Comparison: 5/2/2018, 3/11/2017 Findings: There is a crescentic hyperdense extra-axial fluid collection along the right frontal, parietal, and temporal lobes consistent with subdural hematoma. This measures up to 0.8 cm transverse (image 10). Trace subdural blood also layers along the right tentorium. There is suggestion of minimal leftward midline shift measuring less than 2 mm. There is no hydrocephalus, intra-axial mass, or mass-effect otherwise. There is no CT evidence of acute large artery territorial infarction. The mastoid air cells and paranasal sinuses are clear where imaged. No displaced skull fractures are present. Impression: Small acute right subdural hematoma. Minimal midline shift.  [id number] The critical results contained in this report were communicated to Dr. Wagner Sanchez by Dr Hieu Arboleda on the phone at 12:19 PM. Critical results were communicated as outlined in Section II.C.2.a.i of the ACR Practice Guideline for Communication of Diagnostic Imaging Findings. Assessment and Plan:  
 
Hospital Problems as of 7/14/2018  Date Reviewed: 7/14/2018 Codes Class Noted - Resolved POA Subdural hematoma (HCC) ICD-10-CM: I62.00 ICD-9-CM: 432.1  7/14/2018 - Present Unknown Diabetes mellitus, type 2 (HCC) (Chronic) ICD-10-CM: E11.9 ICD-9-CM: 250.00  8/12/2017 - Present Yes Acquired hypothyroidism (Chronic) ICD-10-CM: E03.9 ICD-9-CM: 244.9  9/12/2012 - Present Yes Mixed hyperlipidemia (Chronic) ICD-10-CM: M08.2 ICD-9-CM: 272.2  9/12/2012 - Present Yes Overview Addendum 9/12/2012  3:51 PM by Amanda Eason With high HDL Intolerant of pravastatin,lovastatin and lescol GERD (gastroesophageal reflux disease) (Chronic) ICD-10-CM: K21.9 ICD-9-CM: 530.81  9/12/2012 - Present Yes Atrial fibrillation with RVR ([address] ICD-10-CM: I48.91 
ICD-9-CM: 427.31  9/12/2012 - Present Yes Overview Addendum 10/14/2012  9:34 AM by Jm Baird MD  
  Paroxysmal.  Coumadin contraindicated due to falls  PLAN: 
Subdural hematoma CT head showed small acute right subdural hematoma with minimal midline shift Neursurgery : Keppra 500 BID and Q1H Neurochecks with repeat CT head 6 hours after the 1st CT scan If worsening will likely need Jeff hole Telemetry monitoring in the ICU Systolic ZM<413 Atrial fibrillation : hold eliquis at this time, continue BB 
HLD: Continue statin GERD: continue Protonix HTN: Maintain Systolic PA<452 
DVT ppx:  SCD Anticipated DC needs:  Discharge in >48 hours Code status:  Full Estimated LOS:  Greater than 2 midnights Risk:  high Signed: 
Hiram Orozco MD

## 2018-07-14 NOTE — PROGRESS NOTES
Pt arrived to 3101 from ED. Pt bathed and placed on monitor. Neuro assessment performed. Pt oriented to person, place and situation. Follows commands appropriately. No motor deficits noted in any extremities - generalized weakness. Pupils equal round and reactive, 4 mm bilaterally. T 99.1, paced on monitor, SBP 170s, RA. In NAD. Pt c/o pain in L wrist.     Dual skin assessment performed. Multiple abrasions to shins, L wrist swollen and tender and L ankle slightly swollen. Small abrasion to R side of forehead and one on top of scalp. Sacrum intact and allevyn placed.

## 2018-07-14 NOTE — ED TRIAGE NOTES
Lives at home. No dementia. Thursday family reports weak, lethargic. Dewanda Flurry that day. Patient to hand and shoulder. Saw PCP and evaluated. No new prescriptions from PCP. Patient has declined over the last 3 days. Dewanda Flurry yesterday at home. Fell this AM.  No LOC. Patient disoriented today. .

## 2018-07-14 NOTE — PROGRESS NOTES
Neurosurgery    Called regarding 79 yo female s/p fall on eliquis with episode of confusion . CT reviewed showing R ASDH with local mass affect and minimal midline shift.   No neurosurgical intervention at this time    -Admit to ICU on critical care service  - 1 hr neuro checks  -Repeat ct without contrast of head in 6 hrs from initial scan  -keppra 500mg BID  - will  Place formal consult on chart

## 2018-07-14 NOTE — PROGRESS NOTES
TRANSFER - IN REPORT:    Verbal report received from [facility], [facility] on [facility]  being received from ED for routine progression of care      Report consisted of patients Situation, Background, Assessment and   Recommendations(SBAR). Information from the following report(s) SBAR, Kardex, ED Summary and Recent Results was reviewed with the receiving nurse. Opportunity for questions and clarification was provided. Assessment completed upon patients arrival to unit and care assumed.

## 2018-07-15 ENCOUNTER — APPOINTMENT (OUTPATIENT)
Dept: CT IMAGING | Age: 83
DRG: 086 | End: 2018-07-15
Attending: HOSPITALIST
Payer: MEDICARE

## 2018-07-15 LAB
ALBUMIN SERPL-MCNC: 2.9 G/DL (ref 3.2–4.6)
ALBUMIN/GLOB SERPL: 0.9 {RATIO} (ref 1.2–3.5)
ALP SERPL-CCNC: 117 U/L (ref 50–136)
ALT SERPL-CCNC: 12 U/L (ref 12–65)
ANION GAP SERPL CALC-SCNC: 9 MMOL/L (ref 7–16)
APPEARANCE UR: CLEAR
AST SERPL-CCNC: 11 U/L (ref 15–37)
ATRIAL RATE: 129 BPM
ATRIAL RATE: 300 BPM
BACTERIA URNS QL MICRO: NORMAL /HPF
BASOPHILS # BLD: 0 K/UL (ref 0–0.2)
BASOPHILS NFR BLD: 0 % (ref 0–2)
BILIRUB SERPL-MCNC: 0.9 MG/DL (ref 0.2–1.1)
BILIRUB UR QL: NEGATIVE
BUN SERPL-MCNC: 26 MG/DL (ref 8–23)
CALCIUM SERPL-MCNC: 8.3 MG/DL (ref 8.3–10.4)
CALCULATED P AXIS, ECG09: 75 DEGREES
CALCULATED R AXIS, ECG10: -171 DEGREES
CALCULATED R AXIS, ECG10: -45 DEGREES
CALCULATED T AXIS, ECG11: 79 DEGREES
CALCULATED T AXIS, ECG11: 96 DEGREES
CASTS URNS QL MICRO: 0 /LPF
CHLORIDE SERPL-SCNC: 100 MMOL/L (ref 98–107)
CO2 SERPL-SCNC: 29 MMOL/L (ref 21–32)
COLOR UR: YELLOW
CREAT SERPL-MCNC: 1.25 MG/DL (ref 0.6–1)
CRYSTALS URNS QL MICRO: 0 /LPF
DIAGNOSIS, 93000: NORMAL
DIAGNOSIS, 93000: NORMAL
DIFFERENTIAL METHOD BLD: ABNORMAL
EOSINOPHIL # BLD: 0 K/UL (ref 0–0.8)
EOSINOPHIL NFR BLD: 1 % (ref 0.5–7.8)
EPI CELLS #/AREA URNS HPF: NORMAL /HPF
ERYTHROCYTE [DISTWIDTH] IN BLOOD BY AUTOMATED COUNT: 13.3 % (ref 11.9–14.6)
GLOBULIN SER CALC-MCNC: 3.3 G/DL (ref 2.3–3.5)
GLUCOSE SERPL-MCNC: 131 MG/DL (ref 65–100)
GLUCOSE UR STRIP.AUTO-MCNC: NEGATIVE MG/DL
HCT VFR BLD AUTO: 29.3 % (ref 35.8–46.3)
HGB BLD-MCNC: 10 G/DL (ref 11.7–15.4)
HGB UR QL STRIP: NEGATIVE
IMM GRANULOCYTES # BLD: 0 K/UL (ref 0–0.5)
IMM GRANULOCYTES NFR BLD AUTO: 0 % (ref 0–5)
KETONES UR QL STRIP.AUTO: ABNORMAL MG/DL
LEUKOCYTE ESTERASE UR QL STRIP.AUTO: ABNORMAL
LYMPHOCYTES # BLD: 1.3 K/UL (ref 0.5–4.6)
LYMPHOCYTES NFR BLD: 18 % (ref 13–44)
MCH RBC QN AUTO: 30.9 PG (ref 26.1–32.9)
MCHC RBC AUTO-ENTMCNC: 34.1 G/DL (ref 31.4–35)
MCV RBC AUTO: 90.4 FL (ref 79.6–97.8)
MONOCYTES # BLD: 0.8 K/UL (ref 0.1–1.3)
MONOCYTES NFR BLD: 11 % (ref 4–12)
MUCOUS THREADS URNS QL MICRO: 0 /LPF
NEUTS SEG # BLD: 5.1 K/UL (ref 1.7–8.2)
NEUTS SEG NFR BLD: 70 % (ref 43–78)
NITRITE UR QL STRIP.AUTO: NEGATIVE
PH UR STRIP: 5.5 [PH] (ref 5–9)
PLATELET # BLD AUTO: 241 K/UL (ref 150–450)
PMV BLD AUTO: 10.4 FL (ref 10.8–14.1)
POTASSIUM SERPL-SCNC: 3.6 MMOL/L (ref 3.5–5.1)
PROT SERPL-MCNC: 6.2 G/DL (ref 6.3–8.2)
PROT UR STRIP-MCNC: NEGATIVE MG/DL
Q-T INTERVAL, ECG07: 460 MS
Q-T INTERVAL, ECG07: 496 MS
QRS DURATION, ECG06: 148 MS
QRS DURATION, ECG06: 152 MS
QTC CALCULATION (BEZET), ECG08: 513 MS
QTC CALCULATION (BEZET), ECG08: 553 MS
RBC # BLD AUTO: 3.24 M/UL (ref 4.05–5.25)
RBC #/AREA URNS HPF: NORMAL /HPF
SODIUM SERPL-SCNC: 138 MMOL/L (ref 136–145)
SP GR UR REFRACTOMETRY: 1.02 (ref 1–1.02)
TROPONIN I SERPL-MCNC: <0.02 NG/ML (ref 0.02–0.05)
UROBILINOGEN UR QL STRIP.AUTO: 0.2 EU/DL (ref 0.2–1)
VENTRICULAR RATE, ECG03: 75 BPM
VENTRICULAR RATE, ECG03: 75 BPM
WBC # BLD AUTO: 7.1 K/UL (ref 4.3–11.1)
WBC URNS QL MICRO: NORMAL /HPF

## 2018-07-15 PROCEDURE — 85025 COMPLETE CBC W/AUTO DIFF WBC: CPT | Performed by: INTERNAL MEDICINE

## 2018-07-15 PROCEDURE — 74011000250 HC RX REV CODE- 250: Performed by: INTERNAL MEDICINE

## 2018-07-15 PROCEDURE — 86580 TB INTRADERMAL TEST: CPT | Performed by: INTERNAL MEDICINE

## 2018-07-15 PROCEDURE — 74011000258 HC RX REV CODE- 258: Performed by: INTERNAL MEDICINE

## 2018-07-15 PROCEDURE — 77030027138 HC INCENT SPIROMETER -A

## 2018-07-15 PROCEDURE — 74011250637 HC RX REV CODE- 250/637: Performed by: INTERNAL MEDICINE

## 2018-07-15 PROCEDURE — 36415 COLL VENOUS BLD VENIPUNCTURE: CPT | Performed by: INTERNAL MEDICINE

## 2018-07-15 PROCEDURE — 81015 MICROSCOPIC EXAM OF URINE: CPT | Performed by: INTERNAL MEDICINE

## 2018-07-15 PROCEDURE — 81001 URINALYSIS AUTO W/SCOPE: CPT | Performed by: INTERNAL MEDICINE

## 2018-07-15 PROCEDURE — 65660000000 HC RM CCU STEPDOWN

## 2018-07-15 PROCEDURE — 80053 COMPREHEN METABOLIC PANEL: CPT | Performed by: INTERNAL MEDICINE

## 2018-07-15 PROCEDURE — 84484 ASSAY OF TROPONIN QUANT: CPT | Performed by: INTERNAL MEDICINE

## 2018-07-15 PROCEDURE — 74011250637 HC RX REV CODE- 250/637: Performed by: HOSPITALIST

## 2018-07-15 PROCEDURE — 93005 ELECTROCARDIOGRAM TRACING: CPT | Performed by: INTERNAL MEDICINE

## 2018-07-15 PROCEDURE — 74011250636 HC RX REV CODE- 250/636: Performed by: INTERNAL MEDICINE

## 2018-07-15 PROCEDURE — 70450 CT HEAD/BRAIN W/O DYE: CPT

## 2018-07-15 PROCEDURE — 74011000302 HC RX REV CODE- 302: Performed by: INTERNAL MEDICINE

## 2018-07-15 RX ORDER — HALOPERIDOL 2 MG/1
1 TABLET ORAL
Status: DISCONTINUED | OUTPATIENT
Start: 2018-07-15 | End: 2018-07-17

## 2018-07-15 RX ORDER — LISINOPRIL 20 MG/1
20 TABLET ORAL DAILY
Status: DISCONTINUED | OUTPATIENT
Start: 2018-07-15 | End: 2018-07-18 | Stop reason: HOSPADM

## 2018-07-15 RX ORDER — NITROGLYCERIN 0.4 MG/1
0.4 TABLET SUBLINGUAL AS NEEDED
Status: DISCONTINUED | OUTPATIENT
Start: 2018-07-15 | End: 2018-07-18 | Stop reason: HOSPADM

## 2018-07-15 RX ADMIN — Medication 10 ML: at 20:56

## 2018-07-15 RX ADMIN — SODIUM CHLORIDE 500 MG: 900 INJECTION, SOLUTION INTRAVENOUS at 01:01

## 2018-07-15 RX ADMIN — TUBERCULIN PURIFIED PROTEIN DERIVATIVE 5 UNITS: 5 INJECTION, SOLUTION INTRADERMAL at 10:23

## 2018-07-15 RX ADMIN — LEVOTHYROXINE SODIUM 25 MCG: 50 TABLET ORAL at 09:10

## 2018-07-15 RX ADMIN — LABETALOL HYDROCHLORIDE 10 MG: 5 INJECTION, SOLUTION INTRAVENOUS at 00:46

## 2018-07-15 RX ADMIN — NITROGLYCERIN 0.4 MG: 0.4 TABLET SUBLINGUAL at 15:51

## 2018-07-15 RX ADMIN — PANTOPRAZOLE SODIUM 40 MG: 40 TABLET, DELAYED RELEASE ORAL at 09:10

## 2018-07-15 RX ADMIN — CARVEDILOL 25 MG: 25 TABLET, FILM COATED ORAL at 18:11

## 2018-07-15 RX ADMIN — LISINOPRIL 20 MG: 20 TABLET ORAL at 02:00

## 2018-07-15 RX ADMIN — Medication 10 ML: at 10:26

## 2018-07-15 RX ADMIN — TRAMADOL HYDROCHLORIDE 50 MG: 50 TABLET, FILM COATED ORAL at 12:40

## 2018-07-15 RX ADMIN — NICARDIPINE HYDROCHLORIDE 5 MG/HR: 25 INJECTION INTRAVENOUS at 06:35

## 2018-07-15 RX ADMIN — SODIUM CHLORIDE 500 MG: 900 INJECTION, SOLUTION INTRAVENOUS at 13:43

## 2018-07-15 RX ADMIN — CARVEDILOL 25 MG: 25 TABLET, FILM COATED ORAL at 09:07

## 2018-07-15 RX ADMIN — Medication 10 ML: at 15:51

## 2018-07-15 RX ADMIN — Medication 10 ML: at 10:25

## 2018-07-15 NOTE — PROGRESS NOTES
Neurosurgery    S/p mechanical trip and multiple falls with r ASDH. Pt with episode of confusion this am returned to baseline      VSS    AAOx 1 person not time or place  Speech fluent answering questions appropriately  M 5/5 B/L all stations , no pron.  Drift  Sensory grossly intact      CT's reviewed- Ct stable with   R ASDH with local mass affect and minimal midline shift      A/P R ASDH s/p mechanical fall      Repeat Ct 2 weeks and f/u with neurosurgery in office  PT/OT for eval subacute vs acute rehab  tx out of ICU setting  keppra 500 mg BID until seen in office  All info relayed to nursing and family at bedside who understood and agreed

## 2018-07-15 NOTE — PROGRESS NOTES
Systolic blood pressure hugo to greater than 140 mm Hg after patient on and off bed pan x2. Blood pressure did not return to baseline with rest.  labetalol 10 mg given iv push.

## 2018-07-15 NOTE — PROGRESS NOTES
Patient's systolic pressure remains above 140 mm Hg despite prn dose of labetalol. Discussed with Dr. Pablo Paredes. Order to give home of dose of lisinopril now and restart Cardene with intent to wean once lisinopril becomes therapeutic. Patient is resting with eyes closed.

## 2018-07-15 NOTE — PROGRESS NOTES
LEAPFROG PROTOCOL NOTE Euzhang Arias 7/15/2018 The patient is currently in the critical care setting managed by Dr. Susanne Mahoney with Subdural Hematoma s/p fall on chronic anticoagulation. The patient's chart is reviewed and the patient is discussed with the staff. Patient is currently hemodynamically stable. Patient has no needs identified for Intensivist management in the critical care setting at this time. Please notify us if can be of assistance. No charge billed to the patient. Thank you. Kalani Ji NP 
 
AGree with above Daphne Russell MD

## 2018-07-15 NOTE — PROGRESS NOTES
Hourly neuro check completed. Patient has previously been oriented or would reorient within a few minutes of waking. Patient is now disoriented to place, time and situation. She is not receptive to reorientation and is becoming hostile. Patient requesting her daughter be called. Daughter, Noemi Onofre, called, and patient spoke with her. Noemi Onofre was also unable to reassure or reorient her mother. She states this is not normal for her mother. No other neurological changes noted. Dr. Sabine Jay notified. Order received for stat CT.

## 2018-07-15 NOTE — CONSULTS
Physical Medicine & Rehabilitation Note-consult    Patient: Sravanthi Hart MRN: [id number]  SSN: [id number]    YOB: 1927  Age: 80 y.o. Sex: female      Admit Date: 7/14/2018  Admitting Physician: Santy Ray MD    Medical Decision Making/Plan/Recommend:   Patient seen and examined briefly. Medical records reviewed. Discussed rehab plans and goals in general with daughter. Patient still not able to participate in any type of rehab with PT/ OT and ST due to poor arousal and critical condition. Will hope to start therapies when condition improves. IRC will continue to follow. Thank you for the opportunity to participate in the care of this patient.     Signed By: Roslyn Stallworth MD     July 15, 2018

## 2018-07-15 NOTE — PROGRESS NOTES
MD notified of CP. Orders received for EKG, sublingual nitro and one time check of troponin. Pt refusing nitro at this time. Son at bedside.

## 2018-07-15 NOTE — PROGRESS NOTES
Pt now resting quietly with daughter at bedside. Very tired and takes several minutes to awaken, but when awake, follows commands appropriately, calm and cooperative. Orientation level varies.

## 2018-07-15 NOTE — PROGRESS NOTES
TRANSFER - OUT REPORT:    Verbal report given to Benjamin Jurado on Arie Damian  being transferred to [id number] for routine progression of care       Report consisted of patients Situation, Background, Assessment and   Recommendations(SBAR). Information from the following report(s) SBAR, Kardex, [facility], Recent Results and Cardiac Rhythm Paced was reviewed with the receiving nurse. Lines:   Peripheral IV 07/14/18 Right Antecubital (Active)   Site Assessment Intact;Drainage (comment) 7/15/2018  3:29 PM   Phlebitis Assessment 0 7/15/2018  3:29 PM   Infiltration Assessment 0 7/15/2018  3:29 PM   Dressing Status Intact; Old drainage 7/15/2018  3:29 PM   Dressing Type Tape;Transparent 7/15/2018  3:29 PM   Hub Color/Line Status Pink;Patent 7/15/2018  3:29 PM   Alcohol Cap Used No 7/15/2018  3:29 PM       Peripheral IV 07/15/18 Right;Upper Arm (Active)   Site Assessment Clean, dry, & intact 7/15/2018  3:29 PM   Phlebitis Assessment 0 7/15/2018  3:29 PM   Infiltration Assessment 0 7/15/2018  3:29 PM   Dressing Status Clean, dry, & intact 7/15/2018  3:29 PM   Dressing Type Tape;Transparent 7/15/2018  3:29 PM   Hub Color/Line Status Blue;Patent; Flushed 7/15/2018  3:29 PM   Alcohol Cap Used No 7/15/2018  3:29 PM        Opportunity for questions and clarification was provided. Patient transported with:  Tele box  Pt belongings - clothing    Family at bedside and aware of transfer.

## 2018-07-15 NOTE — PROGRESS NOTES
TRANSFER - IN REPORT:    Verbal report received from [facility] (name) on Dariana Gomes  being received from ICU(unit) for routine progression of care      Report consisted of patients Situation, Background, Assessment and   Recommendations(SBAR). Information from the following report(s) SBAR, Kardex, Intake/Output and MAR was reviewed with the receiving nurse. Opportunity for questions and clarification was provided. Assessment completed upon patients arrival to unit and care assumed.

## 2018-07-15 NOTE — PROGRESS NOTES
Bedside shift report received from Hetal Quiñonez [facility]. Pt in bed, kicking/punching, verbally aggressive. Oriented to person, states she is being held hostage and the staff members are all liars. Refuses to move LLE, but is kicking with L leg and appears to be moving with no difficulty. Moving all other extremities on command. No facial droop noted, pupils equal and reactive to light - 4 mm. Denies pain/headache. Daughter arrived to bedside and updated on recent change of events. Attempting to calm pt.

## 2018-07-15 NOTE — PROGRESS NOTES
Bedside report received from Ml Ruiz RN. Dual neuro assessment performed. Pupils are equal and reactive.  are unequal, right greater than left; however, the left hand is bruised and swollen and mobility in the joints is limited. Patient has slight drift on the left, but again the arm is swollen and painful when manipulated. Strength equal bilaterally in lower extremiteis. Patient is oriented to person, place, and situation. She states year as \"80\" but agrees and recognizes her error when corrected. She is able to correctly state current president. Patient reports she is resting comfortably. Daughter is at bedside. Daughter reports that through the evening transient slurred speech and disorientation may be noted when patient awakes. She states this is common but always resolves within minutes.

## 2018-07-15 NOTE — PROGRESS NOTES
Patient's daughter, Carlos Gilliam notified of changes. She states she will be in shortly to assist in reorienting and calming her mother.

## 2018-07-15 NOTE — PROGRESS NOTES
Hemorrhagic Stroke/Intracerebral Hemorrhage (excludes Subdural/Epidural Hematoma)    VTE Prophylaxis: NO  Antiplatelet: NO    Statin if LDL Greater Than or Equal to100: No    BP Parameters: Less Than 140/90    Controlled With: Scheduled PO    Dysphagia Screen Completed: Yes: Pass    Patient has PEG, NG Tube, Feeding Tube: No    Medication orders per above route: Yes    Nutrition Status: PO    NIH Stroke Scale Complete: 1    Frequency of Vital Signs: Every 4 hours    Frequency of Neuro Checks: Every 4 hours    Daily Education/Care Plan Updated: Yes   Seth Maurice RN

## 2018-07-15 NOTE — PROGRESS NOTES
Hospitalist Progress Note    7/15/2018  Admit Date: 2018  9:43 AM   NAME: Rinku Conklin   :  8/10/1927   MRN:  [id number]   Attending: Dany Carter MD  PCP:  Sonia Grajeda MD    SUBJECTIVE:   Pt admitted with fall and SDH on [facility] for dvt. Repeat CT has been stable, non surgical per neurosurgery. 7/15 : Sleepy, was Agitated this am, BP better controlled, off cardene. Reported CP but refused nitro sl. Nursing notes and chart reviewed. Review of Systems negative with exception of pertinent positives noted above. PHYSICAL EXAM     Visit Vitals    /57 (BP 1 Location: Left arm, BP Patient Position: At rest;Head of bed elevated (Comment degrees))    Pulse 75    Temp 96.6 °F (35.9 °C)  Comment: warm blankets applied    Resp 14    Ht 5' 4\" (1.626 m)    Wt 48 kg (105 lb 13.1 oz)    SpO2 97%    BMI 18.16 kg/m2      Temp (24hrs), Av.9 °F (36.6 °C), Min:96.6 °F (35.9 °C), Max:99.1 °F (37.3 °C)    Oxygen Therapy  O2 Sat (%): 97 % (07/15/18 0715)  Pulse via Oximetry: 76 beats per minute (07/15/18 0715)  O2 Device: Room air (07/15/18 0715)    Intake/Output Summary (Last 24 hours) at 07/15/18 0843  Last data filed at 07/15/18 0428   Gross per 24 hour   Intake           396.25 ml   Output              175 ml   Net           221.25 ml         General: No acute distress. Alert.    Head:  AT/NC  Lungs:  CTABL. Heart:  RRR, no murmur, rub, or gallop  Abdomen: Soft, non-distended, non-tender, +bs  Extremities: No cyanosis or clubbing. Neurologic:  No focal deficits. Moves all extremities. Skin:  No Obvious Rash. Psych:  Normal affect. LABS AND STUDIES:  Personally reviewed all labs, meds, and studies for past 24hrs.       ASSESSMENT      Active Hospital Problems    Diagnosis Date Noted    Subdural hematoma, post-traumatic ([facility] 2018    Diabetes mellitus, type 2 ([facility] 2017    CKD (chronic kidney disease), stage III 2017    DNR (do not resuscitate) 2017    Mixed hyperlipidemia 09/12/2012     With high HDL  Intolerant of pravastatin,lovastatin and lescol      Acquired hypothyroidism 09/12/2012    GERD (gastroesophageal reflux disease) 09/12/2012    Atrial fibrillation with RVR (HCC) 09/12/2012     Paroxysmal.  Coumadin contraindicated due to falls               PLAN:    · Traumatic SHD: Stable, non surgical, off AC, will need to stop and consider just ASA for A Fib, has had too many falls, high risk for rebleed. · Chronic Atrial fibrillation: Holding eliquis at this time, continue BB.   · HLD: Continue statin  · GERD: continue Protonix  · HTN: Maintain Systolic NZ<590    DVT ppx:  SCD    Dispo: Transfer to floor today  High risk pt. Discussed plan with pt's daughter who is in agreement. All questions answered.     Signed By: Dinesh Olmos MD     July 15, 2018

## 2018-07-16 LAB
MM INDURATION POC: 0 MM (ref 0–5)
PPD POC: NEGATIVE NEGATIVE

## 2018-07-16 PROCEDURE — 74011000258 HC RX REV CODE- 258: Performed by: INTERNAL MEDICINE

## 2018-07-16 PROCEDURE — 74011250637 HC RX REV CODE- 250/637: Performed by: INTERNAL MEDICINE

## 2018-07-16 PROCEDURE — 74011000250 HC RX REV CODE- 250: Performed by: INTERNAL MEDICINE

## 2018-07-16 PROCEDURE — 65660000000 HC RM CCU STEPDOWN

## 2018-07-16 PROCEDURE — 77030020263 HC SOL INJ SOD CL0.9% LFCR 1000ML

## 2018-07-16 PROCEDURE — 99232 SBSQ HOSP IP/OBS MODERATE 35: CPT | Performed by: PHYSICAL MEDICINE & REHABILITATION

## 2018-07-16 PROCEDURE — 97162 PT EVAL MOD COMPLEX 30 MIN: CPT

## 2018-07-16 PROCEDURE — 97535 SELF CARE MNGMENT TRAINING: CPT

## 2018-07-16 PROCEDURE — 74011250637 HC RX REV CODE- 250/637: Performed by: HOSPITALIST

## 2018-07-16 PROCEDURE — 97530 THERAPEUTIC ACTIVITIES: CPT

## 2018-07-16 PROCEDURE — 97166 OT EVAL MOD COMPLEX 45 MIN: CPT

## 2018-07-16 PROCEDURE — 74011250636 HC RX REV CODE- 250/636: Performed by: INTERNAL MEDICINE

## 2018-07-16 RX ORDER — METOPROLOL TARTRATE 5 MG/5ML
2.5 INJECTION INTRAVENOUS
Status: DISCONTINUED | OUTPATIENT
Start: 2018-07-16 | End: 2018-07-18 | Stop reason: HOSPADM

## 2018-07-16 RX ADMIN — Medication 10 ML: at 13:22

## 2018-07-16 RX ADMIN — CARVEDILOL 25 MG: 25 TABLET, FILM COATED ORAL at 16:18

## 2018-07-16 RX ADMIN — Medication 5 ML: at 06:23

## 2018-07-16 RX ADMIN — SODIUM CHLORIDE 500 MG: 900 INJECTION, SOLUTION INTRAVENOUS at 01:00

## 2018-07-16 RX ADMIN — METOROPROLOL TARTRATE 2.5 MG: 5 INJECTION, SOLUTION INTRAVENOUS at 13:47

## 2018-07-16 RX ADMIN — LEVOTHYROXINE SODIUM 25 MCG: 50 TABLET ORAL at 06:19

## 2018-07-16 RX ADMIN — CARVEDILOL 25 MG: 25 TABLET, FILM COATED ORAL at 08:37

## 2018-07-16 RX ADMIN — PANTOPRAZOLE SODIUM 40 MG: 40 TABLET, DELAYED RELEASE ORAL at 06:19

## 2018-07-16 RX ADMIN — LISINOPRIL 20 MG: 20 TABLET ORAL at 08:37

## 2018-07-16 RX ADMIN — Medication 5 ML: at 22:33

## 2018-07-16 RX ADMIN — SODIUM CHLORIDE 500 MG: 900 INJECTION, SOLUTION INTRAVENOUS at 13:22

## 2018-07-16 NOTE — PROGRESS NOTES
Hospitalist Progress Note    2018  Admit Date: 2018  9:43 AM   NAME: Jennifer Briceno   :  8/10/1927   MRN:  [id number]   Attending: Balaji Doyle MD  PCP:  Sonia Berry MD    SUBJECTIVE:   Pt admitted with fall and SDH on [facility] for dvt. Repeat CT has been stable, non surgical per neurosurgery.       - pt alert , seemingly at baseline. Family at bedside this morning. Review of Systems negative with exception of pertinent positives noted above  PHYSICAL EXAM     Visit Vitals    /76    Pulse 75    Temp 98.7 °F (37.1 °C)    Resp 16    Ht 5' 4\" (1.626 m)    Wt 47.4 kg (104 lb 6.4 oz)    SpO2 96%    BMI 17.92 kg/m2      Temp (24hrs), Av.1 °F (36.7 °C), Min:97.8 °F (36.6 °C), Max:98.7 °F (37.1 °C)    Oxygen Therapy  O2 Sat (%): 96 % (18 1525)  Pulse via Oximetry: 75 beats per minute (18 0508)  O2 Device: Room air (18 0508)    Intake/Output Summary (Last 24 hours) at 18 1905  Last data filed at 18 0222   Gross per 24 hour   Intake              100 ml   Output                0 ml   Net              100 ml      General: No acute distress    Lungs:  CTA Bilaterally.    Heart:  Regular rate and rhythm,  No murmur, rub, or gallop  Abdomen: Soft, Non distended, Non tender, Positive bowel sounds  Extremities: No cyanosis, clubbing or edema  Neurologic:  No focal deficits    ASSESSMENT      Active Hospital Problems    Diagnosis Date Noted    Subdural hematoma, post-traumatic ([facility] 2018    Diabetes mellitus, type 2 ([facility] 2017    CKD (chronic kidney disease), stage III 2017    DNR (do not resuscitate) 2017    Mixed hyperlipidemia 2012     With high HDL  Intolerant of pravastatin,lovastatin and lescol      Acquired hypothyroidism 2012    GERD (gastroesophageal reflux disease) 2012    Atrial fibrillation with RVR ([address] 2012     Paroxysmal.  Coumadin contraindicated due to falls         A/P  - SDH - post traumatic, CT stable. Non surgical.   - Chronic Afib - holding eliquis. Consider ASA when ok with neurosx  - HLp statin  - GERD - cont protonix  - HTN -  Goal SBP <140, currently close to goal. monitor    DVT Prophylaxis: scds    Dispo - pending IRC eval vs STR.  CM following    Signed By: Darline Ponce, DO     July 16, 2018

## 2018-07-16 NOTE — PROGRESS NOTES
Problem: Falls - Risk of  Goal: *Absence of Falls  Document Jose Fall Risk and appropriate interventions in the flowsheet.    Outcome: Progressing Towards Goal  Fall Risk Interventions:  Mobility Interventions: Assess mobility with egress test, Bed/chair exit alarm, Communicate number of staff needed for ambulation/transfer, OT consult for ADLs, Patient to call before getting OOB, PT Consult for mobility concerns, PT Consult for assist device competence    Mentation Interventions: Adequate sleep, hydration, pain control, Bed/chair exit alarm, Door open when patient unattended, Evaluate medications/consider consulting pharmacy, Familiar objects from home, Family/sitter at bedside, More frequent rounding, Reorient patient, Room close to nurse's station, Toileting rounds, Update white board    Medication Interventions: Assess postural VS orthostatic hypotension, Bed/chair exit alarm, Evaluate medications/consider consulting pharmacy, Patient to call before getting OOB, Teach patient to arise slowly    Elimination Interventions: Bed/chair exit alarm, Call light in reach, Elevated toilet seat, Patient to call for help with toileting needs, Toilet paper/wipes in reach, Toileting schedule/hourly rounds    History of Falls Interventions: Bed/chair exit alarm, Consult care management for discharge planning, Door open when patient unattended, Evaluate medications/consider consulting pharmacy, Investigate reason for fall, Room close to nurse's station

## 2018-07-16 NOTE — PROGRESS NOTES
Problem: Self Care Deficits Care Plan (Adult)  Goal: *Acute Goals and Plan of Care (Insert Text)  1. Patient will complete lower body bathing and dressing with setup and adaptive equipment as needed. 2. Patient will complete toileting with supervision. 3. Patient will tolerate 20 minutes of OT treatment with as needed rest breaks to increase activity tolerance for ADLs. 4. Patient will complete functional transfers with supervision and adaptive equipment as needed. 5. Patient will demonstrate improved safety awareness as evidenced by no verbal cues for safety during entire treatment session. Timeframe: 7 visits        OCCUPATIONAL THERAPY: Initial Assessment, Daily Note and Treatment Day: 1st 7/16/2018  INPATIENT: Hospital Day: 3  [place] / [place] / Product Type: Medicare /      NAME/AGE/GENDER: Caryl Paniagua is a 80 y.o. female   PRIMARY DIAGNOSIS:  Subdural hematoma (HCC) Subdural hematoma, post-traumatic (HCC) Subdural hematoma, post-traumatic (HCC)        ICD-10: Treatment Diagnosis:    · Other lack of cordination (R27.8)  · History of falling (Z91.81)   Precautions/Allergies:     Tylenol [acetaminophen]      ASSESSMENT:     Ms. Pilar Mclaughlin is a 80year old female admitted after fall at home, found to have SDH. Patient lives alone but has a caregiver 9-5 daily and 4 children that rotate staying with her at night. She typically requires supervision only for ADLs and uses a rollator for mobility, although she has been falling at home. She was requiring assistance from either the caregiver or her daughter for all IADLs. Patient agreeable to OT evaluation. Supportive daughter at bedside. Reports no pain. Appears impulsive and needs multiple cues for safety. Also has difficulty following commands although difficult to discern if this is a processing issue or due to being SLICK [facility].  Per daughter, cognition is worse than baseline, although she does have a degree of impulsivity/ decreased safety awareness normally. Oriented to person, place. Disoriented to situation, time. Knows it is July but answers \"springtime\" when asked what season it is. BUE assessment reveals generally decreased but functional in terms of ROM, strength, coordination with the except of the L wrist. L wrist swollen, painful, decreased ROM from fall but negative Xray for fracture, per chart. She is R hand dominant, but is not really using her L hand due to discomfort. Treatment initiated to include bathroom mobility, toilet transfer, toileting, grooming in standing at sink, and upper body dressing. Required cues for safety due to impulsivity and impaired balance. Had one LOB in bathroom and required maximal assistance to correct. See below for additional details of treatment. Patient is currently functioning below her baseline due to new onset decreased cognition and balance. Will plan to follow for acute OT to increase independence and safety. This section established at most recent assessment   PROBLEM LIST (Impairments causing functional limitations):  1. Decreased ADL/Functional Activities  2. Decreased Transfer Abilities  3. Decreased Ambulation Ability/Technique  4. Decreased Balance  5. Decreased Activity Tolerance  6. Decreased Cognition   INTERVENTIONS PLANNED: (Benefits and precautions of occupational therapy have been discussed with the patient.)  1. Activities of daily living training  2. Adaptive equipment training  3. Group therapy  4. Neuromuscular re-eduation  5. Therapeutic activity  6. Therapeutic exercise     TREATMENT PLAN: Frequency/Duration: Follow patient 3x/ week to address above goals. Rehabilitation Potential For Stated Goals: Good     RECOMMENDED REHABILITATION/EQUIPMENT: (at time of discharge pending progress): Due to the probability of continued deficits (see above) this patient will likely need continued skilled occupational therapy after discharge.   Equipment:    None at this time OCCUPATIONAL PROFILE AND HISTORY:   History of Present Injury/Illness (Reason for Referral):  Per H&P, \"Ms. Lucio Clark is a 79 yo female who presented with confusion on a background of HTN, DM, sick sinus syndrome, HLD, systolic CHF, orthostatic hypotension, atrial fibrillation (on Eliquis) and ischemic cardiomyopathy (biventricular pacemaker in place). Her daughter reported that the patient fell at 5am on Thursday. She hit her head but did not lose consciousness. She was lucid until Friday when she was noted to be more lethargic. She was seen by her PCP and gabapentin dose was decreased. She was also noted to have left wrist swelling an pain. Her mentation changes and lethargy prompted the ER admission. X ray showed no signs of fracture (+low bone density). CT head showed small acute right subdural hematoma with minimal midline shift. Neursurgery was consulted who recommended Keppra 500 BID and Q1H Neurochecks with repeat C T head 6 hours after the 1st CT scan. She is oriented to name and place but not year. Most of the history was obtained by speaking to the patient's daughter at bedside. Her daughter notes that her mother has about 10 falls over the past year due to dizziness on standing. The patient denies any paresthesias down the right hand. \":  Past Medical History/Comorbidities:   Ms. Lucio Clark  has a past medical history of A fib; Abnormal loss of weight; Acquired hypothyroidism (9/12/2012); Acute respiratory failure with hypoxemia ([address] (8/12/2017); Anemia; Anorexia (9/2/2014); Arthritis associated with another disorder; Atrial fibrillation ([address] (9/12/2012); Autonomic neuropathy (1/13/2013); CAD (coronary artery disease); CAD in native artery (5/5/2016); CAP (community acquired pneumonia) (8/12/2017); Cervical radiculopathy (5/23/2013); Chest pain; Chronic kidney disease; Chronic pain syndrome (1/13/2013); CKD (chronic kidney disease), stage III (9/12/2012); Diabetes ([address]  Diabetes mellitus; Diabetes mellitus, type 2 ([facility] (9/12/2012); Dysphagia (9/2/2014); Gait disorder (9/12/2012); GERD (gastroesophageal reflux disease); Heart failure ([facility] HTN; Hypertension (9/12/2012); Hypothyroid; IBS (irritable bowel syndrome) (9/12/2012); Iron deficiency (9/12/2012); Lumbar disc disease; MCI (mild cognitive impairment) (1/13/2013); Mixed hyperlipidemia (9/12/2012); Orthostatic hypotension (5/5/2016); Pacemaker; and SSS (sick sinus syndrome) ([email or web address] (5/5/2016). She also has no past medical history of Difficult intubation; Malignant hyperthermia due to anesthesia; Nausea & vomiting; or Pseudocholinesterase deficiency. Ms. Be Antony  has a past surgical history that includes hx cervical fusion; hx appendectomy; hx carpal tunnel release; hx colonoscopy (Nov 2005); hx pacemaker (6/08); hx pacemaker (2017); and vascular surgery procedure unlist (Left, 04/25/2018). Social History/Living Environment:   Home Environment: Private residence  # Steps to Enter: 1  One/Two Story Residence: One story  Living Alone: Yes  Support Systems: Family member(s), Home care staff  Patient Expects to be Discharged to[de-identified] Unknown  Current DME Used/Available at Home: Walker, rolling, Walker, rollator, Angela Speller, straight, Commode, bedside, Shower chair, Wheelchair  Tub or Shower Type: Shower  Prior Level of Function/Work/Activity:  Lives alone but has 9-5 caregivers daily and 4 children that alternate staying with her at night. She typically requires supervision only for ADLs and uses a rollator for mobility, although she has been falling at home. She was requiring assistance from either the caregiver or her daughter for all IADLs. Personal Factors:          Sex:  female        Age:  89 [address] y.o.         Past/Current Experience:  9th floor for rehab in the past    Number of Personal Factors/Comorbidities that affect the Plan of Care: Expanded review of therapy/medical records (1-2):  MODERATE COMPLEXITY   ASSESSMENT OF OCCUPATIONAL PERFORMANCE[de-identified]   Activities of Daily Living:   Basic ADLs (From Assessment) Complex ADLs (From Assessment)   Feeding: Minimum assistance  Oral Facial Hygiene/Grooming: Minimum assistance  Bathing: Maximum assistance  Upper Body Dressing: Minimum assistance  Lower Body Dressing: Moderate assistance  Toileting: Minimum assistance Instrumental ADL  Meal Preparation: Total assistance  Homemaking: Total assistance  Medication Management: Total assistance  Financial Management: Total assistance   Grooming/Bathing/Dressing Activities of Daily Living   Grooming  Grooming Assistance: Minimum assistance  Washing Hands: Minimum assistance Cognitive Retraining  Safety/Judgement: Fall prevention;Decreased insight into deficits; Decreased awareness of need for safety;Decreased awareness of need for assistance;Decreased awareness of environment           Toileting  Toileting Assistance: Contact guard assistance  Bladder Hygiene: Supervision/set-up  Clothing Management: Contact guard assistance     Functional Transfers  Toilet Transfer : Minimum assistance     Bed/Mat Mobility  Rolling: Stand-by assistance;Contact guard assistance  Supine to Sit: Contact guard assistance  Sit to Stand: Minimum assistance  Bed to Chair: Minimum assistance  Scooting: Contact guard assistance       Most Recent Physical Functioning:   Gross Assessment:  AROM: Generally decreased, functional (BUE; excpet limited in L hand due to swelling)  Strength: Generally decreased, functional (BUE)  Coordination: Generally decreased, functional (BUE)               Posture:  Posture (WDL): Exceptions to WDL  Posture Assessment:  Forward head, Kyphosis, Rounded shoulders  Balance:  Sitting: Intact  Standing: Impaired  Standing - Static: Fair  Standing - Dynamic : Poor Bed Mobility:  Rolling: Stand-by assistance;Contact guard assistance  Supine to Sit: Contact guard assistance  Scooting: Contact guard assistance  Wheelchair Mobility:     Transfers:  Sit to Stand: Minimum assistance  Stand to Sit: Minimum assistance  Bed to Chair: Minimum assistance            Patient Vitals for the past 6 hrs:   BP SpO2 Pulse   18 1155 174/80 94 % 76   18 1339 154/67 - -       Mental Status  Neurologic State: Alert  Orientation Level: Oriented to person  Cognition: Impulsive, Impaired decision making, Decreased attention/concentration, Decreased command following  Perception: Cues to maintain midline in standing  Perseveration: No perseveration noted  Safety/Judgement: Fall prevention, Decreased insight into deficits, Decreased awareness of need for safety, Decreased awareness of need for assistance, Decreased awareness of environment                          Physical Skills Involved:  1. Balance  2. Strength  3. Activity Tolerance  4. Fine Motor Control  5. Pain (acute)  6. Edema Cognitive Skills Affected (resulting in the inability to perform in a timely and safe manner):  1. Perception  2. Executive Function  3. Comprehension  4. Safety awareness Psychosocial Skills Affected:  1. Habits/Routines  2. Environmental Adaptation  3. Self-Awareness   Number of elements that affect the Plan of Care: 5+:  HIGH COMPLEXITY   CLINICAL DECISION MAKIN[address] [place] [id number] AM-PAC 6 Clicks   Daily Activity Inpatient Short Form  How much help from another person does the patient currently need. .. Total A Lot A Little None   1. Putting on and taking off regular lower body clothing? [] 1   [x] 2   [] 3   [] 4   2. Bathing (including washing, rinsing, drying)? [] 1   [x] 2   [] 3   [] 4   3. Toileting, which includes using toilet, bedpan or urinal?   [] 1   [] 2   [x] 3   [] 4   4. Putting on and taking off regular upper body clothing? [] 1   [] 2   [x] 3   [] 4   5. Taking care of personal grooming such as brushing teeth? [] 1   [] 2   [x] 3   [] 4   6. Eating meals? [] 1   [] 2   [x] 3   [] 4   © , Trustees of [address] [place] [address] [address] [address], under license to [employer].  All rights reserved      Score:  Initial: 16 Most Recent: X (Date: -- )    Interpretation of Tool:  Represents activities that are increasingly more difficult (i.e. Bed mobility, Transfers, Gait). Score 24 23 22-20 19-15 14-10 9-7 6     Modifier CH CI CJ CK CL CM CN      ? Self Care:     - CURRENT STATUS: CK - 40%-59% impaired, limited or restricted    - GOAL STATUS: CJ - 20%-39% impaired, limited or restricted    - D/C STATUS:  ---------------To be determined---------------  [place] / [place] / Product Type: Medicare /      Medical Necessity:     · Patient demonstrates good rehab potential due to higher previous functional level. Reason for Services/Other Comments:  · Patient continues to require present interventions due to patient's inability to care for self at baseline level of function. Use of outcome tool(s) and clinical judgement create a POC that gives a: MODERATE COMPLEXITY         TREATMENT:   (In addition to Assessment/Re-Assessment sessions the following treatments were rendered)     Pre-treatment Symptoms/Complaints:    Pain: Initial:   Pain Intensity 1: 0  Post Session:  none     Self Care: (8 minutes): Procedure(s) (per grid) utilized to improve and/or restore self-care/home management as related to dressing, toileting, grooming and toilet transfer/ bathroom mobility. Required MAXIMAL  verbal cueing to facilitate activities of daily living skills and safety awarenses. Braces/Orthotics/Lines/Etc:   · O2 Device: Room air  Treatment/Session Assessment:    · Response to Treatment:  Needs encouragement, but overall agreeable   · Interdisciplinary Collaboration:   o Occupational Therapist  o Registered Nurse  o Rehabilitation Attendant  · After treatment position/precautions:   o Up in chair  o Bed alarm/tab alert on  o Bed/Chair-wheels locked  o Call light within reach  o RN notified  o Family at bedside   · Compliance with Program/Exercises:  Will assess as treatment progresses. · Recommendations/Intent for next treatment session: \"Next visit will focus on advancements to more challenging activities and reduction in assistance provided\".   Total Treatment Duration:  OT Patient Time In/Time Out  Time In: 0926  Time Out: FRANCO Tellez/AMIE

## 2018-07-16 NOTE — PROGRESS NOTES
Date of Outreach Update:  Naeem Mcwilliams was seen and assessed. MEWS Score: 1 (07/15/18 1932)  Vitals:    07/15/18 1800 07/15/18 1811 07/15/18 1900 07/15/18 1932   BP: 121/61 121/61 116/56 122/56   Pulse: 75 75 75 75   Resp: 24  22 20   Temp:   98 °F (36.7 °C) 97.9 °F (36.6 °C)   SpO2: 96%  95% 96%   Weight:       Height:             Pain Assessment  Pain Intensity 1: 0 (07/15/18 1600)  Pain Location 1: Chest  Pain Intervention(s) 1: Medication (see MAR) (nitro)  Patient Stated Pain Goal: 0      Previous Outreach assessment has been reviewed. There have been no significant clinical changes since the completion of the last dated Outreach assessment. Will continue to follow up per outreach protocol.     Signed By:   Chandana Luis RN    July 15, 2018 10:58 PM

## 2018-07-16 NOTE — PROGRESS NOTES
Neurosurgery     S/p mechanical trip and multiple falls with r ASDH. Pt with episode of confusion this am returned to baseline        VSS     AAOx 1 person not time or place  Speech fluent answering questions appropriately  M 5/5 B/L all stations , no pron.  Drift  Sensory grossly intact        CT's reviewed- Ct stable with   R ASDH with local mass affect and minimal midline shift        A/P R ASDH s/p mechanical fall        Repeat Ct 2 weeks and f/u with neurosurgery in office  Diana Fontaine NP will place 2 week office fu on chart  All info relayed to nursing and family at bedside who understood and agreed

## 2018-07-16 NOTE — PROGRESS NOTES
Date of Outreach Update:  Carlos Berrios was seen and assessed. MEWS Score: 1 (07/16/18 0734)  Vitals:    07/15/18 2313 07/16/18 0508 07/16/18 0625 07/16/18 0734   BP: 138/62 151/64  168/76   Pulse: 77 75  79   Resp: 15 16  16   Temp: 97.8 °F (36.6 °C) 98.5 °F (36.9 °C)  97.9 °F (36.6 °C)   SpO2: 96% 96%  96%   Weight:   47.4 kg (104 lb 6.4 oz)    Height:             Pain Assessment  Pain Intensity 1: 0 (07/15/18 2045)  Pain Location 1: Chest  Pain Intervention(s) 1: Medication (see MAR) (nitro)  Patient Stated Pain Goal: 0      Previous Outreach assessment has been reviewed. There have been no significant clinical changes since the completion of the last dated Outreach assessment. She was asleep but PT was about to wake her up for therapy. She opened eyes to light touch, would follow simple commands and was confused to situation/time. Her L hand was swollen and sore- daughter reports this was from the fall before she came in. It has been xrayed and negative for fx. VSS on room air. Will continue to follow up per outreach protocol.     Signed By:   Paige Rodriguez RN    July 16, 2018 9:07 AM

## 2018-07-16 NOTE — PROGRESS NOTES
Subarachnoid Hemorrhage    VTE Prophylaxis: No    Antiplatelet: No    Statin if LDL Greater Than or Equal to100: Yes: Lipitor    BP Parameters: Less Than 160/90    Controlled With: Scheduled PO    Dysphagia Screen Completed: Yes: Pass    Patient has PEG, NG Tube, Feeding Tube: No    Medication orders per above route: Yes    Nutrition Status: PO    NIH Stroke Scale Complete: Yes: 0    Frequency of Vital Signs: Every 4 hours    Frequency of Neuro Checks: Every 4 hours    Daily Education/Care Plan Updated: Yes    Eliza Henry

## 2018-07-16 NOTE — PROGRESS NOTES
Problem: Falls - Risk of  Goal: *Absence of Falls  Document Jose Fall Risk and appropriate interventions in the flowsheet.    Outcome: Progressing Towards Goal  Fall Risk Interventions:  Mobility Interventions: Assess mobility with egress test, Bed/chair exit alarm, Communicate number of staff needed for ambulation/transfer, Patient to call before getting OOB    Mentation Interventions: Adequate sleep, hydration, pain control, Bed/chair exit alarm, Door open when patient unattended, Eyeglasses and hearing aids, Familiar objects from home, Family/sitter at bedside, Increase mobility, More frequent rounding, Reorient patient, Room close to nurse's station, Toileting rounds, Update white board    Medication Interventions: Assess postural VS orthostatic hypotension, Bed/chair exit alarm, Patient to call before getting OOB, Teach patient to arise slowly    Elimination Interventions: Bed/chair exit alarm, Call light in reach, Patient to call for help with toileting needs, Toilet paper/wipes in reach, Toileting schedule/hourly rounds    History of Falls Interventions: Bed/chair exit alarm, Door open when patient unattended, Room close to nurse's station

## 2018-07-16 NOTE — PROGRESS NOTES
Problem: Mobility Impaired (Adult and Pediatric)  Goal: *Acute Goals and Plan of Care (Insert Text)  STG:  (1.)Ms. Zane Panda will move from supine to sit and sit to supine , scoot up and down and roll side to side with STAND BY ASSIST within 3 treatment day(s). (2.)Ms. Zane Panda will transfer from bed to chair and chair to bed with STAND BY ASSIST using the least restrictive device within 3 treatment day(s). (3.)Ms. Zane Panda will ambulate with CONTACT GUARD ASSIST for 125 feet with the least restrictive device within 3 treatment day(s). (4.)Ms. Zane Panda will perform standing static and dynamic balance activities x 15 minutes with CONTACT GUARD ASSIST to improve safety within 3 day(s). (5.)Ms. Zane Panda will perform LE exercises with 3 to 5 cues for form within 3 days to improve strength for functional transfers and ambulation. LTG:  (1.)Ms. Zane Panda will move from supine to sit and sit to supine , scoot up and down and roll side to side in bed with SUPERVISION within 7 treatment day(s). (2.)Ms. Zane Panda will transfer from bed to chair and chair to bed with SUPERVISION using the least restrictive device within 7 treatment day(s). (3.)Ms. Zane Panda will ambulate with STAND BY ASSIST for 250 feet with the least restrictive device within 7 treatment day(s). (4.)Ms. Zane Panda will perform standing static and dynamic balance activities x 15 minutes with STAND BY ASSIST to improve safety within 7 day(s).   ________________________________________________________________________________________________      PHYSICAL THERAPY: Initial Assessment, Daily Note, AM 7/16/2018  INPATIENT: Hospital Day: 3  [place] / [place] / Product Type: Medicare /      NAME/AGE/GENDER: Regina Cristobal is a 80 y.o. female   PRIMARY DIAGNOSIS: Subdural hematoma (HCC) Subdural hematoma, post-traumatic (HCC) Subdural hematoma, post-traumatic ([facility]        ICD-10: Treatment Diagnosis:    · Difficulty in walking, Not elsewhere classified (R26.2)   Precaution/Allergies:  Tylenol [acetaminophen]      ASSESSMENT:     Ms. Maricruz Hart is a 80 y.o. female admitted for subdural hematoma following a fall. She lives alone in a single story home with caregivers daily 9-5 and family with her every night, daughter reports she is never alone. Ms. Maricruz Hart typically ambulates with a rollator walker and has experienced multiple falls in the past 6 months. Daughter reports she ambulates at a rapid pace at baseline and often does not use her rollator correctly. Patient is supine on contact and agreeable to therapy. CGA to transfer to edge of bed, impulsively and prior to instruction, patient stood from bed and began to walk, requiring moderate assist due to unsteadiness. Difficult to determine weakness due to patient's confusion on attempting formal strength assessment, however BLE appear grossly 3+/5 with recent LLE blood clot and continued pain and tingling. Elected not to use a rolling walker due to patient's c/o pain of L hand with any movement and unable to place weight when scooting edge of bed. X-ray of L wrist was negative. She stood with minimal assist x2 and treatment initiated to include continued ambulation with increased trunk sway, LLE decreased foot clearance and accelerated pace despite cues. Daughter reports that patient's cognition is far from baseline, but she is nearing her physical baseline. Patient left with OT attending at the end of the session. Maximo Torres is currently functioning below her baseline and would benefit from skilled PT during acute care stay to maximize safety and independence with functional mobility. This section established at most recent assessment   PROBLEM LIST (Impairments causing functional limitations):  1. Decreased Strength  2. Decreased ADL/Functional Activities  3. Decreased Transfer Abilities  4. Decreased Ambulation Ability/Technique  5. Decreased Balance  6. Increased Pain  7.  Decreased Knowledge of Precautions  8. Decreased [place] with Home Exercise Program  9. Decreased Cognition   INTERVENTIONS PLANNED: (Benefits and precautions of physical therapy have been discussed with the patient.)  1. Balance Exercise  2. Bed Mobility  3. Family Education  4. Gait Training  5. Home Exercise Program (HEP)  6. Therapeutic Activites  7. Therapeutic Exercise/Strengthening  8. Transfer Training  9. Patient Education  10. Group Therapy     TREATMENT PLAN: Frequency/Duration: 3 times a week for duration of hospital stay  Rehabilitation Potential For Stated Goals: [place] REHABILITATION/EQUIPMENT: (at time of discharge pending progress): Due to the probability of continued deficits (see above) this patient will likely need continued skilled physical therapy after discharge. Equipment:    None at this time              HISTORY:   History of Present Injury/Illness (Reason for Referral):   is a 79 yo female who presented with confusion on a background of HTN, DM, sick sinus syndrome, HLD, systolic CHF, orthostatic hypotension, atrial fibrillation (on Eliquis) and ischemic cardiomyopathy (biventricular pacemaker in place). Her daughter reported that the patient fell at 5am on Thursday. She hit her head but did not lose consciousness. She was lucid until Friday when she was noted to be more lethargic. She was seen by her PCP and gabapentin dose was decreased. She was also noted to have left wrist swelling an pain. Her mentation changes and lethargy prompted the ER admission. X ray showed no signs of fracture (+low bone density). CT head showed small acute right subdural hematoma with minimal midline shift. Neursurgery was consulted who recommended Keppra 500 BID and Q1H Neurochecks with repeat C T head 6 hours after the 1st CT scan. She is oriented to name and place but not year. Most of the history was obtained by speaking to the patient's daughter at bedside.  Her daughter notes that her mother has about 10 falls over the past year due to dizziness on standing. The patient denies any paresthesias down the right hand. Past Medical History/Comorbidities:   Ms. Yissel Goodwin  has a past medical history of A fib; Abnormal loss of weight; Acquired hypothyroidism (9/12/2012); Acute respiratory failure with hypoxemia ([address] (8/12/2017); Anemia; Anorexia (9/2/2014); Arthritis associated with another disorder; Atrial fibrillation ([address] (9/12/2012); Autonomic neuropathy (1/13/2013); CAD (coronary artery disease); CAD in native artery (5/5/2016); CAP (community acquired pneumonia) (8/12/2017); Cervical radiculopathy (5/23/2013); Chest pain; Chronic kidney disease; Chronic pain syndrome (1/13/2013); CKD (chronic kidney disease), stage III (9/12/2012); Diabetes ([address] Diabetes mellitus; Diabetes mellitus, type 2 ([address] (9/12/2012); Dysphagia (9/2/2014); Gait disorder (9/12/2012); GERD (gastroesophageal reflux disease); Heart failure ([address] HTN; Hypertension (9/12/2012); Hypothyroid; IBS (irritable bowel syndrome) (9/12/2012); Iron deficiency (9/12/2012); Lumbar disc disease; MCI (mild cognitive impairment) (1/13/2013); Mixed hyperlipidemia (9/12/2012); Orthostatic hypotension (5/5/2016); Pacemaker; and SSS (sick sinus syndrome) ([address] (5/5/2016). She also has no past medical history of Difficult intubation; Malignant hyperthermia due to anesthesia; Nausea & vomiting; or Pseudocholinesterase deficiency. Ms. Yissel Goodwin  has a past surgical history that includes hx cervical fusion; hx appendectomy; hx carpal tunnel release; hx colonoscopy (Nov 2005); hx pacemaker (6/08); hx pacemaker (2017); and vascular surgery procedure unlist (Left, 04/25/2018).   Social History/Living Environment:   Home Environment: Private residence  # Steps to Enter: 1  One/Two Story Residence: One story  Living Alone: Yes  Support Systems: Family member(s), Home care staff  Patient Expects to be Discharged to[de-identified] Unknown  Current DME Used/Available at Home: Sai Fisher, rolling, Walker, rollator, Rajeev Clotilde, straight, Commode, bedside, Shower chair, Wheelchair  Tub or Shower Type: Shower  Prior Level of Function/Work/Activity:  Patient ambulates with a rollator walker, requires supervision for shower transfers and has experienced multiple falls. Has family or a paid caregiver with her at all times. Number of Personal Factors/Comorbidities that affect the Plan of Care: 3+: HIGH COMPLEXITY   EXAMINATION:   Most Recent Physical Functioning:   Gross Assessment:  AROM: Generally decreased, functional  PROM: Generally decreased, functional  Strength: Generally decreased, functional  Coordination: Generally decreased, functional  Tone: Normal  Sensation: Impaired               Posture:  Posture (WDL): Exceptions to WDL  Posture Assessment: Forward head, Kyphosis, Rounded shoulders  Balance:  Sitting: Intact  Standing: Impaired  Standing - Static: Fair  Standing - Dynamic : Poor Bed Mobility:  Rolling: Stand-by assistance;Contact guard assistance  Supine to Sit: Contact guard assistance  Scooting: Contact guard assistance  Wheelchair Mobility:     Transfers:  Sit to Stand: Minimum assistance  Stand to Sit: Minimum assistance  Gait:     Base of Support: Narrowed; Center of gravity altered  Speed/Chandni: Accelerated  Step Length: Right shortened;Left shortened  Swing Pattern: Left symmetrical;Right symmetrical  Stance: Right increased; Left increased  Gait Abnormalities: Decreased step clearance; Path deviations;Trunk sway increased; Festinating gait  Distance (ft): 100 Feet (ft)  Assistive Device: Gait belt (hand held assist x2)  Ambulation - Level of Assistance: Minimal assistance;Assist x2  Interventions: Safety awareness training;Manual cues; Visual/Demos; Verbal cues      Body Structures Involved:  1. Nerves  2. Heart  3. Bones Body Functions Affected:  1. Mental  2. Sensory/Pain  3. Neuromusculoskeletal  4. Movement Related Activities and Participation Affected:  1.  Learning and Applying Knowledge  2. General Tasks and Demands  3. Communication  4. Mobility  5. Self Care  6. Domestic Life  7. Interpersonal Interactions and Relationships  8. Community, Social and [place]   Number of elements that affect the Plan of Care: 4+: HIGH COMPLEXITY   CLINICAL PRESENTATION:   Presentation: Evolving clinical presentation with changing clinical characteristics: MODERATE COMPLEXITY   CLINICAL DECISION MAKIN [facility] Mobility Inpatient Short Form  How much difficulty does the patient currently have. .. Unable A Lot A Little None   1. Turning over in bed (including adjusting bedclothes, sheets and blankets)? [] 1   [] 2   [x] 3   [] 4   2. Sitting down on and standing up from a chair with arms ( e.g., wheelchair, bedside commode, etc.)   [] 1   [] 2   [x] 3   [] 4   3. Moving from lying on back to sitting on the side of the bed? [] 1   [] 2   [x] 3   [] 4   How much help from another person does the patient currently need. .. Total A Lot A Little None   4. Moving to and from a bed to a chair (including a wheelchair)? [] 1   [x] 2   [] 3   [] 4   5. Need to walk in hospital room? [] 1   [x] 2   [] 3   [] 4   6. Climbing 3-5 steps with a railing? [] 1   [x] 2   [] 3   [] 4   © , Trustees of [address] [place] [address], under license to [employer]. All rights reserved      Score:  Initial: 15 Most Recent: X (Date: -- )    Interpretation of Tool:  Represents activities that are increasingly more difficult (i.e. Bed mobility, Transfers, Gait). Score 24 23 22-20 19-15 14-10 9-7 6     Modifier CH CI CJ CK CL CM CN      ?  Mobility - Walking and Moving Around:     - CURRENT STATUS: CK - 40%-59% impaired, limited or restricted    - GOAL STATUS: CJ - 20%-39% impaired, limited or restricted    - D/C STATUS:  ---------------To be determined---------------  [place] / [place] / Product Type: Medicare /      Medical Necessity: · Patient demonstrates good rehab potential due to higher previous functional level. Reason for Services/Other Comments:  · Patient continues to require modification of therapeutic interventions to increase complexity of exercises. Use of outcome tool(s) and clinical judgement create a POC that gives a: Questionable prediction of patient's progress: MODERATE COMPLEXITY            TREATMENT:   (In addition to Assessment/Re-Assessment sessions the following treatments were rendered)   Pre-treatment Symptoms/Complaints:  none  Pain: Initial:   Pain Intensity 1: 0  Post Session:  0/10     Therapeutic Activity: (    8 minutes): Therapeutic activities including Chair transfers, Ambulation on level ground and standing balance activities to improve mobility, strength, balance and coordination. Required maximal Safety awareness training;Manual cues; Visual/Demos; Verbal cues to promote static and dynamic balance in standing and to improve safety with ambulation. Braces/Orthotics/Lines/Etc:   · O2 Device: Room air  Treatment/Session Assessment:    · Response to Treatment:  Patient tolerated well with no medical complications. · Interdisciplinary Collaboration:   o Physical Therapist  o Occupational Therapist  o Registered Nurse  · After treatment position/precautions:   o Up in chair  o Bed alarm/tab alert on  o Bed/Chair-wheels locked  o Bed in low position  o Call light within reach  o RN notified  o Family at bedside   · Compliance with Program/Exercises: Will assess as treatment progresses. · Recommendations/Intent for next treatment session: \"Next visit will focus on advancements to more challenging activities and reduction in assistance provided\".   Total Treatment Duration:  PT Patient Time In/Time Out  Time In: 0903  Time Out: [address], DPT

## 2018-07-16 NOTE — PROGRESS NOTES
[facility] CRITICAL CARE OUTREACH NURSE PROGRESS REPORT      SUBJECTIVE: Called to assess patient secondary to transfer from ICU. MEWS Score: 1 (07/15/18 1529)  Vitals:    07/15/18 1800 07/15/18 1811 07/15/18 1900 07/15/18 1932   BP: 121/61 121/61 116/56 122/56   Pulse: 75 75 75 75   Resp: 24  22 20   Temp:   98 °F (36.7 °C) 97.9 °F (36.6 °C)   SpO2: 96%  95% 96%   Weight:       Height:          LAB DATA:    Recent Labs      07/15/18   0316  07/14/18   1006   NA  138  136   K  3.6  3.4*   CL  100  96*   CO2  29  29   AGAP  9  11   GLU  131*  147*   BUN  26*  21   CREA  1.25*  1.50*   GFRAA  52*  42*   GFRNA  43*  35*   CA  8.3  8.9   MG   --   2.0   ALB  2.9*  3.4   TP  6.2*  7.2   GLOB  3.3  3.8*   AGRAT  0.9*  0.9*   ALT  12  12        Recent Labs      07/15/18   0316  07/14/18   1006   WBC  7.1  7.8   HGB  10.0*  11.8   HCT  29.3*  33.9*   PLT  241  243          OBJECTIVE: On arrival to room, I found patient to be resting in bed, family at bedside. General appearance: alert, uncooperative, mild distress, appears stated age, confused, disoriented  Neurologic: Mental status: alertness: alert, orientation: Disoriented to situation, affect: anxious, hostile and irritable       Pain Assessment  Pain Intensity 1: 0 (07/15/18 1600)  Pain Location 1: Chest  Pain Intervention(s) 1: Medication (see MAR) (nitro)  Patient Stated Pain Goal: 0                                 ASSESSMENT:  Pt resting in bed, tearful. Daughter at bedside, reports increased agitation tonight. Respirations even and unlabored. PLAN:  Continue to follow per outreach protocol.

## 2018-07-16 NOTE — PROGRESS NOTES
Met with daughter Armando Newberry during IDT rounds, confirmed pt lives alone in own home and has caregivers from 9 am til 5pm and then has family member that stays with her during the night, pt has rollator, plain walker, sc, bsc and cane for use at home, per family pt on needs supervision for ADLs. Daughter left prior to completing assessment and discussion about STR, [facility] consulted and pending assessment in am for discission. CM will remain available to speak with daughter in am when she returns to talk about STR or home at [place]. Care Management Interventions  PCP Verified by CM:  Yes  Transition of Care Consult (CM Consult): Discharge Planning  Current Support Network: Own Home, Lives Alone  Confirm Follow Up Transport: Family  Plan discussed with Pt/Family/Caregiver: Yes  Freedom of Choice Offered: Yes  Discharge Location  Discharge Placement: Unable to determine at this time

## 2018-07-16 NOTE — PROGRESS NOTES
Initial visit to assess pt's spiritual needs. Ministry of presence & prayer to demonstrate caring & concern, convey emotional & spiritual support.     autumn [place] MDiv,ThM,PhD

## 2018-07-16 NOTE — PROGRESS NOTES
Date of Outreach Update:  Shannan Arias was seen and assessed. MEWS Score: 1 (07/15/18 2313)  Vitals:    07/15/18 1811 07/15/18 1900 07/15/18 1932 07/15/18 2313   BP: 121/61 116/56 122/56 138/62   Pulse: 75 75 75 77   Resp:  22 20 15   Temp:  98 °F (36.7 °C) 97.9 °F (36.6 °C) 97.8 °F (36.6 °C)   SpO2:  95% 96% 96%   Weight:       Height:             Pain Assessment  Pain Intensity 1: 0 (07/15/18 2045)  Pain Location 1: Chest  Pain Intervention(s) 1: Medication (see MAR) (nitro)  Patient Stated Pain Goal: 0      Previous Outreach assessment has been reviewed. There have been no significant clinical changes since the completion of the last dated Outreach assessment. Will continue to follow up per outreach protocol.     Signed By:   Jessica Andrews RN    July 16, 2018 2:57 AM

## 2018-07-16 NOTE — PROGRESS NOTES
Problem: Interdisciplinary Rounds  Goal: Interdisciplinary Rounds  Outcome: Progressing Towards Goal  Interdisciplinary team rounds were held 7/16/2018 with the following team members:Care Management, Physical Therapy, Physician and  and the patient and child(malia). [facility] consulted. Plan of care discussed. See clinical pathway and/or care plan for interventions and desired outcomes.

## 2018-07-16 NOTE — PROGRESS NOTES
PM&R Consult Progress Note      Patient: Ej Wu  Admit Date: 7/14/2018  Admit Diagnosis: Subdural hematoma [facility])  Recommendations: Continue Acute Rehab Program, Coordination of rehab/medical care, Counseling of PM & R care issues management  -Well known to me from prior [facility] stay in May 2018 s/p LLE revascularization. Ms. Kwasi Dowell is very impulsive and unsafe; this is not a new issue. Now s/p fall with acute SDH. She remains hi risk for fall. Family concerned with taking her home. I explained to her dtg that rehab will not change her impulsivity. She is definitely performing below her baseline mod I /supervision. She is min A with transfers and min assist of 2 for gait. Very unsteady. Cognitive dysfxn with regards to orientation, self awareness, safety and recall. Will continue to require 24/7 at home which she has; however, she will need closer supervision than she was getting. -IRC vs SNF vs HH discussed with daughter. Pt wants to go home. I do not think that she would do well in a SNF. I am afraid this will cause her to give up. I believe she could tolerate and actively participate in [facility] level of care and could benefit from a multidisciplinary team approach with PT/OT/ST.   -I will f/u Tues. The family and pt to discuss. History/Subjective/Complaint:     Patient seen and examined. Records reviewed. \" I want to go home. I want to see my cat, Fuss. \"    Pain 1  Pain Scale 1: Visual (07/16/18 0925)  Pain Intensity 1: 0 (07/16/18 0925)  Patient Stated Pain Goal: 0 (07/15/18 2045)  Pain Reassessment 1: Yes (07/15/18 1600)  Pain Onset 1: 2 hrs ago (07/15/18 1551)  Pain Location 1: Chest (07/15/18 1551)  Pain Orientation 1: Anterior (07/15/18 1551)  Pain Description 1: Aching (07/15/18 1551)  Pain Intervention(s) 1: Medication (see MAR) (nitro) (07/15/18 1551)     Objective:     Vitals:  Patient Vitals for the past 8 hrs:   BP Temp Pulse Resp SpO2 Weight   07/16/18 0734 168/76 97.9 °F (36.6 °C) 79 16 96 % - 07/16/18 0625 - - - - - 104 lb 6.4 oz (47.4 kg)   07/16/18 0508 151/64 98.5 °F (36.9 °C) 75 16 96 % -      Intake and Output:  07/14 1901 - 07/16 0700  In: 976.3 [P.O.:480; I.V.:496.3]  Out: 475 [Urine:475]    Allergies   Allergen Reactions    Tylenol [Acetaminophen] Unknown (comments)     insomnia     Current Facility-Administered Medications   Medication Dose Route Frequency    lisinopril (PRINIVIL, ZESTRIL) tablet 20 mg  20 mg Oral DAILY    haloperidol (HALDOL) tablet 1 mg  1 mg Oral Q8H PRN    nitroglycerin (NITROSTAT) tablet 0.4 mg  0.4 mg SubLINGual PRN    levETIRAcetam (KEPPRA) 500 mg in 0.9% sodium chloride 100 mL IVPB  500 mg IntraVENous Q12H    levothyroxine (SYNTHROID) tablet 25 mcg  25 mcg Oral ACB    pantoprazole (PROTONIX) tablet 40 mg  40 mg Oral ACB    sodium chloride (NS) flush 5-10 mL  5-10 mL IntraVENous Q8H    sodium chloride (NS) flush 5-10 mL  5-10 mL IntraVENous PRN    sodium chloride (NS) flush 5-10 mL  5-10 mL IntraVENous Q8H    sodium chloride (NS) flush 5-10 mL  5-10 mL IntraVENous PRN    labetalol (NORMODYNE;TRANDATE) injection 10 mg  10 mg IntraVENous Q6H PRN    traMADol (ULTRAM) tablet 50 mg  50 mg Oral Q6H PRN    carvedilol (COREG) tablet 25 mg  25 mg Oral BID WITH MEALS       Physical Exam:  Remembered me right away from previous admission. Oriented to self, place, month but not year or situation. Didn't know why she was here.    HEENT; NCAT  CV; rrr no m  LUNGS cta b  ABD; soft nt nd  EXT no edema in LEs, some swelling and tenderness in left hand and wrist.  NEURO; non focal. Executive dysfxn    Functional Assessment:  Gross Assessment  AROM: Generally decreased, functional (07/16/18 1000)  PROM: Generally decreased, functional (07/16/18 1000)  Strength: Generally decreased, functional (07/16/18 1000)  Coordination: Generally decreased, functional (07/16/18 1000)  Tone: Normal (07/16/18 1000)  Sensation: Impaired (07/16/18 1000)     Gait  Base of Support: Narrowed; Center of gravity altered (07/16/18 1000)  Speed/Chandni: Accelerated (07/16/18 1000)  Step Length: Right shortened;Left shortened (07/16/18 1000)  Swing Pattern: Left symmetrical;Right symmetrical (07/16/18 1000)  Stance: Right increased; Left increased (07/16/18 1000)  Gait Abnormalities: Decreased step clearance; Path deviations;Trunk sway increased; Festinating gait (07/16/18 1000)  Ambulation - Level of Assistance: Minimal assistance;Assist x2 (07/16/18 1000)  Distance (ft): 100 Feet (ft) (07/16/18 1000)  Assistive Device: Gait belt (hand held assist x2) (07/16/18 1000)  Interventions: Safety awareness training;Manual cues; Visual/Demos; Verbal cues (07/16/18 1000)     Bed Mobility  Rolling: Stand-by assistance;Contact guard assistance (07/16/18 1000)  Supine to Sit: Contact guard assistance (07/16/18 1000)  Scooting: Contact guard assistance (07/16/18 1000)     Balance  Sitting: Intact (07/16/18 1000)  Standing: Impaired (07/16/18 1000)  Standing - Static: Fair (07/16/18 1000)  Standing - Dynamic : Poor (07/16/18 1000)                       Bed/Mat Mobility  Rolling: Stand-by assistance;Contact guard assistance (07/16/18 1000)  Supine to Sit: Contact guard assistance (07/16/18 1000)  Sit to Stand: Minimum assistance (07/16/18 1000)  Scooting: Contact guard assistance (07/16/18 1000)     Labs/Studies:  Recent Results (from the past 72 hour(s))   CBC WITH AUTOMATED DIFF    Collection Time: 07/14/18 10:06 AM   Result Value Ref Range    WBC 7.8 4.3 - 11.1 K/uL    RBC 3.74 (L) 4.05 - 5.25 M/uL    HGB 11.8 11.7 - 15.4 g/dL    HCT 33.9 (L) 35.8 - 46.3 %    MCV 90.6 79.6 - 97.8 FL    MCH 31.6 26.1 - 32.9 PG    MCHC 34.8 31.4 - 35.0 g/dL    RDW 13.4 11.9 - 14.6 %    PLATELET 160 093 - 075 K/uL    MPV 10.4 (L) 10.8 - 14.1 FL    DF AUTOMATED      NEUTROPHILS 68 43 - 78 %    LYMPHOCYTES 16 13 - 44 %    MONOCYTES 15 (H) 4.0 - 12.0 %    EOSINOPHILS 1 0.5 - 7.8 %    BASOPHILS 0 0.0 - 2.0 %    IMMATURE GRANULOCYTES 0 0.0 - 5.0 %    ABS. NEUTROPHILS 5.3 1.7 - 8.2 K/UL    ABS. LYMPHOCYTES 1.2 0.5 - 4.6 K/UL    ABS. MONOCYTES 1.2 0.1 - 1.3 K/UL    ABS. EOSINOPHILS 0.1 0.0 - 0.8 K/UL    ABS. BASOPHILS 0.0 0.0 - 0.2 K/UL    ABS. IMM. GRANS. 0.0 0.0 - 0.5 K/UL   METABOLIC PANEL, COMPREHENSIVE    Collection Time: 07/14/18 10:06 AM   Result Value Ref Range    Sodium 136 136 - 145 mmol/L    Potassium 3.4 (L) 3.5 - 5.1 mmol/L    Chloride 96 (L) 98 - 107 mmol/L    CO2 29 21 - 32 mmol/L    Anion gap 11 7 - 16 mmol/L    Glucose 147 (H) 65 - 100 mg/dL    BUN 21 8 - 23 MG/DL    Creatinine 1.50 (H) 0.6 - 1.0 MG/DL    GFR est AA 42 (L) >60 ml/min/1.73m2    GFR est non-AA 35 (L) >60 ml/min/1.73m2    Calcium 8.9 8.3 - 10.4 MG/DL    Bilirubin, total 1.3 (H) 0.2 - 1.1 MG/DL    ALT (SGPT) 12 12 - 65 U/L    AST (SGOT) 16 15 - 37 U/L    Alk.  phosphatase 148 (H) 50 - 136 U/L    Protein, total 7.2 6.3 - 8.2 g/dL    Albumin 3.4 3.2 - 4.6 g/dL    Globulin 3.8 (H) 2.3 - 3.5 g/dL    A-G Ratio 0.9 (L) 1.2 - 3.5     CK WITH MB    Collection Time: 07/14/18 10:06 AM   Result Value Ref Range    CK 69 21 - 215 U/L    CK - MB 0.9 0.5 - 3.6 ng/ml    CK-MB Index 1.3 <2.5 %   PROTHROMBIN TIME + INR    Collection Time: 07/14/18 10:06 AM   Result Value Ref Range    Prothrombin time 17.6 (H) 11.5 - 14.5 sec    INR 1.5     MAGNESIUM    Collection Time: 07/14/18 10:06 AM   Result Value Ref Range    Magnesium 2.0 1.8 - 2.4 mg/dL   TROPONIN I    Collection Time: 07/14/18 10:06 AM   Result Value Ref Range    Troponin-I, Qt. <0.02 (L) 0.02 - 0.05 NG/ML   AMMONIA    Collection Time: 07/14/18 11:29 AM   Result Value Ref Range    Ammonia <10 (L) 11 - 32 UMOL/L   EKG, 12 LEAD, INITIAL    Collection Time: 07/14/18 12:45 PM   Result Value Ref Range    Ventricular Rate 75 BPM    Atrial Rate 300 BPM    QRS Duration 148 ms    Q-T Interval 460 ms    QTC Calculation (Bezet) 513 ms    Calculated P Axis 75 degrees    Calculated R Axis -171 degrees    Calculated T Axis 79 degrees    Diagnosis !! AGE AND GENDER SPECIFIC ECG ANALYSIS !! Normal sinus rhythm Electronic ventricular pacemaker  Right superior axis deviation  Non-specific intra-ventricular conduction block  Right ventricular hypertrophy  Cannot rule out Anterior infarct (cited on or before 14-JUL-2018)  Abnormal ECG  When compared with ECG of 12-AUG-2017 06:01,  Previous ECG has undetermined rhythm, needs review  Non-specific intra-ventricular conduction block has replaced (RBBB and left   posterior fascicular block)  Questionable change in initial forces of Anterior leads  Confirmed by [facility]  MD (), STORM ROGER ([id number]) on 7/15/2018 9:01:55 AM     METABOLIC PANEL, COMPREHENSIVE    Collection Time: 07/15/18  3:16 AM   Result Value Ref Range    Sodium 138 136 - 145 mmol/L    Potassium 3.6 3.5 - 5.1 mmol/L    Chloride 100 98 - 107 mmol/L    CO2 29 21 - 32 mmol/L    Anion gap 9 7 - 16 mmol/L    Glucose 131 (H) 65 - 100 mg/dL    BUN 26 (H) 8 - 23 MG/DL    Creatinine 1.25 (H) 0.6 - 1.0 MG/DL    GFR est AA 52 (L) >60 ml/min/1.73m2    GFR est non-AA 43 (L) >60 ml/min/1.73m2    Calcium 8.3 8.3 - 10.4 MG/DL    Bilirubin, total 0.9 0.2 - 1.1 MG/DL    ALT (SGPT) 12 12 - 65 U/L    AST (SGOT) 11 (L) 15 - 37 U/L    Alk.  phosphatase 117 50 - 136 U/L    Protein, total 6.2 (L) 6.3 - 8.2 g/dL    Albumin 2.9 (L) 3.2 - 4.6 g/dL    Globulin 3.3 2.3 - 3.5 g/dL    A-G Ratio 0.9 (L) 1.2 - 3.5     CBC WITH AUTOMATED DIFF    Collection Time: 07/15/18  3:16 AM   Result Value Ref Range    WBC 7.1 4.3 - 11.1 K/uL    RBC 3.24 (L) 4.05 - 5.25 M/uL    HGB 10.0 (L) 11.7 - 15.4 g/dL    HCT 29.3 (L) 35.8 - 46.3 %    MCV 90.4 79.6 - 97.8 FL    MCH 30.9 26.1 - 32.9 PG    MCHC 34.1 31.4 - 35.0 g/dL    RDW 13.3 11.9 - 14.6 %    PLATELET 616 069 - 304 K/uL    MPV 10.4 (L) 10.8 - 14.1 FL    DF AUTOMATED      NEUTROPHILS 70 43 - 78 %    LYMPHOCYTES 18 13 - 44 %    MONOCYTES 11 4.0 - 12.0 %    EOSINOPHILS 1 0.5 - 7.8 %    BASOPHILS 0 0.0 - 2.0 %    IMMATURE GRANULOCYTES 0 0.0 - 5.0 % ABS. NEUTROPHILS 5.1 1.7 - 8.2 K/UL    ABS. LYMPHOCYTES 1.3 0.5 - 4.6 K/UL    ABS. MONOCYTES 0.8 0.1 - 1.3 K/UL    ABS. EOSINOPHILS 0.0 0.0 - 0.8 K/UL    ABS. BASOPHILS 0.0 0.0 - 0.2 K/UL    ABS. IMM. GRANS. 0.0 0.0 - 0.5 K/UL   URINALYSIS W/ RFLX MICROSCOPIC    Collection Time: 07/15/18  1:49 PM   Result Value Ref Range    Color YELLOW      Appearance CLEAR      Specific gravity 1.018 1.001 - 1.023      pH (UA) 5.5 5.0 - 9.0      Protein NEGATIVE  NEG mg/dL    Glucose NEGATIVE  mg/dL    Ketone TRACE (A) NEG mg/dL    Bilirubin NEGATIVE  NEG      Blood NEGATIVE  NEG      Urobilinogen 0.2 0.2 - 1.0 EU/dL    Nitrites NEGATIVE  NEG      Leukocyte Esterase TRACE (A) NEG     URINE MICROSCOPIC    Collection Time: 07/15/18  1:49 PM   Result Value Ref Range    WBC 0-3 0 /hpf    RBC 0-3 0 /hpf    Epithelial cells 0-3 0 /hpf    Bacteria TRACE 0 /hpf    Casts 0 0 /lpf    Crystals, urine 0 0 /LPF    Mucus 0 0 /lpf   EKG, 12 LEAD, SUBSEQUENT    Collection Time: 07/15/18  2:08 PM   Result Value Ref Range    Ventricular Rate 75 BPM    Atrial Rate 129 BPM    QRS Duration 152 ms    Q-T Interval 496 ms    QTC Calculation (Bezet) 553 ms    Calculated R Axis -45 degrees    Calculated T Axis 96 degrees    Diagnosis       Ventricular-paced rhythm  Biventricular pacemaker detected  probable underlying atrial fibrillation  When compared with ECG of 14-JUL-2018 12:45,  No significant change was found  Confirmed by EDMAR CHAN ([id number]) on 7/15/2018 2:43:10 PM     TROPONIN I    Collection Time: 07/15/18  3:01 PM   Result Value Ref Range    Troponin-I, Qt. <0.02 (L) 0.02 - 0.05 NG/ML        Assessment:     Principal Problem:    Subdural hematoma, post-traumatic ([facility].) (7/14/2018)    Active Problems:    Diabetes mellitus, type 2 ([facility] (8/12/2017)      Acquired hypothyroidism (9/12/2012)      Mixed hyperlipidemia (9/12/2012)      Overview:  With high HDL      Intolerant of pravastatin,lovastatin and lescol      CKD (chronic kidney disease), stage III (8/12/2017)      GERD (gastroesophageal reflux disease) (9/12/2012)      Atrial fibrillation with RVR (HCC) (9/12/2012)      Overview: Paroxysmal.  Coumadin contraindicated due to falls            DNR (do not resuscitate) (8/12/2017)        Plan:     Recommendations: Continue Acute Rehab Program  Coordination of rehab/medical care  Counseling of PM & R care issues management  Monitoring and management of medical conditions per plan of care/orders  Discussion with Family/Caregiver/Staff  Reviewed Therapies/Labs/Medications/Records    Signed By:  Thong Boland MD     July 16, 2018

## 2018-07-16 NOTE — PROGRESS NOTES
Date of Outreach Update:  Maximo Torres was seen and assessed. MEWS Score: 1 (07/16/18 0734)  Vitals:    07/16/18 0734 07/16/18 1155 07/16/18 1339 07/16/18 1525   BP: 168/76 174/80 154/67 143/76   Pulse: 79 76  75   Resp: 16 16 16   Temp: 97.9 °F (36.6 °C) 98 °F (36.7 °C)  98.7 °F (37.1 °C)   SpO2: 96% 94%  96%   Weight:       Height:             Pain Assessment  Pain Intensity 1: 0 (07/16/18 0926)  Pain Location 1: Chest  Pain Intervention(s) 1: Medication (see MAR) (nitro)  Patient Stated Pain Goal: 0      Previous Outreach assessment has been reviewed. There have been no significant clinical changes since the completion of the last dated Outreach assessment. VSS. Pt discussed with daughter at bedside. Will continue to follow up per outreach protocol.     Signed By:   Nikolay Handy RN    July 16, 2018 3:43 PM

## 2018-07-17 LAB
MM INDURATION POC: 0 MM (ref 0–5)
PPD POC: NEGATIVE NEGATIVE

## 2018-07-17 PROCEDURE — 74011000250 HC RX REV CODE- 250: Performed by: INTERNAL MEDICINE

## 2018-07-17 PROCEDURE — 99232 SBSQ HOSP IP/OBS MODERATE 35: CPT | Performed by: PHYSICAL MEDICINE & REHABILITATION

## 2018-07-17 PROCEDURE — 74011250637 HC RX REV CODE- 250/637: Performed by: INTERNAL MEDICINE

## 2018-07-17 PROCEDURE — 97530 THERAPEUTIC ACTIVITIES: CPT

## 2018-07-17 PROCEDURE — 65660000000 HC RM CCU STEPDOWN

## 2018-07-17 PROCEDURE — 74011000258 HC RX REV CODE- 258: Performed by: INTERNAL MEDICINE

## 2018-07-17 PROCEDURE — 74011250636 HC RX REV CODE- 250/636: Performed by: INTERNAL MEDICINE

## 2018-07-17 PROCEDURE — 74011250637 HC RX REV CODE- 250/637: Performed by: HOSPITALIST

## 2018-07-17 RX ORDER — AMLODIPINE BESYLATE 5 MG/1
5 TABLET ORAL DAILY
Status: DISCONTINUED | OUTPATIENT
Start: 2018-07-17 | End: 2018-07-17

## 2018-07-17 RX ORDER — LEVETIRACETAM 500 MG/1
500 TABLET ORAL EVERY 12 HOURS
Status: DISCONTINUED | OUTPATIENT
Start: 2018-07-17 | End: 2018-07-18 | Stop reason: HOSPADM

## 2018-07-17 RX ORDER — AMLODIPINE BESYLATE 10 MG/1
10 TABLET ORAL DAILY
Status: DISCONTINUED | OUTPATIENT
Start: 2018-07-17 | End: 2018-07-18 | Stop reason: HOSPADM

## 2018-07-17 RX ADMIN — Medication 5 ML: at 06:00

## 2018-07-17 RX ADMIN — Medication 10 ML: at 13:24

## 2018-07-17 RX ADMIN — CARVEDILOL 25 MG: 25 TABLET, FILM COATED ORAL at 08:33

## 2018-07-17 RX ADMIN — LEVETIRACETAM 500 MG: 500 TABLET ORAL at 22:05

## 2018-07-17 RX ADMIN — SODIUM CHLORIDE 500 MG: 900 INJECTION, SOLUTION INTRAVENOUS at 13:19

## 2018-07-17 RX ADMIN — SODIUM CHLORIDE 500 MG: 900 INJECTION, SOLUTION INTRAVENOUS at 02:11

## 2018-07-17 RX ADMIN — METOROPROLOL TARTRATE 2.5 MG: 5 INJECTION, SOLUTION INTRAVENOUS at 13:18

## 2018-07-17 RX ADMIN — LISINOPRIL 20 MG: 20 TABLET ORAL at 08:33

## 2018-07-17 RX ADMIN — LEVOTHYROXINE SODIUM 25 MCG: 50 TABLET ORAL at 05:52

## 2018-07-17 RX ADMIN — CARVEDILOL 25 MG: 25 TABLET, FILM COATED ORAL at 17:10

## 2018-07-17 RX ADMIN — Medication 10 ML: at 22:06

## 2018-07-17 RX ADMIN — PANTOPRAZOLE SODIUM 40 MG: 40 TABLET, DELAYED RELEASE ORAL at 05:51

## 2018-07-17 NOTE — PROGRESS NOTES
Date of Outreach Update:  Sue Villalba was seen and assessed. Patient sleeping. No apparent distress. MEWS Score: 1 (07/16/18 0734)  Vitals:    07/16/18 1339 07/16/18 1525 07/16/18 2024 07/16/18 2309   BP: 154/67 143/76 189/90 184/76   Pulse:  75 76    Resp:  16 16 16   Temp:  98.7 °F (37.1 °C) 98.4 °F (36.9 °C)    SpO2:  96% 99% 96%   Weight:       Height:             Pain Assessment  Pain Intensity 1: 0 (07/16/18 0926)  Pain Location 1: Chest  Pain Intervention(s) 1: Medication (see MAR) (nitro)  Patient Stated Pain Goal: 0      Previous Outreach assessment has been reviewed. There have been no significant clinical changes since the completion of the last dated Outreach assessment. Will continue to follow up per outreach protocol.     Signed By:   Chema Wood    July 17, 2018 2:36 AM

## 2018-07-17 NOTE — PROGRESS NOTES
Problem: Mobility Impaired (Adult and Pediatric)  Goal: *Acute Goals and Plan of Care (Insert Text)  STG:  (1.)Ms. Lukasz Jara will move from supine to sit and sit to supine , scoot up and down and roll side to side with STAND BY ASSIST within 3 treatment day(s). (2.)Ms. Lukasz Jara will transfer from bed to chair and chair to bed with STAND BY ASSIST using the least restrictive device within 3 treatment day(s). (3.)Ms. Lukasz Jara will ambulate with CONTACT GUARD ASSIST for 125 feet with the least restrictive device within 3 treatment day(s). (4.)Ms. Lukasz Jara will perform standing static and dynamic balance activities x 15 minutes with CONTACT GUARD ASSIST to improve safety within 3 day(s). (5.)Ms. Lukasz Jara will perform LE exercises with 3 to 5 cues for form within 3 days to improve strength for functional transfers and ambulation. LTG:  (1.)Ms. Lukasz Jara will move from supine to sit and sit to supine , scoot up and down and roll side to side in bed with SUPERVISION within 7 treatment day(s). (2.)Ms. Lukasz Jara will transfer from bed to chair and chair to bed with SUPERVISION using the least restrictive device within 7 treatment day(s). (3.)Ms. Lukasz Jara will ambulate with STAND BY ASSIST for 250 feet with the least restrictive device within 7 treatment day(s). (4.)Ms. Lukasz Jara will perform standing static and dynamic balance activities x 15 minutes with STAND BY ASSIST to improve safety within 7 day(s).   ________________________________________________________________________________________________      PHYSICAL THERAPY: Daily Note, Treatment Day: 1st, PM 7/17/2018  INPATIENT: Hospital Day: 4  [place] / Product Type: Medicare /      NAME/AGE/GENDER: Adria Bar is a 80 y.o. female   PRIMARY DIAGNOSIS: Subdural hematoma (HCC) Subdural hematoma, post-traumatic (HCC) Subdural hematoma, post-traumatic ([facility]        ICD-10: Treatment Diagnosis:    · Difficulty in walking, Not elsewhere classified (R26.2)   Precaution/Allergies:  Tylenol [acetaminophen]      ASSESSMENT:     Ms. Arnoldo Ortiz is a 80 y.o. female admitted for subdural hematoma following a fall. She lives alone in a single story home with caregivers daily 9-5 and family with her every night, daughter reports she is never alone. Ms. Arnoldo Ortiz typically ambulates with a rollator walker and has experienced multiple falls in the past 6 months. Daughter reports she ambulates at a rapid pace at baseline and often does not use her rollator correctly. Patient is supine on contact and agreeable to therapy. SBA to transfer to sitting, stood with CGA and increased ambulation distance to 125' x2 with rollator walker from home with minimal assist x1 and gait belt. Patient continues to have increased speed, however much improved safety today and good participation. C/o fatigue post ambulation, and assisted back to bed. No issues bearing weight through L wrist today on rollator walker. Good progress towards stated goals with improved safety noted this session. Bed alarm activated and family present at the end of the session. Dariana Gomes is currently functioning below her baseline and would benefit from skilled PT during acute care stay to maximize safety and independence with functional mobility. This section established at most recent assessment   PROBLEM LIST (Impairments causing functional limitations):  1. Decreased Strength  2. Decreased ADL/Functional Activities  3. Decreased Transfer Abilities  4. Decreased Ambulation Ability/Technique  5. Decreased Balance  6. Increased Pain  7. Decreased Knowledge of Precautions  8. Decreased [place] with Home Exercise Program  9. Decreased Cognition   INTERVENTIONS PLANNED: (Benefits and precautions of physical therapy have been discussed with the patient.)  1. Balance Exercise  2. Bed Mobility  3. Family Education  4. Gait Training  5. Home Exercise Program (HEP)  6.  Therapeutic Activites  7. Therapeutic Exercise/Strengthening  8. Transfer Training  9. Patient Education  10. Group Therapy     TREATMENT PLAN: Frequency/Duration: 3 times a week for duration of hospital stay  Rehabilitation Potential For Stated Goals: [facility] REHABILITATION/EQUIPMENT: (at time of discharge pending progress): Due to the probability of continued deficits (see above) this patient will likely need continued skilled physical therapy after discharge. Equipment:    None at this time              HISTORY:   History of Present Injury/Illness (Reason for Referral):   is a 79 yo female who presented with confusion on a background of HTN, DM, sick sinus syndrome, HLD, systolic CHF, orthostatic hypotension, atrial fibrillation (on Eliquis) and ischemic cardiomyopathy (biventricular pacemaker in place). Her daughter reported that the patient fell at 5am on Thursday. She hit her head but did not lose consciousness. She was lucid until Friday when she was noted to be more lethargic. She was seen by her PCP and gabapentin dose was decreased. She was also noted to have left wrist swelling an pain. Her mentation changes and lethargy prompted the ER admission. X ray showed no signs of fracture (+low bone density). CT head showed small acute right subdural hematoma with minimal midline shift. Neursurgery was consulted who recommended Keppra 500 BID and Q1H Neurochecks with repeat C T head 6 hours after the 1st CT scan. She is oriented to name and place but not year. Most of the history was obtained by speaking to the patient's daughter at bedside. Her daughter notes that her mother has about 10 falls over the past year due to dizziness on standing. The patient denies any paresthesias down the right hand. Past Medical History/Comorbidities:   Ms. Stew Mao  has a past medical history of A fib; Abnormal loss of weight; Acquired hypothyroidism (9/12/2012);  Acute respiratory failure with hypoxemia (HCC) (8/12/2017); Anemia; Anorexia (9/2/2014); Arthritis associated with another disorder; Atrial fibrillation ([facility] (9/12/2012); Autonomic neuropathy (1/13/2013); CAD (coronary artery disease); CAD in native artery (5/5/2016); CAP (community acquired pneumonia) (8/12/2017); Cervical radiculopathy (5/23/2013); Chest pain; Chronic kidney disease; Chronic pain syndrome (1/13/2013); CKD (chronic kidney disease), stage III (9/12/2012); Diabetes ([facility] Diabetes mellitus; Diabetes mellitus, type 2 ([facility] (9/12/2012); Dysphagia (9/2/2014); Gait disorder (9/12/2012); GERD (gastroesophageal reflux disease); Heart failure ([facility] HTN; Hypertension (9/12/2012); Hypothyroid; IBS (irritable bowel syndrome) (9/12/2012); Iron deficiency (9/12/2012); Lumbar disc disease; MCI (mild cognitive impairment) (1/13/2013); Mixed hyperlipidemia (9/12/2012); Orthostatic hypotension (5/5/2016); Pacemaker; and SSS (sick sinus syndrome) ([facility] (5/5/2016). She also has no past medical history of Difficult intubation; Malignant hyperthermia due to anesthesia; Nausea & vomiting; or Pseudocholinesterase deficiency. Ms. Carina Kirby  has a past surgical history that includes hx cervical fusion; hx appendectomy; hx carpal tunnel release; hx colonoscopy (Nov 2005); hx pacemaker (6/08); hx pacemaker (2017); and vascular surgery procedure unlist (Left, 04/25/2018). Social History/Living Environment:   Home Environment: Private residence  # Steps to Enter: 1  One/Two Story Residence: One story  Living Alone: Yes  Support Systems: Family member(s), Home care staff  Patient Expects to be Discharged to[de-identified] Unknown  Current DME Used/Available at Home: Luellen Canavan, addie, Walker, rollator, [address], [place], straight, Commode, bedside, Shower chair, Wheelchair  Tub or Shower Type: Shower  Prior Level of Function/Work/Activity:  Patient ambulates with a rollator walker, requires supervision for shower transfers and has experienced multiple falls.  Has family or a paid caregiver with her at all times. Number of Personal Factors/Comorbidities that affect the Plan of Care: 3+: HIGH COMPLEXITY   EXAMINATION:   Most Recent Physical Functioning:   Gross Assessment:  AROM: Generally decreased, functional  PROM: Generally decreased, functional  Strength: Generally decreased, functional  Coordination: Generally decreased, functional  Tone: Normal  Sensation: Impaired               Posture:  Posture (WDL): Exceptions to WDL  Posture Assessment: Forward head, Kyphosis, Rounded shoulders  Balance:  Sitting: Intact  Standing: Impaired  Standing - Static: Fair  Standing - Dynamic : Fair Bed Mobility:  Supine to Sit: Stand-by assistance  Sit to Supine: Stand-by assistance  Scooting: Stand-by assistance  Interventions: Safety awareness training;Verbal cues; Visual cues  Wheelchair Mobility:     Transfers:  Sit to Stand: Contact guard assistance  Stand to Sit: Contact guard assistance  Gait:     Base of Support: Narrowed; Center of gravity altered  Speed/Chandni: Accelerated  Step Length: Right shortened;Left shortened  Swing Pattern: Right symmetrical;Left symmetrical  Stance: Left increased;Right increased  Gait Abnormalities: Decreased step clearance;Trunk sway increased; Path deviations  Distance (ft): 125 Feet (ft) (x2)  Assistive Device: Gait belt;Walker, rollator  Ambulation - Level of Assistance: Assist x1;Minimal assistance;Contact guard assistance  Interventions: Safety awareness training;Manual cues; Verbal cues      Body Structures Involved:  1. Nerves  2. Heart  3. Bones Body Functions Affected:  1. Mental  2. Sensory/Pain  3. Neuromusculoskeletal  4. Movement Related Activities and Participation Affected:  1. Learning and Applying Knowledge  2. General Tasks and Demands  3. Communication  4. Mobility  5. Self Care  6. Domestic Life  7. Interpersonal Interactions and Relationships  8.  Community, Social and [place]   Number of elements that affect the Plan of Care: 4+: HIGH COMPLEXITY   CLINICAL PRESENTATION:   Presentation: Evolving clinical presentation with changing clinical characteristics: MODERATE COMPLEXITY   CLINICAL DECISION MAKIN [facility] Mobility Inpatient Short Form  How much difficulty does the patient currently have. .. Unable A Lot A Little None   1. Turning over in bed (including adjusting bedclothes, sheets and blankets)? [] 1   [] 2   [x] 3   [] 4   2. Sitting down on and standing up from a chair with arms ( e.g., wheelchair, bedside commode, etc.)   [] 1   [] 2   [x] 3   [] 4   3. Moving from lying on back to sitting on the side of the bed? [] 1   [] 2   [x] 3   [] 4   How much help from another person does the patient currently need. .. Total A Lot A Little None   4. Moving to and from a bed to a chair (including a wheelchair)? [] 1   [x] 2   [] 3   [] 4   5. Need to walk in hospital room? [] 1   [x] 2   [] 3   [] 4   6. Climbing 3-5 steps with a railing? [] 1   [x] 2   [] 3   [] 4   © , Trustees of [address] [place] [address], under license to [employer]. All rights reserved      Score:  Initial: 15 Most Recent: X (Date: -- )    Interpretation of Tool:  Represents activities that are increasingly more difficult (i.e. Bed mobility, Transfers, Gait). Score 24 23 22-20 19-15 14-10 9-7 6     Modifier CH CI CJ CK CL CM CN      ? Mobility - Walking and Moving Around:     - CURRENT STATUS: CK - 40%-59% impaired, limited or restricted    - GOAL STATUS: CJ - 20%-39% impaired, limited or restricted    - D/C STATUS:  ---------------To be determined---------------  [place] / [place] / Product Type: Medicare /      Medical Necessity:     · Patient demonstrates good rehab potential due to higher previous functional level. Reason for Services/Other Comments:  · Patient continues to require modification of therapeutic interventions to increase complexity of exercises.    Use of outcome tool(s) and clinical judgement create a POC that gives a: Questionable prediction of patient's progress: MODERATE COMPLEXITY            TREATMENT:   (In addition to Assessment/Re-Assessment sessions the following treatments were rendered)   Pre-treatment Symptoms/Complaints:  none  Pain: Initial:   Pain Intensity 1: 0  Post Session:  0/10     Therapeutic Activity: (    23 minutes): Therapeutic activities including Chair transfers, Ambulation on level ground and standing balance activities to improve mobility, strength, balance and coordination. Required maximal Safety awareness training;Manual cues; Verbal cues to promote static and dynamic balance in standing and to improve safety with ambulation. Braces/Orthotics/Lines/Etc:   · O2 Device: Room air  Treatment/Session Assessment:    · Response to Treatment:  Patient tolerated well with no medical complications. · Interdisciplinary Collaboration:   o Physical Therapist  o Registered Nurse  · After treatment position/precautions:   o Supine in bed  o Bed alarm/tab alert on  o Bed/Chair-wheels locked  o Bed in low position  o Call light within reach  o RN notified  o Family at bedside   · Compliance with Program/Exercises: Will assess as treatment progresses. · Recommendations/Intent for next treatment session: \"Next visit will focus on advancements to more challenging activities and reduction in assistance provided\".   Total Treatment Duration:  PT Patient Time In/Time Out  Time In: 1334  Time Out: [address], DPT

## 2018-07-17 NOTE — PROGRESS NOTES
Met with daughter Merissa Gomez at bedside, requested referral to [facility] for [address], called spoke with Lloyd Gilmore in admission and referral faxed per request, made aware pt medically ready for DC if bed available today. CM will await response.

## 2018-07-17 NOTE — PROGRESS NOTES
Hospitalist Progress Note    2018  Admit Date: 2018  9:43 AM   NAME: Lion Rosales   :  8/10/1927   DOS:              18  MRN:  [id number]   Attending: Lisseth Mendez MD  PCP:  Sonia Sanchez MD  Treatment Team: Attending Provider: Kiran Abdalla MD; Care Manager: Marion Albarran RN    DNR     SUBJECTIVE:   As previously documented: Pt admitted with fall and SDH on [facility] for dvt. Repeat CT has been stable, non surgical per neurosurgery. To repeat CT in 2 weeks.           18    Lion Rosales is sleeping comfortable lying in the bed. Patient's daughter stated she has been sleepy on AM for months. 10+ ROS reviewed and negative except for positive in HPI.    Allergies   Allergen Reactions    Tylenol [Acetaminophen] Unknown (comments)     insomnia     Current Facility-Administered Medications   Medication Dose Route Frequency    amLODIPine (NORVASC) tablet 10 mg  10 mg Oral DAILY    levETIRAcetam (KEPPRA) tablet 500 mg  500 mg Oral Q12H    metoprolol (LOPRESSOR) injection 2.5 mg  2.5 mg IntraVENous Q6H PRN    lisinopril (PRINIVIL, ZESTRIL) tablet 20 mg  20 mg Oral DAILY    nitroglycerin (NITROSTAT) tablet 0.4 mg  0.4 mg SubLINGual PRN    levothyroxine (SYNTHROID) tablet 25 mcg  25 mcg Oral ACB    pantoprazole (PROTONIX) tablet 40 mg  40 mg Oral ACB    sodium chloride (NS) flush 5-10 mL  5-10 mL IntraVENous Q8H    sodium chloride (NS) flush 5-10 mL  5-10 mL IntraVENous PRN    labetalol (NORMODYNE;TRANDATE) injection 10 mg  10 mg IntraVENous Q6H PRN    traMADol (ULTRAM) tablet 50 mg  50 mg Oral Q6H PRN    carvedilol (COREG) tablet 25 mg  25 mg Oral BID WITH MEALS         Immunization History   Administered Date(s) Administered    Influenza High Dose Vaccine PF 10/08/2015, 2016, 10/19/2017    Influenza Vaccine 10/10/2013, 2014    Influenza Vaccine Split 10/15/2012    Pneumococcal Conjugate (PCV-13) 11/10/2015    Pneumococcal Polysaccharide (PPSV-23) 2013    TB Skin Test (PPD) Intradermal 2017, 2018, 07/15/2018    TDAP Vaccine 10/15/2012     Objective:   Patient Vitals for the past 24 hrs:   Temp Pulse Resp BP SpO2   18 1435 97.8 °F (36.6 °C) 76 18 173/81 (!) 76 %   18 1130 97.8 °F (36.6 °C) 75 20 181/73 99 %   18 0820 97.8 °F (36.6 °C) 75 18 175/89 98 %   18 0328 98.6 °F (37 °C) 76 16 187/82 98 %   18 2309 - - 16 184/76 96 %   18 2024 98.4 °F (36.9 °C) 76 16 189/90 99 %     Temp (24hrs), Av.1 °F (36.7 °C), Min:97.8 °F (36.6 °C), Max:98.6 °F (37 °C)    Oxygen Therapy  O2 Sat (%): (!) 76 % (18 1435)  Pulse via Oximetry: 75 beats per minute (18 0508)  O2 Device: Room air (18 0508)  Oxygen Therapy  O2 Sat (%): (!) 76 % (18 1435)  Pulse via Oximetry: 75 beats per minute (18 0508)  O2 Device: Room air (18 0508)    Physical Exam:  General:         Drowsy this morning but easily to arouse. On PM alert following simple commands. HEENT:               NCAT. No obvious deformity. Nares normal.   Lungs: No wheezing/rhonchi/rales  Cardiovascular:   RRR. No m/r/g. No pedal edema b/l. Abdomen:       S/nt/nd. Bowel sounds normal. .   Skin:         No rashes or lesions. Not Jaundiced  Neurologic:     No gross focal deficit. Psychiatric:       Good mood. Normal affect. DIAGNOSTIC STUDIES      Data Review:   Recent Results (from the past 24 hour(s))   PLEASE READ & DOCUMENT PPD TEST IN 48 HRS    Collection Time: 18 10:29 AM   Result Value Ref Range    PPD negative Negative    mm Induration 0 mm       All Micro Results     None          Imaging /Procedures /Studies:    CXR Results  (Last 48 hours)    None        CT Results  (Last 48 hours)    None        No results found. No results found for this visit on 18.     Labs and Studies from previous 24 hours have been personally reviewed by myself Karolina Problems    Diagnosis Date Noted    [facility] hematoma, [facility]traumatic ([facility] 07/14/2018    Diabetes mellitus, type 2 ([facility] 08/12/2017    CKD (chronic kidney disease), stage III 08/12/2017    DNR (do not resuscitate) 08/12/2017    Mixed hyperlipidemia 09/12/2012     With high HDL  Intolerant of pravastatin,lovastatin and lescol      Acquired hypothyroidism 09/12/2012    GERD (gastroesophageal reflux disease) 09/12/2012    Atrial fibrillation with RVR ([facility] 09/12/2012     Paroxysmal.  Coumadin contraindicated due to falls  [address] [facility] as of 7/17/2018  Date Reviewed: 7/14/2018          Codes Class Noted - Resolved POA    * (Principal)Subdural hematoma, post-traumatic ([facility] ICD-10-CM: J32.7C5B  ICD-9-CM: 852.20  7/14/2018 - Present Yes        Diabetes mellitus, type 2 ([facility] (Chronic) ICD-10-CM: E11.9  ICD-9-CM: 250.00  8/12/2017 - Present Yes        CKD (chronic kidney disease), stage III (Chronic) ICD-10-CM: N18.3  ICD-9-CM: 585.3  8/12/2017 - Present Yes        DNR (do not resuscitate) (Chronic) ICD-10-CM: Z66  ICD-9-CM: V49.86  8/12/2017 - Present Yes        Acquired hypothyroidism (Chronic) ICD-10-CM: E03.9  ICD-9-CM: 244.9  9/12/2012 - Present Yes        Mixed hyperlipidemia (Chronic) ICD-10-CM: E78.2  ICD-9-CM: 272.2  9/12/2012 - Present Yes    Overview Addendum 9/12/2012  3:51 PM by Berta Tai     With high HDL  Intolerant of pravastatin,lovastatin and lescol             GERD (gastroesophageal reflux disease) (Chronic) ICD-10-CM: K21.9  ICD-9-CM: 530.81  9/12/2012 - Present Yes        Atrial fibrillation with RVR ([facility] ICD-10-CM: I48.91  ICD-9-CM: 427.31  9/12/2012 - Present Yes    Overview Addendum 10/14/2012  9:34 AM by Joan Krishnan MD     Paroxysmal.  Coumadin contraindicated due to falls                     A/P:    -SDH  Repeated CT stable  Patient to follow up with neurosx as outpatient.    -Afib  Rate controlled  Eliquis held due to problem as above.  To be restarted as per Neurosx    -HTN  Uncontrolled  On carvedilol and lisinopril   Add amlodipine     DVT Prophylaxis: SCDs  CODE Status: DNR  Plan of Care Discussed with: patient.  Care team.    Possible discharge tomorrow on AM    Salty Ray MD  07/17/18

## 2018-07-17 NOTE — PROGRESS NOTES
Date of Outreach Update:  Rahat Baez was seen and assessed. Sleeping, opened eyes to voice. MEWS Score: 1 (07/17/18 0820)  Vitals:    07/16/18 2024 07/16/18 2309 07/17/18 0328 07/17/18 0820   BP: 189/90 184/76 187/82 175/89   Pulse: 76  76 75   Resp: 16 16 16 18   Temp: 98.4 °F (36.9 °C)  98.6 °F (37 °C) 97.8 °F (36.6 °C)   SpO2: 99% 96% 98% 98%   Weight:       Height:             Pain Assessment  Pain Intensity 1: 0 (07/16/18 0926)  Pain Location 1: Chest  Pain Intervention(s) 1: Medication (see MAR) (nitro)  Patient Stated Pain Goal: 0      Previous Outreach assessment has been reviewed. There have been no significant clinical changes since the completion of the last dated Outreach assessment. Pt oriented to person only. VSS on room air, pt warm and dry, resp even/unlabored. Pt's daughter is at bedside and reports that pt has been awake much of the night and sleeping much of the day. Family is deliberating discharge plan options. Will continue to follow up per outreach protocol.     Signed By:   Bianka Larsen RN    July 17, 2018 9:20 AM

## 2018-07-17 NOTE — PROGRESS NOTES
Date of Outreach Update:  Landon Kathleen was seen and assessed. MEWS Score: (P) 1 (07/17/18 1435)  Vitals:    07/17/18 0820 07/17/18 0954 07/17/18 1130 07/17/18 1435   BP: 175/89  181/73 173/81   Pulse: 75  75 76   Resp: 18 20 18   Temp: 97.8 °F (36.6 °C)  97.8 °F (36.6 °C) 97.8 °F (36.6 °C)   SpO2: 98%  99% (!) 76%   Weight:  43.8 kg (96 lb 9.6 oz)     Height:  5' 4\" (1.626 m)           Pain Assessment  Pain Intensity 1: 0 (07/17/18 1334)  Pain Location 1: Chest  Pain Intervention(s) 1: Medication (see MAR) (nitro)  Patient Stated Pain Goal: 0      Previous Outreach assessment has been reviewed. There have been no significant clinical changes since the completion of the last dated Outreach assessment. Possible dc home tomorrow. No further outreach visits scheduled.     Signed By:   Saima Mendez RN    July 17, 2018 5:20 PM

## 2018-07-17 NOTE — PROGRESS NOTES
Problem: Falls - Risk of  Goal: *Absence of Falls  Document Jose Fall Risk and appropriate interventions in the flowsheet.    Outcome: Progressing Towards Goal  Fall Risk Interventions:  Mobility Interventions: Bed/chair exit alarm, Communicate number of staff needed for ambulation/transfer, OT consult for ADLs, Patient to call before getting OOB, PT Consult for mobility concerns, PT Consult for assist device competence    Mentation Interventions: Adequate sleep, hydration, pain control, Bed/chair exit alarm, Door open when patient unattended, Evaluate medications/consider consulting pharmacy, Eyeglasses and hearing aids, Familiar objects from home, Family/sitter at bedside, More frequent rounding, Reorient patient, Room close to nurse's station, Toileting rounds, Update white board    Medication Interventions: Bed/chair exit alarm, Evaluate medications/consider consulting pharmacy, Patient to call before getting OOB, Teach patient to arise slowly    Elimination Interventions: Bed/chair exit alarm, Call light in reach, Elevated toilet seat, Patient to call for help with toileting needs, Toilet paper/wipes in reach, Toileting schedule/hourly rounds    History of Falls Interventions: Bed/chair exit alarm, Consult care management for discharge planning, Door open when patient unattended, Evaluate medications/consider consulting pharmacy, Investigate reason for fall, Room close to nurse's station

## 2018-07-17 NOTE — PROGRESS NOTES
[facility] CRITICAL CARE OUTREACH NURSE PROGRESS REPORT      SUBJECTIVE: Called to assess patient secondary to transfer from ICU. MEWS Score: 1 (07/16/18 0734)  Vitals:    07/16/18 0734 07/16/18 1155 07/16/18 1339 07/16/18 1525   BP: 168/76 174/80 154/67 143/76   Pulse: 79 76  75   Resp: 16 16  16   Temp: 97.9 °F (36.6 °C) 98 °F (36.7 °C)  98.7 °F (37.1 °C)   SpO2: 96% 94%  96%   Weight:       Height:          EKG: V Paced rate 82, unchanged from previous tracings. LAB DATA:    Recent Labs      07/15/18   0316  07/14/18   1006   NA  138  136   K  3.6  3.4*   CL  100  96*   CO2  29  29   AGAP  9  11   GLU  131*  147*   BUN  26*  21   CREA  1.25*  1.50*   GFRAA  52*  42*   GFRNA  43*  35*   CA  8.3  8.9   MG   --   2.0   ALB  2.9*  3.4   TP  6.2*  7.2   GLOB  3.3  3.8*   AGRAT  0.9*  0.9*   ALT  12  12        Recent Labs      07/15/18   0316  07/14/18   1006   WBC  7.1  7.8   HGB  10.0*  11.8   HCT  29.3*  33.9*   PLT  241  243          OBJECTIVE: On arrival to room, I found patient to be resting in bed. Pain Assessment  Pain Intensity 1: 0 (07/16/18 0926)  Pain Location 1: Chest  Pain Intervention(s) 1: Medication (see MAR) (nitro)  Patient Stated Pain Goal: 0                                 ASSESSMENT:  Patient is alert and oriented x 3, disoriented to year. 02 sat 97% on RA. Lungs CTA. PLAN:  Continue to monitor per outreach protocol.

## 2018-07-17 NOTE — PROGRESS NOTES
Physical Therapy Note:    Attempted physical therapy treatment this AM, patient somnolent, did not awaken with total assist transfer to edge of bed. RN aware, will attempt later as patient is able.     Thank you,  KELLEY SethT

## 2018-07-17 NOTE — PROGRESS NOTES
Pt accepted to [facility] for [address], daughter Darrick Arce and made her aware of bed and she has accepted for DC tomorrow.

## 2018-07-17 NOTE — PROGRESS NOTES
PM&R Consult Progress Note      Patient: Rosemary Cali  Admit Date: 7/14/2018  Admit Diagnosis: Subdural hematoma ([address]  Recommendations: Continue Acute Rehab Program, Coordination of rehab/medical care, Counseling of PM & R care issues management, Subacute Rehab  -spoke with pts dtg; I do not expect to see significant improvement in Ms Olson's balance and cognitive deficits. She has always been very impulsive with poor safety awareness and will remain a high fall risk. For the past 1 1/2 yrs , her family has allowed her to live in her own home with a hired caregiver from 9a-5p and family alternates nights. Her dtg here today says that they are all burnt out but afraid to not let pt be in her own home. Her  does marketing for [employer] and there is some interest in Ms Belkis Griggs going there for short to possibly long term care.  -pt is not eating. Failure to thrive in multiple aspects. Overall, prognosis is poor given age and decline   History/Subjective/Complaint:     Patient seen and examined. Records reviewed. Ms Belkis Griggs is sitting up in her chair. Awake and alert. [place]. Denies pain. \" I want to go home. \" states this several times.      Pain 1  Pain Scale 1: Numeric (0 - 10) (07/16/18 0926)  Pain Intensity 1: 0 (07/16/18 0926)  Patient Stated Pain Goal: 0 (07/15/18 2045)  Pain Reassessment 1: Yes (07/15/18 1600)  Pain Onset 1: 2 hrs ago (07/15/18 1551)  Pain Location 1: Chest (07/15/18 1551)  Pain Orientation 1: Anterior (07/15/18 1551)  Pain Description 1: Aching (07/15/18 1551)  Pain Intervention(s) 1: Medication (see MAR) (nitro) (07/15/18 1551)     Objective:     Vitals:  Patient Vitals for the past 8 hrs:   BP Temp Pulse Resp SpO2 Height Weight   07/17/18 0954 - - - - - 5' 4\" (1.626 m) 96 lb 9.6 oz (43.8 kg)   07/17/18 0820 175/89 97.8 °F (36.6 °C) 75 18 98 % - -   07/17/18 0328 187/82 98.6 °F (37 °C) 76 16 98 % - -      Intake and Output:  07/15 1901 - 07/17 0700  In: 100 [I.V.:100]  Out: - Allergies   Allergen Reactions    Tylenol [Acetaminophen] Unknown (comments)     insomnia     Current Facility-Administered Medications   Medication Dose Route Frequency    amLODIPine (NORVASC) tablet 10 mg  10 mg Oral DAILY    levETIRAcetam (KEPPRA) tablet 500 mg  500 mg Oral Q12H    metoprolol (LOPRESSOR) injection 2.5 mg  2.5 mg IntraVENous Q6H PRN    lisinopril (PRINIVIL, ZESTRIL) tablet 20 mg  20 mg Oral DAILY    nitroglycerin (NITROSTAT) tablet 0.4 mg  0.4 mg SubLINGual PRN    levETIRAcetam (KEPPRA) 500 mg in 0.9% sodium chloride 100 mL IVPB  500 mg IntraVENous Q12H    levothyroxine (SYNTHROID) tablet 25 mcg  25 mcg Oral ACB    pantoprazole (PROTONIX) tablet 40 mg  40 mg Oral ACB    sodium chloride (NS) flush 5-10 mL  5-10 mL IntraVENous Q8H    sodium chloride (NS) flush 5-10 mL  5-10 mL IntraVENous PRN    labetalol (NORMODYNE;TRANDATE) injection 10 mg  10 mg IntraVENous Q6H PRN    traMADol (ULTRAM) tablet 50 mg  50 mg Oral Q6H PRN    carvedilol (COREG) tablet 25 mg  25 mg Oral BID WITH MEALS       Physical Exam:  Awake, alert, oriented to self, place, not time or situation. I told her why she was in the hospital and she recalled this after 2-3 minutes. NCAT, PERRLA, EOMI  Generalized non focal prox >distal weakness.  3/5 on left secondary to edema and pain  CV rrr no m  LUNGS cta b  ABD soft ntnd bs +  EXT no edema or calf tenderness    Incision(s)/Wound(s): Wound Groin Left (Active)   Number of days:83              Functional Assessment:  Gross Assessment  AROM: Generally decreased, functional (07/16/18 1000)  PROM: Generally decreased, functional (07/16/18 1000)  Strength: Generally decreased, functional (07/16/18 1000)  Coordination: Generally decreased, functional (07/16/18 1000)  Tone: Normal (07/16/18 1000)  Sensation: Impaired (07/16/18 1000)     Gait  Base of Support: Narrowed; Center of gravity altered (07/16/18 1000)  Speed/Chandni: Accelerated (07/16/18 1000)  Step Length: Right shortened;Left shortened (07/16/18 1000)  Swing Pattern: Left symmetrical;Right symmetrical (07/16/18 1000)  Stance: Right increased; Left increased (07/16/18 1000)  Gait Abnormalities: Decreased step clearance; Path deviations;Trunk sway increased; Festinating gait (07/16/18 1000)  Ambulation - Level of Assistance: Minimal assistance;Assist x2 (07/16/18 1000)  Distance (ft): 100 Feet (ft) (07/16/18 1000)  Assistive Device: Gait belt (hand held assist x2) (07/16/18 1000)  Interventions: Safety awareness training;Manual cues; Visual/Demos; Verbal cues (07/16/18 1000)     Bed Mobility  Rolling: Stand-by assistance;Contact guard assistance (07/16/18 1000)  Supine to Sit: Contact guard assistance (07/16/18 1000)  Scooting: Contact guard assistance (07/16/18 1000)     Balance  Sitting: Intact (07/16/18 1000)  Standing: Impaired (07/16/18 1000)  Standing - Static: Fair (07/16/18 1000)  Standing - Dynamic : Poor (07/16/18 1000)     Grooming  Grooming Assistance: Minimum assistance (07/16/18 0926)  Washing Hands: Minimum assistance (07/16/18 0926)           Toileting  Toileting Assistance: Contact guard assistance (07/16/18 0926)  Bladder Hygiene: Supervision/set-up (07/16/18 4521)  Clothing Management: Contact guard assistance (07/16/18 0926)     Bed/Mat Mobility  Rolling: Stand-by assistance;Contact guard assistance (07/16/18 1000)  Supine to Sit: Contact guard assistance (07/16/18 1000)  Sit to Stand: Minimum assistance (07/16/18 1000)  Bed to Chair: Minimum assistance (07/16/18 0926)  Scooting: Contact guard assistance (07/16/18 1000)     Labs/Studies:  Recent Results (from the past 72 hour(s))   AMMONIA    Collection Time: 07/14/18 11:29 AM   Result Value Ref Range    Ammonia <10 (L) 11 - 32 UMOL/L   EKG, 12 LEAD, INITIAL    Collection Time: 07/14/18 12:45 PM   Result Value Ref Range    Ventricular Rate 75 BPM    Atrial Rate 300 BPM    QRS Duration 148 ms    Q-T Interval 460 ms    QTC Calculation (Bezet) 513 ms Calculated P Axis 75 degrees    Calculated R Axis -171 degrees    Calculated T Axis 79 degrees    Diagnosis       !! AGE AND GENDER SPECIFIC ECG ANALYSIS !! Normal sinus rhythm Electronic ventricular pacemaker  Right superior axis deviation  Non-specific intra-ventricular conduction block  Right ventricular hypertrophy  Cannot rule out Anterior infarct (cited on or before 14-JUL-2018)  Abnormal ECG  When compared with ECG of 12-AUG-2017 06:01,  Previous ECG has undetermined rhythm, needs review  Non-specific intra-ventricular conduction block has replaced (RBBB and left   posterior fascicular block)  Questionable change in initial forces of Anterior leads  Confirmed by Arcadio Spencer MD (), STORM ROGER ([id number]) on 7/15/2018 1:44:69 AM     METABOLIC PANEL, COMPREHENSIVE    Collection Time: 07/15/18  3:16 AM   Result Value Ref Range    Sodium 138 136 - 145 mmol/L    Potassium 3.6 3.5 - 5.1 mmol/L    Chloride 100 98 - 107 mmol/L    CO2 29 21 - 32 mmol/L    Anion gap 9 7 - 16 mmol/L    Glucose 131 (H) 65 - 100 mg/dL    BUN 26 (H) 8 - 23 MG/DL    Creatinine 1.25 (H) 0.6 - 1.0 MG/DL    GFR est AA 52 (L) >60 ml/min/1.73m2    GFR est non-AA 43 (L) >60 ml/min/1.73m2    Calcium 8.3 8.3 - 10.4 MG/DL    Bilirubin, total 0.9 0.2 - 1.1 MG/DL    ALT (SGPT) 12 12 - 65 U/L    AST (SGOT) 11 (L) 15 - 37 U/L    Alk.  phosphatase 117 50 - 136 U/L    Protein, total 6.2 (L) 6.3 - 8.2 g/dL    Albumin 2.9 (L) 3.2 - 4.6 g/dL    Globulin 3.3 2.3 - 3.5 g/dL    A-G Ratio 0.9 (L) 1.2 - 3.5     CBC WITH AUTOMATED DIFF    Collection Time: 07/15/18  3:16 AM   Result Value Ref Range    WBC 7.1 4.3 - 11.1 K/uL    RBC 3.24 (L) 4.05 - 5.25 M/uL    HGB 10.0 (L) 11.7 - 15.4 g/dL    HCT 29.3 (L) 35.8 - 46.3 %    MCV 90.4 79.6 - 97.8 FL    MCH 30.9 26.1 - 32.9 PG    MCHC 34.1 31.4 - 35.0 g/dL    RDW 13.3 11.9 - 14.6 %    PLATELET 875 578 - 419 K/uL    MPV 10.4 (L) 10.8 - 14.1 FL    DF AUTOMATED      NEUTROPHILS 70 43 - 78 %    LYMPHOCYTES 18 13 - 44 %    MONOCYTES 11 4.0 - 12.0 %    EOSINOPHILS 1 0.5 - 7.8 %    BASOPHILS 0 0.0 - 2.0 %    IMMATURE GRANULOCYTES 0 0.0 - 5.0 %    ABS. NEUTROPHILS 5.1 1.7 - 8.2 K/UL    ABS. LYMPHOCYTES 1.3 0.5 - 4.6 K/UL    ABS. MONOCYTES 0.8 0.1 - 1.3 K/UL    ABS. EOSINOPHILS 0.0 0.0 - 0.8 K/UL    ABS. BASOPHILS 0.0 0.0 - 0.2 K/UL    ABS. IMM.  GRANS. 0.0 0.0 - 0.5 K/UL   URINALYSIS W/ RFLX MICROSCOPIC    Collection Time: 07/15/18  1:49 PM   Result Value Ref Range    Color YELLOW      Appearance CLEAR      Specific gravity 1.018 1.001 - 1.023      pH (UA) 5.5 5.0 - 9.0      Protein NEGATIVE  NEG mg/dL    Glucose NEGATIVE  mg/dL    Ketone TRACE (A) NEG mg/dL    Bilirubin NEGATIVE  NEG      Blood NEGATIVE  NEG      Urobilinogen 0.2 0.2 - 1.0 EU/dL    Nitrites NEGATIVE  NEG      Leukocyte Esterase TRACE (A) NEG     URINE MICROSCOPIC    Collection Time: 07/15/18  1:49 PM   Result Value Ref Range    WBC 0-3 0 /hpf    RBC 0-3 0 /hpf    Epithelial cells 0-3 0 /hpf    Bacteria TRACE 0 /hpf    Casts 0 0 /lpf    Crystals, urine 0 0 /LPF    Mucus 0 0 /lpf   EKG, 12 LEAD, SUBSEQUENT    Collection Time: 07/15/18  2:08 PM   Result Value Ref Range    Ventricular Rate 75 BPM    Atrial Rate 129 BPM    QRS Duration 152 ms    Q-T Interval 496 ms    QTC Calculation (Bezet) 553 ms    Calculated R Axis -45 degrees    Calculated T Axis 96 degrees    Diagnosis       Ventricular-paced rhythm  Biventricular pacemaker detected  probable underlying atrial fibrillation  When compared with ECG of 14-JUL-2018 12:45,  No significant change was found  Confirmed by EDMAR CHAN ([id number]) on 7/15/2018 2:43:10 PM     TROPONIN I    Collection Time: 07/15/18  3:01 PM   Result Value Ref Range    Troponin-I, Qt. <0.02 (L) 0.02 - 0.05 NG/ML   PLEASE READ & DOCUMENT PPD TEST IN 24 HRS    Collection Time: 07/16/18 10:37 AM   Result Value Ref Range    PPD negative Negative    mm Induration 0 mm   PLEASE READ & DOCUMENT PPD TEST IN 48 HRS    Collection Time: 07/17/18 10:29 AM   Result Value Ref Range    PPD negative Negative    mm Induration 0 mm        Assessment:     Principal Problem:    Subdural hematoma, post-traumatic ([facility] [address] (7/14/2018)    Active Problems:    Diabetes mellitus, type 2 ([facility] [address] (8/12/2017)      Acquired hypothyroidism (9/12/2012)      Mixed hyperlipidemia (9/12/2012)      Overview:  With high HDL      Intolerant of pravastatin,lovastatin and lescol      CKD (chronic kidney disease), stage III (8/12/2017)      GERD (gastroesophageal reflux disease) (9/12/2012)      Atrial fibrillation with RVR (HCC) (9/12/2012)      Overview: Paroxysmal.  Coumadin contraindicated due to falls            DNR (do not resuscitate) (8/12/2017)        Plan:     Recommendations: Continue Acute Rehab Program  Coordination of rehab/medical care  Counseling of PM & R care issues management  Monitoring and management of medical conditions per plan of care/orders  Discussion with Family/Caregiver/Staff  Reviewed Therapies/Labs/Medications/Records    Signed By:  Aguilar Jain MD     July 17, 2018

## 2018-07-18 ENCOUNTER — APPOINTMENT (OUTPATIENT)
Dept: CT IMAGING | Age: 83
End: 2018-07-18
Attending: EMERGENCY MEDICINE
Payer: MEDICARE

## 2018-07-18 ENCOUNTER — HOSPITAL ENCOUNTER (EMERGENCY)
Age: 83
Discharge: HOME | End: 2018-07-18
Attending: EMERGENCY MEDICINE
Payer: MEDICARE

## 2018-07-18 VITALS
RESPIRATION RATE: 17 BRPM | OXYGEN SATURATION: 95 % | HEIGHT: 64 IN | DIASTOLIC BLOOD PRESSURE: 67 MMHG | WEIGHT: 96.6 LBS | TEMPERATURE: 97.8 F | BODY MASS INDEX: 16.49 KG/M2 | HEART RATE: 76 BPM | SYSTOLIC BLOOD PRESSURE: 131 MMHG

## 2018-07-18 VITALS
RESPIRATION RATE: 20 BRPM | SYSTOLIC BLOOD PRESSURE: 196 MMHG | TEMPERATURE: 98.8 F | DIASTOLIC BLOOD PRESSURE: 90 MMHG | OXYGEN SATURATION: 98 % | HEART RATE: 75 BPM

## 2018-07-18 DIAGNOSIS — S06.5XAA SDH (SUBDURAL HEMATOMA): Primary | ICD-10-CM

## 2018-07-18 DIAGNOSIS — R56.9 OBSERVED SEIZURE-LIKE ACTIVITY (HCC): ICD-10-CM

## 2018-07-18 LAB
ALBUMIN SERPL-MCNC: 3.3 G/DL (ref 3.2–4.6)
ALBUMIN/GLOB SERPL: 0.9 {RATIO} (ref 1.2–3.5)
ALP SERPL-CCNC: 151 U/L (ref 50–136)
ALT SERPL-CCNC: 14 U/L (ref 12–65)
ANION GAP SERPL CALC-SCNC: 10 MMOL/L (ref 7–16)
AST SERPL-CCNC: 17 U/L (ref 15–37)
ATRIAL RATE: 117 BPM
BASOPHILS # BLD: 0 K/UL (ref 0–0.2)
BASOPHILS NFR BLD: 0 % (ref 0–2)
BILIRUB SERPL-MCNC: 0.6 MG/DL (ref 0.2–1.1)
BUN SERPL-MCNC: 14 MG/DL (ref 8–23)
CALCIUM SERPL-MCNC: 8.7 MG/DL (ref 8.3–10.4)
CALCULATED R AXIS, ECG10: 145 DEGREES
CALCULATED T AXIS, ECG11: -22 DEGREES
CHLORIDE SERPL-SCNC: 100 MMOL/L (ref 98–107)
CO2 SERPL-SCNC: 27 MMOL/L (ref 21–32)
CREAT SERPL-MCNC: 1.05 MG/DL (ref 0.6–1)
DIAGNOSIS, 93000: NORMAL
DIFFERENTIAL METHOD BLD: ABNORMAL
EOSINOPHIL # BLD: 0.1 K/UL (ref 0–0.8)
EOSINOPHIL NFR BLD: 2 % (ref 0.5–7.8)
ERYTHROCYTE [DISTWIDTH] IN BLOOD BY AUTOMATED COUNT: 13.1 % (ref 11.9–14.6)
GLOBULIN SER CALC-MCNC: 3.6 G/DL (ref 2.3–3.5)
GLUCOSE SERPL-MCNC: 200 MG/DL (ref 65–100)
HCT VFR BLD AUTO: 29.5 % (ref 35.8–46.3)
HGB BLD-MCNC: 10.2 G/DL (ref 11.7–15.4)
IMM GRANULOCYTES # BLD: 0 K/UL (ref 0–0.5)
IMM GRANULOCYTES NFR BLD AUTO: 0 % (ref 0–5)
LYMPHOCYTES # BLD: 1.4 K/UL (ref 0.5–4.6)
LYMPHOCYTES NFR BLD: 27 % (ref 13–44)
MCH RBC QN AUTO: 31.1 PG (ref 26.1–32.9)
MCHC RBC AUTO-ENTMCNC: 34.6 G/DL (ref 31.4–35)
MCV RBC AUTO: 89.9 FL (ref 79.6–97.8)
MM INDURATION POC: 0 MM (ref 0–5)
MONOCYTES # BLD: 0.5 K/UL (ref 0.1–1.3)
MONOCYTES NFR BLD: 10 % (ref 4–12)
NEUTS SEG # BLD: 3.1 K/UL (ref 1.7–8.2)
NEUTS SEG NFR BLD: 61 % (ref 43–78)
PLATELET # BLD AUTO: 320 K/UL (ref 150–450)
PMV BLD AUTO: 10.1 FL (ref 10.8–14.1)
POTASSIUM SERPL-SCNC: 3.3 MMOL/L (ref 3.5–5.1)
PPD POC: NORMAL NEGATIVE
PROT SERPL-MCNC: 6.9 G/DL (ref 6.3–8.2)
Q-T INTERVAL, ECG07: 468 MS
QRS DURATION, ECG06: 148 MS
QTC CALCULATION (BEZET), ECG08: 522 MS
RBC # BLD AUTO: 3.28 M/UL (ref 4.05–5.25)
SODIUM SERPL-SCNC: 137 MMOL/L (ref 136–145)
TROPONIN I SERPL-MCNC: <0.02 NG/ML (ref 0.02–0.05)
VENTRICULAR RATE, ECG03: 75 BPM
WBC # BLD AUTO: 5.2 K/UL (ref 4.3–11.1)

## 2018-07-18 PROCEDURE — 74011250636 HC RX REV CODE- 250/636: Performed by: EMERGENCY MEDICINE

## 2018-07-18 PROCEDURE — 74011250637 HC RX REV CODE- 250/637: Performed by: INTERNAL MEDICINE

## 2018-07-18 PROCEDURE — 99285 EMERGENCY DEPT VISIT HI MDM: CPT | Performed by: EMERGENCY MEDICINE

## 2018-07-18 PROCEDURE — C9254 INJECTION, LACOSAMIDE: HCPCS | Performed by: EMERGENCY MEDICINE

## 2018-07-18 PROCEDURE — 93005 ELECTROCARDIOGRAM TRACING: CPT | Performed by: EMERGENCY MEDICINE

## 2018-07-18 PROCEDURE — 84484 ASSAY OF TROPONIN QUANT: CPT | Performed by: EMERGENCY MEDICINE

## 2018-07-18 PROCEDURE — 70450 CT HEAD/BRAIN W/O DYE: CPT

## 2018-07-18 PROCEDURE — 85025 COMPLETE CBC W/AUTO DIFF WBC: CPT | Performed by: EMERGENCY MEDICINE

## 2018-07-18 PROCEDURE — 74011250637 HC RX REV CODE- 250/637: Performed by: HOSPITALIST

## 2018-07-18 PROCEDURE — 74011000258 HC RX REV CODE- 258: Performed by: EMERGENCY MEDICINE

## 2018-07-18 PROCEDURE — 80053 COMPREHEN METABOLIC PANEL: CPT | Performed by: EMERGENCY MEDICINE

## 2018-07-18 PROCEDURE — 74011000250 HC RX REV CODE- 250: Performed by: INTERNAL MEDICINE

## 2018-07-18 PROCEDURE — 96365 THER/PROPH/DIAG IV INF INIT: CPT | Performed by: EMERGENCY MEDICINE

## 2018-07-18 PROCEDURE — 74011250637 HC RX REV CODE- 250/637: Performed by: EMERGENCY MEDICINE

## 2018-07-18 RX ORDER — HYDRALAZINE HYDROCHLORIDE 10 MG/1
10 TABLET, FILM COATED ORAL 3 TIMES DAILY
Status: DISCONTINUED | OUTPATIENT
Start: 2018-07-18 | End: 2018-07-18 | Stop reason: HOSPADM

## 2018-07-18 RX ORDER — LEVETIRACETAM 500 MG/1
500 TABLET ORAL EVERY 12 HOURS
Qty: 60 TAB | Refills: 1 | Status: SHIPPED | OUTPATIENT
Start: 2018-07-18 | End: 2018-08-17

## 2018-07-18 RX ORDER — POTASSIUM CHLORIDE 20MEQ/15ML
20 LIQUID (ML) ORAL
Status: COMPLETED | OUTPATIENT
Start: 2018-07-18 | End: 2018-07-18

## 2018-07-18 RX ORDER — LACOSAMIDE 50 MG/1
50 TABLET ORAL 2 TIMES DAILY
Qty: 60 TAB | Refills: 0 | Status: SHIPPED | OUTPATIENT
Start: 2018-07-18 | End: 2018-08-17

## 2018-07-18 RX ORDER — LISINOPRIL 20 MG/1
20 TABLET ORAL DAILY
Qty: 30 TAB | Refills: 2 | Status: SHIPPED | OUTPATIENT
Start: 2018-07-18 | End: 2018-11-09 | Stop reason: SDUPTHER

## 2018-07-18 RX ORDER — HYDRALAZINE HYDROCHLORIDE 10 MG/1
10 TABLET, FILM COATED ORAL 3 TIMES DAILY
Qty: 90 TAB | Refills: 1 | Status: SHIPPED | OUTPATIENT
Start: 2018-07-18 | End: 2018-10-26 | Stop reason: SDUPTHER

## 2018-07-18 RX ORDER — TRAMADOL HYDROCHLORIDE 50 MG/1
50 TABLET ORAL
Qty: 15 TAB | Refills: 0 | Status: SHIPPED | OUTPATIENT
Start: 2018-07-18 | End: 2018-10-10 | Stop reason: SDUPTHER

## 2018-07-18 RX ORDER — CARVEDILOL 25 MG/1
25 TABLET ORAL 2 TIMES DAILY WITH MEALS
Qty: 60 TAB | Refills: 3 | Status: SHIPPED | OUTPATIENT
Start: 2018-07-18 | End: 2018-11-09 | Stop reason: SDUPTHER

## 2018-07-18 RX ORDER — AMLODIPINE BESYLATE 10 MG/1
10 TABLET ORAL DAILY
Qty: 30 TAB | Refills: 1 | Status: SHIPPED | OUTPATIENT
Start: 2018-07-19 | End: 2018-10-26 | Stop reason: SDUPTHER

## 2018-07-18 RX ADMIN — SODIUM CHLORIDE 100 MG: 900 INJECTION, SOLUTION INTRAVENOUS at 18:18

## 2018-07-18 RX ADMIN — HYDRALAZINE HYDROCHLORIDE 10 MG: 10 TABLET, FILM COATED ORAL at 09:55

## 2018-07-18 RX ADMIN — PANTOPRAZOLE SODIUM 40 MG: 40 TABLET, DELAYED RELEASE ORAL at 07:08

## 2018-07-18 RX ADMIN — CARVEDILOL 25 MG: 25 TABLET, FILM COATED ORAL at 07:55

## 2018-07-18 RX ADMIN — AMLODIPINE BESYLATE 10 MG: 10 TABLET ORAL at 08:05

## 2018-07-18 RX ADMIN — LISINOPRIL 20 MG: 20 TABLET ORAL at 08:06

## 2018-07-18 RX ADMIN — POTASSIUM CHLORIDE 20 MEQ: 20 SOLUTION ORAL at 18:18

## 2018-07-18 RX ADMIN — METOROPROLOL TARTRATE 2.5 MG: 5 INJECTION, SOLUTION INTRAVENOUS at 07:50

## 2018-07-18 RX ADMIN — LEVOTHYROXINE SODIUM 25 MCG: 50 TABLET ORAL at 07:08

## 2018-07-18 RX ADMIN — LEVETIRACETAM 500 MG: 500 TABLET ORAL at 09:55

## 2018-07-18 NOTE — DISCHARGE SUMMARY
Hospitalist Discharge Summary Admit Date:  2018  9:43 AM  
Name:  Geneva Waggoner Age:  80 y.o. 
:  8/10/1927 MRN:  [id number] PCP:  Sonia Arauz MD 
Treatment Team: Attending Provider: Ian Holter, MD; Care Manager: Jose Meléndez RN 
 
Problem List for this Hospitalization: 
Hospital Problems as of 2018  Date Reviewed: 2018 Codes Class Noted - Resolved POA * (Principal)Subdural hematoma, post-traumatic ([facility] [address] ICD-10-CM: B21.8D5E 
ICD-9-CM: 852.20  2018 - Present Yes Diabetes mellitus, type 2 (HCC) (Chronic) ICD-10-CM: E11.9 ICD-9-CM: 250.00  2017 - Present Yes  
   
 CKD (chronic kidney disease), stage III (Chronic) ICD-10-CM: N18.3 ICD-9-CM: 585.3  2017 - Present Yes DNR (do not resuscitate) (Chronic) ICD-10-CM: V19 ICD-9-CM: V49.86  2017 - Present Yes Acquired hypothyroidism (Chronic) ICD-10-CM: E03.9 ICD-9-CM: 244.9  2012 - Present Yes Mixed hyperlipidemia (Chronic) ICD-10-CM: Z94.6 ICD-9-CM: 272.2  2012 - Present Yes Overview Addendum 2012  3:51 PM by Jeovanny Denson With high HDL Intolerant of pravastatin,lovastatin and lescol GERD (gastroesophageal reflux disease) (Chronic) ICD-10-CM: K21.9 ICD-9-CM: 530.81  2012 - Present Yes Atrial fibrillation with RVR ([facility] ICD-10-CM: I48.91 
ICD-9-CM: 427.31  2012 - Present Yes Overview Addendum 10/14/2012  9:34 AM by Vernard Hashimoto, MD  
  Paroxysmal.  Coumadin contraindicated due to falls  
  
  
   
  
 
 
 
Admission HPI from 2018:   
\" 81 yo female who presented with confusion on a background of HTN, DM, sick sinus syndrome, HLD, systolic CHF, orthostatic hypotension, atrial fibrillation (on Eliquis) and ischemic cardiomyopathy (biventricular pacemaker in place). Her daughter reported that the patient fell at 5am on Thursday. She hit her head but did not lose consciousness.  She was lucid until Friday when she was noted to be more lethargic. She was seen by her PCP and gabapentin dose was decreased. She was also noted to have left wrist swelling an pain. Her mentation changes and lethargy prompted the ER visit. \" 
 
Hospital Course: In the ED CT head showed small acute right subdural hematoma with minimal midline shift. Neursurgery was consulted who recommended Keppra 500 BID and Q1H Neurochecks with repeat CT head 6 hours after the 1st CT scan. Patient was monitored at ICU and BP was better controlled. Patient had repeated CT brain that were stable. Neurosurgery recommended follow up at clinic for for further work up. Patient was evaluated by PT who recommended rehab. Patient is stable to be discharged. Patient's son at bedside all questions answered. Eliquis to be restarted as outpatient per neurosurgery. Patient needs follow up blood pressure upon discharge as it was on the higher side. Follow up instructions below. Plan was discussed with patient/family/careteam.  All questions answered. Patient was stable at time of discharge and was instructed to call or return if there are any concerns or recurrence of symptoms. Diagnostic Imaging/Tests: All Micro Results Procedure Component Value Units Date/Time C. DIFFICILE/EPI PCR [[id number]] Order Status:  Sent Specimen:  Stool Labs: Results:  
   
BMP, Mg, Phos No results for input(s): NA, K, CL, CO2, AGAP, BUN, CREA, CA, GLU, MG, PHOS in the last 72 hours. CBC No results for input(s): WBC, RBC, HGB, HCT, PLT, GRANS, LYMPH, EOS, MONOS, BASOS, IG, ANEU, ABL, TEENA, ABM, ABB, AIG, HGBEXT, HCTEXT, PLTEXT, HGBEXT, HCTEXT, PLTEXT in the last 72 hours. LFT No results for input(s): SGOT, ALT, TBIL, AP, TP, ALB, GLOB, AGRAT, GPT in the last 72 hours. Cardiac Testing Lab Results Component Value Date/Time   08/14/2017 07:42 AM  
  08/12/2017 05:42 AM  
  03/21/2017 12:51 PM  
  02/25/2017 07:20 AM  
 CK 69 07/14/2018 10:06 AM  
 CK - MB 0.9 07/14/2018 10:06 AM  
 CK-MB Index 1.3 07/14/2018 10:06 AM  
 Troponin-I, Qt. <0.02 (L) 07/15/2018 03:01 PM  
 Troponin-I, Qt. <0.02 (L) 07/14/2018 10:06 AM  
 Troponin-I, Qt. 0.12 (HH) 03/18/2017 06:56 PM  
  
Coagulation Tests Lab Results Component Value Date/Time Prothrombin time 17.6 (H) 07/14/2018 10:06 AM  
 Prothrombin time 13.3 04/23/2018 05:00 PM  
 Prothrombin time 10.9 03/15/2017 12:25 PM  
 INR 1.5 07/14/2018 10:06 AM  
 INR 1.1 04/23/2018 05:00 PM  
 INR 1.0 03/15/2017 12:25 PM  
 aPTT 44.8 (H) 02/26/2017 02:00 AM  
 aPTT 39.9 (H) 02/25/2017 04:10 PM  
 aPTT 31.7 07/09/2008 11:55 AM  
  
A1c Lab Results Component Value Date/Time Hemoglobin A1c 7.0 (H) 07/12/2018 04:06 PM  
 Hemoglobin A1c 7.0 (H) 04/12/2018 04:02 PM  
 Hemoglobin A1c 7.2 (H) 01/25/2018 04:04 PM  
  
Lipid Panel Lab Results Component Value Date/Time Cholesterol, total 170 04/12/2018 04:02 PM  
 HDL Cholesterol 62 04/12/2018 04:02 PM  
 LDL, calculated 74 04/12/2018 04:02 PM  
 VLDL, calculated 34 04/12/2018 04:02 PM  
 Triglyceride 168 (H) 04/12/2018 04:02 PM  
 CHOL/HDL Ratio 2.6 12/19/2016 08:50 AM  
  
Thyroid Panel Lab Results Component Value Date/Time T4, Total 15.8 (H) 02/03/2012 04:39 PM  
 T4, Total 14.8 (H) 09/09/2011 09:55 AM  
 T3 Uptake 37 02/03/2012 04:39 PM  
 T3 Uptake 38 09/09/2011 09:55 AM  
 TSH 0.492 01/25/2018 04:04 PM  
 TSH 4.810 (H) 03/28/2017 09:12 AM  
    
Most Recent UA Lab Results Component Value Date/Time  Color YELLOW 07/15/2018 01:49 PM  
 Appearance CLEAR 07/15/2018 01:49 PM  
 Specific gravity 1.018 07/15/2018 01:49 PM  
 pH (UA) 5.5 07/15/2018 01:49 PM  
 Protein NEGATIVE  07/15/2018 01:49 PM  
 Glucose NEGATIVE  07/15/2018 01:49 PM  
 Ketone TRACE (A) 07/15/2018 01:49 PM  
 Bilirubin NEGATIVE  07/15/2018 01:49 PM  
 Blood NEGATIVE  07/15/2018 01:49 PM  
 Urobilinogen 0.2 07/15/2018 01:49 PM  
 Nitrites NEGATIVE  07/15/2018 01:49 PM  
 Leukocyte Esterase TRACE (A) 07/15/2018 01:49 PM  
  
 
Allergies Allergen Reactions  Tylenol [Acetaminophen] Unknown (comments)  
  insomnia Immunization History Administered Date(s) Administered  Influenza High Dose Vaccine PF 10/08/2015, 09/20/2016, 10/19/2017  Influenza Vaccine 10/10/2013, 09/02/2014  Influenza Vaccine Split 10/15/2012  Pneumococcal Conjugate (PCV-13) 11/10/2015  Pneumococcal Polysaccharide (PPSV-23) 04/16/2013  TB Skin Test (PPD) Intradermal 03/09/2017, 04/28/2018, 07/15/2018  TDAP Vaccine 10/15/2012 All Labs from Last 24 Hrs: 
Recent Results (from the past 24 hour(s)) PLEASE READ & DOCUMENT PPD TEST IN 48 HRS Collection Time: 07/17/18 10:29 AM  
Result Value Ref Range PPD negative Negative  
 mm Induration 0 mm Discharge Exam: 
Patient Vitals for the past 24 hrs: 
 Temp Pulse Resp BP SpO2  
07/18/18 0845 - - - 160/89 -  
07/18/18 0742 98 °F (36.7 °C) 73 17 179/76 99 % 07/18/18 0537 98.3 °F (36.8 °C) 75 16 176/74 98 % 07/17/18 2215 98 °F (36.7 °C) 76 16 146/69 96 % 07/17/18 1940 98.1 °F (36.7 °C) 76 18 171/79 98 % 07/17/18 1435 97.8 °F (36.6 °C) 76 18 173/81 (!) 76 %  
07/17/18 1130 97.8 °F (36.6 °C) 75 20 181/73 99 % Oxygen Therapy O2 Sat (%): 99 % (07/18/18 0742) Pulse via Oximetry: 75 beats per minute (07/16/18 0508) O2 Device: Room air (07/16/18 7401) No intake or output data in the 24 hours ending 07/18/18 0929 Patient is sleeping comfortable in the bed. Son reported she was having insomnia last night. She finally fall sleep around 6:30 AM  
 
General:    Easy to arouse Eyes:   Normal sclera. ENT:  Normocephalic, atraumatic. CV:   No murmur, rub, or gallop. Lungs:  Clear to auscultation bilaterally. Abdomen: Soft, nontender, nondistended. Bowel sounds normal.  
Extremities: Warm and dry. No cyanosis or edema. Neurologic: No gross focal deficits Psych:  Normal mood and affect.  
 
Discharge Info:  
Current Discharge Medication List  
  
START taking these medications Details  
amLODIPine (NORVASC) 10 mg tablet Take 1 Tab by mouth daily. Qty: 30 Tab, Refills: 1  
  
hydrALAZINE (APRESOLINE) 10 mg tablet Take 1 Tab by mouth three (3) times daily. Qty: 90 Tab, Refills: 1  
  
levETIRAcetam (KEPPRA) 500 mg tablet Take 1 Tab by mouth every twelve (12) hours every twelve (12) hours. Qty: 60 Tab, Refills: 1 CONTINUE these medications which have CHANGED Details  
carvedilol (COREG) 25 mg tablet Take 1 Tab by mouth two (2) times daily (with meals). Qty: 60 Tab, Refills: 3 Associated Diagnoses: Femoral artery occlusion, left (HCC)  
  
lisinopril (PRINIVIL, ZESTRIL) 20 mg tablet Take 1 Tab by mouth daily. Qty: 30 Tab, Refills: 2 Associated Diagnoses: Chronic atrial fibrillation ([address] Femoral artery occlusion, left ([address] Chronic systolic congestive heart failure ([address] Autonomic neuropathy; Gait disorder; Congestive heart failure, unspecified HF chronicity, unspecified heart failure type ([address] Type 2 diabetes mellitus with microalbuminuria, without long-term current use of insulin ([address] Essential hypertension; Acquired hypothyroidism; Mixed hyperlipidemia; CKD (chronic kidney disease), stage III; Gastroesophageal reflux disease without esophagitis; Irritable bowel syndrome without diarrhea; Atrial fibrillation with RVR (HCC); MCI (mild cognitive impairment); Chronic pain syndrome; Cervical radiculopathy; Other dysphagia; Pacemaker; SSS (sick sinus syndrome) ([address] Orthostatic hypotension; CAD in native artery; Dyspnea, unspecified type; Atrial fibrillation with rapid ventricular response ([address] Cardiomyopathy, unspecified type ([address] Ischemic cardiomyopathy; DNR (do not resuscitate); S/P AV noris ablation; Biventricular cardiac pacemaker in situ; Type 2 diabetes mellitus with nephropathy (HCC)  
  
traMADol (ULTRAM) 50 mg tablet Take 1 Tab by mouth every six (6) hours as needed.  Max Daily Amount: 200 mg. 
Qty: 15 Tab, Refills: 0 Associated Diagnoses: Femoral artery occlusion, left ([address] CONTINUE these medications which have NOT CHANGED Details B.infantis-B.ani-B.long-B.bifi (PROBIOTIC 4X) 10-15 mg TbEC Take  by mouth.  
  
levothyroxine (SYNTHROID) 25 mcg tablet Take 1 Tab by mouth Daily (before breakfast). Qty: 90 Tab, Refills: 3  
  
ferrous sulfate 325 mg (65 mg iron) tablet Take 1 Tab by mouth two (2) times daily (with meals). Indications: Iron Deficiency Anemia Qty: 60 Tab, Refills: 2  
  
furosemide (LASIX) 20 mg tablet 1/2 tab po daily 
Qty: 60 Tab, Refills: 3 Associated Diagnoses: CAD in native artery  
  
omeprazole (PRILOSEC) 20 mg capsule Take 1 Cap by mouth two (2) times a day. Qty: 180 Cap, Refills: 3 Associated Diagnoses: Rib pain on left side  
  
traZODone (DESYREL) 50 mg tablet Take 1 Tab by mouth nightly. Qty: 30 Tab, Refills: 5  
  
nitroglycerin (NITROSTAT) 0.4 mg SL tablet 1 Tab by SubLINGual route every five (5) minutes as needed. Indications: ANGINA Qty: 1 Bottle, Refills: 3 STOP taking these medications  
  
 gabapentin (NEURONTIN) 300 mg capsule Comments:  
Reason for Stopping:   
   
 apixaban (ELIQUIS) 2.5 mg tablet Comments:  
Reason for Stopping:   
   
 senna-docusate (PERICOLACE) 8.6-50 mg per tablet Comments:  
Reason for Stopping:   
   
  
 
 
 
Disposition: [facility] Activity: PT/OT Eval and Treat Diet: Cardiac Diet Follow-up Information Follow up With Details Comments Contact Info Rodney Allred MD In 2 weeks appointment to be made upon d/c from [address] [place] [address] [id number] 
[phone] Sherryle Revel, MD On 7/31/2018 1:30 pm [address] [facility] [place] [address] [id number] 
[phone] Signed: Tania Guy MD

## 2018-07-18 NOTE — PROGRESS NOTES
In accordance with Medicare guidelines, a copy of the Important Letter from Medicare was presented to the patient's son in anticipation of expected discharge within 48 hours. One copy was given to the patient's son, and a signed copy was placed in the patients chart.

## 2018-07-18 NOTE — ED TRIAGE NOTES
Per ems patient in route to [facility] rehab, ems states while crossing railroad tracks patient had a 4 minute right sided facial droop and confusion which resolved prior to arrival to the rehab center, delayed responses noted upon arrival. Patient hx of subarachnoid hemorrhage and recently dc from [facility]. Pt has no complaints at this time mentation to baseline.     bgl [id number]

## 2018-07-18 NOTE — ED NOTES
I have reviewed discharge instructions with the caregiver. The caregiver verbalized understanding. Patient left ED via Discharge Method: stretcher to Home with ems    Opportunity for questions and clarification provided. Patient given 1 scripts. To continue your aftercare when you leave the hospital, you may receive an automated call from our care team to check in on how you are doing. This is a free service and part of our promise to provide the best care and service to meet your aftercare needs.  If you have questions, or wish to unsubscribe from this service please call [phone]. Thank you for Choosing our [facility] Department.

## 2018-07-18 NOTE — PROGRESS NOTES
Patient scheduled for transport today at 11:00am  via stretcher by [facility] to [address] [place] [address] room # 251. Patient and family notified. Care Management Interventions  PCP Verified by CM:  Yes  Transition of Care Consult (CM Consult): Discharge Planning  Current Support Network: Own Home, Lives Alone  Confirm Follow Up Transport: Family  Plan discussed with Pt/Family/Caregiver: Yes  Freedom of Choice Offered: Yes  Discharge Location  Discharge Placement: Rehab Unit Subacute [facility])

## 2018-07-18 NOTE — DISCHARGE INSTRUCTIONS
Seizure: Care Instructions  Your Care Instructions    Seizures are caused by abnormal patterns of electrical signals in the brain. They are different for each person. Seizures can affect movement, speech, vision, or awareness. Some people have only slight shaking of a hand and do not pass out. Other people may pass out and have violent shaking of the whole body. Some people appear to stare into space. They are awake, but they can't respond normally. Later, they may not remember what happened. You may need tests to identify the type and cause of the seizures. A seizure may occur only once, or you may have them more than one time. Taking medicines as directed and following up with your doctor may help keep you from having more seizures. The doctor has checked you carefully, but problems can develop later. If you notice any problems or new symptoms, get medical treatment right away. Follow-up care is a key part of your treatment and safety. Be sure to make and go to all appointments, and call your doctor if you are having problems. It's also a good idea to know your test results and keep a list of the medicines you take. How can you care for yourself at home? · Be safe with medicines. Take your medicines exactly as prescribed. Call your doctor if you think you are having a problem with your medicine. · Do not do any activity that could be dangerous to you or others until your doctor says it is safe to do so. For example, do not drive a car, operate machinery, swim, or climb ladders. · Be sure that anyone treating you for any health problem knows that you have had a seizure and what medicines you are taking for it. · Identify and avoid things that may make you more likely to have a seizure. These may include lack of sleep, alcohol or drug use, stress, or not eating. · Make sure you go to your follow-up appointment. When should you call for help? Call 911 anytime you think you may need emergency care. For example, call if:    · You have another seizure.     · You have more than one seizure in 24 hours.     · You have new symptoms, such as trouble walking, speaking, or thinking clearly.    Call your doctor now or seek immediate medical care if:    · You are not acting normally.    Watch closely for changes in your health, and be sure to contact your doctor if you have any problems. Where can you learn more? Go to http://david-rui.info/. Enter S095 in the search box to learn more about \"Seizure: Care Instructions. \"  Current as of: October 9, 2017  Content Version: 11.7  © 6250-6941 [employer]. Care instructions adapted under license by [employer] (which disclaims liability or warranty for this information). If you have questions about a medical condition or this instruction, always ask your healthcare professional. [address] any warranty or liability for your use of this information. Subdural Hematoma: Care Instructions  Your Care Instructions  A subdural hematoma is a buildup of blood between the layers of tissue that cover the brain. The blood collects under the layer closest to the skull. (This layer is called the dura.) The bleeding is most often caused by a head injury, but there can be other causes. In an older adult, even a minor injury can lead to a subdural hematoma. The buildup of blood inside the skull can put pressure on the brain. This may cause symptoms, such as a severe headache, confusion, or seizures. There are two kinds of hematomas: acute and chronic. · With an acute hematoma, symptoms start soon after the injury. · With a chronic hematoma, it may be days or weeks before symptoms appear. Doctors use imaging tests to find the buildup of blood. You may have a test such as a CT scan or MRI. The doctor may also do a test to check the pressure inside your skull. Bleeding inside the skull may get worse over time. So it is very important to pay attention to your symptoms. And be sure to see your doctor for follow-up testing. In some cases, treatment is needed to remove the blood. This helps relieve the pressure on the brain. Your doctor may make one or two small holes in your skull. For a large hematoma, the doctor may need to remove a piece of the skull. You may not need treatment if you have a small hematoma that is not causing symptoms. If you take aspirin or some other blood thinner, you may need to stop taking it. The doctor may give you treatment to undo the effects of the blood thinner. This can help prevent more bleeding in the skull. The doctor has checked you carefully, but problems can develop later. If you notice any problems or new symptoms, get medical treatment right away. Follow-up care is a key part of your treatment and safety. Be sure to make and go to all appointments, and call your doctor if you are having problems. It's also a good idea to know your test results and keep a list of the medicines you take. How can you care for yourself at home? For an acute hematoma  · Follow your doctor's instructions. He or she will tell you if you need someone to watch you closely for the next 24 hours or longer. For a chronic hematoma  · Get plenty of sleep at night, and take it easy during the day. Rest is the best way to recover. · Avoid activities that are physically or mentally demanding. These include housework, exercise, paperwork, video games, text messaging, and using the computer. You may need to change your work or school schedule for a while. · Return to your normal activities slowly. Do not try to do too much at once. For either type of hematoma  · Do not drink alcohol until your doctor says it is okay. · Don't drive a car, ride a bike, or operate machinery until your doctor says it's okay. · If you take aspirin or some other blood thinner, be sure to talk to your doctor.  He or she will tell you if and when to start taking this medicine again. Make sure that you understand exactly what your doctor wants you to do. · If you normally take medicine, your doctor will tell you if and when you can restart it. He or she will also give you instructions about taking any new medicines. When should you call for help? Call 911 anytime you think you may need emergency care. For example, call if:    · You have a seizure.     · You passed out (lost consciousness).     · You are confused or can't stay awake.    Call your doctor now or seek immediate medical care if:    · You have new or worse vomiting.     · You feel less alert.     · You have new weakness or numbness in any part of your body.     · You have a headache that is getting worse.    Watch closely for changes in your health, and be sure to contact your doctor if:    · You do not get better as expected.     · You have new symptoms, such as headaches, trouble concentrating, or changes in mood. Where can you learn more? Go to http://david-rui.info/. Enter Y249 in the search box to learn more about \"Subdural Hematoma: Care Instructions. \"  Current as of: October 9, 2017  Content Version: 11.7  © 1471-0890 [employer]. Care instructions adapted under license by [employer] (which disclaims liability or warranty for this information). If you have questions about a medical condition or this instruction, always ask your healthcare professional. [place] [address] any warranty or liability for your use of this information.

## 2018-07-18 NOTE — ED PROVIDER NOTES
HPI Comments: 69-year-old female with history of subdural hematoma presents via EMS with report of witnessed seizure-like activity x 4 minutes. Patient discharged from [facility] [place] [facility] in route to [facility]. After crossing railroad tracks patient had episode. Patient reportedly \"had facial droop and was shaking\". Daughter states patient appeared confused following incident. Patient currently on prophylactic anti-epileptic therapy (keppra). Patient with no other complaints at this time. Family state the patient is at her baseline mental status. Denies fever, vomiting, chest pain, shortness of breath, cough, abdominal pain. State the patient did not have tongue biting or any bowel or bladder incontinence. Patient is a 80 y.o. female presenting with other event. The history is provided by a relative. No  was used. Other   This is a new problem. The current episode started less than 1 hour ago. The problem occurs rarely. The problem has been resolved. Pertinent negatives include no chest pain, no abdominal pain, no headaches and no shortness of breath. Nothing aggravates the symptoms. Nothing relieves the symptoms.         Past Medical History:   Diagnosis Date    A fib     Abnormal loss of weight     Acquired hypothyroidism 9/12/2012    Acute respiratory failure with hypoxemia ([address] 8/12/2017    Anemia     Anorexia 9/2/2014    Arthritis associated with another disorder     Atrial fibrillation ([address] 9/12/2012    Paroxysmal.  Coumadin contraindicated due to falls      Autonomic neuropathy 1/13/2013    CAD (coronary artery disease)     CAD in native artery 5/5/2016    CAP (community acquired pneumonia) 8/12/2017    Cervical radiculopathy 5/23/2013    Chest pain     Chronic kidney disease     Chronic pain syndrome 1/13/2013    Back, right shoulder, neck: Peripheral neuropathy, spinal stenosis C7-T1, foraminal narrowing C4-5     CKD (chronic kidney disease), stage III 9/12/2012    Diabetes ([facility]     about 30 years    Diabetes mellitus     does not check sugar     Diabetes mellitus, type 2 ([facility] 9/12/2012    Dysphagia 9/2/2014    Due to esophageal spasm seen on modified barium swallow 2015     Gait disorder 9/12/2012    GERD (gastroesophageal reflux disease)     Heart failure ([facility]     HTN     Hypertension 9/12/2012    Orthostatic hypotension due to autonomic neuropathy     Hypothyroid     IBS (irritable bowel syndrome) 9/12/2012    Iron deficiency 9/12/2012    Lumbar disc disease     MCI (mild cognitive impairment) 1/13/2013    Mixed hyperlipidemia 9/12/2012    Orthostatic hypotension 5/5/2016    Pacemaker     SSS (sick sinus syndrome) ([facility] 5/5/2016       Past Surgical History:   Procedure Laterality Date    HX APPENDECTOMY      HX CARPAL TUNNEL RELEASE      HX CERVICAL FUSION      HX COLONOSCOPY  Nov 2005    HX PACEMAKER  6/08    HX PACEMAKER  2017    Replacement     VASCULAR SURGERY PROCEDURE UNLIST Left 04/25/2018    fem embolectomy         No family history on file. Social History     Social History    Marital status:      Spouse name: N/A    Number of children: N/A    Years of education: N/A     Occupational History    Not on file. Social History Main Topics    Smoking status: Never Smoker    Smokeless tobacco: Never Used    Alcohol use No    Drug use: No    Sexual activity: Not on file     Other Topics Concern    Not on file     Social History Narrative    Lives with  who has mild vascular dementia, patient is primary caretaker but daughters come over to house 4-5 times a week, Nirmala Perry has HCPOA         ALLERGIES: Tylenol [acetaminophen]    Review of Systems   Constitutional: Negative for chills, fatigue and fever. HENT: Negative for congestion, mouth sores, rhinorrhea and sore throat. Eyes: Negative for visual disturbance. Respiratory: Negative for cough and shortness of breath. Cardiovascular: Negative for chest pain, palpitations and leg swelling. Gastrointestinal: Negative for abdominal pain, blood in stool, diarrhea, nausea and vomiting. Genitourinary: Negative for dysuria and flank pain. Musculoskeletal: Negative for back pain, gait problem, joint swelling, neck pain and neck stiffness. Skin: Negative for pallor and rash. Neurological: Positive for seizures. Negative for dizziness, syncope, weakness, numbness and headaches. Psychiatric/Behavioral: Negative for agitation and confusion. Vitals:    07/18/18 1459 07/18/18 1519 07/18/18 1539 07/18/18 1553   BP: 168/77 155/74 157/76    Pulse: 77 74 75 75   Resp: 17  21 20   Temp:       SpO2: 98% 99% 98% 99%            Physical Exam   Constitutional: She is oriented to person, place, and time. Patient well appearing in no acute distress. Nontoxic in appearance. HENT:   Head: Normocephalic. Mouth/Throat: Oropharynx is clear and moist.   MMM. No tongue trauma present. Eyes: Conjunctivae are normal. Pupils are equal, round, and reactive to light. No nystagmus. Neck: Normal range of motion. No JVD present. No tracheal deviation present. Cardiovascular: Normal rate, regular rhythm, normal heart sounds and intact distal pulses. Pulses 2+ and equal throughout. Pulmonary/Chest: Effort normal and breath sounds normal. She has no rales. CTAB. Abdominal: Soft. There is no rebound and no guarding. Soft, NTND. No rebound or guarding. No CVAT. Musculoskeletal: Normal range of motion. She exhibits no edema. Neurological: She is alert and oriented to person, place, and time. No cranial nerve deficit. Coordination normal.   Patient following commands. Strength 5/5 throughout. No facial droop. Skin: Skin is warm and dry. No rash. Psychiatric: She has a normal mood and affect. Her behavior is normal.   Nursing note and vitals reviewed.        MDM  Number of Diagnoses or Management Options  Observed seizure-like activity ([facility]): new and requires workup  SDH (subdural hematoma) [facility]):   Diagnosis management comments: Patient with known right-sided subdural hematoma at around 8 mm in size. CT head stable. Other labs within normal limits. Patient is returned to baseline. Neurosurgery consult. Recommend neurology consult. Dr. Ashley Payne consulted. Recommends giving Vimpat 100 IV now and discharging home with Vimpat 50 BID. Amount and/or Complexity of Data Reviewed  Clinical lab tests: ordered and reviewed  Tests in the radiology section of CPT®: ordered and reviewed  Tests in the medicine section of CPT®: ordered and reviewed  Review and summarize past medical records: yes  Independent visualization of images, tracings, or specimens: yes    Risk of Complications, Morbidity, and/or Mortality  Presenting problems: moderate  Diagnostic procedures: low  Management options: low    Patient Progress  Patient progress: stable        ED Course   Comment By Time   CT head IMPRESSION: Stable 8 mm acute right-sided subdural hematoma with only local mass effect. No new findings.  Katharina Hui., MD 07/18 0781       Procedures     Results Include:    Recent Results (from the past 24 hour(s))   PLEASE READ & DOCUMENT PPD TEST IN 72 HRS    Collection Time: 07/18/18  8:50 AM   Result Value Ref Range    PPD neg Negative    mm Induration 0 mm   EKG, 12 LEAD, INITIAL    Collection Time: 07/18/18  2:37 PM   Result Value Ref Range    Ventricular Rate 75 BPM    Atrial Rate 117 BPM    QRS Duration 148 ms    Q-T Interval 468 ms    QTC Calculation (Bezet) 522 ms    Calculated R Axis 145 degrees    Calculated T Axis -22 degrees    Diagnosis       Electronic ventricular pacemaker  Confirmed by Ely Nagel MD (), PANCHO REYES ([id number]) on 7/18/2018 5:33:19 PM     CBC WITH AUTOMATED DIFF    Collection Time: 07/18/18  2:42 PM   Result Value Ref Range    WBC 5.2 4.3 - 11.1 K/uL    RBC 3.28 (L) 4.05 - 5.25 M/uL    HGB 10.2 (L) 11.7 - 15.4 g/dL    HCT 29.5 (L) 35.8 - 46.3 %    MCV 89.9 79.6 - 97.8 FL    MCH 31.1 26.1 - 32.9 PG    MCHC 34.6 31.4 - 35.0 g/dL    RDW 13.1 11.9 - 14.6 %    PLATELET 778 792 - 711 K/uL    MPV 10.1 (L) 10.8 - 14.1 FL    DF AUTOMATED      NEUTROPHILS 61 43 - 78 %    LYMPHOCYTES 27 13 - 44 %    MONOCYTES 10 4.0 - 12.0 %    EOSINOPHILS 2 0.5 - 7.8 %    BASOPHILS 0 0.0 - 2.0 %    IMMATURE GRANULOCYTES 0 0.0 - 5.0 %    ABS. NEUTROPHILS 3.1 1.7 - 8.2 K/UL    ABS. LYMPHOCYTES 1.4 0.5 - 4.6 K/UL    ABS. MONOCYTES 0.5 0.1 - 1.3 K/UL    ABS. EOSINOPHILS 0.1 0.0 - 0.8 K/UL    ABS. BASOPHILS 0.0 0.0 - 0.2 K/UL    ABS. IMM. GRANS. 0.0 0.0 - 0.5 K/UL   METABOLIC PANEL, COMPREHENSIVE    Collection Time: 07/18/18  2:42 PM   Result Value Ref Range    Sodium 137 136 - 145 mmol/L    Potassium 3.3 (L) 3.5 - 5.1 mmol/L    Chloride 100 98 - 107 mmol/L    CO2 27 21 - 32 mmol/L    Anion gap 10 7 - 16 mmol/L    Glucose 200 (H) 65 - 100 mg/dL    BUN 14 8 - 23 MG/DL    Creatinine 1.05 (H) 0.6 - 1.0 MG/DL    GFR est AA >60 >60 ml/min/1.73m2    GFR est non-AA 52 (L) >60 ml/min/1.73m2    Calcium 8.7 8.3 - 10.4 MG/DL    Bilirubin, total 0.6 0.2 - 1.1 MG/DL    ALT (SGPT) 14 12 - 65 U/L    AST (SGOT) 17 15 - 37 U/L    Alk. phosphatase 151 (H) 50 - 136 U/L    Protein, total 6.9 6.3 - 8.2 g/dL    Albumin 3.3 3.2 - 4.6 g/dL    Globulin 3.6 (H) 2.3 - 3.5 g/dL    A-G Ratio 0.9 (L) 1.2 - 3.5     TROPONIN I    Collection Time: 07/18/18  2:42 PM   Result Value Ref Range    Troponin-I, Qt. <0.02 (L) 0.02 - 0.05 NG/ML         Owen Gill MD; 7/18/2018 @5:11 PM Voice dictation software was used during the making of this note. This software is not perfect and grammatical and other typographical errors may be present.   This note has not been proofread for errors.  ===================================================================

## 2018-07-18 NOTE — PROGRESS NOTES
C-Diff testing has been ordered but the test has not been completed. It did not meet testing criteria because:  · The patient has not had 3 or more liquid bowel movements in the last 24 hours.

## 2018-07-30 ENCOUNTER — HOSPITAL ENCOUNTER (OUTPATIENT)
Dept: CT IMAGING | Age: 83
Discharge: HOME | End: 2018-07-30
Attending: NEUROLOGICAL SURGERY
Payer: MEDICARE

## 2018-07-30 DIAGNOSIS — S06.5XAA SDH (SUBDURAL HEMATOMA): ICD-10-CM

## 2018-07-30 PROCEDURE — 70450 CT HEAD/BRAIN W/O DYE: CPT

## 2018-07-31 ENCOUNTER — HOME HEALTH ADMISSION (OUTPATIENT)
Dept: HOME HEALTH SERVICES | Facility: HOME HEALTH | Age: 83
End: 2018-07-31
Payer: MEDICARE

## 2018-08-02 ENCOUNTER — HOME CARE VISIT (OUTPATIENT)
Dept: SCHEDULING | Facility: HOME HEALTH | Age: 83
End: 2018-08-02
Payer: MEDICARE

## 2018-08-02 VITALS
RESPIRATION RATE: 12 BRPM | HEART RATE: 80 BPM | DIASTOLIC BLOOD PRESSURE: 72 MMHG | SYSTOLIC BLOOD PRESSURE: 156 MMHG | TEMPERATURE: 97.8 F

## 2018-08-02 PROCEDURE — 400013 HH SOC

## 2018-08-02 PROCEDURE — 3331090001 HH PPS REVENUE CREDIT

## 2018-08-02 PROCEDURE — G0151 HHCP-SERV OF PT,EA 15 MIN: HCPCS

## 2018-08-02 PROCEDURE — 3331090002 HH PPS REVENUE DEBIT

## 2018-08-03 PROBLEM — R56.9 SEIZURES (HCC): Status: ACTIVE | Noted: 2018-08-03

## 2018-08-03 PROCEDURE — 3331090002 HH PPS REVENUE DEBIT

## 2018-08-03 PROCEDURE — 3331090001 HH PPS REVENUE CREDIT

## 2018-08-04 PROCEDURE — 3331090001 HH PPS REVENUE CREDIT

## 2018-08-04 PROCEDURE — 3331090002 HH PPS REVENUE DEBIT

## 2018-08-05 PROCEDURE — 3331090001 HH PPS REVENUE CREDIT

## 2018-08-05 PROCEDURE — 3331090002 HH PPS REVENUE DEBIT

## 2018-08-06 ENCOUNTER — HOME CARE VISIT (OUTPATIENT)
Dept: SCHEDULING | Facility: HOME HEALTH | Age: 83
End: 2018-08-06
Payer: MEDICARE

## 2018-08-06 VITALS
TEMPERATURE: 97.5 F | HEART RATE: 72 BPM | SYSTOLIC BLOOD PRESSURE: 144 MMHG | DIASTOLIC BLOOD PRESSURE: 72 MMHG | RESPIRATION RATE: 16 BRPM

## 2018-08-06 VITALS
DIASTOLIC BLOOD PRESSURE: 64 MMHG | HEART RATE: 75 BPM | SYSTOLIC BLOOD PRESSURE: 140 MMHG | RESPIRATION RATE: 16 BRPM | TEMPERATURE: 97.5 F

## 2018-08-06 PROCEDURE — 3331090002 HH PPS REVENUE DEBIT

## 2018-08-06 PROCEDURE — G0153 HHCP-SVS OF S/L PATH,EA 15MN: HCPCS

## 2018-08-06 PROCEDURE — G0152 HHCP-SERV OF OT,EA 15 MIN: HCPCS

## 2018-08-06 PROCEDURE — 3331090001 HH PPS REVENUE CREDIT

## 2018-08-07 ENCOUNTER — HOME CARE VISIT (OUTPATIENT)
Dept: SCHEDULING | Facility: HOME HEALTH | Age: 83
End: 2018-08-07
Payer: MEDICARE

## 2018-08-07 PROCEDURE — 3331090001 HH PPS REVENUE CREDIT

## 2018-08-07 PROCEDURE — 3331090002 HH PPS REVENUE DEBIT

## 2018-08-07 PROCEDURE — G0151 HHCP-SERV OF PT,EA 15 MIN: HCPCS

## 2018-08-08 ENCOUNTER — HOME CARE VISIT (OUTPATIENT)
Dept: SCHEDULING | Facility: HOME HEALTH | Age: 83
End: 2018-08-08
Payer: MEDICARE

## 2018-08-08 VITALS
DIASTOLIC BLOOD PRESSURE: 68 MMHG | HEART RATE: 72 BPM | TEMPERATURE: 97.6 F | RESPIRATION RATE: 16 BRPM | SYSTOLIC BLOOD PRESSURE: 122 MMHG

## 2018-08-08 PROCEDURE — 3331090001 HH PPS REVENUE CREDIT

## 2018-08-08 PROCEDURE — G0153 HHCP-SVS OF S/L PATH,EA 15MN: HCPCS

## 2018-08-08 PROCEDURE — 3331090002 HH PPS REVENUE DEBIT

## 2018-08-09 ENCOUNTER — HOME CARE VISIT (OUTPATIENT)
Dept: SCHEDULING | Facility: HOME HEALTH | Age: 83
End: 2018-08-09
Payer: MEDICARE

## 2018-08-09 PROCEDURE — 3331090002 HH PPS REVENUE DEBIT

## 2018-08-09 PROCEDURE — G0157 HHC PT ASSISTANT EA 15: HCPCS

## 2018-08-09 PROCEDURE — 3331090001 HH PPS REVENUE CREDIT

## 2018-08-10 VITALS
TEMPERATURE: 97.2 F | SYSTOLIC BLOOD PRESSURE: 132 MMHG | DIASTOLIC BLOOD PRESSURE: 78 MMHG | HEART RATE: 82 BPM | RESPIRATION RATE: 18 BRPM

## 2018-08-10 PROCEDURE — 3331090002 HH PPS REVENUE DEBIT

## 2018-08-10 PROCEDURE — 3331090001 HH PPS REVENUE CREDIT

## 2018-08-11 PROCEDURE — 3331090001 HH PPS REVENUE CREDIT

## 2018-08-11 PROCEDURE — 3331090002 HH PPS REVENUE DEBIT

## 2018-08-12 PROCEDURE — 3331090001 HH PPS REVENUE CREDIT

## 2018-08-12 PROCEDURE — 3331090002 HH PPS REVENUE DEBIT

## 2018-08-13 ENCOUNTER — HOME CARE VISIT (OUTPATIENT)
Dept: HOME HEALTH SERVICES | Facility: HOME HEALTH | Age: 83
End: 2018-08-13
Payer: MEDICARE

## 2018-08-13 VITALS
TEMPERATURE: 97.2 F | DIASTOLIC BLOOD PRESSURE: 78 MMHG | SYSTOLIC BLOOD PRESSURE: 132 MMHG | HEART RATE: 82 BPM | RESPIRATION RATE: 18 BRPM

## 2018-08-13 PROCEDURE — 3331090002 HH PPS REVENUE DEBIT

## 2018-08-13 PROCEDURE — 3331090001 HH PPS REVENUE CREDIT

## 2018-08-14 ENCOUNTER — HOSPITAL ENCOUNTER (OUTPATIENT)
Dept: LAB | Age: 83
Discharge: HOME | End: 2018-08-14
Payer: MEDICARE

## 2018-08-14 DIAGNOSIS — R56.9 SEIZURE (HCC): ICD-10-CM

## 2018-08-14 PROCEDURE — 3331090002 HH PPS REVENUE DEBIT

## 2018-08-14 PROCEDURE — 3331090001 HH PPS REVENUE CREDIT

## 2018-08-14 PROCEDURE — 80185 ASSAY OF PHENYTOIN TOTAL: CPT

## 2018-08-14 PROCEDURE — 36415 COLL VENOUS BLD VENIPUNCTURE: CPT

## 2018-08-15 ENCOUNTER — HOME CARE VISIT (OUTPATIENT)
Dept: SCHEDULING | Facility: HOME HEALTH | Age: 83
End: 2018-08-15
Payer: MEDICARE

## 2018-08-15 ENCOUNTER — HOME CARE VISIT (OUTPATIENT)
Dept: HOME HEALTH SERVICES | Facility: HOME HEALTH | Age: 83
End: 2018-08-15
Payer: MEDICARE

## 2018-08-15 LAB
PHENYTOIN FREE SERPL-MCNC: ABNORMAL UG/ML (ref 1–2)
PHENYTOIN SERPL-MCNC: 2.6 UG/ML (ref 10–20)

## 2018-08-15 PROCEDURE — 3331090001 HH PPS REVENUE CREDIT

## 2018-08-15 PROCEDURE — 3331090002 HH PPS REVENUE DEBIT

## 2018-08-16 ENCOUNTER — HOME CARE VISIT (OUTPATIENT)
Dept: SCHEDULING | Facility: HOME HEALTH | Age: 83
End: 2018-08-16
Payer: MEDICARE

## 2018-08-16 PROCEDURE — 3331090001 HH PPS REVENUE CREDIT

## 2018-08-16 PROCEDURE — 3331090002 HH PPS REVENUE DEBIT

## 2018-08-17 PROCEDURE — 3331090002 HH PPS REVENUE DEBIT

## 2018-08-17 PROCEDURE — 3331090001 HH PPS REVENUE CREDIT

## 2018-08-18 PROCEDURE — 3331090002 HH PPS REVENUE DEBIT

## 2018-08-18 PROCEDURE — 3331090001 HH PPS REVENUE CREDIT

## 2018-08-19 PROCEDURE — 3331090002 HH PPS REVENUE DEBIT

## 2018-08-19 PROCEDURE — 3331090001 HH PPS REVENUE CREDIT

## 2018-08-20 PROCEDURE — 3331090002 HH PPS REVENUE DEBIT

## 2018-08-20 PROCEDURE — 3331090001 HH PPS REVENUE CREDIT

## 2018-08-21 ENCOUNTER — HOME CARE VISIT (OUTPATIENT)
Dept: SCHEDULING | Facility: HOME HEALTH | Age: 83
End: 2018-08-21
Payer: MEDICARE

## 2018-08-21 PROCEDURE — 3331090002 HH PPS REVENUE DEBIT

## 2018-08-21 PROCEDURE — 3331090001 HH PPS REVENUE CREDIT

## 2018-08-22 ENCOUNTER — HOSPITAL ENCOUNTER (OUTPATIENT)
Dept: LAB | Age: 83
Discharge: HOME | End: 2018-08-22

## 2018-08-22 ENCOUNTER — HOME CARE VISIT (OUTPATIENT)
Dept: SCHEDULING | Facility: HOME HEALTH | Age: 83
End: 2018-08-22
Payer: MEDICARE

## 2018-08-22 VITALS — DIASTOLIC BLOOD PRESSURE: 60 MMHG | SYSTOLIC BLOOD PRESSURE: 120 MMHG

## 2018-08-22 LAB
ANION GAP SERPL CALC-SCNC: 16 MMOL/L (ref 7–16)
BUN SERPL-MCNC: 28 MG/DL (ref 8–23)
CALCIUM SERPL-MCNC: 7.8 MG/DL (ref 8.3–10.4)
CHLORIDE SERPL-SCNC: 95 MMOL/L (ref 98–107)
CO2 SERPL-SCNC: 24 MMOL/L (ref 21–32)
CREAT SERPL-MCNC: 1.92 MG/DL (ref 0.6–1)
GLUCOSE SERPL-MCNC: 168 MG/DL (ref 65–100)
POTASSIUM SERPL-SCNC: 4.7 MMOL/L (ref 3.5–5.1)
SODIUM SERPL-SCNC: 135 MMOL/L (ref 136–145)

## 2018-08-22 PROCEDURE — 3331090002 HH PPS REVENUE DEBIT

## 2018-08-22 PROCEDURE — G0299 HHS/HOSPICE OF RN EA 15 MIN: HCPCS

## 2018-08-22 PROCEDURE — 3331090001 HH PPS REVENUE CREDIT

## 2018-08-22 PROCEDURE — 80048 BASIC METABOLIC PNL TOTAL CA: CPT

## 2018-08-23 ENCOUNTER — HOME CARE VISIT (OUTPATIENT)
Dept: SCHEDULING | Facility: HOME HEALTH | Age: 83
End: 2018-08-23
Payer: MEDICARE

## 2018-08-23 PROCEDURE — 3331090001 HH PPS REVENUE CREDIT

## 2018-08-23 PROCEDURE — 3331090002 HH PPS REVENUE DEBIT

## 2018-08-24 PROCEDURE — 3331090002 HH PPS REVENUE DEBIT

## 2018-08-24 PROCEDURE — 3331090001 HH PPS REVENUE CREDIT

## 2018-08-25 PROCEDURE — 3331090001 HH PPS REVENUE CREDIT

## 2018-08-25 PROCEDURE — 3331090002 HH PPS REVENUE DEBIT

## 2018-08-26 PROCEDURE — 3331090002 HH PPS REVENUE DEBIT

## 2018-08-26 PROCEDURE — 3331090001 HH PPS REVENUE CREDIT

## 2018-08-27 ENCOUNTER — HOME CARE VISIT (OUTPATIENT)
Dept: SCHEDULING | Facility: HOME HEALTH | Age: 83
End: 2018-08-27
Payer: MEDICARE

## 2018-08-27 PROCEDURE — 3331090001 HH PPS REVENUE CREDIT

## 2018-08-27 PROCEDURE — 3331090002 HH PPS REVENUE DEBIT

## 2018-08-28 PROCEDURE — 3331090002 HH PPS REVENUE DEBIT

## 2018-08-28 PROCEDURE — 3331090001 HH PPS REVENUE CREDIT

## 2018-08-29 ENCOUNTER — HOSPITAL ENCOUNTER (OUTPATIENT)
Dept: LAB | Age: 83
Discharge: HOME | End: 2018-08-29
Payer: MEDICARE

## 2018-08-29 ENCOUNTER — HOME CARE VISIT (OUTPATIENT)
Dept: SCHEDULING | Facility: HOME HEALTH | Age: 83
End: 2018-08-29
Payer: MEDICARE

## 2018-08-29 VITALS
HEART RATE: 84 BPM | TEMPERATURE: 97.9 F | OXYGEN SATURATION: 98 % | RESPIRATION RATE: 18 BRPM | DIASTOLIC BLOOD PRESSURE: 58 MMHG | SYSTOLIC BLOOD PRESSURE: 106 MMHG

## 2018-08-29 LAB
ANION GAP SERPL CALC-SCNC: 13 MMOL/L (ref 7–16)
BUN SERPL-MCNC: 23 MG/DL (ref 8–23)
CALCIUM SERPL-MCNC: 8.1 MG/DL (ref 8.3–10.4)
CHLORIDE SERPL-SCNC: 100 MMOL/L (ref 98–107)
CO2 SERPL-SCNC: 26 MMOL/L (ref 21–32)
CREAT SERPL-MCNC: 1.66 MG/DL (ref 0.6–1)
GLUCOSE SERPL-MCNC: 114 MG/DL (ref 65–100)
POTASSIUM SERPL-SCNC: 4.7 MMOL/L (ref 3.5–5.1)
SODIUM SERPL-SCNC: 139 MMOL/L (ref 136–145)

## 2018-08-29 PROCEDURE — 3331090001 HH PPS REVENUE CREDIT

## 2018-08-29 PROCEDURE — 3331090003 HH PPS REVENUE ADJ

## 2018-08-29 PROCEDURE — G0299 HHS/HOSPICE OF RN EA 15 MIN: HCPCS

## 2018-08-29 PROCEDURE — 80048 BASIC METABOLIC PNL TOTAL CA: CPT

## 2018-08-29 PROCEDURE — 3331090002 HH PPS REVENUE DEBIT

## 2018-08-30 PROCEDURE — 3331090001 HH PPS REVENUE CREDIT

## 2018-08-30 PROCEDURE — 3331090002 HH PPS REVENUE DEBIT

## 2018-08-30 NOTE — PROGRESS NOTES
Spoke with pt's daughter Iris Olson informed her that the pt's sodium level is much better. Kidneys are improved. Repeat BMP in 2 weeks with HH.

## 2018-08-31 ENCOUNTER — HOSPITAL ENCOUNTER (OUTPATIENT)
Dept: CT IMAGING | Age: 83
Discharge: HOME | End: 2018-08-31
Attending: NEUROLOGICAL SURGERY
Payer: MEDICARE

## 2018-08-31 ENCOUNTER — HOSPITAL ENCOUNTER (OUTPATIENT)
Dept: LAB | Age: 83
Discharge: HOME | End: 2018-08-31
Attending: NEUROLOGICAL SURGERY
Payer: MEDICARE

## 2018-08-31 DIAGNOSIS — S06.5XAA SUBDURAL HEMATOMA: ICD-10-CM

## 2018-08-31 DIAGNOSIS — R56.9 SEIZURES (HCC): ICD-10-CM

## 2018-08-31 LAB — PHENYTOIN SERPL-MCNC: 20.7 UG/ML (ref 10–20)

## 2018-08-31 PROCEDURE — 3331090002 HH PPS REVENUE DEBIT

## 2018-08-31 PROCEDURE — 80185 ASSAY OF PHENYTOIN TOTAL: CPT

## 2018-08-31 PROCEDURE — 70450 CT HEAD/BRAIN W/O DYE: CPT

## 2018-08-31 PROCEDURE — 36415 COLL VENOUS BLD VENIPUNCTURE: CPT

## 2018-08-31 PROCEDURE — 3331090001 HH PPS REVENUE CREDIT

## 2018-09-01 PROCEDURE — 3331090002 HH PPS REVENUE DEBIT

## 2018-09-01 PROCEDURE — 3331090001 HH PPS REVENUE CREDIT

## 2018-09-02 PROCEDURE — 3331090001 HH PPS REVENUE CREDIT

## 2018-09-02 PROCEDURE — 3331090002 HH PPS REVENUE DEBIT

## 2018-09-03 PROCEDURE — 3331090002 HH PPS REVENUE DEBIT

## 2018-09-03 PROCEDURE — 3331090001 HH PPS REVENUE CREDIT

## 2018-09-04 ENCOUNTER — HOME CARE VISIT (OUTPATIENT)
Dept: HOME HEALTH SERVICES | Facility: HOME HEALTH | Age: 83
End: 2018-09-04
Payer: MEDICARE

## 2018-09-04 PROCEDURE — 3331090002 HH PPS REVENUE DEBIT

## 2018-09-04 PROCEDURE — 3331090001 HH PPS REVENUE CREDIT

## 2018-11-15 ENCOUNTER — HOME HEALTH ADMISSION (OUTPATIENT)
Dept: HOME HEALTH SERVICES | Facility: HOME HEALTH | Age: 83
End: 2018-11-15
Payer: MEDICARE

## 2018-11-17 ENCOUNTER — HOME CARE VISIT (OUTPATIENT)
Dept: SCHEDULING | Facility: HOME HEALTH | Age: 83
End: 2018-11-17
Payer: MEDICARE

## 2018-11-17 PROCEDURE — 3331090001 HH PPS REVENUE CREDIT

## 2018-11-17 PROCEDURE — 400013 HH SOC

## 2018-11-17 PROCEDURE — 3331090002 HH PPS REVENUE DEBIT

## 2018-11-17 PROCEDURE — G0151 HHCP-SERV OF PT,EA 15 MIN: HCPCS

## 2018-11-18 VITALS
HEART RATE: 84 BPM | SYSTOLIC BLOOD PRESSURE: 116 MMHG | OXYGEN SATURATION: 97 % | RESPIRATION RATE: 18 BRPM | DIASTOLIC BLOOD PRESSURE: 84 MMHG | TEMPERATURE: 97.8 F

## 2018-11-18 PROCEDURE — 3331090002 HH PPS REVENUE DEBIT

## 2018-11-18 PROCEDURE — 3331090001 HH PPS REVENUE CREDIT

## 2018-11-19 ENCOUNTER — HOME CARE VISIT (OUTPATIENT)
Dept: SCHEDULING | Facility: HOME HEALTH | Age: 83
End: 2018-11-19
Payer: MEDICARE

## 2018-11-19 VITALS
HEART RATE: 76 BPM | DIASTOLIC BLOOD PRESSURE: 66 MMHG | SYSTOLIC BLOOD PRESSURE: 122 MMHG | TEMPERATURE: 97.4 F | RESPIRATION RATE: 16 BRPM

## 2018-11-19 PROCEDURE — G0151 HHCP-SERV OF PT,EA 15 MIN: HCPCS

## 2018-11-19 PROCEDURE — 3331090002 HH PPS REVENUE DEBIT

## 2018-11-19 PROCEDURE — 3331090001 HH PPS REVENUE CREDIT

## 2018-11-20 PROCEDURE — 3331090001 HH PPS REVENUE CREDIT

## 2018-11-20 PROCEDURE — 3331090002 HH PPS REVENUE DEBIT

## 2018-11-21 ENCOUNTER — HOME CARE VISIT (OUTPATIENT)
Dept: SCHEDULING | Facility: HOME HEALTH | Age: 83
End: 2018-11-21
Payer: MEDICARE

## 2018-11-21 VITALS
HEART RATE: 72 BPM | RESPIRATION RATE: 18 BRPM | TEMPERATURE: 98 F | SYSTOLIC BLOOD PRESSURE: 100 MMHG | DIASTOLIC BLOOD PRESSURE: 58 MMHG

## 2018-11-21 PROCEDURE — 3331090001 HH PPS REVENUE CREDIT

## 2018-11-21 PROCEDURE — G0151 HHCP-SERV OF PT,EA 15 MIN: HCPCS

## 2018-11-21 PROCEDURE — 3331090002 HH PPS REVENUE DEBIT

## 2018-11-22 PROCEDURE — 3331090002 HH PPS REVENUE DEBIT

## 2018-11-22 PROCEDURE — 3331090001 HH PPS REVENUE CREDIT

## 2018-11-23 ENCOUNTER — HOME CARE VISIT (OUTPATIENT)
Dept: SCHEDULING | Facility: HOME HEALTH | Age: 83
End: 2018-11-23
Payer: MEDICARE

## 2018-11-23 PROCEDURE — 3331090002 HH PPS REVENUE DEBIT

## 2018-11-23 PROCEDURE — 3331090001 HH PPS REVENUE CREDIT

## 2018-11-23 PROCEDURE — G0151 HHCP-SERV OF PT,EA 15 MIN: HCPCS

## 2018-11-24 PROCEDURE — 3331090002 HH PPS REVENUE DEBIT

## 2018-11-24 PROCEDURE — 3331090001 HH PPS REVENUE CREDIT

## 2018-11-25 PROCEDURE — 3331090002 HH PPS REVENUE DEBIT

## 2018-11-25 PROCEDURE — 3331090001 HH PPS REVENUE CREDIT

## 2018-11-26 ENCOUNTER — HOME CARE VISIT (OUTPATIENT)
Dept: SCHEDULING | Facility: HOME HEALTH | Age: 83
End: 2018-11-26
Payer: MEDICARE

## 2018-11-26 VITALS
HEART RATE: 82 BPM | DIASTOLIC BLOOD PRESSURE: 68 MMHG | SYSTOLIC BLOOD PRESSURE: 130 MMHG | RESPIRATION RATE: 18 BRPM | TEMPERATURE: 97.4 F

## 2018-11-26 PROCEDURE — 3331090001 HH PPS REVENUE CREDIT

## 2018-11-26 PROCEDURE — G0151 HHCP-SERV OF PT,EA 15 MIN: HCPCS

## 2018-11-26 PROCEDURE — 3331090002 HH PPS REVENUE DEBIT

## 2018-11-27 PROCEDURE — 3331090002 HH PPS REVENUE DEBIT

## 2018-11-27 PROCEDURE — 3331090001 HH PPS REVENUE CREDIT

## 2018-11-28 ENCOUNTER — HOME CARE VISIT (OUTPATIENT)
Dept: SCHEDULING | Facility: HOME HEALTH | Age: 83
End: 2018-11-28
Payer: MEDICARE

## 2018-11-28 PROCEDURE — 3331090002 HH PPS REVENUE DEBIT

## 2018-11-28 PROCEDURE — 3331090001 HH PPS REVENUE CREDIT

## 2018-11-28 PROCEDURE — G0157 HHC PT ASSISTANT EA 15: HCPCS

## 2018-11-29 ENCOUNTER — HOME CARE VISIT (OUTPATIENT)
Dept: HOME HEALTH SERVICES | Facility: HOME HEALTH | Age: 83
End: 2018-11-29
Payer: MEDICARE

## 2018-11-29 PROCEDURE — 3331090002 HH PPS REVENUE DEBIT

## 2018-11-29 PROCEDURE — 3331090001 HH PPS REVENUE CREDIT

## 2018-11-30 ENCOUNTER — HOME CARE VISIT (OUTPATIENT)
Dept: SCHEDULING | Facility: HOME HEALTH | Age: 83
End: 2018-11-30
Payer: MEDICARE

## 2018-11-30 VITALS
DIASTOLIC BLOOD PRESSURE: 64 MMHG | HEART RATE: 78 BPM | TEMPERATURE: 97.7 F | RESPIRATION RATE: 18 BRPM | SYSTOLIC BLOOD PRESSURE: 120 MMHG

## 2018-11-30 PROCEDURE — 3331090002 HH PPS REVENUE DEBIT

## 2018-11-30 PROCEDURE — G0151 HHCP-SERV OF PT,EA 15 MIN: HCPCS

## 2018-11-30 PROCEDURE — 3331090001 HH PPS REVENUE CREDIT

## 2018-12-01 PROCEDURE — 3331090001 HH PPS REVENUE CREDIT

## 2018-12-01 PROCEDURE — 3331090002 HH PPS REVENUE DEBIT

## 2018-12-02 PROCEDURE — 3331090002 HH PPS REVENUE DEBIT

## 2018-12-02 PROCEDURE — 3331090001 HH PPS REVENUE CREDIT

## 2018-12-03 ENCOUNTER — HOME CARE VISIT (OUTPATIENT)
Dept: SCHEDULING | Facility: HOME HEALTH | Age: 83
End: 2018-12-03
Payer: MEDICARE

## 2018-12-03 VITALS
RESPIRATION RATE: 18 BRPM | HEART RATE: 74 BPM | SYSTOLIC BLOOD PRESSURE: 160 MMHG | TEMPERATURE: 97.8 F | DIASTOLIC BLOOD PRESSURE: 78 MMHG

## 2018-12-03 PROCEDURE — 3331090001 HH PPS REVENUE CREDIT

## 2018-12-03 PROCEDURE — 3331090002 HH PPS REVENUE DEBIT

## 2018-12-03 PROCEDURE — G0151 HHCP-SERV OF PT,EA 15 MIN: HCPCS

## 2018-12-04 PROCEDURE — 3331090002 HH PPS REVENUE DEBIT

## 2018-12-04 PROCEDURE — 3331090001 HH PPS REVENUE CREDIT

## 2018-12-05 ENCOUNTER — HOME CARE VISIT (OUTPATIENT)
Dept: SCHEDULING | Facility: HOME HEALTH | Age: 83
End: 2018-12-05
Payer: MEDICARE

## 2018-12-05 PROCEDURE — G0157 HHC PT ASSISTANT EA 15: HCPCS

## 2018-12-05 PROCEDURE — 3331090001 HH PPS REVENUE CREDIT

## 2018-12-05 PROCEDURE — 3331090002 HH PPS REVENUE DEBIT

## 2018-12-06 PROCEDURE — 3331090001 HH PPS REVENUE CREDIT

## 2018-12-06 PROCEDURE — 3331090002 HH PPS REVENUE DEBIT

## 2018-12-07 ENCOUNTER — HOME CARE VISIT (OUTPATIENT)
Dept: SCHEDULING | Facility: HOME HEALTH | Age: 83
End: 2018-12-07
Payer: MEDICARE

## 2018-12-07 VITALS
RESPIRATION RATE: 18 BRPM | HEART RATE: 68 BPM | SYSTOLIC BLOOD PRESSURE: 128 MMHG | TEMPERATURE: 97.7 F | DIASTOLIC BLOOD PRESSURE: 82 MMHG

## 2018-12-07 PROCEDURE — 3331090001 HH PPS REVENUE CREDIT

## 2018-12-07 PROCEDURE — 3331090002 HH PPS REVENUE DEBIT

## 2018-12-07 PROCEDURE — G0151 HHCP-SERV OF PT,EA 15 MIN: HCPCS

## 2018-12-09 VITALS
SYSTOLIC BLOOD PRESSURE: 132 MMHG | TEMPERATURE: 97.3 F | RESPIRATION RATE: 18 BRPM | DIASTOLIC BLOOD PRESSURE: 82 MMHG | HEART RATE: 82 BPM

## 2019-01-01 ENCOUNTER — HOSPITAL ENCOUNTER (OUTPATIENT)
Dept: LAB | Age: 84
Discharge: HOME | End: 2019-02-28
Attending: INTERNAL MEDICINE
Payer: MEDICARE

## 2019-01-01 ENCOUNTER — APPOINTMENT (OUTPATIENT)
Dept: GENERAL RADIOLOGY | Age: 84
DRG: 291 | End: 2019-01-01
Attending: EMERGENCY MEDICINE
Payer: MEDICARE

## 2019-01-01 ENCOUNTER — HOSPITAL ENCOUNTER (INPATIENT)
Age: 84
LOS: 3 days | Discharge: HOSPICE | DRG: 291 | End: 2019-05-14
Attending: EMERGENCY MEDICINE | Admitting: INTERNAL MEDICINE
Payer: MEDICARE

## 2019-01-01 ENCOUNTER — HOSPITAL ENCOUNTER (OUTPATIENT)
Dept: GENERAL RADIOLOGY | Age: 84
Discharge: HOME | End: 2019-02-28
Attending: INTERNAL MEDICINE
Payer: MEDICARE

## 2019-01-01 ENCOUNTER — PATIENT OUTREACH (OUTPATIENT)
Dept: CASE MANAGEMENT | Age: 84
End: 2019-01-01

## 2019-01-01 ENCOUNTER — HOSPITAL ENCOUNTER (OUTPATIENT)
Dept: LAB | Age: 84
Discharge: HOME | End: 2019-03-07
Payer: MEDICARE

## 2019-01-01 VITALS
TEMPERATURE: 98.1 F | SYSTOLIC BLOOD PRESSURE: 127 MMHG | RESPIRATION RATE: 16 BRPM | BODY MASS INDEX: 19.13 KG/M2 | WEIGHT: 101.3 LBS | DIASTOLIC BLOOD PRESSURE: 52 MMHG | HEIGHT: 61 IN | OXYGEN SATURATION: 96 % | HEART RATE: 75 BPM

## 2019-01-01 DIAGNOSIS — Z51.5 ENCOUNTER FOR PALLIATIVE CARE: ICD-10-CM

## 2019-01-01 DIAGNOSIS — I50.23 ACUTE ON CHRONIC SYSTOLIC CHF (CONGESTIVE HEART FAILURE) (HCC): ICD-10-CM

## 2019-01-01 DIAGNOSIS — R09.02 HYPOXIA: ICD-10-CM

## 2019-01-01 DIAGNOSIS — N17.9 ACUTE RENAL FAILURE SUPERIMPOSED ON STAGE 1 CHRONIC KIDNEY DISEASE, UNSPECIFIED ACUTE RENAL FAILURE TYPE (HCC): ICD-10-CM

## 2019-01-01 DIAGNOSIS — R06.02 SOB (SHORTNESS OF BREATH): ICD-10-CM

## 2019-01-01 DIAGNOSIS — I50.9 CONGESTIVE HEART FAILURE, UNSPECIFIED HF CHRONICITY, UNSPECIFIED HEART FAILURE TYPE (HCC): Primary | ICD-10-CM

## 2019-01-01 DIAGNOSIS — N18.9 ACUTE RENAL FAILURE SUPERIMPOSED ON CHRONIC KIDNEY DISEASE, UNSPECIFIED CKD STAGE, UNSPECIFIED ACUTE RENAL FAILURE TYPE (HCC): ICD-10-CM

## 2019-01-01 DIAGNOSIS — N18.1 ACUTE RENAL FAILURE SUPERIMPOSED ON STAGE 1 CHRONIC KIDNEY DISEASE, UNSPECIFIED ACUTE RENAL FAILURE TYPE (HCC): ICD-10-CM

## 2019-01-01 DIAGNOSIS — M54.12 CERVICAL RADICULOPATHY: ICD-10-CM

## 2019-01-01 DIAGNOSIS — N18.9 CHRONIC KIDNEY DISEASE, UNSPECIFIED CKD STAGE: ICD-10-CM

## 2019-01-01 DIAGNOSIS — Z71.89 ADVANCED CARE PLANNING/COUNSELING DISCUSSION: ICD-10-CM

## 2019-01-01 DIAGNOSIS — I49.5 SSS (SICK SINUS SYNDROME) (HCC): Chronic | ICD-10-CM

## 2019-01-01 DIAGNOSIS — E11.21 TYPE 2 DIABETES MELLITUS WITH NEPHROPATHY (HCC): Chronic | ICD-10-CM

## 2019-01-01 DIAGNOSIS — N17.9 ACUTE RENAL FAILURE SUPERIMPOSED ON CHRONIC KIDNEY DISEASE, UNSPECIFIED CKD STAGE, UNSPECIFIED ACUTE RENAL FAILURE TYPE (HCC): ICD-10-CM

## 2019-01-01 DIAGNOSIS — R53.81 DEBILITY: ICD-10-CM

## 2019-01-01 DIAGNOSIS — R53.81 PHYSICAL DEBILITY: Chronic | ICD-10-CM

## 2019-01-01 DIAGNOSIS — I50.22 CHRONIC SYSTOLIC CONGESTIVE HEART FAILURE (HCC): ICD-10-CM

## 2019-01-01 DIAGNOSIS — D50.0 IRON DEFICIENCY ANEMIA DUE TO CHRONIC BLOOD LOSS: ICD-10-CM

## 2019-01-01 DIAGNOSIS — J84.9 INTERSTITIAL LUNG DISEASE (HCC): ICD-10-CM

## 2019-01-01 DIAGNOSIS — Z95.0 BIVENTRICULAR CARDIAC PACEMAKER IN SITU: ICD-10-CM

## 2019-01-01 DIAGNOSIS — E03.9 ACQUIRED HYPOTHYROIDISM: Chronic | ICD-10-CM

## 2019-01-01 LAB
ALBUMIN SERPL-MCNC: 4 G/DL (ref 3.2–4.6)
ALBUMIN/GLOB SERPL: 1.2 {RATIO} (ref 1.2–3.5)
ALP SERPL-CCNC: 137 U/L (ref 50–136)
ALT SERPL-CCNC: 14 U/L (ref 12–65)
ANION GAP SERPL CALC-SCNC: 7 MMOL/L
ANION GAP SERPL CALC-SCNC: 7 MMOL/L (ref 7–16)
ANION GAP SERPL CALC-SCNC: 7 MMOL/L (ref 7–16)
ANION GAP SERPL CALC-SCNC: 8 MMOL/L (ref 7–16)
ANION GAP SERPL CALC-SCNC: 9 MMOL/L (ref 7–16)
AST SERPL-CCNC: 14 U/L (ref 15–37)
ATRIAL RATE: 75 BPM
BASOPHILS # BLD: 0 K/UL (ref 0–0.2)
BASOPHILS # BLD: 0 K/UL (ref 0–0.2)
BASOPHILS NFR BLD: 0 % (ref 0–2)
BASOPHILS NFR BLD: 0 % (ref 0–2)
BILIRUB SERPL-MCNC: 0.4 MG/DL (ref 0.2–1.1)
BNP SERPL-MCNC: 256 PG/ML
BNP SERPL-MCNC: 284 PG/ML
BNP SERPL-MCNC: 351 PG/ML
BNP SERPL-MCNC: 417 PG/ML
BNP SERPL-MCNC: 449 PG/ML
BUN SERPL-MCNC: 22 MG/DL (ref 8–23)
BUN SERPL-MCNC: 22 MG/DL (ref 8–23)
BUN SERPL-MCNC: 24 MG/DL (ref 8–23)
BUN SERPL-MCNC: 25 MG/DL (ref 8–23)
BUN SERPL-MCNC: 34 MG/DL (ref 8–23)
CALCIUM SERPL-MCNC: 8.2 MG/DL (ref 8.3–10.4)
CALCIUM SERPL-MCNC: 8.3 MG/DL (ref 8.3–10.4)
CALCIUM SERPL-MCNC: 8.5 MG/DL (ref 8.3–10.4)
CALCULATED P AXIS, ECG09: 80 DEGREES
CALCULATED R AXIS, ECG10: -67 DEGREES
CALCULATED T AXIS, ECG11: 91 DEGREES
CHLORIDE SERPL-SCNC: 101 MMOL/L (ref 98–107)
CHLORIDE SERPL-SCNC: 95 MMOL/L (ref 98–107)
CHLORIDE SERPL-SCNC: 96 MMOL/L (ref 98–107)
CHLORIDE SERPL-SCNC: 99 MMOL/L (ref 98–107)
CHLORIDE SERPL-SCNC: 99 MMOL/L (ref 98–107)
CO2 SERPL-SCNC: 29 MMOL/L (ref 21–32)
CO2 SERPL-SCNC: 29 MMOL/L (ref 21–32)
CO2 SERPL-SCNC: 30 MMOL/L (ref 21–32)
CO2 SERPL-SCNC: 31 MMOL/L (ref 21–32)
CO2 SERPL-SCNC: 31 MMOL/L (ref 21–32)
CREAT SERPL-MCNC: 2.33 MG/DL (ref 0.6–1)
CREAT SERPL-MCNC: 2.33 MG/DL (ref 0.6–1)
CREAT SERPL-MCNC: 2.45 MG/DL (ref 0.6–1)
CREAT SERPL-MCNC: 2.52 MG/DL (ref 0.6–1)
CREAT SERPL-MCNC: 2.6 MG/DL (ref 0.6–1)
DIAGNOSIS, 93000: NORMAL
DIFFERENTIAL METHOD BLD: ABNORMAL
DIFFERENTIAL METHOD BLD: ABNORMAL
EOSINOPHIL # BLD: 0.1 K/UL (ref 0–0.8)
EOSINOPHIL # BLD: 0.1 K/UL (ref 0–0.8)
EOSINOPHIL NFR BLD: 1 % (ref 0.5–7.8)
EOSINOPHIL NFR BLD: 3 % (ref 0.5–7.8)
ERYTHROCYTE [DISTWIDTH] IN BLOOD BY AUTOMATED COUNT: 12 % (ref 11.9–14.6)
ERYTHROCYTE [DISTWIDTH] IN BLOOD BY AUTOMATED COUNT: 12.9 % (ref 11.9–14.6)
ERYTHROCYTE [DISTWIDTH] IN BLOOD BY AUTOMATED COUNT: 13.2 % (ref 11.9–14.6)
ERYTHROCYTE [DISTWIDTH] IN BLOOD BY AUTOMATED COUNT: 13.5 % (ref 11.9–14.6)
ERYTHROCYTE [DISTWIDTH] IN BLOOD BY AUTOMATED COUNT: 13.6 % (ref 11.9–14.6)
FERRITIN SERPL-MCNC: 100 NG/ML (ref 8–388)
FERRITIN SERPL-MCNC: 87 NG/ML (ref 8–388)
GLOBULIN SER CALC-MCNC: 3.3 G/DL (ref 2.3–3.5)
GLUCOSE SERPL-MCNC: 122 MG/DL (ref 65–100)
GLUCOSE SERPL-MCNC: 163 MG/DL (ref 65–100)
GLUCOSE SERPL-MCNC: 83 MG/DL (ref 65–100)
GLUCOSE SERPL-MCNC: 90 MG/DL (ref 65–100)
GLUCOSE SERPL-MCNC: 92 MG/DL (ref 65–100)
HCT VFR BLD AUTO: 27.1 % (ref 35.8–46.3)
HCT VFR BLD AUTO: 27.5 % (ref 35.8–46.3)
HCT VFR BLD AUTO: 28.7 % (ref 35.8–46.3)
HCT VFR BLD AUTO: 29 % (ref 35.8–46.3)
HCT VFR BLD AUTO: 31.7 % (ref 35.8–46.3)
HGB BLD-MCNC: 10.5 G/DL (ref 11.7–15.4)
HGB BLD-MCNC: 8.8 G/DL (ref 11.7–15.4)
HGB BLD-MCNC: 9.1 G/DL (ref 11.7–15.4)
HGB BLD-MCNC: 9.6 G/DL (ref 11.7–15.4)
HGB BLD-MCNC: 9.7 G/DL (ref 11.7–15.4)
IMM GRANULOCYTES # BLD AUTO: 0 K/UL (ref 0–0.5)
IMM GRANULOCYTES # BLD AUTO: 0 K/UL (ref 0–0.5)
IMM GRANULOCYTES NFR BLD AUTO: 0 % (ref 0–5)
IMM GRANULOCYTES NFR BLD AUTO: 0 % (ref 0–5)
IRON SATN MFR SERPL: 19 %
IRON SATN MFR SERPL: 30 %
IRON SERPL-MCNC: 40 UG/DL (ref 35–150)
IRON SERPL-MCNC: 71 UG/DL (ref 35–150)
LYMPHOCYTES # BLD: 1 K/UL (ref 0.5–4.6)
LYMPHOCYTES # BLD: 1.3 K/UL (ref 0.5–4.6)
LYMPHOCYTES NFR BLD: 19 % (ref 13–44)
LYMPHOCYTES NFR BLD: 28 % (ref 13–44)
MAGNESIUM SERPL-MCNC: 2.1 MG/DL (ref 1.8–2.4)
MAGNESIUM SERPL-MCNC: 2.1 MG/DL (ref 1.8–2.4)
MAGNESIUM SERPL-MCNC: 2.2 MG/DL (ref 1.8–2.4)
MCH RBC QN AUTO: 31.4 PG (ref 26.1–32.9)
MCH RBC QN AUTO: 31.8 PG (ref 26.1–32.9)
MCH RBC QN AUTO: 31.9 PG (ref 26.1–32.9)
MCH RBC QN AUTO: 32.4 PG (ref 26.1–32.9)
MCH RBC QN AUTO: 33 PG (ref 26.1–32.9)
MCHC RBC AUTO-ENTMCNC: 32.5 G/DL (ref 31.4–35)
MCHC RBC AUTO-ENTMCNC: 33.1 G/DL (ref 31.4–35)
MCHC RBC AUTO-ENTMCNC: 33.1 G/DL (ref 31.4–35)
MCHC RBC AUTO-ENTMCNC: 33.4 G/DL (ref 31.4–35)
MCHC RBC AUTO-ENTMCNC: 33.4 G/DL (ref 31.4–35)
MCV RBC AUTO: 95 FL (ref 79.6–97.8)
MCV RBC AUTO: 96.5 FL (ref 79.6–97.8)
MCV RBC AUTO: 96.8 FL (ref 79.6–97.8)
MCV RBC AUTO: 97.8 FL (ref 79.6–97.8)
MCV RBC AUTO: 98.6 FL (ref 79.6–97.8)
MM INDURATION POC: 0 MM (ref 0–5)
MM INDURATION POC: NORMAL MM (ref 0–5)
MONOCYTES # BLD: 0.5 K/UL (ref 0.1–1.3)
MONOCYTES # BLD: 0.6 K/UL (ref 0.1–1.3)
MONOCYTES NFR BLD: 10 % (ref 4–12)
MONOCYTES NFR BLD: 13 % (ref 4–12)
NEUTS SEG # BLD: 2.6 K/UL (ref 1.7–8.2)
NEUTS SEG # BLD: 3.6 K/UL (ref 1.7–8.2)
NEUTS SEG NFR BLD: 56 % (ref 43–78)
NEUTS SEG NFR BLD: 70 % (ref 43–78)
NRBC # BLD: 0 K/UL (ref 0–0.2)
P-R INTERVAL, ECG05: 152 MS
PLATELET # BLD AUTO: 225 K/UL (ref 150–450)
PLATELET # BLD AUTO: 241 K/UL (ref 150–450)
PLATELET # BLD AUTO: 244 K/UL (ref 150–450)
PLATELET # BLD AUTO: 254 K/UL (ref 150–450)
PLATELET # BLD AUTO: 288 K/UL (ref 150–450)
PMV BLD AUTO: 10.4 FL (ref 9.4–12.3)
PMV BLD AUTO: 10.4 FL (ref 9.4–12.3)
PMV BLD AUTO: 10.5 FL (ref 9.4–12.3)
PMV BLD AUTO: 10.6 FL (ref 9.4–12.3)
PMV BLD AUTO: 9.5 FL (ref 9.4–12.3)
POTASSIUM SERPL-SCNC: 3.5 MMOL/L (ref 3.5–5.1)
POTASSIUM SERPL-SCNC: 3.5 MMOL/L (ref 3.5–5.1)
POTASSIUM SERPL-SCNC: 3.6 MMOL/L (ref 3.5–5.1)
POTASSIUM SERPL-SCNC: 4 MMOL/L (ref 3.5–5.1)
POTASSIUM SERPL-SCNC: 4.5 MMOL/L (ref 3.5–5.1)
PPD POC: NEGATIVE NEGATIVE
PPD POC: NORMAL NEGATIVE
PROT SERPL-MCNC: 7.3 G/DL (ref 6.3–8.2)
Q-T INTERVAL, ECG07: 480 MS
QRS DURATION, ECG06: 128 MS
QTC CALCULATION (BEZET), ECG08: 536 MS
RBC # BLD AUTO: 2.8 M/UL (ref 4.05–5.2)
RBC # BLD AUTO: 2.85 M/UL (ref 4.05–5.2)
RBC # BLD AUTO: 2.94 M/UL (ref 4.05–5.2)
RBC # BLD AUTO: 3.02 M/UL (ref 4.05–5.2)
RBC # BLD AUTO: 3.24 M/UL (ref 4.05–5.2)
SODIUM SERPL-SCNC: 134 MMOL/L (ref 136–145)
SODIUM SERPL-SCNC: 135 MMOL/L (ref 136–145)
SODIUM SERPL-SCNC: 139 MMOL/L (ref 136–145)
T3FREE SERPL-MCNC: 2.1 PG/ML (ref 2–4.4)
T4 FREE SERPL-MCNC: 1.5 NG/DL (ref 0.78–1.46)
TIBC SERPL-MCNC: 209 UG/DL (ref 250–450)
TIBC SERPL-MCNC: 233 UG/DL (ref 250–450)
TROPONIN I SERPL-MCNC: <0.02 NG/ML (ref 0.02–0.05)
TSH SERPL DL<=0.005 MIU/L-ACNC: 0.7 UIU/ML (ref 0.36–3.74)
VENTRICULAR RATE, ECG03: 75 BPM
WBC # BLD AUTO: 3.8 K/UL (ref 4.3–11.1)
WBC # BLD AUTO: 4.3 K/UL (ref 4.3–11.1)
WBC # BLD AUTO: 4.6 K/UL (ref 4.3–11.1)
WBC # BLD AUTO: 4.7 K/UL (ref 4.3–11.1)
WBC # BLD AUTO: 5.2 K/UL (ref 4.3–11.1)

## 2019-01-01 PROCEDURE — 83735 ASSAY OF MAGNESIUM: CPT

## 2019-01-01 PROCEDURE — 94761 N-INVAS EAR/PLS OXIMETRY MLT: CPT

## 2019-01-01 PROCEDURE — 85025 COMPLETE CBC W/AUTO DIFF WBC: CPT

## 2019-01-01 PROCEDURE — 85027 COMPLETE CBC AUTOMATED: CPT

## 2019-01-01 PROCEDURE — 80048 BASIC METABOLIC PNL TOTAL CA: CPT

## 2019-01-01 PROCEDURE — 84443 ASSAY THYROID STIM HORMONE: CPT

## 2019-01-01 PROCEDURE — 99285 EMERGENCY DEPT VISIT HI MDM: CPT | Performed by: EMERGENCY MEDICINE

## 2019-01-01 PROCEDURE — 99231 SBSQ HOSP IP/OBS SF/LOW 25: CPT | Performed by: NURSE PRACTITIONER

## 2019-01-01 PROCEDURE — 74011250637 HC RX REV CODE- 250/637: Performed by: FAMILY MEDICINE

## 2019-01-01 PROCEDURE — 74011250637 HC RX REV CODE- 250/637: Performed by: INTERNAL MEDICINE

## 2019-01-01 PROCEDURE — 36415 COLL VENOUS BLD VENIPUNCTURE: CPT

## 2019-01-01 PROCEDURE — 82728 ASSAY OF FERRITIN: CPT

## 2019-01-01 PROCEDURE — 74011000250 HC RX REV CODE- 250: Performed by: NURSE PRACTITIONER

## 2019-01-01 PROCEDURE — 84481 FREE ASSAY (FT-3): CPT

## 2019-01-01 PROCEDURE — 83880 ASSAY OF NATRIURETIC PEPTIDE: CPT

## 2019-01-01 PROCEDURE — 71046 X-RAY EXAM CHEST 2 VIEWS: CPT

## 2019-01-01 PROCEDURE — 99222 1ST HOSP IP/OBS MODERATE 55: CPT | Performed by: NURSE PRACTITIONER

## 2019-01-01 PROCEDURE — 84484 ASSAY OF TROPONIN QUANT: CPT

## 2019-01-01 PROCEDURE — 97110 THERAPEUTIC EXERCISES: CPT

## 2019-01-01 PROCEDURE — 83540 ASSAY OF IRON: CPT

## 2019-01-01 PROCEDURE — 93005 ELECTROCARDIOGRAM TRACING: CPT | Performed by: EMERGENCY MEDICINE

## 2019-01-01 PROCEDURE — 97535 SELF CARE MNGMENT TRAINING: CPT

## 2019-01-01 PROCEDURE — 99222 1ST HOSP IP/OBS MODERATE 55: CPT | Performed by: INTERNAL MEDICINE

## 2019-01-01 PROCEDURE — 74011250636 HC RX REV CODE- 250/636: Performed by: INTERNAL MEDICINE

## 2019-01-01 PROCEDURE — 65660000000 HC RM CCU STEPDOWN

## 2019-01-01 PROCEDURE — 76450000000

## 2019-01-01 PROCEDURE — 99232 SBSQ HOSP IP/OBS MODERATE 35: CPT | Performed by: INTERNAL MEDICINE

## 2019-01-01 PROCEDURE — C8929 TTE W OR WO FOL WCON,DOPPLER: HCPCS

## 2019-01-01 PROCEDURE — 74011250637 HC RX REV CODE- 250/637: Performed by: NURSE PRACTITIONER

## 2019-01-01 PROCEDURE — 97161 PT EVAL LOW COMPLEX 20 MIN: CPT

## 2019-01-01 PROCEDURE — 94760 N-INVAS EAR/PLS OXIMETRY 1: CPT

## 2019-01-01 PROCEDURE — 97165 OT EVAL LOW COMPLEX 30 MIN: CPT

## 2019-01-01 PROCEDURE — 74011000302 HC RX REV CODE- 302: Performed by: INTERNAL MEDICINE

## 2019-01-01 PROCEDURE — 80053 COMPREHEN METABOLIC PANEL: CPT

## 2019-01-01 PROCEDURE — 86580 TB INTRADERMAL TEST: CPT | Performed by: INTERNAL MEDICINE

## 2019-01-01 PROCEDURE — 84439 ASSAY OF FREE THYROXINE: CPT

## 2019-01-01 PROCEDURE — 94762 N-INVAS EAR/PLS OXIMTRY CONT: CPT

## 2019-01-01 PROCEDURE — 74011250636 HC RX REV CODE- 250/636: Performed by: NURSE PRACTITIONER

## 2019-01-01 RX ORDER — FUROSEMIDE 10 MG/ML
60 INJECTION INTRAMUSCULAR; INTRAVENOUS ONCE
Status: COMPLETED | OUTPATIENT
Start: 2019-01-01 | End: 2019-01-01

## 2019-01-01 RX ORDER — GABAPENTIN 300 MG/1
600 CAPSULE ORAL
Status: DISCONTINUED | OUTPATIENT
Start: 2019-01-01 | End: 2019-01-01

## 2019-01-01 RX ORDER — TORSEMIDE 20 MG/1
20 TABLET ORAL DAILY
Status: DISCONTINUED | OUTPATIENT
Start: 2019-01-01 | End: 2019-01-01

## 2019-01-01 RX ORDER — FUROSEMIDE 10 MG/ML
40 INJECTION INTRAMUSCULAR; INTRAVENOUS DAILY
Status: DISCONTINUED | OUTPATIENT
Start: 2019-01-01 | End: 2019-01-01

## 2019-01-01 RX ORDER — TORSEMIDE 20 MG/1
20 TABLET ORAL 2 TIMES DAILY
Status: DISCONTINUED | OUTPATIENT
Start: 2019-01-01 | End: 2019-01-01 | Stop reason: HOSPADM

## 2019-01-01 RX ORDER — TORSEMIDE 20 MG/1
20 TABLET ORAL 2 TIMES DAILY
Qty: 60 TAB | Refills: 0 | Status: SHIPPED | OUTPATIENT
Start: 2019-01-01

## 2019-01-01 RX ORDER — CARVEDILOL 25 MG/1
25 TABLET ORAL 2 TIMES DAILY WITH MEALS
Status: DISCONTINUED | OUTPATIENT
Start: 2019-01-01 | End: 2019-01-01 | Stop reason: HOSPADM

## 2019-01-01 RX ORDER — PANTOPRAZOLE SODIUM 40 MG/1
40 TABLET, DELAYED RELEASE ORAL
Status: DISCONTINUED | OUTPATIENT
Start: 2019-01-01 | End: 2019-01-01 | Stop reason: HOSPADM

## 2019-01-01 RX ORDER — HYDRALAZINE HYDROCHLORIDE 10 MG/1
10 TABLET, FILM COATED ORAL 3 TIMES DAILY
Status: DISCONTINUED | OUTPATIENT
Start: 2019-01-01 | End: 2019-01-01 | Stop reason: HOSPADM

## 2019-01-01 RX ORDER — TRAZODONE HYDROCHLORIDE 50 MG/1
100 TABLET ORAL
Status: DISCONTINUED | OUTPATIENT
Start: 2019-01-01 | End: 2019-01-01 | Stop reason: HOSPADM

## 2019-01-01 RX ORDER — AMLODIPINE BESYLATE 10 MG/1
10 TABLET ORAL DAILY
Status: DISCONTINUED | OUTPATIENT
Start: 2019-01-01 | End: 2019-01-01 | Stop reason: HOSPADM

## 2019-01-01 RX ORDER — GABAPENTIN 300 MG/1
300 CAPSULE ORAL
Status: DISCONTINUED | OUTPATIENT
Start: 2019-01-01 | End: 2019-01-01 | Stop reason: HOSPADM

## 2019-01-01 RX ORDER — LEVOTHYROXINE SODIUM 50 UG/1
25 TABLET ORAL
Status: DISCONTINUED | OUTPATIENT
Start: 2019-01-01 | End: 2019-01-01 | Stop reason: HOSPADM

## 2019-01-01 RX ORDER — LIDOCAINE 4 G/100G
1 PATCH TOPICAL EVERY 24 HOURS
Status: DISCONTINUED | OUTPATIENT
Start: 2019-01-01 | End: 2019-01-01 | Stop reason: HOSPADM

## 2019-01-01 RX ORDER — NALOXONE HYDROCHLORIDE 0.4 MG/ML
0.4 INJECTION, SOLUTION INTRAMUSCULAR; INTRAVENOUS; SUBCUTANEOUS AS NEEDED
Status: DISCONTINUED | OUTPATIENT
Start: 2019-01-01 | End: 2019-01-01 | Stop reason: HOSPADM

## 2019-01-01 RX ORDER — AMOXICILLIN 250 MG
1 CAPSULE ORAL DAILY
Status: DISCONTINUED | OUTPATIENT
Start: 2019-01-01 | End: 2019-01-01 | Stop reason: HOSPADM

## 2019-01-01 RX ORDER — GUAIFENESIN 100 MG/5ML
81 LIQUID (ML) ORAL DAILY
Status: DISCONTINUED | OUTPATIENT
Start: 2019-01-01 | End: 2019-01-01 | Stop reason: HOSPADM

## 2019-01-01 RX ORDER — FUROSEMIDE 20 MG/1
20 TABLET ORAL DAILY
Status: DISCONTINUED | OUTPATIENT
Start: 2019-01-01 | End: 2019-01-01

## 2019-01-01 RX ORDER — LISINOPRIL 20 MG/1
20 TABLET ORAL DAILY
Status: DISCONTINUED | OUTPATIENT
Start: 2019-01-01 | End: 2019-01-01 | Stop reason: HOSPADM

## 2019-01-01 RX ORDER — TRAMADOL HYDROCHLORIDE 50 MG/1
50 TABLET ORAL
Status: DISCONTINUED | OUTPATIENT
Start: 2019-01-01 | End: 2019-01-01 | Stop reason: HOSPADM

## 2019-01-01 RX ORDER — ASPIRIN 81 MG/1
81 TABLET ORAL DAILY
Status: DISCONTINUED | OUTPATIENT
Start: 2019-01-01 | End: 2019-01-01

## 2019-01-01 RX ORDER — GUAIFENESIN 100 MG/5ML
81 LIQUID (ML) ORAL DAILY
Qty: 30 TAB | Refills: 0 | Status: SHIPPED | OUTPATIENT
Start: 2019-01-01

## 2019-01-01 RX ORDER — ACETAMINOPHEN 325 MG/1
650 TABLET ORAL
Status: DISCONTINUED | OUTPATIENT
Start: 2019-01-01 | End: 2019-01-01 | Stop reason: HOSPADM

## 2019-01-01 RX ORDER — LIDOCAINE 4 G/100G
1 PATCH TOPICAL EVERY 24 HOURS
Qty: 5 PATCH | Refills: 0 | Status: SHIPPED | OUTPATIENT
Start: 2019-01-01

## 2019-01-01 RX ORDER — GABAPENTIN 300 MG/1
300 CAPSULE ORAL DAILY
Status: DISCONTINUED | OUTPATIENT
Start: 2019-01-01 | End: 2019-01-01

## 2019-01-01 RX ADMIN — TRAZODONE HYDROCHLORIDE 100 MG: 50 TABLET ORAL at 21:53

## 2019-01-01 RX ADMIN — TRAMADOL HYDROCHLORIDE 50 MG: 50 TABLET, FILM COATED ORAL at 08:12

## 2019-01-01 RX ADMIN — AMLODIPINE BESYLATE 10 MG: 10 TABLET ORAL at 08:12

## 2019-01-01 RX ADMIN — PANTOPRAZOLE SODIUM 40 MG: 40 TABLET, DELAYED RELEASE ORAL at 09:33

## 2019-01-01 RX ADMIN — FUROSEMIDE 60 MG: 10 INJECTION, SOLUTION INTRAMUSCULAR; INTRAVENOUS at 23:37

## 2019-01-01 RX ADMIN — HYDRALAZINE HYDROCHLORIDE 10 MG: 10 TABLET, FILM COATED ORAL at 10:32

## 2019-01-01 RX ADMIN — LEVOTHYROXINE SODIUM 25 MCG: 50 TABLET ORAL at 08:42

## 2019-01-01 RX ADMIN — ASPIRIN 81 MG 81 MG: 81 TABLET ORAL at 09:33

## 2019-01-01 RX ADMIN — ASPIRIN 81 MG 81 MG: 81 TABLET ORAL at 08:42

## 2019-01-01 RX ADMIN — PERFLUTREN 1 ML: 6.52 INJECTION, SUSPENSION INTRAVENOUS at 08:34

## 2019-01-01 RX ADMIN — HYDRALAZINE HYDROCHLORIDE 10 MG: 10 TABLET, FILM COATED ORAL at 22:50

## 2019-01-01 RX ADMIN — AMLODIPINE BESYLATE 10 MG: 10 TABLET ORAL at 08:42

## 2019-01-01 RX ADMIN — CARVEDILOL 25 MG: 25 TABLET, FILM COATED ORAL at 16:31

## 2019-01-01 RX ADMIN — TRAMADOL HYDROCHLORIDE 50 MG: 50 TABLET, FILM COATED ORAL at 13:41

## 2019-01-01 RX ADMIN — HYDRALAZINE HYDROCHLORIDE 10 MG: 10 TABLET, FILM COATED ORAL at 21:49

## 2019-01-01 RX ADMIN — PANTOPRAZOLE SODIUM 40 MG: 40 TABLET, DELAYED RELEASE ORAL at 08:42

## 2019-01-01 RX ADMIN — LISINOPRIL 20 MG: 20 TABLET ORAL at 09:29

## 2019-01-01 RX ADMIN — TRAZODONE HYDROCHLORIDE 100 MG: 50 TABLET ORAL at 21:42

## 2019-01-01 RX ADMIN — CARVEDILOL 25 MG: 25 TABLET, FILM COATED ORAL at 09:34

## 2019-01-01 RX ADMIN — TRAMADOL HYDROCHLORIDE 50 MG: 50 TABLET, FILM COATED ORAL at 23:37

## 2019-01-01 RX ADMIN — CARVEDILOL 25 MG: 25 TABLET, FILM COATED ORAL at 21:54

## 2019-01-01 RX ADMIN — CARVEDILOL 25 MG: 25 TABLET, FILM COATED ORAL at 17:47

## 2019-01-01 RX ADMIN — ACETAMINOPHEN 650 MG: 325 TABLET, FILM COATED ORAL at 13:33

## 2019-01-01 RX ADMIN — LEVOTHYROXINE SODIUM 25 MCG: 50 TABLET ORAL at 09:34

## 2019-01-01 RX ADMIN — HYDRALAZINE HYDROCHLORIDE 10 MG: 10 TABLET, FILM COATED ORAL at 08:42

## 2019-01-01 RX ADMIN — TRAZODONE HYDROCHLORIDE 100 MG: 50 TABLET ORAL at 22:50

## 2019-01-01 RX ADMIN — TORSEMIDE 20 MG: 20 TABLET ORAL at 17:47

## 2019-01-01 RX ADMIN — LEVOTHYROXINE SODIUM 25 MCG: 50 TABLET ORAL at 08:12

## 2019-01-01 RX ADMIN — HYDRALAZINE HYDROCHLORIDE 10 MG: 10 TABLET, FILM COATED ORAL at 09:09

## 2019-01-01 RX ADMIN — ACETAMINOPHEN 650 MG: 325 TABLET, FILM COATED ORAL at 03:07

## 2019-01-01 RX ADMIN — FUROSEMIDE 40 MG: 10 INJECTION, SOLUTION INTRAMUSCULAR; INTRAVENOUS at 08:12

## 2019-01-01 RX ADMIN — HYDRALAZINE HYDROCHLORIDE 10 MG: 10 TABLET, FILM COATED ORAL at 21:57

## 2019-01-01 RX ADMIN — TORSEMIDE 20 MG: 20 TABLET ORAL at 08:42

## 2019-01-01 RX ADMIN — PANTOPRAZOLE SODIUM 40 MG: 40 TABLET, DELAYED RELEASE ORAL at 08:12

## 2019-01-01 RX ADMIN — CARVEDILOL 25 MG: 25 TABLET, FILM COATED ORAL at 08:12

## 2019-01-01 RX ADMIN — ACETAMINOPHEN 650 MG: 325 TABLET, FILM COATED ORAL at 19:43

## 2019-01-01 RX ADMIN — HYDRALAZINE HYDROCHLORIDE 10 MG: 10 TABLET, FILM COATED ORAL at 16:31

## 2019-01-01 RX ADMIN — AMLODIPINE BESYLATE 10 MG: 10 TABLET ORAL at 09:33

## 2019-01-01 RX ADMIN — TRAMADOL HYDROCHLORIDE 50 MG: 50 TABLET, FILM COATED ORAL at 21:54

## 2019-01-01 RX ADMIN — TRAMADOL HYDROCHLORIDE 50 MG: 50 TABLET, FILM COATED ORAL at 16:37

## 2019-01-01 RX ADMIN — GABAPENTIN 600 MG: 300 CAPSULE ORAL at 22:34

## 2019-01-01 RX ADMIN — TORSEMIDE 20 MG: 20 TABLET ORAL at 09:34

## 2019-01-01 RX ADMIN — CARVEDILOL 25 MG: 25 TABLET, FILM COATED ORAL at 08:42

## 2019-01-01 RX ADMIN — TUBERCULIN PURIFIED PROTEIN DERIVATIVE 5 UNITS: 5 INJECTION, SOLUTION INTRADERMAL at 21:54

## 2019-01-01 RX ADMIN — LISINOPRIL 20 MG: 20 TABLET ORAL at 08:12

## 2019-01-01 RX ADMIN — HYDRALAZINE HYDROCHLORIDE 10 MG: 10 TABLET, FILM COATED ORAL at 15:56

## 2019-01-01 RX ADMIN — TORSEMIDE 20 MG: 20 TABLET ORAL at 13:08

## 2019-01-01 RX ADMIN — GABAPENTIN 300 MG: 300 CAPSULE ORAL at 22:50

## 2019-01-01 RX ADMIN — SENNOSIDES, DOCUSATE SODIUM 1 TABLET: 50; 8.6 TABLET, FILM COATED ORAL at 08:42

## 2019-01-01 RX ADMIN — GABAPENTIN 300 MG: 300 CAPSULE ORAL at 21:42

## 2019-01-01 RX ADMIN — LISINOPRIL 20 MG: 20 TABLET ORAL at 08:42

## 2019-02-28 PROBLEM — I48.19 PERSISTENT ATRIAL FIBRILLATION (HCC): Status: ACTIVE | Noted: 2019-01-01

## 2019-04-26 PROBLEM — R56.9 SEIZURES (HCC): Status: RESOLVED | Noted: 2018-08-03 | Resolved: 2019-01-01

## 2019-05-11 PROBLEM — I48.19 PERSISTENT ATRIAL FIBRILLATION (HCC): Chronic | Status: ACTIVE | Noted: 2019-01-01

## 2019-05-11 PROBLEM — N18.9 ACUTE-ON-CHRONIC KIDNEY INJURY (HCC): Status: ACTIVE | Noted: 2019-01-01

## 2019-05-11 PROBLEM — R09.02 HYPOXIA: Status: ACTIVE | Noted: 2019-01-01

## 2019-05-11 PROBLEM — I50.23 ACUTE ON CHRONIC SYSTOLIC CHF (CONGESTIVE HEART FAILURE) (HCC): Status: ACTIVE | Noted: 2019-01-01

## 2019-05-11 PROBLEM — N17.9 ACUTE-ON-CHRONIC KIDNEY INJURY (HCC): Status: ACTIVE | Noted: 2019-01-01

## 2019-05-11 PROBLEM — E11.21 TYPE 2 DIABETES MELLITUS WITH NEPHROPATHY (HCC): Chronic | Status: ACTIVE | Noted: 2018-01-25

## 2019-05-11 PROBLEM — I50.9 CHF EXACERBATION (HCC): Status: ACTIVE | Noted: 2019-01-01

## 2019-05-11 PROBLEM — R53.81 PHYSICAL DEBILITY: Chronic | Status: ACTIVE | Noted: 2018-04-30

## 2019-05-11 NOTE — PROGRESS NOTES
TRANSFER - IN REPORT: 
 
Verbal report received from SALVATORE Alvarenga on Luis Carlos Gates being received from ER for routine progression of care Report consisted of patients Situation, Background, Assessment and Recommendations(SBAR). Information from the following report(s) SBAR, Kardex, ED Summary, Intake/Output, MAR, Accordion, Recent Results, Med Rec Status and Cardiac Rhythm AV paced was reviewed with the receiving nurse. Opportunity for questions and clarification was provided. Assessment completed upon patients arrival to unit and care assumed.

## 2019-05-11 NOTE — PROGRESS NOTES
Visit with patient in ER prior to transfer to floor. Patient is calm. Wolfe family present  knows patient from previous admissions Assured patient of our support during their time with us. Melba Adams,  Staff  C: [phone]  /  [email or web address]

## 2019-05-11 NOTE — ED TRIAGE NOTES
Pt to triage via w/c with daughter. Pt daughter reports pt having difficulty breathing and states cardiology doubled her lasix to 40mg once per day. Pt daughter states patient is also a little more confused than normal stating some confusion is normal for pt, but seems more excessive. Pt has hx CKD, CHF, MI, DM, and pacemaker in place.

## 2019-05-11 NOTE — ED NOTES
TRANSFER - OUT REPORT: 
 
Verbal report given to Roy Ormond RN(name) on Curtis Downs  being transferred to telemetry(unit) for routine progression of care Report consisted of patients Situation, Background, Assessment and  
Recommendations(SBAR). Information from the following report(s) SBAR, ED Summary and Recent Results was reviewed with the receiving nurse. Lines:  
Peripheral IV 05/11/19 Left Antecubital (Active) Site Assessment Clean, dry, & intact 5/11/2019  2:54 PM  
Phlebitis Assessment 0 5/11/2019  2:54 PM  
Infiltration Assessment 0 5/11/2019  2:54 PM  
Dressing Status Clean, dry, & intact 5/11/2019  2:54 PM  
Dressing Type Transparent 5/11/2019  2:54 PM  
Hub Color/Line Status Pink 5/11/2019  2:54 PM  
  
 
Opportunity for questions and clarification was provided.

## 2019-05-11 NOTE — H&P
HOSPITALIST H&P/CONSULTNAME:  Shannan Arias Age:  80 y.o. 
:   8/10/1927 MRN:   [id number] PCP: Myrtle Kelly MD 
Consulting MD: Treatment Team: Attending Provider: Ana Maria Wall DO; Consulting Provider: Madelin Roy MD 
HPI:  
Patient 75Q with pmhx systolic CHFe, chronic afib rate controlled s/p biv pacer, hypothyroidism, CAD, DM, HTN presents with one week worsening dyspnea, orthopnea and PND. Seen by cardiology on  with increased dose of lasix with minimal improvement of home symptoms. Patient is poor historian secondary to suspected dementia and [place] but denies chest pain. Says she is feeling slightly better after diuresis in ED. ED workup notable for CXR with small bilateral effusions,  and trop neg <0.02. Cr 2.3 baseline variable 1.6-2.6. She does not wear oxygen at home but with 85% sat on RA in ED, up to 95% on 2lnc. Hospitalists have been asked to admit for acute/ch CHFe. Cardiology also called, will consult. Complete ROS done and is as stated in HPI or otherwise negative Past Medical History:  
Diagnosis Date  A fib  Abnormal loss of weight  Acquired hypothyroidism 2012  Acute respiratory failure with hypoxemia ([address] 2017  Anemia  Anorexia 2014  Arthritis associated with another disorder  Atrial fibrillation ([address] 2012 Paroxysmal.  Coumadin contraindicated due to falls    
 Autonomic neuropathy 2013  CAD (coronary artery disease)  CAD in native artery 2016  CAP (community acquired pneumonia) 2017  Cervical radiculopathy 2013  Chest pain  Chronic kidney disease  Chronic pain syndrome 2013 Back, right shoulder, neck: Peripheral neuropathy, spinal stenosis C7-T1, foraminal narrowing C4-5  CKD (chronic kidney disease), stage III ([address] 2012  Diabetes ([address]   
 about 30 years  Diabetes mellitus   
 does not check sugar  Diabetes mellitus, type 2 ([facility] 2012  Dysphagia 2014 Due to esophageal spasm seen on modified barium swallow   Gait disorder 2012  GERD (gastroesophageal reflux disease)  Heart failure ([facility]  HTN   
 Hypertension 2012 Orthostatic hypotension due to autonomic neuropathy  Hypothyroid  IBS (irritable bowel syndrome) 2012  Iron deficiency 2012  Lumbar disc disease  MCI (mild cognitive impairment) 2013  Mixed hyperlipidemia 2012  Orthostatic hypotension 2016  Pacemaker  SSS (sick sinus syndrome) ([facility] 2016 Past Surgical History:  
Procedure Laterality Date  HX APPENDECTOMY  HX CARPAL TUNNEL RELEASE  HX CERVICAL FUSION    
 HX COLONOSCOPY  2005  HX PACEMAKER    HX PACEMAKER  2017 Replacement  VASCULAR SURGERY PROCEDURE UNLIST Left 2018  
 fem embolectomy Prior to Admission Medications Prescriptions Last Dose Informant Patient Reported? Taking? amLODIPine (NORVASC) 10 mg tablet   No No  
Sig: Take 1 Tab by mouth daily. carvedilol (COREG) 25 mg tablet   No No  
Sig: Take 1 Tab by mouth two (2) times daily (with meals). docusate sodium (STOOL SOFTENER PO)   Yes No  
Sig: Take  by mouth as needed. furosemide (LASIX) 20 mg tablet   No No  
Sig: Take 1 Tab by mouth daily. gabapentin (NEURONTIN) 600 mg tablet   No No  
Sig: Take 1 Tab by mouth three (3) times daily. Patient taking differently: Take 600 mg by mouth daily. hydrALAZINE (APRESOLINE) 10 mg tablet   No No  
Sig: Take 1 Tab by mouth three (3) times daily. levothyroxine (SYNTHROID) 25 mcg tablet   No No  
Sig: Take 1 Tab by mouth Daily (before breakfast). lisinopril (PRINIVIL, ZESTRIL) 20 mg tablet   No No  
Sig: Take 1 Tab by mouth daily. nitroglycerin (NITROSTAT) 0.4 mg SL tablet   No No  
Si Tab by SubLINGual route every five (5) minutes as needed. omeprazole (PRILOSEC) 20 mg capsule   No No  
Sig: Take 1 Cap by mouth daily. prednisoLONE acetate (PRED FORTE) 1 % ophthalmic suspension   Yes No  
Sig: instill 1 drop into both eyes three times a day for 7 days  
traMADol (ULTRAM) 50 mg tablet   No No  
Sig: Take 1 Tab by mouth every eight (8) hours as needed for Pain for up to 30 days. Max Daily Amount: 150 mg.  
traZODone (DESYREL) 50 mg tablet   Yes No  
Sig: Take 100 mg by mouth nightly. Facility-Administered Medications: None Allergies Allergen Reactions  Tylenol [Acetaminophen] Unknown (comments)  
  insomnia Social History Tobacco Use  Smoking status: Never Smoker  Smokeless tobacco: Never Used Substance Use Topics  Alcohol use: No  
  
History reviewed. No pertinent family history. Objective:  
 
Visit Vitals /83 (BP 1 Location: Right arm, BP Patient Position: At rest) Pulse 76 Temp 97.8 °F (36.6 °C) Resp 20 Ht 5' 1\" (1.549 m) Wt 49 kg (108 lb) SpO2 95% BMI 20.41 kg/m² Temp (24hrs), Av.8 °F (36.6 °C), Min:97.8 °F (36.6 °C), Max:97.9 °F (36.6 °C) Oxygen Therapy O2 Sat (%): 95 % (19) Pulse via Oximetry: 75 beats per minute (19) O2 Device: Nasal cannula (19) O2 Flow Rate (L/min): 2 l/min (19) Physical Exam: 
General:    Alert, cooperative, no distress, appears stated age. Head:   Normocephalic, without obvious abnormality, atraumatic. Nose:  Nares normal. No drainage or sinus tenderness. Lungs:   Bibasilar rales Heart:   Irregularly irregular, s1 s2 Abdomen:   Soft, non-tender. Not distended. Bowel sounds normal.  
Extremities: No cyanosis. No edema. No clubbing Skin:     Texture, turgor normal. No rashes or lesions. Not Jaundiced Neurologic: Alert and oriented x 3, no focal deficits Data Review:  
Recent Results (from the past 24 hour(s)) EKG, 12 LEAD, INITIAL Collection Time: 19  2:54 PM  
Result Value Ref Range Ventricular Rate 75 BPM  
 Atrial Rate 75 BPM  
 P-R Interval 152 ms QRS Duration 128 ms Q-T Interval 480 ms QTC Calculation (Bezet) 536 ms Calculated P Axis 80 degrees Calculated R Axis -67 degrees Calculated T Axis 91 degrees Diagnosis AV dual-paced rhythm Biventricular pacemaker detected Abnormal ECG When compared with ECG of 18-JUL-2018 14:37, No significant change was found CBC WITH AUTOMATED DIFF Collection Time: 05/11/19  2:55 PM  
Result Value Ref Range WBC 5.2 4.3 - 11.1 K/uL  
 RBC 3.24 (L) 4.05 - 5.2 M/uL  
 HGB 10.5 (L) 11.7 - 15.4 g/dL HCT 31.7 (L) 35.8 - 46.3 % MCV 97.8 79.6 - 97.8 FL  
 MCH 32.4 26.1 - 32.9 PG  
 MCHC 33.1 31.4 - 35.0 g/dL  
 RDW 13.5 11.9 - 14.6 % PLATELET 815 005 - 398 K/uL MPV 10.4 9.4 - 12.3 FL ABSOLUTE NRBC 0.00 0.0 - 0.2 K/uL  
 DF AUTOMATED NEUTROPHILS 70 43 - 78 % LYMPHOCYTES 19 13 - 44 % MONOCYTES 10 4.0 - 12.0 % EOSINOPHILS 1 0.5 - 7.8 % BASOPHILS 0 0.0 - 2.0 % IMMATURE GRANULOCYTES 0 0.0 - 5.0 %  
 ABS. NEUTROPHILS 3.6 1.7 - 8.2 K/UL  
 ABS. LYMPHOCYTES 1.0 0.5 - 4.6 K/UL  
 ABS. MONOCYTES 0.5 0.1 - 1.3 K/UL  
 ABS. EOSINOPHILS 0.1 0.0 - 0.8 K/UL  
 ABS. BASOPHILS 0.0 0.0 - 0.2 K/UL  
 ABS. IMM. GRANS. 0.0 0.0 - 0.5 K/UL METABOLIC PANEL, COMPREHENSIVE Collection Time: 05/11/19  2:55 PM  
Result Value Ref Range Sodium 135 (L) 136 - 145 mmol/L Potassium 4.0 3.5 - 5.1 mmol/L Chloride 99 98 - 107 mmol/L  
 CO2 29 21 - 32 mmol/L Anion gap 7 7 - 16 mmol/L Glucose 163 (H) 65 - 100 mg/dL BUN 22 8 - 23 MG/DL Creatinine 2.33 (H) 0.6 - 1.0 MG/DL  
 GFR est AA 25 (L) >60 ml/min/1.73m2 GFR est non-AA 21 (L) >60 ml/min/1.73m2 Calcium 8.5 8.3 - 10.4 MG/DL Bilirubin, total 0.4 0.2 - 1.1 MG/DL  
 ALT (SGPT) 14 12 - 65 U/L  
 AST (SGOT) 14 (L) 15 - 37 U/L Alk. phosphatase 137 (H) 50 - 136 U/L Protein, total 7.3 6.3 - 8.2 g/dL Albumin 4.0 3.2 - 4.6 g/dL Globulin 3.3 2.3 - 3.5 g/dL A-G Ratio 1.2 1.2 - 3.5 BNP Collection Time: 05/11/19  2:55 PM  
Result Value Ref Range  (H) 0 pg/mL TROPONIN I Collection Time: 05/11/19  2:55 PM  
Result Value Ref Range Troponin-I, Qt. <0.02 (L) 0.02 - 0.05 NG/ML Imaging Yasmine Keep [facility] [place] Final [99] 5/11/2019 16:58 5/11/2019 17:18 Study Result PA AND LATERAL CHEST X-RAY. 
  
Clinical Indication: Shortness of breath 
  
Comparison: Chest x-ray dated 2/28/2019 
  
Findings: 2 views of the chest submitted demonstrate new bilateral small pleural 
effusions slightly greater on the left. A pacer device remains in place. There 
is chronic interstitial changes again noted. No dense consolidated airspace 
opacity noted. The bones are osteopenic. 
  
IMPRESSION IMPRESSION: 
1. New bilateral small pleural effusions. 2. Chronic interstitial changes again evident. Assessment and Plan: Active Hospital Problems Diagnosis Date Noted  Acute on chronic systolic CHF (congestive heart failure) ([facility] 05/11/2019  Acute-on-chronic kidney injury ([facility] 05/11/2019  Persistent atrial fibrillation ([facility] 02/28/2019  Type 2 diabetes mellitus with nephropathy ([facility] 01/25/2018  Diabetes mellitus, type 2 ([facility] 08/12/2017  Hypertension 08/12/2017 Orthostatic hypotension due to autonomic neuropathy  DNR (do not resuscitate) 08/12/2017  Acquired hypothyroidism 09/12/2012  GERD (gastroesophageal reflux disease) 09/12/2012 A/P 
- Acute on chronic systolic CHF - EF 4704 10%. Will repeat echo. Lasix 40mg IV daily, monitor renal function. Continue coreg and ace-I. Consult cardiology. - CKD stage 3 - Cr variable. Monitor with diuresis. Strict I/O's. Assuredly contributing to volume overload. Will request nephrology to see. - Afib - rate controlled, cont coreg. S/p PPM. ?not on [address] or ASA. Will add ASA. - Hypothyroidism - synthroid.  Check TSH, t4 
 
 - HTN-  Resume home meds - GERD - PPI Code Status: DNR as d/w patient at bedside. Anticipated discharge: 3-4 days. Order PT/OT ppd. Signed By: Leonel Stone DO May 11, 2019

## 2019-05-11 NOTE — ED PROVIDER NOTES
[address] [place] is a 80 y.o. female seen on 5/11/2019 at 3:34 PM in the [facility] EMERGENCY DEPT in room ERJ/J. 
 
Chief Complaint Patient presents with  Shortness of Breath HPI:  70-year-old female presenting to the emergency department for evaluation of shortness of breath. She has a history of CHF and has been being managed by [facility] cardiology. They recently increased her dose of Lasix from 20 mg daily to 40 mg. She is coming in by her daughter who provides the majority of the history. She states that she is having episodes of acute shortness of breath. These occur at random. Occasionally occur at night and she has gone outside one time to get fresh air. She is not having increased orthopnea. She does not have any leg swelling. She has not had any chest pain. The daughter also notes that she has developed some bags under her eyes that look like some swelling and also a little bit of edema in her left hand. Historian: Patient, patient's daughter REVIEW OF SYSTEMS Review of Systems Constitutional: Negative for fever. HENT: Negative. Eyes: Negative. Respiratory: Positive for shortness of breath. Negative for cough, chest tightness and wheezing. Cardiovascular: Negative for chest pain. Gastrointestinal: Negative for abdominal distention, abdominal pain, constipation, diarrhea and vomiting. Endocrine: Negative. Genitourinary: Negative for dysuria, flank pain, frequency and urgency. Neurological: Negative for dizziness, syncope and headaches. Psychiatric/Behavioral: Negative. All other systems reviewed and are negative. PAST MEDICAL HISTORY Past Medical History:  
Diagnosis Date  A fib  Abnormal loss of weight  Acquired hypothyroidism 9/12/2012  Acute respiratory failure with hypoxemia ([facility] [address] 8/12/2017  Anemia  Anorexia 9/2/2014  Arthritis associated with another disorder  Atrial fibrillation ([address] 9/12/2012 Paroxysmal.  Coumadin contraindicated due to falls    
 Autonomic neuropathy 1/13/2013  CAD (coronary artery disease)  CAD in native artery 5/5/2016  CAP (community acquired pneumonia) 8/12/2017  Cervical radiculopathy 5/23/2013  Chest pain  Chronic kidney disease  Chronic pain syndrome 1/13/2013 Back, right shoulder, neck: Peripheral neuropathy, spinal stenosis C7-T1, foraminal narrowing C4-5  CKD (chronic kidney disease), stage III ([address] 9/12/2012  Diabetes ([address]   
 about 30 years  Diabetes mellitus   
 does not check sugar  Diabetes mellitus, type 2 ([address] 9/12/2012  Dysphagia 9/2/2014 Due to esophageal spasm seen on modified barium swallow 2015  Gait disorder 9/12/2012  GERD (gastroesophageal reflux disease)  Heart failure ([address]  HTN   
 Hypertension 9/12/2012 Orthostatic hypotension due to autonomic neuropathy  Hypothyroid  IBS (irritable bowel syndrome) 9/12/2012  Iron deficiency 9/12/2012  Lumbar disc disease  MCI (mild cognitive impairment) 1/13/2013  Mixed hyperlipidemia 9/12/2012  Orthostatic hypotension 5/5/2016  Pacemaker  SSS (sick sinus syndrome) ([address] 5/5/2016 Past Surgical History:  
Procedure Laterality Date  HX APPENDECTOMY  HX CARPAL TUNNEL RELEASE  HX CERVICAL FUSION    
 HX COLONOSCOPY  Nov 2005  HX PACEMAKER  6/08  HX PACEMAKER  2017 Replacement  VASCULAR SURGERY PROCEDURE UNLIST Left 04/25/2018  
 fem embolectomy Social History Socioeconomic History  Marital status:  Spouse name: Not on file  Number of children: Not on file  Years of education: Not on file  Highest education level: Not on file Tobacco Use  Smoking status: Never Smoker  Smokeless tobacco: Never Used Substance and Sexual Activity  Alcohol use: No  
 Drug use: No  
Other Topics Concern Social History Narrative Lives with  who has mild vascular dementia, patient is primary caretaker but daughters come over to house 4-5 times a week, Tray Emmanuelle has [place] Prior to Admission Medications Prescriptions Last Dose Informant Patient Reported? Taking? amLODIPine (NORVASC) 10 mg tablet   No No  
Sig: Take 1 Tab by mouth daily. carvedilol (COREG) 25 mg tablet   No No  
Sig: Take 1 Tab by mouth two (2) times daily (with meals). docusate sodium (STOOL SOFTENER PO)   Yes No  
Sig: Take  by mouth as needed. furosemide (LASIX) 20 mg tablet   No No  
Sig: Take 1 Tab by mouth daily. gabapentin (NEURONTIN) 600 mg tablet   No No  
Sig: Take 1 Tab by mouth three (3) times daily. Patient taking differently: Take 600 mg by mouth daily. hydrALAZINE (APRESOLINE) 10 mg tablet   No No  
Sig: Take 1 Tab by mouth three (3) times daily. levothyroxine (SYNTHROID) 25 mcg tablet   No No  
Sig: Take 1 Tab by mouth Daily (before breakfast). lisinopril (PRINIVIL, ZESTRIL) 20 mg tablet   No No  
Sig: Take 1 Tab by mouth daily. nitroglycerin (NITROSTAT) 0.4 mg SL tablet   No No  
Si Tab by SubLINGual route every five (5) minutes as needed. omeprazole (PRILOSEC) 20 mg capsule   No No  
Sig: Take 1 Cap by mouth daily. prednisoLONE acetate (PRED FORTE) 1 % ophthalmic suspension   Yes No  
Sig: instill 1 drop into both eyes three times a day for 7 days  
traMADol (ULTRAM) 50 mg tablet   No No  
Sig: Take 1 Tab by mouth every eight (8) hours as needed for Pain for up to 30 days. Max Daily Amount: 150 mg.  
traZODone (DESYREL) 50 mg tablet   Yes No  
Sig: Take 100 mg by mouth nightly. Facility-Administered Medications: None Allergies Allergen Reactions  Tylenol [Acetaminophen] Unknown (comments)  
  insomnia PHYSICAL EXAM    
 
Vitals:  
 19 1452 BP: 119/63 Pulse: 75 Resp: 16 Temp: 97.8 °F (36.6 °C) SpO2: 91% Vital signs were reviewed.   
 
Physical Exam  
 Constitutional: She is oriented to person, place, and time. She appears well-developed and well-nourished. No distress. HENT:  
Head: Normocephalic and atraumatic. Eyes: Pupils are equal, round, and reactive to light. EOM are normal.  
Mild edema inferior to eyes bilaterally Neck: Normal range of motion. Neck supple. Cardiovascular: Normal rate, regular rhythm, normal heart sounds and intact distal pulses. Exam reveals no gallop and no friction rub. No murmur heard. Pulmonary/Chest: Effort normal and breath sounds normal. No stridor. No respiratory distress. She has no wheezes. Crackles at bases bilaterally Abdominal: Soft. Bowel sounds are normal. She exhibits no distension and no mass. There is no tenderness. There is no rebound and no guarding. Musculoskeletal: Normal range of motion. She exhibits no edema, tenderness or deformity. Neurological: She is alert and oriented to person, place, and time. No sensory deficit. No focal neuro deficits Skin: Skin is warm and dry. No rash noted. She is not diaphoretic. No erythema. Psychiatric: She has a normal mood and affect. Her behavior is normal.  
Vitals reviewed. MEDICAL DECISION MAKING Differential Diagnosis: chf, pna, pleural effusion MDM Number of Diagnoses or Management Options Amount and/or Complexity of Data Reviewed Clinical lab tests: ordered and reviewed Tests in the radiology section of CPT®: ordered and reviewed Risk of Complications, Morbidity, and/or Mortality Presenting problems: high Diagnostic procedures: high Management options: high EKG Date/Time: 5/11/2019 5:24 PM 
Performed by: Andrzej Lopez MD 
Authorized by: Andrzej Lopez MD  
 
Interpretation: Interpretation: abnormal   
Rate:  
  ECG rate assessment: normal   
Rhythm:  
  Rhythm: paced Pacing:  
  Type of pacing:  AV 
 
 
 
ED Course:   
5:21 PM 
Pt noted to be hypoxic to 86% with ambulation, 88% in triage.  She is saturating well on 3L NC. Her BNP is continuing to trend up and she has bilateral crackles on exam despite increasing lasix dose as outpt. Given her clinical trajectory and hypoxia, I will discuss the case with cardiology. 5:31 PM 
Discussed with cardiology, who feels this could be a mixed picture of CHF and possibly interstitial lung disease causing hypoxia. He is requested that the hospitalist admit the patient and cardiology will consult. [facility] page hospitalist service. Disposition:  Admit to the hospital 
Diagnosis:  Congestive heart failure, hypoxia, chronic kidney disease, interstitial lung disease 
____________________________________________________________________ A portion of this note was generated using voice recognition dictation software. While the note has been reviewed for accuracy, please note certain words and phrases may not be transcribed as intended that some grammatical and/or typographical errors may be present.

## 2019-05-12 NOTE — CONSULTS
[address] Nephrology Consult Subjective:  
 
Katelin Alanis is a 80 y.o.  female who is being seen for renal disease. This is a pleasant elderly lady with a history of stage 4 CKD. Creatinine is in the mid 2's. She was admitted with volume overload and diuresed. She feels a little better today. Her edema has resolved. Dyspnea is somewhat improved. Renal function is unchanged. Her Xray showed some mild edema. Past Medical History:  
Diagnosis Date  A fib  Abnormal loss of weight  Acquired hypothyroidism 9/12/2012  Acute respiratory failure with hypoxemia ([address] 8/12/2017  Anemia  Anorexia 9/2/2014  Arthritis associated with another disorder  Atrial fibrillation ([address] 9/12/2012 Paroxysmal.  Coumadin contraindicated due to falls    
 Autonomic neuropathy 1/13/2013  CAD (coronary artery disease)  CAD in native artery 5/5/2016  CAP (community acquired pneumonia) 8/12/2017  Cervical radiculopathy 5/23/2013  Chest pain  Chronic kidney disease  Chronic pain syndrome 1/13/2013 Back, right shoulder, neck: Peripheral neuropathy, spinal stenosis C7-T1, foraminal narrowing C4-5  CKD (chronic kidney disease), stage III ([address] 9/12/2012  Diabetes ([address]   
 about 30 years  Diabetes mellitus   
 does not check sugar  Diabetes mellitus, type 2 ([address] 9/12/2012  Dysphagia 9/2/2014 Due to esophageal spasm seen on modified barium swallow 2015  Gait disorder 9/12/2012  GERD (gastroesophageal reflux disease)  Heart failure ([address]  HTN   
 Hypertension 9/12/2012 Orthostatic hypotension due to autonomic neuropathy  Hypothyroid  IBS (irritable bowel syndrome) 9/12/2012  Iron deficiency 9/12/2012  Lumbar disc disease  MCI (mild cognitive impairment) 1/13/2013  Mixed hyperlipidemia 9/12/2012  Orthostatic hypotension 5/5/2016  Pacemaker  SSS (sick sinus syndrome) ([address] 5/5/2016 Past Surgical History:  
Procedure Laterality Date  HX APPENDECTOMY  HX CARPAL TUNNEL RELEASE  HX CERVICAL FUSION    
 HX COLONOSCOPY  Nov 2005  HX PACEMAKER  6/08  HX PACEMAKER  2017 Replacement  VASCULAR SURGERY PROCEDURE UNLIST Left 04/25/2018  
 fem embolectomy History reviewed. No pertinent family history. Social History Tobacco Use  Smoking status: Never Smoker  Smokeless tobacco: Never Used Substance Use Topics  Alcohol use: No  
   
Current Facility-Administered Medications Medication Dose Route Frequency Provider Last Rate Last Dose  torsemide (DEMADEX) tablet 20 mg  20 mg Oral DAILY Haleigh Jaimes MD      
 tuberculin injection 5 Units  5 Units IntraDERMal Circle, DO   5 Units at 05/11/19 2154  amLODIPine (NORVASC) tablet 10 mg  10 mg Oral DAILY Concetta Aponte, DO   10 mg at 05/12/19 0068  carvedilol (COREG) tablet 25 mg  25 mg Oral BID WITH MEALS Concetta Aponte, DO   25 mg at 05/12/19 3241  hydrALAZINE (APRESOLINE) tablet 10 mg  10 mg Oral TID Raymond CALDWELL, DO   10 mg at 05/12/19 3287  levothyroxine (SYNTHROID) tablet 25 mcg  25 mcg Oral ACB Concetta Aponte, DO   25 mcg at 05/12/19 9575  lisinopril (PRINIVIL, ZESTRIL) tablet 20 mg  20 mg Oral DAILY Concetta Aponte, DO   20 mg at 05/12/19 6488  pantoprazole (PROTONIX) tablet 40 mg  40 mg Oral ACB Concetta Aponte, DO   40 mg at 05/12/19 7287  traMADol (ULTRAM) tablet 50 mg  50 mg Oral Q8H PRN Martir Aponte, DO   50 mg at 05/12/19 6652  traZODone (DESYREL) tablet 100 mg  100 mg Oral QHS Martir Aponte, DO   100 mg at 05/11/19 2153  
 gabapentin (NEURONTIN) capsule 600 mg  600 mg Oral QHS Junious Gauze, DO   600 mg at 05/11/19 2234 Allergies Allergen Reactions  Tylenol [Acetaminophen] Unknown (comments)  
  insomnia Review of Systems: 
Pertinent items are noted in the History of Present Illness. Objective: Intake and Output:   
05/12 0701 - 05/12 1900 In: -  
Out: 150 [Urine:150] 05/10 1901 - 05/12 0700 In: 240 [P.O.:240] Out: 1700 [Urine:1700] Physical Exam:  
General appearance: alert, cooperative, no distress, appears stated age Neck: supple, symmetrical, trachea midline, no adenopathy, thyroid: not enlarged, symmetric, no tenderness/mass/nodules, no carotid bruit and no JVD Lungs: clear to auscultation bilaterally Heart: regular rate and rhythm, S1, S2 normal, no murmur, click, rub or gallop Abdomen: soft, non-tender. Bowel sounds normal. No masses,  no organomegaly Extremities: extremities normal, atraumatic, no cyanosis or edema Data Review:  
Recent Results (from the past 24 hour(s)) EKG, 12 LEAD, INITIAL Collection Time: 05/11/19  2:54 PM  
Result Value Ref Range Ventricular Rate 75 BPM  
 Atrial Rate 75 BPM  
 P-R Interval 152 ms QRS Duration 128 ms Q-T Interval 480 ms QTC Calculation (Bezet) 536 ms Calculated P Axis 80 degrees Calculated R Axis -67 degrees Calculated T Axis 91 degrees Diagnosis AV dual-paced rhythm Biventricular pacemaker detected Abnormal ECG When compared with ECG of 18-JUL-2018 14:37, No significant change was found Confirmed by Sandra Xiong MD (), Suzan Birch ([id number]) on 5/12/2019 8:08:17 AM 
  
CBC WITH AUTOMATED DIFF Collection Time: 05/11/19  2:55 PM  
Result Value Ref Range WBC 5.2 4.3 - 11.1 K/uL  
 RBC 3.24 (L) 4.05 - 5.2 M/uL  
 HGB 10.5 (L) 11.7 - 15.4 g/dL HCT 31.7 (L) 35.8 - 46.3 % MCV 97.8 79.6 - 97.8 FL  
 MCH 32.4 26.1 - 32.9 PG  
 MCHC 33.1 31.4 - 35.0 g/dL  
 RDW 13.5 11.9 - 14.6 % PLATELET 678 646 - 262 K/uL MPV 10.4 9.4 - 12.3 FL ABSOLUTE NRBC 0.00 0.0 - 0.2 K/uL  
 DF AUTOMATED NEUTROPHILS 70 43 - 78 % LYMPHOCYTES 19 13 - 44 % MONOCYTES 10 4.0 - 12.0 % EOSINOPHILS 1 0.5 - 7.8 % BASOPHILS 0 0.0 - 2.0 % IMMATURE GRANULOCYTES 0 0.0 - 5.0 % ABS. NEUTROPHILS 3.6 1.7 - 8.2 K/UL  
 ABS. LYMPHOCYTES 1.0 0.5 - 4.6 K/UL  
 ABS. MONOCYTES 0.5 0.1 - 1.3 K/UL  
 ABS. EOSINOPHILS 0.1 0.0 - 0.8 K/UL  
 ABS. BASOPHILS 0.0 0.0 - 0.2 K/UL  
 ABS. IMM. GRANS. 0.0 0.0 - 0.5 K/UL METABOLIC PANEL, COMPREHENSIVE Collection Time: 05/11/19  2:55 PM  
Result Value Ref Range Sodium 135 (L) 136 - 145 mmol/L Potassium 4.0 3.5 - 5.1 mmol/L Chloride 99 98 - 107 mmol/L  
 CO2 29 21 - 32 mmol/L Anion gap 7 7 - 16 mmol/L Glucose 163 (H) 65 - 100 mg/dL BUN 22 8 - 23 MG/DL Creatinine 2.33 (H) 0.6 - 1.0 MG/DL  
 GFR est AA 25 (L) >60 ml/min/1.73m2 GFR est non-AA 21 (L) >60 ml/min/1.73m2 Calcium 8.5 8.3 - 10.4 MG/DL Bilirubin, total 0.4 0.2 - 1.1 MG/DL  
 ALT (SGPT) 14 12 - 65 U/L  
 AST (SGOT) 14 (L) 15 - 37 U/L Alk. phosphatase 137 (H) 50 - 136 U/L Protein, total 7.3 6.3 - 8.2 g/dL Albumin 4.0 3.2 - 4.6 g/dL Globulin 3.3 2.3 - 3.5 g/dL A-G Ratio 1.2 1.2 - 3.5 BNP Collection Time: 05/11/19  2:55 PM  
Result Value Ref Range  (H) 0 pg/mL TROPONIN I Collection Time: 05/11/19  2:55 PM  
Result Value Ref Range Troponin-I, Qt. <0.02 (L) 0.02 - 0.05 NG/ML  
TSH 3RD GENERATION Collection Time: 05/11/19  8:11 PM  
Result Value Ref Range TSH 0.702 0.358 - 3.740 uIU/mL T4, FREE Collection Time: 05/11/19  8:11 PM  
Result Value Ref Range T4, Free 1.5 (H) 0.78 - 1.46 NG/DL  
BNP Collection Time: 05/12/19  4:22 AM  
Result Value Ref Range  (H) 0 pg/mL CBC W/O DIFF Collection Time: 05/12/19  4:22 AM  
Result Value Ref Range WBC 4.6 4.3 - 11.1 K/uL  
 RBC 2.85 (L) 4.05 - 5.2 M/uL HGB 9.1 (L) 11.7 - 15.4 g/dL HCT 27.5 (L) 35.8 - 46.3 % MCV 96.5 79.6 - 97.8 FL  
 MCH 31.9 26.1 - 32.9 PG  
 MCHC 33.1 31.4 - 35.0 g/dL  
 RDW 13.6 11.9 - 14.6 % PLATELET 646 021 - 287 K/uL MPV 10.6 9.4 - 12.3 FL ABSOLUTE NRBC 0.00 0.0 - 0.2 K/uL METABOLIC PANEL, BASIC  
 Collection Time: 05/12/19  4:22 AM  
Result Value Ref Range Sodium 139 136 - 145 mmol/L Potassium 3.5 3.5 - 5.1 mmol/L Chloride 101 98 - 107 mmol/L  
 CO2 31 21 - 32 mmol/L Anion gap 7 7 - 16 mmol/L Glucose 83 65 - 100 mg/dL BUN 22 8 - 23 MG/DL Creatinine 2.33 (H) 0.6 - 1.0 MG/DL  
 GFR est AA 25 (L) >60 ml/min/1.73m2 GFR est non-AA 21 (L) >60 ml/min/1.73m2 Calcium 8.3 8.3 - 10.4 MG/DL MAGNESIUM Collection Time: 05/12/19  4:22 AM  
Result Value Ref Range Magnesium 2.1 1.8 - 2.4 mg/dL Assessment:  
 
Principal Problem: 
  Acute on chronic systolic CHF (congestive heart failure) ([facility] [address] (5/11/2019) Active Problems: Hypertension (8/12/2017) Overview: Orthostatic hypotension due to autonomic neuropathy Acquired hypothyroidism (9/12/2012) GERD (gastroesophageal reflux disease) (9/12/2012) SSS (sick sinus syndrome) ([facility] [address] (5/5/2016) DNR (do not resuscitate) (8/12/2017) Type 2 diabetes mellitus with nephropathy ([facility] [address] (1/25/2018) Physical debility (4/30/2018) Persistent atrial fibrillation ([facility] [address] (2/28/2019) Acute-on-chronic kidney injury ([facility] [address] (5/11/2019) Hypoxia (5/11/2019) CKD 4 Plan: This is a delightful lady with stage 4 CKD which appears to be at baseline. I will send urine studies. She has multifactorial dyspnea and there may be a volume component. Would put her on Demadex rather than daily Lasix. Twice daily Lasix would likely make her uncomfortable with nocturia. I do think she has some chronic lung disease and would ask Pulmonary to see her. She may benefit from home oxygen as well at least from a comfort standpoint. Might help keep her out of the hospital as well. Will continue ACE I. Thank you for the kind courtesy of this consultation. Will make further adjustments to the medical regimen as the clinical course dictates and follow very closely with you.  
 
 
Signed By: Radha Estrella MD   
 May 12, 2019

## 2019-05-12 NOTE — CONSULTS
[facility] Cardiology Consult Date of  Admission: 5/11/2019  3:15 PM  
 
Primary Care Physician: Dr. Tamica Cornejo Primary Cardiologist: Dr. Vaishali Arambula Referring Physician: Dr. Juan C Richards Consulting Physician: Dr. José Miguel Meade CC/Reason for consult: Samantha Ni is a 80 y.o.  female with PMHx of PAF (not on [address] 2/2 falls and brain bleed 6/2018, poor watchman candidate), HTN, HLD, sHF (EF 30-35% -- 2/2017), IBS, CKD, Hypothyroidism, GERD and SSS (s/p PPM -- SJM BiV 3/2017) who was admitted by the Hospitalist for hypoxia and SOB. She is unable to provide much history and her daughter who is her caregiver provides HPI. She presented to the ED with O2 sats 85%. BNP was 449. CXR read as new bilat small effusions and persistent chronic interstitial changes. She did not receive any Lasix in the ED. Daughter states that Pt has been having increased SOB and \"smothering\" intermittently over the past couple months. No pattern. Can occur at night or while alert and sitting on the couch. No cough, congestion, fevers or recent respiratory illness. No sputum production. Daughter reports Pt woke up at [facility] this week gasping for air. Per daughter she is supposed to be having OP oximetry study for home O2. She is followed by Dr. Vaishali Arambula in our office. She saw him on 5/7/19 and had reported increased SOB. Her Lasix was increased from 20mg to 40mg daily. Daughter states not much improvement. Has noted intermittent ankle edema. States that today Pts L hand and face appeared swollen. Daughter states that Pt has not been urinating as much as usual for the past few days. Reports that she typically drinks a Diet Dr. Simba Hodge in the AM, a glass of OJ, a glass of tea with meals and water throughout the day. Not on a FR or sodium restricted diet. Likes to eat vegetables (peas, beans, cabbage) and cornbread and adds salt to her food. Patient Active Problem List  
Diagnosis Code  Diabetes mellitus, type 2 ([address] E11.9  Hypertension I10  
 Acquired hypothyroidism E03.9  Mixed hyperlipidemia E78.2  CKD (chronic kidney disease), stage III ([facility]) N18.3  GERD (gastroesophageal reflux disease) K21.9  
 IBS (irritable bowel syndrome) K58.9  Gait disorder R26.9  Atrial fibrillation with RVR ([facility]) I48.91  
 Autonomic neuropathy G90.9  MCI (mild cognitive impairment) G31.84  Chronic pain syndrome G89.4  Cervical radiculopathy M54.12  Dysphagia R13.10  SSS (sick sinus syndrome) ([facility]) I49.5  Orthostatic hypotension I95.1  CAD in native artery I25.10  Atrial fibrillation with rapid ventricular response ([facility]) I48.91  
 Ischemic cardiomyopathy I25.5  DNR (do not resuscitate) Z66  
 S/P AV noris ablation Z98.890  Biventricular cardiac pacemaker in situ Z95.0  Type 2 diabetes mellitus with nephropathy ([facility] [address] E11.21  
 History of thrombosis Z86.718  Chronic systolic congestive heart failure ([facility]) I50.22  
 Physical debility R53.81  
 Subdural hematoma ([facility]) S06.5X9A  
 Persistent atrial fibrillation ([facility]) I48.1  Acute on chronic systolic CHF (congestive heart failure) ([facility]) I50.23  
 Acute-on-chronic kidney injury ([facility]) N17.9, N18.9 Past Medical History:  
Diagnosis Date  A fib  Abnormal loss of weight  Acquired hypothyroidism 9/12/2012  Acute respiratory failure with hypoxemia ([facility] 8/12/2017  Anemia  Anorexia 9/2/2014  Arthritis associated with another disorder  Atrial fibrillation ([facility] 9/12/2012 Paroxysmal.  Coumadin contraindicated due to falls    
 Autonomic neuropathy 1/13/2013  CAD (coronary artery disease)  CAD in native artery 5/5/2016  CAP (community acquired pneumonia) 8/12/2017  Cervical radiculopathy 5/23/2013  Chest pain  Chronic kidney disease  Chronic pain syndrome 1/13/2013 Back, right shoulder, neck: Peripheral neuropathy, spinal stenosis C7-T1, foraminal narrowing C4-5  CKD (chronic kidney disease), stage III ([facility] [facility] 9/12/2012  Diabetes ([facility] [address] [facility]   
 about 30 years  Diabetes mellitus   
 does not check sugar  Diabetes mellitus, type 2 ([address] 9/12/2012  Dysphagia 9/2/2014 Due to esophageal spasm seen on modified barium swallow 2015  Gait disorder 9/12/2012  GERD (gastroesophageal reflux disease)  Heart failure ([address]  HTN   
 Hypertension 9/12/2012 Orthostatic hypotension due to autonomic neuropathy  Hypothyroid  IBS (irritable bowel syndrome) 9/12/2012  Iron deficiency 9/12/2012  Lumbar disc disease  MCI (mild cognitive impairment) 1/13/2013  Mixed hyperlipidemia 9/12/2012  Orthostatic hypotension 5/5/2016  Pacemaker  SSS (sick sinus syndrome) ([facility] 5/5/2016 Past Surgical History:  
Procedure Laterality Date  HX APPENDECTOMY  HX CARPAL TUNNEL RELEASE  HX CERVICAL FUSION    
 HX COLONOSCOPY  Nov 2005  HX PACEMAKER  6/08  HX PACEMAKER  2017 Replacement  VASCULAR SURGERY PROCEDURE UNLIST Left 04/25/2018  
 fem embolectomy Allergies Allergen Reactions  Tylenol [Acetaminophen] Unknown (comments)  
  insomnia History reviewed. No pertinent family history. Current Facility-Administered Medications Medication Dose Route Frequency  tuberculin injection 5 Units  5 Units IntraDERMal ONCE  
 [START ON 5/12/2019] amLODIPine (NORVASC) tablet 10 mg  10 mg Oral DAILY  carvedilol (COREG) tablet 25 mg  25 mg Oral BID WITH MEALS  
 hydrALAZINE (APRESOLINE) tablet 10 mg  10 mg Oral TID  [START ON 5/12/2019] levothyroxine (SYNTHROID) tablet 25 mcg  25 mcg Oral ACB  [START ON 5/12/2019] lisinopril (PRINIVIL, ZESTRIL) tablet 20 mg  20 mg Oral DAILY  [START ON 5/12/2019] pantoprazole (PROTONIX) tablet 40 mg  40 mg Oral ACB  traMADol (ULTRAM) tablet 50 mg  50 mg Oral Q8H PRN  
 traZODone (DESYREL) tablet 100 mg  100 mg Oral QHS  [START ON 5/12/2019] furosemide (LASIX) injection 40 mg  40 mg IntraVENous DAILY  gabapentin (NEURONTIN) capsule 600 mg  600 mg Oral QHS Review of Systems Unable to perform ROS: Other (Pt unable to provide, as per daughter - see HPI) Physical Exam 
Vitals:  
 05/11/19 1807 05/11/19 1813 05/11/19 1833 05/11/19 2023 BP: 141/65 142/65 147/83 129/58 Pulse: 76  76 75 Resp: 20  20 20 Temp: 97.9 °F (36.6 °C)  97.8 °F (36.6 °C) 98.3 °F (36.8 °C) SpO2: 94% 96% 95% 94% Weight:      
Height:      
 
 
Physical Exam: 
Physical Exam  
Constitutional: She is oriented to person, place, and time and well-developed, well-nourished, and in no distress. Frail, elderly female HENT:  
Head: Normocephalic and atraumatic. Nose: Nose normal.  
Mouth/Throat: Oropharynx is clear and moist.  
+Habematolel Eyes: Pupils are equal, round, and reactive to light. Conjunctivae and EOM are normal. No scleral icterus. Neck: Normal range of motion. Neck supple. No JVD present. No tracheal deviation present. Cardiovascular: Normal rate, regular rhythm, normal heart sounds and intact distal pulses. Exam reveals no friction rub. No murmur heard. Pulmonary/Chest: Effort normal. No stridor. No respiratory distress. She has no wheezes. She has rales. On O2, BBS coarse throughout Abdominal: Bowel sounds are normal. She exhibits no distension. There is no tenderness. Musculoskeletal: Normal range of motion. She exhibits no edema. Neurological: She is alert and oriented to person, place, and time. No cranial nerve deficit. Skin: Skin is warm and dry. Psychiatric: Mood and affect normal.  
 
 
Cardiographics Telemetry: AV paced ECG: AV paced Echocardiogram: ordered and pending Labs:  
Recent Labs 05/11/19 
1455 * K 4.0  
BUN 22  
CREA 2.33* * WBC 5.2 HGB 10.5* HCT 31.7*  Assessment/Plan: 
 
 Assessment:  
  
Principal Problem: Acute on chronic systolic CHF (congestive heart failure) -- monitor on tele, diurese with IV lasix as tolerated, will need increased dose with elevated Cr, has not received any lasix since arrival at 1500 -- will give 60mg now and monitor response, d/w Hospitalist as well, watch renal function with diuresis, cont BB and ACEi for now -- may need to hold if renal function deteriorates, check ECHO to eval EF, low-sodium/FR diet, strict I&O, daily weights, f/u daily labs/lytes Active Problems: Hypertension -- cont home meds, monitor BP and adjust as indicated Acquired hypothyroidism -- cont Synthroid -- per Hospitalist 
 
  GERD (gastroesophageal reflux disease) -- cont PPI 
 
  SSS (sick sinus syndrome) -- s/p SJM BIV PM 
 
  DNR (do not resuscitate) -- noted Type 2 diabetes mellitus with nephropathy -- per Hospitalist 
 
  Physical debility -- consider HH vs STR -- has round-the-clock caregivers at home Persistent atrial fibrillation -- s/p AV node ablation and PM -- currently AV paced Acute-on-chronic kidney injury -- Nephrology consult pending Hypoxia -- O2 sats low on arrival and OP study pending -- try to complete here if possible, cont O2 for now SOB likely multifactorial with low heart function, elevated Cr (although better today than last month despite increased PO lasix) and hypoxia (sleep apnea ? ? -- Pt snoring during exam tonight). Thank you very much for this referral. We appreciate the opportunity to participate in this patient's care. We will follow along with above stated plan. Skyler Paula NP Consulting MD: Rosette Davalos I have personally seen and examined patient and agree with above assessment. I agree and confirm with findings with additional details/exceptions as listed below: 
 
Presented with some dyspnea/hypoxia; CXray with some chronic interstitial changes. Received IV lasix with good response and improved symptoms.  Home maintenance dose prior was lasix 20mg which would not suffice with CKD. Reassess dosing/options prior to d/c. Also needs EF reassessed since AVN ablation/BiV upgrade in 3/2017; last device check with >99% BiV paced. Prior EF 30-35% in 2/2017. Also with hx of persistent AF but not AC candidate with prior ICH. Plan ASA 81mg with history of AF/reported CAD per records Continue current LV dysfunction meds (ok to use ACEI with Cr <3; on coreg). Further recs pending clinical course. Mara Cook MD 
5/12/2019

## 2019-05-12 NOTE — CONSULTS
CONSULT NOTE Katelin Alanis 5/12/2019 Date of Admission:  5/11/2019 The patient's chart is reviewed and the patient is discussed with the staff. Subjective:  
 
Patient is a 80 y.o.  female seen and evaluated at the request of Dr. Darron Mcmanus. Patient has a history of hypothyroidism, DM, CKD, HTN, HL, chronic a.fib s/p AV node ablation, SSS s/p PPM, and chronic sCHF. Seen by Cardiology as an outpatient on 5/7 and lasix dose increased for worsening dyspnea, orthopnea, and PND. She did not improve and presented to the ER for further evaluation. , creat 2.3 (baseline in the 2s) and CXR with small bilateral effusions. Her initial O2 sat was 85% but improved to 95% on 2 lpm. She was admitted for acute on chronic sCHF. Cardiology and Nephrology were consulted. Her diuretics were adjusted but she continues to have some dyspnea and we are consulted to assist in her care. She is currently on O2 at 2 lpm with o2 sat 98%. Review of Systems A comprehensive review of systems was negative except for that written in the HPI. Patient Active Problem List  
Diagnosis Code  Diabetes mellitus, type 2 ([facility] [address] E11.9  Hypertension I10  
 Acquired hypothyroidism E03.9  Mixed hyperlipidemia E78.2  CKD (chronic kidney disease), stage III ([facility]) N18.3  GERD (gastroesophageal reflux disease) K21.9  
 IBS (irritable bowel syndrome) K58.9  Gait disorder R26.9  Atrial fibrillation with RVR ([facility]) I48.91  
 Autonomic neuropathy G90.9  MCI (mild cognitive impairment) G31.84  Chronic pain syndrome G89.4  Cervical radiculopathy M54.12  Dysphagia R13.10  SSS (sick sinus syndrome) ([facility]) I49.5  Orthostatic hypotension I95.1  CAD in native artery I25.10  Atrial fibrillation with rapid ventricular response ([facility]) I48.91  
 Ischemic cardiomyopathy I25.5  DNR (do not resuscitate) Z66  
 S/P AV noris ablation Z98.890  
  Biventricular cardiac pacemaker in situ Z95.0  Type 2 diabetes mellitus with nephropathy ([facility] [address] E11.21  
 History of thrombosis Z86.718  Chronic systolic congestive heart failure (HCC) I50.22  
 Physical debility R53.81  
 Subdural hematoma (HCC) S06.5X9A  
 Persistent atrial fibrillation (HCC) I48.1  Acute on chronic systolic CHF (congestive heart failure) (HCC) I50.23  
 Acute-on-chronic kidney injury (HCC) N17.9, N18.9  Hypoxia R09.02 Home DME company none. Prior to Admission Medications Prescriptions Last Dose Informant Patient Reported? Taking? amLODIPine (NORVASC) 10 mg tablet 2019 at Unknown time  No Yes Sig: Take 1 Tab by mouth daily. carvedilol (COREG) 25 mg tablet 2019 at Unknown time  No Yes Sig: Take 1 Tab by mouth two (2) times daily (with meals). docusate sodium (STOOL SOFTENER PO) Unknown at Unknown time  Yes No  
Sig: Take  by mouth as needed. furosemide (LASIX) 20 mg tablet 2019 at Unknown time  No Yes Sig: Take 1 Tab by mouth daily. Patient taking differently: Take 40 mg by mouth daily. gabapentin (NEURONTIN) 600 mg tablet 2019 at Unknown time  No Yes Sig: Take 1 Tab by mouth three (3) times daily. Patient taking differently: Take 600 mg by mouth daily. hydrALAZINE (APRESOLINE) 10 mg tablet 2019 at Unknown time  No Yes Sig: Take 1 Tab by mouth three (3) times daily. levothyroxine (SYNTHROID) 25 mcg tablet   No Yes Sig: Take 1 Tab by mouth Daily (before breakfast). lisinopril (PRINIVIL, ZESTRIL) 20 mg tablet   No Yes Sig: Take 1 Tab by mouth daily. nitroglycerin (NITROSTAT) 0.4 mg SL tablet   No Yes Si Tab by SubLINGual route every five (5) minutes as needed. omeprazole (PRILOSEC) 20 mg capsule   No Yes Sig: Take 1 Cap by mouth daily. prednisoLONE acetate (PRED FORTE) 1 % ophthalmic suspension Not Taking at Unknown time  Yes No  
Sig: instill 1 drop into both eyes three times a day for 7 days traMADol (ULTRAM) 50 mg tablet   No Yes Sig: Take 1 Tab by mouth every eight (8) hours as needed for Pain for up to 30 days. Max Daily Amount: 150 mg.  
traZODone (DESYREL) 50 mg tablet   Yes Yes Sig: Take 100 mg by mouth nightly. Facility-Administered Medications: None Past Medical History:  
Diagnosis Date  A fib  Abnormal loss of weight  Acquired hypothyroidism 9/12/2012  Acute respiratory failure with hypoxemia ([address] 8/12/2017  Anemia  Anorexia 9/2/2014  Arthritis associated with another disorder  Atrial fibrillation ([address] 9/12/2012 Paroxysmal.  Coumadin contraindicated due to falls    
 Autonomic neuropathy 1/13/2013  CAD (coronary artery disease)  CAD in native artery 5/5/2016  CAP (community acquired pneumonia) 8/12/2017  Cervical radiculopathy 5/23/2013  Chest pain  Chronic kidney disease  Chronic pain syndrome 1/13/2013 Back, right shoulder, neck: Peripheral neuropathy, spinal stenosis C7-T1, foraminal narrowing C4-5  CKD (chronic kidney disease), stage III ([address] 9/12/2012  Diabetes ([address]   
 about 30 years  Diabetes mellitus   
 does not check sugar  Diabetes mellitus, type 2 ([address] 9/12/2012  Dysphagia 9/2/2014 Due to esophageal spasm seen on modified barium swallow 2015  Gait disorder 9/12/2012  GERD (gastroesophageal reflux disease)  Heart failure ([address]  HTN   
 Hypertension 9/12/2012 Orthostatic hypotension due to autonomic neuropathy  Hypothyroid  IBS (irritable bowel syndrome) 9/12/2012  Iron deficiency 9/12/2012  Lumbar disc disease  MCI (mild cognitive impairment) 1/13/2013  Mixed hyperlipidemia 9/12/2012  Orthostatic hypotension 5/5/2016  Pacemaker  SSS (sick sinus syndrome) ([address] 5/5/2016 Past Surgical History:  
Procedure Laterality Date  HX APPENDECTOMY  HX CARPAL TUNNEL RELEASE  HX CERVICAL FUSION    
 HX COLONOSCOPY  Nov 2005  HX PACEMAKER  6/08  HX PACEMAKER  2017 Replacement  VASCULAR SURGERY PROCEDURE UNLIST Left 04/25/2018  
 fem embolectomy Social History Socioeconomic History  Marital status:  Spouse name: Not on file  Number of children: Not on file  Years of education: Not on file  Highest education level: Not on file Occupational History  Not on file Social Needs  Financial resource strain: Not on file  Food insecurity:  
  Worry: Not on file Inability: Not on file  Transportation needs:  
  Medical: Not on file Non-medical: Not on file Tobacco Use  Smoking status: Never Smoker  Smokeless tobacco: Never Used Substance and Sexual Activity  Alcohol use: No  
 Drug use: No  
 Sexual activity: Not on file Lifestyle  Physical activity:  
  Days per week: Not on file Minutes per session: Not on file  Stress: Not on file Relationships  Social connections:  
  Talks on phone: Not on file Gets together: Not on file Attends Yazidi service: Not on file Active member of club or organization: Not on file Attends meetings of clubs or organizations: Not on file Relationship status: Not on file  Intimate partner violence:  
  Fear of current or ex partner: Not on file Emotionally abused: Not on file Physically abused: Not on file Forced sexual activity: Not on file Other Topics Concern  Caffeine Concern Not Asked  Back Care Not Asked  Exercise Not Asked  Occupational Exposure Not Asked  Sleep Concern Not Asked  Stress Concern Not Asked  Weight Concern Not Asked Social History Narrative Lives with  who has mild vascular dementia, patient is primary caretaker but daughters come over to house 4-5 times a week, [place] has [employer] History reviewed. No pertinent family history. Allergies Allergen Reactions  Tylenol [Acetaminophen] Unknown (comments)  
  insomnia Current Facility-Administered Medications Medication Dose Route Frequency  torsemide (DEMADEX) tablet 20 mg  20 mg Oral DAILY  gabapentin (NEURONTIN) capsule 300 mg  300 mg Oral QHS  [START ON 5/13/2019] aspirin chewable tablet 81 mg  81 mg Oral DAILY  tuberculin injection 5 Units  5 Units IntraDERMal ONCE  
 amLODIPine (NORVASC) tablet 10 mg  10 mg Oral DAILY  carvedilol (COREG) tablet 25 mg  25 mg Oral BID WITH MEALS  
 hydrALAZINE (APRESOLINE) tablet 10 mg  10 mg Oral TID  levothyroxine (SYNTHROID) tablet 25 mcg  25 mcg Oral ACB  lisinopril (PRINIVIL, ZESTRIL) tablet 20 mg  20 mg Oral DAILY  pantoprazole (PROTONIX) tablet 40 mg  40 mg Oral ACB  traMADol (ULTRAM) tablet 50 mg  50 mg Oral Q8H PRN  
 traZODone (DESYREL) tablet 100 mg  100 mg Oral QHS Objective:  
 
Vitals:  
 05/12/19 0447 05/12/19 0854 05/12/19 1112 05/12/19 1258 BP: 122/57 117/73  106/62 Pulse: 75 76  76 Resp: 18 18  18 Temp: 98.4 °F (36.9 °C) 98.6 °F (37 °C)  98.6 °F (37 °C) SpO2: 94% 98% 94% 96% Weight: 104 lb 14.4 oz (47.6 kg) Height: PHYSICAL EXAM  
 
Constitutional:  the patient is well developed and in no acute distress EENMT:  Sclera clear, pupils equal, oral mucosa moist 
Respiratory: few crackles posteriorly Cardiovascular:  RRR without M,G,R 
Gastrointestinal: soft and non-tender; with positive bowel sounds. Musculoskeletal: warm without cyanosis. There is no lower extremity edema. Skin:  no jaundice or rashes, no wounds Neurologic: no gross neuro deficits Psychiatric:  alert and oriented x 3 CXR:   
5/11 Recent Labs 05/12/19 
0422 05/11/19 
1455 WBC 4.6 5.2 HGB 9.1* 10.5* HCT 27.5* 31.7*  288 Recent Labs 05/12/19 
0422 05/11/19 
1455  135* K 3.5 4.0  
 99 GLU 83 163* CO2 31 29 BUN 22 22 CREA 2.33* 2.33* MG 2.1  --   
CA 8.3 8.5  
TROIQ  --  <0.02* ALB  --  4.0 TBILI  --  0.4 ALT  --  14 SGOT  --  14* No results for input(s): PH, PCO2, PO2, HCO3 in the last 72 hours. No results for input(s): LCAD, LAC in the last 72 hours. Assessment:  (Medical Decision Making) Hospital Problems  Date Reviewed: 5/12/2019 Codes Class Noted POA * (Principal) Acute on chronic systolic CHF (congestive heart failure) (HCC) ICD-10-CM: X95.64 ICD-9-CM: 428.23, 428.0  5/11/2019 Yes Acute-on-chronic kidney injury ([facility] [address] ICD-10-CM: N17.9, N18.9 ICD-9-CM: 584.9, 585.9  5/11/2019 Yes Hypoxia ICD-10-CM: R09.02 
ICD-9-CM: 799.02  5/11/2019 Yes Persistent atrial fibrillation (HCC) (Chronic) ICD-10-CM: I48.1 ICD-9-CM: 427.31  2/28/2019 Yes Physical debility (Chronic) ICD-10-CM: R53.81 ICD-9-CM: 799.3  4/30/2018 Yes Type 2 diabetes mellitus with nephropathy (HCC) (Chronic) ICD-10-CM: E11.21 
ICD-9-CM: 250.40, 583.81  1/25/2018 Yes Hypertension (Chronic) ICD-10-CM: I10 
ICD-9-CM: 401.9  8/12/2017 Yes Overview Signed 9/12/2012  3:43 PM by Lu Sweeney Orthostatic hypotension due to autonomic neuropathy DNR (do not resuscitate) (Chronic) ICD-10-CM: I33 ICD-9-CM: V49.86  8/12/2017 Yes  
   
 SSS (sick sinus syndrome) (HCC) (Chronic) ICD-10-CM: I49.5 ICD-9-CM: 427.81  5/5/2016 Yes Acquired hypothyroidism (Chronic) ICD-10-CM: E03.9 ICD-9-CM: 244.9  9/12/2012 Yes GERD (gastroesophageal reflux disease) (Chronic) ICD-10-CM: K21.9 ICD-9-CM: 530.81  9/12/2012 Yes Plan:  (Medical Decision Making) --Demedex - adjusted by Nephrology Diuresed 1.7 liters yesterday Creat 2.33 today  >>>417 
--TTE pending 
--BOBBY on RA tonight 
--will need to check ambulatory sat prior to discharge More than 50% of the time documented was spent in face-to-face contact with the patient and in the care of the patient on the floor/unit where the patient is located. Thank you very much for this referral.  We appreciate the opportunity to participate in this patient's care. Will follow along with above stated plan.  
 
Fatoumata Sosa MD

## 2019-05-12 NOTE — PROGRESS NOTES
Dual skin assessment complete. Sacrum and bilateral heels assessed, no abnormalities noted. Generalized skin is fragile but with no breakdown. Slight orbital edema and lower extremity edema noted.

## 2019-05-12 NOTE — PROGRESS NOTES
Bedside and verbal report received from Tessy CHASE. Family at bedside and humidification added to oxygen.

## 2019-05-12 NOTE — ROUTINE PROCESS
CHF teaching started to pt/ family @ BS; aware of Dx. Emphasis to report worsening dyspnea, take prescription . t.. Emphasis on taking prescription meds as ordered, to keep F/U appts and to call MD STAT if any of the following occur: · If you gain 2 lbs in one day or 5 lbs in a week, and short of breath. · If you can not lay flat without developing short of breath or rapid breathing at night; or if it wakes you up. Develop a cough or wheezing. · If you notice swollen hands/feet/ankles or stomach with a bloated/ full feeling. · If you become confused or mentally fuzzy or dizzy. · If you notice a rapid or change in your heart rate. · If you become more exhausted all the time and unable to do the same level of activity without stopping to catch your breath. Drink no more than 8 cups a day in 8 oz. cups. Your Heart can not handle any more. Stay away from salt (limit anything with salt or sodium in it). Limit to 250mg per serving. Pt/family verbalizes understanding, will follow to reinforce teaching skills: 20 mins Cardiac die/ FR  reinforce 
  
Palliative care: ordered, ACP on file

## 2019-05-12 NOTE — PROGRESS NOTES
PT Note: PT orders received/reviewed and evaluation attempted. Patient was reporting increased pain and that she had recently received pain meds. Evaluation will be re-attempted as schedule and patient's status allow. Thanks, Pedro Purdy, PT, DPT

## 2019-05-12 NOTE — PROGRESS NOTES
OT NOTE: 
 
Pt declines opportunity for OT evaluation this AM secondary to increased pain. Will follow up as schedule permits. Thanks, [facility], OTR/L

## 2019-05-12 NOTE — PROGRESS NOTES
Verbal bedside report given to [facility], oncoming RN. Patient's situation, background, assessment and recommendations provided. Opportunity for questions provided. Oncoming RN assumed care of patient.

## 2019-05-12 NOTE — PROGRESS NOTES
Problem: Mobility Impaired (Adult and Pediatric) Goal: *Acute Goals and Plan of Care (Insert Text) Description LTG: 
(1.)Ms. Be Antony will move from supine to sit and sit to supine , scoot up and down and roll side to side in bed with SUPERVISION within 7 treatment day(s). (2.)Ms. Be Antony will transfer from bed to chair and chair to bed with STAND BY ASSIST using the least restrictive device within 7 treatment day(s). (3.)Ms. Be Antony will ambulate with STAND BY ASSIST for 200 feet with the least restrictive device within 7 treatment day(s). (4.)Ms. Be Antony will participate in therapeutic activity/exercises x 23 minutes for increased strength within 7 treatment days. ________________________________________________________________________________________________ Outcome: Progressing Towards Goal 
  
PHYSICAL THERAPY: Initial Assessment and AM 5/12/2019 INPATIENT: PT Visit Days : 1 [place] / [place] B / Product Type: Medicare /   
  
NAME/AGE/GENDER: Dinh Butt is a 80 y.o. female PRIMARY DIAGNOSIS: CHF exacerbation (HCC) [I50.9] Acute on chronic systolic CHF (congestive heart failure) ([address] Acute on chronic systolic CHF (congestive heart failure) ([address] ICD-10: Treatment Diagnosis:  
 Generalized Muscle Weakness (M62.81) Difficulty in walking, Not elsewhere classified (R26.2) History of falling (Z91.81) Precaution/Allergies: 
Tylenol [acetaminophen] ASSESSMENT:  
 
Ms. Be Antony is a 80 y.o. female in the hospital for the above who was supine in bed upon arrival.  Pt very pleasant but hard of hearing so history provided by daughter. She reports that the pt lives in a one house that has 1 step to enter where she has 24 hour supervision. She also stated that it is usually one of the patient's 4 children who stay with her but they have sitter as well. PTA MsHawk Antony was ambulatory with rollator but experienced several recent falls in the past year. Ms. Cary Hamm presents to PT with [facility] and decreased strength in B LEs (hip flexors and dorsiflexors.)  Pt also presents with kyphotic posture and round shoulders. During evaluation pt performed bed mobility with Shasha and good to fair sitting balance. She stood and ambulated short distance to recliner with Shasha and fair standing balance with HHA. Pt on nasal cannula for duration of evaluation with SpO2 above 90%. Ms. Cary Hamm could benefit from skilled PT as she is currently functioning below her baseline. Pt received treatment of therapeutic exercise from recliner chair. She performed with visual, verbal, and tactile cuing. Pt required several rest breaks and appeared to fatigue quickly with exertion. Pt benefited from treatment given decreased strength and activity tolerance. This section established at most recent assessment PROBLEM LIST (Impairments causing functional limitations): 
Decreased Strength Decreased ADL/Functional Activities Decreased Transfer Abilities Decreased Ambulation Ability/Technique Decreased Balance Increased Pain Decreased Activity Tolerance Increased Fatigue Increased Shortness of Breath Decreased Cognition INTERVENTIONS PLANNED: (Benefits and precautions of physical therapy have been discussed with the patient.) Balance Exercise Bed Mobility Family Education Gait Training Therapeutic Activites Therapeutic Exercise/Strengthening Transfer Training TREATMENT PLAN: Frequency/Duration: 3 times a week for duration of hospital stay Rehabilitation Potential For Stated Goals: Good REHAB RECOMMENDATIONS (at time of discharge pending progress):   
Placement: It is my opinion, based on this patient's performance to date, that Ms. Cary Hamm may benefit from [address] after discharge due to the functional deficits listed above that are likely to improve with skilled rehabilitation because he/she has multiple medical issues that affect his/her functional mobility in the community. Equipment:  
None at this time HISTORY:  
History of Present Injury/Illness (Reason for Referral): 
See H&P Past Medical History/Comorbidities: Ms. Lukasz Jara  has a past medical history of A fib, Abnormal loss of weight, Acquired hypothyroidism (9/12/2012), Acute respiratory failure with hypoxemia ([address] (8/12/2017), Anemia, Anorexia (9/2/2014), Arthritis associated with another disorder, Atrial fibrillation ([address] (9/12/2012), Autonomic neuropathy (1/13/2013), CAD (coronary artery disease), CAD in native artery (5/5/2016), CAP (community acquired pneumonia) (8/12/2017), Cervical radiculopathy (5/23/2013), Chest pain, Chronic kidney disease, Chronic pain syndrome (1/13/2013), CKD (chronic kidney disease), stage III ([address] (9/12/2012), Diabetes ([address] Diabetes mellitus, Diabetes mellitus, type 2 ([address] (9/12/2012), Dysphagia (9/2/2014), Gait disorder (9/12/2012), GERD (gastroesophageal reflux disease), Heart failure ([address] HTN, Hypertension (9/12/2012), Hypothyroid, IBS (irritable bowel syndrome) (9/12/2012), Iron deficiency (9/12/2012), Lumbar disc disease, MCI (mild cognitive impairment) (1/13/2013), Mixed hyperlipidemia (9/12/2012), Orthostatic hypotension (5/5/2016), Pacemaker, and SSS (sick sinus syndrome) ([address] (5/5/2016). She also has no past medical history of Difficult intubation, Malignant hyperthermia due to anesthesia, Nausea & vomiting, or Pseudocholinesterase deficiency. Ms. Lukasz Jara  has a past surgical history that includes hx cervical fusion; hx appendectomy; hx carpal tunnel release; hx colonoscopy (Nov 2005); hx pacemaker (6/08); hx pacemaker (2017); and vascular surgery procedure unlist (Left, 04/25/2018). Social History/Living Environment:  
Home Environment: Private residence # Steps to Enter: 1 One/Two Story Residence: One story Living Alone: No 
 Support Systems: Child(malia), Home care staff Patient Expects to be Discharged to[de-identified] Private residence Current DME Used/Available at Home: Wheelchair, Walker, rollator, Shower chair, Grab bars Tub or Shower Type: Shower Prior Level of Function/Work/Activity: 
Lives in one story house and is supervised . Pt ambulatory with rollator but numerous recent falls. Number of Personal Factors/Comorbidities that affect the Plan of Care: 3+: HIGH COMPLEXITY EXAMINATION:  
Most Recent Physical Functioning:  
Gross Assessment: 
AROM: Within functional limits Strength: Generally decreased, functional(B hip flexion 4-/5, B dorsiflexion /5) Posture: 
Posture (WDL): Exceptions to [facility] Posture Assessment: Kyphosis, Rounded shoulders Balance: 
Sitting: Impaired Sitting - Static: Good (unsupported) Sitting - Dynamic: Fair (occasional) Standing: Impaired Standing - Static: Fair Standing - Dynamic : Fair Bed Mobility: 
Supine to Sit: Minimum assistance Wheelchair Mobility: 
  
Transfers: 
Sit to Stand: Minimum assistance Stand to Sit: Contact guard assistance Bed to Chair: Minimum assistance Gait: 
  
Speed/Chandni: Slow;Shuffled Step Length: Right shortened;Left shortened Gait Abnormalities: Decreased step clearance Distance (ft): 2 Feet (ft) Assistive Device: Other (comment)(HHA) Ambulation - Level of Assistance: Minimal assistance Body Structures Involved: 
Heart Lungs Muscles Body Functions Affected: 
Cardio Respiratory Neuromusculoskeletal 
Movement Related Activities and Participation Affected: 
General Tasks and Demands Mobility Self Care Domestic Life Community, Social and [place] Number of elements that affect the Plan of Care: 4+: HIGH COMPLEXITY CLINICAL PRESENTATION:  
Presentation: Stable and uncomplicated: LOW COMPLEXITY CLINICAL DECISION MAKIN [facility] [address] [id number] AM-PAC? ?6 Clicks? Basic Mobility Inpatient Short Form How much difficulty does the patient currently have. .. Unable A Lot A Little None 1. Turning over in bed (including adjusting bedclothes, sheets and blankets)? ? 1   ? 2   ? 3   ? 4  
2. Sitting down on and standing up from a chair with arms ( e.g., wheelchair, bedside commode, etc.)   ? 1   ? 2   ? 3   ? 4  
3. Moving from lying on back to sitting on the side of the bed?   ? 1   ? 2   ? 3   ? 4 How much help from another person does the patient currently need. .. Total A Lot A Little None 4. Moving to and from a bed to a chair (including a wheelchair)? ? 1   ? 2   ? 3   ? 4  
5. Need to walk in hospital room? ? 1   ? 2   ? 3   ? 4  
6. Climbing 3-5 steps with a railing? ? 1   ? 2   ? 3   ? 4  
© 2007, Trustees of [facility] [employer], under license to [employer]. All rights reserved Score:  Initial: 18 Most Recent: X (Date: -- ) Interpretation of Tool:  Represents activities that are increasingly more difficult (i.e. Bed mobility, Transfers, Gait). Medical Necessity:    
Patient demonstrates good 
 rehab potential due to higher previous functional level. Reason for Services/Other Comments: 
Patient continues to require skilled intervention due to decreased functional mobility and activity tolerance Mateo Arnold Use of outcome tool(s) and clinical judgement create a POC that gives a: Clear prediction of patient's progress: LOW COMPLEXITY  
  
 
 
 
TREATMENT:  
(In addition to Assessment/Re-Assessment sessions the following treatments were rendered) Pre-treatment Symptoms/Complaints:  Chronic neck/shoulder pain 
Pain: Initial:  
Pain Intensity 1: 8 Pain Location 1: Shoulder, Neck  Post Session:  8/10 Therapeutic Exercise: (8 Minutes):  Exercises per grid below to improve mobility, strength and activity tolerance . Required minimal visual, verbal and tactile cues to promote proper body alignment, promote proper body mechanics and promote proper body breathing techniques.   Progressed repetitions and complexity of movement as indicated. Date: 
5/12/19 Date: 
 Date: 
  
ACTIVITY/EXERCISE AM PM AM PM AM PM  
Seated LAQ 1 x 20 B Seated marching 1 x 15 B Seated AP 1 x 20 B Seated hip abd/add w knee ext 1 x 10 B       
        
        
        
B = bilateral; AA = active assistive; A = active; P = passive Braces/Orthotics/Lines/Etc:  
O2 Device: Nasal cannula Treatment/Session Assessment:   
Response to Treatment:  Fairly given she appeared SOB with exertion. Interdisciplinary Collaboration:  
Physical Therapist 
Registered Nurse After treatment position/precautions:  
Up in chair Bed alarm/tab alert on Bed/Chair-wheels locked Bed in low position Call light within reach RN notified Family at bedside Compliance with Program/Exercises: Will assess as treatment progresses Recommendations/Intent for next treatment session: \"Next visit will focus on advancements to more challenging activities and reduction in assistance provided\". Total Treatment Duration: PT Patient Time In/Time Out Time In: 0544 Time Out: 1138 Devota Cinthia PT, DPT

## 2019-05-12 NOTE — PROGRESS NOTES
Hospitalist Progress Note Subjective:  
Daily Progress Note: 2019 8:00 AM 
 
Patient presented to ER with complaints of SOB, more confused than usual.  History of CHF with cardiology increasing lasix from 20 to 40 mg daily 19. Random episodes of SOB that self resolve for about a week, usually at night. No orthopnea. Hypoxic to 86% with rising BNP.  
 
:  Resting quietly in bed. Diuresd 1.7 liters yesterday. Pleasantly confused. Cardiology, Nephrology, and Pulmonary in with recommendations. Current Facility-Administered Medications Medication Dose Route Frequency  tuberculin injection 5 Units  5 Units IntraDERMal ONCE  
 amLODIPine (NORVASC) tablet 10 mg  10 mg Oral DAILY  carvedilol (COREG) tablet 25 mg  25 mg Oral BID WITH MEALS  
 hydrALAZINE (APRESOLINE) tablet 10 mg  10 mg Oral TID  levothyroxine (SYNTHROID) tablet 25 mcg  25 mcg Oral ACB  lisinopril (PRINIVIL, ZESTRIL) tablet 20 mg  20 mg Oral DAILY  pantoprazole (PROTONIX) tablet 40 mg  40 mg Oral ACB  traMADol (ULTRAM) tablet 50 mg  50 mg Oral Q8H PRN  
 traZODone (DESYREL) tablet 100 mg  100 mg Oral QHS  furosemide (LASIX) injection 40 mg  40 mg IntraVENous DAILY  gabapentin (NEURONTIN) capsule 600 mg  600 mg Oral QHS Review of Systems Patient is unable Objective:  
 
Visit Vitals /57 (BP 1 Location: Right arm, BP Patient Position: Supine) Pulse 75 Temp 98.4 °F (36.9 °C) Resp 18 Ht 5' 1\" (1.549 m) Wt 47.6 kg (104 lb 14.4 oz) SpO2 94% Breastfeeding? No  
BMI 19.82 kg/m² O2 Flow Rate (L/min): 2 l/min O2 Device: Nasal cannula Temp (24hrs), Av.2 °F (36.8 °C), Min:97.8 °F (36.6 °C), Max:98.8 °F (37.1 °C) 
 
701 - 1900 In: -  
Out: 150 [Urine:150] 05/10 1901 -  0700 In: 240 [P.O.:240] Out: 1700 [Urine:1700] General appearance: Intermittently confused. Awake and alert, cooperative, SOB with minimal exertion. Frail. Head: Normocephalic, without obvious abnormality, atraumatic Eyes: conjunctivae/corneas clear. PERRL Neck: supple, symmetrical, trachea midline, no carotid bruit and mild JVD Lungs: continued harsh  Sounds with some expiratory wheezes. Heart: Regular, Irregular rate and rhythm, Paced. Abdomen: soft, non-tender. Bowel sounds normal. No masses,  no organomegaly Extremities: extremities normal, atraumatic, no cyanosis or edema Skin: Skin color, texture, turgor normal. No rashes or lesions Neurologic: Grossly normal 
 
Additional comments: Notes,orders, test results, vitals reviewed Data Review Recent Results (from the past 24 hour(s)) EKG, 12 LEAD, INITIAL Collection Time: 05/11/19  2:54 PM  
Result Value Ref Range Ventricular Rate 75 BPM  
 Atrial Rate 75 BPM  
 P-R Interval 152 ms QRS Duration 128 ms Q-T Interval 480 ms QTC Calculation (Bezet) 536 ms Calculated P Axis 80 degrees Calculated R Axis -67 degrees Calculated T Axis 91 degrees Diagnosis AV dual-paced rhythm Biventricular pacemaker detected Abnormal ECG When compared with ECG of 18-JUL-2018 14:37, No significant change was found CBC WITH AUTOMATED DIFF Collection Time: 05/11/19  2:55 PM  
Result Value Ref Range WBC 5.2 4.3 - 11.1 K/uL  
 RBC 3.24 (L) 4.05 - 5.2 M/uL  
 HGB 10.5 (L) 11.7 - 15.4 g/dL HCT 31.7 (L) 35.8 - 46.3 % MCV 97.8 79.6 - 97.8 FL  
 MCH 32.4 26.1 - 32.9 PG  
 MCHC 33.1 31.4 - 35.0 g/dL  
 RDW 13.5 11.9 - 14.6 % PLATELET 830 459 - 159 K/uL MPV 10.4 9.4 - 12.3 FL ABSOLUTE NRBC 0.00 0.0 - 0.2 K/uL  
 DF AUTOMATED NEUTROPHILS 70 43 - 78 % LYMPHOCYTES 19 13 - 44 % MONOCYTES 10 4.0 - 12.0 % EOSINOPHILS 1 0.5 - 7.8 % BASOPHILS 0 0.0 - 2.0 % IMMATURE GRANULOCYTES 0 0.0 - 5.0 %  
 ABS. NEUTROPHILS 3.6 1.7 - 8.2 K/UL  
 ABS. LYMPHOCYTES 1.0 0.5 - 4.6 K/UL  
 ABS. MONOCYTES 0.5 0.1 - 1.3 K/UL  
 ABS. EOSINOPHILS 0.1 0.0 - 0.8 K/UL ABS. BASOPHILS 0.0 0.0 - 0.2 K/UL  
 ABS. IMM. GRANS. 0.0 0.0 - 0.5 K/UL METABOLIC PANEL, COMPREHENSIVE Collection Time: 05/11/19  2:55 PM  
Result Value Ref Range Sodium 135 (L) 136 - 145 mmol/L Potassium 4.0 3.5 - 5.1 mmol/L Chloride 99 98 - 107 mmol/L  
 CO2 29 21 - 32 mmol/L Anion gap 7 7 - 16 mmol/L Glucose 163 (H) 65 - 100 mg/dL BUN 22 8 - 23 MG/DL Creatinine 2.33 (H) 0.6 - 1.0 MG/DL  
 GFR est AA 25 (L) >60 ml/min/1.73m2 GFR est non-AA 21 (L) >60 ml/min/1.73m2 Calcium 8.5 8.3 - 10.4 MG/DL Bilirubin, total 0.4 0.2 - 1.1 MG/DL  
 ALT (SGPT) 14 12 - 65 U/L  
 AST (SGOT) 14 (L) 15 - 37 U/L Alk. phosphatase 137 (H) 50 - 136 U/L Protein, total 7.3 6.3 - 8.2 g/dL Albumin 4.0 3.2 - 4.6 g/dL Globulin 3.3 2.3 - 3.5 g/dL A-G Ratio 1.2 1.2 - 3.5 BNP Collection Time: 05/11/19  2:55 PM  
Result Value Ref Range  (H) 0 pg/mL TROPONIN I Collection Time: 05/11/19  2:55 PM  
Result Value Ref Range Troponin-I, Qt. <0.02 (L) 0.02 - 0.05 NG/ML  
TSH 3RD GENERATION Collection Time: 05/11/19  8:11 PM  
Result Value Ref Range TSH 0.702 0.358 - 3.740 uIU/mL T4, FREE Collection Time: 05/11/19  8:11 PM  
Result Value Ref Range T4, Free 1.5 (H) 0.78 - 1.46 NG/DL  
CBC W/O DIFF Collection Time: 05/12/19  4:22 AM  
Result Value Ref Range WBC 4.6 4.3 - 11.1 K/uL  
 RBC 2.85 (L) 4.05 - 5.2 M/uL HGB 9.1 (L) 11.7 - 15.4 g/dL HCT 27.5 (L) 35.8 - 46.3 % MCV 96.5 79.6 - 97.8 FL  
 MCH 31.9 26.1 - 32.9 PG  
 MCHC 33.1 31.4 - 35.0 g/dL  
 RDW 13.6 11.9 - 14.6 % PLATELET 616 799 - 927 K/uL MPV 10.6 9.4 - 12.3 FL ABSOLUTE NRBC 0.00 0.0 - 0.2 K/uL METABOLIC PANEL, BASIC Collection Time: 05/12/19  4:22 AM  
Result Value Ref Range Sodium 139 136 - 145 mmol/L Potassium 3.5 3.5 - 5.1 mmol/L Chloride 101 98 - 107 mmol/L  
 CO2 31 21 - 32 mmol/L Anion gap 7 7 - 16 mmol/L Glucose 83 65 - 100 mg/dL  BUN 22 8 - 23 MG/DL  
 Creatinine 2.33 (H) 0.6 - 1.0 MG/DL  
 GFR est AA 25 (L) >60 ml/min/1.73m2 GFR est non-AA 21 (L) >60 ml/min/1.73m2 Calcium 8.3 8.3 - 10.4 MG/DL MAGNESIUM Collection Time: 05/12/19  4:22 AM  
Result Value Ref Range Magnesium 2.1 1.8 - 2.4 mg/dL 5/11:  CXR: New bilateral small pleural effusions. Chronic interstitial changes again evident Assessment/Plan:  
Acute on chronic systolic CHF:  EF: 56-61% Admission BNP: 449 Strict I+O, daily weights, low sodium diet Continue BB and ACE for now Hypertension:  Continue home meds Orthostatic hypotension due to autonomic neuropathy Acquired hypothyroidism:  Continue home meds GERD:  PPI 
SSS with pacemaker CAD with history of MI Cardiology on board DNR 
NIDDM: monitor Diabetic neuropathy Physical debility:  Has 24/7 caregivers Chronic atrial fibrillation/PAF Not on ASA or [address] Poor Watchman candidate Prior ablation, paced Acute-on-chronic kidney injury:  CKD IV Nephrology consulted:  Recommend demadex Hypoxia/SOB:  Multifactorial  
 Will need walking satprior to d/c Currently on 2 liters w sat 98% Oxygen sat on admission: 85% Hard of hearing Anemia: most likely chronic Care Plan discussed with: Patient/Daughter and Nurse Signed By: Cornelia Wheeler NP May 12, 2019

## 2019-05-13 NOTE — CONSULTS
Palliative Care Patient: Regina Cristobal MRN: [id number]  SSN: [id number] YOB: 1927  Age: 80 y.o. Sex: female Date of Request: 19 Date of Consult:  2019 Reason for Consult:  advanced care plan planning/end of life; CHF Bundle patient Requesting Physician: Dr. Fredy Louis Assessment/Plan:  
 
Principal Diagnosis:    
Respiratory Failure, Acute on Chronic  J96.20 Additional Diagnoses: · Advance Care Planning Counseling Z71.89 
· Debility, Unspecified  R53.81 · Dyspnea  R06.00 
· Pain, back  M54.9 · Counseling, Encounter for Medical Advice  Z71.9 
· Encounter for Palliative Care  Z51.5 Palliative Performance Scale (PPS): PPS: 50 Medical Decision Making:  
Reviewed and summarized notes from admission to present. Discussed case with appropriate providers: DHEERAJ Prabhakar Reviewed laboratory and x-ray data from admission to present. The pt is resting in bed, dtr Kristene Goodpasture at bedside. Ms. Zane Panda is breathing easily. She endorses chronic pain in her neck, at the site of a surgery she had many years ago. Tramadol is somewhat effective. Pt and dtr are agreeable to trying a lidocaine patch; order placed. Pt says it has been at least a couple of days since she has had a BM; have added PeriColace. Have added Narcan as pt is on Tramadol. Ms. Zane Panda is pleasant and somewhat confused. She lives in her own home. Dtr Kristene Goodpasture says that a family member or a paid caregiver is with the pt around the clock. Kristene Goodpasture expressed interest in hospice. The pt's   a little over a year ago, after 68 years of marriage. Ms. Zane Panda says she misses him so much and is ready to go be with him. The family was very pleased with the services of [facility] and are interested in talking with them concerning the pt. DHEERAJ Ludwig is aware and will arrange.   If the pt is not yet eligible for hospice, Kristene Goodpasture expressed interest in having a home palliative service make visits. Will continue to follow for symptom management and ACP. Will discuss findings with members of the interdisciplinary team.   
 
Thank you for this referral.    
 
  
. 
 
Subjective:  
 
History obtained from:  Family, Care Provider and Chart Chief Complaint: Pleasantly confused History of Present Illness:  From H&P: \"Patient 20J with pmhx systolic CHFe, chronic afib rate controlled s/p biv pacer, hypothyroidism, CAD, DM, HTN presents with one week worsening dyspnea, orthopnea and PND. Seen by cardiology on 5/7 with increased dose of lasix with minimal improvement of home symptoms. Patient is poor historian secondary to suspected dementia and [place] but denies chest pain. Says she is feeling slightly better after diuresis in ED.  
  
ED workup notable for CXR with small bilateral effusions,  and trop neg <0.02. Cr 2.3 baseline variable 1.6-2.6. She does not wear oxygen at home but with 85% sat on RA in ED, up to 95% on 2lnc. \" Advance Directive: Yes Code Status:  DNR Health Care Power of : Yes - Copy of [address] on file. Past Medical History:  
Diagnosis Date  A fib  Abnormal loss of weight  Acquired hypothyroidism 9/12/2012  Acute respiratory failure with hypoxemia ([address] 8/12/2017  Anemia  Anorexia 9/2/2014  Arthritis associated with another disorder  Atrial fibrillation ([address] 9/12/2012 Paroxysmal.  Coumadin contraindicated due to falls    
 Autonomic neuropathy 1/13/2013  CAD (coronary artery disease)  CAD in native artery 5/5/2016  CAP (community acquired pneumonia) 8/12/2017  Cervical radiculopathy 5/23/2013  Chest pain  Chronic kidney disease  Chronic pain syndrome 1/13/2013 Back, right shoulder, neck: Peripheral neuropathy, spinal stenosis C7-T1, foraminal narrowing C4-5  CKD (chronic kidney disease), stage III ([address] 9/12/2012  Diabetes ([facility]   
 about 30 years  Diabetes mellitus   
 does not check sugar  Diabetes mellitus, type 2 ([facility] 9/12/2012  Dysphagia 9/2/2014 Due to esophageal spasm seen on modified barium swallow 2015  Gait disorder 9/12/2012  GERD (gastroesophageal reflux disease)  Heart failure ([facility]  HTN   
 Hypertension 9/12/2012 Orthostatic hypotension due to autonomic neuropathy  Hypothyroid  IBS (irritable bowel syndrome) 9/12/2012  Iron deficiency 9/12/2012  Lumbar disc disease  MCI (mild cognitive impairment) 1/13/2013  Mixed hyperlipidemia 9/12/2012  Orthostatic hypotension 5/5/2016  Pacemaker  SSS (sick sinus syndrome) ([facility] 5/5/2016 Past Surgical History:  
Procedure Laterality Date  HX APPENDECTOMY  HX CARPAL TUNNEL RELEASE  HX CERVICAL FUSION    
 HX COLONOSCOPY  Nov 2005  HX PACEMAKER  6/08  HX PACEMAKER  2017 Replacement  VASCULAR SURGERY PROCEDURE UNLIST Left 04/25/2018  
 fem embolectomy History reviewed. No pertinent family history. Social History Tobacco Use  Smoking status: Never Smoker  Smokeless tobacco: Never Used Substance Use Topics  Alcohol use: No  
 
Prior to Admission medications Medication Sig Start Date End Date Taking? Authorizing Provider  
traMADol (ULTRAM) 50 mg tablet Take 1 Tab by mouth every eight (8) hours as needed for Pain for up to 30 days. Max Daily Amount: 150 mg. 4/26/19 5/26/19 Yes Sonia Fields MD  
traZODone (DESYREL) 50 mg tablet Take 100 mg by mouth nightly. Yes Provider, Historical  
furosemide (LASIX) 20 mg tablet Take 1 Tab by mouth daily. Patient taking differently: Take 40 mg by mouth daily. 3/7/19  Yes Ninoska Reyes MD  
amLODIPine (NORVASC) 10 mg tablet Take 1 Tab by mouth daily. 3/7/19  Yes Ninoska Reyes MD  
nitroglycerin (NITROSTAT) 0.4 mg SL tablet 1 Tab by SubLINGual route every five (5) minutes as needed.  1/24/19  Yes Sonia Garcia MD  
 hydrALAZINE (APRESOLINE) 10 mg tablet Take 1 Tab by mouth three (3) times daily. 1/24/19  Yes Sonia Fields MD  
omeprazole (PRILOSEC) 20 mg capsule Take 1 Cap by mouth daily. 1/24/19  Yes Sonia Fields MD  
gabapentin (NEURONTIN) 600 mg tablet Take 1 Tab by mouth three (3) times daily. Patient taking differently: Take 600 mg by mouth daily. 1/24/19  Yes Sonia Fields MD  
lisinopril (PRINIVIL, ZESTRIL) 20 mg tablet Take 1 Tab by mouth daily. 11/9/18  Yes Sonia Fields MD  
carvedilol (COREG) 25 mg tablet Take 1 Tab by mouth two (2) times daily (with meals). 11/9/18  Yes Sonia Fields MD  
levothyroxine (SYNTHROID) 25 mcg tablet Take 1 Tab by mouth Daily (before breakfast). 5/23/18  Yes Sonia Fields MD  
prednisoLONE acetate (PRED FORTE) 1 % ophthalmic suspension instill 1 drop into both eyes three times a day for 7 days 12/19/18   Provider, Historical  
docusate sodium (STOOL SOFTENER PO) Take  by mouth as needed. Provider, Historical  
 
 
No Known Allergies Review of Systems: A comprehensive review of systems was negative except for: Gastrointestinal: Positive for possible constipation. Musculoskeletal: Positive for chronic neck pain. Objective:  
 
Visit Vitals /58 Pulse 75 Temp 97.5 °F (36.4 °C) Resp 18 Ht 5' 1\" (1.549 m) Wt 104 lb 14.4 oz (47.6 kg) SpO2 93% Breastfeeding? No  
BMI 19.82 kg/m² Physical Exam: 
 
General:  Cooperative. No acute distress. Eyes:  Conjunctivae/corneas clear Nose: Nares normal. Septum midline. Neck: Supple, symmetrical, trachea midline, no JVD Lungs:   Crackles bilaterally, unlabored Heart:  Regular rate and rhythm, no murmur Abdomen:   Soft, non-tender, non-distended Extremities: Normal, atraumatic, no cyanosis or edema Skin: Skin color, texture, turgor normal. No rash or lesions. Neurologic: Nonfocal  
Psych: Alert, oriented to self Assessment:  
 
Hospital Problems  Date Reviewed: 5/12/2019 Codes Class Noted POA * (Principal) Acute on chronic systolic CHF (congestive heart failure) (HCC) ICD-10-CM: V76.37 ICD-9-CM: 428.23, 428.0  5/11/2019 Yes Acute-on-chronic kidney injury ([facility] ICD-10-CM: N17.9, N18.9 ICD-9-CM: 584.9, 585.9  5/11/2019 Yes Hypoxia ICD-10-CM: R09.02 
ICD-9-CM: 799.02  5/11/2019 Yes Persistent atrial fibrillation (HCC) (Chronic) ICD-10-CM: I48.1 ICD-9-CM: 427.31  2/28/2019 Yes Physical debility (Chronic) ICD-10-CM: R53.81 ICD-9-CM: 799.3  4/30/2018 Yes Type 2 diabetes mellitus with nephropathy (HCC) (Chronic) ICD-10-CM: E11.21 
ICD-9-CM: 250.40, 583.81  1/25/2018 Yes Hypertension (Chronic) ICD-10-CM: I10 
ICD-9-CM: 401.9  8/12/2017 Yes Overview Signed 9/12/2012  3:43 PM by Randy Lovett Orthostatic hypotension due to autonomic neuropathy DNR (do not resuscitate) (Chronic) ICD-10-CM: Z68 ICD-9-CM: V49.86  8/12/2017 Yes  
   
 SSS (sick sinus syndrome) (HCC) (Chronic) ICD-10-CM: I49.5 ICD-9-CM: 427.81  5/5/2016 Yes Acquired hypothyroidism (Chronic) ICD-10-CM: E03.9 ICD-9-CM: 244.9  9/12/2012 Yes GERD (gastroesophageal reflux disease) (Chronic) ICD-10-CM: K21.9 ICD-9-CM: 530.81  9/12/2012 Yes Signed By: David Holland NP May 13, 2019

## 2019-05-13 NOTE — PROGRESS NOTES
Bedside and Verbal shift change report given to Edy barraza RN (oncoming nurse) by self Zeny panda). Report included the following information SBAR, Kardex, MAR and Recent Results.

## 2019-05-13 NOTE — PROGRESS NOTES
Regina Cristobal Admission Date: 5/11/2019 Daily Progress Note: 5/13/2019 The patient's chart is reviewed and the patient is discussed with the staff. 80 y.o.  female seen and evaluated at the request of Dr. Clayton Kuhn. Patient has a history of hypothyroidism, DM, CKD, HTN, HL, chronic a.fib s/p AV node ablation, SSS s/p PPM, and chronic sCHF. Seen by Cardiology as an outpatient on 5/7 and lasix dose increased for worsening dyspnea, orthopnea, and PND. She did not improve and presented to the ER for further evaluation. , creat 2.3 (baseline in the 2s) and CXR with small bilateral effusions. Her initial O2 sat was 85% but improved to 95% on 2 lpm. She was admitted for acute on chronic sCHF. Cardiology and Nephrology were consulted. Her diuretics were adjusted but she continues to have some dyspnea and we are consulted to assist in her care. Subjective:  
Now on ra,  Had santos last pm- some episodes of desat but not clear on how many minutes below 89- RT to look at Current Facility-Administered Medications Medication Dose Route Frequency  acetaminophen (TYLENOL) tablet 650 mg  650 mg Oral Q6H PRN  
 torsemide (DEMADEX) tablet 20 mg  20 mg Oral BID  
 gabapentin (NEURONTIN) capsule 300 mg  300 mg Oral QHS  aspirin chewable tablet 81 mg  81 mg Oral DAILY  amLODIPine (NORVASC) tablet 10 mg  10 mg Oral DAILY  carvedilol (COREG) tablet 25 mg  25 mg Oral BID WITH MEALS  
 hydrALAZINE (APRESOLINE) tablet 10 mg  10 mg Oral TID  levothyroxine (SYNTHROID) tablet 25 mcg  25 mcg Oral ACB  lisinopril (PRINIVIL, ZESTRIL) tablet 20 mg  20 mg Oral DAILY  pantoprazole (PROTONIX) tablet 40 mg  40 mg Oral ACB  traMADol (ULTRAM) tablet 50 mg  50 mg Oral Q8H PRN  
 traZODone (DESYREL) tablet 100 mg  100 mg Oral QHS Review of Systems Constitutional: negative for fever, chills, sweats Cardiovascular: negative for chest pain, palpitations, syncope, edema Gastrointestinal:  negative for dysphagia, reflux, vomiting, diarrhea, abdominal pain, or melena Neurologic:  negative for focal weakness, numbness, headache Objective:  
 
Vitals:  
 05/13/19 0552 05/13/19 0732 05/13/19 0900 05/13/19 7266 BP:   134/72 129/64 Pulse:   75 76 Resp:   18 Temp:   97.6 °F (36.4 °C) SpO2: 97% 94% 95% Weight:      
Height:      
 
Intake and Output:  
05/11 1901 - 05/13 0700 In: 240 [P.O.:240] Out: 3400 [[id number]] No intake/output data recorded. Physical Exam:  
Constitution:  the patient is well developed and in no acute distress EENMT:  Sclera clear, pupils equal, oral mucosa moist 
Respiratory: clear Cardiovascular:  RRR without M,G,R 
Gastrointestinal: soft and non-tender; with positive bowel sounds. Musculoskeletal: warm without cyanosis. There is no lower extremity edema. Skin:  no jaundice or rashes, no wounds Neurologic: no gross neuro deficits Psychiatric:  alert and oriented x 3 CXR:  
 
 
LAB No results for input(s): GLUCPOC in the last 72 hours. No lab exists for component: [place] Recent Labs 05/13/19 0449 05/12/19 0422 05/11/19 
1455 WBC 4.3 4.6 5.2 HGB 8.8* 9.1* 10.5* HCT 27.1* 27.5* 31.7*  244 288 Recent Labs 05/13/19 0449 05/12/19 0422 05/11/19 
1455 * 139 135* K 3.6 3.5 4.0  
CL 96* 101 99 CO2 30 31 29 GLU 90 83 163* BUN 25* 22 22 CREA 2.52* 2.33* 2.33* MG 2.1 2.1  --   
CA 8.2* 8.3 8.5  
TROIQ  --   --  <0.02* ALB  --   --  4.0 TBILI  --   --  0.4 ALT  --   --  14 SGOT  --   --  14* No results for input(s): PH, PCO2, PO2, HCO3, PHI, PCO2I, PO2I, HCO3I in the last 72 hours. No results for input(s): LCAD, LAC in the last 72 hours. Assessment:  (Medical Decision Making) Hospital Problems  Date Reviewed: 5/12/2019 Codes Class Noted POA * (Principal) Acute on chronic systolic CHF (congestive heart failure) (HCC) ICD-10-CM: N98.30 ICD-9-CM: 428.23, 428.0  5/11/2019 Yes Acute-on-chronic kidney injury ([facility] ICD-10-CM: N17.9, N18.9 ICD-9-CM: 584.9, 585.9  5/11/2019 Yes Increase cr Hypoxia ICD-10-CM: R09.02 
ICD-9-CM: 799.02  5/11/2019 Yes Now off O2 Persistent atrial fibrillation (HCC) (Chronic) ICD-10-CM: I48.1 ICD-9-CM: 427.31  2/28/2019 Yes Physical debility (Chronic) ICD-10-CM: R53.81 ICD-9-CM: 799.3  4/30/2018 Yes Type 2 diabetes mellitus with nephropathy (HCC) (Chronic) ICD-10-CM: E11.21 
ICD-9-CM: 250.40, 583.81  1/25/2018 Yes Hypertension (Chronic) ICD-10-CM: I10 
ICD-9-CM: 401.9  8/12/2017 Yes Overview Signed 9/12/2012  3:43 PM by Lara Humphreys Orthostatic hypotension due to autonomic neuropathy DNR (do not resuscitate) (Chronic) ICD-10-CM: N13 ICD-9-CM: V49.86  8/12/2017 Yes  
   
 SSS (sick sinus syndrome) (HCC) (Chronic) ICD-10-CM: I49.5 ICD-9-CM: 427.81  5/5/2016 Yes Acquired hypothyroidism (Chronic) ICD-10-CM: E03.9 ICD-9-CM: 244.9  9/12/2012 Yes GERD (gastroesophageal reflux disease) (Chronic) ICD-10-CM: K21.9 ICD-9-CM: 530.81  9/12/2012 Yes Hypoxia- > 1 hour with decreased O2 sat last pm 
 
Plan:  (Medical Decision Making) 1   Check sat at rest and ambulating 
2   O2 desat > 1 hour last pm- will need O2 hs at least 
-- 
 
More than 50% of the time documented was spent in face-to-face contact with the patient and in the care of the patient on the floor/unit where the patient is located.  
 
Peggy Cooley MD

## 2019-05-13 NOTE — PROGRESS NOTES
Care Management Interventions PCP Verified by CM: Yes Last Visit to PCP: 04/26/19 Palliative Care Criteria Met (RRAT>21 & CHF Dx)?: Yes(RRAT- 45, CHF ) Palliative Consult Recommended?: Yes Mode of Transport at Discharge: Other (see comment)(Tamika Adams [phone]) Transition of Care Consult (CM Consult): Discharge Planning Discharge Durable Medical Equipment: No 
Physical Therapy Consult: Yes Occupational Therapy Consult: Yes Speech Therapy Consult: No 
Current Support Network: Lives with Spouse, Own Home Confirm Follow Up Transport: Family Plan discussed with Pt/Family/Caregiver: Yes Freedom of Choice Offered: Yes Discharge Location Discharge Placement: Home Pt admitted to 3rd floor [facility] for CHF exacerbation. CM met with pt to discuss CM needs & DCP. Pt is A&Ox4. Pt is indep at home with all ADLS. Pt lives alone but has 24 h care from family & private caregivers. Pt has walker, WC, SC, BSC, glucometer; no further DME needs. Pt has no difficulty with obtaining medications. Pt relies on family for transport needs. Palliative care has met with pt, family us deciding on hospice. CM provided information for several agencies. Family will discuss. DCP home with hospice vs HH. CM to continue to monitor.

## 2019-05-13 NOTE — PROGRESS NOTES
5/13/2019 8:21 AM 
 
Admit Date: 5/11/2019 Admit Diagnosis: CHF exacerbation ([address] [I50.9] Subjective: No cp- still intermittent sob Objective:  
  
Visit Vitals /57 (BP 1 Location: Right arm, BP Patient Position: At rest) Pulse 78 Temp 98 °F (36.7 °C) Resp 18 Ht 5' 1\" (1.549 m) Wt 47.6 kg (104 lb 14.4 oz) SpO2 94% Breastfeeding? No  
BMI 19.82 kg/m² Physical Exam: 
Gwinda Tod, Well Nourished, No Acute Distress, Alert & Oriented x 3, appropriate mood. Neck- supple, no JVD 
CV- regular rate and rhythm no MRG Lung- rales bilaterally Abd- soft, nontender, nondistended Ext- no edema bilaterally. Skin- warm and dry Data Review:  
Recent Labs 05/13/19 
0449 * K 3.6 BUN 25* CREA 2.52* WBC 4.3 HGB 8.8* HCT 27.1*  
 Assessment/Plan:  
 
Principal Problem: 
  Acute on chronic systolic CHF (congestive heart failure) ([address] (5/11/2019)Improved with current therapy. Will continue medications still sob- difficult issue with crf- discussed with family- increase demadex to bid- echo today Active Problems: Hypertension (8/12/2017) Overview: Orthostatic hypotension due to autonomic neuropathy Acquired hypothyroidism (9/12/2012) GERD (gastroesophageal reflux disease) (9/12/2012) SSS (sick sinus syndrome) ([address] (5/5/2016) DNR (do not resuscitate) (8/12/2017) Type 2 diabetes mellitus with nephropathy ([address] (1/25/2018) Physical debility (4/30/2018) Persistent atrial fibrillation ([address] (2/28/2019) Acute-on-chronic kidney injury ([address] (5/11/2019) Hypoxia (5/11/2019)

## 2019-05-13 NOTE — PROGRESS NOTES
I was unable to perform an Ambulating Sat on Pt. Per Dr. Nelson Nolan order because she refused to ambulate stating that her back and neck were hurting. Her Resting SPO2 was 95% on RA.

## 2019-05-13 NOTE — PROGRESS NOTES
Oxygen Qualifier Room air: SpO2 with O2 and liter flow Resting SpO2  95% Not needed Ambulating SpO2 Refused to perform Refused to perform due to back and neck pain Completed by: 
 
Mirta Estes, RT

## 2019-05-13 NOTE — PROGRESS NOTES
Following for potential CHF Bundle patient. RRAT- 39. Palliative Care consult. Daughter did mention Hospice to Cardiology recently. Cardiology following and seen by HF Navigator. Continue to follow. [place] vs Hospice. Would be eligible for Health  to follow for 90 days at no cost.   
 
Randy Dasilva RN- [facility], BSN Care Transition/ Bundled Payment Navigator [phone]

## 2019-05-13 NOTE — PROGRESS NOTES
Bedside and Verbal shift change report received from [facility]. Report included the following information SBAR, Kardex, Procedure Summary, Intake/Output, MAR, Recent Results and Cardiac Rhythm paced.

## 2019-05-13 NOTE — PROGRESS NOTES
Bedside and Verbal shift change report given to self (oncoming nurse) by Jian Jasso (offgoing nurse). Report included the following information SBAR, Kardex, Intake/Output, MAR and Recent Results.

## 2019-05-13 NOTE — PROGRESS NOTES
Problem: Self Care Deficits Care Plan (Adult) Goal: *Acute Goals and Plan of Care (Insert Text) Description 1. Patient will perform bathing with supervision within 7 days with equipment as needed. 2.  Patient will perform upper body dressing and lower body dressing with supervision within 7 days with equipment as needed. 3.  Patient will perform toileting with supervision within 7 days with equipment as needed. 4.   Patient will perform toilet transfer with supervision within 7 days with equipment as needed. 5.   Pt will participate in B UE therapeutic exercises for 8 minutes with 3 rest breaks within 7 days. 6.  Pt and or caregiver to demonstrate and verbalize good understanding of recommendations for increasing safety with functional tasks within 7 days. Outcome: Progressing Towards Goal 
  
OCCUPATIONAL THERAPY: Initial Assessment and AM 5/13/2019 INPATIENT: OT Visit Days: 1 [place] / [place] B / Product Type: Medicare /  
  
NAME/AGE/GENDER: Jyotsna Kirby is a 80 y.o. female PRIMARY DIAGNOSIS:  CHF exacerbation ([facility]) [I50.9] Acute on chronic systolic CHF (congestive heart failure) ([facility] Acute on chronic systolic CHF (congestive heart failure) ([facility] ICD-10: Treatment Diagnosis:  
 Stiffness of Left Shoulder, Not elsewhere classified (M25.612) Stiffness of Right Shoulder, Not elsewhere classified (M25.611) Generalized Muscle Weakness (M62.81) Other lack of cordination (R27.8) Repeated Falls (R29.6) History of falling (Z91.81) Precautions/Allergies: 
   Patient has no known allergies. ASSESSMENT:  
Ms. Sophia Marquez admitted with dx: CHF. Patient lives at home with sitter during the days, and her family at nights. Prior to admit, patient was able to dress herself, though she required assist for getting in and out of tub/shower with shower chair & assist with bathing for safety.   Patient ambulated with rollator or cane or handheld assist.  Patient usually does not prepare meals, though on rare occasion, her son reported that she would make biscuits. Patient at 93% O2 supine in bed. Patient sat on edge of bed with CGA, and patient able to stand with CGA. Patient ambulated to bathroom with Minimal Assist using RW with constant cues for safety. Patient brushed her teeth with SBA for balance. Patient then fatigued, and she ambulated to chair with minimal assist using RW with constant cues for safety. O2 sats at 97% after returning to chair on room air. Patient functioning below baseline, and patient to benefit from Occupational Therapy to maximize ADL performance. Cont OT per tx plan. This section established at most recent assessment PROBLEM LIST (Impairments causing functional limitations): 
Decreased Strength Decreased ADL/Functional Activities Decreased Transfer Abilities Decreased Ambulation Ability/Technique Decreased Balance Decreased Activity Tolerance Decreased Work Simplification/Energy Conservation Techniques Increased Fatigue Decreased Flexibility/Joint Mobility Decreased Cognition INTERVENTIONS PLANNED: (Benefits and precautions of occupational therapy have been discussed with the patient.) Activities of daily living training Adaptive equipment training Balance training Clothing management Cognitive training Donning&doffing training Devin tech training Hygiene training Medication management training Neuromuscular re-eduation Re-evaluation Sensory reintegration training Therapeutic activity Therapeutic exercise TREATMENT PLAN: Frequency/Duration: Follow patient 3x's/wk to address above goals. Rehabilitation Potential For Stated Goals: Good REHAB RECOMMENDATIONS (at time of discharge pending progress):   
Placement:  
It is my opinion, based on this patient's performance to date, that Ms. Jojo Gaitan may benefit from [address] after discharge due to the functional deficits listed above that are likely to improve with skilled rehabilitation because he/she has multiple medical issues that affect his/her functional mobility in the community. Equipment:  
None at this time OCCUPATIONAL PROFILE AND HISTORY:  
History of Present Injury/Illness (Reason for Referral): 
Patient 24R with pmhx systolic CHFe, chronic afib rate controlled s/p biv pacer, hypothyroidism, CAD, DM, HTN presents with one week worsening dyspnea, orthopnea and PND. Seen by cardiology on 5/7 with increased dose of lasix with minimal improvement of home symptoms. Patient is poor historian secondary to suspected dementia and [place] but denies chest pain. Says she is feeling slightly better after diuresis in ED. ED workup notable for CXR with small bilateral effusions,  and trop neg <0.02. Cr 2.3 baseline variable 1.6-2.6. She does not wear oxygen at home but with 85% sat on RA in ED, up to 95% on 2lnc. Past Medical History/Comorbidities: Ms. Jojo Gaitan  has a past medical history of A fib, Abnormal loss of weight, Acquired hypothyroidism (9/12/2012), Acute respiratory failure with hypoxemia ([address] (8/12/2017), Anemia, Anorexia (9/2/2014), Arthritis associated with another disorder, Atrial fibrillation ([address] (9/12/2012), Autonomic neuropathy (1/13/2013), CAD (coronary artery disease), CAD in native artery (5/5/2016), CAP (community acquired pneumonia) (8/12/2017), Cervical radiculopathy (5/23/2013), Chest pain, Chronic kidney disease, Chronic pain syndrome (1/13/2013), CKD (chronic kidney disease), stage III ([address] (9/12/2012), Diabetes ([address] Diabetes mellitus, Diabetes mellitus, type 2 ([address] (9/12/2012), Dysphagia (9/2/2014), Gait disorder (9/12/2012), GERD (gastroesophageal reflux disease), Heart failure ([address] HTN, Hypertension (9/12/2012), Hypothyroid, IBS (irritable bowel syndrome) (9/12/2012), Iron deficiency (9/12/2012), Lumbar disc disease, MCI (mild cognitive impairment) (1/13/2013), Mixed hyperlipidemia (9/12/2012), Orthostatic hypotension (5/5/2016), Pacemaker, and SSS (sick sinus syndrome) ([facility] (5/5/2016). She also has no past medical history of Difficult intubation, Malignant hyperthermia due to anesthesia, Nausea & vomiting, or Pseudocholinesterase deficiency. Ms. Pilar Mclaughlin  has a past surgical history that includes hx cervical fusion; hx appendectomy; hx carpal tunnel release; hx colonoscopy (Nov 2005); hx pacemaker (6/08); hx pacemaker (2017); and vascular surgery procedure unlist (Left, 04/25/2018). Social History/Living Environment:  
Home Environment: Private residence # Steps to Enter: 1 One/Two Story Residence: One story Living Alone: No 
Support Systems: Child(malia), Home care staff Patient Expects to be Discharged to[de-identified] Private residence Current DME Used/Available at Home: Wheelchair, Walker, rollator, Shower chair, Grab bars Tub or Shower Type: Shower Prior Level of Function/Work/Activity: 
Patient lives at home with sitter during the days, and her family at nights. Prior to admit, patient was able to dress herself, though she required assist for getting in and out of tub/shower with shower chair & assist with bathing for safety. Patient ambulated with rollator or cane or handheld assist.  Patient usually does not prepare meals, though on rare occasion, her son reported that she would make biscuits. Number of Personal Factors/Comorbidities that affect the Plan of Care: Brief history (0):  LOW COMPLEXITY ASSESSMENT OF OCCUPATIONAL PERFORMANCE[de-identified]  
Activities of Daily Living:  
Basic ADLs (From Assessment) Complex ADLs (From Assessment) Oral Facial Hygiene/Grooming: Stand-by assistance(standing sink side w RW) Bathing: Moderate assistance Upper Body Dressing: Minimum assistance Lower Body Dressing: Moderate assistance Toileting: Moderate assistance Instrumental ADL Meal Preparation: Total assistance Homemaking: Total assistance Medication Management: Maximum assistance Grooming/Bathing/Dressing Activities of Daily Living Cognitive Retraining Safety/Judgement: Awareness of environment; Fall prevention Functional Transfers Bathroom Mobility: Minimum assistance Bed/Mat Mobility Supine to Sit: Contact guard assistance Sit to Stand: Minimum assistance Stand to Sit: Contact guard assistance Bed to Chair: Minimum assistance Most Recent Physical Functioning:  
Gross Assessment: 
  
         
  
Posture: 
Posture (WDL): Exceptions to [facility] Posture Assessment: Kyphosis, Rounded shoulders Balance: 
  Bed Mobility: 
Supine to Sit: Contact guard assistance Wheelchair Mobility: 
  
Transfers: 
Sit to Stand: Minimum assistance Stand to Sit: Contact guard assistance Bed to Chair: Minimum assistance Patient Vitals for the past 6 hrs: 
 BP SpO2 Pulse 19 0732  94 % 19 0900 134/72 95 % 75  
19 0929 129/64  76 Mental Status Neurologic State: Alert Orientation Level: Oriented X4 Cognition: Appropriate for age attention/concentration, Follows commands Perception: Appears intact Perseveration: No perseveration noted Safety/Judgement: Awareness of environment, Fall prevention Physical Skills Involved: 
Range of Motion Balance Strength Activity Tolerance Fine Motor Control Pain (Chronic) Cognitive Skills Affected (resulting in the inability to perform in a timely and safe manner): Executive Function Divided Attention Psychosocial Skills Affected: 
Habits/Routines Social Interaction Number of elements that affect the Plan of Care: 5+:  HIGH COMPLEXITY CLINICAL DECISION MAKIN [facility] [address] [address] AM-PAC? ?6 Clicks? Daily Activity Inpatient Short Form How much help from another person does the patient currently need. ..  Total A Lot A Little None 1. Putting on and taking off regular lower body clothing? ? 1   ? 2   ? 3   ? 4  
2. Bathing (including washing, rinsing, drying)? ? 1   ? 2   ? 3   ? 4  
3. Toileting, which includes using toilet, bedpan or urinal?   ? 1   ? 2   ? 3   ? 4  
4. Putting on and taking off regular upper body clothing? ? 1   ? 2   ? 3   ? 4  
5. Taking care of personal grooming such as brushing teeth? ? 1   ? 2   ? 3   ? 4  
6. Eating meals? ? 1   ? 2   ? 3   ? 4  
© 2007, Trustees of [address] [place] [address] [address] [address], under license to [employer]. All rights reserved Score:  Initial: 15 Most Recent: X (Date: -- ) Interpretation of Tool:  Represents activities that are increasingly more difficult (i.e. Bed mobility, Transfers, Gait). Medical Necessity:    
Patient demonstrates good 
 rehab potential due to higher previous functional level. Reason for Services/Other Comments: 
Patient continues to require skilled intervention due to patient's inability to take care of self Clark Reasoner Use of outcome tool(s) and clinical judgement create a POC that gives a: LOW COMPLEXITY  
 
 
 
TREATMENT:  
(In addition to Assessment/Re-Assessment sessions the following treatments were rendered) Pre-treatment Symptoms/Complaints:   
Pain: Initial:  
Pain Intensity 1: 0 0 Post Session:  0 Self Care: (13 minutes): Procedure(s) (per grid) utilized to improve and/or restore self-care/home management as related to grooming. Required minimal verbal and tactile cueing to facilitate activities of daily living skills and compensatory activities. Braces/Orthotics/Lines/Etc:  
IV 
O2 Device: Room air Treatment/Session Assessment:   
Response to Treatment:  positive Interdisciplinary Collaboration:  
Physical Therapist 
Registered Nurse Physician After treatment position/precautions:  
Up in chair Bed alarm/tab alert on Bed/Chair-wheels locked Bed in low position Call light within reach RN notified Family at bedside Compliance with Program/Exercises: Compliant all of the time, Will assess as treatment progresses. Recommendations/Intent for next treatment session: \"Next visit will focus on advancements to more challenging activities and reduction in assistance provided\". Total Treatment Duration: OT Patient Time In/Time Out Time In: 6894 Time Out: 1015 Gianni Davidson OT

## 2019-05-13 NOTE — PROGRESS NOTES
[facility] Progress Note Follow-Up on: CKD ROS: 
Gen - no fever, no chills, appetite unchanged CV - no chest pain, no palpitation Lung - shortness of breath better, no cough Abd - no tenderness, no nausea/vomiting, no diarrhea Ext - no edema Exam: 
Vitals:  
 05/13/19 0732 05/13/19 0900 05/13/19 0929 05/13/19 1249 BP:  134/72 129/64 133/58 Pulse:  75 76 75 Resp:  18  18 Temp:  97.6 °F (36.4 °C)  97.5 °F (36.4 °C) SpO2: 94% 95%  93% Weight:      
Height:      
 
 
 
Intake/Output Summary (Last 24 hours) at 5/13/2019 1358 Last data filed at 5/13/2019 1113 Gross per 24 hour Intake  Output 1650 ml Net -1650 ml Wt Readings from Last 3 Encounters:  
05/13/19 47.6 kg (104 lb 14.4 oz) 05/07/19 49.4 kg (108 lb 12.8 oz) 04/26/19 49 kg (108 lb) GEN - in no distress CV - regular, no murmur, no rub Lung - scant rales bilaterally Abd - soft, nontender Ext - no edema Recent Labs 05/13/19 
0449 05/12/19 
0422 05/11/19 
1455 WBC 4.3 4.6 5.2 HGB 8.8* 9.1* 10.5* HCT 27.1* 27.5* 31.7*  244 288 Recent Labs 05/13/19 
0449 05/12/19 
0422 05/11/19 
1455 * 139 135* K 3.6 3.5 4.0  
CL 96* 101 99 CO2 30 31 29 BUN 25* 22 22 CREA 2.52* 2.33* 2.33* CA 8.2* 8.3 8.5 GLU 90 83 163* MG 2.1 2.1  -- Assessment / Plan: 
Principal Problem: 
  Acute on chronic systolic CHF (congestive heart failure) ([facility] (5/11/2019) Active Problems: Hypertension (8/12/2017) Overview: Orthostatic hypotension due to autonomic neuropathy Acquired hypothyroidism (9/12/2012) GERD (gastroesophageal reflux disease) (9/12/2012) SSS (sick sinus syndrome) ([facility] (5/5/2016) DNR (do not resuscitate) (8/12/2017) Type 2 diabetes mellitus with nephropathy ([address] (1/25/2018) Physical debility (4/30/2018) Persistent atrial fibrillation ([facility] (2/28/2019) Acute-on-chronic kidney injury ([facility] (5/11/2019) Hypoxia (5/11/2019) 1. CKD - Cr has ranged from 2.2-2.8 this year - Current Cr mid 2's - Diuresing reasonably well - Monitor renal function, US unremarkable 2.  HFpEF

## 2019-05-13 NOTE — PROGRESS NOTES
Hospitalist Progress Note Subjective:  
Daily Progress Note: 5/13/2019 3:26 PM 
 
Patient presented to ER with complaints of SOB, more confused than usual.  History of CHF with cardiology increasing lasix from 20 to 40 mg daily 5/7/19. Random episodes of SOB that self resolve for about a week, usually at night. No orthopnea. Hypoxic to 86% with rising BNP. Diuresed 1.7 liters first 24 hours. Cardiology, pulmonary, and Nephrology on board. 5/13:  Palliative care in. Overnight oximetry completed last pm.  Not read as yet, but did desat > 1 hour so will need oxygen through night. Current Facility-Administered Medications Medication Dose Route Frequency  acetaminophen (TYLENOL) tablet 650 mg  650 mg Oral Q6H PRN  
 torsemide (DEMADEX) tablet 20 mg  20 mg Oral BID  lidocaine 4 % patch 1 Patch  1 Patch TransDERmal Q24H  
 [START ON 5/14/2019] senna-docusate (PERICOLACE) 8.6-50 mg per tablet 1 Tab  1 Tab Oral DAILY  naloxone (NARCAN) injection 0.4 mg  0.4 mg IntraVENous PRN  
 gabapentin (NEURONTIN) capsule 300 mg  300 mg Oral QHS  aspirin chewable tablet 81 mg  81 mg Oral DAILY  amLODIPine (NORVASC) tablet 10 mg  10 mg Oral DAILY  carvedilol (COREG) tablet 25 mg  25 mg Oral BID WITH MEALS  
 hydrALAZINE (APRESOLINE) tablet 10 mg  10 mg Oral TID  levothyroxine (SYNTHROID) tablet 25 mcg  25 mcg Oral ACB  lisinopril (PRINIVIL, ZESTRIL) tablet 20 mg  20 mg Oral DAILY  pantoprazole (PROTONIX) tablet 40 mg  40 mg Oral ACB  traMADol (ULTRAM) tablet 50 mg  50 mg Oral Q8H PRN  
 traZODone (DESYREL) tablet 100 mg  100 mg Oral QHS Review of Systems A comprehensive review of systems was negative except for that written in the HPI. Objective:  
 
Visit Vitals /58 Pulse 75 Temp 97.5 °F (36.4 °C) Resp 18 Ht 5' 1\" (1.549 m) Wt 47.6 kg (104 lb 14.4 oz) SpO2 93% Breastfeeding? No  
BMI 19.82 kg/m² O2 Flow Rate (L/min): 2 l/min(returning to 2L since overnight finished) O2 Device: Room air Temp (24hrs), Av.7 °F (36.5 °C), Min:97.5 °F (36.4 °C), Max:98.2 °F (36.8 °C) 
 
 
701 - 1900 In: -  
Out: 1000 [Urine:1000] 1901 -  0700 In: 240 [P.O.:240] Out: 3400 [QWVST:6496] General appearance: Intermittently confused. Awake and alert, cooperative, SOB with minimal exertion. Frail. Head: Normocephalic, without obvious abnormality, atraumatic Eyes: conjunctivae/corneas clear. PERRL Neck: supple, symmetrical, trachea midline, no carotid bruit and mild JVD Lungs: continued harsh  Sounds with some expiratory wheezes. Heart: Regular, Irregular rate and rhythm, Paced. Abdomen: soft, non-tender. Bowel sounds normal. No masses,  no organomegaly Extremities: All extremities normal, atraumatic, no cyanosis or edema Skin: Skin color, texture, turgor normal. No rashes or lesions Neurologic: Grossly normal 
  
Additional comments: Notes,orders, test results, vitals reviewed Data Review Recent Results (from the past 24 hour(s)) MAGNESIUM Collection Time: 19  4:49 AM  
Result Value Ref Range Magnesium 2.1 1.8 - 2.4 mg/dL CBC W/O DIFF Collection Time: 19  4:49 AM  
Result Value Ref Range WBC 4.3 4.3 - 11.1 K/uL  
 RBC 2.80 (L) 4.05 - 5.2 M/uL HGB 8.8 (L) 11.7 - 15.4 g/dL HCT 27.1 (L) 35.8 - 46.3 % MCV 96.8 79.6 - 97.8 FL  
 MCH 31.4 26.1 - 32.9 PG  
 MCHC 32.5 31.4 - 35.0 g/dL  
 RDW 13.2 11.9 - 14.6 % PLATELET 242 497 - 039 K/uL MPV 10.4 9.4 - 12.3 FL ABSOLUTE NRBC 0.00 0.0 - 0.2 K/uL METABOLIC PANEL, BASIC Collection Time: 19  4:49 AM  
Result Value Ref Range Sodium 135 (L) 136 - 145 mmol/L Potassium 3.6 3.5 - 5.1 mmol/L Chloride 96 (L) 98 - 107 mmol/L  
 CO2 30 21 - 32 mmol/L Anion gap 9 7 - 16 mmol/L Glucose 90 65 - 100 mg/dL BUN 25 (H) 8 - 23 MG/DL  Creatinine 2.52 (H) 0.6 - 1.0 MG/DL  
 GFR est AA 23 (L) >60 ml/min/1.73m2 GFR est non-AA 19 (L) >60 ml/min/1.73m2 Calcium 8.2 (L) 8.3 - 10.4 MG/DL  
BNP Collection Time: 05/13/19  4:49 AM  
Result Value Ref Range  (H) 0 pg/mL 5/11:  CXR: New bilateral small pleural effusions. Chronic interstitial changes again evident 
  
5/11:  ECHOCARDIOGRAM:  
 
-  Left ventricle: Systolic function was normal. Ejection fraction was estimated in the range of 55 % to 60 %. There were no regional wall motion abnormalities. Wall thickness was mildly increased. -  Left atrium: The atrium was moderately dilated. -  Inferior vena cava, hepatic veins: The respirophasic change in diameter was more than 50%. -  Aortic valve: There was mild to moderate regurg Mitral valve: There was mild to moderate annular calcification. -  Tricuspid valve: There was mild regurgitation. 
  
  Room air: SpO2 with O2 and liter flow Resting SpO2  95% Not needed Ambulating SpO2 Refused to perform Refused to perform due to back and neck pain  
  
Assessment/Plan:  
Acute on chronic systolic CHF:  EF: 52-39% Admission BNP: 449 Strict I+O, daily weights, low sodium diet Continue BB and ACE for now Hypertension:  Continue home meds Orthostatic hypotension due to autonomic neuropathy Acquired hypothyroidism:  Continue home meds GERD:  PPI 
SSS with pacemaker CAD with history of MI Cardiology on board DNR 
NIDDM: monitor Diabetic neuropathy Physical debility:  Has 24/7 caregivers Chronic atrial fibrillation/PAF Not on ASA or UVAL[facility] Poor Watchman candidate Prior ablation, paced Acute-on-chronic kidney injury:  CKD IV Nephrology consulted:  Recommend demadex Hypoxia/SOB:  Multifactorial  
            Will need walking sat prior to d/c Currently on 2 liters w sat 98% Oxygen sat on admission: 85% Hard of hearing Anemia: most likely chronic  
  
Care Plan discussed with: Patient/Daughter and Nurse Signed By: Parth Ha NP May 13, 2019

## 2019-05-13 NOTE — PROGRESS NOTES
Verbal bedside report given to lakisha Packer RN. Patient's situation, background, assessment and recommendations provided. Opportunity for questions provided. Oncoming RN assumed care of patient.

## 2019-05-13 NOTE — ROUTINE PROCESS
Spoke with patient and son in regards to [address] at this point and progress. Patient continues with shortness of breath despite diuresis, pulmonology consulted along with nephrology as Cr. 2.52 today after 2.33 on admit. Palliative care consult pending- Echo pending. Continue to follow CHF teaching continues to pt/family. Emphasis on taking prescription meds as ordered, to keep F/U appts and to call MD STAT if any of the following occur: ? If you gain 2 lbs in one day or 5 lbs in a week, and short of breath. ? If you can not lay flat without developing short of breath or rapid breathing at night; or if it wakes you up. Develop a cough or wheezing. ? If you notice swollen hands/feet/ankles or stomach with a bloated/ full feeling. ? If you become confused or mentally fuzzy or dizzy. ? If you notice a rapid or change in your heart rate. ? If you become more exhausted all the time and unable to do the same level of activity without stopping to catch your breath. Drink no more than 8 cups a day in 8 oz. cups. Your Heart can not handle any more. Stay away from salt (limit anything with salt or sodium in it). Limit to 250mg per serving. Pt/family verbalizes understanding, will follow to reinforce teaching skills: 20 mins

## 2019-05-14 NOTE — PROGRESS NOTES
Nutrition Reason for assessment: Consult received for a calorie count GENNA An Assessment:  
Diet: DIET NUTRITIONAL SUPPLEMENTS All Meals; Ensure [employer] DIET CARDIAC Regular; 2 GM NA (House Low NA); FR 2000ML Food/Nutrition Patient History:  Pt sleeping-caregiver at bedside assists with interview. Pt's caregiver states that she is with her 2 days per week and one of her three children are with her the other days. She has known her for ~8 years but has assisted with her care for the past 6 months. She states that the pt is not a big eater but does eat throughout the day. She states that she does not have any difficulties with solids and does feed herself. Typical diet recall includes cream of wheat at breakfast and a sandwich and chips or a snack cake at lunches. She states that she has seen boost in the refrigerator but has not seen her drink it before. H/O dementia, CAD, DM, HTN, CKD IV. She is receiving demadex and pericolace.  today. AM BMP glucose 92 mg/dl. Anthropometrics:Height: 5' 1\" (154.9 cm),  Weight: 45.9 kg (101 lb 4.8 oz), Weight Source: Standing scale (comment), Body mass index is 19.14 kg/m². BMI class of underweight. Macronutrient needs: EER:  8846-7036 kcal /day (22-25 kcal/kg I BW) 
EPR:  43-48 grams protein/day (0.9-1 grams/kg I BW)(GFR 20)-CKD Intake/Comparative Standards: Average intake for past 3 day(s)/3 recorded meal(s): 0%. This potentially meets ~0% of kcal and ~0% of protein needs Nutrition Diagnosis: Inadequate energy intake r/t decreased ability to consume sufficient oral intake as evidenced by pt eating 0% of meals since admission. Intervention: 
Meals and snacks: Continue current diet. [facility]. Added preferences, provided her with a menu. Nutrition Supplement Therapy: added ensure enlive TID I do not see a good reason to start a calorie count.   I believe that it would tell us that she is not eating enough to meet her EEN and I do not believe that she would be a candidate for artificial nutrition. Discharge nutrition plan: Will most likely benefit from ONS at discharge. Coordination of Nutrition Care: Caregiver at bedside Minus [address], [place], [address], [phone]

## 2019-05-14 NOTE — PROGRESS NOTES
Palliative Care Progress Note Patient: Caryl Paniagua MRN: [id number]  SSN: [id number] YOB: 1927  Age: 80 y.o. Sex: female Assessment/Plan: Chief Complaint/Interval History: Pt not needing O2 today. Principal Diagnosis: · Respiratory Failure, Acute on Chronic  J96.20 Additional Diagnoses: · Debility, Unspecified  R53.81 
· Pain, back  M54.9 
· Encounter for Palliative Care  Z51.5 Palliative Performance Scale (PPS) PPS: 50 Medical Decision Making:  
Reviewed and summarized notes from last 24 hours. Discussed case with appropriate providers. Reviewed lab and X-ray data from last 24 hours. Pt resting in easy chair, private caregiver Dearl Bun in room. Pt smiles easily. She denies SOB, N/V, and other discomforts. She endorses ongoing chronic upper back pain. Jose Gaines explains that the pain is in the left shoulder; we adjust the lidocaine patch. Jose Gaines says that the pt ate a few bites of breakfast, but that pt's appetite has declined over time. Elizabethlilliam Gaines says that she has been told that pt may be discharged tomorrow. Family is weighing home health vs hospice; CM Hamp Pallas A-P assisting with this process. Will follow intermittently. Will discuss findings with members of the interdisciplinary team.   
 
  
More than 50% of this 15 minute visit was spent counseling and coordination of care as outlined above. Subjective:  
 
Review of Systems: A comprehensive review of systems was negative except for: Musculoskeletal: Positive for chronic pain in area of left shoulder blade. Objective:  
 
Visit Vitals /48 (BP 1 Location: Right arm, BP Patient Position: Lying left side) Pulse 75 Temp 98.3 °F (36.8 °C) Resp 17 Ht 5' 1\" (1.549 m) Wt 101 lb 4.8 oz (45.9 kg) SpO2 93% Breastfeeding? No  
BMI 19.14 kg/m² Physical Exam: 
 
General:  Cooperative. No acute distress. Eyes:  Conjunctivae/corneas clear Nose: Nares normal. Septum midline. Neck: Supple, symmetrical, trachea midline, no JVD Lungs:   Clear to auscultation bilaterally, unlabored Heart:  Regular rate and rhythm, no murmur Abdomen:   Soft, non-tender, non-distended Extremities: Normal, atraumatic, no cyanosis or edema Skin: Skin color, texture, turgor normal. No rash or lesions. Neurologic: Nonfocal  
Psych: Alert, oriented to self Signed By: Rafy Tracy NP May 14, 2019

## 2019-05-14 NOTE — PROGRESS NOTES
Discharge instructions reviewed with patient and family. Prescriptions given for Asa, lidocaine patch, demedex and med info sheets provided for all new medications. Opportunity for questions provided. Patient and family voiced understanding of all discharge instructions.

## 2019-05-14 NOTE — PROGRESS NOTES
Hospitalist Progress Note Admit Date:  2019  3:15 PM  
Name:  Annabella Riley Age:  80 y.o. 
:  8/10/1927 MRN:  [id number] PCP:  Amador Sanchez MD 
Treatment Team: Attending Provider: Sarbjit Rabago DO; Consulting Provider: Rosamaria Ballard MD; Consulting Provider: Janas Holstein, MD; Consulting Provider: Elo Lugo MD; Consulting Provider: Royal Pretty MD; Utilization Review: Galina Gutierrez RN; Care Manager: Kayli Rudolph; Consulting Provider: Sarah Hernandez NP; Utilization Review: Sherif Case RN; Physical Therapist: Cristiana Barber, PT, DPT Subjective:  
 
Pt is a 79 yo female with pmh HFrEF, A Fib, pacer, HTN, CKD IV who presented to ER  with worsening dyspnea. She was found to have acute CHF exac with BNP > 400, 02 sat 85%, cxr with new bilat pleural effusions. Cards, Pulm and Nephro are all following. She is clinically improving, now between RA and 2 L NC. She is weak and caretaker reports little appetite at home. She denies SOB while at rest, denies CP. She is hopeful for home soon. Objective:  
 
Patient Vitals for the past 24 hrs: 
 Temp Pulse Resp BP SpO2  
19 0723 98.3 °F (36.8 °C) 75 17 114/48 93 % 19 0504 97.8 °F (36.6 °C) 78 18 112/51 90 % 19 0105 97.8 °F (36.6 °C) 75 18 115/46 97 % 19 2040 97.9 °F (36.6 °C) 75 18 132/62 96 % 19 1747  74  120/56   
19 1646 97.6 °F (36.4 °C) 76 18 124/58 93 % 19 1556  75  107/58   
19 1249 97.5 °F (36.4 °C) 75 18 133/58 93 % 19 1015     97 % 19 0929  76  129/64  Oxygen Therapy O2 Sat (%): 93 % (19 0723) Pulse via Oximetry: 76 beats per minute (19 0732) O2 Device: Nasal cannula (19) O2 Flow Rate (L/min): 2 l/min (19) Intake/Output Summary (Last 24 hours) at 2019 7211 Last data filed at 2019 0403 Gross per 24 hour Intake 30 ml Output 1000 ml Net -970 ml General:    Thin and weak but  Alert. CV:   RRR. No murmur, rub, or gallop. Lungs:   CTAB. No wheezing, rhonchi, or rales. Abdomen:   Soft, nontender, nondistended. Extremities: Warm and dry. No cyanosis or edema. Skin:     No rashes or jaundice. Neuro:  No gross focal deficits Data Review: 
I have reviewed all labs, meds, telemetry events, and studies from the last 24 hours: 
 
Recent Results (from the past 24 hour(s)) PLEASE READ & DOCUMENT PPD TEST IN 48 HRS Collection Time: 05/13/19  9:55 PM  
Result Value Ref Range PPD Negative Negative  
 mm Induration 0 0 - 5 mm MAGNESIUM Collection Time: 05/14/19  4:43 AM  
Result Value Ref Range Magnesium 2.2 1.8 - 2.4 mg/dL CBC W/O DIFF Collection Time: 05/14/19  4:43 AM  
Result Value Ref Range WBC 3.8 (L) 4.3 - 11.1 K/uL  
 RBC 3.02 (L) 4.05 - 5.2 M/uL HGB 9.6 (L) 11.7 - 15.4 g/dL HCT 28.7 (L) 35.8 - 46.3 % MCV 95.0 79.6 - 97.8 FL  
 MCH 31.8 26.1 - 32.9 PG  
 MCHC 33.4 31.4 - 35.0 g/dL  
 RDW 12.9 11.9 - 14.6 % PLATELET 263 635 - 325 K/uL MPV 10.5 9.4 - 12.3 FL ABSOLUTE NRBC 0.00 0.0 - 0.2 K/uL METABOLIC PANEL, BASIC Collection Time: 05/14/19  4:43 AM  
Result Value Ref Range Sodium 134 (L) 136 - 145 mmol/L Potassium 3.5 3.5 - 5.1 mmol/L Chloride 95 (L) 98 - 107 mmol/L  
 CO2 31 21 - 32 mmol/L Anion gap 8 7 - 16 mmol/L Glucose 92 65 - 100 mg/dL BUN 24 (H) 8 - 23 MG/DL Creatinine 2.45 (H) 0.6 - 1.0 MG/DL  
 GFR est AA 24 (L) >60 ml/min/1.73m2 GFR est non-AA 20 (L) >60 ml/min/1.73m2 Calcium 8.5 8.3 - 10.4 MG/DL  
BNP Collection Time: 05/14/19  4:43 AM  
Result Value Ref Range  (H) 0 pg/mL All Micro Results None Results for orders placed or performed during the hospital encounter of 05/11/19  
2D ECHO COMPLETE ADULT (TTE) W OR WO CONTR Narrative [place] [address]  [place], [address] [facility] 
[phone] Transthoracic Echocardiogram 
2D, M-mode, Doppler, and Color Doppler Patient: Sadia Franklin 
MR #: [id number] : 10-Aug-1927 Age: 80 years Gender: Female Study date: 12-May-2019 Account #: [de-identified] Height: 61 in 
Weight: 103.8 lb 
BSA: 1.43 mï¾² Status:Routine Location: 320 BP: 122/ 57 Allergies: NO KNOWN ALLERGENS Sonographer:  Keli Lanza RDCS Group:  [facility] Referring Physician:  Maria D Elder MD 
Reading Physician:  Ketan Whalen. Jluis Rogers MD [facility] INDICATIONS: re-eval CHF PROCEDURE: This was a routine study. A transthoracic echocardiogram was 
performed. The study included complete 2D imaging, M-mode, complete spectral 
Doppler, and color Doppler. Intravenous contrast (Definity, 2 ml) was 
administered. Image quality was adequate. LEFT VENTRICLE: Size was normal. Systolic function was normal. Ejection 
fraction was estimated in the range of 55 % to 60 %. There were no regional 
wall motion abnormalities. Wall thickness was mildly increased. Left 
ventricular diastolic dysfunction was present. Average E/e' of 27.95. 
 
RIGHT VENTRICLE: The size was normal. Systolic function was normal. A pacing 
wire was present. LEFT ATRIUM: The atrium was moderately dilated. RIGHT ATRIUM: Size was at the upper limits of normal. A pacing wire was 
present. SYSTEMIC VEINS: IVC: The inferior vena cava was normal in size and course. The 
respirophasic change in diameter was more than 50%. AORTIC VALVE: The valve was trileaflet. Leaflets exhibited moderate  
sclerosis. There was no evidence for stenosis. There was mild to moderate regurgitation. MITRAL VALVE: There was mild to moderate annular calcification. There was  
mild 
diffuse thickening of the anterior and posterior leaflets. There was no 
evidence for stenosis. There was trivial regurgitation. TRICUSPID VALVE: The valve structure was normal. There was no evidence for stenosis. There was mild regurgitation. PULMONIC VALVE: The valve structure was normal. There was no evidence for 
stenosis. There was trivial regurgitation. PERICARDIUM: There was no pericardial effusion. AORTA: The root exhibited normal size. SUMMARY: 
 
-  Left ventricle: Systolic function was normal. Ejection fraction was 
estimated in the range of 55 % to 60 %. There were no regional wall motion 
abnormalities. Wall thickness was mildly increased. -  Left atrium: The atrium was moderately dilated. -  Inferior vena cava, hepatic veins: The respirophasic change in diameter  
was 
more than 50%. -  Aortic valve: There was mild to moderate regurgitation. 
 
-  Mitral valve: There was mild to moderate annular calcification. 
 
-  Tricuspid valve: There was mild regurgitation. SYSTEM MEASUREMENT TABLES 
 
2D mode AoR Diam (2D): 2.9 cm 
LA Dimension (2D): 2.8 cm Left Atrium Systolic Volume Index; Method of Disks, Biplane; 2D mode;: 50.8 
ml/m2 IVS/LVPW (2D): 1.1 IVSd (2D): 1.5 cm LVIDd (2D): 4 cm LVIDs (2D): 2.9 cm 
LVPWd (2D): 1.4 cm RVIDd (2D): 2.6 cm Unspecified Scan Mode Peak Grad; Mean; Antegrade Flow: 10 mm[Hg] Vmax; Antegrade Flow: 163 cm/s Prepared and signed by Triston Lewis MD [facility] Signed 13-May-2019 11:45:07 Current Meds: 
Current Facility-Administered Medications Medication Dose Route Frequency  acetaminophen (TYLENOL) tablet 650 mg  650 mg Oral Q6H PRN  
 torsemide (DEMADEX) tablet 20 mg  20 mg Oral BID  lidocaine 4 % patch 1 Patch  1 Patch TransDERmal Q24H  
 senna-docusate (PERICOLACE) 8.6-50 mg per tablet 1 Tab  1 Tab Oral DAILY  naloxone (NARCAN) injection 0.4 mg  0.4 mg IntraVENous PRN  
 gabapentin (NEURONTIN) capsule 300 mg  300 mg Oral QHS  aspirin chewable tablet 81 mg  81 mg Oral DAILY  amLODIPine (NORVASC) tablet 10 mg  10 mg Oral DAILY  carvedilol (COREG) tablet 25 mg  25 mg Oral BID WITH MEALS  
  hydrALAZINE (APRESOLINE) tablet 10 mg  10 mg Oral TID  levothyroxine (SYNTHROID) tablet 25 mcg  25 mcg Oral ACB  lisinopril (PRINIVIL, ZESTRIL) tablet 20 mg  20 mg Oral DAILY  pantoprazole (PROTONIX) tablet 40 mg  40 mg Oral ACB  traMADol (ULTRAM) tablet 50 mg  50 mg Oral Q8H PRN  
 traZODone (DESYREL) tablet 100 mg  100 mg Oral QHS Other Studies (last 24 hours): No results found. Assessment and Plan:  
 
Hospital Problems as of 5/14/2019 Date Reviewed: 5/12/2019 Codes Class Noted - Resolved POA * (Principal) Acute on chronic systolic CHF (congestive heart failure) (HCC) ICD-10-CM: V32.37 ICD-9-CM: 428.23, 428.0  5/11/2019 - Present Yes Acute-on-chronic kidney injury ([facility] [address] ICD-10-CM: N17.9, N18.9 ICD-9-CM: 584.9, 585.9  5/11/2019 - Present Yes Hypoxia ICD-10-CM: R09.02 
ICD-9-CM: 799.02  5/11/2019 - Present Yes Persistent atrial fibrillation (HCC) (Chronic) ICD-10-CM: I48.1 ICD-9-CM: 427.31  2/28/2019 - Present Yes Physical debility (Chronic) ICD-10-CM: R53.81 ICD-9-CM: 799.3  4/30/2018 - Present Yes Type 2 diabetes mellitus with nephropathy (HCC) (Chronic) ICD-10-CM: E11.21 
ICD-9-CM: 250.40, 583.81  1/25/2018 - Present Yes Diabetes mellitus, type 2 (HCC) (Chronic) ICD-10-CM: E11.9 ICD-9-CM: 250.00  8/12/2017 - Present Hypertension (Chronic) ICD-10-CM: I10 
ICD-9-CM: 401.9  8/12/2017 - Present Yes Overview Signed 9/12/2012  3:43 PM by Travis Gave Orthostatic hypotension due to autonomic neuropathy CKD (chronic kidney disease), stage III (HCC) (Chronic) ICD-10-CM: N18.3 ICD-9-CM: 585.3  8/12/2017 - Present DNR (do not resuscitate) (Chronic) ICD-10-CM: M40 ICD-9-CM: V49.86  8/12/2017 - Present Yes  
   
 SSS (sick sinus syndrome) (HCC) (Chronic) ICD-10-CM: I49.5 ICD-9-CM: 427.81  5/5/2016 - Present Yes Acquired hypothyroidism (Chronic) ICD-10-CM: E03.9 ICD-9-CM: 244.9  9/12/2012 - Present Yes GERD (gastroesophageal reflux disease) (Chronic) ICD-10-CM: K21.9 ICD-9-CM: 530.81  9/12/2012 - Present Yes Plan: 
 
Acute on chronic systolic HF Repeat echo shows improved EF from 2017 Clinically improving Need home 02 qualifier (pt did not ambulate with RT yesterday) Strict I&Os, daily weights, low Na diet Cont BID Demadex Chronic A Fib Rate controlled on Coreg Not on [address] [place] [address], started on ASA Poor watchman candidate Cards following CKD Cr stable Nephro following Unintentional weight loss Chronic poor appetite Consult nutrition DC planning Likely home soon, when okay with cards and pulm CM following, home with hh vs hospice Diet:  DIET CARDIAC 
DVT ppx:  SCDs Signed: 
Rio Newell NP

## 2019-05-14 NOTE — PROGRESS NOTES
5/14/2019 12:53 PM 
 
Admit Date: 5/11/2019 Admit Diagnosis: CHF exacerbation ([address] [I50.9] Subjective:  
breahthing better- no cp Objective:  
  
Visit Vitals /52 (BP 1 Location: Right arm, BP Patient Position: At rest) Pulse 75 Temp 98.1 °F (36.7 °C) Resp 16 Ht 5' 1\" (1.549 m) Wt 45.9 kg (101 lb 4.8 oz) SpO2 96% Breastfeeding? No  
BMI 19.14 kg/m² Physical Exam: 
Gwinda Tod, Well Nourished, No Acute Distress, Alert & Oriented x 3, appropriate mood. Neck- supple, no JVD 
CV- regular rate and rhythm no MRG Lung- rales bilaterally Abd- soft, nontender, nondistended Ext- no edema bilaterally. Skin- warm and dry Data Review:  
Recent Labs 05/14/19 
7814 * K 3.5 BUN 24* CREA 2.45* WBC 3.8* HGB 9.6* HCT 28.7*  
 Assessment/Plan:  
 
Principal Problem: 
  Acute on chronic systolic CHF (congestive heart failure) ([address] (5/11/2019)Improved with current therapy. Will continue medications Doing better on bid diuretics Active Problems: Hypertension (8/12/2017)Stable. Continue current medical therapy. Overview: Orthostatic hypotension due to autonomic neuropathy Acquired hypothyroidism (9/12/2012) GERD (gastroesophageal reflux disease) (9/12/2012) SSS (sick sinus syndrome) ([address] (5/5/2016) DNR (do not resuscitate) (8/12/2017) Type 2 diabetes mellitus with nephropathy ([address] (1/25/2018) Physical debility (4/30/2018) Persistent atrial fibrillation ([address] (2/28/2019) Acute-on-chronic kidney injury ([address] (5/11/2019)- cr stable- will order am bmp Hypoxia (5/11/2019)

## 2019-05-14 NOTE — DISCHARGE INSTRUCTIONS
Heart Failure: Care Instructions  Your Care Instructions    Heart failure occurs when your heart does not pump as much blood as the body needs. Failure does not mean that the heart has stopped pumping but rather that it is not pumping as well as it should. Over time, this causes fluid buildup in your lungs and other parts of your body. Fluid buildup can cause shortness of breath, fatigue, swollen ankles, and other problems. By taking medicines regularly, reducing sodium (salt) in your diet, checking your weight every day, and making lifestyle changes, you can feel better and live longer. Follow-up care is a key part of your treatment and safety. Be sure to make and go to all appointments, and call your doctor if you are having problems. It's also a good idea to know your test results and keep a list of the medicines you take. How can you care for yourself at home? Medicines    · Be safe with medicines. Take your medicines exactly as prescribed. Call your doctor if you think you are having a problem with your medicine.     · Do not take any vitamins, over-the-counter medicine, or herbal products without talking to your doctor first. [facility] not take ibuprofen (Advil or Motrin) and naproxen (Aleve) without talking to your doctor first. They could make your heart failure worse.     · You may be taking some of the following medicine. ? Beta-blockers can slow heart rate, decrease blood pressure, and improve your condition. Taking a beta-blocker may lower your chance of needing to be hospitalized. ? Angiotensin-converting enzyme inhibitors (ACEIs) reduce the heart's workload, lower blood pressure, and reduce swelling. Taking an ACEI may lower your chance of needing to be hospitalized again. ? Angiotensin II receptor blockers (ARBs) work like ACEIs. Your doctor may prescribe them instead of ACEIs. ? Diuretics, also called water pills, reduce swelling. ?  Potassium supplements replace this important mineral, which is sometimes lost with diuretics. ? Aspirin and other blood thinners prevent blood clots, which can cause a stroke or heart attack.    You will get more details on the specific medicines your doctor prescribes. Diet    · Your doctor may suggest that you limit sodium to 2,000 milligrams (mg) a day or less. That is less than 1 teaspoon of salt a day, including all the salt you eat in cooking or in packaged foods. People get most of their sodium from processed foods. Fast food and restaurant meals also tend to be very high in sodium.     · Ask your doctor how much liquid you can drink each day. You may have to limit liquids.    Weight    · Weigh yourself without clothing at the same time each day. Record your weight. Call your doctor if you have a sudden weight gain, such as more than 2 to 3 pounds in a day or 5 pounds in a week. (Your doctor may suggest a different range of weight gain.) A sudden weight gain may mean that your heart failure is getting worse.    Activity level    · Start light exercise (if your doctor says it is okay). Even if you can only do a small amount, exercise will help you get stronger, have more energy, and manage your weight and your stress. Walking is an easy way to get exercise. Start out by walking a little more than you did before. Bit by bit, increase the amount you walk.     · When you exercise, watch for signs that your heart is working too hard. You are pushing yourself too hard if you cannot talk while you are exercising. If you become short of breath or dizzy or have chest pain, stop, sit down, and rest.     · If you feel \"wiped out\" the day after you exercise, walk slower or for a shorter distance until you can work up to a better pace.     · Get enough rest at night. Sleeping with 1 or 2 pillows under your upper body and head may help you breathe easier.    Lifestyle changes    · Do not smoke. Smoking can make a heart condition worse.  If you need help quitting, talk to your doctor about stop-smoking programs and medicines. These can increase your chances of quitting for good. Quitting smoking may be the most important step you can take to protect your heart.     · Limit alcohol to 2 drinks a day for men and 1 drink a day for women. Too much alcohol can cause health problems.     · Avoid getting sick from colds and the flu. Get a pneumococcal vaccine shot. If you have had one before, ask your doctor whether you need another dose. Get a flu shot each year. If you must be around people with colds or the flu, wash your hands often. When should you call for help? Call 911 if you have symptoms of sudden heart failure such as:    · You have severe trouble breathing.     · You cough up pink, foamy mucus.     · You have a new irregular or rapid heartbeat.    Call your doctor now or seek immediate medical care if:    · You have new or increased shortness of breath.     · You are dizzy or lightheaded, or you feel like you may faint.     · You have sudden weight gain, such as more than 2 to 3 pounds in a day or 5 pounds in a week. (Your doctor may suggest a different range of weight gain.)     · You have increased swelling in your legs, ankles, or feet.     · You are suddenly so tired or weak that you cannot do your usual activities.    Watch closely for changes in your health, and be sure to contact your doctor if you develop new symptoms. Where can you learn more? Go to http://david-rui.info/. Enter J600 in the search box to learn more about \"Heart Failure: Care Instructions. \"  Current as of: July 22, 2018  Content Version: 11.9  © 0133-7777 [employer]. Care instructions adapted under license by [employer] (which disclaims liability or warranty for this information).  If you have questions about a medical condition or this instruction, always ask your healthcare professional. [address] any warranty or liability for your use of this information. Avoiding Triggers With Heart Failure: Care Instructions  Your Care Instructions    Triggers are anything that make your heart failure flare up. A flare-up is also called \"sudden heart failure\" or \"acute heart failure. \" When you have a flare-up, fluid builds up in your lungs, and you have problems breathing. You might need to go to the hospital. By watching for changes in your condition and avoiding triggers, you can prevent heart failure flare-ups. Follow-up care is a key part of your treatment and safety. Be sure to make and go to all appointments, and call your doctor if you are having problems. It's also a good idea to know your test results and keep a list of the medicines you take. How can you care for yourself at home? Watch for changes in your weight and condition  · Weigh yourself without clothing at the same time each day. Record your weight. Call your doctor if you have sudden weight gain, such as more than 2 to 3 pounds in a day or 5 pounds in a week. (Your doctor may suggest a different range of weight gain.) A sudden weight gain may mean that your heart failure is getting worse. · Keep a daily record of your symptoms. Write down any changes in how you feel, such as new shortness of breath, cough, or problems eating. Also record if your ankles are more swollen than usual and if you feel more tired than usual. Note anything that you ate or did that could have triggered these changes. Limit sodium  Sodium causes your body to hold on to extra water. This may cause your heart failure symptoms to get worse. People get most of their sodium from processed foods. Fast food and restaurant meals also tend to be very high in sodium. · Your doctor may suggest that you limit sodium to 2,000 milligrams (mg) a day or less. That is less than 1 teaspoon of salt a day, including all the salt you eat in cooking or in packaged foods. · Read food labels on cans and food packages.  They tell you how much sodium you get in one serving. Check the serving size. If you eat more than one serving, you are getting more sodium. · Be aware that sodium can come in forms other than salt, including monosodium glutamate (MSG), sodium citrate, and sodium bicarbonate (baking soda). MSG is often added to Asian food. You can sometimes ask for food without MSG or salt. · Slowly reducing salt will help you adjust to the taste. Take the salt shaker off the table. · Flavor your food with garlic, lemon juice, onion, vinegar, herbs, and spices instead of salt. Do not use soy sauce, steak sauce, onion salt, garlic salt, mustard, or ketchup on your food, unless it is labeled \"low-sodium\" or \"low-salt. \"  · Make your own salad dressings, sauces, and ketchup without adding salt. · Use fresh or frozen ingredients, instead of canned ones, whenever you can. Choose low-sodium canned goods. · Eat less processed food and food from restaurants, including fast food. Exercise as directed  Moderate, regular exercise is very good for your heart. It improves your blood flow and helps control your weight. But too much exercise can stress your heart and cause a heart failure flare-up. · Check with your doctor before you start an exercise program.  · Walking is an easy way to get exercise. Start out slowly. Gradually increase the length and pace of your walk. Swimming, riding a bike, and using a treadmill are also good forms of exercise. · When you exercise, watch for signs that your heart is working too hard. You are pushing yourself too hard if you cannot talk while you are exercising. If you become short of breath or dizzy or have chest pain, stop, sit down, and rest.  · Do not exercise when you do not feel well. Take medicines correctly  · Take your medicines exactly as prescribed. Call your doctor if you think you are having a problem with your medicine. · Make a list of all the medicines you take.  Include those prescribed to you by other doctors and any over-the-counter medicines, vitamins, or supplements you take. Take this list with you when you go to any doctor. · Take your medicines at the same time every day. It may help you to post a list of all the medicines you take every day and what time of day you take them. · Make taking your medicine as simple as you can. Plan times to take your medicines when you are doing other things, such as eating a meal or getting ready for bed. This will make it easier to remember to take your medicines. · Get organized. Use helpful tools, such as daily or weekly pill containers. When should you call for help? Call 911 if you have symptoms of sudden heart failure such as:    · You have severe trouble breathing.     · You cough up pink, foamy mucus.     · You have a new irregular or rapid heartbeat.    Call your doctor now or seek immediate medical care if:    · You have new or increased shortness of breath.     · You are dizzy or lightheaded, or you feel like you may faint.     · You have sudden weight gain, such as more than 2 to 3 pounds in a day or 5 pounds in a week. (Your doctor may suggest a different range of weight gain.)     · You have increased swelling in your legs, ankles, or feet.     · You are suddenly so tired or weak that you cannot do your usual activities.    Watch closely for changes in your health, and be sure to contact your doctor if you develop new symptoms. Where can you learn more? Go to http://david-rui.info/. Enter I813 in the search box to learn more about \"Avoiding Triggers With Heart Failure: Care Instructions. \"  Current as of: July 22, 2018  Content Version: 11.9  © 6570-3595 [employer]. Care instructions adapted under license by [employer] (which disclaims liability or warranty for this information).  If you have questions about a medical condition or this instruction, always ask your healthcare professional. [place], [facility] disclaims any warranty or liability for your use of this information. Limiting Sodium and Fluids With Heart Failure: Care Instructions  Your Care Instructions    Sodium causes your body to hold on to extra water. This may cause your heart failure symptoms to get worse. Limiting sodium may help you feel better and lower your risk of having to go to the hospital.  People get most of their sodium from processed foods. Fast food and restaurant meals also tend to be very high in sodium. Your doctor may suggest that you limit sodium to 2,000 milligrams (mg) a day or less. That is less than 1 teaspoon of salt a day, including all the salt you eat in cooked or packaged foods. Usually, you have to limit the amount of liquids you drink only if your heart failure is severe. Limiting sodium alone often is enough to help your body get rid of extra fluids. However, your doctor may tell you to limit your fluid intake to a set amount each day. Follow-up care is a key part of your treatment and safety. Be sure to make and go to all appointments, and call your doctor if you are having problems. It's also a good idea to know your test results and keep a list of the medicines you take. How can you care for yourself at home? Read food labels  · Read food labels on cans and food packages. The labels tell you how much sodium is in each serving. Make sure that you look at the serving size. If you eat more than the serving size, you have eaten more sodium than is listed for one serving. · Food labels also tell you the Percent Daily Value. If the Percent Daily Value says 50%, it means that you will get at least 50% of all the sodium you need for the entire day in one serving. Choose products with low Percent Daily Values for sodium. · Be aware that sodium can come in forms other than salt, including monosodium glutamate (MSG), sodium citrate, and sodium bicarbonate (baking soda).  MSG is often added to Asian food. You can sometimes ask for food without MSG or salt. Buy low-sodium foods  · Buy foods that are labeled \"unsalted\" (no salt added), \"sodium-free\" (less than 5 mg of sodium per serving), or \"low-sodium\" (less than 140 mg of sodium per serving). A food labeled \"light sodium\" has less than half of the full-sodium version of that food. Foods labeled \"reduced-sodium\" may still have too much sodium. · Buy fresh vegetables or plain, frozen vegetables. Buy low-sodium versions of canned vegetables, soups, and other canned goods. Prepare low-sodium meals  · Use less salt each day when cooking. Reducing salt in this way will help you adjust to the taste. Do not add salt after cooking. Take the salt shaker off the table. · Flavor your food with garlic, lemon juice, onion, vinegar, herbs, and spices instead of salt. Do not use soy sauce, steak sauce, onion salt, garlic salt, mustard, or ketchup on your food. · Make your own salad dressings, sauces, and ketchup without adding salt. · Use less salt (or none) when recipes call for it. You can often use half the salt a recipe calls for without losing flavor. Other dishes like rice, pasta, and grains do not need added salt. · Rinse canned vegetables. This removes some--but not all--of the salt. · Avoid water that has a naturally high sodium content or that has been treated with water softeners, which add sodium. Call your local water company to find out the sodium content of your water supply. If you buy bottled water, read the label and choose a sodium-free brand. Avoid high-sodium foods, such as:  · Smoked, cured, salted, and canned meat, fish, and poultry. · Ham, causey, hot dogs, and luncheon meats. · Regular, hard, and processed cheese and regular peanut butter. · Crackers with salted tops. · Frozen prepared meals. · Canned and dried soups, broths, and bouillon, unless labeled sodium-free or low-sodium.   · Canned vegetables, unless labeled sodium-free or low-sodium. · Salted snack foods such as chips and pretzels. · [place] fries, pizza, tacos, and other fast foods. · Pickles, olives, ketchup, and other condiments, especially soy sauce, unless labeled sodium-free or low-sodium. If you cannot cook for yourself  · Have family members or friends help you, or have someone cook low-sodium meals. · Check with your local senior nutrition program to find out where meals are served and whether they offer a low-sodium option. You can often find these programs through your local health department or hospital.  · Have meals delivered to your home. [facility] have a Meals on [employer]. These programs provide one hot meal a day for older adults, delivered to their homes. Ask whether these meals are low-sodium. Let them know that you are on a low-sodium diet. Limiting fluid intake  · Find a method that works for you. You might simply write down how much you drink every time you do. Some people keep a container filled with the amount of fluid allowed for that day. If they drink from a source other than the container, then they pour out that amount. · Measure your regular drinking glasses to find out how much fluid each one holds. Once you know this, you will not have to measure every time. · Besides water, milk, juices, and other drinks, some foods have a lot of fluid. Count any foods that will melt (such as ice cream or gelatin dessert) or liquid foods (such as soup) as part of your fluid intake for the day. Where can you learn more? Go to http://david-rui.info/. Enter A166 in the search box to learn more about \"Limiting Sodium and Fluids With Heart Failure: Care Instructions. \"  Current as of: July 22, 2018  Content Version: 11.9  © 5281-4091 [employer]. Care instructions adapted under license by [employer] (which disclaims liability or warranty for this information).  If you have questions about a medical condition or this instruction, always ask your healthcare professional. [place] [address] any warranty or liability for your use of this information. DISCHARGE SUMMARY from Nurse    PATIENT INSTRUCTIONS:    After general anesthesia or intravenous sedation, for 24 hours or while taking prescription Narcotics:  · Limit your activities  · Do not drive and operate hazardous machinery  · Do not make important personal or business decisions  · Do  not drink alcoholic beverages  · If you have not urinated within 8 hours after discharge, please contact your surgeon on call. Report the following to your surgeon:  · Excessive pain, swelling, redness or odor of or around the surgical area  · Temperature over 100.5  · Nausea and vomiting lasting longer than 4 hours or if unable to take medications  · Any signs of decreased circulation or nerve impairment to extremity: change in color, persistent  numbness, tingling, coldness or increase pain  · Any questions    What to do at Home:    *  Please give a list of your current medications to your Primary Care Provider. *  Please update this list whenever your medications are discontinued, doses are      changed, or new medications (including over-the-counter products) are added. *  Please carry medication information at all times in case of emergency situations. These are general instructions for a healthy lifestyle:    No smoking/ No tobacco products/ Avoid exposure to second hand smoke  Surgeon General's Warning:  Quitting smoking now greatly reduces serious risk to your health.     Obesity, smoking, and sedentary lifestyle greatly increases your risk for illness    A healthy diet, regular physical exercise & weight monitoring are important for maintaining a healthy lifestyle    You may be retaining fluid if you have a history of heart failure or if you experience any of the following symptoms:  Weight gain of 3 pounds or more overnight or 5 pounds in a week, increased swelling in our hands or feet or shortness of breath while lying flat in bed. Please call your doctor as soon as you notice any of these symptoms; do not wait until your next office visit. Recognize signs and symptoms of STROKE:    F-face looks uneven    A-arms unable to move or move unevenly    S-speech slurred or non-existent    T-time-call 911 as soon as signs and symptoms begin-DO NOT go       Back to bed or wait to see if you get better-TIME IS BRAIN. Warning Signs of HEART ATTACK     Call 911 if you have these symptoms:   Chest discomfort. Most heart attacks involve discomfort in the center of the chest that lasts more than a few minutes, or that goes away and comes back. It can feel like uncomfortable pressure, squeezing, fullness, or pain.  Discomfort in other areas of the upper body. Symptoms can include pain or discomfort in one or both arms, the back, neck, jaw, or stomach.  Shortness of breath with or without chest discomfort.  Other signs may include breaking out in a cold sweat, nausea, or lightheadedness. Don't wait more than five minutes to call 911 - MINUTES MATTER! Fast action can save your life. Calling 911 is almost always the fastest way to get lifesaving treatment. Emergency Medical Services staff can begin treatment when they arrive -- up to an hour sooner than if someone gets to the hospital by car. The discharge information has been reviewed with the patient. The patient verbalized understanding. Discharge medications reviewed with the patient and appropriate educational materials and side effects teaching were provided.   ___________________________________________________________________________________________________________________________________

## 2019-05-14 NOTE — PROGRESS NOTES
Care Management Interventions PCP Verified by CM: Yes Last Visit to PCP: 04/26/19 Palliative Care Criteria Met (RRAT>21 & CHF Dx)?: Yes(RRAT- 45, CHF ) Palliative Consult Recommended?: Yes Mode of Transport at Discharge: Other (see comment)(Thanh Danielson [phone]) Transition of Care Consult (CM Consult): Discharge Planning, Home Hospice Discharge Durable Medical Equipment: No 
Physical Therapy Consult: Yes Occupational Therapy Consult: Yes Speech Therapy Consult: No 
Current Support Network: Lives with Spouse, Own Home Confirm Follow Up Transport: Family Plan discussed with Pt/Family/Caregiver: Yes Freedom of Choice Offered: Yes Discharge Location Discharge Placement: Home with hospice Pt to be dc today, CM spoke with pt thanh Meng, requesting hospice referral to [address] [place] [address]. Referral and order sent, CM spoke with admissions coordinator, will arrange for services and equipment to be set up if she meets criteria. Awaiting call back from Hospice for decision. Pt will need home o2 for night time use, hospice will arrange. Family will transport pt home. CM to continue to monitor for dc needs. 1400 [facility], Will meet wit pt son at 0.

## 2019-05-14 NOTE — PROGRESS NOTES
Oxygen Qualifier Room air: SpO2 with O2 and liter flow Resting SpO2  98% Ambulating SpO2  92% Completed by: 
 
Alley Wright, RT

## 2019-05-14 NOTE — PROGRESS NOTES
[facility] Gates Admission Date: 5/11/2019 Daily Progress Note: 5/14/2019 The patient's chart is reviewed and the patient is discussed with the staff. 80 y.o.  female seen and evaluated at the request of Dr. Socrates Key has a history of hypothyroidism, DM, CKD, HTN, HL, chronic a.fib s/p AV node ablation, SSS s/p PPM, and chronic sCHF. Seen by Cardiology as an outpatient on 5/7 and lasix dose increased for worsening dyspnea, orthopnea, and PND. She did not improve and presented to the ER for further evaluation.  , creat 2.3 (baseline in the 2s) and CXR with small bilateral effusions.  Her initial O2 sat was 85% but improved to 95% on 2 lpm. She was admitted for acute on chronic sCHF.  Cardiology and Nephrology were consulted. Ethelyn Passe diuretics were adjusted but she continues to have some dyspnea and we are consulted to assist in her care Subjective:  
O2 sat 92 on ra with ambulation,  Over 100 minutes santos with O2 sat <89 Current Facility-Administered Medications Medication Dose Route Frequency  acetaminophen (TYLENOL) tablet 650 mg  650 mg Oral Q6H PRN  
 torsemide (DEMADEX) tablet 20 mg  20 mg Oral BID  lidocaine 4 % patch 1 Patch  1 Patch TransDERmal Q24H  
 senna-docusate (PERICOLACE) 8.6-50 mg per tablet 1 Tab  1 Tab Oral DAILY  naloxone (NARCAN) injection 0.4 mg  0.4 mg IntraVENous PRN  
 gabapentin (NEURONTIN) capsule 300 mg  300 mg Oral QHS  aspirin chewable tablet 81 mg  81 mg Oral DAILY  amLODIPine (NORVASC) tablet 10 mg  10 mg Oral DAILY  carvedilol (COREG) tablet 25 mg  25 mg Oral BID WITH MEALS  
 hydrALAZINE (APRESOLINE) tablet 10 mg  10 mg Oral TID  levothyroxine (SYNTHROID) tablet 25 mcg  25 mcg Oral ACB  lisinopril (PRINIVIL, ZESTRIL) tablet 20 mg  20 mg Oral DAILY  pantoprazole (PROTONIX) tablet 40 mg  40 mg Oral ACB  traMADol (ULTRAM) tablet 50 mg  50 mg Oral Q8H PRN  
  traZODone (DESYREL) tablet 100 mg  100 mg Oral QHS Review of Systems Constitutional: negative for fever, chills, sweats Cardiovascular: negative for chest pain, palpitations, syncope, edema Gastrointestinal:  negative for dysphagia, reflux, vomiting, diarrhea, abdominal pain, or melena Neurologic:  negative for focal weakness, numbness, headache Objective:  
 
Vitals:  
 05/14/19 0105 05/14/19 1350 05/14/19 3993 05/14/19 3518 BP: 115/46 112/51 114/48 Pulse: 75 78 75 Resp: 18 18 17 Temp: 97.8 °F (36.6 °C) 97.8 °F (36.6 °C) 98.3 °F (36.8 °C) SpO2: 97% 90% 93% 98% Weight:  101 lb 4.8 oz (45.9 kg) Height:      
 
Intake and Output:  
05/12 1901 - 05/14 0700 In: 27 [P.O.:30] Out: 2050 [Urine:2050] No intake/output data recorded. Physical Exam:  
Constitution:  the patient is well developed and in no acute distress EENMT:  Sclera clear, pupils equal, oral mucosa moist 
Respiratory:  clear Cardiovascular:  RRR without M,G,R 
Gastrointestinal: soft and non-tender; with positive bowel sounds. Musculoskeletal: warm without cyanosis. There is no lower extremity edema. Skin:  no jaundice or rashes, no wounds Neurologic: no gross neuro deficits Psychiatric:  alert and oriented x 3 CXR:  
 
 
LAB No results for input(s): GLUCPOC in the last 72 hours. No lab exists for component: [place] Recent Labs 05/14/19 0443 05/13/19 0449 05/12/19 0422 05/11/19 
1455 WBC 3.8* 4.3 4.6 5.2 HGB 9.6* 8.8* 9.1* 10.5* HCT 28.7* 27.1* 27.5* 31.7*  241 244 288 Recent Labs 05/14/19 0443 05/13/19 0449 05/12/19 0422 05/11/19 
1455 * 135* 139 135* K 3.5 3.6 3.5 4.0  
CL 95* 96* 101 99 CO2 31 30 31 29 GLU 92 90 83 163* BUN 24* 25* 22 22 CREA 2.45* 2.52* 2.33* 2.33* MG 2.2 2.1 2.1  --   
CA 8.5 8.2* 8.3 8.5  
TROIQ  --   --   --  <0.02* ALB  --   --   --  4.0 TBILI  --   --   --  0.4 ALT  --   --   --  14 SGOT  --   --   --  14* No results for input(s): PH, PCO2, PO2, HCO3, PHI, PCO2I, PO2I, HCO3I in the last 72 hours. No results for input(s): LCAD, LAC in the last 72 hours. Assessment:  (Medical Decision Making) Hospital Problems  Date Reviewed: 5/12/2019 Codes Class Noted POA * (Principal) Acute on chronic systolic CHF (congestive heart failure) (HCC) ICD-10-CM: E24.19 ICD-9-CM: 428.23, 428.0  5/11/2019 Yes Acute-on-chronic kidney injury ([facility] [address] ICD-10-CM: N17.9, N18.9 ICD-9-CM: 584.9, 585.9  5/11/2019 Yes About the same Hypoxia ICD-10-CM: R09.02 
ICD-9-CM: 799.02  5/11/2019 Yes Needs O2 hs  
 Persistent atrial fibrillation (HCC) (Chronic) ICD-10-CM: I48.1 ICD-9-CM: 427.31  2/28/2019 Yes Physical debility (Chronic) ICD-10-CM: R53.81 ICD-9-CM: 799.3  4/30/2018 Yes Type 2 diabetes mellitus with nephropathy (HCC) (Chronic) ICD-10-CM: E11.21 
ICD-9-CM: 250.40, 583.81  1/25/2018 Yes Hypertension (Chronic) ICD-10-CM: I10 
ICD-9-CM: 401.9  8/12/2017 Yes Overview Signed 9/12/2012  3:43 PM by Magdalena Glaser Orthostatic hypotension due to autonomic neuropathy DNR (do not resuscitate) (Chronic) ICD-10-CM: K81 ICD-9-CM: V49.86  8/12/2017 Yes  
   
 SSS (sick sinus syndrome) (HCC) (Chronic) ICD-10-CM: I49.5 ICD-9-CM: 427.81  5/5/2016 Yes Acquired hypothyroidism (Chronic) ICD-10-CM: E03.9 ICD-9-CM: 244.9  9/12/2012 Yes GERD (gastroesophageal reflux disease) (Chronic) ICD-10-CM: K21.9 ICD-9-CM: 530.81  9/12/2012 Yes Plan:  (Medical Decision Making) 1   Needs O2 hs but not during the day-will ask  to set up 
2   ? Home soon 
-- 
 
More than 50% of the time documented was spent in face-to-face contact with the patient and in the care of the patient on the floor/unit where the patient is located.  
 
Abdirahman Murray MD

## 2019-05-14 NOTE — PROGRESS NOTES
Following for CHF Bundle Patient Care Setting Care Setting Type: Other (specify in comment) Patient Care Setting: Home with family support and [facility] Date: 05/14/19 Patient Care Plan: On Track

## 2019-05-14 NOTE — PROGRESS NOTES
Problem: Falls - Risk of 
Goal: *Absence of Falls Description Document [place] Fall Risk and appropriate interventions in the flowsheet. Outcome: Progressing Towards Goal 
Note:  
Fall Risk Interventions: 
Mobility Interventions: Bed/chair exit alarm, Patient to call before getting OOB Medication Interventions: Evaluate medications/consider consulting pharmacy, Patient to call before getting OOB Elimination Interventions: Call light in reach History of Falls Interventions: Room close to nurse's station Patient progressing towards goal with no falls on current admission. Patient without confusion, agitation, or sensory perception deficits. Patient has steady gait on ambulation. Personal belongings are within reach. Bed is in the low and locked position with side rails up x2. Yellow gripper socks to bilateral feet. Call light within reach and patient verbalizes understanding of use.

## 2019-05-14 NOTE — DISCHARGE SUMMARY
Hospitalist Discharge Summary Admit Date:  2019  3:15 PM  
Name:  Rinku Conklin Age:  80 y.o. 
:  8/10/1927 MRN:  [id number] PCP:  Marah Mao MD 
Treatment Team: Attending Provider: Abril Daly DO; Consulting Provider: Marina Barkley MD; Consulting Provider: Hetal Johnson MD; Consulting Provider: Michelle Peralta MD; Consulting Provider: Gayathri Constantino MD; Utilization Review: Lissa Pulido RN; Care Manager: Jonathan Kirk; Consulting Provider: Anthony Harris NP; Utilization Review: Bhavin Gold RN; Physical Therapist: Minnie Mendes PT, DPT Problem List for this Hospitalization: 
Hospital Problems as of 2019 Date Reviewed: 2019 Codes Class Noted - Resolved POA * (Principal) Acute on chronic systolic CHF (congestive heart failure) (HCC) ICD-10-CM: P53.04 ICD-9-CM: 428.23, 428.0  2019 - Present Yes Acute-on-chronic kidney injury ([facility] [address] ICD-10-CM: N17.9, N18.9 ICD-9-CM: 584.9, 585.9  2019 - Present Yes Hypoxia ICD-10-CM: R09.02 
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 
ICD-9-CM: 250.40, 583.81  2018 - Present Yes Diabetes mellitus, type 2 (HCC) (Chronic) ICD-10-CM: E11.9 ICD-9-CM: 250.00  2017 - Present Hypertension (Chronic) ICD-10-CM: I10 
ICD-9-CM: 401.9  2017 - Present Yes Overview Signed 2012  3:43 PM by Peace Walls Orthostatic hypotension due to autonomic neuropathy CKD (chronic kidney disease), stage III (HCC) (Chronic) ICD-10-CM: N18.3 ICD-9-CM: 585.3  2017 - Present  DNR (do not resuscitate) (Chronic) ICD-10-CM: O50 
 ICD-9-CM: V49.86  8/12/2017 - Present Yes  
   
 SSS (sick sinus syndrome) (HCC) (Chronic) ICD-10-CM: I49.5 ICD-9-CM: 427.81  5/5/2016 - Present Yes Acquired hypothyroidism (Chronic) ICD-10-CM: E03.9 ICD-9-CM: 244.9  9/12/2012 - Present Yes GERD (gastroesophageal reflux disease) (Chronic) ICD-10-CM: K21.9 ICD-9-CM: 530.81  9/12/2012 - Present Yes Admission HPI from 5/11/2019:   
 
Patient 16W with pmhx systolic CHFe, chronic afib rate controlled s/p biv pacer, hypothyroidism, CAD, DM, HTN presents with one week worsening dyspnea, orthopnea and PND. Seen by cardiology on 5/7 with increased dose of lasix with minimal improvement of home symptoms. Patient is poor historian secondary to suspected dementia and [place] but denies chest pain. Says she is feeling slightly better after diuresis in ED.  
  
ED workup notable for CXR with small bilateral effusions,  and trop neg <0.02. Cr 2.3 baseline variable 1.6-2.6. She does not wear oxygen at home but with 85% sat on RA in ED, up to 95% on 2lnc.  
  
Hospitalists have been asked to admit for acute/ch CHFe. Cardiology also called, will consult.  
  
Hospital Course: 
 
Pt is a 79 yo female with pmh HFrEF, A Fib, pacer, HTN, CKD IV who presented to ER 05/11 with worsening dyspnea. She was found to have acute CHF exac with BNP > 400, 02 sat 85%, cxr with new bilat pleural effusions. Cards, Pulm and Nephro consulted. Nephrology recommend BID Demadex. She is clinically improving, now only requiring 02 at night. She is stable for d/c home. Should follow up with PCP in 1 week for repeat BMP. Follow up instructions and discharge meds at bottom of this note. Plan was discussed with pt, caretaker, pulm, CM. All questions answered. Patient was stable at time of discharge. Diagnostic Imaging/Tests:  
Xr Chest Pa Lat Result Date: 5/11/2019 PA AND LATERAL CHEST X-RAY.  Clinical Indication: Shortness of breath Comparison: Chest x-ray dated 2019 Findings: 2 views of the chest submitted demonstrate new bilateral small pleural effusions slightly greater on the left. A pacer device remains in place. There is chronic interstitial changes again noted. No dense consolidated airspace opacity noted. The bones are osteopenic. IMPRESSION: 1. New bilateral small pleural effusions. 2. Chronic interstitial changes again evident. Echocardiogram results: 
Results for orders placed or performed during the hospital encounter of 19  
2D ECHO COMPLETE ADULT (TTE) W OR WO CONTR Narrative [place] [facility] [address] [place], [address] [facility] 
[phone] Transthoracic Echocardiogram 
2D, M-mode, Doppler, and Color Doppler Patient: Philomena Morales 
MR #: [id number] : 10-Aug-1927 Age: 80 years Gender: Female Study date: 12-May-2019 Account #: [de-identified] Height: 61 in 
Weight: 103.8 lb 
BSA: 1.43 mï¾² Status:Routine Location: 320 BP: 122/ 57 Allergies: NO KNOWN ALLERGENS Sonographer:  Ary Gilman RD Group:  [address] Cardiology Referring Physician:  Xena Foy MD 
Reading Physician:  Fifi Price. Constance Khan MD [facility] INDICATIONS: re-eval CHF PROCEDURE: This was a routine study. A transthoracic echocardiogram was 
performed. The study included complete 2D imaging, M-mode, complete spectral 
Doppler, and color Doppler. Intravenous contrast (Definity, 2 ml) was 
administered. Image quality was adequate. LEFT VENTRICLE: Size was normal. Systolic function was normal. Ejection 
fraction was estimated in the range of 55 % to 60 %. There were no regional 
wall motion abnormalities. Wall thickness was mildly increased. Left 
ventricular diastolic dysfunction was present. Average E/e' of 27.95. 
 
RIGHT VENTRICLE: The size was normal. Systolic function was normal. A pacing 
wire was present. LEFT ATRIUM: The atrium was moderately dilated. RIGHT ATRIUM: Size was at the upper limits of normal. A pacing wire was 
present. SYSTEMIC VEINS: IVC: The inferior vena cava was normal in size and course. The 
respirophasic change in diameter was more than 50%. AORTIC VALVE: The valve was trileaflet. Leaflets exhibited moderate  
sclerosis. There was no evidence for stenosis. There was mild to moderate regurgitation. MITRAL VALVE: There was mild to moderate annular calcification. There was  
mild 
diffuse thickening of the anterior and posterior leaflets. There was no 
evidence for stenosis. There was trivial regurgitation. TRICUSPID VALVE: The valve structure was normal. There was no evidence for 
stenosis. There was mild regurgitation. PULMONIC VALVE: The valve structure was normal. There was no evidence for 
stenosis. There was trivial regurgitation. PERICARDIUM: There was no pericardial effusion. AORTA: The root exhibited normal size. SUMMARY: 
 
-  Left ventricle: Systolic function was normal. Ejection fraction was 
estimated in the range of 55 % to 60 %. There were no regional wall motion 
abnormalities. Wall thickness was mildly increased. -  Left atrium: The atrium was moderately dilated. -  Inferior vena cava, hepatic veins: The respirophasic change in diameter  
was 
more than 50%. -  Aortic valve: There was mild to moderate regurgitation. 
 
-  Mitral valve: There was mild to moderate annular calcification. 
 
-  Tricuspid valve: There was mild regurgitation. SYSTEM MEASUREMENT TABLES 
 
2D mode AoR Diam (2D): 2.9 cm 
LA Dimension (2D): 2.8 cm Left Atrium Systolic Volume Index; Method of Disks, Biplane; 2D mode;: 50.8 
ml/m2 IVS/LVPW (2D): 1.1 IVSd (2D): 1.5 cm LVIDd (2D): 4 cm LVIDs (2D): 2.9 cm 
LVPWd (2D): 1.4 cm RVIDd (2D): 2.6 cm Unspecified Scan Mode Peak Grad; Mean; Antegrade Flow: 10 mm[Hg] Vmax; Antegrade Flow: 163 cm/s Prepared and signed by Triston Fox MD [facility] 
 Signed 13-May-2019 11:45:07 All Micro Results None Labs: Results:  
   
BMP, Mg, Phos Recent Labs 05/14/19 
0012 05/13/19 0449 05/12/19 0422 * 135* 139  
K 3.5 3.6 3.5 CL 95* 96* 101 CO2 31 30 31 AGAP 8 9 7 BUN 24* 25* 22  
CREA 2.45* 2.52* 2.33* CA 8.5 8.2* 8.3 GLU 92 90 83 MG 2.2 2.1 2.1 CBC Recent Labs 05/14/19 0443 05/13/19 0449 05/12/19 0422 05/11/19 
1455 WBC 3.8* 4.3 4.6 5.2  
RBC 3.02* 2.80* 2.85* 3.24* HGB 9.6* 8.8* 9.1* 10.5* HCT 28.7* 27.1* 27.5* 31.7*  241 244 288 GRANS  --   --   --  70  
LYMPH  --   --   --  19  
EOS  --   --   --  1 MONOS  --   --   --  10  
BASOS  --   --   --  0 IG  --   --   --  0 ANEU  --   --   --  3.6 ABL  --   --   --  1.0 TEENA  --   --   --  0.1 ABM  --   --   --  0.5 ABB  --   --   --  0.0 AIG  --   --   --  0.0 LFT Recent Labs 05/11/19 
1455 SGOT 14* ALT 14  
* TP 7.3 ALB 4.0  
GLOB 3.3 AGRAT 1.2 Cardiac Testing Lab Results Component Value Date/Time  (H) 05/14/2019 04:43 AM  
  (H) 05/13/2019 04:49 AM  
  (H) 05/12/2019 04:22 AM  
  02/25/2017 07:20 AM  
 CK 69 07/14/2018 10:06 AM  
 CK - MB 0.9 07/14/2018 10:06 AM  
 CK-MB Index 1.3 07/14/2018 10:06 AM  
 Troponin-I, Qt. <0.02 (L) 05/11/2019 02:55 PM  
 Troponin-I, Qt. <0.02 (L) 07/18/2018 02:42 PM  
 Troponin-I, Qt. <0.02 (L) 07/15/2018 03:01 PM  
  
Coagulation Tests Lab Results Component Value Date/Time Prothrombin time 17.6 (H) 07/14/2018 10:06 AM  
 Prothrombin time 13.3 04/23/2018 05:00 PM  
 Prothrombin time 10.9 03/15/2017 12:25 PM  
 INR 1.5 07/14/2018 10:06 AM  
 INR 1.1 04/23/2018 05:00 PM  
 INR 1.0 03/15/2017 12:25 PM  
 aPTT 44.8 (H) 02/26/2017 02:00 AM  
 aPTT 39.9 (H) 02/25/2017 04:10 PM  
 aPTT 31.7 07/09/2008 11:55 AM  
  
A1c Lab Results Component Value Date/Time  Hemoglobin A1c 6.5 (H) 03/20/2019 02:31 PM  
 Hemoglobin A1c 7.0 (H) 07/12/2018 04:06 PM  
 Hemoglobin A1c 7.0 (H) 04/12/2018 04:02 PM  
  
Lipid Panel Lab Results Component Value Date/Time Cholesterol, total 168 03/20/2019 02:31 PM  
 HDL Cholesterol 42 03/20/2019 02:31 PM  
 LDL, calculated 104 (H) 03/20/2019 02:31 PM  
 VLDL, calculated 22 03/20/2019 02:31 PM  
 Triglyceride 110 03/20/2019 02:31 PM  
 CHOL/HDL Ratio 2.6 12/19/2016 08:50 AM  
  
Thyroid Panel Lab Results Component Value Date/Time TSH 0.702 05/11/2019 08:11 PM  
 TSH 0.492 01/25/2018 04:04 PM  
 T4, Total 15.8 (H) 02/03/2012 04:39 PM  
 T4, Total 14.8 (H) 09/09/2011 09:55 AM  
 T4, Free 1.5 (H) 05/11/2019 08:11 PM  
 T4, Free 1.38 01/25/2018 04:04 PM  
 T3 Uptake 37 02/03/2012 04:39 PM  
 T3 Uptake 38 09/09/2011 09:55 AM  
    
Most Recent UA Lab Results Component Value Date/Time Color Yellow 05/10/2019 11:03 AM  
 Appearance Clear 05/10/2019 11:03 AM  
 Specific gravity 1.018 07/15/2018 01:49 PM  
 pH (UA) 6.5 05/10/2019 11:03 AM  
 Protein Negative 05/10/2019 11:03 AM  
 Glucose Negative 05/10/2019 11:03 AM  
 Ketone Negative 05/10/2019 11:03 AM  
 Bilirubin Negative 05/10/2019 11:03 AM  
 Blood Negative 05/10/2019 11:03 AM  
 Urobilinogen 0.2 E.U./dL 05/10/2019 11:03 AM  
 Nitrites Negative 05/10/2019 11:03 AM  
 Leukocyte Esterase Trace (A) 05/10/2019 11:03 AM  
 WBC 0-5 05/10/2019 11:17 AM  
 RBC 0-2 05/10/2019 11:17 AM  
 Epithelial cells 0-10 05/10/2019 11:17 AM  
 Bacteria Few 05/10/2019 11:17 AM  
 Casts 0 07/15/2018 01:49 PM  
 Crystals, urine 0 07/15/2018 01:49 PM  
 Mucus Present 05/10/2019 11:17 AM  
  
 
No Known Allergies Immunization History Administered Date(s) Administered  Influenza High Dose Vaccine PF 10/08/2015, 09/20/2016, 10/19/2017, 10/01/2018  Influenza Vaccine 10/10/2013, 09/02/2014  Influenza Vaccine Split 10/15/2012  Pneumococcal Conjugate (PCV-13) 11/10/2015  Pneumococcal Polysaccharide (PPSV-23) 04/16/2013  TB Skin Test (PPD) Intradermal 03/09/2017, 04/28/2018, 07/15/2018, 05/11/2019  TDAP Vaccine 10/15/2012 All Labs from Last 24 Hrs: 
Recent Results (from the past 24 hour(s)) PLEASE READ & DOCUMENT PPD TEST IN 48 HRS Collection Time: 05/13/19  9:55 PM  
Result Value Ref Range PPD Negative Negative  
 mm Induration 0 0 - 5 mm MAGNESIUM Collection Time: 05/14/19  4:43 AM  
Result Value Ref Range Magnesium 2.2 1.8 - 2.4 mg/dL CBC W/O DIFF Collection Time: 05/14/19  4:43 AM  
Result Value Ref Range WBC 3.8 (L) 4.3 - 11.1 K/uL  
 RBC 3.02 (L) 4.05 - 5.2 M/uL HGB 9.6 (L) 11.7 - 15.4 g/dL HCT 28.7 (L) 35.8 - 46.3 % MCV 95.0 79.6 - 97.8 FL  
 MCH 31.8 26.1 - 32.9 PG  
 MCHC 33.4 31.4 - 35.0 g/dL  
 RDW 12.9 11.9 - 14.6 % PLATELET 976 594 - 565 K/uL MPV 10.5 9.4 - 12.3 FL ABSOLUTE NRBC 0.00 0.0 - 0.2 K/uL METABOLIC PANEL, BASIC Collection Time: 05/14/19  4:43 AM  
Result Value Ref Range Sodium 134 (L) 136 - 145 mmol/L Potassium 3.5 3.5 - 5.1 mmol/L Chloride 95 (L) 98 - 107 mmol/L  
 CO2 31 21 - 32 mmol/L Anion gap 8 7 - 16 mmol/L Glucose 92 65 - 100 mg/dL BUN 24 (H) 8 - 23 MG/DL Creatinine 2.45 (H) 0.6 - 1.0 MG/DL  
 GFR est AA 24 (L) >60 ml/min/1.73m2 GFR est non-AA 20 (L) >60 ml/min/1.73m2 Calcium 8.5 8.3 - 10.4 MG/DL  
BNP Collection Time: 05/14/19  4:43 AM  
Result Value Ref Range  (H) 0 pg/mL Current Med List in Hospital:  
Current Facility-Administered Medications Medication Dose Route Frequency  acetaminophen (TYLENOL) tablet 650 mg  650 mg Oral Q6H PRN  
 torsemide (DEMADEX) tablet 20 mg  20 mg Oral BID  lidocaine 4 % patch 1 Patch  1 Patch TransDERmal Q24H  
 senna-docusate (PERICOLACE) 8.6-50 mg per tablet 1 Tab  1 Tab Oral DAILY  naloxone (NARCAN) injection 0.4 mg  0.4 mg IntraVENous PRN  
 gabapentin (NEURONTIN) capsule 300 mg  300 mg Oral QHS  aspirin chewable tablet 81 mg  81 mg Oral DAILY  amLODIPine (NORVASC) tablet 10 mg  10 mg Oral DAILY  carvedilol (COREG) tablet 25 mg  25 mg Oral BID WITH MEALS  
 hydrALAZINE (APRESOLINE) tablet 10 mg  10 mg Oral TID  levothyroxine (SYNTHROID) tablet 25 mcg  25 mcg Oral ACB  lisinopril (PRINIVIL, ZESTRIL) tablet 20 mg  20 mg Oral DAILY  pantoprazole (PROTONIX) tablet 40 mg  40 mg Oral ACB  traMADol (ULTRAM) tablet 50 mg  50 mg Oral Q8H PRN  
 traZODone (DESYREL) tablet 100 mg  100 mg Oral QHS Discharge Exam: 
Patient Vitals for the past 24 hrs: 
 Temp Pulse Resp BP SpO2  
05/14/19 1137 98.1 °F (36.7 °C) 75 16 127/52 96 % 05/14/19 0947     98 % 05/14/19 0723 98.3 °F (36.8 °C) 75 17 114/48 93 % 05/14/19 0504 97.8 °F (36.6 °C) 78 18 112/51 90 % 05/14/19 0105 97.8 °F (36.6 °C) 75 18 115/46 97 % 05/13/19 2040 97.9 °F (36.6 °C) 75 18 132/62 96 % 05/13/19 1747  74  120/56   
05/13/19 1646 97.6 °F (36.4 °C) 76 18 124/58 93 % 05/13/19 1556  75  107/58   
05/13/19 1249 97.5 °F (36.4 °C) 75 18 133/58 93 % Oxygen Therapy O2 Sat (%): 96 % (05/14/19 1137) Pulse via Oximetry: 80 beats per minute (05/14/19 0947) O2 Device: Room air (05/14/19 0947) O2 Flow Rate (L/min): 0 l/min (05/14/19 0947) Intake/Output Summary (Last 24 hours) at 5/14/2019 1144 Last data filed at 5/14/2019 0403 Gross per 24 hour Intake 30 ml Output 400 ml Net -370 ml General:    Well nourished. Alert. Eyes:   Normal sclera. Extraocular movements intact. ENT:  Normocephalic, atraumatic. Moist mucous membranes CV:   Regular rate and rhythm. No murmur, rub, or gallop. Lungs:  Clear to auscultation bilaterally. No wheezing, rhonchi, or rales. Abdomen: Soft, nontender, nondistended. Bowel sounds normal.  
Extremities: Warm and dry. No cyanosis or edema. Neurologic: CN II-XII grossly intact. Sensation intact. Skin:     No rashes or jaundice. Psych:  Normal mood and affect. Discharge Info: Current Discharge Medication List  
  
START taking these medications Details  
aspirin 81 mg chewable tablet Take 1 Tab by mouth daily. Qty: 30 Tab, Refills: 0  
  
torsemide (DEMADEX) 20 mg tablet Take 1 Tab by mouth two (2) times a day. Qty: 60 Tab, Refills: 0  
  
lidocaine 4 % patch 1 Patch by TransDERmal route every twenty-four (24) hours. Qty: 5 Patch, Refills: 0 CONTINUE these medications which have NOT CHANGED Details  
traMADol (ULTRAM) 50 mg tablet Take 1 Tab by mouth every eight (8) hours as needed for Pain for up to 30 days. Max Daily Amount: 150 mg. 
Qty: 45 Tab, Refills: 0 Associated Diagnoses: Femoral artery occlusion, left ([facility] [address] traZODone (DESYREL) 50 mg tablet Take 100 mg by mouth nightly. amLODIPine (NORVASC) 10 mg tablet Take 1 Tab by mouth daily. Qty: 30 Tab, Refills: 6  
  
nitroglycerin (NITROSTAT) 0.4 mg SL tablet 1 Tab by SubLINGual route every five (5) minutes as needed. Qty: 1 Bottle, Refills: 3  
  
hydrALAZINE (APRESOLINE) 10 mg tablet Take 1 Tab by mouth three (3) times daily. Qty: 270 Tab, Refills: 3  
  
omeprazole (PRILOSEC) 20 mg capsule Take 1 Cap by mouth daily. Qty: 90 Cap, Refills: 3 Associated Diagnoses: Rib pain on left side  
  
gabapentin (NEURONTIN) 600 mg tablet Take 1 Tab by mouth three (3) times daily. Qty: 270 Tab, Refills: 3  
  
lisinopril (PRINIVIL, ZESTRIL) 20 mg tablet Take 1 Tab by mouth daily. Qty: 90 Tab, Refills: 3 Associated Diagnoses: Chronic atrial fibrillation ([facility] [address] Femoral artery occlusion, left ([facility] [address] Chronic systolic congestive heart failure ([facility] [address] Autonomic neuropathy; Gait disorder; Congestive heart failure, unspecified HF chronicity, unspecified heart failure type ([facility] [address] Type 2 diabetes mellitus with microalbuminuria, without long-term current use of insulin ([facility] [address] Essential hypertension;  Acquired hypothyroidism; Mixed hyperlipidemia; CKD (chronic kidney disease), stage III ([facility] [address] Gastroesophageal reflux disease without esophagitis; Irritable bowel syndrome without diarrhea; Atrial fibrillation with RVR (HCC); MCI (mild cognitive impairment); Chronic pain syndrome; Cervical radiculopathy; Other dysphagia; Pacemaker; SSS (sick sinus syndrome) ([address] Orthostatic hypotension; CAD in native artery; Dyspnea, unspecified type; Atrial fibrillation with rapid ventricular response ([address] Cardiomyopathy, unspecified type ([address] Ischemic cardiomyopathy; DNR (do not resuscitate); S/P AV noris ablation; Biventricular cardiac pacemaker in situ; Type 2 diabetes mellitus with nephropathy ([facility])  
  
carvedilol (COREG) 25 mg tablet Take 1 Tab by mouth two (2) times daily (with meals). Qty: 180 Tab, Refills: 3 Associated Diagnoses: Femoral artery occlusion, left ([address] levothyroxine (SYNTHROID) 25 mcg tablet Take 1 Tab by mouth Daily (before breakfast). Qty: 90 Tab, Refills: 3  
  
prednisoLONE acetate (PRED FORTE) 1 % ophthalmic suspension instill 1 drop into both eyes three times a day for 7 days Refills: 0  
  
docusate sodium (STOOL SOFTENER PO) Take  by mouth as needed. STOP taking these medications  
  
 furosemide (LASIX) 20 mg tablet Comments:  
Reason for Stopping:   
   
  
 
 
Disposition: Home with   
Activity: Regular as tolerated Diet: DIET NUTRITIONAL SUPPLEMENTS All Meals; Ensure [employer] DIET CARDIAC Regular; 2 GM NA (House Low NA); FR 2000ML Follow-up Appointments Procedures  FOLLOW UP VISIT Appointment in: One Week PCP in 1 week will need repeat BMP  
  PCP in 1 week will need repeat BMP Standing Status:   Standing Number of Occurrences:   1 Order Specific Question:   Appointment in Answer: One Week Follow-up Information Follow up With Specialties Details Why Contact Info [facility] CARDIOLOGY  Go on 5/21/2019 2:30pm Please follow up with cardiology after hospitalization [phone] [facility] 
[phone] Jonna JOAUQIN MD Internal Medicine   [address] [facility] [address] [phone] Time spent in patient discharge planning and coordination 35 minutes.  
 
Signed: 
Brit Dasilva NP

## 2019-05-14 NOTE — PROGRESS NOTES
[address] Nephrology Subjective:  CKD4 - breathing is improved Review of Systems -  
General ROS: negative for - fever, chills Respiratory ROS: no SOB, cough, MAGUIRE Cardiovascular ROS: no CP, palpitations Gastrointestinal ROS: no N&V, abdominal pain, diarrhea Genito-Urinary ROS: no difficulty voiding, dysuria Neurological ROS: no seizures, focal weekness Objective: 
 
Vitals:  
 05/14/19 1223 05/14/19 0723 05/14/19 0947 05/14/19 1137 BP: 112/51 114/48  127/52 Pulse: 78 75  75 Resp: 18 17  16 Temp: 97.8 °F (36.6 °C) 98.3 °F (36.8 °C)  98.1 °F (36.7 °C) SpO2: 90% 93% 98% 96% Weight: 45.9 kg (101 lb 4.8 oz) Height:      
 
 
PE 
Gen: in no acute distress CV:reg rate Chest:clear Abd: soft Ext/Access: no edema Aleida Katherine LAB Recent Labs 05/14/19 
4741 05/13/19 0449 05/12/19 
0422 WBC 3.8* 4.3 4.6 HGB 9.6* 8.8* 9.1*  
HCT 28.7* 27.1* 27.5*  
 241 244 Recent Labs 05/14/19 
0443 05/13/19 
0449 05/12/19 
0422 05/11/19 
1455 * 135* 139 135* K 3.5 3.6 3.5 4.0  
CL 95* 96* 101 99 CO2 31 30 31 29 GLU 92 90 83 163* BUN 24* 25* 22 22 CREA 2.45* 2.52* 2.33* 2.33* MG 2.2 2.1 2.1  --   
CA 8.5 8.2* 8.3 8.5  
TROIQ  --   --   --  <0.02* ALB  --   --   --  4.0 TBILI  --   --   --  0.4 ALT  --   --   --  14 SGOT  --   --   --  14* Radiology A/P:  
Patient Active Problem List  
Diagnosis Code  Diabetes mellitus, type 2 ([facility] [address] E11.9  Hypertension I10  
 Acquired hypothyroidism E03.9  Mixed hyperlipidemia E78.2  CKD (chronic kidney disease), stage III ([facility]) N18.3  GERD (gastroesophageal reflux disease) K21.9  
 IBS (irritable bowel syndrome) K58.9  Gait disorder R26.9  Atrial fibrillation with RVR ([facility]) I48.91  
 Autonomic neuropathy G90.9  MCI (mild cognitive impairment) G31.84  Chronic pain syndrome G89.4  Cervical radiculopathy M54.12  Dysphagia R13.10  SSS (sick sinus syndrome) ([facility]) I49.5  Orthostatic hypotension I95.1  CAD in native artery I25.10  Atrial fibrillation with rapid ventricular response (HCC) I48.91  
 Ischemic cardiomyopathy I25.5  DNR (do not resuscitate) Z66  
 S/P AV noris ablation Z98.890  Biventricular cardiac pacemaker in situ Z95.0  Type 2 diabetes mellitus with nephropathy ([facility] [address] E11.21  
 History of thrombosis Z86.718  Chronic systolic congestive heart failure (HCC) I50.22  
 Physical debility R53.81  
 Subdural hematoma (HCC) S06.5X9A  
 Persistent atrial fibrillation (HCC) I48.1  Acute on chronic systolic CHF (congestive heart failure) (HCC) I50.23  
 Acute-on-chronic kidney injury (HCC) N17.9, N18.9  Hypoxia R09.02  
 
 
CKD - renal function  Is stable. HFpEF- diuresing nicely on demadex.   
 
 
Epi Silva MD

## 2019-05-14 NOTE — ROUTINE PROCESS
A personal caregiver at bedside- Cr. From 2.52->2. 45. Family has spoken to Palliative care in consultation and is being set up to speak with hospice to see if patient qualifies vs. palliative care at d/c. Continue to follow dispo, HH vs.PC vs. Hospice. Set up TC 7 at d/c based off of dispo. Patient in no distress, pleasantly confused. 
-4L negative since admit TC7 for May 21st- placed to D/C summary CHF teaching completed, verbalize emphasis on monitoring self and report to MD: 
? If you gain 2 lbs in one day or 5 lbs in a week, and short of breath. ? If you can not lay flat without developing short of breath or rapid breathing at night; or if it wakes you up. Develop a cough or wheezing. ? If you notice swollen hands/feet/ankles or stomach with a bloated/ full feeling. ? If you are  more confused or mentally fuzzy or dizzy. ? If you notice a rapid or change in your heart rate. ? If you become more exhausted all the time and unable to do the same level of activity without stopping to catch your breath. Drink no more than 8 cups a day in 8 oz. cups. Limit Cola Drinks. Your Heart can not handle any more. Stay away from salt (limit anything with salt or sodium in it). Limit to 250mg per serving. Exercise needs to be started with your Doctors approval. 
Reduce stress; Call myself or Provider if assistance is needed. Pass post test via teach back, will make self available post DC ,if an questions arise. Diabetic teaching completed.  Planner/scale @ BS:  60 mins total

## 2020-05-11 NOTE — PROGRESS NOTES
05/09/18 0932   Time Spent With Patient   Time In [address] [place] [address] [address]   Time Out [id number]   Patient Seen For: AM;ADLs   Feeding/Eating   Feeding/Eating Assistance S   Comments setup assist to open packages    Grooming   Grooming Assistance  S   Comments setup assist    Upper Body Bathing   Bathing Assistance, Upper S   Position Performed Other (comment)  (seated on tub transfer bench )   Comments setup assist for soap    Lower Body Bathing   Bathing Assistance, Lower  CGA   Position Performed Standing; Other (comment)  (seated on tub transfer bench )   Comments CGA due to impaired balance    Toileting   Toileting Assistance (FIM Score) Min A  (steadying assist during clothing management )   Upper Body Dressing    Dressing Assistance  S   Comments gathered clothing without AE with CGA   Lower Body Dressing    Dressing Assistance  SBA   Leg Crossed Method Used No   Position Performed Seated edge of bed;Standing   Comments close SBA when standing for clothing management up    Functional Transfers   Toilet Transfer  Elevated seat;Grab bars;Stand pivot transfer with walker   Amount of Assistance Required CGA   Tub or Shower Type Shower   Amount of Assistance Required CGA   Adaptive Equipment Tub transfer bench;Walker (comment);Grab bars   S: \"I don't sleep at night so mornings are hard for me. \"   O: Patient presented sidelying sleeping in bed. Agreeable to treatment. Patient completed ADL; see above for FIMs. Remains with decreased balance during functional mobility and decreased awareness of decreased balance. A: Remains limited by decreased balance and decreased insight into balance deficits. Patient tolerated session well with no complaint of pain. P: Continue OT POC with focus on ADL/IADL skills, functional transfers, functional mobility, coordination, strength, static and dynamic balance, and activity tolerance to maximize safety and independence with ADLs and functional transfers.    Patient was left seated up in recliner with call bell/all other needs in reach. Chair alarm activated. Interdisciplinary communication: Collaborated with PT and confirmed that patient is on track to meet goals.      Lisa Sandoval MS, OTR/L  5/9/2018 aracelis all pertinent systems negative

## 2021-05-17 NOTE — ADDENDUM NOTE
Addendum  created 04/26/18 1337 by Olaf Loyd CRNA    Anesthesia Intra Flowsheets edited Labs/Imaging Studies/Medications

## 2022-06-30 NOTE — PROGRESS NOTES
End Of Shift Functional Summary, Nursing      TOILETING:    Does patient need assist with adjusting pants up or down and/or pericare? yes  If yes, please indicate what the patient needs help with:  Patito care, pulling pants up and down, brief. Pt uses walker. Does the patient require extra time? yes  Does the patient require standby assistance? no  Does the patient require contact guard assistance? yes  Does the patient require more than one staff member for assistance? no    TOILET TRANSFER:    Pt requires minimal assistance. Pt uses walker. Does the patient require extra time? yes  Does the patient require contact guard assistance? yes  Does the patient require more than one staff member for assistance? yes    BLADDER:    Pt does not have a [place] catheter that staff manages. Pt does not take medication. If so, please indicate which medication:    Pt is continent. of bladder and voids in toilet   Pt has had 0 bladder accidents during this shift requiring minimal assistance to clean up. (An accident is when the episode is not contained in a brief AND/OR the clothing/linen requires changing/cleaning up.)    BOWEL:  Pt does take medication. Pt is incontinent of bowel and uses brief. Pt requires staff to change brief    Pt has had 0 bowel accidents during this shift requiring moderate assistance from staff to clean up. (An accident is when the episode is not contained in a brief AND/OR the clothing/linen requires changing/cleaning up.)    BED/CHAIR TRANSFER  Pt requires moderate assistance. Pt uses walker  Does the patient require extra time? yes  Does the patient require contact guard assistance? yes  Does the patient require more than one staff member for assistance? no      EATING  Pt requires no assistance. Pt does not wear dentures. TUBE FEEDINGS:  Pt does not  receive nutrition through tube feedings. Patient requires no assistance with feedings.           Documentation reviewed and plan of care Pt to ED from home with complaints of wanting a blood test for pregnancy. Patient states that she has not been feeling herself and took a pregnancy test this morning that was positive. Patient states that she thinks a few weeks ago she had a miscarriage. Patient states she does not know the date but that she had severe abdominal cramping and heavy bleeding. Patient unsure when last real period was. discussed/reviewed with   patient during the shift.

## 2022-07-23 NOTE — IP AVS SNAPSHOT
Merlin Laroche 
 
 
 [address] [facility] 
[phone] Patient: Doretha Pineda MRN: [id number] :8/10/1927 You are allergic to the following Allergen Reactions Other Medication Unknown (comments) Tylenol (caused Insomnia) Recent Documentation Height  
  
  
  
  
  
 1.626 m Emergency Contacts Name Discharge Info Relation Home Work Mobile Coretha Sender  Child [2] [phone] Sarbjit Irineo  Daughter [21] [phone] About your hospitalization You were admitted on:  2017 You last received care in the:  [facility] 3 TELEMETRY You were discharged on:  2017 Unit phone number:  [phone] Why you were hospitalized Your primary diagnosis was:  Atrial Fibrillation With Rvr (Hcc) Your diagnoses also included:  Acquired Hypothyroidism, Hypertension, Dyspnea, Atrial Fibrillation With Rapid Ventricular Response (Hcc) Providers Seen During Your Hospitalizations Provider Role Specialty Primary office phone Jerald Durant MD Attending Provider Emergency Medicine [phone] Alessia Oneil MD Attending Provider Cardiology [phone] Your Primary Care Physician (PCP) Primary Care Physician Office Phone Office Fax OTHER, PHYS ** None ** ** None ** Follow-up Information Follow up With Details Comments Contact Info Tatum Back MD In 1 week [facility] follow up, Please call office for appointment Patient can only remember the practice name and not the physician Elvira Bartlett MD  The office will call you with a follow up appointment [address] [place] [address] 
[phone] Your Appointments 2017  9:10 AM EDT  
LAB with [facility] PM LAB [address] [facility] [address] [address] [phone] Wednesday April 05, 2017  2:00 PM EDT Office Visit with Sonia Sharma MD  
[facility]) [address] [facility] [place] [address] [phone] Current Discharge Medication List  
  
START taking these medications Dose & Instructions Dispensing Information Comments Morning Noon Evening Bedtime  
 carvedilol 12.5 mg tablet Commonly known as:  Jessika Dome Your next dose is: Today, Tomorrow Other:  _________ Dose:  12.5 mg Take 1 Tab by mouth two (2) times daily (with meals). Quantity:  60 Tab Refills:  6  
     
   
   
   
  
 furosemide 20 mg tablet Commonly known as:  LASIX Your next dose is: Today, Tomorrow Other:  _________ Dose:  20 mg Take 1 Tab by mouth daily. Quantity:  30 Tab Refills:  3  
     
   
   
   
  
 potassium chloride 10 mEq tablet Commonly known as:  K-DUR, KLOR-CON Your next dose is: Today, Tomorrow Other:  _________ Dose:  10 mEq Take 1 Tab by mouth daily. Quantity:  30 Tab Refills:  3 CONTINUE these medications which have CHANGED Dose & Instructions Dispensing Information Comments Morning Noon Evening Bedtime  
 lisinopril 10 mg tablet Commonly known as:  Evangelina Caputo What changed:  See the new instructions. Your next dose is: Today, Tomorrow Other:  _________ Dose:  10 mg Take 1 Tab by mouth daily. Quantity:  30 Tab Refills:  6 CONTINUE these medications which have NOT CHANGED Dose & Instructions Dispensing Information Comments Morning Noon Evening Bedtime  
 amiodarone 200 mg tablet Commonly known as:  CORDARONE Your next dose is: Today, Tomorrow Other:  _________ Dose:  200 mg Take 1 Tab by mouth daily.  Indications: PREVENTION OF RECURRENT ATRIAL FIBRILLATION  
 Quantity:  90 Tab Refills:  3  
     
   
   
   
  
 aspirin delayed-release 81 mg tablet Your next dose is: Today, Tomorrow Other:  _________ Take  by mouth daily. Refills:  0  
     
   
   
   
  
 calcium carbonate 500 mg calcium (1,250 mg) tablet Commonly known as:  OS-CAN Your next dose is: Today, Tomorrow Other:  _________ Dose:  1 Tab  
take 1 Tab by mouth two (2) times a day. Refills:  0  
     
   
   
   
  
 gabapentin 600 mg tablet Commonly known as:  NEURONTIN Your next dose is: Today, Tomorrow Other:  _________ Dose:  600 mg Take 1 Tab by mouth three (3) times daily. Quantity:  360 Tab Refills:  3  
     
   
   
   
  
 glimepiride 1 mg tablet Commonly known as:  AMARYL Your next dose is: Today, Tomorrow Other:  _________ Dose:  1 mg Take 1 mg by mouth. Refills:  0  
     
   
   
   
  
 glucose blood VI test strips strip Commonly known as:  ONETOUCH ULTRA TEST Your next dose is: Today, Tomorrow Other:  _________ Test twice a day Quantity:  4 Package Refills:  3 HYDROcodone-acetaminophen 7.5-325 mg per tablet Commonly known as:  Zelaya Minor Your next dose is: Today, Tomorrow Other:  _________ Dose:  1 Tab Take 1 Tab by mouth three (3) times daily. Max Daily Amount: 3 Tabs. Indications: Pain Quantity:  90 Tab Refills:  0  
     
   
   
   
  
 levothyroxine 25 mcg tablet Commonly known as:  SYNTHROID Your next dose is: Today, Tomorrow Other:  _________ Dose:  25 mcg Take 1 Tab by mouth Daily (before breakfast). Quantity:  90 Tab Refills:  3  
     
   
   
   
  
 nitroglycerin 0.4 mg SL tablet Commonly known as:  NITROSTAT Your next dose is: Today, Tomorrow Other:  _________  Dose:  0.4 mg  
 1 Tab by SubLINGual route every five (5) minutes as needed. Indications: ANGINA Quantity:  1 Bottle Refills:  3  
     
   
   
   
  
 omeprazole 20 mg capsule Commonly known as:  PRILOSEC Your next dose is: Today, Tomorrow Other:  _________ Dose:  20 mg Take 1 Cap by mouth two (2) times a day. Quantity:  180 Cap Refills:  3  
     
   
   
   
  
 polyethylene glycol 17 gram packet Commonly known as:  Sheria Bud Your next dose is: Today, Tomorrow Other:  _________ Dose:  17 g Take 1 Packet by mouth daily. Quantity:  30 Packet Refills:  5 SENNA Your next dose is: Today, Tomorrow Other:  _________ TK 1 T PO QPM  
 Refills:  1  
     
   
   
   
  
 senna-docusate 8.6-50 mg per tablet Commonly known as:  Annalisa Lynnthorne Your next dose is: Today, Tomorrow Other:  _________ Dose:  1 Tab Take 1 Tab by mouth nightly. Quantity:  90 Tab Refills:  3 VITAMIN D3 400 unit Tab tablet Generic drug:  cholecalciferol Your next dose is: Today, Tomorrow Other:  _________ Dose:  1000 Units Take 1,000 Units by mouth daily. Refills:  0  
     
   
   
   
  
 zolpidem 5 mg tablet Commonly known as:  AMBIEN Your next dose is: Today, Tomorrow Other:  _________ Dose:  5 mg Take 1 Tab by mouth nightly as needed for Sleep. Max Daily Amount: 5 mg. Quantity:  30 Tab Refills:  1 STOP taking these medications   
 amLODIPine 5 mg tablet Commonly known as:  NORVASC  
   
  
 cloNIDine HCl 0.1 mg tablet Commonly known as:  CATAPRES  
   
  
 fludrocortisone 0.1 mg tablet Commonly known as:  FLORINEF Where to Get Your Medications Information on where to get these meds will be given to you by the nurse or doctor. ! Ask your nurse or doctor about these medications carvedilol 12.5 mg tablet  
 furosemide 20 mg tablet  
 lisinopril 10 mg tablet  
 potassium chloride 10 mEq tablet Discharge Instructions DISCHARGE SUMMARY from Nurse The following personal items are in your possession at time of discharge: 
 
Dental Appliances: None Visual Aid: Glasses Home Medications: None Jewelry: None Clothing: Pajamas, Footwear, Robe, At bedside Other Valuables: None PATIENT INSTRUCTIONS: 
 
 
F-face looks uneven A-arms unable to move or move unevenly S-speech slurred or non-existent T-time-call 911 as soon as signs and symptoms begin-DO NOT go Back to bed or wait to see if you get better-TIME IS BRAIN. Warning Signs of HEART ATTACK Call 911 if you have these symptoms: 
? Chest discomfort. Most heart attacks involve discomfort in the center of the chest that lasts more than a few minutes, or that goes away and comes back. It can feel like uncomfortable pressure, squeezing, fullness, or pain. ? Discomfort in other areas of the upper body. Symptoms can include pain or discomfort in one or both arms, the back, neck, jaw, or stomach. ? Shortness of breath with or without chest discomfort. ? Other signs may include breaking out in a cold sweat, nausea, or lightheadedness. Don't wait more than five minutes to call [address]! Fast action can save your life. Calling 911 is almost always the fastest way to get lifesaving treatment. Emergency Medical Services staff can begin treatment when they arrive  up to an hour sooner than if someone gets to the hospital by car. The discharge information has been reviewed with the patient. The patient verbalized understanding. Discharge medications reviewed with the patient and appropriate educational materials and side effects teaching were provided. Atrial Fibrillation: Care Instructions Your Care Instructions Atrial fibrillation is an irregular and often fast heartbeat. Treating this condition is important for several reasons. It can cause blood clots, which can travel from your heart to your brain and cause a stroke. If you have a fast heartbeat, you may feel lightheaded, dizzy, and weak. An irregular heartbeat can also increase your risk for heart failure. Atrial fibrillation is often the result of another heart condition, such as high blood pressure or coronary artery disease. Making changes to improve your heart condition will help you stay healthy and active. Follow-up care is a key part of your treatment and safety. Be sure to make and go to all appointments, and call your doctor if you are having problems. It's also a good idea to know your test results and keep a list of the medicines you take. How can you care for yourself at home? Medicines · Take your medicines exactly as prescribed. Call your doctor if you think you are having a problem with your medicine. You will get more details on the specific medicines your doctor prescribes. · If your doctor has given you a blood thinner to prevent a stroke, be sure you get instructions about how to take your medicine safely. Blood thinners can cause serious bleeding problems. · Do not take any vitamins, over-the-counter drugs, or herbal products without talking to your doctor first. 
Lifestyle changes · Do not smoke. Smoking can increase your chance of a stroke and heart attack. If you need help quitting, talk to your doctor about stop-smoking programs and medicines. These can increase your chances of quitting for good. · Eat a heart-healthy diet. · Stay at a healthy weight. Lose weight if you need to. · Limit alcohol to 2 drinks a day for men and 1 drink a day for women. Too much alcohol can cause health problems. · Avoid colds and flu. Get a pneumococcal vaccine shot. If you have had one before, ask your doctor whether you need another dose. Get a flu shot every year. If you must be around people with colds or flu, wash your hands often. Activity · If your doctor recommends it, get more exercise. Walking is a good choice. Bit by bit, increase the amount you walk every day. Try for at least 30 minutes on most days of the week. You also may want to swim, bike, or do other activities. Your doctor may suggest that you join a cardiac rehabilitation program so that you can have help increasing your physical activity safely. · Start light exercise if your doctor says it is okay. Even a small amount will help you get stronger, have more energy, and manage stress. Walking is an easy way to get exercise. Start out by walking a little more than you did in the hospital. Gradually increase the amount you walk. · When you exercise, watch for signs that your heart is working too hard. You are pushing too hard if you cannot talk while you are exercising. If you become short of breath or dizzy or have chest pain, sit down and rest immediately. · Check your pulse regularly. Place two fingers on the artery at the palm side of your wrist, in line with your thumb. If your heartbeat seems uneven or fast, talk to your doctor. When should you call for help? Call 911 anytime you think you may need emergency care. For example, call if: 
· You have symptoms of a heart attack. These may include: ¨ Chest pain or pressure, or a strange feeling in the chest. 
¨ Sweating. ¨ Shortness of breath. ¨ Nausea or vomiting. ¨ Pain, pressure, or a strange feeling in the back, neck, jaw, or upper belly or in one or both shoulders or arms. ¨ Lightheadedness or sudden weakness. ¨ A fast or irregular heartbeat. After you call 911, the  may tell you to chew 1 adult-strength or 2 to 4 low-dose aspirin. Wait for an ambulance. Do not try to drive yourself. · You have symptoms of a stroke. These may include: 
¨ Sudden numbness, tingling, weakness, or loss of movement in your face, arm, or leg, especially on only one side of your body. ¨ Sudden vision changes. ¨ Sudden trouble speaking. ¨ Sudden confusion or trouble understanding simple statements. ¨ Sudden problems with walking or balance. ¨ A sudden, severe headache that is different from past headaches. · You passed out (lost consciousness). Call your doctor now or seek immediate medical care if: 
· You have new or increased shortness of breath. · You feel dizzy or lightheaded, or you feel like you may faint. · Your heart rate becomes irregular. · You can feel your heart flutter in your chest or skip heartbeats. Tell your doctor if these symptoms are new or worse. Watch closely for changes in your health, and be sure to contact your doctor if you have any problems. Where can you learn more? Go to http://david-rui.info/. Enter U020 in the search box to learn more about \"Atrial Fibrillation: Care Instructions. \" Current as of: January 27, 2016 Content Version: 11.1 © 6950-4715 [employer]. Care instructions adapted under license by [employer] (which disclaims liability or warranty for this information). If you have questions about a medical condition or this instruction, always ask your healthcare professional. [place] [address] any warranty or liability for your use of this information. Avoiding Triggers With Heart Failure: Care Instructions Your Care Instructions Triggers are anything that make your heart failure flare up. A flare-up is also called \"sudden heart failure\" or \"acute heart failure. \" When you have a flare-up, fluid builds up in your lungs, and you have problems breathing. You might need to go to the hospital. By watching for changes in your condition and avoiding triggers, you can prevent heart failure flare-ups. Follow-up care is a key part of your treatment and safety. Be sure to make and go to all appointments, and call your doctor if you are having problems. It's also a good idea to know your test results and keep a list of the medicines you take. How can you care for yourself at home? Watch for changes in your weight and condition · Weigh yourself without clothing at the same time each day. Record your weight. Call your doctor if you gain 3 pounds or more in 2 to 3 days. A sudden weight gain may mean that your heart failure is getting worse. · Keep a daily record of your symptoms. Write down any changes in how you feel, such as new shortness of breath, cough, or problems eating. Also record if your ankles are more swollen than usual and if you have to urinate in the night more often. Note anything that you ate or did that could have triggered these changes. Limit sodium Sodium causes your body to hold on to water, making it harder for your heart to pump. People get most of their sodium from processed foods. Fast food and restaurant meals also tend to be very high in sodium. · Your doctor may suggest that you limit sodium to 2,000 milligrams (mg) a day or less. That is less than 1 teaspoon of salt a day, including all the salt you eat in cooking or in packaged foods. · Read food labels on cans and food packages. They tell you how much sodium you get in one serving. Check the serving size. If you eat more than one serving, you are getting more sodium. · Be aware that sodium can come in forms other than salt, including monosodium glutamate (MSG), sodium citrate, and sodium bicarbonate (baking soda). MSG is often added to Asian food. You can sometimes ask for food without MSG or salt. · Slowly reducing salt will help you adjust to the taste. Take the salt shaker off the table. · Flavor your food with garlic, lemon juice, onion, vinegar, herbs, and spices instead of salt. Do not use soy sauce, steak sauce, onion salt, garlic salt, mustard, or ketchup on your food, unless it is labeled \"low-sodium\" or \"low-salt. \" 
· Make your own salad dressings, sauces, and ketchup without adding salt. · Use fresh or frozen ingredients, instead of canned ones, whenever you can. Choose low-sodium canned goods. · Eat less processed food and food from restaurants, including fast food. Exercise as directed Moderate, regular exercise is very good for your heart. It improves your blood flow and helps control your weight. But too much exercise can stress your heart and cause a heart failure flare-up. · Check with your doctor before you start an exercise program. 
· Walking is an easy way to get exercise. Start out slowly. Gradually increase the length and pace of your walk. Swimming, riding a bike, and using a treadmill are also good forms of exercise. · When you exercise, watch for signs that your heart is working too hard. You are pushing yourself too hard if you cannot talk while you are exercising. If you become short of breath or dizzy or have chest pain, stop, sit down, and rest. 
· Do not exercise when you do not feel well. Take medicines correctly · Take your medicines exactly as prescribed. Call your doctor if you think you are having a problem with your medicine. · Make a list of all the medicines you take. Include those prescribed to you by other doctors and any over-the-counter medicines, vitamins, or supplements you take. Take this list with you when you go to any doctor. · Take your medicines at the same time every day. It may help you to post a list of all the medicines you take every day and what time of day you take them. · Make taking your medicine as simple as you can. Plan times to take your medicines when you are doing other things, such as eating a meal or getting ready for bed. This will make it easier to remember to take your medicines. · Get organized. Use helpful tools, such as daily or weekly pill containers. When should you call for help? Call 911 if you have symptoms of sudden heart failure such as: 
· You have severe trouble breathing. · You cough up pink, foamy mucus. · You have a new irregular or rapid heartbeat. Call your doctor now or seek immediate medical care if: 
· You have new or increased shortness of breath. · You are dizzy or lightheaded, or you feel like you may faint. · You have sudden weight gain, such as 3 pounds or more in 2 to 3 days. · You have increased swelling in your legs, ankles, or feet. · You are suddenly so tired or weak that you cannot do your usual activities. Watch closely for changes in your health, and be sure to contact your doctor if you develop new symptoms. Where can you learn more? Go to http://david-rui.info/. Enter L505 in the search box to learn more about \"Avoiding Triggers With Heart Failure: Care Instructions. \" Current as of: April 27, 2016 Content Version: 11.1 © 1712-9997 [employer]. Care instructions adapted under license by [employer] (which disclaims liability or warranty for this information). If you have questions about a medical condition or this instruction, always ask your healthcare professional. [place] [address] any warranty or liability for your use of this information. Discharge Orders None ACO Transitions of Care Introducing [employer] [address] Diann Reyes offers a voluntary care coordination program to provide high quality service and care to [facility] fee-for-service beneficiaries. Gideon Akbar was designed to help you enhance your health and well-being through the following services: ? Transitions of Care  support for individuals who are transitioning from one care setting to another (example: Hospital to home). ? Chronic and Complex Care Coordination  support for individuals and caregivers of those with serious or chronic illnesses or with more than one chronic (ongoing) condition and those who take a number of different medications. If you meet specific medical criteria, a [address] [place] [address] Rd may call you directly to coordinate your care with your primary care physician and your other care providers. For questions about the [facility] programs, please, contact your physicians office. For general questions or additional information about Accountable Care Organizations: 
Please visit www.medicare.gov/acos. html or call 1-800-MEDICARE (3-222.570.3103) TTY users should call 6-218.231.5645. [employer] Announcement We are excited to announce that we are making your provider's discharge notes available to you in [employer]. You will see these notes when they are completed and signed by the physician that discharged you from your recent hospital stay. If you have any questions or concerns about any information you see in [employer], please call the Health Information Department where you were seen or reach out to your Primary Care Provider for more information about your plan of care. Introducing [facility] [place] [facility]! Sharan Falcon introduces [employer] patient portal. Now you can access parts of your medical record, email your doctor's office, and request medication refills online. 1. In your internet browser, go to [email or web address] 2. Click on the First Time User? Click Here link in the Sign In box. You will see the New Member Sign Up page. 3. Enter your [employer] Access Code exactly as it appears below.  You will No not need to use this code after youve completed the sign-up process. If you do not sign up before the expiration date, you must request a new code. · [employer] Access Code: [facility] Expires: 5/25/2017  9:40 AM 
 
4. Enter the last four digits of your Social Security Number (xxxx) and Date of Birth (mm/dd/yyyy) as indicated and click Submit. You will be taken to the next sign-up page. 5. Create a [employer] ID. This will be your [employer] login ID and cannot be changed, so think of one that is secure and easy to remember. 6. Create a [employer] password. You can change your password at any time. 7. Enter your Password Reset Question and Answer. This can be used at a later time if you forget your password. 8. Enter your e-mail address. You will receive e-mail notification when new information is available in [address] 9. Click Sign Up. You can now view and download portions of your medical record. 10. Click the Download Summary menu link to download a portable copy of your medical information. If you have questions, please visit the Frequently Asked Questions section of the [employer] website. Remember, [employer] is NOT to be used for urgent needs. For medical emergencies, dial 911. Now available from your iPhone and Android! General Information Please provide this summary of care documentation to your next provider. Patient Signature:  ____________________________________________________________ Date:  ____________________________________________________________  
  
Catherine Mustafa Provider Signature:  ____________________________________________________________ Date:  ____________________________________________________________

## 2023-06-28 NOTE — PROGRESS NOTES
TRANSFER - OUT REPORT:    Verbal report given to Jomar Heaton RN on Arie Damian  being transferred to [facility] unit 2 room 251 for routine progression of care       Report consisted of patients Situation, Background, Assessment and   Recommendations(SBAR). Information from the following report(s) SBAR, Kardex, ED Summary, Procedure Summary, Intake/Output, MAR and Recent Results was reviewed with the receiving nurse. Lines:      Opportunity for questions and clarification was provided.       Patient transported with:  [facility] declines

## 2023-08-13 NOTE — ED NOTES
Patient is resting on the stretcher. Patient is on B cardiac monitor, continuous pulse ox, and cycling vital signs. Patient denies needs at this time. Visitor at bedside. Call light within reach. Side rails x 2 for safety. Will continue to monitor. Patient received two warm blankets for comfort. Her/She

## (undated) DEVICE — APPLIER CLP L9.38IN M LIG TI DISP STR RNG HNDL LIGACLP

## (undated) DEVICE — APPLIER CLP L9.375IN APER 2.1MM CLS L3.8MM 20 SM TI CLP

## (undated) DEVICE — 2000CC GUARDIAN II: Brand: GUARDIAN

## (undated) DEVICE — PAD,NON-ADHERENT,3X8,STERILE,LF,1/PK: Brand: MEDLINE

## (undated) DEVICE — SYR LR LCK 1ML GRAD NSAF 30ML --

## (undated) DEVICE — INTENDED TO BE USED TO OCCLUDE, RETRACT AND IDENTIFY ARTERIES, VEINS, TENDONS AND NERVES IN SURGICAL PROCEDURES: Brand: STERION®  VESSEL LOOP

## (undated) DEVICE — AMD ANTIMICROBIAL NON-ADHERENT PAD,0.2% POLYHEXAMETHYLENE BIGUANIDE HCI (PHMB): Brand: TELFA

## (undated) DEVICE — Device

## (undated) DEVICE — CLAMP INSERT: Brand: STEALTH® FIBRA® CLAMP INSERT

## (undated) DEVICE — DRAPE TWL SURG 16X26IN BLU ORB04] ALLCARE INC]

## (undated) DEVICE — SUTURE VCRL SZ 3-0 L27IN ABSRB UD L26MM SH 1/2 CIR J416H

## (undated) DEVICE — INTRODUCER SHTH 8FR CANN L5.5CM DIL TIP 35MM BLU TUNGSTEN

## (undated) DEVICE — SYRINGE MED 3ML NDL 23GA L1IN PLAS N CTRL LUERLOCK TIP REG

## (undated) DEVICE — SUTURE PROL SZ 5-0 L24IN NONABSORBABLE BLU L13MM C-1 3/8 M8725

## (undated) DEVICE — AGENT HEMSTAT W4XL4IN OXIDIZED REGENERATED CELOS ABSRB SFT

## (undated) DEVICE — 3M™ IOBAN™ 2 ANTIMICROBIAL INCISE DRAPE 6648EZ: Brand: IOBAN™ 2

## (undated) DEVICE — WIPE INSTR HIGH ABSORBENT FAST WICKING LINT FREE COUNT 20

## (undated) DEVICE — 3M™ TEGADERM™ TRANSPARENT FILM DRESSING FRAME STYLE, 1626W, 4 IN X 4-3/4 IN (10 CM X 12 CM), 50/CT 4CT/CASE: Brand: 3M™ TEGADERM™

## (undated) DEVICE — DRAPE XR C ARM 41X74IN LF --

## (undated) DEVICE — SUTURE PERMAHAND SZ 3-0 L18IN NONABSORBABLE BLK SILK BRAID A184H

## (undated) DEVICE — SYRINGE ANGIO CNTRST DEL 20 CC POLYCARB DK GRN MEDALLION

## (undated) DEVICE — BENTSON WIRE GUIDE 20CM DISTAL FLEXIBILITY WITH SOFTENED TIP: Brand: BENTSON

## (undated) DEVICE — DISH PETRI W/LID STRL -- MIN CASE ORDER IS 2 CA

## (undated) DEVICE — UNIVERSAL DRAPES: Brand: MEDLINE INDUSTRIES, INC.

## (undated) DEVICE — SYRINGE MED 20ML WHT PLUNG CLR POLYCARB BRL FIX M LUER CONN

## (undated) DEVICE — STOCKINETTE,DOUBLE PLY,6X48,STERILE: Brand: MEDLINE

## (undated) DEVICE — STOCKINETTE TUBE 6X48 -- MEDICHOICE

## (undated) DEVICE — BASIC SINGLE BASIN-LF: Brand: MEDLINE INDUSTRIES, INC.

## (undated) DEVICE — (D)ADHESIVE TISS HI VISC 1ML -- DISC USE ITEM 346585

## (undated) DEVICE — SYRINGE WITH HYPODERMIC SAFETY NEEDLE: Brand: MAGELLAN

## (undated) DEVICE — SUTURE NONABSORBABLE MONOFILAMENT 5-0 C-1 1X24 IN PROLENE 8725H

## (undated) DEVICE — SUT SLK 4-0 18IN TIE MP BLK --

## (undated) DEVICE — CATHETER VASC AD 5FR L65CM 0.038 DIAG KA 2 PERIPH W/O

## (undated) DEVICE — SUTURE MCRYL SZ 4-0 L27IN ABSRB UD L19MM PS-2 1/2 CIR PRIM Y426H

## (undated) DEVICE — REM POLYHESIVE ADULT PATIENT RETURN ELECTRODE: Brand: VALLEYLAB

## (undated) DEVICE — FOGARTY ARTERIAL EMBOLECTOMY CATHETER 4F 80CM: Brand: FOGARTY

## (undated) DEVICE — SUTURE PROL SZ 5-0 L24IN NONABSORBABLE BLU L9.3MM BV-1 3/8 9702H

## (undated) DEVICE — SUTURE PERMAHAND SZ 2-0 L12X18IN NONABSORBABLE BLK SILK A185H

## (undated) DEVICE — COVER LT HNDL FOR OR SURG LAMP

## (undated) DEVICE — (D)PREP SKN CHLRAPRP APPL 26ML -- CONVERT TO ITEM 371833

## (undated) DEVICE — SYR 1ML TB 1/100ML GRAD --

## (undated) DEVICE — SUTURE PROL SZ 6-0 L24IN NONABSORBABLE BLU BV-1 L9.3MM 3/8 M8805

## (undated) DEVICE — GEL US 20GM NONIRRITATING OVERWRAPPED FILE PCH TRNSMIT

## (undated) DEVICE — DISH PTRI STRL --